# Patient Record
Sex: FEMALE | Race: WHITE | NOT HISPANIC OR LATINO | Employment: UNEMPLOYED | URBAN - METROPOLITAN AREA
[De-identification: names, ages, dates, MRNs, and addresses within clinical notes are randomized per-mention and may not be internally consistent; named-entity substitution may affect disease eponyms.]

---

## 2021-05-28 ENCOUNTER — HOSPITAL ENCOUNTER (EMERGENCY)
Facility: HOSPITAL | Age: 56
Discharge: HOME | End: 2021-05-28
Attending: EMERGENCY MEDICINE
Payer: MEDICARE

## 2021-05-28 VITALS
SYSTOLIC BLOOD PRESSURE: 124 MMHG | HEART RATE: 76 BPM | WEIGHT: 172.31 LBS | OXYGEN SATURATION: 96 % | DIASTOLIC BLOOD PRESSURE: 70 MMHG | TEMPERATURE: 99 F | RESPIRATION RATE: 17 BRPM

## 2021-05-28 DIAGNOSIS — E87.6 HYPOKALEMIA: ICD-10-CM

## 2021-05-28 DIAGNOSIS — R51.9 GENERALIZED HEADACHE: ICD-10-CM

## 2021-05-28 DIAGNOSIS — R11.0 NAUSEA: ICD-10-CM

## 2021-05-28 DIAGNOSIS — R07.9 CHEST PAIN: ICD-10-CM

## 2021-05-28 DIAGNOSIS — E86.0 DEHYDRATION: Primary | ICD-10-CM

## 2021-05-28 DIAGNOSIS — F17.200 SMOKER: ICD-10-CM

## 2021-05-28 LAB
ALBUMIN SERPL BCP-MCNC: 3.9 G/DL (ref 3.5–5.2)
ALP SERPL-CCNC: 62 U/L (ref 38–126)
ALT SERPL W/O P-5'-P-CCNC: 25 U/L (ref 10–44)
AMPHET+METHAMPHET UR QL: NORMAL
ANION GAP SERPL CALC-SCNC: 6 MMOL/L (ref 8–16)
AST SERPL-CCNC: 30 U/L (ref 15–46)
BARBITURATES UR QL SCN>200 NG/ML: NEGATIVE
BASOPHILS # BLD AUTO: 0.07 K/UL (ref 0–0.2)
BASOPHILS NFR BLD: 0.8 % (ref 0–1.9)
BENZODIAZ UR QL SCN>200 NG/ML: NEGATIVE
BILIRUB SERPL-MCNC: 0.3 MG/DL (ref 0.1–1)
BILIRUB UR QL STRIP: NEGATIVE
BZE UR QL SCN: NEGATIVE
CALCIUM SERPL-MCNC: 8.8 MG/DL (ref 8.7–10.5)
CANNABINOIDS UR QL SCN: NEGATIVE
CHLORIDE SERPL-SCNC: 100 MMOL/L (ref 95–110)
CK SERPL-CCNC: 85 U/L (ref 55–170)
CLARITY UR REFRACT.AUTO: CLEAR
CO2 SERPL-SCNC: 29 MMOL/L (ref 23–29)
COLOR UR AUTO: NORMAL
CREAT SERPL-MCNC: 0.75 MG/DL (ref 0.5–1.4)
CREAT UR-MCNC: 28.5 MG/DL (ref 15–325)
DIFFERENTIAL METHOD: NORMAL
EOSINOPHIL # BLD AUTO: 0.1 K/UL (ref 0–0.5)
EOSINOPHIL NFR BLD: 1.1 % (ref 0–8)
ERYTHROCYTE [DISTWIDTH] IN BLOOD BY AUTOMATED COUNT: 13.4 % (ref 11.5–14.5)
EST. GFR  (AFRICAN AMERICAN): >60 ML/MIN/1.73 M^2
EST. GFR  (NON AFRICAN AMERICAN): >60 ML/MIN/1.73 M^2
ETHANOL SERPL-MCNC: <10 MG/DL
GLUCOSE SERPL-MCNC: 114 MG/DL (ref 70–110)
GLUCOSE UR QL STRIP: NEGATIVE
HCT VFR BLD AUTO: 37.7 % (ref 37–48.5)
HGB BLD-MCNC: 12.4 G/DL (ref 12–16)
HGB UR QL STRIP: NEGATIVE
IMM GRANULOCYTES # BLD AUTO: 0.02 K/UL (ref 0–0.04)
IMM GRANULOCYTES NFR BLD AUTO: 0.2 % (ref 0–0.5)
KETONES UR QL STRIP: NEGATIVE
LEUKOCYTE ESTERASE UR QL STRIP: NEGATIVE
LIPASE SERPL-CCNC: 69 U/L (ref 23–300)
LYMPHOCYTES # BLD AUTO: 3.2 K/UL (ref 1–4.8)
LYMPHOCYTES NFR BLD: 35.9 % (ref 18–48)
MCH RBC QN AUTO: 29.1 PG (ref 27–31)
MCHC RBC AUTO-ENTMCNC: 32.9 G/DL (ref 32–36)
MCV RBC AUTO: 89 FL (ref 82–98)
METHADONE UR QL SCN>300 NG/ML: NEGATIVE
MONOCYTES # BLD AUTO: 0.6 K/UL (ref 0.3–1)
MONOCYTES NFR BLD: 6.5 % (ref 4–15)
NEUTROPHILS # BLD AUTO: 4.9 K/UL (ref 1.8–7.7)
NEUTROPHILS NFR BLD: 55.5 % (ref 38–73)
NITRITE UR QL STRIP: NEGATIVE
NRBC BLD-RTO: 0 /100 WBC
NT-PROBNP SERPL-MCNC: 157 PG/ML (ref 5–900)
OPIATES UR QL SCN: NEGATIVE
PCP UR QL SCN>25 NG/ML: NEGATIVE
PH UR STRIP: 7 [PH] (ref 5–8)
PLATELET # BLD AUTO: 248 K/UL (ref 150–450)
PMV BLD AUTO: 11.2 FL (ref 9.2–12.9)
POTASSIUM SERPL-SCNC: 3.3 MMOL/L (ref 3.5–5.1)
PROT SERPL-MCNC: 8 G/DL (ref 6–8.4)
PROT UR QL STRIP: NEGATIVE
RBC # BLD AUTO: 4.26 M/UL (ref 4–5.4)
SODIUM SERPL-SCNC: 135 MMOL/L (ref 136–145)
SP GR UR STRIP: 1 (ref 1–1.03)
TOXICOLOGY INFORMATION: NORMAL
TROPONIN I SERPL-MCNC: <0.012 NG/ML (ref 0.01–0.03)
TROPONIN I SERPL-MCNC: <0.012 NG/ML (ref 0.01–0.03)
URN SPEC COLLECT METH UR: NORMAL
UROBILINOGEN UR STRIP-ACNC: NEGATIVE EU/DL
UUN UR-MCNC: 12 MG/DL (ref 7–17)
WBC # BLD AUTO: 8.87 K/UL (ref 3.9–12.7)

## 2021-05-28 PROCEDURE — 25000003 PHARM REV CODE 250: Mod: ER | Performed by: EMERGENCY MEDICINE

## 2021-05-28 PROCEDURE — 93005 ELECTROCARDIOGRAM TRACING: CPT | Mod: ER

## 2021-05-28 PROCEDURE — 84484 ASSAY OF TROPONIN QUANT: CPT | Mod: 91,ER | Performed by: EMERGENCY MEDICINE

## 2021-05-28 PROCEDURE — 80053 COMPREHEN METABOLIC PANEL: CPT | Mod: ER | Performed by: EMERGENCY MEDICINE

## 2021-05-28 PROCEDURE — 93010 EKG 12-LEAD: ICD-10-PCS | Mod: ,,, | Performed by: INTERNAL MEDICINE

## 2021-05-28 PROCEDURE — 96374 THER/PROPH/DIAG INJ IV PUSH: CPT | Mod: ER

## 2021-05-28 PROCEDURE — 99285 EMERGENCY DEPT VISIT HI MDM: CPT | Mod: 25,ER

## 2021-05-28 PROCEDURE — 93010 ELECTROCARDIOGRAM REPORT: CPT | Mod: ,,, | Performed by: INTERNAL MEDICINE

## 2021-05-28 PROCEDURE — 80307 DRUG TEST PRSMV CHEM ANLYZR: CPT | Mod: ER | Performed by: EMERGENCY MEDICINE

## 2021-05-28 PROCEDURE — 83690 ASSAY OF LIPASE: CPT | Mod: ER | Performed by: EMERGENCY MEDICINE

## 2021-05-28 PROCEDURE — 63600175 PHARM REV CODE 636 W HCPCS: Mod: ER | Performed by: EMERGENCY MEDICINE

## 2021-05-28 PROCEDURE — 82077 ASSAY SPEC XCP UR&BREATH IA: CPT | Mod: ER | Performed by: EMERGENCY MEDICINE

## 2021-05-28 PROCEDURE — 96375 TX/PRO/DX INJ NEW DRUG ADDON: CPT | Mod: ER

## 2021-05-28 PROCEDURE — 85025 COMPLETE CBC W/AUTO DIFF WBC: CPT | Mod: ER | Performed by: EMERGENCY MEDICINE

## 2021-05-28 PROCEDURE — 82550 ASSAY OF CK (CPK): CPT | Mod: ER | Performed by: EMERGENCY MEDICINE

## 2021-05-28 PROCEDURE — 83880 ASSAY OF NATRIURETIC PEPTIDE: CPT | Mod: ER | Performed by: EMERGENCY MEDICINE

## 2021-05-28 PROCEDURE — 96361 HYDRATE IV INFUSION ADD-ON: CPT | Mod: ER

## 2021-05-28 PROCEDURE — 94760 N-INVAS EAR/PLS OXIMETRY 1: CPT | Mod: ER

## 2021-05-28 PROCEDURE — 81003 URINALYSIS AUTO W/O SCOPE: CPT | Mod: ER,59 | Performed by: EMERGENCY MEDICINE

## 2021-05-28 RX ORDER — POTASSIUM CHLORIDE 20 MEQ/1
20 TABLET, EXTENDED RELEASE ORAL
Status: COMPLETED | OUTPATIENT
Start: 2021-05-28 | End: 2021-05-28

## 2021-05-28 RX ORDER — LIDOCAINE HYDROCHLORIDE 20 MG/ML
10 SOLUTION OROPHARYNGEAL
Status: COMPLETED | OUTPATIENT
Start: 2021-05-28 | End: 2021-05-28

## 2021-05-28 RX ORDER — MAG HYDROX/ALUMINUM HYD/SIMETH 200-200-20
30 SUSPENSION, ORAL (FINAL DOSE FORM) ORAL
Status: COMPLETED | OUTPATIENT
Start: 2021-05-28 | End: 2021-05-28

## 2021-05-28 RX ORDER — ASPIRIN 325 MG
325 TABLET ORAL
Status: COMPLETED | OUTPATIENT
Start: 2021-05-28 | End: 2021-05-28

## 2021-05-28 RX ORDER — DEXAMETHASONE SODIUM PHOSPHATE 4 MG/ML
8 INJECTION, SOLUTION INTRA-ARTICULAR; INTRALESIONAL; INTRAMUSCULAR; INTRAVENOUS; SOFT TISSUE
Status: COMPLETED | OUTPATIENT
Start: 2021-05-28 | End: 2021-05-28

## 2021-05-28 RX ORDER — SODIUM CHLORIDE 9 MG/ML
INJECTION, SOLUTION INTRAVENOUS
Status: COMPLETED | OUTPATIENT
Start: 2021-05-28 | End: 2021-05-28

## 2021-05-28 RX ORDER — PANTOPRAZOLE SODIUM 40 MG/1
40 TABLET, DELAYED RELEASE ORAL
Status: COMPLETED | OUTPATIENT
Start: 2021-05-28 | End: 2021-05-28

## 2021-05-28 RX ORDER — METOCLOPRAMIDE HYDROCHLORIDE 5 MG/ML
10 INJECTION INTRAMUSCULAR; INTRAVENOUS
Status: COMPLETED | OUTPATIENT
Start: 2021-05-28 | End: 2021-05-28

## 2021-05-28 RX ORDER — ALBUTEROL SULFATE 1.25 MG/3ML
1.25 SOLUTION RESPIRATORY (INHALATION) EVERY 6 HOURS PRN
COMMUNITY

## 2021-05-28 RX ORDER — ONDANSETRON 2 MG/ML
4 INJECTION INTRAMUSCULAR; INTRAVENOUS
Status: COMPLETED | OUTPATIENT
Start: 2021-05-28 | End: 2021-05-28

## 2021-05-28 RX ORDER — FAMOTIDINE 20 MG/1
20 TABLET, FILM COATED ORAL
Status: COMPLETED | OUTPATIENT
Start: 2021-05-28 | End: 2021-05-28

## 2021-05-28 RX ADMIN — FAMOTIDINE 20 MG: 20 TABLET ORAL at 09:05

## 2021-05-28 RX ADMIN — METOCLOPRAMIDE 10 MG: 5 INJECTION, SOLUTION INTRAMUSCULAR; INTRAVENOUS at 09:05

## 2021-05-28 RX ADMIN — DEXAMETHASONE SODIUM PHOSPHATE 8 MG: 4 INJECTION, SOLUTION INTRAMUSCULAR; INTRAVENOUS at 09:05

## 2021-05-28 RX ADMIN — PANTOPRAZOLE SODIUM 40 MG: 40 TABLET, DELAYED RELEASE ORAL at 09:05

## 2021-05-28 RX ADMIN — ASPIRIN 325 MG ORAL TABLET 325 MG: 325 PILL ORAL at 05:05

## 2021-05-28 RX ADMIN — SODIUM CHLORIDE 1000 ML: 0.9 INJECTION, SOLUTION INTRAVENOUS at 10:05

## 2021-05-28 RX ADMIN — ALUMINUM HYDROXIDE, MAGNESIUM HYDROXIDE, AND SIMETHICONE 30 ML: 200; 200; 20 SUSPENSION ORAL at 09:05

## 2021-05-28 RX ADMIN — POTASSIUM CHLORIDE 20 MEQ: 1500 TABLET, EXTENDED RELEASE ORAL at 06:05

## 2021-05-28 RX ADMIN — LIDOCAINE HYDROCHLORIDE 10 ML: 20 SOLUTION ORAL; TOPICAL at 09:05

## 2021-05-28 RX ADMIN — SODIUM CHLORIDE 1000 ML: 0.9 INJECTION, SOLUTION INTRAVENOUS at 09:05

## 2021-05-28 RX ADMIN — ONDANSETRON 4 MG: 2 INJECTION INTRAMUSCULAR; INTRAVENOUS at 05:05

## 2021-05-28 RX ADMIN — SODIUM CHLORIDE: 0.9 INJECTION, SOLUTION INTRAVENOUS at 05:05

## 2021-06-08 ENCOUNTER — HOSPITAL ENCOUNTER (EMERGENCY)
Facility: HOSPITAL | Age: 56
Discharge: HOME | End: 2021-06-08
Attending: EMERGENCY MEDICINE
Payer: MEDICARE

## 2021-06-08 VITALS
BODY MASS INDEX: 23.1 KG/M2 | HEART RATE: 86 BPM | TEMPERATURE: 99 F | RESPIRATION RATE: 15 BRPM | HEIGHT: 71 IN | OXYGEN SATURATION: 98 % | WEIGHT: 165 LBS | SYSTOLIC BLOOD PRESSURE: 121 MMHG | DIASTOLIC BLOOD PRESSURE: 71 MMHG

## 2021-06-08 DIAGNOSIS — L03.115 CELLULITIS OF RIGHT LOWER EXTREMITY: Primary | ICD-10-CM

## 2021-06-08 LAB
AMPHET+METHAMPHET UR QL: NORMAL
BACTERIA #/AREA URNS AUTO: NORMAL /HPF
BARBITURATES UR QL SCN>200 NG/ML: NEGATIVE
BENZODIAZ UR QL SCN>200 NG/ML: NEGATIVE
BILIRUB UR QL STRIP: ABNORMAL
BZE UR QL SCN: NEGATIVE
CANNABINOIDS UR QL SCN: NEGATIVE
CLARITY UR REFRACT.AUTO: CLEAR
COLOR UR AUTO: YELLOW
CREAT UR-MCNC: 245.4 MG/DL (ref 15–325)
GLUCOSE UR QL STRIP: NEGATIVE
HGB UR QL STRIP: ABNORMAL
HYALINE CASTS UR QL AUTO: 0 /LPF
KETONES UR QL STRIP: ABNORMAL
LEUKOCYTE ESTERASE UR QL STRIP: ABNORMAL
METHADONE UR QL SCN>300 NG/ML: NEGATIVE
MICROSCOPIC COMMENT: NORMAL
NITRITE UR QL STRIP: NEGATIVE
OPIATES UR QL SCN: NEGATIVE
PCP UR QL SCN>25 NG/ML: NEGATIVE
PH UR STRIP: 6 [PH] (ref 5–8)
PROT UR QL STRIP: ABNORMAL
RBC #/AREA URNS AUTO: 2 /HPF (ref 0–4)
SP GR UR STRIP: 1.02 (ref 1–1.03)
TOXICOLOGY INFORMATION: NORMAL
URN SPEC COLLECT METH UR: ABNORMAL
UROBILINOGEN UR STRIP-ACNC: ABNORMAL EU/DL
WBC #/AREA URNS AUTO: 2 /HPF (ref 0–5)

## 2021-06-08 PROCEDURE — 81000 URINALYSIS NONAUTO W/SCOPE: CPT | Mod: ER | Performed by: EMERGENCY MEDICINE

## 2021-06-08 PROCEDURE — 96372 THER/PROPH/DIAG INJ SC/IM: CPT | Mod: ER

## 2021-06-08 PROCEDURE — 25000003 PHARM REV CODE 250: Mod: ER | Performed by: EMERGENCY MEDICINE

## 2021-06-08 PROCEDURE — 99284 EMERGENCY DEPT VISIT MOD MDM: CPT | Mod: 25,ER

## 2021-06-08 RX ORDER — TRAMADOL HYDROCHLORIDE AND ACETAMINOPHEN 37.5; 325 MG/1; MG/1
1 TABLET, FILM COATED ORAL EVERY 6 HOURS PRN
Qty: 9 TABLET | Refills: 0 | Status: SHIPPED | OUTPATIENT
Start: 2021-06-08

## 2021-06-08 RX ORDER — CLINDAMYCIN PHOSPHATE 150 MG/ML
900 INJECTION, SOLUTION INTRAVENOUS
Status: COMPLETED | OUTPATIENT
Start: 2021-06-08 | End: 2021-06-08

## 2021-06-08 RX ORDER — CLINDAMYCIN HYDROCHLORIDE 150 MG/1
300 CAPSULE ORAL 4 TIMES DAILY
Qty: 56 CAPSULE | Refills: 0 | Status: SHIPPED | OUTPATIENT
Start: 2021-06-08 | End: 2021-06-15

## 2021-06-08 RX ORDER — KETOROLAC TROMETHAMINE 10 MG/1
10 TABLET, FILM COATED ORAL
Status: COMPLETED | OUTPATIENT
Start: 2021-06-08 | End: 2021-06-08

## 2021-06-08 RX ADMIN — CLINDAMYCIN PHOSPHATE 900 MG: 150 INJECTION, SOLUTION INTRAMUSCULAR; INTRAVENOUS at 04:06

## 2021-06-08 RX ADMIN — KETOROLAC TROMETHAMINE 10 MG: 10 TABLET, FILM COATED ORAL at 02:06

## 2021-07-24 ENCOUNTER — HOSPITAL ENCOUNTER (EMERGENCY)
Facility: HOSPITAL | Age: 56
Discharge: HOME | End: 2021-07-24
Attending: EMERGENCY MEDICINE
Payer: MEDICARE

## 2021-07-24 VITALS
OXYGEN SATURATION: 94 % | SYSTOLIC BLOOD PRESSURE: 106 MMHG | WEIGHT: 165 LBS | RESPIRATION RATE: 18 BRPM | BODY MASS INDEX: 23.01 KG/M2 | TEMPERATURE: 98 F | HEART RATE: 76 BPM | DIASTOLIC BLOOD PRESSURE: 67 MMHG

## 2021-07-24 DIAGNOSIS — F15.10 AMPHETAMINE ABUSE: ICD-10-CM

## 2021-07-24 DIAGNOSIS — T50.901A ACCIDENTAL DRUG OVERDOSE, INITIAL ENCOUNTER: Primary | ICD-10-CM

## 2021-07-24 DIAGNOSIS — F11.10 OPIATE ABUSE, CONTINUOUS: ICD-10-CM

## 2021-07-24 LAB
AMPHET+METHAMPHET UR QL: ABNORMAL
BARBITURATES UR QL SCN>200 NG/ML: NEGATIVE
BENZODIAZ UR QL SCN>200 NG/ML: NEGATIVE
BZE UR QL SCN: NEGATIVE
CANNABINOIDS UR QL SCN: NEGATIVE
CREAT UR-MCNC: 69.1 MG/DL (ref 15–325)
METHADONE UR QL SCN>300 NG/ML: NEGATIVE
OPIATES UR QL SCN: NEGATIVE
PCP UR QL SCN>25 NG/ML: NEGATIVE
POCT GLUCOSE: 188 MG/DL (ref 70–110)
TOXICOLOGY INFORMATION: ABNORMAL

## 2021-07-24 PROCEDURE — 82962 GLUCOSE BLOOD TEST: CPT | Mod: ER

## 2021-07-24 PROCEDURE — 80307 DRUG TEST PRSMV CHEM ANLYZR: CPT | Mod: ER | Performed by: EMERGENCY MEDICINE

## 2021-07-24 PROCEDURE — 99283 EMERGENCY DEPT VISIT LOW MDM: CPT | Mod: 25,ER

## 2021-07-24 RX ORDER — NALOXONE HYDROCHLORIDE 4 MG/.1ML
SPRAY NASAL
Qty: 1 EACH | Refills: 1 | OUTPATIENT
Start: 2021-07-24 | End: 2022-03-24

## 2021-07-24 RX ORDER — AMLODIPINE BESYLATE 5 MG/1
5 TABLET ORAL DAILY
COMMUNITY
End: 2022-03-24 | Stop reason: SDUPTHER

## 2021-12-21 ENCOUNTER — HOSPITAL ENCOUNTER (EMERGENCY)
Facility: HOSPITAL | Age: 56
Discharge: HOME | End: 2021-12-21
Attending: EMERGENCY MEDICINE
Payer: MEDICARE

## 2021-12-21 VITALS
WEIGHT: 175 LBS | RESPIRATION RATE: 18 BRPM | BODY MASS INDEX: 24.5 KG/M2 | HEIGHT: 71 IN | SYSTOLIC BLOOD PRESSURE: 174 MMHG | HEART RATE: 74 BPM | TEMPERATURE: 98 F | OXYGEN SATURATION: 100 % | DIASTOLIC BLOOD PRESSURE: 82 MMHG

## 2021-12-21 DIAGNOSIS — S89.91XA RIGHT KNEE INJURY, INITIAL ENCOUNTER: ICD-10-CM

## 2021-12-21 DIAGNOSIS — S02.85XA RIGHT ORBITAL FRACTURE, CLOSED, INITIAL ENCOUNTER: Primary | ICD-10-CM

## 2021-12-21 PROCEDURE — 63600175 PHARM REV CODE 636 W HCPCS: Mod: ER | Performed by: EMERGENCY MEDICINE

## 2021-12-21 PROCEDURE — 99284 EMERGENCY DEPT VISIT MOD MDM: CPT | Mod: 25,ER

## 2021-12-21 PROCEDURE — 25000003 PHARM REV CODE 250: Mod: ER | Performed by: EMERGENCY MEDICINE

## 2021-12-21 PROCEDURE — 96372 THER/PROPH/DIAG INJ SC/IM: CPT | Mod: ER

## 2021-12-21 RX ORDER — DICLOFENAC SODIUM 75 MG/1
75 TABLET, DELAYED RELEASE ORAL 2 TIMES DAILY
Qty: 60 TABLET | Refills: 0 | Status: SHIPPED | OUTPATIENT
Start: 2021-12-21

## 2021-12-21 RX ORDER — ACETAMINOPHEN 500 MG
1000 TABLET ORAL
Status: COMPLETED | OUTPATIENT
Start: 2021-12-21 | End: 2021-12-21

## 2021-12-21 RX ORDER — AMOXICILLIN AND CLAVULANATE POTASSIUM 875; 125 MG/1; MG/1
1 TABLET, FILM COATED ORAL
Status: COMPLETED | OUTPATIENT
Start: 2021-12-21 | End: 2021-12-21

## 2021-12-21 RX ORDER — KETOROLAC TROMETHAMINE 30 MG/ML
15 INJECTION, SOLUTION INTRAMUSCULAR; INTRAVENOUS
Status: COMPLETED | OUTPATIENT
Start: 2021-12-21 | End: 2021-12-21

## 2021-12-21 RX ORDER — AMOXICILLIN AND CLAVULANATE POTASSIUM 875; 125 MG/1; MG/1
1 TABLET, FILM COATED ORAL 2 TIMES DAILY
Qty: 14 TABLET | Refills: 0 | Status: SHIPPED | OUTPATIENT
Start: 2021-12-21 | End: 2022-03-24 | Stop reason: CLARIF

## 2021-12-21 RX ADMIN — AMOXICILLIN AND CLAVULANATE POTASSIUM 1 TABLET: 875; 125 TABLET, FILM COATED ORAL at 08:12

## 2021-12-21 RX ADMIN — KETOROLAC TROMETHAMINE 15 MG: 30 INJECTION, SOLUTION INTRAMUSCULAR; INTRAVENOUS at 08:12

## 2021-12-21 RX ADMIN — ACETAMINOPHEN 1000 MG: 500 TABLET ORAL at 07:12

## 2021-12-29 ENCOUNTER — TELEPHONE (OUTPATIENT)
Dept: ADMINISTRATIVE | Facility: OTHER | Age: 56
End: 2021-12-29

## 2022-03-24 ENCOUNTER — HOSPITAL ENCOUNTER (EMERGENCY)
Facility: HOSPITAL | Age: 57
Discharge: HOME | End: 2022-03-24
Attending: EMERGENCY MEDICINE
Payer: MEDICARE

## 2022-03-24 VITALS
WEIGHT: 194.69 LBS | DIASTOLIC BLOOD PRESSURE: 61 MMHG | OXYGEN SATURATION: 100 % | HEIGHT: 71 IN | RESPIRATION RATE: 18 BRPM | TEMPERATURE: 98 F | HEART RATE: 72 BPM | BODY MASS INDEX: 27.26 KG/M2 | SYSTOLIC BLOOD PRESSURE: 106 MMHG

## 2022-03-24 DIAGNOSIS — R40.20 LOSS OF CONSCIOUSNESS: ICD-10-CM

## 2022-03-24 DIAGNOSIS — T40.601A OPIATE OVERDOSE, ACCIDENTAL OR UNINTENTIONAL, INITIAL ENCOUNTER: Primary | ICD-10-CM

## 2022-03-24 LAB
ALBUMIN SERPL BCP-MCNC: 3.7 G/DL (ref 3.5–5.2)
ALP SERPL-CCNC: 87 U/L (ref 38–126)
ALT SERPL W/O P-5'-P-CCNC: 68 U/L (ref 10–44)
ANION GAP SERPL CALC-SCNC: 9 MMOL/L (ref 8–16)
AST SERPL-CCNC: 69 U/L (ref 15–46)
BASOPHILS # BLD AUTO: 0.05 K/UL (ref 0–0.2)
BASOPHILS NFR BLD: 0.6 % (ref 0–1.9)
BILIRUB SERPL-MCNC: 0.2 MG/DL (ref 0.1–1)
BILIRUB UR QL STRIP: NEGATIVE
CALCIUM SERPL-MCNC: 8.3 MG/DL (ref 8.7–10.5)
CHLORIDE SERPL-SCNC: 101 MMOL/L (ref 95–110)
CK SERPL-CCNC: 167 U/L (ref 55–170)
CLARITY UR REFRACT.AUTO: CLEAR
CO2 SERPL-SCNC: 27 MMOL/L (ref 23–29)
COLOR UR AUTO: YELLOW
CREAT SERPL-MCNC: 0.91 MG/DL (ref 0.5–1.4)
DIFFERENTIAL METHOD: ABNORMAL
EOSINOPHIL # BLD AUTO: 0.2 K/UL (ref 0–0.5)
EOSINOPHIL NFR BLD: 2.4 % (ref 0–8)
ERYTHROCYTE [DISTWIDTH] IN BLOOD BY AUTOMATED COUNT: 12.9 % (ref 11.5–14.5)
EST. GFR  (AFRICAN AMERICAN): >60 ML/MIN/1.73 M^2
EST. GFR  (NON AFRICAN AMERICAN): >60 ML/MIN/1.73 M^2
GLUCOSE SERPL-MCNC: 140 MG/DL (ref 70–110)
GLUCOSE UR QL STRIP: ABNORMAL
HCT VFR BLD AUTO: 33.3 % (ref 37–48.5)
HGB BLD-MCNC: 10.9 G/DL (ref 12–16)
HGB UR QL STRIP: ABNORMAL
IMM GRANULOCYTES # BLD AUTO: 0.02 K/UL (ref 0–0.04)
IMM GRANULOCYTES NFR BLD AUTO: 0.3 % (ref 0–0.5)
KETONES UR QL STRIP: NEGATIVE
LEUKOCYTE ESTERASE UR QL STRIP: NEGATIVE
LYMPHOCYTES # BLD AUTO: 1.4 K/UL (ref 1–4.8)
LYMPHOCYTES NFR BLD: 18 % (ref 18–48)
MCH RBC QN AUTO: 29.1 PG (ref 27–31)
MCHC RBC AUTO-ENTMCNC: 32.7 G/DL (ref 32–36)
MCV RBC AUTO: 89 FL (ref 82–98)
MONOCYTES # BLD AUTO: 0.4 K/UL (ref 0.3–1)
MONOCYTES NFR BLD: 5.3 % (ref 4–15)
NEUTROPHILS # BLD AUTO: 5.7 K/UL (ref 1.8–7.7)
NEUTROPHILS NFR BLD: 73.4 % (ref 38–73)
NITRITE UR QL STRIP: NEGATIVE
NRBC BLD-RTO: 0 /100 WBC
PH UR STRIP: 6 [PH] (ref 5–8)
PLATELET # BLD AUTO: 249 K/UL (ref 150–450)
PMV BLD AUTO: 10.1 FL (ref 9.2–12.9)
POTASSIUM SERPL-SCNC: 3.9 MMOL/L (ref 3.5–5.1)
PROT SERPL-MCNC: 7.8 G/DL (ref 6–8.4)
PROT UR QL STRIP: ABNORMAL
RBC # BLD AUTO: 3.75 M/UL (ref 4–5.4)
SODIUM SERPL-SCNC: 137 MMOL/L (ref 136–145)
SP GR UR STRIP: 1.02 (ref 1–1.03)
URN SPEC COLLECT METH UR: ABNORMAL
UROBILINOGEN UR STRIP-ACNC: NEGATIVE EU/DL
UUN UR-MCNC: 23 MG/DL (ref 7–17)
WBC # BLD AUTO: 7.76 K/UL (ref 3.9–12.7)

## 2022-03-24 PROCEDURE — 99284 EMERGENCY DEPT VISIT MOD MDM: CPT | Mod: ER

## 2022-03-24 PROCEDURE — 80053 COMPREHEN METABOLIC PANEL: CPT | Mod: ER | Performed by: EMERGENCY MEDICINE

## 2022-03-24 PROCEDURE — 93010 ELECTROCARDIOGRAM REPORT: CPT | Mod: ,,, | Performed by: INTERNAL MEDICINE

## 2022-03-24 PROCEDURE — 81003 URINALYSIS AUTO W/O SCOPE: CPT | Mod: ER | Performed by: EMERGENCY MEDICINE

## 2022-03-24 PROCEDURE — 82550 ASSAY OF CK (CPK): CPT | Mod: ER | Performed by: EMERGENCY MEDICINE

## 2022-03-24 PROCEDURE — 93005 ELECTROCARDIOGRAM TRACING: CPT | Mod: ER

## 2022-03-24 PROCEDURE — 93010 EKG 12-LEAD: ICD-10-PCS | Mod: ,,, | Performed by: INTERNAL MEDICINE

## 2022-03-24 PROCEDURE — 85025 COMPLETE CBC W/AUTO DIFF WBC: CPT | Mod: ER | Performed by: EMERGENCY MEDICINE

## 2022-03-24 RX ORDER — NALOXONE HYDROCHLORIDE 4 MG/.1ML
SPRAY NASAL
Qty: 1 EACH | Refills: 11 | Status: SHIPPED | OUTPATIENT
Start: 2022-03-24

## 2022-03-24 RX ORDER — AMLODIPINE BESYLATE 5 MG/1
5 TABLET ORAL DAILY
Qty: 30 TABLET | Refills: 0 | Status: SHIPPED | OUTPATIENT
Start: 2022-03-24

## 2022-03-24 RX ORDER — HYDROXYZINE PAMOATE 25 MG/1
25 CAPSULE ORAL 4 TIMES DAILY
Qty: 30 CAPSULE | Refills: 0 | Status: SHIPPED | OUTPATIENT
Start: 2022-03-24

## 2022-03-24 RX ORDER — ALBUTEROL SULFATE 90 UG/1
1-2 AEROSOL, METERED RESPIRATORY (INHALATION) EVERY 6 HOURS PRN
Qty: 6.7 G | Refills: 0 | Status: SHIPPED | OUTPATIENT
Start: 2022-03-24
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
## 2024-08-12 ENCOUNTER — HOSPITAL ENCOUNTER (INPATIENT)
Facility: HOSPITAL | Age: 59
LOS: 1 days | Discharge: HOME | DRG: 291 | End: 2024-08-14
Attending: EMERGENCY MEDICINE | Admitting: HOSPITALIST
Payer: MEDICARE

## 2024-08-12 DIAGNOSIS — R79.89 ELEVATED BRAIN NATRIURETIC PEPTIDE (BNP) LEVEL: ICD-10-CM

## 2024-08-12 DIAGNOSIS — D50.0 IRON DEFICIENCY ANEMIA DUE TO CHRONIC BLOOD LOSS: ICD-10-CM

## 2024-08-12 DIAGNOSIS — E87.6 HYPOKALEMIA: ICD-10-CM

## 2024-08-12 DIAGNOSIS — I50.9 ACUTE ON CHRONIC CONGESTIVE HEART FAILURE, UNSPECIFIED HEART FAILURE TYPE: Primary | ICD-10-CM

## 2024-08-12 DIAGNOSIS — C19 COLORECTAL CARCINOMA: ICD-10-CM

## 2024-08-12 DIAGNOSIS — R06.02 SHORTNESS OF BREATH: ICD-10-CM

## 2024-08-12 PROBLEM — Z59.00 HOMELESSNESS: Status: ACTIVE | Noted: 2024-08-12

## 2024-08-12 PROBLEM — Z87.891 SMOKING HISTORY: Status: ACTIVE | Noted: 2024-08-12

## 2024-08-12 PROBLEM — D64.9 ANEMIA: Status: ACTIVE | Noted: 2024-08-12

## 2024-08-12 PROBLEM — R94.4 DECREASED GFR: Status: ACTIVE | Noted: 2024-08-12

## 2024-08-12 LAB
ALBUMIN SERPL BCP-MCNC: 2 G/DL (ref 3.5–5.2)
ALBUMIN SERPL BCP-MCNC: 2.2 G/DL (ref 3.5–5.2)
ALP SERPL-CCNC: 97 U/L (ref 55–135)
ALP SERPL-CCNC: 98 U/L (ref 55–135)
ALT SERPL W/O P-5'-P-CCNC: 17 U/L (ref 10–44)
ALT SERPL W/O P-5'-P-CCNC: 21 U/L (ref 10–44)
AMPHET+METHAMPHET UR QL: ABNORMAL
ANION GAP SERPL CALC-SCNC: 13 MMOL/L (ref 8–16)
ANION GAP SERPL CALC-SCNC: 13 MMOL/L (ref 8–16)
APICAL FOUR CHAMBER EJECTION FRACTION: 21 %
APICAL TWO CHAMBER EJECTION FRACTION: 47 %
ASCENDING AORTA: 3.4 CM
AST SERPL-CCNC: 14 U/L (ref 10–40)
AST SERPL-CCNC: 15 U/L (ref 10–40)
AV INDEX (PROSTH): 0.62
AV MEAN GRADIENT: 4 MMHG
AV PEAK GRADIENT: 8 MMHG
AV VALVE AREA BY VELOCITY RATIO: 2.02 CM²
AV VALVE AREA: 1.95 CM²
AV VELOCITY RATIO: 0.64
BARBITURATES UR QL SCN>200 NG/ML: NEGATIVE
BASOPHILS # BLD AUTO: 0.06 K/UL (ref 0–0.2)
BASOPHILS # BLD AUTO: 0.08 K/UL (ref 0–0.2)
BASOPHILS NFR BLD: 0.7 % (ref 0–1.9)
BASOPHILS NFR BLD: 1 % (ref 0–1.9)
BENZODIAZ UR QL SCN>200 NG/ML: NEGATIVE
BILIRUB SERPL-MCNC: 0.4 MG/DL (ref 0.1–1)
BILIRUB SERPL-MCNC: 0.5 MG/DL (ref 0.1–1)
BNP SERPL-MCNC: 347 PG/ML (ref 0–99)
BSA FOR ECHO PROCEDURE: 1.97 M2
BUN SERPL-MCNC: 12 MG/DL (ref 6–20)
BUN SERPL-MCNC: 13 MG/DL (ref 6–20)
BZE UR QL SCN: NEGATIVE
CALCIUM SERPL-MCNC: 7.9 MG/DL (ref 8.7–10.5)
CALCIUM SERPL-MCNC: 8.2 MG/DL (ref 8.7–10.5)
CANNABINOIDS UR QL SCN: NEGATIVE
CHLORIDE SERPL-SCNC: 102 MMOL/L (ref 95–110)
CHLORIDE SERPL-SCNC: 102 MMOL/L (ref 95–110)
CO2 SERPL-SCNC: 20 MMOL/L (ref 23–29)
CO2 SERPL-SCNC: 21 MMOL/L (ref 23–29)
CREAT SERPL-MCNC: 1.3 MG/DL (ref 0.5–1.4)
CREAT SERPL-MCNC: 1.3 MG/DL (ref 0.5–1.4)
CREAT UR-MCNC: 6 MG/DL (ref 15–325)
CTP QC/QA: YES
CTP QC/QA: YES
CV ECHO LV RWT: 0.34 CM
DIFFERENTIAL METHOD BLD: ABNORMAL
DIFFERENTIAL METHOD BLD: ABNORMAL
DOP CALC AO PEAK VEL: 1.4 M/S
DOP CALC AO VTI: 22.9 CM
DOP CALC LVOT AREA: 3.2 CM2
DOP CALC LVOT DIAMETER: 2.01 CM
DOP CALC LVOT PEAK VEL: 0.89 M/S
DOP CALC LVOT STROKE VOLUME: 44.72 CM3
DOP CALC MV VTI: 20.5 CM
DOP CALCLVOT PEAK VEL VTI: 14.1 CM
E WAVE DECELERATION TIME: 154.28 MSEC
E/A RATIO: 0.86
E/E' RATIO: 11.69 M/S
ECHO LV POSTERIOR WALL: 1.03 CM (ref 0.6–1.1)
EOSINOPHIL # BLD AUTO: 0.2 K/UL (ref 0–0.5)
EOSINOPHIL # BLD AUTO: 0.3 K/UL (ref 0–0.5)
EOSINOPHIL NFR BLD: 2.6 % (ref 0–8)
EOSINOPHIL NFR BLD: 3.5 % (ref 0–8)
ERYTHROCYTE [DISTWIDTH] IN BLOOD BY AUTOMATED COUNT: 21.1 % (ref 11.5–14.5)
ERYTHROCYTE [DISTWIDTH] IN BLOOD BY AUTOMATED COUNT: 21.2 % (ref 11.5–14.5)
EST. GFR  (NO RACE VARIABLE): 48 ML/MIN/1.73 M^2
EST. GFR  (NO RACE VARIABLE): 48 ML/MIN/1.73 M^2
ETHANOL UR-MCNC: <10 MG/DL
FERRITIN SERPL-MCNC: 112 NG/ML (ref 20–300)
FRACTIONAL SHORTENING: 7 % (ref 28–44)
GLUCOSE SERPL-MCNC: 86 MG/DL (ref 70–110)
GLUCOSE SERPL-MCNC: 91 MG/DL (ref 70–110)
HCT VFR BLD AUTO: 28.2 % (ref 37–48.5)
HCT VFR BLD AUTO: 28.3 % (ref 37–48.5)
HGB BLD-MCNC: 8.6 G/DL (ref 12–16)
HGB BLD-MCNC: 8.8 G/DL (ref 12–16)
IMM GRANULOCYTES # BLD AUTO: 0.03 K/UL (ref 0–0.04)
IMM GRANULOCYTES # BLD AUTO: 0.04 K/UL (ref 0–0.04)
IMM GRANULOCYTES NFR BLD AUTO: 0.4 % (ref 0–0.5)
IMM GRANULOCYTES NFR BLD AUTO: 0.4 % (ref 0–0.5)
INTERVENTRICULAR SEPTUM: 0.79 CM (ref 0.6–1.1)
IRON SERPL-MCNC: 21 UG/DL (ref 30–160)
IVRT: 111.32 MSEC
LA MAJOR: 5.97 CM
LA MINOR: 5.63 CM
LA WIDTH: 4.8 CM
LEFT ATRIUM AREA SYSTOLIC (APICAL 2 CHAMBER): 23.67 CM2
LEFT ATRIUM AREA SYSTOLIC (APICAL 4 CHAMBER): 25.77 CM2
LEFT ATRIUM SIZE: 4.22 CM
LEFT ATRIUM VOLUME INDEX MOD: 44.1 ML/M2
LEFT ATRIUM VOLUME INDEX: 52.2 ML/M2
LEFT ATRIUM VOLUME MOD: 84.22 CM3
LEFT ATRIUM VOLUME: 99.78 CM3
LEFT INTERNAL DIMENSION IN SYSTOLE: 5.54 CM (ref 2.1–4)
LEFT VENTRICLE DIASTOLIC VOLUME INDEX: 93.4 ML/M2
LEFT VENTRICLE DIASTOLIC VOLUME: 178.4 ML
LEFT VENTRICLE END DIASTOLIC VOLUME APICAL 2 CHAMBER: 143.67 ML
LEFT VENTRICLE END DIASTOLIC VOLUME APICAL 4 CHAMBER: 145.65 ML
LEFT VENTRICLE END SYSTOLIC VOLUME APICAL 2 CHAMBER: 78.97 ML
LEFT VENTRICLE END SYSTOLIC VOLUME APICAL 4 CHAMBER: 82.83 ML
LEFT VENTRICLE MASS INDEX: 114 G/M2
LEFT VENTRICLE SYSTOLIC VOLUME INDEX: 78.6 ML/M2
LEFT VENTRICLE SYSTOLIC VOLUME: 150.13 ML
LEFT VENTRICULAR INTERNAL DIMENSION IN DIASTOLE: 5.98 CM (ref 3.5–6)
LEFT VENTRICULAR MASS: 217.51 G
LV LATERAL E/E' RATIO: 12.67 M/S
LV SEPTAL E/E' RATIO: 10.86 M/S
LVED V (TEICH): 178.4 ML
LVES V (TEICH): 150.13 ML
LVOT MG: 1.74 MMHG
LVOT MV: 0.62 CM/S
LYMPHOCYTES # BLD AUTO: 1.9 K/UL (ref 1–4.8)
LYMPHOCYTES # BLD AUTO: 2 K/UL (ref 1–4.8)
LYMPHOCYTES NFR BLD: 22 % (ref 18–48)
LYMPHOCYTES NFR BLD: 24.9 % (ref 18–48)
MAGNESIUM SERPL-MCNC: 1.4 MG/DL (ref 1.6–2.6)
MCH RBC QN AUTO: 24.1 PG (ref 27–31)
MCH RBC QN AUTO: 24.2 PG (ref 27–31)
MCHC RBC AUTO-ENTMCNC: 30.4 G/DL (ref 32–36)
MCHC RBC AUTO-ENTMCNC: 31.2 G/DL (ref 32–36)
MCV RBC AUTO: 78 FL (ref 82–98)
MCV RBC AUTO: 79 FL (ref 82–98)
METHADONE UR QL SCN>300 NG/ML: NEGATIVE
MONOCYTES # BLD AUTO: 0.5 K/UL (ref 0.3–1)
MONOCYTES # BLD AUTO: 0.5 K/UL (ref 0.3–1)
MONOCYTES NFR BLD: 5.2 % (ref 4–15)
MONOCYTES NFR BLD: 6.8 % (ref 4–15)
MV MEAN GRADIENT: 2 MMHG
MV PEAK A VEL: 0.88 M/S
MV PEAK E VEL: 0.76 M/S
MV PEAK GRADIENT: 3 MMHG
MV VALVE AREA BY CONTINUITY EQUATION: 2.18 CM2
NEUTROPHILS # BLD AUTO: 4.8 K/UL (ref 1.8–7.7)
NEUTROPHILS # BLD AUTO: 6.2 K/UL (ref 1.8–7.7)
NEUTROPHILS NFR BLD: 63.4 % (ref 38–73)
NEUTROPHILS NFR BLD: 69.1 % (ref 38–73)
NRBC BLD-RTO: 0 /100 WBC
NRBC BLD-RTO: 0 /100 WBC
OHS CV RV/LV RATIO: 0.54 CM
OHS LV EJECTION FRACTION SIMPSONS BIPLANE MOD: 34 %
OPIATES UR QL SCN: NEGATIVE
PCP UR QL SCN>25 NG/ML: NEGATIVE
PHOSPHATE SERPL-MCNC: 3.5 MG/DL (ref 2.7–4.5)
PISA TR MAX VEL: 2.86 M/S
PLATELET # BLD AUTO: 317 K/UL (ref 150–450)
PLATELET # BLD AUTO: 358 K/UL (ref 150–450)
PLATELET BLD QL SMEAR: ABNORMAL
PMV BLD AUTO: 9.5 FL (ref 9.2–12.9)
PMV BLD AUTO: ABNORMAL FL (ref 9.2–12.9)
POC MOLECULAR INFLUENZA A AGN: NEGATIVE
POC MOLECULAR INFLUENZA B AGN: NEGATIVE
POCT GLUCOSE: 103 MG/DL (ref 70–110)
POTASSIUM SERPL-SCNC: 3 MMOL/L (ref 3.5–5.1)
POTASSIUM SERPL-SCNC: 3.2 MMOL/L (ref 3.5–5.1)
PROT SERPL-MCNC: 5.9 G/DL (ref 6–8.4)
PROT SERPL-MCNC: 6.7 G/DL (ref 6–8.4)
PULM VEIN S/D RATIO: 1.19
PV PEAK D VEL: 0.43 M/S
PV PEAK S VEL: 0.51 M/S
RA MAJOR: 5.27 CM
RA PRESSURE ESTIMATED: 3 MMHG
RA WIDTH: 4.99 CM
RBC # BLD AUTO: 3.57 M/UL (ref 4–5.4)
RBC # BLD AUTO: 3.63 M/UL (ref 4–5.4)
RIGHT VENTRICULAR END-DIASTOLIC DIMENSION: 3.23 CM
RV TB RVSP: 6 MMHG
RV TISSUE DOPPLER FREE WALL SYSTOLIC VELOCITY 1 (APICAL 4 CHAMBER VIEW): 7.35 CM/S
SARS-COV-2 RDRP RESP QL NAA+PROBE: NEGATIVE
SATURATED IRON: 7 % (ref 20–50)
SINUS: 3.84 CM
SODIUM SERPL-SCNC: 135 MMOL/L (ref 136–145)
SODIUM SERPL-SCNC: 136 MMOL/L (ref 136–145)
STJ: 3.5 CM
TDI LATERAL: 0.06 M/S
TDI SEPTAL: 0.07 M/S
TDI: 0.07 M/S
TOTAL IRON BINDING CAPACITY: 283 UG/DL (ref 250–450)
TOXICOLOGY INFORMATION: ABNORMAL
TR MAX PG: 33 MMHG
TRANSFERRIN SERPL-MCNC: 191 MG/DL (ref 200–375)
TRICUSPID ANNULAR PLANE SYSTOLIC EXCURSION: 2.16 CM
TROPONIN I SERPL DL<=0.01 NG/ML-MCNC: 0.06 NG/ML (ref 0–0.03)
TROPONIN I SERPL DL<=0.01 NG/ML-MCNC: 0.06 NG/ML (ref 0–0.03)
TV REST PULMONARY ARTERY PRESSURE: 36 MMHG
WBC # BLD AUTO: 7.63 K/UL (ref 3.9–12.7)
WBC # BLD AUTO: 8.96 K/UL (ref 3.9–12.7)
Z-SCORE OF LEFT VENTRICULAR DIMENSION IN END DIASTOLE: 1.07
Z-SCORE OF LEFT VENTRICULAR DIMENSION IN END SYSTOLE: 4.07

## 2024-08-12 PROCEDURE — 96366 THER/PROPH/DIAG IV INF ADDON: CPT

## 2024-08-12 PROCEDURE — 99205 OFFICE O/P NEW HI 60 MIN: CPT | Mod: ,,, | Performed by: NURSE PRACTITIONER

## 2024-08-12 PROCEDURE — 84484 ASSAY OF TROPONIN QUANT: CPT | Mod: 91 | Performed by: EMERGENCY MEDICINE

## 2024-08-12 PROCEDURE — 96365 THER/PROPH/DIAG IV INF INIT: CPT

## 2024-08-12 PROCEDURE — 25000003 PHARM REV CODE 250: Performed by: EMERGENCY MEDICINE

## 2024-08-12 PROCEDURE — 25000003 PHARM REV CODE 250

## 2024-08-12 PROCEDURE — 83735 ASSAY OF MAGNESIUM: CPT

## 2024-08-12 PROCEDURE — 96375 TX/PRO/DX INJ NEW DRUG ADDON: CPT

## 2024-08-12 PROCEDURE — 84100 ASSAY OF PHOSPHORUS: CPT

## 2024-08-12 PROCEDURE — 25000003 PHARM REV CODE 250: Performed by: NURSE PRACTITIONER

## 2024-08-12 PROCEDURE — 63600175 PHARM REV CODE 636 W HCPCS

## 2024-08-12 PROCEDURE — 84484 ASSAY OF TROPONIN QUANT: CPT | Performed by: EMERGENCY MEDICINE

## 2024-08-12 PROCEDURE — 80307 DRUG TEST PRSMV CHEM ANLYZR: CPT

## 2024-08-12 PROCEDURE — 80053 COMPREHEN METABOLIC PANEL: CPT | Performed by: EMERGENCY MEDICINE

## 2024-08-12 PROCEDURE — 80053 COMPREHEN METABOLIC PANEL: CPT | Mod: 91

## 2024-08-12 PROCEDURE — 96372 THER/PROPH/DIAG INJ SC/IM: CPT

## 2024-08-12 PROCEDURE — 96376 TX/PRO/DX INJ SAME DRUG ADON: CPT

## 2024-08-12 PROCEDURE — 87502 INFLUENZA DNA AMP PROBE: CPT

## 2024-08-12 PROCEDURE — 93010 ELECTROCARDIOGRAM REPORT: CPT | Mod: ,,, | Performed by: INTERNAL MEDICINE

## 2024-08-12 PROCEDURE — 99285 EMERGENCY DEPT VISIT HI MDM: CPT | Mod: 25

## 2024-08-12 PROCEDURE — G0378 HOSPITAL OBSERVATION PER HR: HCPCS

## 2024-08-12 PROCEDURE — 83880 ASSAY OF NATRIURETIC PEPTIDE: CPT | Performed by: EMERGENCY MEDICINE

## 2024-08-12 PROCEDURE — 85025 COMPLETE CBC W/AUTO DIFF WBC: CPT | Performed by: EMERGENCY MEDICINE

## 2024-08-12 PROCEDURE — 85025 COMPLETE CBC W/AUTO DIFF WBC: CPT | Mod: 91

## 2024-08-12 PROCEDURE — 63600175 PHARM REV CODE 636 W HCPCS: Performed by: EMERGENCY MEDICINE

## 2024-08-12 PROCEDURE — 63600175 PHARM REV CODE 636 W HCPCS: Performed by: NURSE PRACTITIONER

## 2024-08-12 PROCEDURE — 82728 ASSAY OF FERRITIN: CPT

## 2024-08-12 PROCEDURE — 93005 ELECTROCARDIOGRAM TRACING: CPT

## 2024-08-12 PROCEDURE — 83540 ASSAY OF IRON: CPT

## 2024-08-12 PROCEDURE — 87635 SARS-COV-2 COVID-19 AMP PRB: CPT | Performed by: STUDENT IN AN ORGANIZED HEALTH CARE EDUCATION/TRAINING PROGRAM

## 2024-08-12 RX ORDER — LIDOCAINE 50 MG/G
1 PATCH TOPICAL
Status: DISCONTINUED | OUTPATIENT
Start: 2024-08-12 | End: 2024-08-14 | Stop reason: HOSPADM

## 2024-08-12 RX ORDER — SPIRONOLACTONE 25 MG/1
25 TABLET ORAL DAILY
COMMUNITY
Start: 2024-07-26

## 2024-08-12 RX ORDER — POTASSIUM CHLORIDE 20 MEQ/1
40 TABLET, EXTENDED RELEASE ORAL
Status: COMPLETED | OUTPATIENT
Start: 2024-08-12 | End: 2024-08-12

## 2024-08-12 RX ORDER — ENOXAPARIN SODIUM 100 MG/ML
40 INJECTION SUBCUTANEOUS EVERY 24 HOURS
Status: DISCONTINUED | OUTPATIENT
Start: 2024-08-12 | End: 2024-08-14 | Stop reason: HOSPADM

## 2024-08-12 RX ORDER — TALC
6 POWDER (GRAM) TOPICAL NIGHTLY PRN
Status: DISCONTINUED | OUTPATIENT
Start: 2024-08-12 | End: 2024-08-14 | Stop reason: HOSPADM

## 2024-08-12 RX ORDER — IBUPROFEN 200 MG
24 TABLET ORAL
Status: DISCONTINUED | OUTPATIENT
Start: 2024-08-12 | End: 2024-08-14 | Stop reason: HOSPADM

## 2024-08-12 RX ORDER — NALOXONE HCL 0.4 MG/ML
0.02 VIAL (ML) INJECTION
Status: DISCONTINUED | OUTPATIENT
Start: 2024-08-12 | End: 2024-08-14 | Stop reason: HOSPADM

## 2024-08-12 RX ORDER — IBUPROFEN 200 MG
16 TABLET ORAL
Status: DISCONTINUED | OUTPATIENT
Start: 2024-08-12 | End: 2024-08-14 | Stop reason: HOSPADM

## 2024-08-12 RX ORDER — LANOLIN ALCOHOL/MO/W.PET/CERES
1 CREAM (GRAM) TOPICAL DAILY
Status: DISCONTINUED | OUTPATIENT
Start: 2024-08-12 | End: 2024-08-14 | Stop reason: HOSPADM

## 2024-08-12 RX ORDER — LOSARTAN POTASSIUM 25 MG/1
25 TABLET ORAL DAILY
Status: ON HOLD | COMMUNITY
Start: 2024-07-26 | End: 2024-08-14

## 2024-08-12 RX ORDER — GLUCAGON 1 MG
1 KIT INJECTION
Status: DISCONTINUED | OUTPATIENT
Start: 2024-08-12 | End: 2024-08-14 | Stop reason: HOSPADM

## 2024-08-12 RX ORDER — FERROUS SULFATE TAB 325 MG (65 MG ELEMENTAL FE) 325 (65 FE) MG
325 TAB ORAL DAILY
Status: ON HOLD | COMMUNITY
Start: 2024-07-25 | End: 2024-08-14

## 2024-08-12 RX ORDER — LOSARTAN POTASSIUM 25 MG/1
25 TABLET ORAL DAILY
Status: DISCONTINUED | OUTPATIENT
Start: 2024-08-12 | End: 2024-08-14 | Stop reason: HOSPADM

## 2024-08-12 RX ORDER — POLYETHYLENE GLYCOL 3350 17 G/17G
17 POWDER, FOR SOLUTION ORAL DAILY PRN
Status: DISCONTINUED | OUTPATIENT
Start: 2024-08-12 | End: 2024-08-14 | Stop reason: HOSPADM

## 2024-08-12 RX ORDER — MAGNESIUM SULFATE HEPTAHYDRATE 40 MG/ML
2 INJECTION, SOLUTION INTRAVENOUS ONCE
Status: COMPLETED | OUTPATIENT
Start: 2024-08-12 | End: 2024-08-12

## 2024-08-12 RX ORDER — FUROSEMIDE 10 MG/ML
80 INJECTION INTRAMUSCULAR; INTRAVENOUS EVERY 12 HOURS
Status: DISCONTINUED | OUTPATIENT
Start: 2024-08-12 | End: 2024-08-14 | Stop reason: HOSPADM

## 2024-08-12 RX ORDER — METOPROLOL SUCCINATE 25 MG/1
25 TABLET, EXTENDED RELEASE ORAL DAILY
Status: DISCONTINUED | OUTPATIENT
Start: 2024-08-12 | End: 2024-08-14 | Stop reason: HOSPADM

## 2024-08-12 RX ORDER — POLYETHYLENE GLYCOL 3350 17 G/17G
17 POWDER, FOR SOLUTION ORAL DAILY
Status: DISCONTINUED | OUTPATIENT
Start: 2024-08-13 | End: 2024-08-14 | Stop reason: HOSPADM

## 2024-08-12 RX ORDER — ONDANSETRON HYDROCHLORIDE 2 MG/ML
4 INJECTION, SOLUTION INTRAVENOUS EVERY 8 HOURS PRN
Status: DISCONTINUED | OUTPATIENT
Start: 2024-08-12 | End: 2024-08-14 | Stop reason: HOSPADM

## 2024-08-12 RX ORDER — ROSUVASTATIN CALCIUM 20 MG/1
20 TABLET, COATED ORAL NIGHTLY
Status: ON HOLD | COMMUNITY
Start: 2024-07-25 | End: 2024-08-14

## 2024-08-12 RX ORDER — POTASSIUM CHLORIDE 20 MEQ/1
20 TABLET, EXTENDED RELEASE ORAL
Status: COMPLETED | OUTPATIENT
Start: 2024-08-12 | End: 2024-08-12

## 2024-08-12 RX ORDER — ACETAMINOPHEN 325 MG/1
650 TABLET ORAL EVERY 4 HOURS PRN
Status: DISCONTINUED | OUTPATIENT
Start: 2024-08-12 | End: 2024-08-14 | Stop reason: HOSPADM

## 2024-08-12 RX ORDER — SODIUM CHLORIDE 0.9 % (FLUSH) 0.9 %
5 SYRINGE (ML) INJECTION
Status: DISCONTINUED | OUTPATIENT
Start: 2024-08-12 | End: 2024-08-14 | Stop reason: HOSPADM

## 2024-08-12 RX ORDER — AMOXICILLIN 250 MG
1 CAPSULE ORAL 2 TIMES DAILY
Status: DISCONTINUED | OUTPATIENT
Start: 2024-08-12 | End: 2024-08-14 | Stop reason: HOSPADM

## 2024-08-12 RX ORDER — FUROSEMIDE 10 MG/ML
80 INJECTION INTRAMUSCULAR; INTRAVENOUS
Status: COMPLETED | OUTPATIENT
Start: 2024-08-12 | End: 2024-08-12

## 2024-08-12 RX ORDER — PANTOPRAZOLE SODIUM 40 MG/1
TABLET, DELAYED RELEASE ORAL
Status: ON HOLD | COMMUNITY
Start: 2024-07-25 | End: 2024-08-14

## 2024-08-12 RX ORDER — METOPROLOL SUCCINATE 25 MG/1
25 TABLET, EXTENDED RELEASE ORAL DAILY
Status: ON HOLD | COMMUNITY
Start: 2024-07-26 | End: 2024-08-14

## 2024-08-12 RX ADMIN — SENNOSIDES AND DOCUSATE SODIUM 1 TABLET: 8.6; 5 TABLET ORAL at 10:08

## 2024-08-12 RX ADMIN — SENNOSIDES AND DOCUSATE SODIUM 1 TABLET: 8.6; 5 TABLET ORAL at 08:08

## 2024-08-12 RX ADMIN — LIDOCAINE 1 PATCH: 50 PATCH CUTANEOUS at 10:08

## 2024-08-12 RX ADMIN — MAGNESIUM SULFATE HEPTAHYDRATE 2 G: 40 INJECTION, SOLUTION INTRAVENOUS at 09:08

## 2024-08-12 RX ADMIN — POTASSIUM CHLORIDE 40 MEQ: 1500 TABLET, EXTENDED RELEASE ORAL at 03:08

## 2024-08-12 RX ADMIN — MAGNESIUM SULFATE HEPTAHYDRATE 2 G: 40 INJECTION, SOLUTION INTRAVENOUS at 11:08

## 2024-08-12 RX ADMIN — FUROSEMIDE 80 MG: 10 INJECTION, SOLUTION INTRAVENOUS at 10:08

## 2024-08-12 RX ADMIN — LOSARTAN POTASSIUM 25 MG: 25 TABLET, FILM COATED ORAL at 08:08

## 2024-08-12 RX ADMIN — ENOXAPARIN SODIUM 40 MG: 40 INJECTION SUBCUTANEOUS at 04:08

## 2024-08-12 RX ADMIN — POTASSIUM CHLORIDE 20 MEQ: 1500 TABLET, EXTENDED RELEASE ORAL at 04:08

## 2024-08-12 RX ADMIN — FUROSEMIDE 80 MG: 10 INJECTION, SOLUTION INTRAVENOUS at 03:08

## 2024-08-12 RX ADMIN — ACETAMINOPHEN 650 MG: 325 TABLET ORAL at 04:08

## 2024-08-12 RX ADMIN — METOPROLOL SUCCINATE 25 MG: 25 TABLET, EXTENDED RELEASE ORAL at 08:08

## 2024-08-12 RX ADMIN — POTASSIUM CHLORIDE 20 MEQ: 1500 TABLET, EXTENDED RELEASE ORAL at 12:08

## 2024-08-12 RX ADMIN — POTASSIUM CHLORIDE 20 MEQ: 1500 TABLET, EXTENDED RELEASE ORAL at 02:08

## 2024-08-12 RX ADMIN — FUROSEMIDE 80 MG: 10 INJECTION, SOLUTION INTRAVENOUS at 08:08

## 2024-08-12 RX ADMIN — FERROUS SULFATE TAB 325 MG (65 MG ELEMENTAL FE) 1 EACH: 325 (65 FE) TAB at 08:08

## 2024-08-12 NOTE — NURSING
Report given to Melany. Patient left per stretcher with personal belongings at hand to room #429. Son is aware.

## 2024-08-12 NOTE — ASSESSMENT & PLAN NOTE
- troponin 0.063-0.055  - reports recent angiogram with no stent placement  - presented with ADHF; feel troponin elevation related to demand etiology in setting of ADHF
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
## 2024-08-12 NOTE — CONSULTS
Job - Emergency Dept  Cardiology  Consult Note    Patient Name: Mary Ellen Saini  MRN: [id number]  Admission Date: 8/12/2024  Hospital Length of Stay: 0 days  Code Status: Full Code   Attending Provider: Kavin Villagomez DO   Consulting Provider: ANGIE Infante ANP  Primary Care Physician: Marlen, Primary Doctor  Principal Problem:Acute exacerbation of CHF (congestive heart failure)    Patient information was obtained from patient and ER records.     Inpatient consult to [facility]  Consult performed by: Denise Acosta APRN, AUGUSTINE  Consult ordered by: Ej Peralta MD  Reason for consult: ADHF      Subjective:     Chief Complaint:  SOB and BLE edema      HPI:   60yo female with elevated troponin, hypokalemia, anemia, acute CHF and tobacco abuse who presented to the ER with complaints of chest pain, SOB and BLE edema. She reports recently being admitted twice in the past 2 weeks in [place] with similar symptoms. She reports improvement in her LE swelling and weight  down to 160lbs with recurrence of symptoms 2 days ago and weight gain of 30lbs. She reports being discharged with several medications but does not think she received Lasix. She had a Life Vest placed and reports undergoing an angiogram and does not think she had any stents placed. She has relocated to this area to be closer to family. Labs CBC with H&H 8.8&28.2 BMP with K+ 3.2 Mg 1.4  Troponin 0.063-0.055 CXR with no marked pulmonary edema. Started on Lasix 80mg IV BID and admitted to [facility]. Cardiology consulted for evaluation of CHF exacerbation     Past Medical History:   Diagnosis Date    Asthma     Bipolar disorder     Hypertension        Past Surgical History:   Procedure Laterality Date    CHOLECYSTECTOMY      SHOULDER SURGERY      TUBAL LIGATION         Review of patient's allergies indicates:  No Known Allergies    No current facility-administered medications on file prior to encounter.     Current  Outpatient Medications on File Prior to Encounter   Medication Sig    albuterol (ACCUNEB) 1.25 mg/3 mL Nebu Take 1.25 mg by nebulization every 6 (six) hours as needed. Rescue    albuterol (PROVENTIL/VENTOLIN HFA) 90 mcg/actuation inhaler Inhale 1-2 puffs into the lungs every 6 (six) hours as needed for Wheezing. Rescue    ALBUTEROL INHL Inhale into the lungs.    amLODIPine (NORVASC) 5 MG tablet Take 1 tablet (5 mg total) by mouth once daily.    diclofenac (VOLTAREN) 75 MG EC tablet Take 1 tablet (75 mg total) by mouth 2 (two) times daily.    hydrOXYzine pamoate (VISTARIL) 25 MG Cap Take 1 capsule (25 mg total) by mouth 4 (four) times daily.    naloxone (NARCAN) 4 mg/actuation Spry 4mg by nasal route as needed for opioid overdose; may repeat every 2-3 minutes in alternating nostrils until medical help arrives. Call 911    tramadol-acetaminophen 37.5-325 mg (ULTRACET) 37.5-325 mg Tab Take 1 tablet by mouth every 6 (six) hours as needed for Pain.     Family History    None       Tobacco Use    Smoking status: Every Day     Types: Cigarettes    Smokeless tobacco: Never   Substance and Sexual Activity    Alcohol use: Yes     Comment: socailly    Drug use: Yes     Types: Cocaine, Methamphetamines     Comment: denies cocaine or meth. Reports maijurana use    Sexual activity: Yes     Review of Systems   Constitutional: Negative for chills, decreased appetite, diaphoresis, fever, malaise/fatigue, weight gain and weight loss.   Cardiovascular:  Positive for dyspnea on exertion and leg swelling. Negative for chest pain, claudication, irregular heartbeat, near-syncope, orthopnea, palpitations and paroxysmal nocturnal dyspnea.   Respiratory:  Negative for cough, shortness of breath, snoring, sputum production and wheezing.    Endocrine: Negative for cold intolerance, heat intolerance, polydipsia, polyphagia and polyuria.   Skin:  Negative for color change, dry skin, itching, nail changes and poor wound healing.    Musculoskeletal:  Negative for back pain, gout, joint pain and joint swelling.   Gastrointestinal:  Negative for bloating, abdominal pain, constipation, diarrhea, hematemesis, hematochezia, melena, nausea and vomiting.   Genitourinary:  Negative for dysuria, hematuria and nocturia.   Neurological:  Negative for dizziness, headaches, light-headedness, numbness, paresthesias and weakness.   Psychiatric/Behavioral:  Negative for altered mental status, depression and memory loss.      Objective:     Vital Signs (Most Recent):  Temp: 97.6 °F (36.4 °C) (08/12/24 0753)  Pulse: 83 (08/12/24 0853)  Resp: 20 (08/12/24 0853)  BP: 127/76 (08/12/24 0853)  SpO2: 97 % (08/12/24 0853) Vital Signs (24h Range):  Temp:  [97.6 °F (36.4 °C)-97.7 °F (36.5 °C)] 97.6 °F (36.4 °C)  Pulse:  [] 83  Resp:  [18-22] 20  SpO2:  [96 %-100 %] 97 %  BP: (115-139)/(76-83) 127/76     Weight: 86.2 kg (190 lb 0.6 oz)  Body mass index is 32.62 kg/m².    SpO2: 97 %         Intake/Output Summary (Last 24 hours) at 8/12/2024 0937  Last data filed at 8/12/2024 0902  Gross per 24 hour   Intake 150 ml   Output 1850 ml   Net -1700 ml       Lines/Drains/Airways       Drain  Duration             Female External Urinary Catheter w/ Suction 08/12/24 0855 <1 day              Peripheral Intravenous Line  Duration                  Peripheral IV - Single Lumen 08/12/24 0258 20 G No Left;Posterior Wrist <1 day                     Physical Exam  Constitutional:       General: She is not in acute distress.     Appearance: She is well-developed.   Cardiovascular:      Rate and Rhythm: Normal rate and regular rhythm.      Heart sounds: No murmur heard.     No gallop.   Pulmonary:      Effort: Pulmonary effort is normal. No respiratory distress.      Breath sounds: Normal breath sounds. No wheezing.   Abdominal:      General: Bowel sounds are normal. There is no distension.      Palpations: Abdomen is soft.      Tenderness: There is no abdominal tenderness.    Musculoskeletal:      Right lower leg: Edema (3+) present.      Left lower leg: Edema (3+) present.   Skin:     General: Skin is warm and dry.   Neurological:      Mental Status: She is alert and oriented to person, place, and time.          Significant Labs: BMP:   Recent Labs   Lab 08/12/24  0028 08/12/24  0535   GLU 91 86   * 136   K 3.0* 3.2*    102   CO2 20* 21*   BUN 13 12   CREATININE 1.3 1.3   CALCIUM 8.2* 7.9*   MG  --  1.4*    and CBC   Recent Labs   Lab 08/12/24  0028 08/12/24  0535   WBC 8.96 7.63   HGB 8.8* 8.6*   HCT 28.2* 28.3*     --        Significant Imaging: Echocardiogram: Transthoracic echo (TTE) complete (Cupid Only): Pending   Assessment and Plan:     * Acute exacerbation of CHF (congestive heart failure)  - reports recent admission in [place] x 2 for CHF exacerbation with Life Vest placed  - diuresed at that time with improvement in symptoms with recent recurrence; complains of MONSON and BLE edema this admission; significant LE edema noted; on IV Lasix 80mg BID with no output documented out overnight; approximately 500cc of clear yellow urine noted this AM  - will repeat echo this AM; request records from [place]; recommend continuation of IV diuresis for now  - uncertain if home med rec list accurate- will review meds at bedside; currently on Losartan and Toprol XL  with SBP 110s-130s and will continue BB and ARB; hold CCB for now   - strict I&Os and daily weights     Anemia  - H&H 8.8&28.2 this AM  - essentially unchanged from admission  - uncertain of baseline H&H; further management per primary team     Hypokalemia  - K+ 3.2 this AM  - replacement ordered for total of 100mEq; Mg 1.4 with 2g Mg rider ordered  - repeat BMP and Mg tomorrow  - goal K+>4.0 Mg >2.0    Elevated troponin  - troponin 0.063-0.055  - reports recent angiogram with no stent placement  - presented with ADHF; feel troponin elevation related to demand etiology in setting of ADHF         VTE Risk  Mitigation (From admission, onward)           Ordered     enoxaparin injection 40 mg  Daily         08/12/24 0400     IP VTE HIGH RISK PATIENT  Once         08/12/24 0400     Place sequential compression device  Until discontinued         08/12/24 0400                    Thank you for your consult. I will follow-up with patient. Please contact us if you have any additional questions.    ANGIE Infante, ANP  Cardiology   Job - Emergency Dept

## 2024-08-12 NOTE — ASSESSMENT & PLAN NOTE
GFR of 48 on recent CMP, although unclear history of CKD.  Last CMP 2 years ago without noted decrease in GFR  No KENY on CMP    Plan:  Renally dose meds  Trend CMP  Avoid nephrotoxic agents

## 2024-08-12 NOTE — SUBJECTIVE & OBJECTIVE
Interval History: NAOE. Pt denies any SOB, chest pain, or SOB on RA. She continues to be diuresed with Lasix 80 mg every 12 hours with a good  UOP response  of 1.8 liters   She reports that she is homeless and is requesting assistance.      Review of Systems   Constitutional:  Negative for chills and fever.   Respiratory:  Negative for apnea and shortness of breath.    Cardiovascular:  Positive for leg swelling. Negative for chest pain.   Gastrointestinal:  Negative for constipation, diarrhea, nausea and vomiting.   Endocrine: Negative for polydipsia and polyphagia.   Musculoskeletal:  Negative for joint swelling.         Objective:     Vital Signs (Most Recent):  Temp: 97.6 °F (36.4 °C) (08/12/24 0753)  Pulse: 83 (08/12/24 0853)  Resp: 20 (08/12/24 0853)  BP: 127/76 (08/12/24 0853)  SpO2: 97 % (08/12/24 0853) Vital Signs (24h Range):  Temp:  [97.6 °F (36.4 °C)-97.7 °F (36.5 °C)] 97.6 °F (36.4 °C)  Pulse:  [] 83  Resp:  [18-22] 20  SpO2:  [96 %-100 %] 97 %  BP: (115-139)/(76-83) 127/76     Weight: 86.2 kg (190 lb 0.6 oz)  Body mass index is 32.62 kg/m².    Intake/Output Summary (Last 24 hours) at 8/12/2024 1112  Last data filed at 8/12/2024 0902  Gross per 24 hour   Intake 150 ml   Output 1850 ml   Net -1700 ml         Physical Exam  Constitutional:       Appearance: Normal appearance.   HENT:      Head: Normocephalic and atraumatic.      Mouth/Throat:      Mouth: Mucous membranes are moist.      Pharynx: Oropharynx is clear.   Eyes:      Extraocular Movements: Extraocular movements intact.      Pupils: Pupils are equal, round, and reactive to light.   Cardiovascular:      Rate and Rhythm: Normal rate and regular rhythm.   Pulmonary:      Breath sounds: Wheezing present. No rhonchi or rales.   Abdominal:      General: Abdomen is flat.   Musculoskeletal:      Right lower leg: Edema (+2) present.      Left lower leg: Edema (+2) present.   Neurological:      General: No focal deficit present.      Mental Status:  She is alert.             Significant Labs: All pertinent labs within the past 24 hours have been reviewed.    Significant Imaging: I have reviewed all pertinent imaging results/findings within the past 24 hours.

## 2024-08-12 NOTE — CARE UPDATE
Echocardiogram pending.       Received records from [facility]. Admitted in July with SOB and BLE edema with documentation of atrial fib (atrial flutter noted on EKG) with RVR, NSTEMI with high sensitivity troponin 251, echocardiogram with EF 20-25%, reports of recent diagnosis of colon cancer with severe anemia with Hgb 6- reported melena as well at that time (appears newly diagnosed and untreated with ?HemOnc follow up) and COPD. Underwent EGD with notation of esophageal candida, distal healing esophageal ulcer, 5 antral ulcers s/p biopsy and 3 duodenal ulcers and was placed on PPI- pathology obtained (esophageal candida, stains for amyloid subtyping- positive for amyloid A and P and chronic active gastritis with amyloid on antrum of stomach).  PRBC transfusion with Hgb up to 9    Seen by Cardiology with recommendation for medical management and no invasive procedure recommended given anemia, non compliance and report recent of substance abuse (methamphetamine). Placed on Entresto, Toprol XL and Aldactone but later changed to Losartan given lack of insurance and financial constraints. Also recommended Life Vest. No OAC was started given anemia and reports of melena with newly diagnosed colon cancer Instructed to follow up with Cardiology and PCP as an outpatient.     Will continue with medical management for CHF and diurese until euvolemic.    Records to be scanned into chart

## 2024-08-12 NOTE — SUBJECTIVE & OBJECTIVE
Past Medical History:   Diagnosis Date    Asthma     Bipolar disorder     Hypertension        Past Surgical History:   Procedure Laterality Date    CHOLECYSTECTOMY      SHOULDER SURGERY      TUBAL LIGATION         Review of patient's allergies indicates:  No Known Allergies    No current facility-administered medications on file prior to encounter.     Current Outpatient Medications on File Prior to Encounter   Medication Sig    albuterol (ACCUNEB) 1.25 mg/3 mL Nebu Take 1.25 mg by nebulization every 6 (six) hours as needed. Rescue    albuterol (PROVENTIL/VENTOLIN HFA) 90 mcg/actuation inhaler Inhale 1-2 puffs into the lungs every 6 (six) hours as needed for Wheezing. Rescue    ALBUTEROL INHL Inhale into the lungs.    amLODIPine (NORVASC) 5 MG tablet Take 1 tablet (5 mg total) by mouth once daily.    diclofenac (VOLTAREN) 75 MG EC tablet Take 1 tablet (75 mg total) by mouth 2 (two) times daily.    hydrOXYzine pamoate (VISTARIL) 25 MG Cap Take 1 capsule (25 mg total) by mouth 4 (four) times daily.    naloxone (NARCAN) 4 mg/actuation Spry 4mg by nasal route as needed for opioid overdose; may repeat every 2-3 minutes in alternating nostrils until medical help arrives. Call 911    tramadol-acetaminophen 37.5-325 mg (ULTRACET) 37.5-325 mg Tab Take 1 tablet by mouth every 6 (six) hours as needed for Pain.     Family History    None       Tobacco Use    Smoking status: Every Day     Types: Cigarettes    Smokeless tobacco: Never   Substance and Sexual Activity    Alcohol use: Yes     Comment: socailly    Drug use: Yes     Types: Cocaine, Methamphetamines     Comment: denies cocaine or meth. Reports maijurana use    Sexual activity: Yes     Review of Systems   Constitutional: Negative for chills, decreased appetite, diaphoresis, fever, malaise/fatigue, weight gain and weight loss.   Cardiovascular:  Positive for dyspnea on exertion and leg swelling. Negative for chest pain, claudication, irregular heartbeat, near-syncope,  orthopnea, palpitations and paroxysmal nocturnal dyspnea.   Respiratory:  Negative for cough, shortness of breath, snoring, sputum production and wheezing.    Endocrine: Negative for cold intolerance, heat intolerance, polydipsia, polyphagia and polyuria.   Skin:  Negative for color change, dry skin, itching, nail changes and poor wound healing.   Musculoskeletal:  Negative for back pain, gout, joint pain and joint swelling.   Gastrointestinal:  Negative for bloating, abdominal pain, constipation, diarrhea, hematemesis, hematochezia, melena, nausea and vomiting.   Genitourinary:  Negative for dysuria, hematuria and nocturia.   Neurological:  Negative for dizziness, headaches, light-headedness, numbness, paresthesias and weakness.   Psychiatric/Behavioral:  Negative for altered mental status, depression and memory loss.      Objective:     Vital Signs (Most Recent):  Temp: 97.6 °F (36.4 °C) (08/12/24 0753)  Pulse: 83 (08/12/24 0853)  Resp: 20 (08/12/24 0853)  BP: 127/76 (08/12/24 0853)  SpO2: 97 % (08/12/24 0853) Vital Signs (24h Range):  Temp:  [97.6 °F (36.4 °C)-97.7 °F (36.5 °C)] 97.6 °F (36.4 °C)  Pulse:  [] 83  Resp:  [18-22] 20  SpO2:  [96 %-100 %] 97 %  BP: (115-139)/(76-83) 127/76     Weight: 86.2 kg (190 lb 0.6 oz)  Body mass index is 32.62 kg/m².    SpO2: 97 %         Intake/Output Summary (Last 24 hours) at 8/12/2024 0937  Last data filed at 8/12/2024 0902  Gross per 24 hour   Intake 150 ml   Output 1850 ml   Net -1700 ml       Lines/Drains/Airways       Drain  Duration             Female External Urinary Catheter w/ Suction 08/12/24 0855 <1 day              Peripheral Intravenous Line  Duration                  Peripheral IV - Single Lumen 08/12/24 0258 20 G No Left;Posterior Wrist <1 day                     Physical Exam  Constitutional:       General: She is not in acute distress.     Appearance: She is well-developed.   Cardiovascular:      Rate and Rhythm: Normal rate and regular rhythm.       Heart sounds: No murmur heard.     No gallop.   Pulmonary:      Effort: Pulmonary effort is normal. No respiratory distress.      Breath sounds: Normal breath sounds. No wheezing.   Abdominal:      General: Bowel sounds are normal. There is no distension.      Palpations: Abdomen is soft.      Tenderness: There is no abdominal tenderness.   Musculoskeletal:      Right lower leg: Edema (3+) present.      Left lower leg: Edema (3+) present.   Skin:     General: Skin is warm and dry.   Neurological:      Mental Status: She is alert and oriented to person, place, and time.          Significant Labs: BMP:   Recent Labs   Lab 08/12/24  0028 08/12/24  0535   GLU 91 86   * 136   K 3.0* 3.2*    102   CO2 20* 21*   BUN 13 12   CREATININE 1.3 1.3   CALCIUM 8.2* 7.9*   MG  --  1.4*    and CBC   Recent Labs   Lab 08/12/24  0028 08/12/24  0535   WBC 8.96 7.63   HGB 8.8* 8.6*   HCT 28.2* 28.3*     --        Significant Imaging: Echocardiogram: Transthoracic echo (TTE) complete (Cupid Only): Pending

## 2024-08-12 NOTE — PLAN OF CARE
Problem: Adult Inpatient Plan of Care  Goal: Plan of Care Review  Outcome: Progressing  Goal: Patient-Specific Goal (Individualized)  Outcome: Progressing  Goal: Absence of Hospital-Acquired Illness or Injury  Outcome: Progressing  Goal: Optimal Comfort and Wellbeing  Outcome: Progressing  Goal: Readiness for Transition of Care  Outcome: Progressing     Problem: Heart Failure  Goal: Optimal Coping  Outcome: Progressing  Goal: Optimal Cardiac Output  Outcome: Progressing  Goal: Stable Heart Rate and Rhythm  Outcome: Progressing  Goal: Optimal Functional Ability  Outcome: Progressing  Goal: Fluid and Electrolyte Balance  Outcome: Progressing  Goal: Improved Oral Intake  Outcome: Progressing  Goal: Effective Oxygenation and Ventilation  Outcome: Progressing  Goal: Effective Breathing Pattern During Sleep  Outcome: Progressing     Problem: Electrolyte Imbalance  Goal: Electrolyte Balance  Outcome: Progressing

## 2024-08-12 NOTE — ASSESSMENT & PLAN NOTE
Anemia is likely due to Iron deficiency. Most recent hemoglobin and hematocrit are listed below.  Recent Labs     08/12/24  0028 08/12/24  0535   HGB 8.8* 8.6*   HCT 28.2* 28.3*       Plan  - Monitor serial CBC: Daily  - Transfuse PRBC if patient becomes hemodynamically unstable, symptomatic or H/H drops below 7/21.  - Patient has not received any PRBC transfusions to date  - Patient's anemia is currently stable  - Continue home iron  - Iron studies while inpatient are pending

## 2024-08-12 NOTE — SUBJECTIVE & OBJECTIVE
Past Medical History:   Diagnosis Date    Asthma     Bipolar disorder     Hypertension        Past Surgical History:   Procedure Laterality Date    CHOLECYSTECTOMY      SHOULDER SURGERY      TUBAL LIGATION         Review of patient's allergies indicates:  No Known Allergies    No current facility-administered medications on file prior to encounter.     Current Outpatient Medications on File Prior to Encounter   Medication Sig    albuterol (ACCUNEB) 1.25 mg/3 mL Nebu Take 1.25 mg by nebulization every 6 (six) hours as needed. Rescue    albuterol (PROVENTIL/VENTOLIN HFA) 90 mcg/actuation inhaler Inhale 1-2 puffs into the lungs every 6 (six) hours as needed for Wheezing. Rescue    ALBUTEROL INHL Inhale into the lungs.    amLODIPine (NORVASC) 5 MG tablet Take 1 tablet (5 mg total) by mouth once daily.    diclofenac (VOLTAREN) 75 MG EC tablet Take 1 tablet (75 mg total) by mouth 2 (two) times daily.    hydrOXYzine pamoate (VISTARIL) 25 MG Cap Take 1 capsule (25 mg total) by mouth 4 (four) times daily.    naloxone (NARCAN) 4 mg/actuation Spry 4mg by nasal route as needed for opioid overdose; may repeat every 2-3 minutes in alternating nostrils until medical help arrives. Call 911    tramadol-acetaminophen 37.5-325 mg (ULTRACET) 37.5-325 mg Tab Take 1 tablet by mouth every 6 (six) hours as needed for Pain.     Family History    None       Tobacco Use    Smoking status: Every Day     Types: Cigarettes    Smokeless tobacco: Never   Substance and Sexual Activity    Alcohol use: Yes     Comment: socailly    Drug use: Yes     Types: Cocaine, Methamphetamines     Comment: denies cocaine or meth. Reports maijurana use    Sexual activity: Yes     Review of Systems   Constitutional:  Negative for chills and fever.   Respiratory:  Positive for shortness of breath. Negative for apnea.    Cardiovascular:  Positive for chest pain.   Gastrointestinal:  Negative for constipation, diarrhea, nausea and vomiting.   Endocrine: Negative for  polydipsia and polyphagia.   Musculoskeletal:  Positive for joint swelling.     Objective:     Vital Signs (Most Recent):  Temp: 97.7 °F (36.5 °C) (08/11/24 2218)  Pulse: 76 (08/12/24 0314)  Resp: (!) 21 (08/12/24 0314)  BP: 130/79 (08/12/24 0314)  SpO2: 98 % (08/12/24 0314) Vital Signs (24h Range):  Temp:  [97.7 °F (36.5 °C)] 97.7 °F (36.5 °C)  Pulse:  [] 76  Resp:  [18-22] 21  SpO2:  [96 %-100 %] 98 %  BP: (115-139)/(77-83) 130/79     Weight: 86.2 kg (190 lb 0.6 oz)  Body mass index is 32.62 kg/m².     Physical Exam  Constitutional:       Appearance: Normal appearance.   HENT:      Head: Normocephalic and atraumatic.      Mouth/Throat:      Mouth: Mucous membranes are moist.      Pharynx: Oropharynx is clear.   Eyes:      Extraocular Movements: Extraocular movements intact.      Pupils: Pupils are equal, round, and reactive to light.   Cardiovascular:      Rate and Rhythm: Normal rate and regular rhythm.   Pulmonary:      Breath sounds: Rhonchi and rales present. No wheezing.   Abdominal:      General: Abdomen is flat.   Musculoskeletal:      Right lower leg: 3+ Edema present.      Left lower leg: 3+ Edema present.   Neurological:      General: No focal deficit present.      Mental Status: She is alert.              CRANIAL NERVES     CN III, IV, VI   Pupils are equal, round, and reactive to light.       Significant Labs: All pertinent labs within the past 24 hours have been reviewed.  CBC:   Recent Labs   Lab 08/12/24 0028   WBC 8.96   HGB 8.8*   HCT 28.2*        CMP:   Recent Labs   Lab 08/12/24 0028   *   K 3.0*      CO2 20*   GLU 91   BUN 13   CREATININE 1.3   CALCIUM 8.2*   PROT 6.7   ALBUMIN 2.2*   BILITOT 0.5   ALKPHOS 98   AST 15   ALT 21   ANIONGAP 13     Troponin:   Recent Labs   Lab 08/12/24 0028 08/12/24 0310   TROPONINI 0.063* 0.055*       Significant Imaging: I have reviewed all pertinent imaging results/findings within the past 24 hours.  Imaging Results               X-Ray Chest AP Portable (Final result)  Result time 08/12/24 00:35:50      Final result by Nick Clifton DO (08/12/24 00:35:50)                   Impression:      No acute abnormality.      Electronically signed by: Nick Clifton  Date:    08/12/2024  Time:    00:35               Narrative:    EXAMINATION:  XR CHEST AP PORTABLE    CLINICAL HISTORY:  CHF;    TECHNIQUE:  Single frontal view of the chest was performed.    COMPARISON:  05/28/2021.    FINDINGS:  Mild bandlike opacities noted in the left lung base compatible with atelectasis.  The lungs are otherwise clear.  The pleural spaces are clear.  The cardiac silhouette is borderline.  Osseous structures demonstrate degenerative changes.

## 2024-08-12 NOTE — ASSESSMENT & PLAN NOTE
Anemia is likely due to Iron deficiency. Most recent hemoglobin and hematocrit are listed below.  Recent Labs     08/12/24  0028   HGB 8.8*   HCT 28.2*     Plan  - Monitor serial CBC: Daily  - Transfuse PRBC if patient becomes hemodynamically unstable, symptomatic or H/H drops below 7/21.  - Patient has not received any PRBC transfusions to date  - Patient's anemia is currently stable  - Continue home iron  - Iron studies while inpatient

## 2024-08-12 NOTE — HPI
Mary Ellen Saini is a 59 year old female PMHx of heart failure (unclear if systolic or diastolic), HTN, asthma, tobacco use disorder (minimum 2 cigarettes per day since age of 11) presenting to ER with complaints of edema, shortness of breath, and chest pain. Endorses recent hospitalization for CHF exacerbation in [place] with discharge dry weight in the 160s. Endorses over the last 4 days, was having worsening edema of lower extremity and discomfort. Additionally states increased weight gain of roughly 30lbs within this time frame. States medication adherence and takes 40 PO lasix at home, although not noted in her dispensary report so unclear if patient has actually been adherent to this. Denies any cough, however, states she has been having worsening shortness of breath and intermittent chest pain.    In the ED, initial vital signs /83, , Temp 97.7, SpO2 100% on room air. Labs include CBC with stable H/H 8.8/28.2 and WBC within normal limits 8.96. CMP with Na 135 K 3.0 Cl 102 Co2 20 and BUN/Cr 13/1.3. CXR with mild bandlike opacities noted in the left lung base. EKG NSR. Trops 0.063 which downtrended to 0.055. BNP elevated to 347. Initiated on 80 IV lasix and potassium supplementation. [facility] consulted for evaluation for admission for CHF exacerbation.

## 2024-08-12 NOTE — PLAN OF CARE
08/12/24 1810   Admission   Initial VN Admission Questions Complete   Communication Issues? None   Shift   Virtual Nurse - Patient Verbalized Approval Of Camera Use;VN Rounding   Safety/Activity   Patient Rounds bed in low position;placement of personal items at bedside;call light in patient/parent reach;visualized patient;toileting offered;clutter free environment maintained   Safety Promotion/Fall Prevention assistive device/personal item within reach;medications reviewed;side rails raised x 2;Fall Risk reviewed with patient/family   Positioning   Head of Bed (HOB) Positioning HOB at 30-45 degrees     VN cued into patient's room for introduction with patient's permission.  VN role explained and informed patient that VN would be working with bedside nurse and rest of care team.  Plan of care reviewed. Fall risk and bed alarm protocol education provided.  Instructed patient to call for assistance.  Patient aware and agreeable.  Patient's chart, labs and vital signs reviewed.  Allowed time for questions.  Pt denies pain, dyspnea, emotional support provided, pt reports recently homeless with her adult son.  Will continue to be available as needed.

## 2024-08-12 NOTE — ASSESSMENT & PLAN NOTE
- reports recent admission in [place] x 2 for CHF exacerbation with Life Vest placed  - diuresed at that time with improvement in symptoms with recent recurrence; complains of MONSON and BLE edema this admission; significant LE edema noted; on IV Lasix 80mg BID with no output documented out overnight; approximately 500cc of clear yellow urine noted this AM  - will repeat echo this AM; request records from [place]; recommend continuation of IV diuresis for now  - uncertain if home med rec list accurate- will review meds at bedside; currently on Losartan and Toprol XL  with SBP 110s-130s and will continue BB and ARB; hold CCB for now   - strict I&Os and daily weights

## 2024-08-12 NOTE — ASSESSMENT & PLAN NOTE
Unclear if etiology of CHF is systolic or diastolic in nature. Reportedly had CHF exacerbation at OSH in [place]. CHF is currently uncontrolled due to Continued edema of extremities and Weight gain of 30 pounds. No recent Echo on file. Continue Beta Blocker, ACE/ARB, and Furosemide and monitor clinical status closely. Monitor on telemetry. Monitor strict Is&Os and daily weights.  Place on fluid restriction of 2 L. Cardiology has been consulted. Continue to stress to patient importance of self efficacy and  on diet for CHF. Last BNP reviewed- and noted below   Recent Labs   Lab 08/12/24  0028   *       Plan:  Continue 80 mg  IV lasix BID  Added DuoNeb for wheezing   Pt wears a Life Vest  Echo 8/12/24 showed Moderate global hypokinesis present. There is moderately reduced systolic function with a visually estimated ejection fraction of 30 - 35%.   Cardiology consult, appreciate recs  Daily weights  Daily I/Os  Fluid restriction to 2L  Continue home BB and ARB (takes losartan and metoprolol based on dispensary report)  Urine Drug screen (positive amphetamines) and Covid/Flu both negative

## 2024-08-12 NOTE — PROGRESS NOTES
[facility] Emergency Dept  [facility]  Progress Note    Patient Name: Mary Ellen Saini  MRN: [id number]  Patient Class: OP- Observation   Admission Date: 8/12/2024  Length of Stay: 0 days  Attending Physician: Kavin Villagomez DO  Primary Care Provider: Marlen, Primary Doctor        Subjective:     Principal Problem:Acute exacerbation of CHF (congestive heart failure)        HPI:  Mary Ellen Saini is a 59 year old female PMHx of heart failure (unclear if systolic or diastolic), HTN, asthma, tobacco use disorder (minimum 2 cigarettes per day since age of 11) presenting to ER with complaints of edema, shortness of breath, and chest pain. Endorses recent hospitalization for CHF exacerbation in [place] with discharge dry weight in the 160s. Endorses over the last 4 days, was having worsening edema of lower extremity and discomfort. Additionally states increased weight gain of roughly 30lbs within this time frame. States medication adherence and takes 40 PO lasix at home, although not noted in her dispensary report so unclear if patient has actually been adherent to this. Denies any cough, however, states she has been having worsening shortness of breath and intermittent chest pain.    In the ED, initial vital signs /83, , Temp 97.7, SpO2 100% on room air. Labs include CBC with stable H/H 8.8/28.2 and WBC within normal limits 8.96. CMP with Na 135 K 3.0 Cl 102 Co2 20 and BUN/Cr 13/1.3. CXR with mild bandlike opacities noted in the left lung base. EKG NSR. Trops 0.063 which downtrended to 0.055. BNP elevated to 347. Initiated on 80 IV lasix and potassium supplementation. [facility] consulted for evaluation for admission for CHF exacerbation.     Overview/Hospital Course:  No notes on file    Interval History: NAOE. Pt denies any SOB, chest pain, or SOB on RA. She continues to be diuresed with Lasix 80 mg every 12 hours with a good  UOP response  of 1.8 liters   She reports that she is homeless and is  requesting assistance.      Review of Systems   Constitutional:  Negative for chills and fever.   Respiratory:  Negative for apnea and shortness of breath.    Cardiovascular:  Positive for leg swelling. Negative for chest pain.   Gastrointestinal:  Negative for constipation, diarrhea, nausea and vomiting.   Endocrine: Negative for polydipsia and polyphagia.   Musculoskeletal:  Negative for joint swelling.         Objective:     Vital Signs (Most Recent):  Temp: 97.6 °F (36.4 °C) (08/12/24 0753)  Pulse: 83 (08/12/24 0853)  Resp: 20 (08/12/24 0853)  BP: 127/76 (08/12/24 0853)  SpO2: 97 % (08/12/24 0853) Vital Signs (24h Range):  Temp:  [97.6 °F (36.4 °C)-97.7 °F (36.5 °C)] 97.6 °F (36.4 °C)  Pulse:  [] 83  Resp:  [18-22] 20  SpO2:  [96 %-100 %] 97 %  BP: (115-139)/(76-83) 127/76     Weight: 86.2 kg (190 lb 0.6 oz)  Body mass index is 32.62 kg/m².    Intake/Output Summary (Last 24 hours) at 8/12/2024 1112  Last data filed at 8/12/2024 0902  Gross per 24 hour   Intake 150 ml   Output 1850 ml   Net -1700 ml         Physical Exam  Constitutional:       Appearance: Normal appearance.   HENT:      Head: Normocephalic and atraumatic.      Mouth/Throat:      Mouth: Mucous membranes are moist.      Pharynx: Oropharynx is clear.   Eyes:      Extraocular Movements: Extraocular movements intact.      Pupils: Pupils are equal, round, and reactive to light.   Cardiovascular:      Rate and Rhythm: Normal rate and regular rhythm.   Pulmonary:      Breath sounds: Wheezing present. No rhonchi or rales.   Abdominal:      General: Abdomen is flat.   Musculoskeletal:      Right lower leg: Edema (+2) present.      Left lower leg: Edema (+2) present.   Neurological:      General: No focal deficit present.      Mental Status: She is alert.             Significant Labs: All pertinent labs within the past 24 hours have been reviewed.    Significant Imaging: I have reviewed all pertinent imaging results/findings within the past 24  hours.    Assessment/Plan:      * Acute exacerbation of CHF (congestive heart failure)  Unclear if etiology of CHF is systolic or diastolic in nature. Reportedly had CHF exacerbation at OSH in [place]. CHF is currently uncontrolled due to Continued edema of extremities and Weight gain of 30 pounds. No recent Echo on file. Continue Beta Blocker, ACE/ARB, and Furosemide and monitor clinical status closely. Monitor on telemetry. Monitor strict Is&Os and daily weights.  Place on fluid restriction of 2 L. Cardiology has been consulted. Continue to stress to patient importance of self efficacy and  on diet for CHF. Last BNP reviewed- and noted below   Recent Labs   Lab 08/12/24  0028   *       Plan:  Continue 80 mg  IV lasix BID  Echo pending today  Cardiology consult, appreciate recs  Daily weights  Daily I/Os  Fluid restriction to 2L  Continue home BB and ARB (takes losartan and metoprolol based on dispensary report)  Urine Drug screen (positive amphetamines) and Covid/Flu both negative    Smoking history  Minimum of 2 cigarettes per day since age of 11    Plan:  Consider nicotine patches  Consider smoking cessation referral at discharge    Decreased GFR  GFR of 48 on recent CMP, although unclear history of CKD.  Last CMP 2 years ago without noted decrease in GFR  No KENY on CMP    Plan:  Renally dose meds  Trend CMP  Avoid nephrotoxic agents    Anemia  Anemia is likely due to Iron deficiency. Most recent hemoglobin and hematocrit are listed below.  Recent Labs     08/12/24  0028 08/12/24  0535   HGB 8.8* 8.6*   HCT 28.2* 28.3*       Plan  - Monitor serial CBC: Daily  - Transfuse PRBC if patient becomes hemodynamically unstable, symptomatic or H/H drops below 7/21.  - Patient has not received any PRBC transfusions to date  - Patient's anemia is currently stable  - Continue home iron  - Iron studies while inpatient are pending    Hypokalemia  Patient's most recent potassium results are listed below.    Recent Labs     08/12/24  0028   K 3.0*     Plan  - Replete potassium per protocol  - Monitor potassium Daily  - Patient's hypokalemia is stable  - Continue to trend and replete prn daily    Elevated troponin  RESOLVED  Chest pain upon presentation  Trops 0.063 --> 0.055  No ST changes on EKG  Likely demand ischemia in the setting of CHF exacerbation.    Plan:  Continue to monitor for chest pain      VTE Risk Mitigation (From admission, onward)           Ordered     enoxaparin injection 40 mg  Daily         08/12/24 0400     IP VTE HIGH RISK PATIENT  Once         08/12/24 0400     Place sequential compression device  Until discontinued         08/12/24 0400                    Discharge Planning   JENAE:      Code Status: Full Code   Is the patient medically ready for discharge?:     Reason for patient still in hospital (select all that apply): Patient trending condition                     Berenice Alberto MD  Department of Hospital Medicine   [facility] Emergency Dept

## 2024-08-12 NOTE — ASSESSMENT & PLAN NOTE
Unclear if etiology of CHF is systolic or diastolic in nature. Reportedly had CHF exacerbation at OSH in [place]. CHF is currently uncontrolled due to Continued edema of extremities and Weight gain of 30 pounds. No recent Echo on file. Continue Beta Blocker, ACE/ARB, and Furosemide and monitor clinical status closely. Monitor on telemetry. Monitor strict Is&Os and daily weights.  Place on fluid restriction of 2 L. Cardiology has been consulted. Continue to stress to patient importance of self efficacy and  on diet for CHF. Last BNP reviewed- and noted below   Recent Labs   Lab 08/12/24  0028   *       Plan:  Continue 80 mg  IV lasix BID  Echo pending today  Cardiology consult, appreciate recs  Daily weights  Daily I/Os  Fluid restriction to 2L  Continue home BB and ARB (takes losartan and metoprolol based on dispensary report)  Urine Drug screen (positive amphetamines) and Covid/Flu both negative

## 2024-08-12 NOTE — ASSESSMENT & PLAN NOTE
RESOLVED  Chest pain upon presentation  Trops 0.063 --> 0.055  No ST changes on EKG  Likely demand ischemia in the setting of CHF exacerbation.    Plan:  Continue to monitor for chest pain

## 2024-08-12 NOTE — ASSESSMENT & PLAN NOTE
Minimum of 2 cigarettes per day since age of 11    Plan:  Consider nicotine patches  Consider smoking cessation referral at discharge

## 2024-08-12 NOTE — ASSESSMENT & PLAN NOTE
Unclear if etiology of CHF is systolic or diastolic in nature. Reportedly had CHF exacerbation at OSH in [place]. CHF is currently uncontrolled due to Continued edema of extremities and Weight gain of 30 pounds. No recent Echo on file. Continue Beta Blocker, ACE/ARB, and Furosemide and monitor clinical status closely. Monitor on telemetry. Monitor strict Is&Os and daily weights.  Place on fluid restriction of 2 L. Cardiology has been consulted. Continue to stress to patient importance of self efficacy and  on diet for CHF. Last BNP reviewed- and noted below   Recent Labs   Lab 08/12/24  0028   *     Plan:  80 IV lasix BID  Echo while inpatient   Cardiology consult, appreciate recs  Daily weights  Daily I/Os  Fluid restriction to 2L  Continue home BB and ARB (takes losartan and metoprolol based on dispensary report)  Urine Drug screen and Covid/Flu

## 2024-08-12 NOTE — PHARMACY MED REC
"[facility]           Pharmacy  Admission Medication History     The home medication history was taken by Caitlin Stinson.      Medication history obtained from Medications listed below were obtained from: [employer]- [employer]    Based on information gathered for medication list, you may go to "Admission" then "Reconcile Home Medications" tabs to review and/or act upon those items.     The home medication list has been updated by the Pharmacy department.   Please read ALL comments highlighted in yellow.   Please address this information as you see fit.    Feel free to contact us if you have any questions or require assistance.        No current facility-administered medications on file prior to encounter.     Current Outpatient Medications on File Prior to Encounter   Medication Sig Dispense Refill    FEROSUL 325 mg (65 mg iron) Tab tablet Take 325 mg by mouth once daily.      losartan (COZAAR) 25 MG tablet Take 25 mg by mouth once daily.      metoprolol succinate (TOPROL-XL) 25 MG 24 hr tablet Take 25 mg by mouth once daily.      pantoprazole (PROTONIX) 40 MG tablet Take by mouth.      rosuvastatin (CRESTOR) 20 MG tablet Take 20 mg by mouth every evening.      spironolactone (ALDACTONE) 25 MG tablet Take 25 mg by mouth once daily.      naloxone (NARCAN) 4 mg/actuation Spry 4mg by nasal route as needed for opioid overdose; may repeat every 2-3 minutes in alternating nostrils until medical help arrives. Call 911 1 each 11       Please address this information as you see fit.  Feel free to contact us if you have any questions or require assistance.    Caitlin Stinson  [phone]                  .          "

## 2024-08-12 NOTE — ASSESSMENT & PLAN NOTE
Patient's most recent potassium results are listed below.   Recent Labs     08/12/24  0028   K 3.0*     Plan  - Replete potassium per protocol  - Monitor potassium Daily  - Patient's hypokalemia is stable  - Continue to trend and replete prn daily

## 2024-08-12 NOTE — ED PROVIDER NOTES
Encounter Date: 8/11/2024       History     Chief Complaint   Patient presents with    Leg Swelling     Bilateral leg swelling increased over last 4-5 days; hx of CHF, states she takes 40 mg Lasix daily; reports compliance; states she was in the hospital last week for same complaint, was given 80mg IVP Lasix daily during admission     HPI    59-year-old female multiple medical problems including hypertension bipolar asthma CHF presents the ER for evaluation of worsening leg edema shortness of breath intermittent chest pain.  Patient reports that she was recently admitted at outside hospital a few weeks ago for CHF exacerbation.  She was subsequently discharged reports her dry weight was in the 160s.  Reports of the last 4 days she was compliant with her medication but she was having worsening edema swelling and discomfort of her lower extremities.  She reports she was had a proximally 30 lb weight gain.  She has been compliant with her Lasix she came the ER for further evaluation.    Review of patient's allergies indicates:  No Known Allergies  Past Medical History:   Diagnosis Date    Asthma     Bipolar disorder     Hypertension      Past Surgical History:   Procedure Laterality Date    CHOLECYSTECTOMY      SHOULDER SURGERY      TUBAL LIGATION       No family history on file.  Social History     Tobacco Use    Smoking status: Every Day     Types: Cigarettes    Smokeless tobacco: Never   Substance Use Topics    Alcohol use: Yes     Comment: socailly    Drug use: Yes     Types: Cocaine, Methamphetamines     Comment: denies cocaine or meth. Reports maijurana use     Review of Systems   Constitutional:  Positive for fatigue.   Respiratory:  Positive for cough.    Cardiovascular:  Positive for chest pain and leg swelling.   All other systems reviewed and are negative.      Physical Exam     Initial Vitals [08/11/24 2218]   BP Pulse Resp Temp SpO2   139/83 101 18 97.7 °F (36.5 °C) 100 %      MAP       --         Physical  Exam    Nursing note and vitals reviewed.  Constitutional:   Elderly chronically ill-appearing no significant distress   HENT:   Head: Normocephalic and atraumatic.   Eyes: Pupils are equal, round, and reactive to light.   Neck:   Normal range of motion.  Cardiovascular:  Normal rate and regular rhythm.           Pulmonary/Chest: No respiratory distress.   Rhonchi crackles noted bilaterally no accessory muscle use   Abdominal: Abdomen is soft. She exhibits no distension. There is no abdominal tenderness.   Musculoskeletal:      Cervical back: Normal range of motion.      Comments: Plus three pitting edema bilaterally no cellulitic changes     Neurological: She is alert and oriented to person, place, and time. She has normal strength. GCS score is 15. GCS eye subscore is 4. GCS verbal subscore is 5. GCS motor subscore is 6.   Skin: Skin is warm and dry. Capillary refill takes less than 2 seconds.   Psychiatric: She has a normal mood and affect. Thought content normal.         ED Course   Procedures  Labs Reviewed   CBC W/ AUTO DIFFERENTIAL - Abnormal       Result Value    WBC 8.96      RBC 3.63 (*)     Hemoglobin 8.8 (*)     Hematocrit 28.2 (*)     MCV 78 (*)     MCH 24.2 (*)     MCHC 31.2 (*)     RDW 21.1 (*)     Platelets 317      MPV 9.5      Immature Granulocytes 0.4      Gran # (ANC) 6.2      Immature Grans (Abs) 0.04      Lymph # 2.0      Mono # 0.5      Eos # 0.2      Baso # 0.06      nRBC 0      Gran % 69.1      Lymph % 22.0      Mono % 5.2      Eosinophil % 2.6      Basophil % 0.7      Differential Method Automated     COMPREHENSIVE METABOLIC PANEL - Abnormal    Sodium 135 (*)     Potassium 3.0 (*)     Chloride 102      CO2 20 (*)     Glucose 91      BUN 13      Creatinine 1.3      Calcium 8.2 (*)     Total Protein 6.7      Albumin 2.2 (*)     Total Bilirubin 0.5      Alkaline Phosphatase 98      AST 15      ALT 21      eGFR 48 (*)     Anion Gap 13     TROPONIN I - Abnormal    Troponin I 0.063 (*)    B-TYPE  NATRIURETIC PEPTIDE - Abnormal     (*)    TROPONIN I - Abnormal    Troponin I 0.055 (*)    COMPREHENSIVE METABOLIC PANEL   MAGNESIUM   PHOSPHORUS   CBC W/ AUTO DIFFERENTIAL   IRON AND TIBC   FERRITIN   TOXICOLOGY SCREEN, URINE, RANDOM (COMPLIANCE)   SARS-COV-2 RDRP GENE    POC Rapid COVID Negative       Acceptable Yes     POCT INFLUENZA A/B MOLECULAR    POC Molecular Influenza A Ag Negative      POC Molecular Influenza B Ag Negative       Acceptable Yes     POCT GLUCOSE MONITORING CONTINUOUS     EKG Readings: (Independently Interpreted)   Sinus rhythm 95 beats per minute, mild ST depressions in inferior leads no STEMI       Imaging Results              X-Ray Chest AP Portable (Final result)  Result time 08/12/24 00:35:50      Final result by Nick Clifton DO (08/12/24 00:35:50)                   Impression:      No acute abnormality.      Electronically signed by: Nick Clifton  Date:    08/12/2024  Time:    00:35               Narrative:    EXAMINATION:  XR CHEST AP PORTABLE    CLINICAL HISTORY:  CHF;    TECHNIQUE:  Single frontal view of the chest was performed.    COMPARISON:  05/28/2021.    FINDINGS:  Mild bandlike opacities noted in the left lung base compatible with atelectasis.  The lungs are otherwise clear.  The pleural spaces are clear.  The cardiac silhouette is borderline.  Osseous structures demonstrate degenerative changes.                                       Medications   sodium chloride 0.9% flush 5 mL (has no administration in time range)   melatonin tablet 6 mg (has no administration in time range)   ondansetron injection 4 mg (has no administration in time range)   polyethylene glycol packet 17 g (has no administration in time range)   senna-docusate 8.6-50 mg per tablet 1 tablet (has no administration in time range)   acetaminophen tablet 650 mg (650 mg Oral Given 8/12/24 3588)   naloxone 0.4 mg/mL injection 0.02 mg (has no administration in time range)    glucose chewable tablet 16 g (has no administration in time range)   glucose chewable tablet 24 g (has no administration in time range)   glucagon (human recombinant) injection 1 mg (has no administration in time range)   enoxaparin injection 40 mg (has no administration in time range)   furosemide injection 80 mg (has no administration in time range)   dextrose 10% bolus 125 mL 125 mL (has no administration in time range)   dextrose 10% bolus 250 mL 250 mL (has no administration in time range)   losartan tablet 25 mg (has no administration in time range)   metoprolol succinate (TOPROL-XL) 24 hr tablet 25 mg (has no administration in time range)   ferrous sulfate tablet 1 each (has no administration in time range)   potassium chloride SA CR tablet 40 mEq (40 mEq Oral Given 8/12/24 0310)   furosemide injection 80 mg (80 mg Intravenous Given 8/12/24 0310)     Medical Decision Making  Pleasant 59-year-old female presents the ER evaluation of CHF exacerbation.  Endorses 30 lb gain of water weight weakness fatigue intermittent chest pains and shortness of breath.  Significant pitting edema noted on exam without cellulitic changes.  Concern for CHF exacerbation.  Will give Lasix blood work anticipate admission.  No records of previous hospitalizations available at this time.    Amount and/or Complexity of Data Reviewed  Labs: ordered. Decision-making details documented in ED Course.  Radiology: ordered. Decision-making details documented in ED Course.  ECG/medicine tests: ordered and independent interpretation performed. Decision-making details documented in ED Course.    Risk  Prescription drug management.               ED Course as of 08/12/24 0554   Mon Aug 12, 2024   0330 Troponin I(!) [SE]   0330 Brain natriuretic peptide(!) [SE]   0330 CBC auto differential(!) [SE]   0330 Comprehensive metabolic panel(!) [SE]   0330 X-Ray Chest AP Portable [SE]   0331 Resting in bed no distress Lasix potassium given.  Concern for  CHF exacerbation given 30 lb weight gain +3 pitting edema.  Patient endorses compliance with Lasix.  Potassium repleted with p.o. given IV Lasix discussed with [facility] accepted to their service. [SE]      ED Course User Index  [SE] Alfred Don MD                           Clinical Impression:  Final diagnoses:  [R06.02] Shortness of breath  [I50.9] Acute on chronic congestive heart failure, unspecified heart failure type (Primary)  [E87.6] Hypokalemia          ED Disposition Condition    Observation Stable                Alfred Don MD  08/12/24 0571

## 2024-08-12 NOTE — H&P
[facility] Medicine  History & Physical    Patient Name: Mary Ellen Saini  MRN: [id number]  Patient Class: OP- Observation  Admission Date: 8/12/2024  Attending Physician: Kavin Villagomez DO   Primary Care Provider: Marlen, Primary Doctor         Patient information was obtained from patient, past medical records, and ER records.     Subjective:     Principal Problem:Acute exacerbation of CHF (congestive heart failure)    Chief Complaint:   Chief Complaint   Patient presents with    Leg Swelling     Bilateral leg swelling increased over last 4-5 days; hx of CHF, states she takes 40 mg Lasix daily; reports compliance; states she was in the hospital last week for same complaint, was given 80mg IVP Lasix daily during admission        HPI: Mary Ellen Saini is a 59 year old female PMHx of heart failure (unclear if systolic or diastolic), HTN, asthma, tobacco use disorder (minimum 2 cigarettes per day since age of 11) presenting to ER with complaints of edema, shortness of breath, and chest pain. Endorses recent hospitalization for CHF exacerbation in [place] with discharge dry weight in the 160s. Endorses over the last 4 days, was having worsening edema of lower extremity and discomfort. Additionally states increased weight gain of roughly 30lbs within this time frame. States medication adherence and takes 40 PO lasix at home, although not noted in her dispensary report so unclear if patient has actually been adherent to this. Denies any cough, however, states she has been having worsening shortness of breath and intermittent chest pain.    In the ED, initial vital signs /83, , Temp 97.7, SpO2 100% on room air. Labs include CBC with stable H/H 8.8/28.2 and WBC within normal limits 8.96. CMP with Na 135 K 3.0 Cl 102 Co2 20 and BUN/Cr 13/1.3. CXR with mild bandlike opacities noted in the left lung base. EKG NSR. Trops 0.063 which downtrended to 0.055. BNP elevated to 347. Initiated on 80  IV lasix and potassium supplementation. [facility] consulted for evaluation for admission for CHF exacerbation.     Past Medical History:   Diagnosis Date    Asthma     Bipolar disorder     Hypertension        Past Surgical History:   Procedure Laterality Date    CHOLECYSTECTOMY      SHOULDER SURGERY      TUBAL LIGATION         Review of patient's allergies indicates:  No Known Allergies    No current facility-administered medications on file prior to encounter.     Current Outpatient Medications on File Prior to Encounter   Medication Sig    albuterol (ACCUNEB) 1.25 mg/3 mL Nebu Take 1.25 mg by nebulization every 6 (six) hours as needed. Rescue    albuterol (PROVENTIL/VENTOLIN HFA) 90 mcg/actuation inhaler Inhale 1-2 puffs into the lungs every 6 (six) hours as needed for Wheezing. Rescue    ALBUTEROL INHL Inhale into the lungs.    amLODIPine (NORVASC) 5 MG tablet Take 1 tablet (5 mg total) by mouth once daily.    diclofenac (VOLTAREN) 75 MG EC tablet Take 1 tablet (75 mg total) by mouth 2 (two) times daily.    hydrOXYzine pamoate (VISTARIL) 25 MG Cap Take 1 capsule (25 mg total) by mouth 4 (four) times daily.    naloxone (NARCAN) 4 mg/actuation Spry 4mg by nasal route as needed for opioid overdose; may repeat every 2-3 minutes in alternating nostrils until medical help arrives. Call 911    tramadol-acetaminophen 37.5-325 mg (ULTRACET) 37.5-325 mg Tab Take 1 tablet by mouth every 6 (six) hours as needed for Pain.     Family History    None       Tobacco Use    Smoking status: Every Day     Types: Cigarettes    Smokeless tobacco: Never   Substance and Sexual Activity    Alcohol use: Yes     Comment: socailly    Drug use: Yes     Types: Cocaine, Methamphetamines     Comment: denies cocaine or meth. Reports maijurana use    Sexual activity: Yes     Review of Systems   Constitutional:  Negative for chills and fever.   Respiratory:  Positive for shortness of breath. Negative for apnea.    Cardiovascular:   Positive for chest pain.   Gastrointestinal:  Negative for constipation, diarrhea, nausea and vomiting.   Endocrine: Negative for polydipsia and polyphagia.   Musculoskeletal:  Positive for joint swelling.     Objective:     Vital Signs (Most Recent):  Temp: 97.7 °F (36.5 °C) (08/11/24 2218)  Pulse: 76 (08/12/24 0314)  Resp: (!) 21 (08/12/24 0314)  BP: 130/79 (08/12/24 0314)  SpO2: 98 % (08/12/24 0314) Vital Signs (24h Range):  Temp:  [97.7 °F (36.5 °C)] 97.7 °F (36.5 °C)  Pulse:  [] 76  Resp:  [18-22] 21  SpO2:  [96 %-100 %] 98 %  BP: (115-139)/(77-83) 130/79     Weight: 86.2 kg (190 lb 0.6 oz)  Body mass index is 32.62 kg/m².     Physical Exam  Constitutional:       Appearance: Normal appearance.   HENT:      Head: Normocephalic and atraumatic.      Mouth/Throat:      Mouth: Mucous membranes are moist.      Pharynx: Oropharynx is clear.   Eyes:      Extraocular Movements: Extraocular movements intact.      Pupils: Pupils are equal, round, and reactive to light.   Cardiovascular:      Rate and Rhythm: Normal rate and regular rhythm.   Pulmonary:      Breath sounds: Rhonchi and rales present. No wheezing.   Abdominal:      General: Abdomen is flat.   Musculoskeletal:      Right lower leg: 3+ Edema present.      Left lower leg: 3+ Edema present.   Neurological:      General: No focal deficit present.      Mental Status: She is alert.              CRANIAL NERVES     CN III, IV, VI   Pupils are equal, round, and reactive to light.       Significant Labs: All pertinent labs within the past 24 hours have been reviewed.  CBC:   Recent Labs   Lab 08/12/24 0028   WBC 8.96   HGB 8.8*   HCT 28.2*        CMP:   Recent Labs   Lab 08/12/24 0028   *   K 3.0*      CO2 20*   GLU 91   BUN 13   CREATININE 1.3   CALCIUM 8.2*   PROT 6.7   ALBUMIN 2.2*   BILITOT 0.5   ALKPHOS 98   AST 15   ALT 21   ANIONGAP 13     Troponin:   Recent Labs   Lab 08/12/24 0028 08/12/24 0310   TROPONINI 0.063* 0.055*        Significant Imaging: I have reviewed all pertinent imaging results/findings within the past 24 hours.  Imaging Results              X-Ray Chest AP Portable (Final result)  Result time 08/12/24 00:35:50      Final result by Nick Clifton DO (08/12/24 00:35:50)                   Impression:      No acute abnormality.      Electronically signed by: Nick Clifton  Date:    08/12/2024  Time:    00:35               Narrative:    EXAMINATION:  XR CHEST AP PORTABLE    CLINICAL HISTORY:  CHF;    TECHNIQUE:  Single frontal view of the chest was performed.    COMPARISON:  05/28/2021.    FINDINGS:  Mild bandlike opacities noted in the left lung base compatible with atelectasis.  The lungs are otherwise clear.  The pleural spaces are clear.  The cardiac silhouette is borderline.  Osseous structures demonstrate degenerative changes.                                     Assessment/Plan:     * Acute exacerbation of CHF (congestive heart failure)  Unclear if etiology of CHF is systolic or diastolic in nature. Reportedly had CHF exacerbation at OSH in [place]. CHF is currently uncontrolled due to Continued edema of extremities and Weight gain of 30 pounds. No recent Echo on file. Continue Beta Blocker, ACE/ARB, and Furosemide and monitor clinical status closely. Monitor on telemetry. Monitor strict Is&Os and daily weights.  Place on fluid restriction of 2 L. Cardiology has been consulted. Continue to stress to patient importance of self efficacy and  on diet for CHF. Last BNP reviewed- and noted below   Recent Labs   Lab 08/12/24  0028   *     Plan:  80 IV lasix BID  Echo while inpatient   Cardiology consult, appreciate recs  Daily weights  Daily I/Os  Fluid restriction to 2L  Continue home BB and ARB (takes losartan and metoprolol based on dispensary report)  Urine Drug screen and Covid/Flu    Smoking history  Minimum of 2 cigarettes per day since age of 11    Plan:  Consider nicotine patches  Consider  smoking cessation referral at discharge    Decreased GFR  GFR of 48 on recent CMP, although unclear history of CKD.  Last CMP 2 years ago without noted decrease in GFR  No KENY on CMP    Plan:  Renally dose meds  Trend CMP  Avoid nephrotoxic agents    Anemia  Anemia is likely due to Iron deficiency. Most recent hemoglobin and hematocrit are listed below.  Recent Labs     08/12/24 0028   HGB 8.8*   HCT 28.2*     Plan  - Monitor serial CBC: Daily  - Transfuse PRBC if patient becomes hemodynamically unstable, symptomatic or H/H drops below 7/21.  - Patient has not received any PRBC transfusions to date  - Patient's anemia is currently stable  - Continue home iron  - Iron studies while inpatient    Hypokalemia  Patient's most recent potassium results are listed below.   Recent Labs     08/12/24 0028   K 3.0*     Plan  - Replete potassium per protocol  - Monitor potassium Daily  - Patient's hypokalemia is stable  - Continue to trend and replete prn daily    Elevated troponin  RESOLVED  Chest pain upon presentation  Trops 0.063 --> 0.055  No ST changes on EKG  Likely demand ischemia in the setting of CHF exacerbation.    Plan:  Continue to monitor for chest pain      VTE Risk Mitigation (From admission, onward)           Ordered     enoxaparin injection 40 mg  Daily         08/12/24 0400     IP VTE HIGH RISK PATIENT  Once         08/12/24 0400     Place sequential compression device  Until discontinued         08/12/24 0400                      On 08/12/2024, patient should be placed in hospital observation services under my care in collaboration with Dr. Villagomez.         Ej Peralta MD  Department of Hospital Medicine  [place] - Emergency Dept

## 2024-08-12 NOTE — ASSESSMENT & PLAN NOTE
- K+ 3.2 this AM  - replacement ordered for total of 100mEq; Mg 1.4 with 2g Mg rider ordered  - repeat BMP and Mg tomorrow  - goal K+>4.0 Mg >2.0

## 2024-08-12 NOTE — ASSESSMENT & PLAN NOTE
- H&H 8.8&28.2 this AM  - essentially unchanged from admission  - uncertain of baseline H&H; further management per primary team

## 2024-08-13 ENCOUNTER — CLINICAL SUPPORT (OUTPATIENT)
Dept: SMOKING CESSATION | Facility: CLINIC | Age: 59
End: 2024-08-13

## 2024-08-13 DIAGNOSIS — F17.210 CIGARETTE SMOKER: Primary | ICD-10-CM

## 2024-08-13 PROBLEM — I48.92 ATRIAL FLUTTER: Status: ACTIVE | Noted: 2024-08-13

## 2024-08-13 PROBLEM — M79.605 LEG PAIN, BILATERAL: Status: ACTIVE | Noted: 2024-08-13

## 2024-08-13 PROBLEM — J44.1 COPD EXACERBATION: Status: ACTIVE | Noted: 2024-08-13

## 2024-08-13 PROBLEM — I50.43 ACUTE ON CHRONIC COMBINED SYSTOLIC AND DIASTOLIC CONGESTIVE HEART FAILURE: Status: ACTIVE | Noted: 2024-08-12

## 2024-08-13 PROBLEM — M79.604 LEG PAIN, BILATERAL: Status: ACTIVE | Noted: 2024-08-13

## 2024-08-13 PROBLEM — C19 COLORECTAL CARCINOMA: Status: ACTIVE | Noted: 2024-08-13

## 2024-08-13 LAB
ALBUMIN SERPL BCP-MCNC: 1.9 G/DL (ref 3.5–5.2)
ALP SERPL-CCNC: 97 U/L (ref 55–135)
ALT SERPL W/O P-5'-P-CCNC: 15 U/L (ref 10–44)
ANION GAP SERPL CALC-SCNC: 10 MMOL/L (ref 8–16)
AST SERPL-CCNC: 13 U/L (ref 10–40)
BASOPHILS # BLD AUTO: 0.05 K/UL (ref 0–0.2)
BASOPHILS NFR BLD: 0.6 % (ref 0–1.9)
BILIRUB SERPL-MCNC: 0.3 MG/DL (ref 0.1–1)
BUN SERPL-MCNC: 14 MG/DL (ref 6–20)
CALCIUM SERPL-MCNC: 8 MG/DL (ref 8.7–10.5)
CHLORIDE SERPL-SCNC: 98 MMOL/L (ref 95–110)
CO2 SERPL-SCNC: 27 MMOL/L (ref 23–29)
CREAT SERPL-MCNC: 1.3 MG/DL (ref 0.5–1.4)
DIFFERENTIAL METHOD BLD: ABNORMAL
EOSINOPHIL # BLD AUTO: 0.3 K/UL (ref 0–0.5)
EOSINOPHIL NFR BLD: 3.1 % (ref 0–8)
ERYTHROCYTE [DISTWIDTH] IN BLOOD BY AUTOMATED COUNT: 20.6 % (ref 11.5–14.5)
EST. GFR  (NO RACE VARIABLE): 48 ML/MIN/1.73 M^2
GLUCOSE SERPL-MCNC: 94 MG/DL (ref 70–110)
HCT VFR BLD AUTO: 27.3 % (ref 37–48.5)
HGB BLD-MCNC: 8.5 G/DL (ref 12–16)
IMM GRANULOCYTES # BLD AUTO: 0.02 K/UL (ref 0–0.04)
IMM GRANULOCYTES NFR BLD AUTO: 0.2 % (ref 0–0.5)
LYMPHOCYTES # BLD AUTO: 1.8 K/UL (ref 1–4.8)
LYMPHOCYTES NFR BLD: 21.7 % (ref 18–48)
MAGNESIUM SERPL-MCNC: 1.7 MG/DL (ref 1.6–2.6)
MCH RBC QN AUTO: 24 PG (ref 27–31)
MCHC RBC AUTO-ENTMCNC: 31.1 G/DL (ref 32–36)
MCV RBC AUTO: 77 FL (ref 82–98)
MONOCYTES # BLD AUTO: 0.6 K/UL (ref 0.3–1)
MONOCYTES NFR BLD: 7.2 % (ref 4–15)
NEUTROPHILS # BLD AUTO: 5.6 K/UL (ref 1.8–7.7)
NEUTROPHILS NFR BLD: 67.2 % (ref 38–73)
NRBC BLD-RTO: 0 /100 WBC
OHS QRS DURATION: 82 MS
OHS QTC CALCULATION: 469 MS
PHOSPHATE SERPL-MCNC: 3 MG/DL (ref 2.7–4.5)
PLATELET # BLD AUTO: 335 K/UL (ref 150–450)
PMV BLD AUTO: 9.6 FL (ref 9.2–12.9)
POCT GLUCOSE: 106 MG/DL (ref 70–110)
POCT GLUCOSE: 138 MG/DL (ref 70–110)
POCT GLUCOSE: 221 MG/DL (ref 70–110)
POTASSIUM SERPL-SCNC: 3.8 MMOL/L (ref 3.5–5.1)
PROT SERPL-MCNC: 6.1 G/DL (ref 6–8.4)
RBC # BLD AUTO: 3.54 M/UL (ref 4–5.4)
SODIUM SERPL-SCNC: 135 MMOL/L (ref 136–145)
WBC # BLD AUTO: 8.35 K/UL (ref 3.9–12.7)

## 2024-08-13 PROCEDURE — 25000242 PHARM REV CODE 250 ALT 637 W/ HCPCS: Performed by: NURSE PRACTITIONER

## 2024-08-13 PROCEDURE — 97161 PT EVAL LOW COMPLEX 20 MIN: CPT

## 2024-08-13 PROCEDURE — 25000003 PHARM REV CODE 250

## 2024-08-13 PROCEDURE — 63600175 PHARM REV CODE 636 W HCPCS

## 2024-08-13 PROCEDURE — 99233 SBSQ HOSP IP/OBS HIGH 50: CPT | Mod: ,,, | Performed by: NURSE PRACTITIONER

## 2024-08-13 PROCEDURE — 36415 COLL VENOUS BLD VENIPUNCTURE: CPT

## 2024-08-13 PROCEDURE — 99406 BEHAV CHNG SMOKING 3-10 MIN: CPT | Mod: ,,,

## 2024-08-13 PROCEDURE — 11000001 HC ACUTE MED/SURG PRIVATE ROOM

## 2024-08-13 PROCEDURE — 94761 N-INVAS EAR/PLS OXIMETRY MLT: CPT

## 2024-08-13 PROCEDURE — 97165 OT EVAL LOW COMPLEX 30 MIN: CPT

## 2024-08-13 PROCEDURE — 94640 AIRWAY INHALATION TREATMENT: CPT

## 2024-08-13 PROCEDURE — S4991 NICOTINE PATCH NONLEGEND: HCPCS

## 2024-08-13 PROCEDURE — 83735 ASSAY OF MAGNESIUM: CPT

## 2024-08-13 PROCEDURE — 99900035 HC TECH TIME PER 15 MIN (STAT)

## 2024-08-13 PROCEDURE — 25000003 PHARM REV CODE 250: Performed by: NURSE PRACTITIONER

## 2024-08-13 PROCEDURE — 84100 ASSAY OF PHOSPHORUS: CPT

## 2024-08-13 PROCEDURE — 85025 COMPLETE CBC W/AUTO DIFF WBC: CPT

## 2024-08-13 PROCEDURE — 63600175 PHARM REV CODE 636 W HCPCS: Performed by: NURSE PRACTITIONER

## 2024-08-13 PROCEDURE — 96376 TX/PRO/DX INJ SAME DRUG ADON: CPT

## 2024-08-13 PROCEDURE — 80053 COMPREHEN METABOLIC PANEL: CPT

## 2024-08-13 PROCEDURE — 96366 THER/PROPH/DIAG IV INF ADDON: CPT

## 2024-08-13 RX ORDER — IPRATROPIUM BROMIDE AND ALBUTEROL SULFATE 2.5; .5 MG/3ML; MG/3ML
3 SOLUTION RESPIRATORY (INHALATION)
Status: DISCONTINUED | OUTPATIENT
Start: 2024-08-14 | End: 2024-08-14 | Stop reason: HOSPADM

## 2024-08-13 RX ORDER — PREDNISONE 20 MG/1
40 TABLET ORAL DAILY
Status: DISCONTINUED | OUTPATIENT
Start: 2024-08-13 | End: 2024-08-13

## 2024-08-13 RX ORDER — IBUPROFEN 200 MG
1 TABLET ORAL DAILY
Status: DISCONTINUED | OUTPATIENT
Start: 2024-08-13 | End: 2024-08-14 | Stop reason: DRUGHIGH

## 2024-08-13 RX ORDER — IPRATROPIUM BROMIDE AND ALBUTEROL SULFATE 2.5; .5 MG/3ML; MG/3ML
3 SOLUTION RESPIRATORY (INHALATION) EVERY 4 HOURS
Status: DISCONTINUED | OUTPATIENT
Start: 2024-08-13 | End: 2024-08-13

## 2024-08-13 RX ORDER — PREDNISONE 20 MG/1
40 TABLET ORAL DAILY
Status: DISCONTINUED | OUTPATIENT
Start: 2024-08-13 | End: 2024-08-14 | Stop reason: HOSPADM

## 2024-08-13 RX ORDER — MAGNESIUM SULFATE HEPTAHYDRATE 40 MG/ML
2 INJECTION, SOLUTION INTRAVENOUS ONCE
Status: COMPLETED | OUTPATIENT
Start: 2024-08-13 | End: 2024-08-13

## 2024-08-13 RX ADMIN — LIDOCAINE 1 PATCH: 50 PATCH CUTANEOUS at 08:08

## 2024-08-13 RX ADMIN — FERROUS SULFATE TAB 325 MG (65 MG ELEMENTAL FE) 1 EACH: 325 (65 FE) TAB at 09:08

## 2024-08-13 RX ADMIN — FUROSEMIDE 80 MG: 10 INJECTION, SOLUTION INTRAVENOUS at 09:08

## 2024-08-13 RX ADMIN — PREDNISONE 40 MG: 20 TABLET ORAL at 12:08

## 2024-08-13 RX ADMIN — SENNOSIDES AND DOCUSATE SODIUM 1 TABLET: 8.6; 5 TABLET ORAL at 09:08

## 2024-08-13 RX ADMIN — IPRATROPIUM BROMIDE AND ALBUTEROL SULFATE 3 ML: .5; 3 SOLUTION RESPIRATORY (INHALATION) at 03:08

## 2024-08-13 RX ADMIN — METOPROLOL SUCCINATE 25 MG: 25 TABLET, EXTENDED RELEASE ORAL at 09:08

## 2024-08-13 RX ADMIN — FUROSEMIDE 80 MG: 10 INJECTION, SOLUTION INTRAVENOUS at 08:08

## 2024-08-13 RX ADMIN — MAGNESIUM SULFATE HEPTAHYDRATE 2 G: 40 INJECTION, SOLUTION INTRAVENOUS at 06:08

## 2024-08-13 RX ADMIN — LOSARTAN POTASSIUM 25 MG: 25 TABLET, FILM COATED ORAL at 09:08

## 2024-08-13 RX ADMIN — IPRATROPIUM BROMIDE AND ALBUTEROL SULFATE 3 ML: .5; 3 SOLUTION RESPIRATORY (INHALATION) at 12:08

## 2024-08-13 RX ADMIN — NICOTINE 1 PATCH: 14 PATCH, EXTENDED RELEASE TRANSDERMAL at 02:08

## 2024-08-13 RX ADMIN — ENOXAPARIN SODIUM 40 MG: 40 INJECTION SUBCUTANEOUS at 05:08

## 2024-08-13 RX ADMIN — POLYETHYLENE GLYCOL 3350 17 G: 17 POWDER, FOR SOLUTION ORAL at 09:08

## 2024-08-13 NOTE — PROGRESS NOTES
[facility] - Telemetry  Cardiology  Progress Note    Patient Name: Mary Ellen Saini  MRN: [id number]  Admission Date: 8/12/2024  Hospital Length of Stay: 0 days  Code Status: Full Code   Attending Physician: Kavin Villagomez DO   Primary Care Physician: Marlen, Primary Doctor  Expected Discharge Date: 8/12/2024  Principal Problem:Acute on chronic combined systolic and diastolic congestive heart failure    Subjective:     Hospital Course:   8/12/2024 per HPI   8/13/2024 Remains on Lasix 80mg IV BID with 6.3L out overnight negative 7.6L since admission. Echo yesterday with EF 30-35% H&H 8.5&27.3 unchanged from yesterday. BMP with creatinine 1.3. BLE venous ultrasound negative for DVT     No new subjective & objective note has been filed under this hospital service since the last note was generated.    Assessment and Plan:     Brief HPI: Seen this morning on AM NP rounds while resting in bed. Reports feeling better. LE edema improving. Discussed POC as detailed below-verbalized understanding and agrees with POC      * Acute on chronic combined systolic and diastolic congestive heart failure  - reports recent admission in [place] [id number] for CHF exacerbation with Life Vest placed- diuresed at that time with improvement in symptoms with recent recurrence  - admitted with MONSON and BLE edema; significant LE edema noted; remains on IV Lasix 80mg BID with 6.3L out overnight negative 7.6L since admission; outside records report medication regimen of Losartan, Toprol XL and Aldactone with EF 20-25%  - repeat echo yesterday with EF 30-35%; continue ARB and BB; IV Lasix for continued diuresis; will plan for transition to oral Lasix once euvolemic- Lasix 40mg po BID   - SBP 110s-120s and will continue BB and ARB  - strict I&Os and daily weights     Atrial flutter  - noted on EKG from previous admission to [facility]   - admission EKG with NSR; on Toprol XL; no OAC prescribed due to anemia and recent diagnosis of colon  cancer  - CHADSVAS score 2 (CHF and HTN) will hold OAC for now given anemia and to allow for further evaluation of recently diagnosed colon cancer     Anemia  - H&H 8.5&27.3 this AM  - essentially unchanged from admission  - uncertain of baseline H&H; further management per primary team; history of colon cancer diagnosed in Feb 2024 no Hem Onc follow up due to transportation issues     Hypokalemia  - K+ 3.8 this AM  - oral KDur ordered  - goal K+>4.0 Mg >2.0    Elevated troponin  - troponin 0.063-0.055  - reported  recent angiogram at [facility] however records indicate no angiogram given anemia as well as other precluding factors   - presented with ADHF;  troponin elevation related to demand etiology in setting of ADHF         VTE Risk Mitigation (From admission, onward)           Ordered     enoxaparin injection 40 mg  Daily         08/12/24 0400     IP VTE HIGH RISK PATIENT  Once         08/12/24 0400     Place sequential compression device  Until discontinued         08/12/24 0400                    ANGIE Infante, ANP  Cardiology  [place] - Telemetry

## 2024-08-13 NOTE — PLAN OF CARE
Problem: Heart Failure  Goal: Optimal Cardiac Output  Outcome: Progressing  Goal: Stable Heart Rate and Rhythm  Outcome: Progressing     Problem: Heart Failure  Goal: Stable Heart Rate and Rhythm  Outcome: Progressing     Problem: Fall Injury Risk  Goal: Absence of Fall and Fall-Related Injury  Outcome: Progressing

## 2024-08-13 NOTE — SUBJECTIVE & OBJECTIVE
Review of Systems   Constitutional: Negative for chills, decreased appetite, diaphoresis, fever, malaise/fatigue, weight gain and weight loss.   Cardiovascular:  Positive for dyspnea on exertion and leg swelling. Negative for chest pain, claudication, irregular heartbeat, near-syncope, orthopnea, palpitations and paroxysmal nocturnal dyspnea.   Respiratory:  Negative for cough, shortness of breath, snoring, sputum production and wheezing.    Endocrine: Negative for cold intolerance, heat intolerance, polydipsia, polyphagia and polyuria.   Skin:  Negative for color change, dry skin, itching, nail changes and poor wound healing.   Musculoskeletal:  Negative for back pain, gout, joint pain and joint swelling.   Gastrointestinal:  Negative for bloating, abdominal pain, constipation, diarrhea, hematemesis, hematochezia, melena, nausea and vomiting.   Genitourinary:  Negative for dysuria, hematuria and nocturia.   Neurological:  Negative for dizziness, headaches, light-headedness, numbness, paresthesias and weakness.   Psychiatric/Behavioral:  Negative for altered mental status, depression and memory loss.      Objective:     Vital Signs (Most Recent):  Temp: 98.4 °F (36.9 °C) (08/13/24 0746)  Pulse: 84 (08/13/24 0746)  Resp: 17 (08/13/24 0746)  BP: 129/74 (08/13/24 0746)  SpO2: 95 % (08/13/24 0746) Vital Signs (24h Range):  Temp:  [96.7 °F (35.9 °C)-98.9 °F (37.2 °C)] 98.4 °F (36.9 °C)  Pulse:  [77-89] 84  Resp:  [17-20] 17  SpO2:  [93 %-100 %] 95 %  BP: ()/(50-84) 129/74     Weight: 79.4 kg (175 lb 0.7 oz)  Body mass index is 30.05 kg/m².     SpO2: 95 %         Intake/Output Summary (Last 24 hours) at 8/13/2024 1044  Last data filed at 8/13/2024 0232  Gross per 24 hour   Intake 428.12 ml   Output 4600 ml   Net -4171.88 ml       Lines/Drains/Airways       Peripheral Intravenous Line  Duration                  Peripheral IV - Single Lumen 08/12/24 0258 20 G No Left;Posterior Wrist 1 day                        Physical Exam  Constitutional:       General: She is not in acute distress.     Appearance: She is well-developed.   Cardiovascular:      Rate and Rhythm: Normal rate and regular rhythm.      Heart sounds: No murmur heard.     No gallop.   Pulmonary:      Effort: Pulmonary effort is normal. No respiratory distress.      Breath sounds: Normal breath sounds. No wheezing.   Abdominal:      General: Bowel sounds are normal. There is no distension.      Palpations: Abdomen is soft.      Tenderness: There is no abdominal tenderness.   Musculoskeletal:      Right lower leg: Edema present.      Left lower leg: Edema present.   Skin:     General: Skin is warm and dry.   Neurological:      Mental Status: She is alert and oriented to person, place, and time.            Significant Labs: BMP:   Recent Labs   Lab 08/12/24  0028 08/12/24  0535 08/13/24  0315   GLU 91 86 94   * 136 135*   K 3.0* 3.2* 3.8    102 98   CO2 20* 21* 27   BUN 13 12 14   CREATININE 1.3 1.3 1.3   CALCIUM 8.2* 7.9* 8.0*   MG  --  1.4* 1.7    and CBC   Recent Labs   Lab 08/12/24  0028 08/12/24  0535 08/13/24  0315   WBC 8.96 7.63 8.35   HGB 8.8* 8.6* 8.5*   HCT 28.2* 28.3* 27.3*    358 335       Significant Imaging: Echocardiogram: Transthoracic echo (TTE) complete (Cupid Only):   Results for orders placed or performed during the hospital encounter of 08/12/24   Echo   Result Value Ref Range    Jenkins's Biplane MOD Ejection Fraction 34 %    A2C EF 47 %    A4C EF 21 %    LVOT stroke volume 44.72 cm3    LVIDd 5.98 3.5 - 6.0 cm    LV Systolic Volume 150.13 mL    LVIDs 5.54 (A) 2.1 - 4.0 cm    LV ESV A2C 78.97 mL    LV Diastolic Volume 178.40 mL    LV ESV A4C 82.83 mL    LV EDV A2C 143.646925269104133 mL    LV EDV A4C 145.65 mL    Left Ventricular End Systolic Volume by Teichholz Method 150.13 mL    Left Ventricular End Diastolic Volume by Teichholz Method 178.40 mL    IVS 0.79 0.6 - 1.1 cm    LVOT diameter 2.01 cm    LVOT area 3.2 cm2     FS 7 (A) 28 - 44 %    Left Ventricle Relative Wall Thickness 0.34 cm    Posterior Wall 1.03 0.6 - 1.1 cm    LV mass 217.51 g    MV Peak E Sukhdeep 0.76 m/s    TDI LATERAL 0.06 m/s    TDI SEPTAL 0.07 m/s    E/E' ratio 11.69 m/s    MV Peak A Sukhdeep 0.88 m/s    TR Max Sukhdeep 2.86 m/s    E/A ratio 0.86     IVRT 111.32 msec    E wave deceleration time 154.28 msec    LV SEPTAL E/E' RATIO 10.86 m/s    LV LATERAL E/E' RATIO 12.67 m/s    PV Peak S Sukhdeep 0.51 m/s    PV Peak D Sukhdeep 0.43 m/s    Pulm vein S/D ratio 1.19     LVOT peak sukhdeep 0.89 m/s    Left Ventricular Outflow Tract Mean Velocity 0.62 cm/s    Left Ventricular Outflow Tract Mean Gradient 1.74 mmHg    RV S' 7.35 cm/s    TAPSE 2.16 cm    RV/LV Ratio 0.54 cm    LA size 4.22 cm    Left Atrium Minor Axis 5.63 cm    Left Atrium Major Axis 5.97 cm    LA volume (mod) 84.22 cm3    RA Major Axis 5.27 cm    RA Width 4.99 cm    AV mean gradient 4 mmHg    AV peak gradient 8 mmHg    Ao peak sukhdeep 1.40 m/s    Ao VTI 22.90 cm    LVOT peak VTI 14.10 cm    AV valve area 1.95 cm²    AV Velocity Ratio 0.64     AV index (prosthetic) 0.62     BHARAT by Velocity Ratio 2.02 cm²    MV mean gradient 2 mmHg    MV peak gradient 3 mmHg    MV valve area by continuity eq 2.18 cm2    MV VTI 20.5 cm    Triscuspid Valve Regurgitation Peak Gradient 33 mmHg    Sinus 3.84 cm    STJ 3.50 cm    Ascending aorta 3.40 cm    Mean e' 0.07 m/s    LA area A4C 25.77 cm2    LA area A2C 23.67 cm2    RVDD 3.23 cm    BSA 1.97 m2    LV Systolic Volume Index 78.6 mL/m2    LV Diastolic Volume Index 93.40 mL/m2    LV Mass Index 114 g/m2    LA Volume Index (Mod) 44.1 mL/m2    ZLVIDS 4.07     ZLVIDD 1.07     LA Volume Index 52.2 mL/m2    LA volume 99.78 cm3    LA WIDTH 4.8 cm    TV resting pulmonary artery pressure 36 mmHg    RV TB RVSP 6 mmHg    Est. RA pres 3 mmHg    Narrative      Left Ventricle: The left ventricle is moderately dilated. There is   eccentric hypertrophy. Moderate global hypokinesis present. There is   moderately reduced  systolic function with a visually estimated ejection   fraction of 30 - 35%. Biplane (2D) method of discs ejection fraction is   34%. There is diastolic dysfunction but grade cannot be determined.    Right Ventricle: Normal right ventricular cavity size. Wall thickness   is normal. Systolic function is normal.    Left Atrium: Left atrium is severely dilated.    Right Atrium: Right atrium is moderately dilated.    Aortic Valve: There is mild stenosis. Aortic valve area by VTI is 1.95   cm². Aortic valve peak velocity is 1.40 m/s. Mean gradient is 4 mmHg. The   dimensionless index is 0.62.    Mitral Valve: There is mild regurgitation.    Tricuspid Valve: There is mild regurgitation.    Pulmonic Valve: There is mild regurgitation.    Pulmonary Artery: The estimated pulmonary artery systolic pressure is   36 mmHg.    IVC/SVC: Normal venous pressure at 3 mmHg.

## 2024-08-13 NOTE — ASSESSMENT & PLAN NOTE
- noted on EKG from previous admission to [facility]   - admission EKG with NSR; on Toprol XL; no OAC prescribed due to anemia and recent diagnosis of colon cancer  - CHADSVAS score 2 (CHF and HTN) will hold OAC for now given anemia and to allow for further evaluation of recently diagnosed colon cancer

## 2024-08-13 NOTE — PROGRESS NOTES
[facility] - Telemetry  Cardiology  Progress Note    Patient Name: Mary Ellen Saini  MRN: [id number]  Admission Date: 8/12/2024  Hospital Length of Stay: 0 days  Code Status: Full Code   Attending Physician: Kavin Villagomez DO   Primary Care Physician: Marlen, Primary Doctor  Expected Discharge Date: 8/12/2024  Principal Problem:Acute on chronic combined systolic and diastolic congestive heart failure    Subjective:     Hospital Course:   8/12/2024 per HPI   8/13/2024 Remains on Lasix 80mg IV BID with 6.3L out overnight negative 7.6L since admission. Echo yesterday with EF 30-35% H&H 8.5&27.3 unchanged from yesterday. BMP with creatinine 1.3. BLE venous ultrasound negative for DVT         Review of Systems   Constitutional: Negative for chills, decreased appetite, diaphoresis, fever, malaise/fatigue, weight gain and weight loss.   Cardiovascular:  Positive for dyspnea on exertion and leg swelling. Negative for chest pain, claudication, irregular heartbeat, near-syncope, orthopnea, palpitations and paroxysmal nocturnal dyspnea.   Respiratory:  Negative for cough, shortness of breath, snoring, sputum production and wheezing.    Endocrine: Negative for cold intolerance, heat intolerance, polydipsia, polyphagia and polyuria.   Skin:  Negative for color change, dry skin, itching, nail changes and poor wound healing.   Musculoskeletal:  Negative for back pain, gout, joint pain and joint swelling.   Gastrointestinal:  Negative for bloating, abdominal pain, constipation, diarrhea, hematemesis, hematochezia, melena, nausea and vomiting.   Genitourinary:  Negative for dysuria, hematuria and nocturia.   Neurological:  Negative for dizziness, headaches, light-headedness, numbness, paresthesias and weakness.   Psychiatric/Behavioral:  Negative for altered mental status, depression and memory loss.      Objective:     Vital Signs (Most Recent):  Temp: 98.4 °F (36.9 °C) (08/13/24 0746)  Pulse: 84 (08/13/24 0746)  Resp: 17 (08/13/24  0746)  BP: 129/74 (08/13/24 0746)  SpO2: 95 % (08/13/24 0746) Vital Signs (24h Range):  Temp:  [96.7 °F (35.9 °C)-98.9 °F (37.2 °C)] 98.4 °F (36.9 °C)  Pulse:  [77-89] 84  Resp:  [17-20] 17  SpO2:  [93 %-100 %] 95 %  BP: ()/(50-84) 129/74     Weight: 79.4 kg (175 lb 0.7 oz)  Body mass index is 30.05 kg/m².     SpO2: 95 %         Intake/Output Summary (Last 24 hours) at 8/13/2024 1044  Last data filed at 8/13/2024 0232  Gross per 24 hour   Intake 428.12 ml   Output 4600 ml   Net -4171.88 ml       Lines/Drains/Airways       Peripheral Intravenous Line  Duration                  Peripheral IV - Single Lumen 08/12/24 0258 20 G No Left;Posterior Wrist 1 day                       Physical Exam  Constitutional:       General: She is not in acute distress.     Appearance: She is well-developed.   Cardiovascular:      Rate and Rhythm: Normal rate and regular rhythm.      Heart sounds: No murmur heard.     No gallop.   Pulmonary:      Effort: Pulmonary effort is normal. No respiratory distress.      Breath sounds: Normal breath sounds. No wheezing.   Abdominal:      General: Bowel sounds are normal. There is no distension.      Palpations: Abdomen is soft.      Tenderness: There is no abdominal tenderness.   Musculoskeletal:      Right lower leg: Edema present.      Left lower leg: Edema present.   Skin:     General: Skin is warm and dry.   Neurological:      Mental Status: She is alert and oriented to person, place, and time.            Significant Labs: BMP:   Recent Labs   Lab 08/12/24  0028 08/12/24  0535 08/13/24  0315   GLU 91 86 94   * 136 135*   K 3.0* 3.2* 3.8    102 98   CO2 20* 21* 27   BUN 13 12 14   CREATININE 1.3 1.3 1.3   CALCIUM 8.2* 7.9* 8.0*   MG  --  1.4* 1.7    and CBC   Recent Labs   Lab 08/12/24  0028 08/12/24  0535 08/13/24  0315   WBC 8.96 7.63 8.35   HGB 8.8* 8.6* 8.5*   HCT 28.2* 28.3* 27.3*    358 335       Significant Imaging: Echocardiogram: Transthoracic echo (TTE)  complete (Cupid Only):   Results for orders placed or performed during the hospital encounter of 08/12/24   Echo   Result Value Ref Range    Jenkins's Biplane MOD Ejection Fraction 34 %    A2C EF 47 %    A4C EF 21 %    LVOT stroke volume 44.72 cm3    LVIDd 5.98 3.5 - 6.0 cm    LV Systolic Volume 150.13 mL    LVIDs 5.54 (A) 2.1 - 4.0 cm    LV ESV A2C 78.97 mL    LV Diastolic Volume 178.40 mL    LV ESV A4C 82.83 mL    LV EDV A2C 143.310656468257309 mL    LV EDV A4C 145.65 mL    Left Ventricular End Systolic Volume by Teichholz Method 150.13 mL    Left Ventricular End Diastolic Volume by Teichholz Method 178.40 mL    IVS 0.79 0.6 - 1.1 cm    LVOT diameter 2.01 cm    LVOT area 3.2 cm2    FS 7 (A) 28 - 44 %    Left Ventricle Relative Wall Thickness 0.34 cm    Posterior Wall 1.03 0.6 - 1.1 cm    LV mass 217.51 g    MV Peak E Sukhdeep 0.76 m/s    TDI LATERAL 0.06 m/s    TDI SEPTAL 0.07 m/s    E/E' ratio 11.69 m/s    MV Peak A Sukhdeep 0.88 m/s    TR Max Sukhdeep 2.86 m/s    E/A ratio 0.86     IVRT 111.32 msec    E wave deceleration time 154.28 msec    LV SEPTAL E/E' RATIO 10.86 m/s    LV LATERAL E/E' RATIO 12.67 m/s    PV Peak S Sukhdeep 0.51 m/s    PV Peak D Sukhdeep 0.43 m/s    Pulm vein S/D ratio 1.19     LVOT peak sukhdeep 0.89 m/s    Left Ventricular Outflow Tract Mean Velocity 0.62 cm/s    Left Ventricular Outflow Tract Mean Gradient 1.74 mmHg    RV S' 7.35 cm/s    TAPSE 2.16 cm    RV/LV Ratio 0.54 cm    LA size 4.22 cm    Left Atrium Minor Axis 5.63 cm    Left Atrium Major Axis 5.97 cm    LA volume (mod) 84.22 cm3    RA Major Axis 5.27 cm    RA Width 4.99 cm    AV mean gradient 4 mmHg    AV peak gradient 8 mmHg    Ao peak sukhdeep 1.40 m/s    Ao VTI 22.90 cm    LVOT peak VTI 14.10 cm    AV valve area 1.95 cm²    AV Velocity Ratio 0.64     AV index (prosthetic) 0.62     BHARAT by Velocity Ratio 2.02 cm²    MV mean gradient 2 mmHg    MV peak gradient 3 mmHg    MV valve area by continuity eq 2.18 cm2    MV VTI 20.5 cm    Triscuspid Valve Regurgitation Peak  Gradient 33 mmHg    Sinus 3.84 cm    STJ 3.50 cm    Ascending aorta 3.40 cm    Mean e' 0.07 m/s    LA area A4C 25.77 cm2    LA area A2C 23.67 cm2    RVDD 3.23 cm    BSA 1.97 m2    LV Systolic Volume Index 78.6 mL/m2    LV Diastolic Volume Index 93.40 mL/m2    LV Mass Index 114 g/m2    LA Volume Index (Mod) 44.1 mL/m2    ZLVIDS 4.07     ZLVIDD 1.07     LA Volume Index 52.2 mL/m2    LA volume 99.78 cm3    LA WIDTH 4.8 cm    TV resting pulmonary artery pressure 36 mmHg    RV TB RVSP 6 mmHg    Est. RA pres 3 mmHg    Narrative      Left Ventricle: The left ventricle is moderately dilated. There is   eccentric hypertrophy. Moderate global hypokinesis present. There is   moderately reduced systolic function with a visually estimated ejection   fraction of 30 - 35%. Biplane (2D) method of discs ejection fraction is   34%. There is diastolic dysfunction but grade cannot be determined.    Right Ventricle: Normal right ventricular cavity size. Wall thickness   is normal. Systolic function is normal.    Left Atrium: Left atrium is severely dilated.    Right Atrium: Right atrium is moderately dilated.    Aortic Valve: There is mild stenosis. Aortic valve area by VTI is 1.95   cm². Aortic valve peak velocity is 1.40 m/s. Mean gradient is 4 mmHg. The   dimensionless index is 0.62.    Mitral Valve: There is mild regurgitation.    Tricuspid Valve: There is mild regurgitation.    Pulmonic Valve: There is mild regurgitation.    Pulmonary Artery: The estimated pulmonary artery systolic pressure is   36 mmHg.    IVC/SVC: Normal venous pressure at 3 mmHg.       Assessment and Plan:     Brief HPI: Seen this morning on AM NP rounds while resting in bed with brother at the bedside. Reports LE edema improving as well as SOB. Discussed POC as detailed below-verbalized understanding and agrees with POC      * Acute on chronic combined systolic and diastolic congestive heart failure  - reports recent admission in [place] x 2 for CHF  exacerbation with Life Vest placed- diuresed at that time with improvement in symptoms with recent recurrence  - admitted with MONSON and BLE edema; significant LE edema noted; remains on IV Lasix 80mg BID with 6.3L out overnight negative 7.6L since admission; outside records report medication regimen of Losartan, Toprol XL and Aldactone with EF 20-25%  - repeat echo yesterday with EF 30-35%; continue ARB and BB; IV Lasix for continued diuresis; will plan for transition to oral Lasix once euvolemic- Lasix 40mg po BID   - SBP 110s-120s and will continue BB and ARB  - strict I&Os and daily weights     Atrial flutter  - noted on EKG from previous admission to [facility]   - admission EKG with NSR; on Toprol XL; no OAC prescribed due to anemia and recent diagnosis of colon cancer  - CHADSVAS score 2 (CHF and HTN) will hold OAC for now given anemia and to allow for further evaluation of recently diagnosed colon cancer     Anemia  - H&H 8.5&27.3 this AM  - essentially unchanged from admission  - uncertain of baseline H&H; further management per primary team; history of colon cancer diagnosed in Feb 2024 no Hem Onc follow up due to transportation issues     Hypokalemia  - K+ 3.8 this AM  - oral KDur ordered  - goal K+>4.0 Mg >2.0    Elevated troponin  - troponin 0.063-0.055  - reported  recent angiogram at [facility] however records indicate no angiogram given anemia as well as other precluding factors   - presented with ADHF;  troponin elevation related to demand etiology in setting of ADHF         VTE Risk Mitigation (From admission, onward)           Ordered     enoxaparin injection 40 mg  Daily         08/12/24 0400     IP VTE HIGH RISK PATIENT  Once         08/12/24 0400     Place sequential compression device  Until discontinued         08/12/24 0400                    ANGIE Infante, ANP  Cardiology  [place] - Telemetry

## 2024-08-13 NOTE — ASSESSMENT & PLAN NOTE
Anemia is likely due to Iron deficiency. Most recent hemoglobin and hematocrit are listed below.  Recent Labs     08/12/24  0028 08/12/24  0535 08/13/24  0315   HGB 8.8* 8.6* 8.5*   HCT 28.2* 28.3* 27.3*       Plan  - Monitor serial CBC: Daily  - Transfuse PRBC if patient becomes hemodynamically unstable, symptomatic or H/H drops below 7/21.  - Patient has not received any PRBC transfusions to date  - Patient's anemia is currently stable  - Continue home iron  - Iron studies  WNL

## 2024-08-13 NOTE — NURSING
Shift assessment complete. Pt is resting with eyes closed. Oriented x 4 while awake. Verbalizes no needs/complaints at this time. Bed in lowest position, locked, and side rails up x 3. Call light in reach.

## 2024-08-13 NOTE — PLAN OF CARE
Nutrition Plan of Care:    Recommendations     1. Encourage oral intake    2. Recommend ONS if po intake <50%     3. Monitor labs and weights    4. Collaboration with medical providers     Goals: Patient to consume >75% of EEN prior to RD follow up  Nutrition Goal Status: progressing towards goal  Communication of RD Recs: other (comment) (poc)     Assessment and Plan     Nutrition Problem  Altered nutritional needs      Related to (etiology):   Congestive Heart Failure     Signs and Symptoms (as evidenced by):   Edema   Fluid restrictions     Interventions/Recommendations (treatment strategy):  Disease specific oral diet   Collaboration with medical providers     Nutrition Diagnosis Status:   Henry Ro MS, RDN, LDN

## 2024-08-13 NOTE — SUBJECTIVE & OBJECTIVE
Interval History:NAOE. Denies any SOB on RA, chest pain or palpitations.  .  Does have increased redness, warmth and pain to the calf areas bilateral.  She does endorse a cough and expiratory wheezing.  She has remained Afebrile.      Review of Systems   Constitutional:  Negative for chills and fever.   Respiratory:  Negative for apnea and shortness of breath.    Cardiovascular:  Positive for leg swelling. Negative for chest pain.   Gastrointestinal:  Negative for constipation, diarrhea, nausea and vomiting.   Endocrine: Negative for polydipsia and polyphagia.   Musculoskeletal:  Negative for joint swelling.       Objective:     Vital Signs (Most Recent):  Temp: 98.4 °F (36.9 °C) (08/13/24 0746)  Pulse: 84 (08/13/24 0746)  Resp: 17 (08/13/24 0746)  BP: 129/74 (08/13/24 0746)  SpO2: 95 % (08/13/24 0746) Vital Signs (24h Range):  Temp:  [96.7 °F (35.9 °C)-98.9 °F (37.2 °C)] 98.4 °F (36.9 °C)  Pulse:  [77-89] 84  Resp:  [17-20] 17  SpO2:  [93 %-100 %] 95 %  BP: ()/(50-84) 129/74     Weight: 79.4 kg (175 lb 0.7 oz)  Body mass index is 30.05 kg/m².    Intake/Output Summary (Last 24 hours) at 8/13/2024 1053  Last data filed at 8/13/2024 0232  Gross per 24 hour   Intake 428.12 ml   Output 4600 ml   Net -4171.88 ml         Physical Exam  Constitutional:       Appearance: Normal appearance.   HENT:      Head: Normocephalic and atraumatic.      Mouth/Throat:      Mouth: Mucous membranes are moist.      Pharynx: Oropharynx is clear.   Eyes:      Extraocular Movements: Extraocular movements intact.      Pupils: Pupils are equal, round, and reactive to light.   Cardiovascular:      Rate and Rhythm: Normal rate and regular rhythm.   Pulmonary:      Breath sounds: Wheezing present. No rhonchi or rales.   Abdominal:      General: Abdomen is flat.   Musculoskeletal:      Right lower leg: Edema (+2) present.      Left lower leg: Edema (+2) present.   Neurological:      General: No focal deficit present.      Mental Status: She  is alert.             Significant Labs: All pertinent labs within the past 24 hours have been reviewed.    Significant Imaging: I have reviewed all pertinent imaging results/findings within the past 24 hours.

## 2024-08-13 NOTE — ASSESSMENT & PLAN NOTE
- H&H 8.5&27.3 this AM  - essentially unchanged from admission  - uncertain of baseline H&H; further management per primary team; history of colon cancer diagnosed in Feb 2024 no Hem Onc follow up due to transportation issues

## 2024-08-13 NOTE — ASSESSMENT & PLAN NOTE
- troponin 0.063-0.055  - reported  recent angiogram at [facility] however records indicate no angiogram given anemia as well as other precluding factors   - presented with ADHF;  troponin elevation related to demand etiology in setting of ADHF

## 2024-08-13 NOTE — PROGRESS NOTES
"Pt will d/c to [facility] tomorrow   CM met with pt - she needs clothing - CM to check clothing closet;   Pt will need f/u with pcp, Oncology and Cardiology - CM to request apts per scheduling     Per RAYSA Reid RN - [facility] Liason Nurse:  "She will need all her meds at d/c and a ride there. [address] [place] [address]. She said she needs f/u for CHF and colon cancer, also a new PCP"    Follow up apts have been requested     Future Appointments   Date Time Provider Department Center   8/27/2024 10:00 AM Yandel Bonds, RRT [facility]           "

## 2024-08-13 NOTE — PROGRESS NOTES
[facility]  Adult Nutrition  Progress Note    SUMMARY       Recommendations    1. Encourage oral intake    2. Recommend ONS if po intake <50%     3. Monitor labs and weights    4. Collaboration with medical providers    Goals: Patient to consume >75% of EEN prior to RD follow up  Nutrition Goal Status: progressing towards goal  Communication of RD Recs: other (comment) (poc)    Assessment and Plan    Nutrition Problem  Altered nutritional needs     Related to (etiology):   Congestive Heart Failure    Signs and Symptoms (as evidenced by):   Edema   Fluid restrictions    Interventions/Recommendations (treatment strategy):  Disease specific oral diet   Collaboration with medical providers    Nutrition Diagnosis Status:   New         Malnutrition Assessment     Musculoskeletal/Lower Extremities: edema       Fluid Accumulation (Malnutrition): moderate to severe (BLE, with CHF medical diagnosis)                         Reason for Assessment    Reason For Assessment: identified at risk by screening criteria    Diagnosis: cardiac disease  Patient Active Problem List   Diagnosis    Elevated troponin    Acute on chronic combined systolic and diastolic congestive heart failure    Hypokalemia    Anemia    Decreased GFR    Smoking history    Homelessness    Atrial flutter    Leg pain, bilateral    COPD exacerbation       Relevant Medical History:   Past Medical History:   Diagnosis Date    Asthma     Bipolar disorder     Hypertension        Interdisciplinary Rounds: did not attend (RD remote)    General Information Comments:   8/13/2024: Patient is on a cardiac, 1500ml fluid restriction oral diet with fair intake noted at 50%.  Patient is on lasix with 6.3L out yesterday.  Patient with 3-4+ edema in BLE.  No significant weight loss found at this time, Patient with UBW around 78kg. Labs reviewed.  NKFA.  LBM: 8/10/2024.  RD to continue to monitor po intake and tolerance.    Nutrition Discharge Planning: Patient to  "continue on cardiac, 1500ml fluid restriction if appropriate post discharge    Nutrition Risk Screen    Nutrition Risk Screen: no indicators present  Nutrition Related Social Determinants of Health: SDOH: None Identified    Nutrition/Diet History    Spiritual, Cultural Beliefs, Rastafari Practices, Values that Affect Care: no  Food Allergies: NKFA    Anthropometrics    Temp: 98.5 °F (36.9 °C)  Height Method: Stated  Height: 5' 4" (162.6 cm)  Height (inches): 64 in  Weight Method: Bed Scale  Weight: 79.4 kg (175 lb 0.7 oz)  Weight (lb): 175.05 lb  Ideal Body Weight (IBW), Female: 120 lb  % Ideal Body Weight, Female (lb): 145.88 %  BMI (Calculated): 30  BMI Grade: 30 - 34.9- obesity - grade I  Weight Loss:  (Patient reported weight loss, UBW 78kg)       Lab/Procedures/Meds    Pertinent Labs Reviewed: reviewed  BMP  Lab Results   Component Value Date     (L) 08/13/2024    K 3.8 08/13/2024    CL 98 08/13/2024    CO2 27 08/13/2024    BUN 14 08/13/2024    CREATININE 1.3 08/13/2024    CALCIUM 8.0 (L) 08/13/2024    ANIONGAP 10 08/13/2024    EGFRNORACEVR 48 (A) 08/13/2024     No results found for: "LABA1C", "HGBA1C"  Lab Results   Component Value Date    CALCIUM 8.0 (L) 08/13/2024    PHOS 3.0 08/13/2024       Pertinent Medications Reviewed: reviewed  Scheduled Meds:   albuterol-ipratropium  3 mL Nebulization Q4H    enoxparin  40 mg Subcutaneous Daily    ferrous sulfate  1 tablet Oral Daily    furosemide (LASIX) injection  80 mg Intravenous Q12H    LIDOcaine  1 patch Transdermal Q24H    losartan  25 mg Oral Daily    metoprolol succinate  25 mg Oral Daily    polyethylene glycol  17 g Oral Daily    predniSONE  40 mg Oral Daily    senna-docusate 8.6-50 mg  1 tablet Oral BID     Continuous Infusions:  PRN Meds:.  Current Facility-Administered Medications:     acetaminophen, 650 mg, Oral, Q4H PRN    dextrose 10%, 12.5 g, Intravenous, PRN    dextrose 10%, 25 g, Intravenous, PRN    glucagon (human recombinant), 1 mg, " Intramuscular, PRN    glucose, 16 g, Oral, PRN    glucose, 24 g, Oral, PRN    melatonin, 6 mg, Oral, Nightly PRN    naloxone, 0.02 mg, Intravenous, PRN    ondansetron, 4 mg, Intravenous, Q8H PRN    polyethylene glycol, 17 g, Oral, Daily PRN    sodium chloride 0.9%, 5 mL, Intravenous, PRN    Physical Findings/Assessment         Estimated/Assessed Needs    Weight Used For Calorie Calculations: 79.4 kg (175 lb 0.7 oz)  Energy Calorie Requirements (kcal): 25kcals/kg (1985kcals/day)  Energy Need Method: Kcal/kg  Protein Requirements: 0.8-1.0g/kg (64-80g/day)  Weight Used For Protein Calculations: 79.4 kg (175 lb 0.7 oz)  Fluid Requirements (mL): 1500ml fluid restriction  Estimated Fluid Requirement Method: other (see comments)  RDA Method (mL): 25  CHO Requirement: 225-250      Nutrition Prescription Ordered    Current Diet Order: Cardiac, 1500ml fluid restriction    Evaluation of Received Nutrient/Fluid Intake    % Kcal Needs: 50%  % Protein Needs: 50%  I/O: 8/13/2024: -7622mL since admit  Energy Calories Required: meeting needs  Protein Required: meeting needs  Fluid Required: meeting needs  Comments: LBM: 8/10/2024  Tolerance: tolerating  % Intake of Estimated Energy Needs: 50 - 75 %  % Meal Intake: 50 - 75 %    Nutrition Risk    Level of Risk/Frequency of Follow-up: low - moderate (Follow up: 1-2x per week)     Monitor and Evaluation    Food and Nutrient Intake: energy intake, food and beverage intake  Food and Nutrient Adminstration: diet order  Physical Activity and Function: nutrition-related ADLs and IADLs, factors affecting access to physical activity  Anthropometric Measurements: height/length, weight, weight change, body mass index  Biochemical Data, Medical Tests and Procedures: electrolyte and renal panel, gastrointestinal profile, glucose/endocrine profile, inflammatory profile, lipid profile     Nutrition Follow-Up    RD Follow-up?: Yes  Halley Ro, MS, RDN, LDN

## 2024-08-13 NOTE — PROGRESS NOTES
[facility] Medicine  Progress Note    Patient Name: Mary Ellen Saini  MRN: [id number]  Patient Class: OP- Observation   Admission Date: 8/12/2024  Length of Stay: 0 days  Attending Physician: Ugo Magana MD  Primary Care Provider: Marlen, Primary Doctor        Subjective:     Principal Problem:Acute on chronic combined systolic and diastolic congestive heart failure        HPI:  Mary Ellen Saini is a 59 year old female PMHx of heart failure (unclear if systolic or diastolic), HTN, asthma, tobacco use disorder (minimum 2 cigarettes per day since age of 11) presenting to ER with complaints of edema, shortness of breath, and chest pain. Endorses recent hospitalization for CHF exacerbation in [place] with discharge dry weight in the 160s. Endorses over the last 4 days, was having worsening edema of lower extremity and discomfort. Additionally states increased weight gain of roughly 30lbs within this time frame. States medication adherence and takes 40 PO lasix at home, although not noted in her dispensary report so unclear if patient has actually been adherent to this. Denies any cough, however, states she has been having worsening shortness of breath and intermittent chest pain.    In the ED, initial vital signs /83, , Temp 97.7, SpO2 100% on room air. Labs include CBC with stable H/H 8.8/28.2 and WBC within normal limits 8.96. CMP with Na 135 K 3.0 Cl 102 Co2 20 and BUN/Cr 13/1.3. CXR with mild bandlike opacities noted in the left lung base. EKG NSR. Trops 0.063 which downtrended to 0.055. BNP elevated to 347. Initiated on 80 IV lasix and potassium supplementation. [facility] consulted for evaluation for admission for CHF exacerbation.     Overview/Hospital Course:  No notes on file    Interval History:NAOE. Denies any SOB on RA, chest pain or palpitations.  .  Does have increased redness, warmth and pain to the calf areas bilateral.  She does endorse a cough and expiratory  wheezing.  She has remained Afebrile.      Review of Systems   Constitutional:  Negative for chills and fever.   Respiratory:  Negative for apnea and shortness of breath.    Cardiovascular:  Positive for leg swelling. Negative for chest pain.   Gastrointestinal:  Negative for constipation, diarrhea, nausea and vomiting.   Endocrine: Negative for polydipsia and polyphagia.   Musculoskeletal:  Negative for joint swelling.       Objective:     Vital Signs (Most Recent):  Temp: 98.4 °F (36.9 °C) (08/13/24 0746)  Pulse: 84 (08/13/24 0746)  Resp: 17 (08/13/24 0746)  BP: 129/74 (08/13/24 0746)  SpO2: 95 % (08/13/24 0746) Vital Signs (24h Range):  Temp:  [96.7 °F (35.9 °C)-98.9 °F (37.2 °C)] 98.4 °F (36.9 °C)  Pulse:  [77-89] 84  Resp:  [17-20] 17  SpO2:  [93 %-100 %] 95 %  BP: ()/(50-84) 129/74     Weight: 79.4 kg (175 lb 0.7 oz)  Body mass index is 30.05 kg/m².    Intake/Output Summary (Last 24 hours) at 8/13/2024 1053  Last data filed at 8/13/2024 0232  Gross per 24 hour   Intake 428.12 ml   Output 4600 ml   Net -4171.88 ml         Physical Exam  Constitutional:       Appearance: Normal appearance.   HENT:      Head: Normocephalic and atraumatic.      Mouth/Throat:      Mouth: Mucous membranes are moist.      Pharynx: Oropharynx is clear.   Eyes:      Extraocular Movements: Extraocular movements intact.      Pupils: Pupils are equal, round, and reactive to light.   Cardiovascular:      Rate and Rhythm: Normal rate and regular rhythm.   Pulmonary:      Breath sounds: Wheezing present. No rhonchi or rales.   Abdominal:      General: Abdomen is flat.   Musculoskeletal:      Right lower leg: Edema (+2) present.      Left lower leg: Edema (+2) present.   Neurological:      General: No focal deficit present.      Mental Status: She is alert.             Significant Labs: All pertinent labs within the past 24 hours have been reviewed.    Significant Imaging: I have reviewed all pertinent imaging results/findings within  the past 24 hours.    Assessment/Plan:      * Acute on chronic combined systolic and diastolic congestive heart failure  Unclear if etiology of CHF is systolic or diastolic in nature. Reportedly had CHF exacerbation at OSH in [place]. CHF is currently uncontrolled due to Continued edema of extremities and Weight gain of 30 pounds. No recent Echo on file. Continue Beta Blocker, ACE/ARB, and Furosemide and monitor clinical status closely. Monitor on telemetry. Monitor strict Is&Os and daily weights.  Place on fluid restriction of 2 L. Cardiology has been consulted. Continue to stress to patient importance of self efficacy and  on diet for CHF. Last BNP reviewed- and noted below   Recent Labs   Lab 08/12/24  0028   *       Plan:  Continue 80 mg  IV lasix BID  Added DuoNeb for wheezing   Pt wears a Life Vest  Echo 8/12/24 showed Moderate global hypokinesis present. There is moderately reduced systolic function with a visually estimated ejection fraction of 30 - 35%.   Cardiology consult, appreciate recs  Daily weights  Daily I/Os  Fluid restriction to 2L  Continue home BB and ARB (takes losartan and metoprolol based on dispensary report)  Urine Drug screen (positive amphetamines) and Covid/Flu both negative    Homelessness  Plan:  Pt to go to [place] after discharge       Smoking history  Minimum of 2 cigarettes per day since age of 11    Plan:  Consider nicotine patches  Consider smoking cessation referral at discharge    Decreased GFR  GFR of 48 on recent CMP, although unclear history of CKD.  Last CMP 2 years ago without noted decrease in GFR  No KENY on CMP    Plan:  Renally dose meds  Trend CMP  Avoid nephrotoxic agents    Anemia  Anemia is likely due to Iron deficiency. Most recent hemoglobin and hematocrit are listed below.  Recent Labs     08/12/24  0028 08/12/24  0535 08/13/24  0315   HGB 8.8* 8.6* 8.5*   HCT 28.2* 28.3* 27.3*       Plan  - Monitor serial CBC: Daily  - Transfuse PRBC if  patient becomes hemodynamically unstable, symptomatic or H/H drops below 7/21.  - Patient has not received any PRBC transfusions to date  - Patient's anemia is currently stable  - Continue home iron  - Iron studies  WNL    Hypokalemia  Patient's most recent potassium results are listed below.   Recent Labs     08/12/24  0028   K 3.0*     Plan  - Replete potassium per protocol  - Monitor potassium Daily  - Patient's hypokalemia is stable  - Continue to trend and replete prn daily    Elevated troponin  RESOLVED  Chest pain upon presentation  Trops 0.063 --> 0.055  No ST changes on EKG  Likely demand ischemia in the setting of CHF exacerbation.    Plan:  Continue to monitor for chest pain      VTE Risk Mitigation (From admission, onward)           Ordered     enoxaparin injection 40 mg  Daily         08/12/24 0400     IP VTE HIGH RISK PATIENT  Once         08/12/24 0400     Place sequential compression device  Until discontinued         08/12/24 0400                    Discharge Planning   JENAE: 8/12/2024     Code Status: Full Code   Is the patient medically ready for discharge?:     Reason for patient still in hospital (select all that apply): Patient trending condition  Discharge Plan A: Shelter          Case discussed with Dr. Izzy Alberto MD  Department of Hospital Medicine   [facility]

## 2024-08-13 NOTE — PLAN OF CARE
Problem: Occupational Therapy  Goal: Occupational Therapy Goal  Description: No goals set 2/2 pt performing at/near baseline.      Outcome: Progressing   Pt performing skills at baseline with no further OT needs; please reconsult if necessary.

## 2024-08-13 NOTE — ASSESSMENT & PLAN NOTE
- reports recent admission in [place] x 2 for CHF exacerbation with Life Vest placed- diuresed at that time with improvement in symptoms with recent recurrence  - admitted with MONSON and BLE edema; significant LE edema noted; remains on IV Lasix 80mg BID with 6.3L out overnight negative 7.6L since admission; outside records report medication regimen of Losartan, Toprol XL and Aldactone with EF 20-25%  - repeat echo yesterday with EF 30-35%; continue ARB and BB; IV Lasix for continued diuresis; will plan for transition to oral Lasix once euvolemic- Lasix 40mg po BID   - SBP 110s-120s and will continue BB and ARB  - strict I&Os and daily weights

## 2024-08-14 ENCOUNTER — CLINICAL SUPPORT (OUTPATIENT)
Dept: SMOKING CESSATION | Facility: CLINIC | Age: 59
End: 2024-08-14

## 2024-08-14 VITALS
DIASTOLIC BLOOD PRESSURE: 76 MMHG | HEART RATE: 88 BPM | HEIGHT: 64 IN | TEMPERATURE: 97 F | RESPIRATION RATE: 18 BRPM | SYSTOLIC BLOOD PRESSURE: 119 MMHG | OXYGEN SATURATION: 100 % | BODY MASS INDEX: 28.91 KG/M2 | WEIGHT: 169.31 LBS

## 2024-08-14 DIAGNOSIS — F17.210 CIGARETTE SMOKER: Primary | ICD-10-CM

## 2024-08-14 PROBLEM — F17.200 TOBACCO DEPENDENCY: Status: ACTIVE | Noted: 2024-08-14

## 2024-08-14 LAB
ALBUMIN SERPL BCP-MCNC: 2.1 G/DL (ref 3.5–5.2)
ALP SERPL-CCNC: 99 U/L (ref 55–135)
ALT SERPL W/O P-5'-P-CCNC: 14 U/L (ref 10–44)
ANION GAP SERPL CALC-SCNC: 11 MMOL/L (ref 8–16)
AST SERPL-CCNC: 10 U/L (ref 10–40)
BASOPHILS # BLD AUTO: 0.01 K/UL (ref 0–0.2)
BASOPHILS NFR BLD: 0.1 % (ref 0–1.9)
BILIRUB SERPL-MCNC: 0.3 MG/DL (ref 0.1–1)
BUN SERPL-MCNC: 22 MG/DL (ref 6–20)
CALCIUM SERPL-MCNC: 8.3 MG/DL (ref 8.7–10.5)
CHLORIDE SERPL-SCNC: 94 MMOL/L (ref 95–110)
CO2 SERPL-SCNC: 29 MMOL/L (ref 23–29)
CREAT SERPL-MCNC: 1.8 MG/DL (ref 0.5–1.4)
DIFFERENTIAL METHOD BLD: ABNORMAL
EOSINOPHIL # BLD AUTO: 0 K/UL (ref 0–0.5)
EOSINOPHIL NFR BLD: 0 % (ref 0–8)
ERYTHROCYTE [DISTWIDTH] IN BLOOD BY AUTOMATED COUNT: 20.5 % (ref 11.5–14.5)
EST. GFR  (NO RACE VARIABLE): 32 ML/MIN/1.73 M^2
GLUCOSE SERPL-MCNC: 145 MG/DL (ref 70–110)
HCT VFR BLD AUTO: 29.8 % (ref 37–48.5)
HGB BLD-MCNC: 9.4 G/DL (ref 12–16)
IMM GRANULOCYTES # BLD AUTO: 0.03 K/UL (ref 0–0.04)
IMM GRANULOCYTES NFR BLD AUTO: 0.4 % (ref 0–0.5)
LYMPHOCYTES # BLD AUTO: 1 K/UL (ref 1–4.8)
LYMPHOCYTES NFR BLD: 12 % (ref 18–48)
MAGNESIUM SERPL-MCNC: 1.8 MG/DL (ref 1.6–2.6)
MCH RBC QN AUTO: 24.2 PG (ref 27–31)
MCHC RBC AUTO-ENTMCNC: 31.5 G/DL (ref 32–36)
MCV RBC AUTO: 77 FL (ref 82–98)
MONOCYTES # BLD AUTO: 0.2 K/UL (ref 0.3–1)
MONOCYTES NFR BLD: 2.4 % (ref 4–15)
NEUTROPHILS # BLD AUTO: 7 K/UL (ref 1.8–7.7)
NEUTROPHILS NFR BLD: 85.1 % (ref 38–73)
NRBC BLD-RTO: 0 /100 WBC
PHOSPHATE SERPL-MCNC: 3.6 MG/DL (ref 2.7–4.5)
PLATELET # BLD AUTO: 398 K/UL (ref 150–450)
PMV BLD AUTO: 9.5 FL (ref 9.2–12.9)
POTASSIUM SERPL-SCNC: 3.6 MMOL/L (ref 3.5–5.1)
PROT SERPL-MCNC: 6.9 G/DL (ref 6–8.4)
RBC # BLD AUTO: 3.88 M/UL (ref 4–5.4)
SODIUM SERPL-SCNC: 134 MMOL/L (ref 136–145)
WBC # BLD AUTO: 8.24 K/UL (ref 3.9–12.7)

## 2024-08-14 PROCEDURE — 25000242 PHARM REV CODE 250 ALT 637 W/ HCPCS

## 2024-08-14 PROCEDURE — 99233 SBSQ HOSP IP/OBS HIGH 50: CPT | Mod: ,,, | Performed by: NURSE PRACTITIONER

## 2024-08-14 PROCEDURE — 25000003 PHARM REV CODE 250: Performed by: NURSE PRACTITIONER

## 2024-08-14 PROCEDURE — 36415 COLL VENOUS BLD VENIPUNCTURE: CPT

## 2024-08-14 PROCEDURE — 63600175 PHARM REV CODE 636 W HCPCS

## 2024-08-14 PROCEDURE — S4991 NICOTINE PATCH NONLEGEND: HCPCS | Performed by: NURSE PRACTITIONER

## 2024-08-14 PROCEDURE — 94761 N-INVAS EAR/PLS OXIMETRY MLT: CPT

## 2024-08-14 PROCEDURE — 85025 COMPLETE CBC W/AUTO DIFF WBC: CPT

## 2024-08-14 PROCEDURE — 80053 COMPREHEN METABOLIC PANEL: CPT

## 2024-08-14 PROCEDURE — 25000003 PHARM REV CODE 250

## 2024-08-14 PROCEDURE — 83735 ASSAY OF MAGNESIUM: CPT

## 2024-08-14 PROCEDURE — 63600175 PHARM REV CODE 636 W HCPCS: Performed by: NURSE PRACTITIONER

## 2024-08-14 PROCEDURE — 94640 AIRWAY INHALATION TREATMENT: CPT

## 2024-08-14 PROCEDURE — S4991 NICOTINE PATCH NONLEGEND: HCPCS

## 2024-08-14 PROCEDURE — 99900035 HC TECH TIME PER 15 MIN (STAT)

## 2024-08-14 PROCEDURE — 84100 ASSAY OF PHOSPHORUS: CPT

## 2024-08-14 RX ORDER — NYSTATIN 100000 [USP'U]/ML
100000 SUSPENSION ORAL 4 TIMES DAILY
Status: DISCONTINUED | OUTPATIENT
Start: 2024-08-14 | End: 2024-08-14 | Stop reason: HOSPADM

## 2024-08-14 RX ORDER — POLYETHYLENE GLYCOL 3350 17 G/17G
17 POWDER, FOR SOLUTION ORAL DAILY PRN
Qty: 510 G | Refills: 0 | Status: ON HOLD | OUTPATIENT
Start: 2024-08-14 | End: 2024-08-24 | Stop reason: HOSPADM

## 2024-08-14 RX ORDER — PANTOPRAZOLE SODIUM 40 MG/1
40 TABLET, DELAYED RELEASE ORAL DAILY
Qty: 30 TABLET | Refills: 1 | Status: SHIPPED | OUTPATIENT
Start: 2024-08-14 | End: 2024-10-13

## 2024-08-14 RX ORDER — ALBUTEROL SULFATE 90 UG/1
1-2 INHALANT RESPIRATORY (INHALATION) EVERY 6 HOURS PRN
Qty: 18 G | Refills: 1 | Status: SHIPPED | OUTPATIENT
Start: 2024-08-14

## 2024-08-14 RX ORDER — TRAMADOL HYDROCHLORIDE AND ACETAMINOPHEN 37.5; 325 MG/1; MG/1
1 TABLET, FILM COATED ORAL EVERY 8 HOURS PRN
Qty: 9 TABLET | Refills: 0 | Status: SHIPPED | OUTPATIENT
Start: 2024-08-14 | End: 2024-08-14

## 2024-08-14 RX ORDER — PANTOPRAZOLE SODIUM 40 MG/1
40 TABLET, DELAYED RELEASE ORAL DAILY
Status: DISCONTINUED | OUTPATIENT
Start: 2024-08-14 | End: 2024-08-14 | Stop reason: HOSPADM

## 2024-08-14 RX ORDER — NYSTATIN 100000 [USP'U]/ML
100000 SUSPENSION ORAL 4 TIMES DAILY
Qty: 40 ML | Refills: 0 | Status: ON HOLD | OUTPATIENT
Start: 2024-08-14 | End: 2024-08-24 | Stop reason: HOSPADM

## 2024-08-14 RX ORDER — IBUPROFEN 200 MG
1 TABLET ORAL DAILY
Status: DISCONTINUED | OUTPATIENT
Start: 2024-08-14 | End: 2024-08-14 | Stop reason: HOSPADM

## 2024-08-14 RX ORDER — NALOXONE HYDROCHLORIDE 4 MG/.1ML
SPRAY NASAL
Qty: 2 EACH | Refills: 11 | Status: SHIPPED | OUTPATIENT
Start: 2024-08-14

## 2024-08-14 RX ORDER — TRAMADOL HYDROCHLORIDE AND ACETAMINOPHEN 37.5; 325 MG/1; MG/1
1 TABLET, FILM COATED ORAL EVERY 8 HOURS PRN
Qty: 9 TABLET | Refills: 0 | Status: ON HOLD | OUTPATIENT
Start: 2024-08-14 | End: 2024-08-24 | Stop reason: HOSPADM

## 2024-08-14 RX ORDER — PANTOPRAZOLE SODIUM 40 MG/1
40 TABLET, DELAYED RELEASE ORAL DAILY
Qty: 30 TABLET | Refills: 1 | Status: SHIPPED | OUTPATIENT
Start: 2024-08-14 | End: 2024-08-14

## 2024-08-14 RX ORDER — METOPROLOL SUCCINATE 25 MG/1
25 TABLET, EXTENDED RELEASE ORAL DAILY
Qty: 30 TABLET | Refills: 1 | Status: SHIPPED | OUTPATIENT
Start: 2024-08-14 | End: 2024-10-13

## 2024-08-14 RX ORDER — OXYBUTYNIN CHLORIDE 5 MG/1
5 TABLET ORAL 3 TIMES DAILY
Qty: 30 TABLET | Refills: 1 | Status: SHIPPED | OUTPATIENT
Start: 2024-08-14 | End: 2024-10-13

## 2024-08-14 RX ORDER — TRAMADOL HYDROCHLORIDE AND ACETAMINOPHEN 37.5; 325 MG/1; MG/1
1 TABLET, FILM COATED ORAL ONCE
Status: COMPLETED | OUTPATIENT
Start: 2024-08-14 | End: 2024-08-14

## 2024-08-14 RX ORDER — FUROSEMIDE 20 MG/1
40 TABLET ORAL 2 TIMES DAILY
Qty: 120 TABLET | Refills: 11 | Status: ON HOLD | OUTPATIENT
Start: 2024-08-14 | End: 2024-08-24

## 2024-08-14 RX ORDER — IBUPROFEN 200 MG
1 TABLET ORAL DAILY
Qty: 28 PATCH | Refills: 1 | Status: SHIPPED | OUTPATIENT
Start: 2024-08-15 | End: 2024-10-14

## 2024-08-14 RX ORDER — ROSUVASTATIN CALCIUM 20 MG/1
20 TABLET, COATED ORAL NIGHTLY
Qty: 30 TABLET | Refills: 1 | Status: SHIPPED | OUTPATIENT
Start: 2024-08-14 | End: 2024-10-13

## 2024-08-14 RX ORDER — LOSARTAN POTASSIUM 25 MG/1
25 TABLET ORAL DAILY
Qty: 30 TABLET | Refills: 1 | Status: SHIPPED | OUTPATIENT
Start: 2024-08-14 | End: 2024-10-13

## 2024-08-14 RX ORDER — FERROUS SULFATE TAB 325 MG (65 MG ELEMENTAL FE) 325 (65 FE) MG
325 TAB ORAL DAILY
Qty: 30 TABLET | Refills: 1 | Status: SHIPPED | OUTPATIENT
Start: 2024-08-14 | End: 2024-10-13

## 2024-08-14 RX ADMIN — SENNOSIDES AND DOCUSATE SODIUM 1 TABLET: 8.6; 5 TABLET ORAL at 08:08

## 2024-08-14 RX ADMIN — IPRATROPIUM BROMIDE AND ALBUTEROL SULFATE 3 ML: .5; 3 SOLUTION RESPIRATORY (INHALATION) at 08:08

## 2024-08-14 RX ADMIN — FUROSEMIDE 80 MG: 10 INJECTION, SOLUTION INTRAVENOUS at 08:08

## 2024-08-14 RX ADMIN — TRAMADOL HYDROCHLORIDE AND ACETAMINOPHEN 1 TABLET: 37.5; 325 TABLET ORAL at 11:08

## 2024-08-14 RX ADMIN — NICOTINE 1 PATCH: 21 PATCH, EXTENDED RELEASE TRANSDERMAL at 11:08

## 2024-08-14 RX ADMIN — FERROUS SULFATE TAB 325 MG (65 MG ELEMENTAL FE) 1 EACH: 325 (65 FE) TAB at 08:08

## 2024-08-14 RX ADMIN — LOSARTAN POTASSIUM 25 MG: 25 TABLET, FILM COATED ORAL at 08:08

## 2024-08-14 RX ADMIN — PANTOPRAZOLE SODIUM 40 MG: 40 TABLET, DELAYED RELEASE ORAL at 08:08

## 2024-08-14 RX ADMIN — PREDNISONE 40 MG: 20 TABLET ORAL at 08:08

## 2024-08-14 RX ADMIN — NICOTINE 1 PATCH: 14 PATCH, EXTENDED RELEASE TRANSDERMAL at 08:08

## 2024-08-14 RX ADMIN — METOPROLOL SUCCINATE 25 MG: 25 TABLET, EXTENDED RELEASE ORAL at 08:08

## 2024-08-14 NOTE — NURSING
Shift assessment complete. Pt is alert and oriented x 4. Denies pain. BLE swelling getting better. Bed in lowest position, locked, and side rails up x 3. Bed alarm on. Call light in reach.

## 2024-08-14 NOTE — DISCHARGE SUMMARY
[facility] Medicine  Discharge Summary      Patient Name: Mary Ellen Saini  MRN: [id number]  ANGELIQUE: [id number]  Patient Class: IP- Inpatient  Admission Date: 8/12/2024  Hospital Length of Stay: 1 days  Discharge Date and Time:  08/14/2024 5:50 PM  Attending Physician: Ugo Magana MD   Discharging Provider: Berenice Alberto MD  Primary Care Provider: Marlen, Primary Doctor    Primary Care Team: Networked reference to record PCT     HPI:   Mary Ellen Saini is a 59 year old female PMHx of heart failure (unclear if systolic or diastolic), HTN, asthma, tobacco use disorder (minimum 2 cigarettes per day since age of 11) presenting to ER with complaints of edema, shortness of breath, and chest pain. Endorses recent hospitalization for CHF exacerbation in [place] with discharge dry weight in the 160s. Endorses over the last 4 days, was having worsening edema of lower extremity and discomfort. Additionally states increased weight gain of roughly 30lbs within this time frame. States medication adherence and takes 40 PO lasix at home, although not noted in her dispensary report so unclear if patient has actually been adherent to this. Denies any cough, however, states she has been having worsening shortness of breath and intermittent chest pain.    In the ED, initial vital signs /83, , Temp 97.7, SpO2 100% on room air. Labs include CBC with stable H/H 8.8/28.2 and WBC within normal limits 8.96. CMP with Na 135 K 3.0 Cl 102 Co2 20 and BUN/Cr 13/1.3. CXR with mild bandlike opacities noted in the left lung base. EKG NSR. Trops 0.063 which downtrended to 0.055. BNP elevated to 347. Initiated on 80 IV lasix and potassium supplementation. [facility] consulted for evaluation for admission for CHF exacerbation.     * No surgery found *      Hospital Course:   Pt has tolerated diuresing during this admission.  Echo showed The left ventricle is moderately dilated. There is eccentric  "hypertrophy. Moderate global hypokinesis present. There is moderately reduced systolic function with a visually estimated ejection fraction of 30 - 35%.  She had some bilateral pain and a US doppler BLE was negative. By discharge pt was Euvolemic. She was homeless and discharged to the [facility] for respite.  On day of discharge, patient was hemodynamically stable. [unfilled] was sent home with instructions to continue home medications, change dosage/frequency of home medications, and/or take new medications as mentioned under "Medication Reconciliation" below. These changes were further explained by patient's nurse or the clinical pharmacist. Patient will need to schedule with their PCP for hospital discharge followup. Patient agreeable to discharge plan & expressed understanding of all aforementioned changes. All questions were answered and return precautions were discussed & detailed in the after-visit summary.       Goals of Care Treatment Preferences:  Code Status: Full Code  Physical Exam  Constitutional:       Appearance: Normal appearance.   HENT:      Head: Normocephalic and atraumatic.      Mouth/Throat:      Mouth: Mucous membranes are moist.      Pharynx: Oropharynx is clear.   Eyes:      Extraocular Movements: Extraocular movements intact.      Pupils: Pupils are equal, round, and reactive to light.   Cardiovascular:      Rate and Rhythm: Normal rate and regular rhythm.   Pulmonary:      Breath sounds: CTAB. No rhonchi or rales.   Abdominal:      General: Abdomen is flat.   Musculoskeletal:      Right lower leg:no  edema present.      Left lower leg: no edema present.   Neurological:      General: No focal deficit present.      Mental Status: She is alert.     SDOH Screening:  The patient was screened for food insecurity, housing instability, transportation needs, utility difficulties, and interpersonal safety. The social determinant(s) of health identified as a concern this admission are:  Housing " instability  Food insecurity  Transportation difficulties        Social Determinants of Health with Concerns     Food Insecurity: Food Insecurity Present (8/12/2024)   Housing Stability: High Risk (8/12/2024)   Transportation Needs: Unmet Transportation Needs (8/12/2024)   Utilities: Patient Declined (8/12/2024)        Consults:   Consults (From admission, onward)          Status Ordering Provider     Inpatient consult to Social Work  Once        Provider:  (Not yet assigned)    Completed CHANTAL SCHUMACHER     Inpatient consult to Cardiology[facility]  Once        Provider:  (Not yet assigned)    Completed YVES HUGGINS new Assessment & Plan notes have been filed under this hospital service since the last note was generated.  Service: Hospital Medicine    Final Active Diagnoses:    Diagnosis Date Noted POA    PRINCIPAL PROBLEM:  Acute on chronic combined systolic and diastolic congestive heart failure [I50.43] 08/12/2024 Yes    Tobacco dependency [F17.200] 08/14/2024 Unknown    Atrial flutter [I48.92] 08/13/2024 Yes    Leg pain, bilateral [M79.604, M79.605] 08/13/2024 Unknown    COPD exacerbation [J44.1] 08/13/2024 Unknown    Colorectal carcinoma [C19] 08/13/2024 Yes    Elevated troponin [R79.89] 08/12/2024 Yes    Hypokalemia [E87.6] 08/12/2024 Yes    Anemia [D64.9] 08/12/2024 Yes    Decreased GFR [R94.4] 08/12/2024 Unknown    Smoking history [Z87.891] 08/12/2024 Not Applicable    Homelessness [Z59.00] 08/12/2024 Not Applicable      Problems Resolved During this Admission:       Discharged Condition: good    Disposition: Home or Self Care    Follow Up:   Follow-up Information       FOLLOW UP APTS Follow up.    Why: PCP, ONCOLOGY AND CARDIOLOGY REQUESTED                         Patient Instructions:      Basic Metabolic Panel   Standing Status: Future Standing Exp. Date: 11/12/25     Ambulatory referral/consult to Oncology   Standing Status: Future   Referral Priority: Routine Referral Type: Consultation    Referral Reason: Specialty Services Required   Requested Specialty: Oncology   Number of Visits Requested: 1     Ambulatory referral/consult to Cardiology   Standing Status: Future   Referral Priority: Routine Referral Type: Consultation   Referral Reason: Specialty Services Required   Requested Specialty: Cardiology   Number of Visits Requested: 1     Ambulatory referral/consult to Gastroenterology   Standing Status: Future   Referral Priority: Routine Referral Type: Consultation   Referral Reason: Specialty Services Required   Requested Specialty: Gastroenterology   Number of Visits Requested: 1     Ambulatory referral/consult to Family Practice   Standing Status: Future   Referral Priority: Routine Referral Type: Consultation   Referral Reason: Specialty Services Required   Requested Specialty: Family Medicine   Number of Visits Requested: 1     Ambulatory referral/consult to Cardiology   Standing Status: Future   Referral Priority: Routine Referral Type: Consultation   Referral Reason: Specialty Services Required   Requested Specialty: Cardiology   Number of Visits Requested: 1     Diet Cardiac     Diet diabetic     Diet Cardiac     Notify your health care provider if you experience any of the following:  temperature >100.4     Notify your health care provider if you experience any of the following:  persistent nausea and vomiting or diarrhea     Notify your health care provider if you experience any of the following:  severe uncontrolled pain     Notify your health care provider if you experience any of the following:  redness, tenderness, or signs of infection (pain, swelling, redness, odor or green/yellow discharge around incision site)     Notify your health care provider if you experience any of the following:  difficulty breathing or increased cough     Notify your health care provider if you experience any of the following:  severe persistent headache     Notify your health care provider if you  experience any of the following:  worsening rash     Notify your health care provider if you experience any of the following:  persistent dizziness, light-headedness, or visual disturbances     Notify your health care provider if you experience any of the following:  increased confusion or weakness     Notify your health care provider if you experience any of the following:     Notify your health care provider if you experience any of the following:  temperature >100.4     Notify your health care provider if you experience any of the following:  persistent nausea and vomiting or diarrhea     Notify your health care provider if you experience any of the following:  redness, tenderness, or signs of infection (pain, swelling, redness, odor or green/yellow discharge around incision site)     Notify your health care provider if you experience any of the following:  difficulty breathing or increased cough     Notify your health care provider if you experience any of the following:  increased confusion or weakness     Notify your health care provider if you experience any of the following:  persistent dizziness, light-headedness, or visual disturbances     Notify your health care provider if you experience any of the following:  temperature >100.4     Notify your health care provider if you experience any of the following:  persistent nausea and vomiting or diarrhea     Notify your health care provider if you experience any of the following:  severe uncontrolled pain     Notify your health care provider if you experience any of the following:  redness, tenderness, or signs of infection (pain, swelling, redness, odor or green/yellow discharge around incision site)     Notify your health care provider if you experience any of the following:  difficulty breathing or increased cough     Notify your health care provider if you experience any of the following:  severe persistent headache     Notify your health care provider if  you experience any of the following:  worsening rash     Notify your health care provider if you experience any of the following:  persistent dizziness, light-headedness, or visual disturbances     Notify your health care provider if you experience any of the following:  increased confusion or weakness     Notify your health care provider if you experience any of the following:     Reason for not Ordering Smoking Cessation Referral     Order Specific Question Answer Comments   Reason for not ordering: Patient refused      Reason for not Prescribing Nicotine Replacement     Order Specific Question Answer Comments   Reason for not Prescribing: Patient refused      Activity as tolerated     Activity as tolerated     Activity as tolerated       Significant Diagnostic Studies: Labs: CMP   Recent Labs   Lab 08/13/24  0315 08/14/24  0350   * 134*   K 3.8 3.6   CL 98 94*   CO2 27 29   GLU 94 145*   BUN 14 22*   CREATININE 1.3 1.8*   CALCIUM 8.0* 8.3*   PROT 6.1 6.9   ALBUMIN 1.9* 2.1*   BILITOT 0.3 0.3   ALKPHOS 97 99   AST 13 10   ALT 15 14   ANIONGAP 10 11   , CBC   Recent Labs   Lab 08/13/24  0315 08/14/24  0350   WBC 8.35 8.24   HGB 8.5* 9.4*   HCT 27.3* 29.8*    398   , and Troponin   Recent Labs   Lab 08/12/24  0028 08/12/24  0310   TROPONINI 0.063* 0.055*       Pending Diagnostic Studies:       None           Medications:  Reconciled Home Medications:      Medication List        START taking these medications      furosemide 20 MG tablet  Commonly known as: LASIX  Take 2 tablets (40 mg total) by mouth 2 (two) times daily.     nicotine 21 mg/24 hr  Commonly known as: NICODERM CQ  Place 1 patch onto the skin once daily.  Start taking on: August 15, 2024     nystatin 100,000 unit/mL suspension  Commonly known as: MYCOSTATIN  Take 1 mL (100,000 Units total) by mouth 4 (four) times daily. for 10 days     oxybutynin 5 MG Tab  Commonly known as: DITROPAN  Take 1 tablet (5 mg total) by mouth 3 (three) times  daily.     polyethylene glycol 17 gram/dose powder  Commonly known as: GLYCOLAX  Take 17 g by mouth daily as needed for Constipation.            CHANGE how you take these medications      albuterol 90 mcg/actuation inhaler  Commonly known as: PROVENTIL/VENTOLIN HFA  Inhale 1-2 puffs into the lungs every 6 (six) hours as needed for Wheezing. Rescue  What changed: Another medication with the same name was removed. Continue taking this medication, and follow the directions you see here.     pantoprazole 40 MG tablet  Commonly known as: PROTONIX  Take 1 tablet (40 mg total) by mouth once daily.  What changed:   how much to take  when to take this     tramadol-acetaminophen 37.5-325 mg 37.5-325 mg Tab  Commonly known as: ULTRACET  Take 1 tablet by mouth every 8 (eight) hours as needed for Pain.  What changed: when to take this            CONTINUE taking these medications      FeroSuL 325 mg (65 mg iron) Tab tablet  Generic drug: ferrous sulfate  Take 1 tablet (325 mg total) by mouth once daily.     losartan 25 MG tablet  Commonly known as: COZAAR  Take 1 tablet (25 mg total) by mouth once daily.     metoprolol succinate 25 MG 24 hr tablet  Commonly known as: TOPROL-XL  Take 1 tablet (25 mg total) by mouth once daily.     naloxone 4 mg/actuation Spry  Commonly known as: NARCAN  4mg by nasal route as needed for opioid overdose; may repeat every 2-3 minutes in alternating nostrils until medical help arrives. Call 911     rosuvastatin 20 MG tablet  Commonly known as: CRESTOR  Take 1 tablet (20 mg total) by mouth every evening.     spironolactone 25 MG tablet  Commonly known as: ALDACTONE  Take 25 mg by mouth once daily.            STOP taking these medications      amLODIPine 5 MG tablet  Commonly known as: NORVASC     diclofenac 75 MG EC tablet  Commonly known as: VOLTAREN     hydrOXYzine pamoate 25 MG Cap  Commonly known as: VISTARIL              Indwelling Lines/Drains at time of discharge:   Lines/Drains/Airways        None                   Time spent on the discharge of patient: 35 minutes         Berenice Alberto MD  Department of [facility] Medicine  [facility]

## 2024-08-14 NOTE — PROGRESS NOTES
[facility]                    Pharmacy       Discharge Medication Education    Patient ACCEPTED medication education. Pharmacy has provided education on the name, indication, and possible side effects of the medication(s) prescribed, using teach-back method.     The following medications have also been discussed, during this admission.        Medication List        START taking these medications      furosemide 20 MG tablet  Commonly known as: LASIX  Take 2 tablets (40 mg total) by mouth 2 (two) times daily.            CHANGE how you take these medications      tramadol-acetaminophen 37.5-325 mg 37.5-325 mg Tab  Commonly known as: ULTRACET  Take 1 tablet by mouth every 8 (eight) hours as needed for Pain.  What changed: when to take this            CONTINUE taking these medications      FeroSuL 325 mg (65 mg iron) Tab tablet  Generic drug: ferrous sulfate     losartan 25 MG tablet  Commonly known as: COZAAR     metoprolol succinate 25 MG 24 hr tablet  Commonly known as: TOPROL-XL     naloxone 4 mg/actuation Spry  Commonly known as: NARCAN  4mg by nasal route as needed for opioid overdose; may repeat every 2-3 minutes in alternating nostrils until medical help arrives. Call 911     pantoprazole 40 MG tablet  Commonly known as: PROTONIX     rosuvastatin 20 MG tablet  Commonly known as: CRESTOR     spironolactone 25 MG tablet  Commonly known as: ALDACTONE            ASK your doctor about these medications      * albuterol 1.25 mg/3 mL Nebu  Commonly known as: ACCUNEB     * albuterol 90 mcg/actuation inhaler  Commonly known as: PROVENTIL/VENTOLIN HFA  Inhale 1-2 puffs into the lungs every 6 (six) hours as needed for Wheezing. Rescue     amLODIPine 5 MG tablet  Commonly known as: NORVASC  Take 1 tablet (5 mg total) by mouth once daily.     diclofenac 75 MG EC tablet  Commonly known as: VOLTAREN  Take 1 tablet (75 mg total) by mouth 2 (two) times daily.     hydrOXYzine pamoate 25 MG Cap  Commonly known  as: VISTARIL  Take 1 capsule (25 mg total) by mouth 4 (four) times daily.           * This list has 2 medication(s) that are the same as other medications prescribed for you. Read the directions carefully, and ask your doctor or other care provider to review them with you.                   Where to Get Your Medications        These medications were sent to [facility], [place] [address]      Hours: Mon-Fri, 8a-5:30p Phone: [phone]   furosemide 20 MG tablet       You can get these medications from any pharmacy    Bring a paper prescription for each of these medications  tramadol-acetaminophen 37.5-325 mg 37.5-325 mg Tab          Thank you  Ella Chavira, PharmD  [phone]

## 2024-08-14 NOTE — PROGRESS NOTES
VN NOTE  Patient with AVS but AVS needed to be updated with additional med info. Bedside nurse reprinted the AVS but transport is at bedside and VN was told that patient stated that she will review it when she gets to facility

## 2024-08-14 NOTE — ASSESSMENT & PLAN NOTE
- troponin 0.063-0.055  - reported  recent angiogram at [facility] however records indicate no angiogram given anemia as well as other precluding factors   - presented with ADHF;  troponin elevation related to demand etiology in setting of ADHF

## 2024-08-14 NOTE — PT/OT/SLP EVAL
Physical Therapy Evaluation and Discharge Note    Patient Name:  Mary Ellen Saini   MRN:  [id number]    Recommendations:     Discharge Recommendations: None, pt does not require acute or post acute PT services.   Discharge Equipment Recommendations: none   Barriers to discharge: Inaccessible home, pt is homeless.     Assessment:     Mary Ellen Saini is a 59 y.o. female admitted with a medical diagnosis of Acute on chronic combined systolic and diastolic congestive heart failure. .  At this time, patient is functioning at their prior level of function and does not require further acute PT services.     Recent Surgery: * No surgery found *      Plan:     During this hospitalization, patient does not require further acute PT services.  Please re-consult if situation changes.      Subjective     Chief Complaint: Being homeless  Patient/Family Comments/goals: to obtain a home to get her daughter back. (Daughter suffered a severe TBI years ago and is impulsive. She lives out of the city but wants to move back with her mother.)  Pain/Comfort:  Pain Rating 1: 0/10  Pain Rating Post-Intervention 1: 0/10    Patients cultural, spiritual, Jehovah's witness conflicts given the current situation: no    Living Environment:  States she lives with her son. When asked about home environment stated she was homeless.   Prior to admission, patients level of function was independent.  Equipment used at home: none.  DME owned (not currently used): none.  Upon discharge, patient will have assistance from son possibly.    Objective:     Communicated with RN prior to session.  Patient found HOB elevated with telemetry upon PT entry to room.    General Precautions: Standard,    Orthopedic Precautions:N/A   Braces: N/A  Respiratory Status: Room air    Exams:  Cognitive Exam:  Patient is oriented to Person, Place, Time, and Situation  RLE ROM: WFL  RLE Strength: WFL  LLE ROM: WFL  LLE Strength: WFL    Functional Mobility:  Bed Mobility:     Rolling Left:   independence  Rolling Right: independence  Supine to Sit: independence  Sit to Supine: independence  Transfers:     Sit to Stand:  independence with no AD  Gait: >500 feet with SBA.  Balance: Good sitting and standing balance.     AM-PAC 6 CLICK MOBILITY  Total Score:24       Treatment and Education:  Bed mobility and transfers as listed above.   Pt ambulated >500 feet with SBA. Pt doesn't demonstrate any significant gait deviations or LOB.   Pt was educated on role of PT and POC.     Patient left supine with HOB elevated with all lines intact, call button in reach, and RN notified.     AM-PAC 6 CLICK MOBILITY  Total Score:24         GOALS:   Multidisciplinary Problems       Physical Therapy Goals       Not on file              Multidisciplinary Problems (Resolved)          Problem: Physical Therapy    Goal Priority Disciplines Outcome Goal Variances Interventions   Physical Therapy Goal   (Resolved)     PT, PT/OT Met     Description: Pt with no acute or post acute care PT needs.                        History:     Past Medical History:   Diagnosis Date    Asthma     Bipolar disorder     Hypertension        Past Surgical History:   Procedure Laterality Date    CHOLECYSTECTOMY      SHOULDER SURGERY      TUBAL LIGATION         Time Tracking:     PT Received On: 08/13/24  PT Start Time: 1811     PT Stop Time: 1822  PT Total Time (min): 11 min     Billable Minutes: Evaluation 11      08/13/2024

## 2024-08-14 NOTE — SUBJECTIVE & OBJECTIVE
Review of Systems   Constitutional: Negative for chills, decreased appetite, diaphoresis, fever, malaise/fatigue, weight gain and weight loss.   Cardiovascular:  Positive for dyspnea on exertion (improving) and leg swelling (improving). Negative for chest pain, claudication, irregular heartbeat, near-syncope, orthopnea, palpitations and paroxysmal nocturnal dyspnea.   Respiratory:  Negative for cough, shortness of breath, snoring, sputum production and wheezing.    Endocrine: Negative for cold intolerance, heat intolerance, polydipsia, polyphagia and polyuria.   Skin:  Negative for color change, dry skin, itching, nail changes and poor wound healing.   Musculoskeletal:  Negative for back pain, gout, joint pain and joint swelling.   Gastrointestinal:  Negative for bloating, abdominal pain, constipation, diarrhea, hematemesis, hematochezia, melena, nausea and vomiting.   Genitourinary:  Negative for dysuria, hematuria and nocturia.   Neurological:  Negative for dizziness, headaches, light-headedness, numbness, paresthesias and weakness.   Psychiatric/Behavioral:  Negative for altered mental status, depression and memory loss.      Objective:     Vital Signs (Most Recent):  Temp: 96.6 °F (35.9 °C) (08/14/24 0819)  Pulse: 88 (08/14/24 0819)  Resp: 18 (08/14/24 0819)  BP: 119/76 (08/14/24 0819)  SpO2: 100 % (08/14/24 0819) Vital Signs (24h Range):  Temp:  [96.6 °F (35.9 °C)-98.5 °F (36.9 °C)] 96.6 °F (35.9 °C)  Pulse:  [] 88  Resp:  [17-19] 18  SpO2:  [95 %-100 %] 100 %  BP: (111-135)/(67-85) 119/76     Weight: 76.8 kg (169 lb 5 oz)  Body mass index is 29.06 kg/m².     SpO2: 100 %         Intake/Output Summary (Last 24 hours) at 8/14/2024 1030  Last data filed at 8/14/2024 0454  Gross per 24 hour   Intake --   Output 1600 ml   Net -1600 ml       Lines/Drains/Airways       Peripheral Intravenous Line  Duration                  Peripheral IV - Single Lumen 08/12/24 0258 20 G No Left;Posterior Wrist 2 days                        Physical Exam  Constitutional:       General: She is not in acute distress.     Appearance: She is well-developed.   Cardiovascular:      Rate and Rhythm: Normal rate and regular rhythm.      Heart sounds: No murmur heard.     No gallop.   Pulmonary:      Effort: Pulmonary effort is normal. No respiratory distress.      Breath sounds: Normal breath sounds. No wheezing.   Abdominal:      General: Bowel sounds are normal. There is no distension.      Palpations: Abdomen is soft.      Tenderness: There is no abdominal tenderness.   Musculoskeletal:      Right lower leg: Edema (trace) present.      Left lower leg: Edema (trace) present.   Skin:     General: Skin is warm and dry.   Neurological:      Mental Status: She is alert and oriented to person, place, and time.            Significant Labs: BMP:   Recent Labs   Lab 08/13/24  0315 08/14/24  0350   GLU 94 145*   * 134*   K 3.8 3.6   CL 98 94*   CO2 27 29   BUN 14 22*   CREATININE 1.3 1.8*   CALCIUM 8.0* 8.3*   MG 1.7 1.8    and CBC   Recent Labs   Lab 08/13/24  0315 08/14/24  0350   WBC 8.35 8.24   HGB 8.5* 9.4*   HCT 27.3* 29.8*    398       Significant Imaging: Echocardiogram: Transthoracic echo (TTE) complete (Cupid Only):   Results for orders placed or performed during the hospital encounter of 08/12/24   Echo   Result Value Ref Range    Jenkins's Biplane MOD Ejection Fraction 34 %    A2C EF 47 %    A4C EF 21 %    LVOT stroke volume 44.72 cm3    LVIDd 5.98 3.5 - 6.0 cm    LV Systolic Volume 150.13 mL    LVIDs 5.54 (A) 2.1 - 4.0 cm    LV ESV A2C 78.97 mL    LV Diastolic Volume 178.40 mL    LV ESV A4C 82.83 mL    LV EDV A2C 143.869419812178453 mL    LV EDV A4C 145.65 mL    Left Ventricular End Systolic Volume by Teichholz Method 150.13 mL    Left Ventricular End Diastolic Volume by Teichholz Method 178.40 mL    IVS 0.79 0.6 - 1.1 cm    LVOT diameter 2.01 cm    LVOT area 3.2 cm2    FS 7 (A) 28 - 44 %    Left Ventricle Relative Wall  Thickness 0.34 cm    Posterior Wall 1.03 0.6 - 1.1 cm    LV mass 217.51 g    MV Peak E Sukhdeep 0.76 m/s    TDI LATERAL 0.06 m/s    TDI SEPTAL 0.07 m/s    E/E' ratio 11.69 m/s    MV Peak A Sukhdeep 0.88 m/s    TR Max Sukhdeep 2.86 m/s    E/A ratio 0.86     IVRT 111.32 msec    E wave deceleration time 154.28 msec    LV SEPTAL E/E' RATIO 10.86 m/s    LV LATERAL E/E' RATIO 12.67 m/s    PV Peak S Sukhdeep 0.51 m/s    PV Peak D Sukhdeep 0.43 m/s    Pulm vein S/D ratio 1.19     LVOT peak sukhdeep 0.89 m/s    Left Ventricular Outflow Tract Mean Velocity 0.62 cm/s    Left Ventricular Outflow Tract Mean Gradient 1.74 mmHg    RV S' 7.35 cm/s    TAPSE 2.16 cm    RV/LV Ratio 0.54 cm    LA size 4.22 cm    Left Atrium Minor Axis 5.63 cm    Left Atrium Major Axis 5.97 cm    LA volume (mod) 84.22 cm3    RA Major Axis 5.27 cm    RA Width 4.99 cm    AV mean gradient 4 mmHg    AV peak gradient 8 mmHg    Ao peak sukhdeep 1.40 m/s    Ao VTI 22.90 cm    LVOT peak VTI 14.10 cm    AV valve area 1.95 cm²    AV Velocity Ratio 0.64     AV index (prosthetic) 0.62     BHARAT by Velocity Ratio 2.02 cm²    MV mean gradient 2 mmHg    MV peak gradient 3 mmHg    MV valve area by continuity eq 2.18 cm2    MV VTI 20.5 cm    Triscuspid Valve Regurgitation Peak Gradient 33 mmHg    Sinus 3.84 cm    STJ 3.50 cm    Ascending aorta 3.40 cm    Mean e' 0.07 m/s    LA area A4C 25.77 cm2    LA area A2C 23.67 cm2    RVDD 3.23 cm    BSA 1.97 m2    LV Systolic Volume Index 78.6 mL/m2    LV Diastolic Volume Index 93.40 mL/m2    LV Mass Index 114 g/m2    LA Volume Index (Mod) 44.1 mL/m2    ZLVIDS 4.07     ZLVIDD 1.07     LA Volume Index 52.2 mL/m2    LA volume 99.78 cm3    LA WIDTH 4.8 cm    TV resting pulmonary artery pressure 36 mmHg    RV TB RVSP 6 mmHg    Est. RA pres 3 mmHg    Narrative      Left Ventricle: The left ventricle is moderately dilated. There is   eccentric hypertrophy. Moderate global hypokinesis present. There is   moderately reduced systolic function with a visually estimated  ejection   fraction of 30 - 35%. Biplane (2D) method of discs ejection fraction is   34%. There is diastolic dysfunction but grade cannot be determined.    Right Ventricle: Normal right ventricular cavity size. Wall thickness   is normal. Systolic function is normal.    Left Atrium: Left atrium is severely dilated.    Right Atrium: Right atrium is moderately dilated.    Aortic Valve: There is mild stenosis. Aortic valve area by VTI is 1.95   cm². Aortic valve peak velocity is 1.40 m/s. Mean gradient is 4 mmHg. The   dimensionless index is 0.62.    Mitral Valve: There is mild regurgitation.    Tricuspid Valve: There is mild regurgitation.    Pulmonic Valve: There is mild regurgitation.    Pulmonary Artery: The estimated pulmonary artery systolic pressure is   36 mmHg.    IVC/SVC: Normal venous pressure at 3 mmHg.

## 2024-08-14 NOTE — PROGRESS NOTES
Smoking cessation education follow-up: Pt is reporting significant nicotine withdrawal symptoms with 14 mg nicoitne patch in use. Order obtained for increase to 21 mg nicotine patch Q day. Reviewed details of pt's initial Ambulatory Smoking Cessation clinic appointment: 8/27/24 at 10 am, [facility].

## 2024-08-14 NOTE — ASSESSMENT & PLAN NOTE
- noted on EKG from previous admission to [facility]   - admission EKG with NSR; on Toprol XL; no OAC prescribed due to anemia and recent diagnosis of colon cancer  - CHADSVAS score 2 (CHF and HTN) will hold OAC for now given anemia and to allow for further evaluation of recently diagnosed colon cancer

## 2024-08-14 NOTE — PROGRESS NOTES
[place] - Telemetry  Cardiology  Progress Note    Patient Name: Mary Ellen Saini  MRN: [id number]  Admission Date: 8/12/2024  Hospital Length of Stay: 1 days  Code Status: Full Code   Attending Physician: Ugo Magana MD   Primary Care Physician: Marlen, Primary Doctor  Expected Discharge Date: 8/14/2024  Principal Problem:Acute on chronic combined systolic and diastolic congestive heart failure    Subjective:     Hospital Course:   8/12/2024 per HPI   8/13/2024 Remains on Lasix 80mg IV BID with 6.3L out overnight negative 7.6L since admission. Echo yesterday with EF 30-35% H&H 8.5&27.3 unchanged from yesterday. BMP with creatinine 1.3. BLE venous ultrasound negative for DVT   8/14/2024 Remains on IV Lasix BID with 1.6L out overnight negative 9.2L since admission likely less net negative given on intake documented. Creatinine up to 1.8. HR and BP stable. H&H with trend up to 9.4&29.8. SOB and BLE edema         Review of Systems   Constitutional: Negative for chills, decreased appetite, diaphoresis, fever, malaise/fatigue, weight gain and weight loss.   Cardiovascular:  Positive for dyspnea on exertion (improving) and leg swelling (improving). Negative for chest pain, claudication, irregular heartbeat, near-syncope, orthopnea, palpitations and paroxysmal nocturnal dyspnea.   Respiratory:  Negative for cough, shortness of breath, snoring, sputum production and wheezing.    Endocrine: Negative for cold intolerance, heat intolerance, polydipsia, polyphagia and polyuria.   Skin:  Negative for color change, dry skin, itching, nail changes and poor wound healing.   Musculoskeletal:  Negative for back pain, gout, joint pain and joint swelling.   Gastrointestinal:  Negative for bloating, abdominal pain, constipation, diarrhea, hematemesis, hematochezia, melena, nausea and vomiting.   Genitourinary:  Negative for dysuria, hematuria and nocturia.   Neurological:  Negative for dizziness, headaches, light-headedness, numbness,  paresthesias and weakness.   Psychiatric/Behavioral:  Negative for altered mental status, depression and memory loss.      Objective:     Vital Signs (Most Recent):  Temp: 96.6 °F (35.9 °C) (08/14/24 0819)  Pulse: 88 (08/14/24 0819)  Resp: 18 (08/14/24 0819)  BP: 119/76 (08/14/24 0819)  SpO2: 100 % (08/14/24 0819) Vital Signs (24h Range):  Temp:  [96.6 °F (35.9 °C)-98.5 °F (36.9 °C)] 96.6 °F (35.9 °C)  Pulse:  [] 88  Resp:  [17-19] 18  SpO2:  [95 %-100 %] 100 %  BP: (111-135)/(67-85) 119/76     Weight: 76.8 kg (169 lb 5 oz)  Body mass index is 29.06 kg/m².     SpO2: 100 %         Intake/Output Summary (Last 24 hours) at 8/14/2024 1030  Last data filed at 8/14/2024 0454  Gross per 24 hour   Intake --   Output 1600 ml   Net -1600 ml       Lines/Drains/Airways       Peripheral Intravenous Line  Duration                  Peripheral IV - Single Lumen 08/12/24 0258 20 G No Left;Posterior Wrist 2 days                       Physical Exam  Constitutional:       General: She is not in acute distress.     Appearance: She is well-developed.   Cardiovascular:      Rate and Rhythm: Normal rate and regular rhythm.      Heart sounds: No murmur heard.     No gallop.   Pulmonary:      Effort: Pulmonary effort is normal. No respiratory distress.      Breath sounds: Normal breath sounds. No wheezing.   Abdominal:      General: Bowel sounds are normal. There is no distension.      Palpations: Abdomen is soft.      Tenderness: There is no abdominal tenderness.   Musculoskeletal:      Right lower leg: Edema (trace) present.      Left lower leg: Edema (trace) present.   Skin:     General: Skin is warm and dry.   Neurological:      Mental Status: She is alert and oriented to person, place, and time.            Significant Labs: BMP:   Recent Labs   Lab 08/13/24  0315 08/14/24  0350   GLU 94 145*   * 134*   K 3.8 3.6   CL 98 94*   CO2 27 29   BUN 14 22*   CREATININE 1.3 1.8*   CALCIUM 8.0* 8.3*   MG 1.7 1.8    and CBC   Recent  Labs   Lab 08/13/24  0315 08/14/24  0350   WBC 8.35 8.24   HGB 8.5* 9.4*   HCT 27.3* 29.8*    398       Significant Imaging: Echocardiogram: Transthoracic echo (TTE) complete (Cupid Only):   Results for orders placed or performed during the hospital encounter of 08/12/24   Echo   Result Value Ref Range    Jenkins's Biplane MOD Ejection Fraction 34 %    A2C EF 47 %    A4C EF 21 %    LVOT stroke volume 44.72 cm3    LVIDd 5.98 3.5 - 6.0 cm    LV Systolic Volume 150.13 mL    LVIDs 5.54 (A) 2.1 - 4.0 cm    LV ESV A2C 78.97 mL    LV Diastolic Volume 178.40 mL    LV ESV A4C 82.83 mL    LV EDV A2C 143.708370515437972 mL    LV EDV A4C 145.65 mL    Left Ventricular End Systolic Volume by Teichholz Method 150.13 mL    Left Ventricular End Diastolic Volume by Teichholz Method 178.40 mL    IVS 0.79 0.6 - 1.1 cm    LVOT diameter 2.01 cm    LVOT area 3.2 cm2    FS 7 (A) 28 - 44 %    Left Ventricle Relative Wall Thickness 0.34 cm    Posterior Wall 1.03 0.6 - 1.1 cm    LV mass 217.51 g    MV Peak E Sukhdeep 0.76 m/s    TDI LATERAL 0.06 m/s    TDI SEPTAL 0.07 m/s    E/E' ratio 11.69 m/s    MV Peak A Sukhdeep 0.88 m/s    TR Max Sukhdeep 2.86 m/s    E/A ratio 0.86     IVRT 111.32 msec    E wave deceleration time 154.28 msec    LV SEPTAL E/E' RATIO 10.86 m/s    LV LATERAL E/E' RATIO 12.67 m/s    PV Peak S Sukhdeep 0.51 m/s    PV Peak D Sukhdeep 0.43 m/s    Pulm vein S/D ratio 1.19     LVOT peak sukhdeep 0.89 m/s    Left Ventricular Outflow Tract Mean Velocity 0.62 cm/s    Left Ventricular Outflow Tract Mean Gradient 1.74 mmHg    RV S' 7.35 cm/s    TAPSE 2.16 cm    RV/LV Ratio 0.54 cm    LA size 4.22 cm    Left Atrium Minor Axis 5.63 cm    Left Atrium Major Axis 5.97 cm    LA volume (mod) 84.22 cm3    RA Major Axis 5.27 cm    RA Width 4.99 cm    AV mean gradient 4 mmHg    AV peak gradient 8 mmHg    Ao peak sukhdeep 1.40 m/s    Ao VTI 22.90 cm    LVOT peak VTI 14.10 cm    AV valve area 1.95 cm²    AV Velocity Ratio 0.64     AV index (prosthetic) 0.62     BHARAT by  Velocity Ratio 2.02 cm²    MV mean gradient 2 mmHg    MV peak gradient 3 mmHg    MV valve area by continuity eq 2.18 cm2    MV VTI 20.5 cm    Triscuspid Valve Regurgitation Peak Gradient 33 mmHg    Sinus 3.84 cm    STJ 3.50 cm    Ascending aorta 3.40 cm    Mean e' 0.07 m/s    LA area A4C 25.77 cm2    LA area A2C 23.67 cm2    RVDD 3.23 cm    BSA 1.97 m2    LV Systolic Volume Index 78.6 mL/m2    LV Diastolic Volume Index 93.40 mL/m2    LV Mass Index 114 g/m2    LA Volume Index (Mod) 44.1 mL/m2    ZLVIDS 4.07     ZLVIDD 1.07     LA Volume Index 52.2 mL/m2    LA volume 99.78 cm3    LA WIDTH 4.8 cm    TV resting pulmonary artery pressure 36 mmHg    RV TB RVSP 6 mmHg    Est. RA pres 3 mmHg    Narrative      Left Ventricle: The left ventricle is moderately dilated. There is   eccentric hypertrophy. Moderate global hypokinesis present. There is   moderately reduced systolic function with a visually estimated ejection   fraction of 30 - 35%. Biplane (2D) method of discs ejection fraction is   34%. There is diastolic dysfunction but grade cannot be determined.    Right Ventricle: Normal right ventricular cavity size. Wall thickness   is normal. Systolic function is normal.    Left Atrium: Left atrium is severely dilated.    Right Atrium: Right atrium is moderately dilated.    Aortic Valve: There is mild stenosis. Aortic valve area by VTI is 1.95   cm². Aortic valve peak velocity is 1.40 m/s. Mean gradient is 4 mmHg. The   dimensionless index is 0.62.    Mitral Valve: There is mild regurgitation.    Tricuspid Valve: There is mild regurgitation.    Pulmonic Valve: There is mild regurgitation.    Pulmonary Artery: The estimated pulmonary artery systolic pressure is   36 mmHg.    IVC/SVC: Normal venous pressure at 3 mmHg.       Assessment and Plan:     Brief HPI: Seen this morning on AM NP rounds while resting in bed. Reports SOB and BLE edema improved. Discussed POC as detailed below-verbalized understanding and agrees with POC       * Acute on chronic combined systolic and diastolic congestive heart failure  - reports recent admission in [place] x 2 for CHF exacerbation with Life Vest placed- diuresed at that time with improvement in symptoms with recent recurrence  - admitted with MONSON and BLE edema;  remains on IV Lasix 80mg BID with 1.6L out overnight negative 9.2L since admission likely less net negative given inaccurate intake documentation  -  echo with EF 30-35%;  SOB and BLE edema improving; creatinine up to 1.8 this AM; recommend holding of IV Lasix; planning on Lasix 40mg po BID upon discharge; on ARB and BB and may need to hold ARB if creatinine trends up further   - SBP 110s-120s   - strict I&Os and daily weights     Atrial flutter  - noted on EKG from previous admission to [facility]   - admission EKG with NSR; on Toprol XL; no OAC prescribed due to anemia and recent diagnosis of colon cancer  - CHADSVAS score 2 (CHF and HTN) will hold OAC for now given anemia and to allow for further evaluation of recently diagnosed colon cancer     Anemia  - H&H 9.4&29.8 this AM up from 8.5&27.3 yesterday   -  uncertain of baseline H&H; further management per primary team; history of colon cancer diagnosed in Feb 2024 no Hem Onc follow up due to transportation issues     Hypokalemia  - K+ 3.6 this AM  - K+ replacement for  goal K+>4.0 Mg >2.0    Elevated troponin  - troponin 0.063-0.055  - reported  recent angiogram at [facility] however records indicate no angiogram given anemia as well as other precluding factors   - presented with ADHF;  troponin elevation related to demand etiology in setting of ADHF         VTE Risk Mitigation (From admission, onward)           Ordered     enoxaparin injection 40 mg  Daily         08/12/24 0400     IP VTE HIGH RISK PATIENT  Once         08/12/24 0400     Place sequential compression device  Until discontinued         08/12/24 0400                    Denise Acosta,  APRN, ANP  Cardiology  [place] - Telemetry

## 2024-08-14 NOTE — ASSESSMENT & PLAN NOTE
- H&H 9.4&29.8 this AM up from 8.5&27.3 yesterday   -  uncertain of baseline H&H; further management per primary team; history of colon cancer diagnosed in Feb 2024 no Hem Onc follow up due to transportation issues

## 2024-08-14 NOTE — PLAN OF CARE
ALEXANDER met with pt prior to d/c - pt to be transported to [facility] per WC Raudel Garcia is assisting with making f/u apts for pt pcp, GI, Oncology and Cardiology   therapy worked with pt- no dme recc   Future Appointments   Date Time Provider Department Center   8/27/2024 10:00 AM Yandel Bonds, RRT [facility]        08/14/24 [id number]   Final Note   Assessment Type Final Discharge Note   Anticipated Discharge Disposition   ([facility])   What phone number can be called within the next 1-3 days to see how you are doing after discharge? [phone]   Hospital Resources/Appts/Education Provided   (ALEXANDER Ng to be contacted with f/u apts [phone])   Post-Acute Status   Post-Acute Authorization Other   Other Status Community Services   Discharge Delays None known at this time

## 2024-08-14 NOTE — PLAN OF CARE
Problem: Adult Inpatient Plan of Care  Goal: Plan of Care Review  8/14/2024 1427 by Eli North RN  Outcome: Met  8/14/2024 0816 by Eli North RN  Outcome: Progressing  Goal: Patient-Specific Goal (Individualized)  Outcome: Met  Goal: Absence of Hospital-Acquired Illness or Injury  Outcome: Met  Goal: Optimal Comfort and Wellbeing  Outcome: Met  Goal: Readiness for Transition of Care  Outcome: Met     Problem: Heart Failure  Goal: Optimal Coping  Outcome: Met  Goal: Optimal Cardiac Output  Outcome: Met  Goal: Stable Heart Rate and Rhythm  Outcome: Met  Goal: Optimal Functional Ability  Outcome: Met  Goal: Fluid and Electrolyte Balance  Outcome: Met  Goal: Improved Oral Intake  Outcome: Met  Goal: Effective Oxygenation and Ventilation  Outcome: Met  Goal: Effective Breathing Pattern During Sleep  Outcome: Met     Problem: Electrolyte Imbalance  Goal: Electrolyte Balance  Outcome: Met     Problem: Fall Injury Risk  Goal: Absence of Fall and Fall-Related Injury  Outcome: Met     Problem: Comorbidity Management  Goal: Maintenance of Asthma Control  Outcome: Met  Goal: Maintenance of Heart Failure Symptom Control  Outcome: Met  Goal: Blood Pressure in Desired Range  Outcome: Met     Problem: Occupational Therapy  Goal: Occupational Therapy Goal  Description: No goals set 2/2 pt performing at/near baseline.      Outcome: Met

## 2024-08-14 NOTE — NURSING
RN unable to review pt's AVS as pt's transportation here to  pt.  stated nurse at facility will review with pt. Otherwise pt ready for dc without any needs.

## 2024-08-14 NOTE — ASSESSMENT & PLAN NOTE
- reports recent admission in [place] x 2 for CHF exacerbation with Life Vest placed- diuresed at that time with improvement in symptoms with recent recurrence  - admitted with MONSON and BLE edema;  remains on IV Lasix 80mg BID with 1.6L out overnight negative 9.2L since admission likely less net negative given inaccurate intake documentation  -  echo with EF 30-35%;  SOB and BLE edema improving; creatinine up to 1.8 this AM; recommend holding of IV Lasix; planning on Lasix 40mg po BID upon discharge; on ARB and BB and may need to hold ARB if creatinine trends up further   - SBP 110s-120s   - strict I&Os and daily weights

## 2024-08-14 NOTE — PT/OT/SLP EVAL
Occupational Therapy   Evaluation and Discharge Note    Name: Mary Ellen Saini  MRN: [id number]  Admitting Diagnosis: Acute on chronic combined systolic and diastolic congestive heart failure  Recent Surgery: * No surgery found *      Recommendations:     Discharge Recommendations: No Therapy Indicated  Discharge Equipment Recommendations: rollator (states she is to get rollator)  Barriers to discharge:   (pt and son are homeless)    Assessment:     Mary Ellen Saini is a 59 y.o. female with a medical diagnosis of Acute on chronic combined systolic and diastolic congestive heart failure. At this time, patient is functioning at their prior level of function and does not require further acute OT services.     Plan:     During this hospitalization, patient does not require further acute OT services.  Please re-consult if situation changes.    Plan of Care Reviewed with: patient    Subjective     Chief Complaint: Pt reports she's feeling a lot better since admission, reports decreased edema BLE  Patient/Family Comments/goals: To feel better    Occupational Profile:  Living Environment: Pt and son are homeless  Previous level of function: Mod I with RW and w/c  Roles and Routines: Indep for ADLs, increasing difficulty with ambulation just prior to presentation to hospital   Equipment Used at home: walker, rolling (had access to w/c)  Assistance upon Discharge: Son per pt report    Pain/Comfort:  Pain Rating 1: 0/10    Patients cultural, spiritual, Scientologist conflicts given the current situation:      Objective:     Communicated with: nsdiane prior to session.  Patient found HOB elevated with peripheral IV, telemetry upon OT entry to room.    General Precautions: Standard, fall  Orthopedic Precautions: N/A  Braces: N/A  Respiratory Status: Room air     Occupational Performance:    Bed Mobility:    Patient completed Rolling/Turning to Right with modified independence  Patient completed Scooting/Bridging with modified  independence  Patient completed Supine to Sit with modified independence  Patient completed Sit to Supine with modified independence    Functional Mobility/Transfers:  Patient completed Sit <> Stand Transfer with modified independence and supervision  with  no assistive device   Patient completed Toilet Transfer Step Transfer technique with modified independence and supervision with  no AD  Functional Mobility: Pt with good dynamic seated and standing balance.     Activities of Daily Living:  Upper Body Dressing: modified independence to don gown as robe seated EOB  Lower Body Dressing: modified independence with minimally increased time to don B socks seated EOB  Toileting: modified independence and distant supervision for pericare and clothing management at standard toilet in room    Cognitive/Visual Perceptual:  Cognitive/Psychosocial Skills:     -       Oriented to: Person, Place, Time and Situation   -       Follows Commands/attention:Follows multistep  commands  -       Communication: clear/fluent  -       Memory: No Deficits noted  -       Safety awareness/insight to disability: intact   -       Mood/Affect/Coping skills/emotional control: Appropriate to situation    Physical Exam:  Postural examination/scapula alignment:    -      Rounded shoulders, forward head  Skin integrity: BLE mild edema with redness  Sensation:    -       Intact  Motor Planning:    -       WFL  Dominant hand:    -       R handed  Upper Extremity Range of Motion: BUE WFL     Upper Extremity Strength:  BUE grossly 4-/5   Strength:  B hands WFL  Fine Motor Coordination:    -       Intact  Gross motor coordination:   WFL     AMPAC 6 Click ADL:  AMPAC Total Score: 24    Treatment & Education:  Pt educated on role of OT and POC.   Pt performing skills as listed above.     Patient left HOB elevated with all lines intact, call button in reach, and nsg notified    GOALS:   Multidisciplinary Problems       Occupational Therapy Goals           Problem: Occupational Therapy    Goal Priority Disciplines Outcome Interventions   Occupational Therapy Goal     OT, PT/OT Progressing    Description: No goals set 2/2 pt performing at/near baseline.                           History:     Past Medical History:   Diagnosis Date    Asthma     Bipolar disorder     Hypertension          Past Surgical History:   Procedure Laterality Date    CHOLECYSTECTOMY      SHOULDER SURGERY      TUBAL LIGATION         Time Tracking:     OT Date of Treatment: 08/13/24  OT Start Time: 1500  OT Stop Time: 1519  OT Total Time (min): 19 min    Billable Minutes:Evaluation 19 8/13/2024

## 2024-08-14 NOTE — PLAN OF CARE
Problem: Physical Therapy  Goal: Physical Therapy Goal  Description: Pt with no acute or post acute care PT needs.   Outcome: Met

## 2024-08-15 NOTE — PROGRESS NOTES
Berenice - Dl MUNOZ for [facility]pite - advised of f/u apts; Radha unable to make f/u with Oncology as MD needs to see notes from original dx.      Future Appointments   Date Time Provider Department Center   8/21/2024  5:00 PM Belle Phillips, CRT [facility]   8/22/2024  1:30 PM Phuc Montes, MAYELA [facility]     Mary Ellen Parveen/#[id number]  SCHED Gastro w/Dr Manuel Elizabeth on 8/19 at 12:30  [facility]   [address]  [address]  [place], [place]   [address].  Park on 4th floor.  [facility]'s entrance should be thru garage.  PH: [phone]     SCHED CARDS at [facility] w/Dr Laurent Wang on 8/19 at 2:30  [address]  3rd Floor  [place], [place]  Park in [address].  Park on 3rd floor.  Cardiology is all on 3rd floor so entrance should be from garage.  PH: [phone]     When you face [facility] from [facility].  It is shaped like a horseshoe.  One parking garage is on one side and the other the opposite.       [facility] - Oncology   [phone] - Will need Fax of records where patient diagnosed.  [facility] notes, labs, and images.     PH:  [phone] - Iraida / Oncology Nurse Navigator

## 2024-08-19 ENCOUNTER — HOSPITAL ENCOUNTER (INPATIENT)
Facility: HOSPITAL | Age: 59
LOS: 3 days | Discharge: HOME HEALTH | DRG: 872 | End: 2024-08-24
Attending: EMERGENCY MEDICINE
Payer: MEDICARE

## 2024-08-19 DIAGNOSIS — J44.9 CHRONIC OBSTRUCTIVE PULMONARY DISEASE, UNSPECIFIED COPD TYPE: ICD-10-CM

## 2024-08-19 DIAGNOSIS — B96.89 URINARY TRACT INFECTION DUE TO ESBL KLEBSIELLA: ICD-10-CM

## 2024-08-19 DIAGNOSIS — R07.9 CHEST PAIN: ICD-10-CM

## 2024-08-19 DIAGNOSIS — R06.02 SHORTNESS OF BREATH: ICD-10-CM

## 2024-08-19 DIAGNOSIS — Z74.09 MOBILITY IMPAIRED: ICD-10-CM

## 2024-08-19 DIAGNOSIS — N39.0 URINARY TRACT INFECTION DUE TO ESBL KLEBSIELLA: ICD-10-CM

## 2024-08-19 DIAGNOSIS — I95.9 HYPOTENSION: ICD-10-CM

## 2024-08-19 DIAGNOSIS — I95.9 ACUTE HYPOTENSION: ICD-10-CM

## 2024-08-19 DIAGNOSIS — B17.10 ACUTE HEPATITIS C VIRUS INFECTION WITHOUT HEPATIC COMA: Primary | ICD-10-CM

## 2024-08-19 LAB
ALBUMIN SERPL BCP-MCNC: 2.5 G/DL (ref 3.5–5.2)
ALLENS TEST: NORMAL
ALP SERPL-CCNC: 93 U/L (ref 55–135)
ALT SERPL W/O P-5'-P-CCNC: 20 U/L (ref 10–44)
ANION GAP SERPL CALC-SCNC: 11 MMOL/L (ref 8–16)
AST SERPL-CCNC: 25 U/L (ref 10–40)
BASOPHILS # BLD AUTO: 0.11 K/UL (ref 0–0.2)
BASOPHILS NFR BLD: 0.8 % (ref 0–1.9)
BILIRUB SERPL-MCNC: 0.2 MG/DL (ref 0.1–1)
BNP SERPL-MCNC: 206 PG/ML (ref 0–99)
BUN SERPL-MCNC: 28 MG/DL (ref 6–20)
CALCIUM SERPL-MCNC: 8 MG/DL (ref 8.7–10.5)
CHLORIDE SERPL-SCNC: 101 MMOL/L (ref 95–110)
CO2 SERPL-SCNC: 24 MMOL/L (ref 23–29)
CREAT SERPL-MCNC: 2.3 MG/DL (ref 0.5–1.4)
D DIMER PPP IA.FEU-MCNC: 0.72 MG/L FEU
DIFFERENTIAL METHOD BLD: ABNORMAL
EOSINOPHIL # BLD AUTO: 0.3 K/UL (ref 0–0.5)
EOSINOPHIL NFR BLD: 1.7 % (ref 0–8)
ERYTHROCYTE [DISTWIDTH] IN BLOOD BY AUTOMATED COUNT: 20.3 % (ref 11.5–14.5)
EST. GFR  (NO RACE VARIABLE): 24 ML/MIN/1.73 M^2
GLUCOSE SERPL-MCNC: 76 MG/DL (ref 70–110)
HCT VFR BLD AUTO: 32.7 % (ref 37–48.5)
HCV AB SERPL QL IA: REACTIVE
HGB BLD-MCNC: 9.7 G/DL (ref 12–16)
HIV 1+2 AB+HIV1 P24 AG SERPL QL IA: NORMAL
IMM GRANULOCYTES # BLD AUTO: 0.55 K/UL (ref 0–0.04)
IMM GRANULOCYTES NFR BLD AUTO: 3.8 % (ref 0–0.5)
LDH SERPL L TO P-CCNC: 1.2 MMOL/L (ref 0.5–2.2)
LYMPHOCYTES # BLD AUTO: 2.8 K/UL (ref 1–4.8)
LYMPHOCYTES NFR BLD: 19.5 % (ref 18–48)
MAGNESIUM SERPL-MCNC: 1.5 MG/DL (ref 1.6–2.6)
MCH RBC QN AUTO: 24.4 PG (ref 27–31)
MCHC RBC AUTO-ENTMCNC: 29.7 G/DL (ref 32–36)
MCV RBC AUTO: 82 FL (ref 82–98)
MONOCYTES # BLD AUTO: 0.7 K/UL (ref 0.3–1)
MONOCYTES NFR BLD: 4.9 % (ref 4–15)
NEUTROPHILS # BLD AUTO: 10.1 K/UL (ref 1.8–7.7)
NEUTROPHILS NFR BLD: 69.3 % (ref 38–73)
NRBC BLD-RTO: 0 /100 WBC
PHOSPHATE SERPL-MCNC: 4.2 MG/DL (ref 2.7–4.5)
PLATELET # BLD AUTO: 487 K/UL (ref 150–450)
PMV BLD AUTO: 9.3 FL (ref 9.2–12.9)
POTASSIUM SERPL-SCNC: 3 MMOL/L (ref 3.5–5.1)
PROCALCITONIN SERPL IA-MCNC: 0.1 NG/ML
PROT SERPL-MCNC: 7 G/DL (ref 6–8.4)
RBC # BLD AUTO: 3.97 M/UL (ref 4–5.4)
SAMPLE: NORMAL
SARS-COV-2 RDRP RESP QL NAA+PROBE: NEGATIVE
SITE: NORMAL
SODIUM SERPL-SCNC: 136 MMOL/L (ref 136–145)
TROPONIN I SERPL DL<=0.01 NG/ML-MCNC: 0.09 NG/ML (ref 0–0.03)
WBC # BLD AUTO: 14.55 K/UL (ref 3.9–12.7)

## 2024-08-19 PROCEDURE — 93010 ELECTROCARDIOGRAM REPORT: CPT | Mod: ,,, | Performed by: INTERNAL MEDICINE

## 2024-08-19 PROCEDURE — 87389 HIV-1 AG W/HIV-1&-2 AB AG IA: CPT | Performed by: EMERGENCY MEDICINE

## 2024-08-19 PROCEDURE — 84484 ASSAY OF TROPONIN QUANT: CPT | Performed by: EMERGENCY MEDICINE

## 2024-08-19 PROCEDURE — 25000003 PHARM REV CODE 250: Performed by: EMERGENCY MEDICINE

## 2024-08-19 PROCEDURE — 84145 PROCALCITONIN (PCT): CPT | Performed by: EMERGENCY MEDICINE

## 2024-08-19 PROCEDURE — 99285 EMERGENCY DEPT VISIT HI MDM: CPT | Mod: 25

## 2024-08-19 PROCEDURE — 83735 ASSAY OF MAGNESIUM: CPT | Performed by: EMERGENCY MEDICINE

## 2024-08-19 PROCEDURE — 93010 ELECTROCARDIOGRAM REPORT: CPT | Mod: 76,,, | Performed by: INTERNAL MEDICINE

## 2024-08-19 PROCEDURE — 84484 ASSAY OF TROPONIN QUANT: CPT | Mod: 91 | Performed by: EMERGENCY MEDICINE

## 2024-08-19 PROCEDURE — 87040 BLOOD CULTURE FOR BACTERIA: CPT | Mod: 59 | Performed by: EMERGENCY MEDICINE

## 2024-08-19 PROCEDURE — 85025 COMPLETE CBC W/AUTO DIFF WBC: CPT | Performed by: EMERGENCY MEDICINE

## 2024-08-19 PROCEDURE — 96366 THER/PROPH/DIAG IV INF ADDON: CPT

## 2024-08-19 PROCEDURE — 96367 TX/PROPH/DG ADDL SEQ IV INF: CPT

## 2024-08-19 PROCEDURE — 83605 ASSAY OF LACTIC ACID: CPT

## 2024-08-19 PROCEDURE — 99900035 HC TECH TIME PER 15 MIN (STAT)

## 2024-08-19 PROCEDURE — 25000242 PHARM REV CODE 250 ALT 637 W/ HCPCS: Performed by: EMERGENCY MEDICINE

## 2024-08-19 PROCEDURE — 83880 ASSAY OF NATRIURETIC PEPTIDE: CPT | Performed by: EMERGENCY MEDICINE

## 2024-08-19 PROCEDURE — 94640 AIRWAY INHALATION TREATMENT: CPT | Mod: XB

## 2024-08-19 PROCEDURE — G0378 HOSPITAL OBSERVATION PER HR: HCPCS

## 2024-08-19 PROCEDURE — 93005 ELECTROCARDIOGRAM TRACING: CPT

## 2024-08-19 PROCEDURE — 63600175 PHARM REV CODE 636 W HCPCS: Performed by: EMERGENCY MEDICINE

## 2024-08-19 PROCEDURE — 84100 ASSAY OF PHOSPHORUS: CPT | Performed by: EMERGENCY MEDICINE

## 2024-08-19 PROCEDURE — 80053 COMPREHEN METABOLIC PANEL: CPT | Performed by: EMERGENCY MEDICINE

## 2024-08-19 PROCEDURE — U0002 COVID-19 LAB TEST NON-CDC: HCPCS | Performed by: EMERGENCY MEDICINE

## 2024-08-19 PROCEDURE — 96361 HYDRATE IV INFUSION ADD-ON: CPT

## 2024-08-19 PROCEDURE — 87522 HEPATITIS C REVRS TRNSCRPJ: CPT | Performed by: EMERGENCY MEDICINE

## 2024-08-19 PROCEDURE — 86803 HEPATITIS C AB TEST: CPT | Performed by: EMERGENCY MEDICINE

## 2024-08-19 PROCEDURE — 85379 FIBRIN DEGRADATION QUANT: CPT | Performed by: EMERGENCY MEDICINE

## 2024-08-19 PROCEDURE — 96365 THER/PROPH/DIAG IV INF INIT: CPT

## 2024-08-19 PROCEDURE — 94761 N-INVAS EAR/PLS OXIMETRY MLT: CPT

## 2024-08-19 RX ORDER — ACETAMINOPHEN 500 MG
1000 TABLET ORAL
Status: COMPLETED | OUTPATIENT
Start: 2024-08-19 | End: 2024-08-19

## 2024-08-19 RX ORDER — MAGNESIUM SULFATE HEPTAHYDRATE 40 MG/ML
2 INJECTION, SOLUTION INTRAVENOUS ONCE
Status: COMPLETED | OUTPATIENT
Start: 2024-08-19 | End: 2024-08-20

## 2024-08-19 RX ORDER — ASPIRIN 325 MG
325 TABLET ORAL
Status: COMPLETED | OUTPATIENT
Start: 2024-08-19 | End: 2024-08-19

## 2024-08-19 RX ORDER — PREDNISONE 20 MG/1
60 TABLET ORAL
Status: COMPLETED | OUTPATIENT
Start: 2024-08-19 | End: 2024-08-19

## 2024-08-19 RX ORDER — IPRATROPIUM BROMIDE AND ALBUTEROL SULFATE 2.5; .5 MG/3ML; MG/3ML
3 SOLUTION RESPIRATORY (INHALATION)
Status: COMPLETED | OUTPATIENT
Start: 2024-08-19 | End: 2024-08-19

## 2024-08-19 RX ADMIN — IPRATROPIUM BROMIDE AND ALBUTEROL SULFATE 3 ML: 2.5; .5 SOLUTION RESPIRATORY (INHALATION) at 08:08

## 2024-08-19 RX ADMIN — POTASSIUM BICARBONATE 25 MEQ: 978 TABLET, EFFERVESCENT ORAL at 10:08

## 2024-08-19 RX ADMIN — ASPIRIN 325 MG ORAL TABLET 325 MG: 325 PILL ORAL at 11:08

## 2024-08-19 RX ADMIN — POTASSIUM BICARBONATE 50 MEQ: 978 TABLET, EFFERVESCENT ORAL at 10:08

## 2024-08-19 RX ADMIN — PIPERACILLIN SODIUM AND TAZOBACTAM SODIUM 4.5 G: 4; .5 INJECTION, POWDER, FOR SOLUTION INTRAVENOUS at 10:08

## 2024-08-19 RX ADMIN — MAGNESIUM SULFATE HEPTAHYDRATE 2 G: 40 INJECTION, SOLUTION INTRAVENOUS at 10:08

## 2024-08-19 RX ADMIN — PREDNISONE 60 MG: 20 TABLET ORAL at 10:08

## 2024-08-19 RX ADMIN — SODIUM CHLORIDE 500 ML: 9 INJECTION, SOLUTION INTRAVENOUS at 09:08

## 2024-08-19 RX ADMIN — VANCOMYCIN HYDROCHLORIDE 1500 MG: 1.5 INJECTION, POWDER, LYOPHILIZED, FOR SOLUTION INTRAVENOUS at 11:08

## 2024-08-19 RX ADMIN — ACETAMINOPHEN 1000 MG: 500 TABLET ORAL at 10:08

## 2024-08-20 PROBLEM — I25.10 CORONARY ARTERY DISEASE INVOLVING NATIVE CORONARY ARTERY OF NATIVE HEART WITHOUT ANGINA PECTORIS: Status: ACTIVE | Noted: 2024-08-20

## 2024-08-20 PROBLEM — J44.9 COPD (CHRONIC OBSTRUCTIVE PULMONARY DISEASE): Status: ACTIVE | Noted: 2024-08-20

## 2024-08-20 PROBLEM — N30.00 ACUTE CYSTITIS WITHOUT HEMATURIA: Status: ACTIVE | Noted: 2024-08-20

## 2024-08-20 PROBLEM — A41.9 SEPSIS: Status: ACTIVE | Noted: 2024-08-20

## 2024-08-20 PROBLEM — N17.9 AKI (ACUTE KIDNEY INJURY): Status: ACTIVE | Noted: 2024-08-20

## 2024-08-20 PROBLEM — R76.8 HEPATITIS C ANTIBODY POSITIVE IN BLOOD: Status: ACTIVE | Noted: 2024-08-20

## 2024-08-20 PROBLEM — Z71.89 ACP (ADVANCE CARE PLANNING): Status: ACTIVE | Noted: 2024-08-20

## 2024-08-20 PROBLEM — I95.9 SYMPTOMATIC HYPOTENSION: Status: ACTIVE | Noted: 2024-08-20

## 2024-08-20 PROBLEM — E83.42 HYPOMAGNESEMIA: Status: ACTIVE | Noted: 2024-08-20

## 2024-08-20 PROBLEM — I50.22 CHRONIC HFREF (HEART FAILURE WITH REDUCED EJECTION FRACTION): Status: ACTIVE | Noted: 2024-08-20

## 2024-08-20 LAB
ALBUMIN SERPL BCP-MCNC: 2.2 G/DL (ref 3.5–5.2)
ALP SERPL-CCNC: 78 U/L (ref 55–135)
ALT SERPL W/O P-5'-P-CCNC: 20 U/L (ref 10–44)
ANION GAP SERPL CALC-SCNC: 8 MMOL/L (ref 8–16)
AST SERPL-CCNC: 24 U/L (ref 10–40)
BACTERIA #/AREA URNS AUTO: ABNORMAL /HPF
BASOPHILS # BLD AUTO: 0.07 K/UL (ref 0–0.2)
BASOPHILS NFR BLD: 0.4 % (ref 0–1.9)
BILIRUB SERPL-MCNC: 0.2 MG/DL (ref 0.1–1)
BILIRUB UR QL STRIP: NEGATIVE
BUN SERPL-MCNC: 27 MG/DL (ref 6–20)
CALCIUM SERPL-MCNC: 7.8 MG/DL (ref 8.7–10.5)
CHLORIDE SERPL-SCNC: 100 MMOL/L (ref 95–110)
CLARITY UR REFRACT.AUTO: ABNORMAL
CO2 SERPL-SCNC: 24 MMOL/L (ref 23–29)
COLOR UR AUTO: YELLOW
CREAT SERPL-MCNC: 1.9 MG/DL (ref 0.5–1.4)
DIFFERENTIAL METHOD BLD: ABNORMAL
EOSINOPHIL # BLD AUTO: 0 K/UL (ref 0–0.5)
EOSINOPHIL NFR BLD: 0.1 % (ref 0–8)
ERYTHROCYTE [DISTWIDTH] IN BLOOD BY AUTOMATED COUNT: 20.3 % (ref 11.5–14.5)
EST. GFR  (NO RACE VARIABLE): 30.2 ML/MIN/1.73 M^2
GLUCOSE SERPL-MCNC: 143 MG/DL (ref 70–110)
GLUCOSE UR QL STRIP: NEGATIVE
HCT VFR BLD AUTO: 27.6 % (ref 37–48.5)
HCV RNA SERPL NAA+PROBE-LOG IU: 5.93 LOGIU/ML
HCV RNA SERPL QL NAA+PROBE: DETECTED
HCV RNA SPEC NAA+PROBE-ACNC: ABNORMAL IU/ML
HGB BLD-MCNC: 8.6 G/DL (ref 12–16)
HGB UR QL STRIP: NEGATIVE
HYALINE CASTS UR QL AUTO: 2 /LPF
IMM GRANULOCYTES # BLD AUTO: 0.37 K/UL (ref 0–0.04)
IMM GRANULOCYTES NFR BLD AUTO: 2.4 % (ref 0–0.5)
KETONES UR QL STRIP: NEGATIVE
LEUKOCYTE ESTERASE UR QL STRIP: ABNORMAL
LYMPHOCYTES # BLD AUTO: 1.1 K/UL (ref 1–4.8)
LYMPHOCYTES NFR BLD: 6.9 % (ref 18–48)
MAGNESIUM SERPL-MCNC: 2 MG/DL (ref 1.6–2.6)
MCH RBC QN AUTO: 24.6 PG (ref 27–31)
MCHC RBC AUTO-ENTMCNC: 31.2 G/DL (ref 32–36)
MCV RBC AUTO: 79 FL (ref 82–98)
MICROSCOPIC COMMENT: ABNORMAL
MONOCYTES # BLD AUTO: 0.1 K/UL (ref 0.3–1)
MONOCYTES NFR BLD: 0.9 % (ref 4–15)
NEUTROPHILS # BLD AUTO: 14.1 K/UL (ref 1.8–7.7)
NEUTROPHILS NFR BLD: 89.3 % (ref 38–73)
NITRITE UR QL STRIP: POSITIVE
NRBC BLD-RTO: 0 /100 WBC
OHS QRS DURATION: 84 MS
OHS QRS DURATION: 90 MS
OHS QTC CALCULATION: 480 MS
OHS QTC CALCULATION: 481 MS
PH UR STRIP: 6 [PH] (ref 5–8)
PHOSPHATE SERPL-MCNC: 3 MG/DL (ref 2.7–4.5)
PLATELET # BLD AUTO: 382 K/UL (ref 150–450)
PMV BLD AUTO: 9.6 FL (ref 9.2–12.9)
POTASSIUM SERPL-SCNC: 4.3 MMOL/L (ref 3.5–5.1)
PROT SERPL-MCNC: 6.3 G/DL (ref 6–8.4)
PROT UR QL STRIP: ABNORMAL
RBC # BLD AUTO: 3.5 M/UL (ref 4–5.4)
RBC #/AREA URNS AUTO: 1 /HPF (ref 0–4)
SODIUM SERPL-SCNC: 132 MMOL/L (ref 136–145)
SP GR UR STRIP: 1.01 (ref 1–1.03)
SQUAMOUS #/AREA URNS AUTO: 0 /HPF
TROPONIN I SERPL DL<=0.01 NG/ML-MCNC: 0.07 NG/ML (ref 0–0.03)
TROPONIN I SERPL DL<=0.01 NG/ML-MCNC: 0.08 NG/ML (ref 0–0.03)
URN SPEC COLLECT METH UR: ABNORMAL
WBC # BLD AUTO: 15.74 K/UL (ref 3.9–12.7)
WBC #/AREA URNS AUTO: >100 /HPF (ref 0–5)
WBC CLUMPS UR QL AUTO: ABNORMAL

## 2024-08-20 PROCEDURE — 96372 THER/PROPH/DIAG INJ SC/IM: CPT

## 2024-08-20 PROCEDURE — 87088 URINE BACTERIA CULTURE: CPT | Performed by: EMERGENCY MEDICINE

## 2024-08-20 PROCEDURE — 84484 ASSAY OF TROPONIN QUANT: CPT | Performed by: HOSPITALIST

## 2024-08-20 PROCEDURE — 87086 URINE CULTURE/COLONY COUNT: CPT | Performed by: EMERGENCY MEDICINE

## 2024-08-20 PROCEDURE — 87186 SC STD MICRODIL/AGAR DIL: CPT | Performed by: EMERGENCY MEDICINE

## 2024-08-20 PROCEDURE — 84100 ASSAY OF PHOSPHORUS: CPT | Performed by: HOSPITALIST

## 2024-08-20 PROCEDURE — 63600175 PHARM REV CODE 636 W HCPCS: Performed by: HOSPITALIST

## 2024-08-20 PROCEDURE — 25000003 PHARM REV CODE 250

## 2024-08-20 PROCEDURE — 63600175 PHARM REV CODE 636 W HCPCS

## 2024-08-20 PROCEDURE — 83735 ASSAY OF MAGNESIUM: CPT | Performed by: HOSPITALIST

## 2024-08-20 PROCEDURE — 81001 URINALYSIS AUTO W/SCOPE: CPT | Performed by: EMERGENCY MEDICINE

## 2024-08-20 PROCEDURE — 96361 HYDRATE IV INFUSION ADD-ON: CPT

## 2024-08-20 PROCEDURE — S4991 NICOTINE PATCH NONLEGEND: HCPCS | Performed by: HOSPITALIST

## 2024-08-20 PROCEDURE — 85025 COMPLETE CBC W/AUTO DIFF WBC: CPT | Performed by: HOSPITALIST

## 2024-08-20 PROCEDURE — 51798 US URINE CAPACITY MEASURE: CPT

## 2024-08-20 PROCEDURE — G0378 HOSPITAL OBSERVATION PER HR: HCPCS

## 2024-08-20 PROCEDURE — 25000003 PHARM REV CODE 250: Performed by: HOSPITALIST

## 2024-08-20 PROCEDURE — 80053 COMPREHEN METABOLIC PANEL: CPT | Performed by: HOSPITALIST

## 2024-08-20 PROCEDURE — 51701 INSERT BLADDER CATHETER: CPT

## 2024-08-20 RX ORDER — AMOXICILLIN 250 MG
2 CAPSULE ORAL 2 TIMES DAILY PRN
Status: DISCONTINUED | OUTPATIENT
Start: 2024-08-20 | End: 2024-08-24 | Stop reason: HOSPADM

## 2024-08-20 RX ORDER — ENOXAPARIN SODIUM 100 MG/ML
40 INJECTION SUBCUTANEOUS EVERY 24 HOURS
Status: DISCONTINUED | OUTPATIENT
Start: 2024-08-20 | End: 2024-08-24 | Stop reason: HOSPADM

## 2024-08-20 RX ORDER — ATORVASTATIN CALCIUM 40 MG/1
40 TABLET, FILM COATED ORAL DAILY
Status: DISCONTINUED | OUTPATIENT
Start: 2024-08-20 | End: 2024-08-24 | Stop reason: HOSPADM

## 2024-08-20 RX ORDER — ALUMINUM HYDROXIDE, MAGNESIUM HYDROXIDE, AND SIMETHICONE 1200; 120; 1200 MG/30ML; MG/30ML; MG/30ML
30 SUSPENSION ORAL
Status: DISCONTINUED | OUTPATIENT
Start: 2024-08-20 | End: 2024-08-20

## 2024-08-20 RX ORDER — LANOLIN ALCOHOL/MO/W.PET/CERES
1 CREAM (GRAM) TOPICAL DAILY
Status: DISCONTINUED | OUTPATIENT
Start: 2024-08-20 | End: 2024-08-24 | Stop reason: HOSPADM

## 2024-08-20 RX ORDER — TALC
6 POWDER (GRAM) TOPICAL NIGHTLY PRN
Status: DISCONTINUED | OUTPATIENT
Start: 2024-08-20 | End: 2024-08-24 | Stop reason: HOSPADM

## 2024-08-20 RX ORDER — ONDANSETRON HYDROCHLORIDE 2 MG/ML
4 INJECTION, SOLUTION INTRAVENOUS EVERY 8 HOURS PRN
Status: DISCONTINUED | OUTPATIENT
Start: 2024-08-20 | End: 2024-08-20 | Stop reason: SDUPTHER

## 2024-08-20 RX ORDER — ALUMINUM HYDROXIDE, MAGNESIUM HYDROXIDE, AND SIMETHICONE 1200; 120; 1200 MG/30ML; MG/30ML; MG/30ML
30 SUSPENSION ORAL 4 TIMES DAILY PRN
Status: DISCONTINUED | OUTPATIENT
Start: 2024-08-20 | End: 2024-08-24 | Stop reason: HOSPADM

## 2024-08-20 RX ORDER — ENOXAPARIN SODIUM 100 MG/ML
30 INJECTION SUBCUTANEOUS EVERY 24 HOURS
Status: DISCONTINUED | OUTPATIENT
Start: 2024-08-20 | End: 2024-08-20 | Stop reason: SDUPTHER

## 2024-08-20 RX ORDER — ACETAMINOPHEN 500 MG
1000 TABLET ORAL ONCE
Status: COMPLETED | OUTPATIENT
Start: 2024-08-20 | End: 2024-08-20

## 2024-08-20 RX ORDER — TRAMADOL HYDROCHLORIDE 50 MG/1
50 TABLET ORAL EVERY 8 HOURS PRN
Status: DISCONTINUED | OUTPATIENT
Start: 2024-08-20 | End: 2024-08-24 | Stop reason: HOSPADM

## 2024-08-20 RX ORDER — SUCRALFATE 1 G/10ML
1 SUSPENSION ORAL EVERY 6 HOURS
Status: DISCONTINUED | OUTPATIENT
Start: 2024-08-20 | End: 2024-08-20

## 2024-08-20 RX ORDER — IBUPROFEN 200 MG
24 TABLET ORAL
Status: DISCONTINUED | OUTPATIENT
Start: 2024-08-20 | End: 2024-08-24 | Stop reason: HOSPADM

## 2024-08-20 RX ORDER — ACETAMINOPHEN 325 MG/1
650 TABLET ORAL EVERY 4 HOURS PRN
Status: DISCONTINUED | OUTPATIENT
Start: 2024-08-20 | End: 2024-08-22

## 2024-08-20 RX ORDER — NALOXONE HCL 0.4 MG/ML
0.02 VIAL (ML) INJECTION
Status: DISCONTINUED | OUTPATIENT
Start: 2024-08-20 | End: 2024-08-24 | Stop reason: HOSPADM

## 2024-08-20 RX ORDER — METOPROLOL SUCCINATE 25 MG/1
25 TABLET, EXTENDED RELEASE ORAL DAILY
Status: DISCONTINUED | OUTPATIENT
Start: 2024-08-20 | End: 2024-08-24 | Stop reason: HOSPADM

## 2024-08-20 RX ORDER — IBUPROFEN 200 MG
1 TABLET ORAL DAILY
Status: DISCONTINUED | OUTPATIENT
Start: 2024-08-20 | End: 2024-08-24 | Stop reason: HOSPADM

## 2024-08-20 RX ORDER — IBUPROFEN 200 MG
16 TABLET ORAL
Status: DISCONTINUED | OUTPATIENT
Start: 2024-08-20 | End: 2024-08-24 | Stop reason: HOSPADM

## 2024-08-20 RX ORDER — SODIUM CHLORIDE 0.9 % (FLUSH) 0.9 %
10 SYRINGE (ML) INJECTION EVERY 12 HOURS PRN
Status: DISCONTINUED | OUTPATIENT
Start: 2024-08-20 | End: 2024-08-24 | Stop reason: HOSPADM

## 2024-08-20 RX ORDER — GLUCAGON 1 MG
1 KIT INJECTION
Status: DISCONTINUED | OUTPATIENT
Start: 2024-08-20 | End: 2024-08-24 | Stop reason: HOSPADM

## 2024-08-20 RX ORDER — PANTOPRAZOLE SODIUM 40 MG/1
40 TABLET, DELAYED RELEASE ORAL DAILY
Status: DISCONTINUED | OUTPATIENT
Start: 2024-08-20 | End: 2024-08-24 | Stop reason: HOSPADM

## 2024-08-20 RX ORDER — OXYBUTYNIN CHLORIDE 5 MG/1
5 TABLET ORAL 3 TIMES DAILY
Status: DISCONTINUED | OUTPATIENT
Start: 2024-08-20 | End: 2024-08-24 | Stop reason: HOSPADM

## 2024-08-20 RX ORDER — NYSTATIN 100000 [USP'U]/ML
100000 SUSPENSION ORAL 4 TIMES DAILY
Status: DISCONTINUED | OUTPATIENT
Start: 2024-08-20 | End: 2024-08-24 | Stop reason: HOSPADM

## 2024-08-20 RX ADMIN — OXYBUTYNIN CHLORIDE 5 MG: 5 TABLET ORAL at 09:08

## 2024-08-20 RX ADMIN — ACETAMINOPHEN 1000 MG: 500 TABLET ORAL at 07:08

## 2024-08-20 RX ADMIN — OXYBUTYNIN CHLORIDE 5 MG: 5 TABLET ORAL at 02:08

## 2024-08-20 RX ADMIN — PANTOPRAZOLE SODIUM 40 MG: 40 TABLET, DELAYED RELEASE ORAL at 09:08

## 2024-08-20 RX ADMIN — NYSTATIN 100000 UNITS: 100000 SUSPENSION ORAL at 09:08

## 2024-08-20 RX ADMIN — ENOXAPARIN SODIUM 40 MG: 40 INJECTION SUBCUTANEOUS at 05:08

## 2024-08-20 RX ADMIN — SODIUM CHLORIDE 500 ML: 9 INJECTION, SOLUTION INTRAVENOUS at 01:08

## 2024-08-20 RX ADMIN — NYSTATIN 100000 UNITS: 100000 SUSPENSION ORAL at 01:08

## 2024-08-20 RX ADMIN — CEFTRIAXONE 1 G: 1 INJECTION, POWDER, FOR SOLUTION INTRAMUSCULAR; INTRAVENOUS at 05:08

## 2024-08-20 RX ADMIN — FERROUS SULFATE TAB 325 MG (65 MG ELEMENTAL FE) 1 EACH: 325 (65 FE) TAB at 09:08

## 2024-08-20 RX ADMIN — TRAMADOL HYDROCHLORIDE 50 MG: 50 TABLET, COATED ORAL at 03:08

## 2024-08-20 RX ADMIN — METOPROLOL SUCCINATE 25 MG: 25 TABLET, EXTENDED RELEASE ORAL at 09:08

## 2024-08-20 RX ADMIN — OXYBUTYNIN CHLORIDE 5 MG: 5 TABLET ORAL at 10:08

## 2024-08-20 RX ADMIN — Medication 1 PATCH: at 09:08

## 2024-08-20 RX ADMIN — NYSTATIN 100000 UNITS: 100000 SUSPENSION ORAL at 10:08

## 2024-08-20 RX ADMIN — NYSTATIN 100000 UNITS: 100000 SUSPENSION ORAL at 05:08

## 2024-08-20 RX ADMIN — ATORVASTATIN CALCIUM 40 MG: 40 TABLET, FILM COATED ORAL at 09:08

## 2024-08-20 RX ADMIN — TRAMADOL HYDROCHLORIDE 50 MG: 50 TABLET, COATED ORAL at 11:08

## 2024-08-20 NOTE — ASSESSMENT & PLAN NOTE
This patient does have evidence of infective focus  My overall impression is sepsis.  Source: Urinary Tract  Antibiotics given-   Antibiotics (72h ago, onward)      Start     Stop Route Frequency Ordered    08/20/24 0545  cefTRIAXone (Rocephin) 1 g in D5W 100 mL IVPB (MB+)         -- IV Every 24 hours (non-standard times) 08/20/24 0433          Latest lactate reviewed-  Recent Labs   Lab 08/19/24  2042   POCLAC 1.20     Organ dysfunction indicated by Acute kidney injury    Fluid challenge Ideal Body Weight- The patient's ideal body weight is Ideal body weight: 54.7 kg (120 lb 9.5 oz) which will be used to calculate fluid bolus of 30 ml/kg for treatment of septic shock.      Post- resuscitation assessment Yes Perfusion exam was performed within 6 hours of septic shock presentation after bolus shows Adequate tissue perfusion assessed by non-invasive monitoring       Will Not start Pressors- Levophed for MAP of 65  Source control achieved by:   -received empiric dose of vancomycin and zosyn in ED   -will start on ceftriaxone pending cultures and clinical improvement

## 2024-08-20 NOTE — ASSESSMENT & PLAN NOTE
-recently moved from [place] to [facility]   -she is currently staying at [facility] and she has a

## 2024-08-20 NOTE — ASSESSMENT & PLAN NOTE
Dangers of cigarette smoking were reviewed with patient in detail. Patient was Counseled for 3-10 minutes. Nicotine replacement options were discussed. Nicotine replacement was discussed- prescribed  -patient reports she is down to 2-4 cigarettes/day

## 2024-08-20 NOTE — ASSESSMENT & PLAN NOTE
Patient's most recent potassium results are listed below.   Recent Labs     08/19/24 2034   K 3.0*     Plan  - Replete potassium per protocol  - Monitor potassium Daily  - Patient's hypokalemia is improving  - follow up with repeat labs and replace as needed

## 2024-08-20 NOTE — H&P
[facility] - Emergency Dept  [facility] Medicine  History & Physical    Patient Name: Mary Ellen Saini  MRN: [id number]  Patient Class: OP- Observation  Admission Date: 8/19/2024  Attending Physician: Chaitanya Wray MD   Primary Care Provider: Marlen Primary Doctor         Patient information was obtained from patient and ER records.     Subjective:     Principal Problem:Sepsis    Chief Complaint:   Chief Complaint   Patient presents with    Fatigue     C/o weakness and SOB. Hx of chf. Initial BP 86/57 with EMS. Now 94/44 with 500mL NS        HPI:   Mary Ellen Saini is a 58 y/o F with hx HFrEF (last EF 30-35%) on life vest 2/2 ischemic cardiomyopathy (past MI), COPD, colon cancer, former substance abuse (cocaine + meth), current smoker who presents to the ED via EMS from the [facility] for evaluation generalized weakness and dizziness. Patient was recently admitted to [facility] 8/12-8/14 for acute CHF exacerbation and treated with aggressive diuresis (total 7 liter) with clinical improvement. Patient reports feeling fatigue since discharged from hospital and she has been taking diuretics lasix 40 mg bid and aldactone. Patient went to hospital discharge follow up clinic with cardiology and GI at [facility] earlier today. She felt worse after returning to [facility] from clinic appointments and she felt really weak with difficulty walking which prompted to call EMS.  EMS administered 500mL of NS due to her systolic BP of ~74. Afterwards, her systolic BP went up to ~84. Her O2 sat was around 99 and she was having 16RR per EMS. Pt also c/o occipital headache, neck pain, dysuria, urinary frequency, suprapubic pain. Pt denies fever, chills, cough, sob, leg swelling, orthopnea, chest pain, palpitation, firing of life vest, vomiting, abdominal pain, focal weakness/numbness, dark stool or bleeding from other sites.          ED course: afebrile, borderline low BP 87/55, oxygenating well on  room air. WBC 15, K 3.3, Mg 1.5, Cr 2.3 up from 1.8 on 8/14, , Troponin 0.088, D-dimer 0.072, procalcitonin negative. EKG NSR @ 88 bpm with nonspecific ST/T changes. CXR w/o acute process.  Patient received  cc bolus, tylenol, aspirin 325 mg, K-bicarb and Mg sulfate replacement, duonebs, prednisone 60 mg, empiric dose of vancomycin and zosyn in ED. During my interview patient is awake, conversant and not in distress. She reports overall feeling better after IVF and her baseline dry weight around 170 lbs. Patient states she recently moved to [facility] from [place] and about a month ago she was admitted to hospital in [place] for heart failure exacerbation when cardiology placed her on life vest as a bridge to AICD placement in near future.      Echo (8/12/24):      Left Ventricle: The left ventricle is moderately dilated. There is eccentric hypertrophy. Moderate global hypokinesis present. There is moderately reduced systolic function with a visually estimated ejection fraction of 30 - 35%. Biplane (2D) method of discs ejection fraction is 34%. There is diastolic dysfunction but grade cannot be determined.    Right Ventricle: Normal right ventricular cavity size. Wall thickness is normal. Systolic function is normal.    Left Atrium: Left atrium is severely dilated.    Right Atrium: Right atrium is moderately dilated.    Aortic Valve: There is mild stenosis. Aortic valve area by VTI is 1.95 cm². Aortic valve peak velocity is 1.40 m/s. Mean gradient is 4 mmHg. The dimensionless index is 0.62.    Mitral Valve: There is mild regurgitation.    Tricuspid Valve: There is mild regurgitation.    Pulmonic Valve: There is mild regurgitation.    Pulmonary Artery: The estimated pulmonary artery systolic pressure is 36 mmHg.    IVC/SVC: Normal venous pressure at 3 mmHg.       Past Medical History:   Diagnosis Date    Asthma     Bipolar disorder     Hypertension        Past Surgical History:   Procedure  Laterality Date    CHOLECYSTECTOMY      SHOULDER SURGERY      TUBAL LIGATION         Review of patient's allergies indicates:  No Known Allergies    No current facility-administered medications on file prior to encounter.     Current Outpatient Medications on File Prior to Encounter   Medication Sig    albuterol (PROVENTIL/VENTOLIN HFA) 90 mcg/actuation inhaler Inhale 1-2 puffs into the lungs every 6 (six) hours as needed for Wheezing. Rescue    FEROSUL 325 mg (65 mg iron) Tab tablet Take 1 tablet (325 mg total) by mouth once daily.    furosemide (LASIX) 20 MG tablet Take 2 tablets (40 mg total) by mouth 2 (two) times daily.    losartan (COZAAR) 25 MG tablet Take 1 tablet (25 mg total) by mouth once daily.    metoprolol succinate (TOPROL-XL) 25 MG 24 hr tablet Take 1 tablet (25 mg total) by mouth once daily.    naloxone (NARCAN) 4 mg/actuation Spry 4mg by nasal route as needed for opioid overdose; may repeat every 2-3 minutes in alternating nostrils until medical help arrives. Call 911    nicotine (NICODERM CQ) 21 mg/24 hr Place 1 patch onto the skin once daily.    nystatin (MYCOSTATIN) 100,000 unit/mL suspension Take 1 mL (100,000 Units total) by mouth 4 (four) times daily. for 10 days    oxybutynin (DITROPAN) 5 MG Tab Take 1 tablet (5 mg total) by mouth 3 (three) times daily.    pantoprazole (PROTONIX) 40 MG tablet Take 1 tablet (40 mg total) by mouth once daily.    polyethylene glycol (GLYCOLAX) 17 gram/dose powder Take 17 g by mouth daily as needed for Constipation.    rosuvastatin (CRESTOR) 20 MG tablet Take 1 tablet (20 mg total) by mouth every evening.    spironolactone (ALDACTONE) 25 MG tablet Take 25 mg by mouth once daily.    tramadol-acetaminophen 37.5-325 mg (ULTRACET) 37.5-325 mg Tab Take 1 tablet by mouth every 8 (eight) hours as needed for Pain.     Family History    None       Tobacco Use    Smoking status: Every Day     Current packs/day: 0.50     Average packs/day: 2.0 packs/day for 57.6 years  (114.4 ttl pk-yrs)     Types: Cigarettes     Start date: 1967    Smokeless tobacco: Never    Tobacco comments:     Pt is a 2 pk/day cigarette smoker x 57 yrs. She states that she cut down to 0.5 pk/day or less approximately 6 months ago, and is trying to quit. Ambulatory referral to Smoking Cessation clinic following hospital discharge.    Substance and Sexual Activity    Alcohol use: Yes     Comment: socailly    Drug use: Yes     Types: Cocaine, Methamphetamines     Comment: denies cocaine or meth. Reports maijurana use    Sexual activity: Yes     Review of Systems   Constitutional:  Positive for fatigue. Negative for activity change, appetite change, chills, diaphoresis and fever.   HENT:  Negative for congestion, dental problem, drooling, ear discharge, ear pain, facial swelling, hearing loss, mouth sores, nosebleeds, postnasal drip, rhinorrhea, sinus pressure, sneezing, sore throat, tinnitus, trouble swallowing and voice change.    Eyes:  Negative for photophobia, pain, discharge, redness, itching and visual disturbance.   Respiratory:  Negative for apnea, cough, choking, chest tightness, shortness of breath, wheezing and stridor.    Cardiovascular:  Negative for chest pain, palpitations and leg swelling.   Gastrointestinal:  Positive for nausea. Negative for abdominal distention, abdominal pain, anal bleeding, blood in stool, constipation, diarrhea, rectal pain and vomiting.        Suprapubic pain    Endocrine: Negative for cold intolerance, heat intolerance, polydipsia, polyphagia and polyuria.   Genitourinary:  Positive for dysuria, frequency and urgency. Negative for decreased urine volume, difficulty urinating, dyspareunia, enuresis, flank pain, genital sores, hematuria, menstrual problem, pelvic pain, vaginal bleeding, vaginal discharge and vaginal pain.   Musculoskeletal:  Negative for arthralgias, back pain, gait problem, joint swelling, myalgias, neck pain and neck stiffness.   Skin:  Negative for color  change, pallor, rash and wound.   Allergic/Immunologic: Negative for environmental allergies, food allergies and immunocompromised state.   Neurological:  Positive for dizziness, weakness and headaches. Negative for tremors, seizures, syncope, facial asymmetry, speech difficulty, light-headedness and numbness.   Hematological:  Negative for adenopathy. Does not bruise/bleed easily.   Psychiatric/Behavioral:  Negative for agitation, behavioral problems, confusion, decreased concentration, dysphoric mood, hallucinations, self-injury, sleep disturbance and suicidal ideas. The patient is not nervous/anxious and is not hyperactive.      Objective:     Vital Signs (Most Recent):  Temp: 98 °F (36.7 °C) (08/20/24 0227)  Pulse: 77 (08/20/24 0402)  Resp: 17 (08/20/24 0402)  BP: 111/64 (08/20/24 0402)  SpO2: 97 % (08/20/24 0402) Vital Signs (24h Range):  Temp:  [97.7 °F (36.5 °C)-98.2 °F (36.8 °C)] 98 °F (36.7 °C)  Pulse:  [74-92] 77  Resp:  [15-23] 17  SpO2:  [95 %-100 %] 97 %  BP: ()/(44-67) 111/64     Weight: 77.1 kg (169 lb 15.6 oz)  Body mass index is 29.18 kg/m².     Physical Exam  Constitutional:       General: She is not in acute distress.     Appearance: She is well-developed. She is ill-appearing. She is not diaphoretic.   HENT:      Head: Normocephalic and atraumatic.      Right Ear: External ear normal.      Left Ear: External ear normal.      Nose: Nose normal.      Mouth/Throat:      Mouth: Mucous membranes are dry.      Pharynx: No oropharyngeal exudate.   Eyes:      General: No scleral icterus.     Conjunctiva/sclera: Conjunctivae normal.      Pupils: Pupils are equal, round, and reactive to light.   Neck:      Thyroid: No thyromegaly.      Vascular: No JVD.      Trachea: No tracheal deviation.   Cardiovascular:      Rate and Rhythm: Normal rate and regular rhythm.      Heart sounds: Normal heart sounds. No murmur heard.     No gallop.      Comments: Life vest in place.   Pulmonary:      Effort: Pulmonary  effort is normal. No respiratory distress.      Breath sounds: Normal breath sounds. No wheezing or rales.   Chest:      Chest wall: No tenderness.   Abdominal:      General: Bowel sounds are normal. There is no distension.      Palpations: Abdomen is soft. There is no mass.      Tenderness: There is no abdominal tenderness. There is no guarding or rebound.   Genitourinary:     Vagina: No vaginal discharge.   Musculoskeletal:         General: No tenderness.      Cervical back: Neck supple.   Lymphadenopathy:      Cervical: No cervical adenopathy.   Skin:     General: Skin is warm and dry.      Coloration: Skin is not pale.      Findings: No erythema or rash.   Neurological:      Mental Status: She is alert and oriented to person, place, and time.      Cranial Nerves: No cranial nerve deficit.      Motor: No abnormal muscle tone.      Coordination: Coordination normal.      Deep Tendon Reflexes: Reflexes are normal and symmetric. Reflexes normal.      Comments: Mild tremors of upper extremities    Psychiatric:         Behavior: Behavior normal.         Thought Content: Thought content normal.         Judgment: Judgment normal.      Comments: Flat affect             CRANIAL NERVES     CN III, IV, VI   Pupils are equal, round, and reactive to light.       Significant Labs: All pertinent labs within the past 24 hours have been reviewed.  Recent Lab Results  (Last 5 results in the past 24 hours)        08/20/24  0227   08/19/24  2333   08/19/24  2238   08/19/24  2233   08/19/24  2042        Allens Test         N/A       Appearance, UA Hazy               Bacteria, UA Moderate               Bilirubin (UA) Negative               Site         Other       Color, UA Yellow               D-Dimer       0.72  Comment: The quantitative D-dimer assay should be used as an aid in   the diagnosis of deep vein thrombosis and pulmonary embolism  in patients with the appropriate presentation and clinical  history. The upper limit of the  reference interval and the clinical   cut off   point are identical. Causes of a positive (>0.50 mg/L FEU) D-Dimer   test  include, but are not limited to: DVT, PE, DIC, thrombolytic   therapy, anticoagulant therapy, recent surgery, trauma, or   pregnancy, disseminated malignancy, aortic aneurysm, cirrhosis,  and severe infection. False negative results may occur in   patients with distal DVT.           Glucose, UA Negative               Hyaline Casts, UA 2               Ketones, UA Negative               Leukocyte Esterase, UA 3+               Microscopic Comment SEE COMMENT  Comment: Other formed elements not mentioned in the report are not   present in the microscopic examination.                  NITRITE UA Positive               Blood, UA Negative               pH, UA 6.0               POC Lactate         1.20       Protein, UA Trace  Comment: Recommend a 24 hour urine protein or a urine   protein/creatinine ratio if globulin induced proteinuria is  clinically suspected.                 RBC, UA 1               Sample         VENOUS       SARS-CoV-2 RNA, Amplification, Qual     Negative  Comment: This test utilizes isothermal nucleic acid amplification technology   to   detect the SARS-CoV-2 RdRp nucleic acid segment. The analytical   sensitivity   (limit of detection) is 500 copies/swab.    A POSITIVE result is indicative of the presence of SARS-CoV-2 RNA;   clinical   correlation with patient history and other diagnostic information is   necessary to determine patient infection status.    A NEGATIVE result means that SARS-CoV-2 nucleic acids are not present   above   the limit of detection. A NEGATIVE result should be treated as   presumptive.   It does not rule out the possibility of COVID-19 and should not be   the sole   basis for treatment decisions.    If COVID-19 is strongly suspected based on clinical and exposure   history,   re-testing using an alternate molecular assay should be considered.    This  test is Food and Drug Administration (FDA) approved. Performance   characteristics of this has been independently verified by [facility] Department of Pathology and Laboratory Medicine.             Spec Grav UA 1.015               Specimen UA Urine, Clean Catch               Squam Epithel, UA 0               Troponin I   0.075  Comment: The reference interval for Troponin I represents the 99th percentile   cutoff   for our facility and is consistent with 3rd generation assay   performance.               WBC Clumps, UA Many               WBC, UA >100                                      Significant Imaging: I have reviewed all pertinent imaging results/findings within the past 24 hours.   Assessment/Plan:     * Sepsis  This patient does have evidence of infective focus  My overall impression is sepsis.  Source: Urinary Tract  Antibiotics given-   Antibiotics (72h ago, onward)      Start     Stop Route Frequency Ordered    08/20/24 0545  cefTRIAXone (Rocephin) 1 g in D5W 100 mL IVPB (MB+)         -- IV Every 24 hours (non-standard times) 08/20/24 0433          Latest lactate reviewed-  Recent Labs   Lab 08/19/24  2042   POCLAC 1.20     Organ dysfunction indicated by Acute kidney injury    Fluid challenge Ideal Body Weight- The patient's ideal body weight is Ideal body weight: 54.7 kg (120 lb 9.5 oz) which will be used to calculate fluid bolus of 30 ml/kg for treatment of septic shock.      Post- resuscitation assessment Yes Perfusion exam was performed within 6 hours of septic shock presentation after bolus shows Adequate tissue perfusion assessed by non-invasive monitoring       Will Not start Pressors- Levophed for MAP of 65  Source control achieved by:   -received empiric dose of vancomycin and zosyn in ED   -will start on ceftriaxone pending cultures and clinical improvement     Acute cystitis without hematuria  -symptoms of suprapubic pain, urinary F/U/D  -UA showed nitrite positive UTI    -received empiric dose of vancomycin and zosyn in ED for sepsis   -continue ceftriaxone and follow up with cultures       Symptomatic hypotension  -SBP in 70-80 upon EMS arrival with dizziness, fatigue and generalized weakness   -likely from over diureses during recent hospital admission for heart failure, taking diuretics at home and UTI   -received total 1500 cc IVF with clinical improvement   -remained normotensive after IVF   -will hold diuretics and blood pressure medicine for now with plan to resume after continued clinical improvement       KENY (acute kidney injury)  KENY is likely due to pre-renal azotemia due to intravascular volume depletion. Baseline creatinine is  1.8 . Most recent creatinine and eGFR are listed below.  Recent Labs     08/19/24 2034   CREATININE 2.3*   EGFRNORACEVR 24.0*      Plan  - KENY is stable  - Avoid nephrotoxins and renally dose meds for GFR listed above  - Monitor urine output, serial BMP, and adjust therapy as needed  - follow up with repeat Cr after IVF  -avoid hypotensive episodes     Hypokalemia  Patient's most recent potassium results are listed below.   Recent Labs     08/19/24 2034   K 3.0*     Plan  - Replete potassium per protocol  - Monitor potassium Daily  - Patient's hypokalemia is improving  - follow up with repeat labs and replace as needed     Hypomagnesemia  Patient has Abnormal Magnesium: hypomagnesemia. Will continue to monitor electrolytes closely. Will replace the affected electrolytes and repeat labs to be done after interventions completed. The patient's magnesium results have been reviewed and are listed below.  Recent Labs   Lab 08/19/24 2034   MG 1.5*        Chronic HFrEF (heart failure with reduced ejection fraction)  -recurrent recent hospital admission for Acute CHF exacerbation   -recent echo on 8/12 showed ED 30-35%  -appears hypovolemic on exam in setting of recent aggressive diuresis   -will hold home lasix and aldactone, losartan  for now with  plan to restart GDMT later   -patient was placed on life vest by cariology at [place] as a bridge to AICD in near future per patient   -she had cardiology clinic follow up earlier today at [facility] with follow up next month per patient         Coronary artery disease involving native coronary artery of native heart without angina pectoris  Patient with known CAD, which is controlled Will continue Beta blocker, Statin and monitor for S/Sx of angina/ACS. Continue to monitor on telemetry.   -mild elevated troponin 0.088 >> 0.075 from demand ischemia in setting of hypotension and KENY   -do not suspect ACS     COPD (chronic obstructive pulmonary disease)  Patient's COPD is controlled currently.  Patient is currently off COPD Pathway. Continue scheduled and prn inhalers. Oxygenating well on room air. Monitor respiratory status closely.     Colorectal carcinoma  -patient had GI clinic follow up at [facility] earlier today and have follow up visit next month per patient       Hepatitis C antibody positive in blood  -positive HCV antibody today, HIV negative  -prior history of substance abuse though denies IVDU  -check HCV PCR and consider outpatient treatment   -      Tobacco dependency  Dangers of cigarette smoking were reviewed with patient in detail. Patient was Counseled for 3-10 minutes. Nicotine replacement options were discussed. Nicotine replacement was discussed- prescribed  -patient reports she is down to 2-4 cigarettes/day     Homelessness  -recently moved from [place] to [facility]   -she is currently staying at [facility] and she has a        ACP (advance care planning)  Advance Care Planning    Date: 08/20/2024    [facility]  I engaged the patient in a voluntary conversation about advance care planning and we specifically addressed what the goals of care would be moving forward, in light of the patient's change in clinical status, specifically if her heart or  breathing stops.  We did specifically address the patient's likely prognosis, which is fair .  We explored the patient's values and preferences for future care.  The patient endorses that what is most important right now is to focus on remaining as independent as possible, symptom/pain control, and extending life as long as possible, even it it means sacrificing quality    Accordingly, we have decided that the best plan to meet the patient's goals includes continuing with treatment and Full code.    A total of 10 min was spent on advance care planning, goals of care discussion, emotional support, formulating and communicating prognosis and exploring burden/benefit of various approaches of treatment. This discussion occurred on a fully voluntary basis with the verbal consent of the patient and/or family.              VTE Risk Mitigation (From admission, onward)           Ordered     enoxaparin injection 30 mg  Daily         08/20/24 0045     IP VTE HIGH RISK PATIENT  Once         08/20/24 0045     Place sequential compression device  Until discontinued         08/20/24 0045                       On 08/20/2024, patient should be placed in hospital observation services under my care.        Pharmacist Renal Dose Adjustment Note    Mary Ellen Saini is a 59 y.o. female being treated with enoxaparin 40 mg every 24 hour for VTE ppt    Patient Data:    Vital Signs (Most Recent):  Temp: 98.2 °F (36.8 °C) (08/19/24 2302)  Pulse: 81 (08/20/24 0001)  Resp: 20 (08/20/24 0001)  BP: (!) 91/54 (08/20/24 0001)  SpO2: 97 % (08/20/24 0001) Vital Signs (72h Range):  Temp:  [97.7 °F (36.5 °C)-98.2 °F (36.8 °C)]   Pulse:  [81-92]   Resp:  [15-23]   BP: ()/(44-63)   SpO2:  [95 %-99 %]      Recent Labs   Lab 08/13/24  0315 08/14/24  0350 08/19/24 2034   CREATININE 1.3 1.8* 2.3*     Serum creatinine: 2.3 mg/dL (H) 08/19/24 2034  Estimated creatinine clearance: 26.8 mL/min (A)    Adjusted   Enoxaparin 30 mg every 24 hours per pharmacy  protocol     Pharmacist's Name: Sandie Smith  Pharmacist's Extension: [phone]      Ishmael Rabago DO  Department of Hospital Medicine  [facility] - Emergency Dept

## 2024-08-20 NOTE — HPI
Mary Ellen Saini is a 60 y/o F with hx HFrEF (last EF 30-35%) on life vest 2/2 ischemic cardiomyopathy (past MI), COPD, colon cancer, former substance abuse (cocaine + meth), current smoker who presents to the ED via EMS from the [facility] for evaluation generalized weakness and dizziness. Patient was recently admitted to [facility] 8/12-8/14 for acute CHF exacerbation and treated with aggressive diuresis (total 7 liter) with clinical improvement. Patient reports feeling fatigue since discharged from hospital and she has been taking diuretics lasix 40 mg bid and aldactone. Patient went to hospital discharge follow up clinic with cardiology and GI at [facility] earlier today. She felt worse after returning to [facility] from clinic appointments and she felt really weak with difficulty walking which prompted to call EMS.  EMS administered 500mL of NS due to her systolic BP of ~74. Afterwards, her systolic BP went up to ~84. Her O2 sat was around 99 and she was having 16RR per EMS. Pt also c/o occipital headache, neck pain, dysuria, urinary frequency, suprapubic pain. Pt denies fever, chills, cough, sob, leg swelling, orthopnea, chest pain, palpitation, firing of life vest, vomiting, abdominal pain, focal weakness/numbness, dark stool or bleeding from other sites.          ED course: afebrile, borderline low BP 87/55, oxygenating well on room air. WBC 15, K 3.3, Mg 1.5, Cr 2.3 up from 1.8 on 8/14, , Troponin 0.088, D-dimer 0.072, procalcitonin negative. EKG NSR @ 88 bpm with nonspecific ST/T changes. CXR w/o acute process.  Patient received  cc bolus, tylenol, aspirin 325 mg, K-bicarb and Mg sulfate replacement, duonebs, prednisone 60 mg, empiric dose of vancomycin and zosyn in ED. During my interview patient is awake, conversant and not in distress. She reports overall feeling better after IVF and her baseline dry weight around 170 lbs. Patient states she  recently moved to [facility] from [place] and about a month ago she was admitted to hospital in [place] for heart failure exacerbation when cardiology placed her on life vest as a bridge to AICD placement in near future.      Echo (8/12/24):      Left Ventricle: The left ventricle is moderately dilated. There is eccentric hypertrophy. Moderate global hypokinesis present. There is moderately reduced systolic function with a visually estimated ejection fraction of 30 - 35%. Biplane (2D) method of discs ejection fraction is 34%. There is diastolic dysfunction but grade cannot be determined.    Right Ventricle: Normal right ventricular cavity size. Wall thickness is normal. Systolic function is normal.    Left Atrium: Left atrium is severely dilated.    Right Atrium: Right atrium is moderately dilated.    Aortic Valve: There is mild stenosis. Aortic valve area by VTI is 1.95 cm². Aortic valve peak velocity is 1.40 m/s. Mean gradient is 4 mmHg. The dimensionless index is 0.62.    Mitral Valve: There is mild regurgitation.    Tricuspid Valve: There is mild regurgitation.    Pulmonic Valve: There is mild regurgitation.    Pulmonary Artery: The estimated pulmonary artery systolic pressure is 36 mmHg.    IVC/SVC: Normal venous pressure at 3 mmHg.

## 2024-08-20 NOTE — ED TRIAGE NOTES
Mary Ellen Saini, a 59 y.o. female presents to the ED w/ complaint of SOB.     Pt was picked up from [place]. Stated that someone else from previous location removed Life vest, advised pt to replace Life vest at this time.     Pt speaking very rapidly while c/o SOB with 1 episode of emesis earlier today.     Triage note:  Chief Complaint   Patient presents with    Fatigue     C/o weakness and SOB. Hx of chf. Initial BP 86/57 with EMS. Now 94/44 with 500mL NS     Review of patient's allergies indicates:  No Known Allergies  Past Medical History:   Diagnosis Date    Asthma     Bipolar disorder     Hypertension

## 2024-08-20 NOTE — PROVIDER PROGRESS NOTES - EMERGENCY DEPT.
Patient was signed out to me by Dr. Goyal pending labs and admission. Briefly, this is a 60yo F with hx CHF, recent exacerbation, now with hypotension, suprapubic abdominal pain, generalized weakness, headache, congestion, cough, and SOB. She endorses chills 2d ago.     PE:   Const: Awake and alert, NAD  Resp: Even and unlabored  Abd: Soft, ND, NTTP  Neuro: Moves all extremities equally  Psych: Normal mood and affect    Data:  Results for orders placed or performed during the hospital encounter of 08/19/24   HIV 1/2 Ag/Ab (4th Gen)   Result Value Ref Range    HIV 1/2 Ag/Ab Non-reactive Non-reactive   Hepatitis C Antibody   Result Value Ref Range    Hepatitis C Ab Reactive (A) Non-reactive   CBC auto differential   Result Value Ref Range    WBC 14.55 (H) 3.90 - 12.70 K/uL    RBC 3.97 (L) 4.00 - 5.40 M/uL    Hemoglobin 9.7 (L) 12.0 - 16.0 g/dL    Hematocrit 32.7 (L) 37.0 - 48.5 %    MCV 82 82 - 98 fL    MCH 24.4 (L) 27.0 - 31.0 pg    MCHC 29.7 (L) 32.0 - 36.0 g/dL    RDW 20.3 (H) 11.5 - 14.5 %    Platelets 487 (H) 150 - 450 K/uL    MPV 9.3 9.2 - 12.9 fL    Immature Granulocytes 3.8 (H) 0.0 - 0.5 %    Gran # (ANC) 10.1 (H) 1.8 - 7.7 K/uL    Immature Grans (Abs) 0.55 (H) 0.00 - 0.04 K/uL    Lymph # 2.8 1.0 - 4.8 K/uL    Mono # 0.7 0.3 - 1.0 K/uL    Eos # 0.3 0.0 - 0.5 K/uL    Baso # 0.11 0.00 - 0.20 K/uL    nRBC 0 0 /100 WBC    Gran % 69.3 38.0 - 73.0 %    Lymph % 19.5 18.0 - 48.0 %    Mono % 4.9 4.0 - 15.0 %    Eosinophil % 1.7 0.0 - 8.0 %    Basophil % 0.8 0.0 - 1.9 %    Differential Method Automated    Comprehensive metabolic panel   Result Value Ref Range    Sodium 136 136 - 145 mmol/L    Potassium 3.0 (L) 3.5 - 5.1 mmol/L    Chloride 101 95 - 110 mmol/L    CO2 24 23 - 29 mmol/L    Glucose 76 70 - 110 mg/dL    BUN 28 (H) 6 - 20 mg/dL    Creatinine 2.3 (H) 0.5 - 1.4 mg/dL    Calcium 8.0 (L) 8.7 - 10.5 mg/dL    Total Protein 7.0 6.0 - 8.4 g/dL    Albumin 2.5 (L) 3.5 - 5.2 g/dL    Total Bilirubin 0.2 0.1 - 1.0 mg/dL  "   Alkaline Phosphatase 93 55 - 135 U/L    AST 25 10 - 40 U/L    ALT 20 10 - 44 U/L    eGFR 24.0 (A) >60 mL/min/1.73 m^2    Anion Gap 11 8 - 16 mmol/L   Magnesium   Result Value Ref Range    Magnesium 1.5 (L) 1.6 - 2.6 mg/dL   Phosphorus   Result Value Ref Range    Phosphorus 4.2 2.7 - 4.5 mg/dL   Troponin I   Result Value Ref Range    Troponin I 0.088 (H) 0.000 - 0.026 ng/mL   Procalcitonin   Result Value Ref Range    Procalcitonin 0.10 <0.25 ng/mL   Brain natriuretic peptide   Result Value Ref Range     (H) 0 - 99 pg/mL   ISTAT Lactate   Result Value Ref Range    POC Lactate 1.20 0.5 - 2.2 mmol/L    Sample VENOUS     Site Other     Allens Test N/A       Imaging Results              X-Ray Chest AP Portable (Final result)  Result time 08/19/24 20:52:41      Final result by Cory Matta MD (08/19/24 20:52:41)                   Impression:      Overall limited quality study.  No obvious acute abnormality identified on this limited single view.      Electronically signed by: Cory Matta MD  Date:    08/19/2024  Time:    20:52               Narrative:    EXAMINATION:  XR CHEST AP PORTABLE    CLINICAL HISTORY:  Provided history is "Sepsis;  ".    TECHNIQUE:  One view of the chest.    COMPARISON:  08/12/2024.    FINDINGS:  Multiple cardiac wires and other support devices overlie the chest, limiting evaluation of the underlying pulmonary parenchyma.  Cardiomediastinal silhouette is not enlarged.  No large focal consolidation or detrimental change in lung aeration allowing for multiple overlapping support devices.  PA and lateral views may provide improved sensitivity if there is persistent clinical concern given the presence of multiple overlapping dense metallic structures.                                       MDM:   Labs show low K and Mg, replaced. She has a worsening KENY, likely due to overdiuresis. She was given 500cc IVF bolus by EMS, had a 2nd 500cc bolus ordered, BP improved 110s systolic, " symptoms improved, bolus stopped after 250cc. Her troponin is slightly elevated, mildly worse than recent baseline, EKG shows mildly worsened ST depressions in her inferior leads, no dynamic changes on repeat, plan to trend troponin, given aspirin.  She has a new leukocytosis with a history of chills and congestion and cough, COVID test ordered and pending at time of admission.  Procalcitonin is negative, though given her hypotension and new leukocytosis, given broad-spectrum antibiotics.  Patient admitted to Hospital Medicine pending UA.  D-dimer pending, suspicion for PE is quite low, patient now asymptomatic following IV fluid boluses, SOB, no tachycardia, no further hypotension, no hypoxemia.

## 2024-08-20 NOTE — ASSESSMENT & PLAN NOTE
Patient has Abnormal Magnesium: hypomagnesemia. Will continue to monitor electrolytes closely. Will replace the affected electrolytes and repeat labs to be done after interventions completed. The patient's magnesium results have been reviewed and are listed below.  Recent Labs   Lab 08/19/24  2034   MG 1.5*

## 2024-08-20 NOTE — ED NOTES
Bladder scan revealed about 200 cc of urine. Patient denies feeling urge to urinate at this time. Will reeval after IVF

## 2024-08-20 NOTE — ASSESSMENT & PLAN NOTE
Patient's COPD is controlled currently.  Patient is currently off COPD Pathway. Continue scheduled and prn inhalers. Oxygenating well on room air. Monitor respiratory status closely.

## 2024-08-20 NOTE — ASSESSMENT & PLAN NOTE
-recurrent recent hospital admission for Acute CHF exacerbation   -recent echo on 8/12 showed ED 30-35%  -appears hypovolemic on exam in setting of recent aggressive diuresis   -will hold home lasix and aldactone, losartan  for now with plan to restart GDMT later   -patient was placed on life vest by cariology at [place] as a bridge to AICD in near future per patient   -she had cardiology clinic follow up earlier today at [facility] with follow up next month per patient

## 2024-08-20 NOTE — ED NOTES
Pt identifiers Mary Ellen Pauliggs were checked and are correct  LOC: The patient is awake, alert, aware of environment with an appropriate affect. Oriented x4, speaking appropriately  APPEARANCE: Pt rates abd pain a 6/10 , in no acute distress, pt is clean and well groomed, clothing properly fastened  SKIN: Skin warm, dry and intact, normal skin turgor, moist mucus membranes  RESPIRATORY: Airway is open and patent, respirations are spontaneous, even and unlabored, normal effort and rate  CARDIAC: Normal rate and rhythm, no peripheral edema noted, capillary refill < 3 seconds, bilateral radial pulses 2+ Pt is on a  cardiac monitor and pulse oximetry  Pt has a life vest on at present   ABDOMEN: Soft, nontender, nondistended. Bowel sounds present to all four quad of abd on auscultation  NEUROLOGIC: PERRL, facial expression is symmetrical, patient moving all extremities spontaneously, normal sensation in all extremities when touched with a finger.  Follows all commands appropriately  MUSCULOSKELETAL: No obvious deformities.

## 2024-08-20 NOTE — ASSESSMENT & PLAN NOTE
-positive HCV antibody today, HIV negative  -prior history of substance abuse though denies IVDU  -check HCV PCR and consider outpatient treatment   -

## 2024-08-20 NOTE — ASSESSMENT & PLAN NOTE
Patient with known CAD, which is controlled Will continue Beta blocker, Statin and monitor for S/Sx of angina/ACS. Continue to monitor on telemetry.   -mild elevated troponin 0.088 >> 0.075 from demand ischemia in setting of hypotension and KENY   -do not suspect ACS

## 2024-08-20 NOTE — ED NOTES
Assumed care of patient. Patient is alert and resting comfortably in bed in NAD. BP, cardiac, and O2 monitoring continued. Patient states that she is more comfortable in her personal clothing at this time. Patient updated on plan of care, pt denies any needs or complaints at this time. Bed locked in lowest position, side rails up x2, call light within reach. VSS. Will continue to monitor.

## 2024-08-20 NOTE — ED NOTES
Nurses Note -- 4 Eyes      8/19/2024   11:44 PM      Skin assessed during: Daily Assessment      [x] No Altered Skin Integrity Present    [x]Prevention Measures Documented      [] Yes- Altered Skin Integrity Present or Discovered   [] LDA Added if Not in Epic (Describe Wound)   [] New Altered Skin Integrity was Present on Admit and Documented in LDA   [] Wound Image Taken    Wound Care Consulted? No    Attending Nurse:  Andree Mederos RN/Staff Member:   Viry

## 2024-08-20 NOTE — ASSESSMENT & PLAN NOTE
-symptoms of suprapubic pain, urinary F/U/D  -UA showed nitrite positive UTI   -received empiric dose of vancomycin and zosyn in ED for sepsis   -continue ceftriaxone and follow up with cultures

## 2024-08-20 NOTE — ASSESSMENT & PLAN NOTE
-SBP in 70-80 upon EMS arrival with dizziness, fatigue and generalized weakness   -likely from over diureses during recent hospital admission for heart failure, taking diuretics at home and UTI   -received total 1500 cc IVF with clinical improvement   -remained normotensive after IVF   -will hold diuretics and blood pressure medicine for now with plan to resume after continued clinical improvement

## 2024-08-20 NOTE — ASSESSMENT & PLAN NOTE
-patient had GI clinic follow up at [facility] earlier today and have follow up visit next month per patient

## 2024-08-20 NOTE — ED PROVIDER NOTES
Encounter Date: 8/19/2024       History     Chief Complaint   Patient presents with    Fatigue     C/o weakness and SOB. Hx of chf. Initial BP 86/57 with EMS. Now 94/44 with 500mL NS     60 y/o F with hx HF (last EF 30-35%) 2/2 ischemic cardiomyopathy (past MI), COPD, and colon cancer presents to the ED via EMS from the [facility] for evaluation of SOB that worsened tonight. Pt has been experiencing generalized weakness, light-headedness with exertion, increased coughing, and increased sputum production for the past 2 to 3 days. Tonight she vomited once went upstairs to her room for an hour or so prior to experiencing SOB for which she called EMS. Prior to arrival EMS administered 500mL of NS due to her systolic BP of ~74. Afterwards, her systolic BP went up to ~84. Her O2 sat was around 99 and she was having 16RR per EMS. Pt also c/o occipital headache, neck pain, dysuria, urinary frequency, suprapubic pain. Pt denies any current leg swelling, abdominal distension, fever, chills.         Review of patient's allergies indicates:  No Known Allergies  Past Medical History:   Diagnosis Date    Asthma     Bipolar disorder     Hypertension      Past Surgical History:   Procedure Laterality Date    CHOLECYSTECTOMY      SHOULDER SURGERY      TUBAL LIGATION       No family history on file.  Social History     Tobacco Use    Smoking status: Every Day     Current packs/day: 0.50     Average packs/day: 2.0 packs/day for 57.6 years (114.4 ttl pk-yrs)     Types: Cigarettes     Start date: 1967    Smokeless tobacco: Never    Tobacco comments:     Pt is a 2 pk/day cigarette smoker x 57 yrs. She states that she cut down to 0.5 pk/day or less approximately 6 months ago, and is trying to quit. Ambulatory referral to Smoking Cessation clinic following hospital discharge.    Substance Use Topics    Alcohol use: Yes     Comment: socailly    Drug use: Yes     Types: Cocaine, Methamphetamines     Comment: denies cocaine or meth.  Reports maijurana use     Review of Systems   Constitutional:  Positive for fatigue.   Respiratory:  Positive for shortness of breath and wheezing.    Gastrointestinal:  Positive for abdominal pain. Negative for nausea and vomiting.   Genitourinary:  Positive for dysuria.       Physical Exam     Initial Vitals   BP Pulse Resp Temp SpO2   08/19/24 1921 08/19/24 1921 08/19/24 1921 08/19/24 1922 08/19/24 1921   (!) 94/44 92 16 97.7 °F (36.5 °C) 99 %      MAP       --                Physical Exam    Nursing note and vitals reviewed.  Constitutional: She appears well-developed and well-nourished. She is not diaphoretic. No distress.   HENT:   Head: Normocephalic and atraumatic.   Eyes: EOM are normal. Right eye exhibits no discharge. Left eye exhibits no discharge.   Neck:   Normal range of motion.  Cardiovascular:  Normal rate and regular rhythm.     Exam reveals no gallop and no friction rub.       No murmur heard.  Wearing life vest    Pulmonary/Chest: No respiratory distress. She has wheezes (diffuse). She has no rales.   Abdominal: Abdomen is soft. Bowel sounds are normal. She exhibits no distension. There is no abdominal tenderness. There is no rebound.   Musculoskeletal:         General: No edema.      Cervical back: Normal range of motion.     Neurological: She is alert and oriented to person, place, and time. GCS score is 15. GCS eye subscore is 4. GCS verbal subscore is 5. GCS motor subscore is 6.   Skin: Skin is warm.         ED Course   Procedures  Labs Reviewed   CBC W/ AUTO DIFFERENTIAL - Abnormal       Result Value    WBC 14.55 (*)     RBC 3.97 (*)     Hemoglobin 9.7 (*)     Hematocrit 32.7 (*)     MCV 82      MCH 24.4 (*)     MCHC 29.7 (*)     RDW 20.3 (*)     Platelets 487 (*)     MPV 9.3      Immature Granulocytes 3.8 (*)     Gran # (ANC) 10.1 (*)     Immature Grans (Abs) 0.55 (*)     Lymph # 2.8      Mono # 0.7      Eos # 0.3      Baso # 0.11      nRBC 0      Gran % 69.3      Lymph % 19.5      Mono %  "4.9      Eosinophil % 1.7      Basophil % 0.8      Differential Method Automated     COMPREHENSIVE METABOLIC PANEL - Abnormal    Sodium 136      Potassium 3.0 (*)     Chloride 101      CO2 24      Glucose 76      BUN 28 (*)     Creatinine 2.3 (*)     Calcium 8.0 (*)     Total Protein 7.0      Albumin 2.5 (*)     Total Bilirubin 0.2      Alkaline Phosphatase 93      AST 25      ALT 20      eGFR 24.0 (*)     Anion Gap 11     MAGNESIUM - Abnormal    Magnesium 1.5 (*)    TROPONIN I - Abnormal    Troponin I 0.088 (*)    B-TYPE NATRIURETIC PEPTIDE - Abnormal     (*)    CULTURE, BLOOD   CULTURE, BLOOD   PHOSPHORUS    Phosphorus 4.2     HIV 1 / 2 ANTIBODY   HEPATITIS C ANTIBODY   URINALYSIS, REFLEX TO URINE CULTURE   PROCALCITONIN   D DIMER, QUANTITATIVE   ISTAT LACTATE    POC Lactate 1.20      Sample VENOUS      Site Other      Allens Test N/A       EKG Readings: (Independently Interpreted)   Initial Reading: No STEMI. Previous EKG: Compared with most recent EKG Rhythm: Normal Sinus Rhythm. Heart Rate: 95.   No ST segment elevation or depression concerning for acute ischemia.          Imaging Results              X-Ray Chest AP Portable (Final result)  Result time 08/19/24 20:52:41      Final result by Cory Matta MD (08/19/24 20:52:41)                   Impression:      Overall limited quality study.  No obvious acute abnormality identified on this limited single view.      Electronically signed by: Cory Matta MD  Date:    08/19/2024  Time:    20:52               Narrative:    EXAMINATION:  XR CHEST AP PORTABLE    CLINICAL HISTORY:  Provided history is "Sepsis;  ".    TECHNIQUE:  One view of the chest.    COMPARISON:  08/12/2024.    FINDINGS:  Multiple cardiac wires and other support devices overlie the chest, limiting evaluation of the underlying pulmonary parenchyma.  Cardiomediastinal silhouette is not enlarged.  No large focal consolidation or detrimental change in lung aeration allowing for " multiple overlapping support devices.  PA and lateral views may provide improved sensitivity if there is persistent clinical concern given the presence of multiple overlapping dense metallic structures.                                       Medications   predniSONE tablet 60 mg (has no administration in time range)   sodium chloride 0.9% bolus 500 mL 500 mL (500 mLs Intravenous New Bag 8/19/24 2119)   magnesium sulfate 2g in water 50mL IVPB (premix) (has no administration in time range)   potassium bicarbonate disintegrating tablet 25 mEq (has no administration in time range)   albuterol-ipratropium 2.5 mg-0.5 mg/3 mL nebulizer solution 3 mL (3 mLs Nebulization Given 8/19/24 2058)     Medical Decision Making  Differential diagnosis includes COPD exacerbation, dehydration, CHF exacerbation, sepsis 2/2 UTI, PE.     Given 500mL due to hypotension. Prior to handoff/shift change workup significant for leukocytosis.       Amount and/or Complexity of Data Reviewed  Labs: ordered.  Radiology: ordered.    Risk  Prescription drug management.              Attending Attestation:   Physician Attestation Statement for Resident:  As the supervising MD   Physician Attestation Statement: I have personally seen and examined this patient.   I agree with the above history.  -:   As the supervising MD I agree with the above PE.     As the supervising MD I agree with the above treatment, course, plan, and disposition.   I was personally present during the entire procedure.   I have reviewed the following: old records at this facility.                           Medical Decision Making:   Initial Assessment:   59-year-old female presenting with fatigue, hypotension, dyspnea with exertion.  On exam she is well-appearing and in no acute distress.  She has bilateral wheezing though is speaking in full sentences without increased work of breathing.  She has noted hypotension, fluid responsive.  She notes pain above her suprapubic area though  denies any other severe abdominal pain.  Was previously here about a week ago with CHF exacerbation.  She was given significant Lasix at that time.  Reports compliance with medications.  ED Management:  Noted KENY above baseline.  No tachycardia.  She notes mild headache and neck pain though no neck stiffness.  No nausea, vomiting, photophobia.  No altered mental status.  Doubt meningitis or encephalitis.  We will get labs to include urinalysis.  Chest x-ray does not appear wet.  No evidence of volume overload.  Conversely I believe she is likely dry.  Will provide an additional 500 cc bolus, total of 1 L. patient is being signed out to oncoming doctor pending remainder of labs and urinalysis.             Clinical Impression:  Final diagnoses:  [I95.9] Hypotension  [I95.9] Acute hypotension                 Ej Caldwell,   Resident  08/19/24 2122       Fidencio Goyal MD  08/19/24 2143       Fidencio Goyal MD  08/19/24 2143

## 2024-08-20 NOTE — PROGRESS NOTES
Pharmacist Renal Dose Adjustment Note    Mary Ellen Saini is a 59 y.o. female being treated with enoxaparin 40 mg every 24 hour for VTE ppt    Patient Data:    Vital Signs (Most Recent):  Temp: 98.2 °F (36.8 °C) (08/19/24 2302)  Pulse: 81 (08/20/24 0001)  Resp: 20 (08/20/24 0001)  BP: (!) 91/54 (08/20/24 0001)  SpO2: 97 % (08/20/24 0001) Vital Signs (72h Range):  Temp:  [97.7 °F (36.5 °C)-98.2 °F (36.8 °C)]   Pulse:  [81-92]   Resp:  [15-23]   BP: ()/(44-63)   SpO2:  [95 %-99 %]      Recent Labs   Lab 08/13/24  0315 08/14/24  0350 08/19/24 2034   CREATININE 1.3 1.8* 2.3*     Serum creatinine: 2.3 mg/dL (H) 08/19/24 2034  Estimated creatinine clearance: 26.8 mL/min (A)    Adjusted   Enoxaparin 30 mg every 24 hours per pharmacy protocol     Pharmacist's Name: Sandie Smith  Pharmacist's Extension: [phone]

## 2024-08-20 NOTE — ASSESSMENT & PLAN NOTE
Advance Care Planning     Date: 08/20/2024    [facility]  I engaged the patient in a voluntary conversation about advance care planning and we specifically addressed what the goals of care would be moving forward, in light of the patient's change in clinical status, specifically if her heart or breathing stops.  We did specifically address the patient's likely prognosis, which is fair .  We explored the patient's values and preferences for future care.  The patient endorses that what is most important right now is to focus on remaining as independent as possible, symptom/pain control, and extending life as long as possible, even it it means sacrificing quality    Accordingly, we have decided that the best plan to meet the patient's goals includes continuing with treatment and Full code.    A total of 10 min was spent on advance care planning, goals of care discussion, emotional support, formulating and communicating prognosis and exploring burden/benefit of various approaches of treatment. This discussion occurred on a fully voluntary basis with the verbal consent of the patient and/or family.

## 2024-08-20 NOTE — ASSESSMENT & PLAN NOTE
KENY is likely due to pre-renal azotemia due to intravascular volume depletion. Baseline creatinine is  1.8 . Most recent creatinine and eGFR are listed below.  Recent Labs     08/19/24 2034   CREATININE 2.3*   EGFRNORACEVR 24.0*      Plan  - KENY is stable  - Avoid nephrotoxins and renally dose meds for GFR listed above  - Monitor urine output, serial BMP, and adjust therapy as needed  - follow up with repeat Cr after IVF  -avoid hypotensive episodes

## 2024-08-20 NOTE — SUBJECTIVE & OBJECTIVE
Past Medical History:   Diagnosis Date    Asthma     Bipolar disorder     Hypertension        Past Surgical History:   Procedure Laterality Date    CHOLECYSTECTOMY      SHOULDER SURGERY      TUBAL LIGATION         Review of patient's allergies indicates:  No Known Allergies    No current facility-administered medications on file prior to encounter.     Current Outpatient Medications on File Prior to Encounter   Medication Sig    albuterol (PROVENTIL/VENTOLIN HFA) 90 mcg/actuation inhaler Inhale 1-2 puffs into the lungs every 6 (six) hours as needed for Wheezing. Rescue    FEROSUL 325 mg (65 mg iron) Tab tablet Take 1 tablet (325 mg total) by mouth once daily.    furosemide (LASIX) 20 MG tablet Take 2 tablets (40 mg total) by mouth 2 (two) times daily.    losartan (COZAAR) 25 MG tablet Take 1 tablet (25 mg total) by mouth once daily.    metoprolol succinate (TOPROL-XL) 25 MG 24 hr tablet Take 1 tablet (25 mg total) by mouth once daily.    naloxone (NARCAN) 4 mg/actuation Spry 4mg by nasal route as needed for opioid overdose; may repeat every 2-3 minutes in alternating nostrils until medical help arrives. Call 911    nicotine (NICODERM CQ) 21 mg/24 hr Place 1 patch onto the skin once daily.    nystatin (MYCOSTATIN) 100,000 unit/mL suspension Take 1 mL (100,000 Units total) by mouth 4 (four) times daily. for 10 days    oxybutynin (DITROPAN) 5 MG Tab Take 1 tablet (5 mg total) by mouth 3 (three) times daily.    pantoprazole (PROTONIX) 40 MG tablet Take 1 tablet (40 mg total) by mouth once daily.    polyethylene glycol (GLYCOLAX) 17 gram/dose powder Take 17 g by mouth daily as needed for Constipation.    rosuvastatin (CRESTOR) 20 MG tablet Take 1 tablet (20 mg total) by mouth every evening.    spironolactone (ALDACTONE) 25 MG tablet Take 25 mg by mouth once daily.    tramadol-acetaminophen 37.5-325 mg (ULTRACET) 37.5-325 mg Tab Take 1 tablet by mouth every 8 (eight) hours as needed for Pain.      Family History    None       Tobacco Use    Smoking status: Every Day     Current packs/day: 0.50     Average packs/day: 2.0 packs/day for 57.6 years (114.4 ttl pk-yrs)     Types: Cigarettes     Start date: 1967    Smokeless tobacco: Never    Tobacco comments:     Pt is a 2 pk/day cigarette smoker x 57 yrs. She states that she cut down to 0.5 pk/day or less approximately 6 months ago, and is trying to quit. Ambulatory referral to Smoking Cessation clinic following hospital discharge.    Substance and Sexual Activity    Alcohol use: Yes     Comment: socailly    Drug use: Yes     Types: Cocaine, Methamphetamines     Comment: denies cocaine or meth. Reports maijurana use    Sexual activity: Yes     Review of Systems   Constitutional:  Positive for fatigue. Negative for activity change, appetite change, chills, diaphoresis and fever.   HENT:  Negative for congestion, dental problem, drooling, ear discharge, ear pain, facial swelling, hearing loss, mouth sores, nosebleeds, postnasal drip, rhinorrhea, sinus pressure, sneezing, sore throat, tinnitus, trouble swallowing and voice change.    Eyes:  Negative for photophobia, pain, discharge, redness, itching and visual disturbance.   Respiratory:  Negative for apnea, cough, choking, chest tightness, shortness of breath, wheezing and stridor.    Cardiovascular:  Negative for chest pain, palpitations and leg swelling.   Gastrointestinal:  Positive for nausea. Negative for abdominal distention, abdominal pain, anal bleeding, blood in stool, constipation, diarrhea, rectal pain and vomiting.        Suprapubic pain    Endocrine: Negative for cold intolerance, heat intolerance, polydipsia, polyphagia and polyuria.   Genitourinary:  Positive for dysuria, frequency and urgency. Negative for decreased urine volume, difficulty urinating, dyspareunia, enuresis, flank pain, genital sores, hematuria, menstrual problem, pelvic pain, vaginal bleeding, vaginal discharge and vaginal  pain.   Musculoskeletal:  Negative for arthralgias, back pain, gait problem, joint swelling, myalgias, neck pain and neck stiffness.   Skin:  Negative for color change, pallor, rash and wound.   Allergic/Immunologic: Negative for environmental allergies, food allergies and immunocompromised state.   Neurological:  Positive for dizziness, weakness and headaches. Negative for tremors, seizures, syncope, facial asymmetry, speech difficulty, light-headedness and numbness.   Hematological:  Negative for adenopathy. Does not bruise/bleed easily.   Psychiatric/Behavioral:  Negative for agitation, behavioral problems, confusion, decreased concentration, dysphoric mood, hallucinations, self-injury, sleep disturbance and suicidal ideas. The patient is not nervous/anxious and is not hyperactive.      Objective:     Vital Signs (Most Recent):  Temp: 98 °F (36.7 °C) (08/20/24 0227)  Pulse: 77 (08/20/24 0402)  Resp: 17 (08/20/24 0402)  BP: 111/64 (08/20/24 0402)  SpO2: 97 % (08/20/24 0402) Vital Signs (24h Range):  Temp:  [97.7 °F (36.5 °C)-98.2 °F (36.8 °C)] 98 °F (36.7 °C)  Pulse:  [74-92] 77  Resp:  [15-23] 17  SpO2:  [95 %-100 %] 97 %  BP: ()/(44-67) 111/64     Weight: 77.1 kg (169 lb 15.6 oz)  Body mass index is 29.18 kg/m².     Physical Exam  Constitutional:       General: She is not in acute distress.     Appearance: She is well-developed. She is ill-appearing. She is not diaphoretic.   HENT:      Head: Normocephalic and atraumatic.      Right Ear: External ear normal.      Left Ear: External ear normal.      Nose: Nose normal.      Mouth/Throat:      Mouth: Mucous membranes are dry.      Pharynx: No oropharyngeal exudate.   Eyes:      General: No scleral icterus.     Conjunctiva/sclera: Conjunctivae normal.      Pupils: Pupils are equal, round, and reactive to light.   Neck:      Thyroid: No thyromegaly.      Vascular: No JVD.      Trachea: No tracheal deviation.   Cardiovascular:      Rate and Rhythm: Normal rate  and regular rhythm.      Heart sounds: Normal heart sounds. No murmur heard.     No gallop.      Comments: Life vest in place.   Pulmonary:      Effort: Pulmonary effort is normal. No respiratory distress.      Breath sounds: Normal breath sounds. No wheezing or rales.   Chest:      Chest wall: No tenderness.   Abdominal:      General: Bowel sounds are normal. There is no distension.      Palpations: Abdomen is soft. There is no mass.      Tenderness: There is no abdominal tenderness. There is no guarding or rebound.   Genitourinary:     Vagina: No vaginal discharge.   Musculoskeletal:         General: No tenderness.      Cervical back: Neck supple.   Lymphadenopathy:      Cervical: No cervical adenopathy.   Skin:     General: Skin is warm and dry.      Coloration: Skin is not pale.      Findings: No erythema or rash.   Neurological:      Mental Status: She is alert and oriented to person, place, and time.      Cranial Nerves: No cranial nerve deficit.      Motor: No abnormal muscle tone.      Coordination: Coordination normal.      Deep Tendon Reflexes: Reflexes are normal and symmetric. Reflexes normal.      Comments: Mild tremors of upper extremities    Psychiatric:         Behavior: Behavior normal.         Thought Content: Thought content normal.         Judgment: Judgment normal.      Comments: Flat affect             CRANIAL NERVES     CN III, IV, VI   Pupils are equal, round, and reactive to light.       Significant Labs: All pertinent labs within the past 24 hours have been reviewed.  Recent Lab Results  (Last 5 results in the past 24 hours)        08/20/24  0227   08/19/24  2333   08/19/24  2238   08/19/24  2233   08/19/24  2042        Allens Test         N/A       Appearance, UA Hazy               Bacteria, UA Moderate               Bilirubin (UA) Negative               Site         Other       Color, UA Yellow               D-Dimer       0.72  Comment: The quantitative D-dimer assay should be used as an  aid in   the diagnosis of deep vein thrombosis and pulmonary embolism  in patients with the appropriate presentation and clinical  history. The upper limit of the reference interval and the clinical   cut off   point are identical. Causes of a positive (>0.50 mg/L FEU) D-Dimer   test  include, but are not limited to: DVT, PE, DIC, thrombolytic   therapy, anticoagulant therapy, recent surgery, trauma, or   pregnancy, disseminated malignancy, aortic aneurysm, cirrhosis,  and severe infection. False negative results may occur in   patients with distal DVT.           Glucose, UA Negative               Hyaline Casts, UA 2               Ketones, UA Negative               Leukocyte Esterase, UA 3+               Microscopic Comment SEE COMMENT  Comment: Other formed elements not mentioned in the report are not   present in the microscopic examination.                  NITRITE UA Positive               Blood, UA Negative               pH, UA 6.0               POC Lactate         1.20       Protein, UA Trace  Comment: Recommend a 24 hour urine protein or a urine   protein/creatinine ratio if globulin induced proteinuria is  clinically suspected.                 RBC, UA 1               Sample         VENOUS       SARS-CoV-2 RNA, Amplification, Qual     Negative  Comment: This test utilizes isothermal nucleic acid amplification technology   to   detect the SARS-CoV-2 RdRp nucleic acid segment. The analytical   sensitivity   (limit of detection) is 500 copies/swab.    A POSITIVE result is indicative of the presence of SARS-CoV-2 RNA;   clinical   correlation with patient history and other diagnostic information is   necessary to determine patient infection status.    A NEGATIVE result means that SARS-CoV-2 nucleic acids are not present   above   the limit of detection. A NEGATIVE result should be treated as   presumptive.   It does not rule out the possibility of COVID-19 and should not be   the sole   basis for treatment  decisions.    If COVID-19 is strongly suspected based on clinical and exposure   history,   re-testing using an alternate molecular assay should be considered.    This test is Food and Drug Administration (FDA) approved. Performance   characteristics of this has been independently verified by [facility] Department of Pathology and Laboratory Medicine.             Spec Grav UA 1.015               Specimen UA Urine, Clean Catch               Squam Epithel, UA 0               Troponin I   0.075  Comment: The reference interval for Troponin I represents the 99th percentile   cutoff   for our facility and is consistent with 3rd generation assay   performance.               WBC Clumps, UA Many               WBC, UA >100                                      Significant Imaging: I have reviewed all pertinent imaging results/findings within the past 24 hours.

## 2024-08-21 ENCOUNTER — TELEPHONE (OUTPATIENT)
Dept: SMOKING CESSATION | Facility: CLINIC | Age: 59
End: 2024-08-21
Payer: MEDICARE

## 2024-08-21 PROBLEM — B17.10 ACUTE HEPATITIS C VIRUS INFECTION WITHOUT HEPATIC COMA: Status: ACTIVE | Noted: 2024-08-21

## 2024-08-21 PROBLEM — Z74.09 MOBILITY IMPAIRED: Status: ACTIVE | Noted: 2024-08-21

## 2024-08-21 LAB
ALBUMIN SERPL BCP-MCNC: 2.1 G/DL (ref 3.5–5.2)
ALP SERPL-CCNC: 74 U/L (ref 55–135)
ALT SERPL W/O P-5'-P-CCNC: 14 U/L (ref 10–44)
ANION GAP SERPL CALC-SCNC: 8 MMOL/L (ref 8–16)
AST SERPL-CCNC: 18 U/L (ref 10–40)
BASOPHILS # BLD AUTO: 0.08 K/UL (ref 0–0.2)
BASOPHILS NFR BLD: 0.5 % (ref 0–1.9)
BILIRUB SERPL-MCNC: 0.1 MG/DL (ref 0.1–1)
BUN SERPL-MCNC: 35 MG/DL (ref 6–20)
CALCIUM SERPL-MCNC: 8.2 MG/DL (ref 8.7–10.5)
CHLORIDE SERPL-SCNC: 101 MMOL/L (ref 95–110)
CO2 SERPL-SCNC: 24 MMOL/L (ref 23–29)
CREAT SERPL-MCNC: 2 MG/DL (ref 0.5–1.4)
DIFFERENTIAL METHOD BLD: ABNORMAL
EOSINOPHIL # BLD AUTO: 0 K/UL (ref 0–0.5)
EOSINOPHIL NFR BLD: 0.2 % (ref 0–8)
ERYTHROCYTE [DISTWIDTH] IN BLOOD BY AUTOMATED COUNT: 20.3 % (ref 11.5–14.5)
EST. GFR  (NO RACE VARIABLE): 28.4 ML/MIN/1.73 M^2
GLUCOSE SERPL-MCNC: 87 MG/DL (ref 70–110)
HCT VFR BLD AUTO: 28.9 % (ref 37–48.5)
HGB BLD-MCNC: 8.6 G/DL (ref 12–16)
IMM GRANULOCYTES # BLD AUTO: 0.22 K/UL (ref 0–0.04)
IMM GRANULOCYTES NFR BLD AUTO: 1.3 % (ref 0–0.5)
LYMPHOCYTES # BLD AUTO: 2.6 K/UL (ref 1–4.8)
LYMPHOCYTES NFR BLD: 14.8 % (ref 18–48)
MAGNESIUM SERPL-MCNC: 1.9 MG/DL (ref 1.6–2.6)
MCH RBC QN AUTO: 24.2 PG (ref 27–31)
MCHC RBC AUTO-ENTMCNC: 29.8 G/DL (ref 32–36)
MCV RBC AUTO: 81 FL (ref 82–98)
MONOCYTES # BLD AUTO: 0.6 K/UL (ref 0.3–1)
MONOCYTES NFR BLD: 3.6 % (ref 4–15)
NEUTROPHILS # BLD AUTO: 14 K/UL (ref 1.8–7.7)
NEUTROPHILS NFR BLD: 79.6 % (ref 38–73)
NRBC BLD-RTO: 0 /100 WBC
PHOSPHATE SERPL-MCNC: 3.4 MG/DL (ref 2.7–4.5)
PLATELET # BLD AUTO: 387 K/UL (ref 150–450)
PMV BLD AUTO: 9.4 FL (ref 9.2–12.9)
POTASSIUM SERPL-SCNC: 3.8 MMOL/L (ref 3.5–5.1)
PROT SERPL-MCNC: 5.9 G/DL (ref 6–8.4)
RBC # BLD AUTO: 3.56 M/UL (ref 4–5.4)
SODIUM SERPL-SCNC: 133 MMOL/L (ref 136–145)
WBC # BLD AUTO: 17.52 K/UL (ref 3.9–12.7)

## 2024-08-21 PROCEDURE — 25000003 PHARM REV CODE 250: Performed by: PHYSICIAN ASSISTANT

## 2024-08-21 PROCEDURE — 94640 AIRWAY INHALATION TREATMENT: CPT

## 2024-08-21 PROCEDURE — 21400001 HC TELEMETRY ROOM

## 2024-08-21 PROCEDURE — 25000003 PHARM REV CODE 250

## 2024-08-21 PROCEDURE — S4991 NICOTINE PATCH NONLEGEND: HCPCS | Performed by: HOSPITALIST

## 2024-08-21 PROCEDURE — 25000242 PHARM REV CODE 250 ALT 637 W/ HCPCS

## 2024-08-21 PROCEDURE — 80053 COMPREHEN METABOLIC PANEL: CPT | Performed by: HOSPITALIST

## 2024-08-21 PROCEDURE — 83735 ASSAY OF MAGNESIUM: CPT | Performed by: HOSPITALIST

## 2024-08-21 PROCEDURE — 63600175 PHARM REV CODE 636 W HCPCS

## 2024-08-21 PROCEDURE — 96367 TX/PROPH/DG ADDL SEQ IV INF: CPT

## 2024-08-21 PROCEDURE — 11000001 HC ACUTE MED/SURG PRIVATE ROOM

## 2024-08-21 PROCEDURE — 94761 N-INVAS EAR/PLS OXIMETRY MLT: CPT

## 2024-08-21 PROCEDURE — 63600175 PHARM REV CODE 636 W HCPCS: Performed by: HOSPITALIST

## 2024-08-21 PROCEDURE — 84100 ASSAY OF PHOSPHORUS: CPT | Performed by: HOSPITALIST

## 2024-08-21 PROCEDURE — 85025 COMPLETE CBC W/AUTO DIFF WBC: CPT | Performed by: HOSPITALIST

## 2024-08-21 PROCEDURE — 25000003 PHARM REV CODE 250: Performed by: HOSPITALIST

## 2024-08-21 RX ORDER — IPRATROPIUM BROMIDE AND ALBUTEROL SULFATE 2.5; .5 MG/3ML; MG/3ML
3 SOLUTION RESPIRATORY (INHALATION)
Status: DISCONTINUED | OUTPATIENT
Start: 2024-08-21 | End: 2024-08-24 | Stop reason: HOSPADM

## 2024-08-21 RX ORDER — IPRATROPIUM BROMIDE AND ALBUTEROL SULFATE 2.5; .5 MG/3ML; MG/3ML
3 SOLUTION RESPIRATORY (INHALATION)
Status: DISCONTINUED | OUTPATIENT
Start: 2024-08-21 | End: 2024-08-21

## 2024-08-21 RX ORDER — BUTALBITAL, ACETAMINOPHEN AND CAFFEINE 50; 325; 40 MG/1; MG/1; MG/1
1 TABLET ORAL ONCE
Status: COMPLETED | OUTPATIENT
Start: 2024-08-21 | End: 2024-08-21

## 2024-08-21 RX ORDER — SODIUM CHLORIDE, SODIUM LACTATE, POTASSIUM CHLORIDE, CALCIUM CHLORIDE 600; 310; 30; 20 MG/100ML; MG/100ML; MG/100ML; MG/100ML
INJECTION, SOLUTION INTRAVENOUS CONTINUOUS
Status: ACTIVE | OUTPATIENT
Start: 2024-08-21 | End: 2024-08-21

## 2024-08-21 RX ADMIN — CEFEPIME 1 G: 1 INJECTION, POWDER, FOR SOLUTION INTRAMUSCULAR; INTRAVENOUS at 10:08

## 2024-08-21 RX ADMIN — Medication 1 PATCH: at 08:08

## 2024-08-21 RX ADMIN — NYSTATIN 100000 UNITS: 100000 SUSPENSION ORAL at 08:08

## 2024-08-21 RX ADMIN — IPRATROPIUM BROMIDE AND ALBUTEROL SULFATE 3 ML: 2.5; .5 SOLUTION RESPIRATORY (INHALATION) at 01:08

## 2024-08-21 RX ADMIN — OXYBUTYNIN CHLORIDE 5 MG: 5 TABLET ORAL at 09:08

## 2024-08-21 RX ADMIN — ACETAMINOPHEN 650 MG: 325 TABLET ORAL at 08:08

## 2024-08-21 RX ADMIN — ENOXAPARIN SODIUM 40 MG: 40 INJECTION SUBCUTANEOUS at 04:08

## 2024-08-21 RX ADMIN — CEFTRIAXONE 1 G: 1 INJECTION, POWDER, FOR SOLUTION INTRAMUSCULAR; INTRAVENOUS at 04:08

## 2024-08-21 RX ADMIN — CEFEPIME 1 G: 1 INJECTION, POWDER, FOR SOLUTION INTRAMUSCULAR; INTRAVENOUS at 09:08

## 2024-08-21 RX ADMIN — BUTALBITAL, ACETAMINOPHEN, AND CAFFEINE 1 TABLET: 325; 50; 40 TABLET ORAL at 12:08

## 2024-08-21 RX ADMIN — IPRATROPIUM BROMIDE AND ALBUTEROL SULFATE 3 ML: 2.5; .5 SOLUTION RESPIRATORY (INHALATION) at 11:08

## 2024-08-21 RX ADMIN — NYSTATIN 100000 UNITS: 100000 SUSPENSION ORAL at 12:08

## 2024-08-21 RX ADMIN — METOPROLOL SUCCINATE 25 MG: 25 TABLET, EXTENDED RELEASE ORAL at 08:08

## 2024-08-21 RX ADMIN — OXYBUTYNIN CHLORIDE 5 MG: 5 TABLET ORAL at 03:08

## 2024-08-21 RX ADMIN — Medication 6 MG: at 11:08

## 2024-08-21 RX ADMIN — NYSTATIN 100000 UNITS: 100000 SUSPENSION ORAL at 09:08

## 2024-08-21 RX ADMIN — ATORVASTATIN CALCIUM 40 MG: 40 TABLET, FILM COATED ORAL at 08:08

## 2024-08-21 RX ADMIN — OXYBUTYNIN CHLORIDE 5 MG: 5 TABLET ORAL at 08:08

## 2024-08-21 RX ADMIN — PANTOPRAZOLE SODIUM 40 MG: 40 TABLET, DELAYED RELEASE ORAL at 08:08

## 2024-08-21 RX ADMIN — FERROUS SULFATE TAB 325 MG (65 MG ELEMENTAL FE) 1 EACH: 325 (65 FE) TAB at 08:08

## 2024-08-21 RX ADMIN — TRAMADOL HYDROCHLORIDE 50 MG: 50 TABLET, COATED ORAL at 12:08

## 2024-08-21 RX ADMIN — SODIUM CHLORIDE, POTASSIUM CHLORIDE, SODIUM LACTATE AND CALCIUM CHLORIDE: 600; 310; 30; 20 INJECTION, SOLUTION INTRAVENOUS at 05:08

## 2024-08-21 NOTE — PLAN OF CARE
Discharge Planning Assessment:  Patient admitted on: 8-19-24  Chart reviewed today  Care plan discussed with treatment team,   attending Dr Kamala JARVIS at home: zoll life vest  Current Dispo: obs admit  [facility], tomorrow or friday  Case management to follow

## 2024-08-21 NOTE — ASSESSMENT & PLAN NOTE
KENY is likely due to pre-renal azotemia due to intravascular volume depletion. Baseline creatinine is  1.8 . Most recent creatinine and eGFR are listed below.  Recent Labs     08/19/24  2034 08/20/24  0421 08/21/24  0449   CREATININE 2.3* 1.9* 2.0*   EGFRNORACEVR 24.0* 30.2* 28.4*        Plan  - KENY is stable  - Avoid nephrotoxins and renally dose meds for GFR listed above  - Monitor urine output, serial BMP, and adjust therapy as needed  - follow up with repeat Cr after IVF  -avoid hypotensive episodes

## 2024-08-21 NOTE — ASSESSMENT & PLAN NOTE
-recurrent recent hospital admission for Acute CHF exacerbation   -recent echo on 8/12 showed ED 30-35%  -appears hypovolemic on exam in setting of recent aggressive diuresis   -will hold home lasix and aldactone, losartan  for now with plan to restart GDMT later   -patient was placed on life vest by cariology at [place] as a bridge to AICD in near future per patient   -she had cardiology clinic follow up earlier today at [facility] with follow up next month per patient

## 2024-08-21 NOTE — ASSESSMENT & PLAN NOTE
-recently moved from [place] to [facility]   -she is currently staying at [facility] and she has a

## 2024-08-21 NOTE — ASSESSMENT & PLAN NOTE
Pt with positive HCV antibody and increased viral load. History of substance abuse though denies IVDU. Has one tattoo. Few sexual partners in past few decades, but suspects her ex- may have had other partners.    - Pt advised on outpatient Hepatology follow up for evaluation and care plan.

## 2024-08-21 NOTE — HOSPITAL COURSE
Pt with KENY and persistent leukocytosis suspected sepsis source ESBL Klebsiella UTI. Started on Meropenem and ID consulted with recs for transition to Ertapenem for total 7d course with ID follow up. Hep C positive with elevated viral load and patient advised on outpatient Hepatology follow up for evaluation/treatment. Pt discharged with recommended PCP follow up, Cardiology follow up for Lifevest vs. ICD plan discussion (previously scheduled), GI follow up for malignancy follow up, new nebulizer machine, new walker for impaired mobility. ID plans to follow up with labs and visit. HH ordered for meds administration and line care.

## 2024-08-21 NOTE — ASSESSMENT & PLAN NOTE
This patient does have evidence of infective focus  My overall impression is sepsis.  Source: Urinary Tract  Antibiotics given-   Antibiotics (72h ago, onward)    Start     Stop Route Frequency Ordered    08/21/24 1015  ceFEPIme (MAXIPIME) 1 g in D5W 100 mL IVPB (MB+)         -- IV Every 12 hours (non-standard times) 08/21/24 0909        Latest lactate reviewed-  Recent Labs   Lab 08/19/24 2042   POCLAC 1.20       Organ dysfunction indicated by Acute kidney injury    Fluid challenge Ideal Body Weight- The patient's ideal body weight is Ideal body weight: 54.7 kg (120 lb 9.5 oz) which will be used to calculate fluid bolus of 30 ml/kg for treatment of septic shock.      Post- resuscitation assessment Yes Perfusion exam was performed within 6 hours of septic shock presentation after bolus shows Adequate tissue perfusion assessed by non-invasive monitoring       Will Not start Pressors- Levophed for MAP of 65  Source control achieved by:   -received empiric dose of vancomycin and zosyn in ED   -will start on ceftriaxone pending cultures and clinical improvement

## 2024-08-21 NOTE — ASSESSMENT & PLAN NOTE
The mobility limitation cannot be sufficiently resolved by the use of a cane. Patient's functional mobility deficit can be sufficiently resolved with the use of a (Rolling Walker or Walker). Patient's mobility limitation significantly impairs their ability to participate in one of more activities of daily living. The use of a (RW or Walker) will significantly improve the patient's ability to participate in MRADLS and the patient will use it on regular basis in the home.

## 2024-08-21 NOTE — ASSESSMENT & PLAN NOTE
-positive HCV antibody today, HIV negative  -prior history of substance abuse though denies IVDU  -check HCV PCR and consider outpatient treatment

## 2024-08-21 NOTE — ED NOTES
Patient provided with sandwich and cranberry juice. Denies any other needs or complaints at this time.

## 2024-08-21 NOTE — PHARMACY MED REC
"  Admission Medication History     The home medication history was taken by Criss Puckett.    You may go to "Admission" then "Reconcile Home Medications" tabs to review and/or act upon these items.     The home medication list has been updated by the Pharmacy department.   Please read ALL comments highlighted in yellow.   Please address this information as you see fit.    Feel free to contact us if you have any questions or require assistance.        Medications listed below were obtained from: Patient/family and Analytic software- [employer]  Current Outpatient Medications on File Prior to Encounter   Medication Sig    albuterol (PROVENTIL/VENTOLIN HFA) 90 mcg/actuation inhaler Inhale 1 puff into the lungs every 6 (six) hours as needed for wheezing. Rescue)      FEROSUL 325 mg (65 mg iron) Tab tablet Take 1 tablet (325 mg total) by mouth once daily.      furosemide (LASIX) 20 MG tablet Take 2 tablets (40 mg total) by mouth 2 (two) times daily.      losartan (COZAAR) 25 MG tablet Take 1 tablet (25 mg total) by mouth once daily.      metoprolol succinate (TOPROL-XL) 25 MG 24 hr tablet Take 1 tablet (25 mg total) by mouth once daily.        nicotine (NICODERM CQ) 21 mg/24 hr Place 1 patch onto the skin once daily.      nystatin (MYCOSTATIN) 100,000 unit/mL suspension Take 1 mL (100,000 Units total) by mouth 4 (four) times daily for 10 days.      oxybutynin (DITROPAN) 5 MG Tab Take 1 tablet (5 mg total) by mouth 3 (three) times daily.      pantoprazole (PROTONIX) 40 MG tablet Take 1 tablet (40 mg total) by mouth once daily.      polyethylene glycol (GLYCOLAX) 17 gram/dose powder Take 17 g by mouth daily as needed for constipation.        rosuvastatin (CRESTOR) 20 MG tablet Take 1 tablet (20 mg total) by mouth every evening.      spironolactone (ALDACTONE) 25 MG tablet Take 25 mg by mouth once daily.        tramadol-acetaminophen 37.5-325 mg (ULTRACET) 37.5-325 mg Tab Take 1 tablet by mouth every 8 (eight) hours as needed " for pain.        naloxone (NARCAN) 4 mg/actuation Spry 4mg by nasal route as needed for opioid overdose; may repeat every 2-3 minutes in alternating nostrils until medical help arrives. Call 911 (Patient not taking: Reported on 8/21/2024)               Criss Puckett  EXT [id number]                .

## 2024-08-21 NOTE — ED NOTES
Assumed care for pt after recieving report from Rajani VINSON. Pt. resting in bed in NAD, RR e/u. Vital signs stable and within desired limits at this time of assessment. Pt. offered bathroom assistance and denies need at this time. Explanation of care/wait provided. Pt verbalizes no needs at this time. Bed in low, locked position with rails up and call bell in reach. Pt's white board updated with today's care team and plan.

## 2024-08-21 NOTE — ED NOTES
Nurses Note -- 4 Eyes      8/20/2024   8:03 PM      Skin assessed during: Q Shift Change      [x] No Altered Skin Integrity Present    [x]Prevention Measures Documented      [] Yes- Altered Skin Integrity Present or Discovered   [] LDA Added if Not in Epic (Describe Wound)   [] New Altered Skin Integrity was Present on Admit and Documented in LDA   [] Wound Image Taken    Wound Care Consulted? No    Attending Nurse:  Almaz Mederos RN/Staff Member:   Andree

## 2024-08-21 NOTE — ASSESSMENT & PLAN NOTE
-symptoms of suprapubic pain, urinary F/U/D  -UA showed nitrite positive UTI  -received empiric dose of vancomycin and zosyn in ED for sepsis   -cultures with GNR, but rising leukocytosis so CTX broadened to Cefepime

## 2024-08-21 NOTE — ED NOTES
Assumed care of patient. Patient is alert and resting comfortably in bed in NAD. BP, cardiac, and O2 monitoring continued. Patient remains in hospital gown. Patient updated on plan of care, pt denies any needs or complaints at this time. Bed locked in lowest position, side rails up x2, call light within reach. VSS. Will continue to monitor.

## 2024-08-21 NOTE — TELEPHONE ENCOUNTER
Spoke with patient's (Andra), she states patient is currently admitted to the hospital. Will call to reschedule smoking cessation visit at a later date and time.

## 2024-08-21 NOTE — ASSESSMENT & PLAN NOTE
Patient has Abnormal Magnesium: hypomagnesemia. Will continue to monitor electrolytes closely. Will replace the affected electrolytes and repeat labs to be done after interventions completed. The patient's magnesium results have been reviewed and are listed below.  Recent Labs   Lab 08/21/24  0449   MG 1.9

## 2024-08-21 NOTE — SUBJECTIVE & OBJECTIVE
Interval History: Wheezing today. Pt says she uses a nebulizer at home 4x/day.    Review of Systems   Constitutional:  Negative for activity change, appetite change and fever.   Respiratory:  Negative for cough, shortness of breath and wheezing.    Cardiovascular:  Negative for chest pain and leg swelling.   Gastrointestinal:  Negative for abdominal pain, constipation, diarrhea, nausea and vomiting.   Genitourinary:  Positive for dysuria and urgency.   Skin:  Negative for rash.   Neurological:  Negative for headaches.     Objective:     Vital Signs (Most Recent):  Temp: 97.9 °F (36.6 °C) (08/21/24 1030)  Pulse: 75 (08/21/24 1311)  Resp: 18 (08/21/24 1311)  BP: 114/69 (08/21/24 1145)  SpO2: 100 % (08/21/24 1311) Vital Signs (24h Range):  Temp:  [97.4 °F (36.3 °C)-98.9 °F (37.2 °C)] 97.9 °F (36.6 °C)  Pulse:  [73-92] 75  Resp:  [15-20] 18  SpO2:  [95 %-100 %] 100 %  BP: (105-131)/(55-72) 114/69     Weight: 77.1 kg (169 lb 15.6 oz)  Body mass index is 29.18 kg/m².    Intake/Output Summary (Last 24 hours) at 8/21/2024 1620  Last data filed at 8/21/2024 1108  Gross per 24 hour   Intake 200.37 ml   Output --   Net 200.37 ml         Physical Exam  Vitals and nursing note reviewed.   HENT:      Head: Normocephalic and atraumatic.   Cardiovascular:      Rate and Rhythm: Normal rate and regular rhythm.      Pulses: Normal pulses.   Pulmonary:      Effort: Pulmonary effort is normal. No respiratory distress.      Breath sounds: Wheezing present. No rales.   Abdominal:      Palpations: Abdomen is soft.      Tenderness: There is no abdominal tenderness. There is no guarding.   Musculoskeletal:      Right lower leg: No edema.      Left lower leg: No edema.   Skin:     General: Skin is warm and dry.   Neurological:      General: No focal deficit present.      Mental Status: She is alert and oriented to person, place, and time.   Psychiatric:         Mood and Affect: Mood normal.             Significant Labs: All pertinent labs  within the past 24 hours have been reviewed.    Significant Imaging: I have reviewed all pertinent imaging results/findings within the past 24 hours.

## 2024-08-21 NOTE — PLAN OF CARE
[facility] - Emergency Dept  Initial Discharge Assessment       Primary Care Provider: Marlen, Primary Doctor    Admission Diagnosis: Hypotension [I95.9]    Admission Date: 8/19/2024  Expected Discharge Date: 8/22/2024    Transition of Care Barriers: (P) Homeless, Other (see comments) (respite shelter bed currently at )    Payor: [employer] MANAGED MEDICARE / Plan: [employer] MEDICARE PLAN PPO / Product Type: Medicare Advantage /     Extended Emergency Contact Information  Primary Emergency Contact: Sheyla Buckley  Mobile Phone: [phone]  Relation: Sister  Preferred language: English   needed? No  Secondary Emergency Contact: Raymond Shi  Mobile Phone: [phone]  Relation: Son    Discharge Plan A: (P) Shelter  Discharge Plan B: (P) Shelter    No Pharmacies Listed    Initial Assessment (most recent)       Adult Discharge Assessment - 08/21/24 1053          Discharge Assessment    Assessment Type Discharge Planning Assessment (P)      Confirmed/corrected address, phone number and insurance Yes (P)      Confirmed Demographics Correct on Facesheet (P)      Source of Information patient;health record (P)      Does patient/caregiver understand observation status Yes (P)      Communicated JENAE with patient/caregiver Yes (P)      People in Home facility resident (P)      Do you expect to return to your current living situation? Yes (P)      Do you have help at home or someone to help you manage your care at home? No (P)      Prior to hospitilization cognitive status: Alert/Oriented (P)      Current cognitive status: Alert/Oriented (P)      Equipment Currently Used at Home other (see comments) (P)    zoll life vest    Patient currently being followed by outpatient case management? No (P)      Do you currently have service(s) that help you manage your care at home? No (P)      Do you take prescription medications? Yes (P)      Do you have prescription coverage? Yes (P)      Do you have any problems affording any of your  prescribed medications? TBD (P)      Is the patient taking medications as prescribed? yes (P)      How do you get to doctors appointments? health plan transportation;public transportation (P)      Are you on dialysis? No (P)      Discharge Plan A Shelter (P)      Discharge Plan B Shelter (P)      DME Needed Upon Discharge  none (P)      Discharge Plan discussed with: Patient (P)      Transition of Care Barriers Homeless;Other (see comments) (P)    respite shelter bed currently at        Physical Activity    On average, how many days per week do you engage in moderate to strenuous exercise (like a brisk walk)? 0 days (P)         Financial Resource Strain    How hard is it for you to pay for the very basics like food, housing, medical care, and heating? Somewhat hard (P)         Housing Stability    In the last 12 months, was there a time when you were not able to pay the mortgage or rent on time? Yes (P)         Food Insecurity    Within the past 12 months, you worried that your food would run out before you got the money to buy more. Never true (P)         Stress    Do you feel stress - tense, restless, nervous, or anxious, or unable to sleep at night because your mind is troubled all the time - these days? To some extent (P)         Social Isolation    How often do you feel lonely or isolated from those around you?  Sometimes (P)         Alcohol Use    Q1: How often do you have a drink containing alcohol? Patient declined (P)         Health Literacy    How often do you need to have someone help you when you read instructions, pamphlets, or other written material from your doctor or pharmacy? Rarely (P)

## 2024-08-21 NOTE — ASSESSMENT & PLAN NOTE
-patient had GI clinic follow up at [facility] earlier today and have follow up visit next month per patient

## 2024-08-21 NOTE — PROGRESS NOTES
[facility] - Emergency Dept  [facility] Medicine  Progress Note    Patient Name: Mary Ellen Saini  MRN: [id number]  Patient Class: IP- Inpatient   Admission Date: 8/19/2024  Length of Stay: 0 days  Attending Physician: Chaitanya Wray MD  Primary Care Provider: Marlen, Primary Doctor        Subjective:     Principal Problem:Sepsis        HPI:    Mary Ellen Saini is a 60 y/o F with hx HFrEF (last EF 30-35%) on life vest 2/2 ischemic cardiomyopathy (past MI), COPD, colon cancer, former substance abuse (cocaine + meth), current smoker who presents to the ED via EMS from the [facility] for evaluation generalized weakness and dizziness. Patient was recently admitted to [facility] 8/12-8/14 for acute CHF exacerbation and treated with aggressive diuresis (total 7 liter) with clinical improvement. Patient reports feeling fatigue since discharged from hospital and she has been taking diuretics lasix 40 mg bid and aldactone. Patient went to hospital discharge follow up clinic with cardiology and GI at [facility] earlier today. She felt worse after returning to [facility] from clinic appointments and she felt really weak with difficulty walking which prompted to call EMS.  EMS administered 500mL of NS due to her systolic BP of ~74. Afterwards, her systolic BP went up to ~84. Her O2 sat was around 99 and she was having 16RR per EMS. Pt also c/o occipital headache, neck pain, dysuria, urinary frequency, suprapubic pain. Pt denies fever, chills, cough, sob, leg swelling, orthopnea, chest pain, palpitation, firing of life vest, vomiting, abdominal pain, focal weakness/numbness, dark stool or bleeding from other sites.          ED course: afebrile, borderline low BP 87/55, oxygenating well on room air. WBC 15, K 3.3, Mg 1.5, Cr 2.3 up from 1.8 on 8/14, , Troponin 0.088, D-dimer 0.072, procalcitonin negative. EKG NSR @ 88 bpm with nonspecific ST/T changes. CXR w/o acute process.  Patient received   cc bolus, tylenol, aspirin 325 mg, K-bicarb and Mg sulfate replacement, duonebs, prednisone 60 mg, empiric dose of vancomycin and zosyn in ED. During my interview patient is awake, conversant and not in distress. She reports overall feeling better after IVF and her baseline dry weight around 170 lbs. Patient states she recently moved to [facility] from [place] and about a month ago she was admitted to hospital in [place] for heart failure exacerbation when cardiology placed her on life vest as a bridge to AICD placement in near future.      Echo (8/12/24):      Left Ventricle: The left ventricle is moderately dilated. There is eccentric hypertrophy. Moderate global hypokinesis present. There is moderately reduced systolic function with a visually estimated ejection fraction of 30 - 35%. Biplane (2D) method of discs ejection fraction is 34%. There is diastolic dysfunction but grade cannot be determined.    Right Ventricle: Normal right ventricular cavity size. Wall thickness is normal. Systolic function is normal.    Left Atrium: Left atrium is severely dilated.    Right Atrium: Right atrium is moderately dilated.    Aortic Valve: There is mild stenosis. Aortic valve area by VTI is 1.95 cm². Aortic valve peak velocity is 1.40 m/s. Mean gradient is 4 mmHg. The dimensionless index is 0.62.    Mitral Valve: There is mild regurgitation.    Tricuspid Valve: There is mild regurgitation.    Pulmonic Valve: There is mild regurgitation.    Pulmonary Artery: The estimated pulmonary artery systolic pressure is 36 mmHg.    IVC/SVC: Normal venous pressure at 3 mmHg.       Overview/Hospital Course:  Pt with KENY and persistent leukocytosis suspected sepsis source UTI with cultures pending. Hep C positive with elevated viral load and patient advised on outpatient Hepatology follow up for evaluation/treatment.     Interval History: Wheezing today. Pt says she uses a nebulizer at home 4x/day.    Review of Systems    Constitutional:  Negative for activity change, appetite change and fever.   Respiratory:  Negative for cough, shortness of breath and wheezing.    Cardiovascular:  Negative for chest pain and leg swelling.   Gastrointestinal:  Negative for abdominal pain, constipation, diarrhea, nausea and vomiting.   Genitourinary:  Positive for dysuria and urgency.   Skin:  Negative for rash.   Neurological:  Negative for headaches.     Objective:     Vital Signs (Most Recent):  Temp: 97.9 °F (36.6 °C) (08/21/24 1030)  Pulse: 75 (08/21/24 1311)  Resp: 18 (08/21/24 1311)  BP: 114/69 (08/21/24 1145)  SpO2: 100 % (08/21/24 1311) Vital Signs (24h Range):  Temp:  [97.4 °F (36.3 °C)-98.9 °F (37.2 °C)] 97.9 °F (36.6 °C)  Pulse:  [73-92] 75  Resp:  [15-20] 18  SpO2:  [95 %-100 %] 100 %  BP: (105-131)/(55-72) 114/69     Weight: 77.1 kg (169 lb 15.6 oz)  Body mass index is 29.18 kg/m².    Intake/Output Summary (Last 24 hours) at 8/21/2024 1620  Last data filed at 8/21/2024 1108  Gross per 24 hour   Intake 200.37 ml   Output --   Net 200.37 ml         Physical Exam  Vitals and nursing note reviewed.   HENT:      Head: Normocephalic and atraumatic.   Cardiovascular:      Rate and Rhythm: Normal rate and regular rhythm.      Pulses: Normal pulses.   Pulmonary:      Effort: Pulmonary effort is normal. No respiratory distress.      Breath sounds: Wheezing present. No rales.   Abdominal:      Palpations: Abdomen is soft.      Tenderness: There is no abdominal tenderness. There is no guarding.   Musculoskeletal:      Right lower leg: No edema.      Left lower leg: No edema.   Skin:     General: Skin is warm and dry.   Neurological:      General: No focal deficit present.      Mental Status: She is alert and oriented to person, place, and time.   Psychiatric:         Mood and Affect: Mood normal.             Significant Labs: All pertinent labs within the past 24 hours have been reviewed.    Significant Imaging: I have reviewed all pertinent  imaging results/findings within the past 24 hours.    Assessment/Plan:      * Sepsis  This patient does have evidence of infective focus  My overall impression is sepsis.  Source: Urinary Tract  Antibiotics given-   Antibiotics (72h ago, onward)      Start     Stop Route Frequency Ordered    08/21/24 1015  ceFEPIme (MAXIPIME) 1 g in D5W 100 mL IVPB (MB+)         -- IV Every 12 hours (non-standard times) 08/21/24 0909          Latest lactate reviewed-  Recent Labs   Lab 08/19/24 2042   POCLAC 1.20       Organ dysfunction indicated by Acute kidney injury    Fluid challenge Ideal Body Weight- The patient's ideal body weight is Ideal body weight: 54.7 kg (120 lb 9.5 oz) which will be used to calculate fluid bolus of 30 ml/kg for treatment of septic shock.      Post- resuscitation assessment Yes Perfusion exam was performed within 6 hours of septic shock presentation after bolus shows Adequate tissue perfusion assessed by non-invasive monitoring       Will Not start Pressors- Levophed for MAP of 65  Source control achieved by:   -received empiric dose of vancomycin and zosyn in ED   -will start on ceftriaxone pending cultures and clinical improvement     Mobility impaired  The mobility limitation cannot be sufficiently resolved by the use of a cane. Patient's functional mobility deficit can be sufficiently resolved with the use of a (Rolling Walker or Walker). Patient's mobility limitation significantly impairs their ability to participate in one of more activities of daily living. The use of a (RW or Walker) will significantly improve the patient's ability to participate in MRADLS and the patient will use it on regular basis in the home.       Acute hepatitis C virus infection without hepatic coma  Pt with positive HCV antibody and increased viral load. History of substance abuse though denies IVDU. Has one tattoo. Few sexual partners in past few decades, but suspects her ex- may have had other partners.    - Pt  advised on outpatient Hepatology follow up for evaluation and care plan.     ACP (advance care planning)  Advance Care Planning    Date: 08/20/2024    [facility]  I engaged the patient in a voluntary conversation about advance care planning and we specifically addressed what the goals of care would be moving forward, in light of the patient's change in clinical status, specifically if her heart or breathing stops.  We did specifically address the patient's likely prognosis, which is fair .  We explored the patient's values and preferences for future care.  The patient endorses that what is most important right now is to focus on remaining as independent as possible, symptom/pain control, and extending life as long as possible, even it it means sacrificing quality    Accordingly, we have decided that the best plan to meet the patient's goals includes continuing with treatment and Full code.    A total of 10 min was spent on advance care planning, goals of care discussion, emotional support, formulating and communicating prognosis and exploring burden/benefit of various approaches of treatment. This discussion occurred on a fully voluntary basis with the verbal consent of the patient and/or family.            Hepatitis C antibody positive in blood  -positive HCV antibody today, HIV negative  -prior history of substance abuse though denies IVDU  -check HCV PCR and consider outpatient treatment       COPD (chronic obstructive pulmonary disease)  Patient's COPD is controlled currently.  Patient is currently off COPD Pathway. Continue scheduled and prn inhalers. Oxygenating well on room air. Monitor respiratory status closely.     -wheezing on exam today, pt reports using nebulizer 4x/day. Would benefit from OP PCP/Pulm follow up for PFTs for COPD evaluation as none on file. Dusom ordered inpatient.     Coronary artery disease involving native coronary artery of native heart without angina pectoris  Patient with known CAD,  which is controlled Will continue Beta blocker, Statin and monitor for S/Sx of angina/ACS. Continue to monitor on telemetry.   -mild elevated troponin 0.088 >> 0.075 from demand ischemia in setting of hypotension and KENY   -do not suspect ACS     Chronic HFrEF (heart failure with reduced ejection fraction)  -recurrent recent hospital admission for Acute CHF exacerbation   -recent echo on 8/12 showed ED 30-35%  -appears hypovolemic on exam in setting of recent aggressive diuresis   -will hold home lasix and aldactone, losartan  for now with plan to restart GDMT later   -patient was placed on life vest by cariology at [place] as a bridge to AICD in near future per patient   -she had cardiology clinic follow up earlier today at [facility] with follow up next month per patient         Hypomagnesemia  Patient has Abnormal Magnesium: hypomagnesemia. Will continue to monitor electrolytes closely. Will replace the affected electrolytes and repeat labs to be done after interventions completed. The patient's magnesium results have been reviewed and are listed below.  Recent Labs   Lab 08/21/24  0449   MG 1.9          KENY (acute kidney injury)  KENY is likely due to pre-renal azotemia due to intravascular volume depletion. Baseline creatinine is  1.8 . Most recent creatinine and eGFR are listed below.  Recent Labs     08/19/24  2034 08/20/24  0421 08/21/24  0449   CREATININE 2.3* 1.9* 2.0*   EGFRNORACEVR 24.0* 30.2* 28.4*        Plan  - KENY is stable  - Avoid nephrotoxins and renally dose meds for GFR listed above  - Monitor urine output, serial BMP, and adjust therapy as needed  - follow up with repeat Cr after IVF  -avoid hypotensive episodes     Symptomatic hypotension  -SBP in 70-80 upon EMS arrival with dizziness, fatigue and generalized weakness   -likely from over diureses during recent hospital admission for heart failure, taking diuretics at home and UTI   -received total 1500 cc IVF with clinical  improvement   -remained normotensive after IVF   -will hold diuretics and blood pressure medicine for now with plan to resume after continued clinical improvement       Acute cystitis without hematuria  -symptoms of suprapubic pain, urinary F/U/D  -UA showed nitrite positive UTI  -received empiric dose of vancomycin and zosyn in ED for sepsis   -cultures with GNR, but rising leukocytosis so CTX broadened to Cefepime      Tobacco dependency  Dangers of cigarette smoking were reviewed with patient in detail. Patient was Counseled for 3-10 minutes. Nicotine replacement options were discussed. Nicotine replacement was discussed- prescribed  -patient reports she is down to 2-4 cigarettes/day       Colorectal carcinoma  -patient had GI clinic follow up at [facility] earlier today and have follow up visit next month per patient       Homelessness  -recently moved from [place] to [facility]   -she is currently staying at [facility] and she has a        Hypokalemia  Patient's most recent potassium results are listed below.   Recent Labs     08/19/24 2034 08/20/24  0421 08/21/24  0449   K 3.0* 4.3 3.8       Plan  - Replete potassium per protocol  - Monitor potassium Daily  - Patient's hypokalemia is improving  - follow up with repeat labs and replace as needed     Elevated troponin  Trop trended to peak          VTE Risk Mitigation (From admission, onward)           Ordered     enoxaparin injection 40 mg  Every 24 hours         08/20/24 1420     IP VTE HIGH RISK PATIENT  Once         08/20/24 0045     Place sequential compression device  Until discontinued         08/20/24 0045                    Discharge Planning   JENAE: 8/22/2024     Code Status: Full Code   Is the patient medically ready for discharge?:     Reason for patient still in hospital (select all that apply): Treatment  Discharge Plan A: Shelter                  Chaitanya Wray MD  Department of Hospital Medicine   [facility] -  Emergency Dept

## 2024-08-21 NOTE — ASSESSMENT & PLAN NOTE
Patient's most recent potassium results are listed below.   Recent Labs     08/19/24 2034 08/20/24  0421 08/21/24  0449   K 3.0* 4.3 3.8       Plan  - Replete potassium per protocol  - Monitor potassium Daily  - Patient's hypokalemia is improving  - follow up with repeat labs and replace as needed

## 2024-08-22 PROBLEM — B96.89 URINARY TRACT INFECTION DUE TO ESBL KLEBSIELLA: Status: ACTIVE | Noted: 2024-08-20

## 2024-08-22 PROBLEM — N39.0 URINARY TRACT INFECTION DUE TO ESBL KLEBSIELLA: Status: ACTIVE | Noted: 2024-08-20

## 2024-08-22 LAB
ALBUMIN SERPL BCP-MCNC: 2.1 G/DL (ref 3.5–5.2)
ALP SERPL-CCNC: 68 U/L (ref 55–135)
ALT SERPL W/O P-5'-P-CCNC: 15 U/L (ref 10–44)
ANION GAP SERPL CALC-SCNC: 6 MMOL/L (ref 8–16)
AST SERPL-CCNC: 18 U/L (ref 10–40)
BACTERIA UR CULT: ABNORMAL
BASOPHILS # BLD AUTO: 0.1 K/UL (ref 0–0.2)
BASOPHILS NFR BLD: 0.8 % (ref 0–1.9)
BILIRUB SERPL-MCNC: 0.1 MG/DL (ref 0.1–1)
BUN SERPL-MCNC: 34 MG/DL (ref 6–20)
CALCIUM SERPL-MCNC: 8 MG/DL (ref 8.7–10.5)
CHLORIDE SERPL-SCNC: 102 MMOL/L (ref 95–110)
CO2 SERPL-SCNC: 26 MMOL/L (ref 23–29)
CREAT SERPL-MCNC: 1.6 MG/DL (ref 0.5–1.4)
DIFFERENTIAL METHOD BLD: ABNORMAL
EOSINOPHIL # BLD AUTO: 0.2 K/UL (ref 0–0.5)
EOSINOPHIL NFR BLD: 1.7 % (ref 0–8)
ERYTHROCYTE [DISTWIDTH] IN BLOOD BY AUTOMATED COUNT: 20.3 % (ref 11.5–14.5)
EST. GFR  (NO RACE VARIABLE): 37.2 ML/MIN/1.73 M^2
GLUCOSE SERPL-MCNC: 82 MG/DL (ref 70–110)
HCT VFR BLD AUTO: 26.8 % (ref 37–48.5)
HGB BLD-MCNC: 8.2 G/DL (ref 12–16)
IMM GRANULOCYTES # BLD AUTO: 0.2 K/UL (ref 0–0.04)
IMM GRANULOCYTES NFR BLD AUTO: 1.6 % (ref 0–0.5)
LYMPHOCYTES # BLD AUTO: 2.5 K/UL (ref 1–4.8)
LYMPHOCYTES NFR BLD: 19.6 % (ref 18–48)
MAGNESIUM SERPL-MCNC: 1.6 MG/DL (ref 1.6–2.6)
MCH RBC QN AUTO: 24.6 PG (ref 27–31)
MCHC RBC AUTO-ENTMCNC: 30.6 G/DL (ref 32–36)
MCV RBC AUTO: 81 FL (ref 82–98)
MONOCYTES # BLD AUTO: 0.7 K/UL (ref 0.3–1)
MONOCYTES NFR BLD: 5.6 % (ref 4–15)
NEUTROPHILS # BLD AUTO: 8.9 K/UL (ref 1.8–7.7)
NEUTROPHILS NFR BLD: 70.7 % (ref 38–73)
NRBC BLD-RTO: 0 /100 WBC
PHOSPHATE SERPL-MCNC: 3.4 MG/DL (ref 2.7–4.5)
PLATELET # BLD AUTO: 348 K/UL (ref 150–450)
PMV BLD AUTO: 9.4 FL (ref 9.2–12.9)
POTASSIUM SERPL-SCNC: 3.8 MMOL/L (ref 3.5–5.1)
PROT SERPL-MCNC: 5.7 G/DL (ref 6–8.4)
RBC # BLD AUTO: 3.33 M/UL (ref 4–5.4)
SODIUM SERPL-SCNC: 134 MMOL/L (ref 136–145)
WBC # BLD AUTO: 12.53 K/UL (ref 3.9–12.7)

## 2024-08-22 PROCEDURE — 27000221 HC OXYGEN, UP TO 24 HOURS

## 2024-08-22 PROCEDURE — 25000003 PHARM REV CODE 250

## 2024-08-22 PROCEDURE — 83735 ASSAY OF MAGNESIUM: CPT | Performed by: HOSPITALIST

## 2024-08-22 PROCEDURE — S4991 NICOTINE PATCH NONLEGEND: HCPCS | Performed by: HOSPITALIST

## 2024-08-22 PROCEDURE — 94640 AIRWAY INHALATION TREATMENT: CPT

## 2024-08-22 PROCEDURE — 25000242 PHARM REV CODE 250 ALT 637 W/ HCPCS

## 2024-08-22 PROCEDURE — 85025 COMPLETE CBC W/AUTO DIFF WBC: CPT

## 2024-08-22 PROCEDURE — 21400001 HC TELEMETRY ROOM

## 2024-08-22 PROCEDURE — 84100 ASSAY OF PHOSPHORUS: CPT | Performed by: HOSPITALIST

## 2024-08-22 PROCEDURE — 63600175 PHARM REV CODE 636 W HCPCS

## 2024-08-22 PROCEDURE — 99900035 HC TECH TIME PER 15 MIN (STAT)

## 2024-08-22 PROCEDURE — 36415 COLL VENOUS BLD VENIPUNCTURE: CPT | Performed by: HOSPITALIST

## 2024-08-22 PROCEDURE — 94761 N-INVAS EAR/PLS OXIMETRY MLT: CPT

## 2024-08-22 PROCEDURE — 25000003 PHARM REV CODE 250: Performed by: HOSPITALIST

## 2024-08-22 PROCEDURE — 80053 COMPREHEN METABOLIC PANEL: CPT

## 2024-08-22 RX ORDER — BUTALBITAL, ACETAMINOPHEN AND CAFFEINE 50; 325; 40 MG/1; MG/1; MG/1
1 TABLET ORAL EVERY 4 HOURS PRN
Status: DISCONTINUED | OUTPATIENT
Start: 2024-08-22 | End: 2024-08-24 | Stop reason: HOSPADM

## 2024-08-22 RX ADMIN — ACETAMINOPHEN 650 MG: 325 TABLET ORAL at 02:08

## 2024-08-22 RX ADMIN — NYSTATIN 100000 UNITS: 100000 SUSPENSION ORAL at 08:08

## 2024-08-22 RX ADMIN — IPRATROPIUM BROMIDE AND ALBUTEROL SULFATE 3 ML: 2.5; .5 SOLUTION RESPIRATORY (INHALATION) at 08:08

## 2024-08-22 RX ADMIN — IPRATROPIUM BROMIDE AND ALBUTEROL SULFATE 3 ML: 2.5; .5 SOLUTION RESPIRATORY (INHALATION) at 07:08

## 2024-08-22 RX ADMIN — NYSTATIN 100000 UNITS: 100000 SUSPENSION ORAL at 12:08

## 2024-08-22 RX ADMIN — NYSTATIN 100000 UNITS: 100000 SUSPENSION ORAL at 04:08

## 2024-08-22 RX ADMIN — OXYBUTYNIN CHLORIDE 5 MG: 5 TABLET ORAL at 08:08

## 2024-08-22 RX ADMIN — IPRATROPIUM BROMIDE AND ALBUTEROL SULFATE 3 ML: 2.5; .5 SOLUTION RESPIRATORY (INHALATION) at 01:08

## 2024-08-22 RX ADMIN — TRAMADOL HYDROCHLORIDE 50 MG: 50 TABLET, COATED ORAL at 02:08

## 2024-08-22 RX ADMIN — ATORVASTATIN CALCIUM 40 MG: 40 TABLET, FILM COATED ORAL at 08:08

## 2024-08-22 RX ADMIN — OXYBUTYNIN CHLORIDE 5 MG: 5 TABLET ORAL at 04:08

## 2024-08-22 RX ADMIN — BUTALBITAL, ACETAMINOPHEN, AND CAFFEINE 1 TABLET: 325; 50; 40 TABLET ORAL at 11:08

## 2024-08-22 RX ADMIN — ENOXAPARIN SODIUM 40 MG: 40 INJECTION SUBCUTANEOUS at 04:08

## 2024-08-22 RX ADMIN — METOPROLOL SUCCINATE 25 MG: 25 TABLET, EXTENDED RELEASE ORAL at 08:08

## 2024-08-22 RX ADMIN — CEFEPIME 1 G: 1 INJECTION, POWDER, FOR SOLUTION INTRAMUSCULAR; INTRAVENOUS at 09:08

## 2024-08-22 RX ADMIN — MEROPENEM 1 G: 1 INJECTION INTRAVENOUS at 04:08

## 2024-08-22 RX ADMIN — ACETAMINOPHEN 650 MG: 325 TABLET ORAL at 09:08

## 2024-08-22 RX ADMIN — BUTALBITAL, ACETAMINOPHEN, AND CAFFEINE 1 TABLET: 325; 50; 40 TABLET ORAL at 12:08

## 2024-08-22 RX ADMIN — FERROUS SULFATE TAB 325 MG (65 MG ELEMENTAL FE) 1 EACH: 325 (65 FE) TAB at 08:08

## 2024-08-22 RX ADMIN — PANTOPRAZOLE SODIUM 40 MG: 40 TABLET, DELAYED RELEASE ORAL at 08:08

## 2024-08-22 RX ADMIN — Medication 1 PATCH: at 08:08

## 2024-08-22 NOTE — PLAN OF CARE
[facility] scheduled pcp f/u   Future Appointments   Date Time Provider Department Center   8/29/2024  1:30 PM [facility]

## 2024-08-22 NOTE — ASSESSMENT & PLAN NOTE
Patient has Abnormal Magnesium: hypomagnesemia. Will continue to monitor electrolytes closely. Will replace the affected electrolytes and repeat labs to be done after interventions completed. The patient's magnesium results have been reviewed and are listed below.  Recent Labs   Lab 08/22/24  0527   MG 1.6

## 2024-08-22 NOTE — PLAN OF CARE
Problem: Sepsis/Septic Shock  Goal: Blood Glucose Level Within Targeted Range  Outcome: Progressing     Problem: Acute Kidney Injury/Impairment  Goal: Effective Renal Function  Outcome: Progressing   Iv abx given. Infrequent coughs noted. Bed locked and in lowest position. Call light in reach.

## 2024-08-22 NOTE — ASSESSMENT & PLAN NOTE
Dangers of cigarette smoking were reviewed with patient in detail. Patient was Counseled for 3-10 minutes. Nicotine replacement options were discussed. Nicotine replacement was discussed- prescribed  -patient reports she is down to 2-4 cigarettes/day   -doing well with nicotine patch

## 2024-08-22 NOTE — ASSESSMENT & PLAN NOTE
Patient's most recent potassium results are listed below.   Recent Labs     08/20/24  0421 08/21/24  0449 08/22/24  0527   K 4.3 3.8 3.8       Plan  - Replete potassium per protocol  - Monitor potassium Daily  - Patient's hypokalemia is improving  - follow up with repeat labs and replace as needed

## 2024-08-22 NOTE — ASSESSMENT & PLAN NOTE
KENY is likely due to pre-renal azotemia due to intravascular volume depletion. Baseline creatinine is  1.8 . Most recent creatinine and eGFR are listed below.  Recent Labs     08/20/24  0421 08/21/24  0449 08/22/24  0527   CREATININE 1.9* 2.0* 1.6*   EGFRNORACEVR 30.2* 28.4* 37.2*        Plan  - KENY is stable  - Avoid nephrotoxins and renally dose meds for GFR listed above  - Monitor urine output, serial BMP, and adjust therapy as needed  - follow up with repeat Cr after IVF  -avoid hypotensive episodes

## 2024-08-22 NOTE — ASSESSMENT & PLAN NOTE
This patient does have evidence of infective focus  My overall impression is sepsis.  Source: Urinary Tract  Antibiotics given-   Antibiotics (72h ago, onward)      Start     Stop Route Frequency Ordered    08/22/24 1645  meropenem (MERREM) 1 g in 0.9% NaCl 100 mL IVPB (MB+)         -- IV Every 8 hours (non-standard times) 08/22/24 1540          Latest lactate reviewed-  Recent Labs   Lab 08/19/24  2042   POCLAC 1.20       Organ dysfunction indicated by Acute kidney injury    Fluid challenge Ideal Body Weight- The patient's ideal body weight is Ideal body weight: 54.7 kg (120 lb 9.5 oz) which will be used to calculate fluid bolus of 30 ml/kg for treatment of septic shock.      Post- resuscitation assessment Yes Perfusion exam was performed within 6 hours of septic shock presentation after bolus shows Adequate tissue perfusion assessed by non-invasive monitoring       Will Not start Pressors- Levophed for MAP of 65  Source control achieved by:   -received empiric dose of vancomycin and zosyn in ED   -see UTI

## 2024-08-22 NOTE — PROGRESS NOTES
Pharmacist Renal Dose Adjustment Note    Mary Ellen Saini is a 59 y.o. female being treated with the medication Meropenem    Patient Data:    Vital Signs (Most Recent):  Temp: 97.6 °F (36.4 °C) (08/22/24 1125)  Pulse: 76 (08/22/24 1300)  Resp: 16 (08/22/24 1300)  BP: 112/73 (08/22/24 1125)  SpO2: 98 % (08/22/24 1502) Vital Signs (72h Range):  Temp:  [97.2 °F (36.2 °C)-98.9 °F (37.2 °C)]   Pulse:  [73-92]   Resp:  [15-23]   BP: ()/(44-77)   SpO2:  [94 %-100 %]      Recent Labs   Lab 08/20/24  0421 08/21/24  0449 08/22/24  0527   CREATININE 1.9* 2.0* 1.6*     Serum creatinine: 1.6 mg/dL (H) 08/22/24 0527  Estimated creatinine clearance: 38.5 mL/min (A)    Medication:Meropenem dose: 1 G frequency Q 8 H will be changed to medication:Meropenem dose:1 G frequency:Q 12 H    Pharmacist's Name: Jya Malhotra  Pharmacist's Extension: [phone]

## 2024-08-22 NOTE — PROGRESS NOTES
[facility] Surg ([address] Medicine  Progress Note    Patient Name: Mary Ellen Saini  MRN: [id number]  Patient Class: IP- Inpatient   Admission Date: 8/19/2024  Length of Stay: 1 days  Attending Physician: Chaitanya Wray MD  Primary Care Provider: Marlen, Primary Doctor        Subjective:     Principal Problem:Urinary tract infection due to ESBL Klebsiella        HPI:    Mary Ellen Saini is a 58 y/o F with hx HFrEF (last EF 30-35%) on life vest 2/2 ischemic cardiomyopathy (past MI), COPD, colon cancer, former substance abuse (cocaine + meth), current smoker who presents to the ED via EMS from the [facility] for evaluation generalized weakness and dizziness. Patient was recently admitted to [facility] 8/12-8/14 for acute CHF exacerbation and treated with aggressive diuresis (total 7 liter) with clinical improvement. Patient reports feeling fatigue since discharged from hospital and she has been taking diuretics lasix 40 mg bid and aldactone. Patient went to hospital discharge follow up clinic with cardiology and GI at [facility] earlier today. She felt worse after returning to [facility] from clinic appointments and she felt really weak with difficulty walking which prompted to call EMS.  EMS administered 500mL of NS due to her systolic BP of ~74. Afterwards, her systolic BP went up to ~84. Her O2 sat was around 99 and she was having 16RR per EMS. Pt also c/o occipital headache, neck pain, dysuria, urinary frequency, suprapubic pain. Pt denies fever, chills, cough, sob, leg swelling, orthopnea, chest pain, palpitation, firing of life vest, vomiting, abdominal pain, focal weakness/numbness, dark stool or bleeding from other sites.          ED course: afebrile, borderline low BP 87/55, oxygenating well on room air. WBC 15, K 3.3, Mg 1.5, Cr 2.3 up from 1.8 on 8/14, , Troponin 0.088, D-dimer 0.072, procalcitonin negative. EKG NSR @ 88 bpm with nonspecific ST/T  changes. CXR w/o acute process.  Patient received  cc bolus, tylenol, aspirin 325 mg, K-bicarb and Mg sulfate replacement, duonebs, prednisone 60 mg, empiric dose of vancomycin and zosyn in ED. During my interview patient is awake, conversant and not in distress. She reports overall feeling better after IVF and her baseline dry weight around 170 lbs. Patient states she recently moved to [facility] from [place] and about a month ago she was admitted to hospital in [place] for heart failure exacerbation when cardiology placed her on life vest as a bridge to AICD placement in near future.      Echo (8/12/24):      Left Ventricle: The left ventricle is moderately dilated. There is eccentric hypertrophy. Moderate global hypokinesis present. There is moderately reduced systolic function with a visually estimated ejection fraction of 30 - 35%. Biplane (2D) method of discs ejection fraction is 34%. There is diastolic dysfunction but grade cannot be determined.    Right Ventricle: Normal right ventricular cavity size. Wall thickness is normal. Systolic function is normal.    Left Atrium: Left atrium is severely dilated.    Right Atrium: Right atrium is moderately dilated.    Aortic Valve: There is mild stenosis. Aortic valve area by VTI is 1.95 cm². Aortic valve peak velocity is 1.40 m/s. Mean gradient is 4 mmHg. The dimensionless index is 0.62.    Mitral Valve: There is mild regurgitation.    Tricuspid Valve: There is mild regurgitation.    Pulmonic Valve: There is mild regurgitation.    Pulmonary Artery: The estimated pulmonary artery systolic pressure is 36 mmHg.    IVC/SVC: Normal venous pressure at 3 mmHg.       Overview/Hospital Course:  Pt with KENY and persistent leukocytosis suspected sepsis source ESBL Klebsiella UTI. Started on Meropenem and ID consulted. Hep C positive with elevated viral load and patient advised on outpatient Hepatology follow up for evaluation/treatment.     Interval History:  Persistent wheezing, improved with nebs.    Review of Systems   Constitutional:  Negative for activity change, appetite change and fever.   Respiratory:  Positive for cough and wheezing. Negative for shortness of breath.    Cardiovascular:  Negative for chest pain and leg swelling.   Gastrointestinal:  Negative for abdominal pain, constipation, diarrhea, nausea and vomiting.   Skin:  Negative for rash.   Neurological:  Negative for headaches.     Objective:     Vital Signs (Most Recent):  Temp: 97.6 °F (36.4 °C) (08/22/24 1125)  Pulse: 76 (08/22/24 1300)  Resp: 16 (08/22/24 1300)  BP: 112/73 (08/22/24 1125)  SpO2: 98 % (08/22/24 1502) Vital Signs (24h Range):  Temp:  [97.2 °F (36.2 °C)-98 °F (36.7 °C)] 97.6 °F (36.4 °C)  Pulse:  [73-88] 76  Resp:  [16-19] 16  SpO2:  [94 %-99 %] 98 %  BP: (111-120)/(61-75) 112/73     Weight: 77.1 kg (169 lb 15.6 oz)  Body mass index is 29.18 kg/m².    Intake/Output Summary (Last 24 hours) at 8/22/2024 1542  Last data filed at 8/22/2024 1245  Gross per 24 hour   Intake 982 ml   Output 1 ml   Net 981 ml         Physical Exam  Vitals and nursing note reviewed.   HENT:      Head: Normocephalic and atraumatic.   Cardiovascular:      Rate and Rhythm: Normal rate and regular rhythm.      Pulses: Normal pulses.   Pulmonary:      Effort: Pulmonary effort is normal. No respiratory distress.      Breath sounds: Wheezing present. No rales.   Abdominal:      Palpations: Abdomen is soft.      Tenderness: There is no abdominal tenderness. There is no guarding.   Musculoskeletal:      Right lower leg: No edema.      Left lower leg: No edema.   Skin:     General: Skin is warm and dry.   Neurological:      General: No focal deficit present.      Mental Status: She is alert and oriented to person, place, and time.   Psychiatric:         Mood and Affect: Mood normal.             Significant Labs: All pertinent labs within the past 24 hours have been reviewed.    Significant Imaging: I have reviewed all  pertinent imaging results/findings within the past 24 hours.    Assessment/Plan:      * Urinary tract infection due to ESBL Klebsiella  -symptoms of suprapubic pain, urinary F/U/D. UA showed nitrite positive UTI. Received empiric dose of vancomycin and zosyn in ED for sepsis, transitioned to CTX, then transitioned to Cefepime after persistent leukocytosis. Urine Cx with ESBL Klebsiella.    - Meropenem  - ID consulted for IV vs po regimen and duration    Mobility impaired  The mobility limitation cannot be sufficiently resolved by the use of a cane. Patient's functional mobility deficit can be sufficiently resolved with the use of a (Rolling Walker or Walker). Patient's mobility limitation significantly impairs their ability to participate in one of more activities of daily living. The use of a (RW or Walker) will significantly improve the patient's ability to participate in MRADLS and the patient will use it on regular basis in the home.       Acute hepatitis C virus infection without hepatic coma  Pt with positive HCV antibody and increased viral load. History of substance abuse though denies IVDU. Has one tattoo. Few sexual partners in past few decades, but suspects her ex- may have had other partners.    - Pt advised on outpatient Hepatology follow up for evaluation and care plan.     ACP (advance care planning)  Advance Care Planning    Date: 08/20/2024    [facility]  I engaged the patient in a voluntary conversation about advance care planning and we specifically addressed what the goals of care would be moving forward, in light of the patient's change in clinical status, specifically if her heart or breathing stops.  We did specifically address the patient's likely prognosis, which is fair .  We explored the patient's values and preferences for future care.  The patient endorses that what is most important right now is to focus on remaining as independent as possible, symptom/pain control, and extending life  as long as possible, even it it means sacrificing quality    Accordingly, we have decided that the best plan to meet the patient's goals includes continuing with treatment and Full code.    A total of 10 min was spent on advance care planning, goals of care discussion, emotional support, formulating and communicating prognosis and exploring burden/benefit of various approaches of treatment. This discussion occurred on a fully voluntary basis with the verbal consent of the patient and/or family.           Hepatitis C antibody positive in blood  -positive HCV antibody today, HIV negative  -prior history of substance abuse though denies IVDU  -check HCV PCR and consider outpatient treatment       COPD (chronic obstructive pulmonary disease)  Patient's COPD is controlled currently.  Patient is currently off COPD Pathway. Continue scheduled and prn inhalers. Oxygenating well on room air. Monitor respiratory status closely.     -wheezing on exam today, pt reports using nebulizer 4x/day. Would benefit from OP PCP/Pulm follow up for PFTs for COPD evaluation as none on file. Duonebs ordered inpatient.     Coronary artery disease involving native coronary artery of native heart without angina pectoris  Patient with known CAD, which is controlled Will continue Beta blocker, Statin and monitor for S/Sx of angina/ACS. Continue to monitor on telemetry.   -mild elevated troponin 0.088 >> 0.075 from demand ischemia in setting of hypotension and KENY   -do not suspect ACS     Chronic HFrEF (heart failure with reduced ejection fraction)  -recurrent recent hospital admission for Acute CHF exacerbation   -recent echo on 8/12 showed ED 30-35%  -appears hypovolemic on exam in setting of recent aggressive diuresis   -will hold home lasix and aldactone, losartan  for now with plan to restart GDMT later   -patient was placed on life vest by cariology at [facility] as a bridge to AICD in near future per patient   -she has cardiology clinic  scheduled for Mon for LiveVest plan        Hypomagnesemia  Patient has Abnormal Magnesium: hypomagnesemia. Will continue to monitor electrolytes closely. Will replace the affected electrolytes and repeat labs to be done after interventions completed. The patient's magnesium results have been reviewed and are listed below.  Recent Labs   Lab 08/22/24  0527   MG 1.6          KENY (acute kidney injury)  KENY is likely due to pre-renal azotemia due to intravascular volume depletion. Baseline creatinine is  1.8 . Most recent creatinine and eGFR are listed below.  Recent Labs     08/20/24  0421 08/21/24  0449 08/22/24  0527   CREATININE 1.9* 2.0* 1.6*   EGFRNORACEVR 30.2* 28.4* 37.2*        Plan  - KENY is stable  - Avoid nephrotoxins and renally dose meds for GFR listed above  - Monitor urine output, serial BMP, and adjust therapy as needed  - follow up with repeat Cr after IVF  -avoid hypotensive episodes     Symptomatic hypotension  -SBP in 70-80 upon EMS arrival with dizziness, fatigue and generalized weakness   -likely from over diureses during recent hospital admission for heart failure, taking diuretics at home and UTI   -received total 1500 cc IVF with clinical improvement   -remained normotensive after IVF   -will hold diuretics and blood pressure medicine for now with plan to resume after continued clinical improvement       Sepsis  This patient does have evidence of infective focus  My overall impression is sepsis.  Source: Urinary Tract  Antibiotics given-   Antibiotics (72h ago, onward)      Start     Stop Route Frequency Ordered    08/22/24 1645  meropenem (MERREM) 1 g in 0.9% NaCl 100 mL IVPB (MB+)         -- IV Every 8 hours (non-standard times) 08/22/24 1540          Latest lactate reviewed-  Recent Labs   Lab 08/19/24  2042   POCLAC 1.20       Organ dysfunction indicated by Acute kidney injury    Fluid challenge Ideal Body Weight- The patient's ideal body weight is Ideal body weight: 54.7 kg (120 lb 9.5 oz)  which will be used to calculate fluid bolus of 30 ml/kg for treatment of septic shock.      Post- resuscitation assessment Yes Perfusion exam was performed within 6 hours of septic shock presentation after bolus shows Adequate tissue perfusion assessed by non-invasive monitoring       Will Not start Pressors- Levophed for MAP of 65  Source control achieved by:   -received empiric dose of vancomycin and zosyn in ED   -see UTI    Tobacco dependency  Dangers of cigarette smoking were reviewed with patient in detail. Patient was Counseled for 3-10 minutes. Nicotine replacement options were discussed. Nicotine replacement was discussed- prescribed  -patient reports she is down to 2-4 cigarettes/day   -doing well with nicotine patch      Colorectal carcinoma  -patient had GI clinic follow up at  hospital day prior to admission and have follow up visit next month per patient       Homelessness  -recently moved from [facility]   -she is currently staying at [facility] and she has a        Hypokalemia  Patient's most recent potassium results are listed below.   Recent Labs     08/20/24  0421 08/21/24  0449 08/22/24  0527   K 4.3 3.8 3.8       Plan  - Replete potassium per protocol  - Monitor potassium Daily  - Patient's hypokalemia is improving  - follow up with repeat labs and replace as needed     Elevated troponin  Trop trended to peak          VTE Risk Mitigation (From admission, onward)           Ordered     enoxaparin injection 40 mg  Every 24 hours         08/20/24 1420     IP VTE HIGH RISK PATIENT  Once         08/20/24 0045     Place sequential compression device  Until discontinued         08/20/24 0045                    Discharge Planning   JENAE: 8/23/2024     Code Status: Full Code   Is the patient medically ready for discharge?:     Reason for patient still in hospital (select all that apply): Treatment and Consult recommendations  Discharge Plan A: Shelter                   Chaitanya Wray MD  Department of Hospital Medicine   [facility] ([place]

## 2024-08-22 NOTE — ASSESSMENT & PLAN NOTE
-recently moved from [place] to [facility]   -she is currently staying at [facility] and she has a

## 2024-08-22 NOTE — ASSESSMENT & PLAN NOTE
Advance Care Planning     Date: 08/20/2024    [facility]  I engaged the patient in a voluntary conversation about advance care planning and we specifically addressed what the goals of care would be moving forward, in light of the patient's change in clinical status, specifically if her heart or breathing stops.  We did specifically address the patient's likely prognosis, which is fair .  We explored the patient's values and preferences for future care.  The patient endorses that what is most important right now is to focus on remaining as independent as possible, symptom/pain control, and extending life as long as possible, even it it means sacrificing quality    Accordingly, we have decided that the best plan to meet the patient's goals includes continuing with treatment and Full code.    A total of 10 min was spent on advance care planning, goals of care discussion, emotional support, formulating and communicating prognosis and exploring burden/benefit of various approaches of treatment. This discussion occurred on a fully voluntary basis with the verbal consent of the patient and/or family.

## 2024-08-22 NOTE — PLAN OF CARE
Problem: Adult Inpatient Plan of Care  Goal: Plan of Care Review  Outcome: Progressing  Flowsheets (Taken 8/22/2024 0246)  Plan of Care Reviewed With: patient  Goal: Patient-Specific Goal (Individualized)  Outcome: Progressing  Goal: Absence of Hospital-Acquired Illness or Injury  Outcome: Progressing  Intervention: Prevent Skin Injury  Flowsheets (Taken 8/22/2024 0246)  Body Position: position changed independently  Skin Protection: pulse oximeter probe site changed  Device Skin Pressure Protection:   absorbent pad utilized/changed   adhesive use limited  Intervention: Prevent Infection  Flowsheets (Taken 8/22/2024 0246)  Infection Prevention: hand hygiene promoted  Goal: Optimal Comfort and Wellbeing  Outcome: Progressing  Intervention: Monitor Pain and Promote Comfort  Flowsheets (Taken 8/22/2024 0246)  Pain Management Interventions:   care clustered   pain management plan reviewed with patient/caregiver   position adjusted   quiet environment facilitated  Intervention: Provide Person-Centered Care  Flowsheets (Taken 8/22/2024 0246)  Trust Relationship/Rapport:   care explained   thoughts/feelings acknowledged  Goal: Readiness for Transition of Care  Outcome: Progressing     Problem: Sepsis/Septic Shock  Goal: Optimal Coping  Outcome: Progressing  Intervention: Optimize Psychosocial Adjustment to Illness  Flowsheets (Taken 8/22/2024 0246)  Supportive Measures:   active listening utilized   verbalization of feelings encouraged  Goal: Absence of Bleeding  Outcome: Progressing  Goal: Blood Glucose Level Within Targeted Range  Outcome: Progressing  Goal: Absence of Infection Signs and Symptoms  Outcome: Progressing  Intervention: Initiate Sepsis Management  Flowsheets (Taken 8/22/2024 0246)  Infection Prevention: hand hygiene promoted  Intervention: Promote Stabilization  Flowsheets (Taken 8/22/2024 0246)  Fluid/Electrolyte Management: fluids provided  Intervention: Promote Recovery  Flowsheets (Taken 8/22/2024  0246)  Sleep/Rest Enhancement:   awakenings minimized   regular sleep/rest pattern promoted   room darkened   noise level reduced  Airway/Ventilation Support: pulmonary hygiene promoted  Goal: Optimal Nutrition Intake  Outcome: Progressing  Intervention: Optimize Nutrition Delivery  Flowsheets (Taken 8/22/2024 0246)  Nutrition Interventions: meal set-up provided     Problem: Acute Kidney Injury/Impairment  Goal: Fluid and Electrolyte Balance  Outcome: Progressing  Intervention: Monitor and Manage Fluid and Electrolyte Balance  Flowsheets (Taken 8/22/2024 0246)  Fluid/Electrolyte Management: fluids provided  Goal: Improved Oral Intake  Outcome: Progressing  Intervention: Promote and Optimize Oral Intake  Flowsheets (Taken 8/22/2024 0246)  Oral Nutrition Promotion: rest periods promoted  Nutrition Interventions: meal set-up provided  Goal: Effective Renal Function  Outcome: Progressing  Intervention: Monitor and Support Renal Function  Flowsheets (Taken 8/22/2024 0246)  Medication Review/Management: medications reviewed

## 2024-08-22 NOTE — ASSESSMENT & PLAN NOTE
-recurrent recent hospital admission for Acute CHF exacerbation   -recent echo on 8/12 showed ED 30-35%  -appears hypovolemic on exam in setting of recent aggressive diuresis   -will hold home lasix and aldactone, losartan  for now with plan to restart GDMT later   -patient was placed on life vest by cariology at [place] as a bridge to AICD in near future per patient   -she has cardiology clinic scheduled for Mon for LiveVest plan

## 2024-08-22 NOTE — ASSESSMENT & PLAN NOTE
-patient had GI clinic follow up at [facility] hospital day prior to admission and have follow up visit next month per patient

## 2024-08-22 NOTE — SUBJECTIVE & OBJECTIVE
Interval History: Persistent wheezing, improved with nebs.    Review of Systems   Constitutional:  Negative for activity change, appetite change and fever.   Respiratory:  Positive for cough and wheezing. Negative for shortness of breath.    Cardiovascular:  Negative for chest pain and leg swelling.   Gastrointestinal:  Negative for abdominal pain, constipation, diarrhea, nausea and vomiting.   Skin:  Negative for rash.   Neurological:  Negative for headaches.     Objective:     Vital Signs (Most Recent):  Temp: 97.6 °F (36.4 °C) (08/22/24 1125)  Pulse: 76 (08/22/24 1300)  Resp: 16 (08/22/24 1300)  BP: 112/73 (08/22/24 1125)  SpO2: 98 % (08/22/24 1502) Vital Signs (24h Range):  Temp:  [97.2 °F (36.2 °C)-98 °F (36.7 °C)] 97.6 °F (36.4 °C)  Pulse:  [73-88] 76  Resp:  [16-19] 16  SpO2:  [94 %-99 %] 98 %  BP: (111-120)/(61-75) 112/73     Weight: 77.1 kg (169 lb 15.6 oz)  Body mass index is 29.18 kg/m².    Intake/Output Summary (Last 24 hours) at 8/22/2024 1542  Last data filed at 8/22/2024 1245  Gross per 24 hour   Intake 982 ml   Output 1 ml   Net 981 ml         Physical Exam  Vitals and nursing note reviewed.   HENT:      Head: Normocephalic and atraumatic.   Cardiovascular:      Rate and Rhythm: Normal rate and regular rhythm.      Pulses: Normal pulses.   Pulmonary:      Effort: Pulmonary effort is normal. No respiratory distress.      Breath sounds: Wheezing present. No rales.   Abdominal:      Palpations: Abdomen is soft.      Tenderness: There is no abdominal tenderness. There is no guarding.   Musculoskeletal:      Right lower leg: No edema.      Left lower leg: No edema.   Skin:     General: Skin is warm and dry.   Neurological:      General: No focal deficit present.      Mental Status: She is alert and oriented to person, place, and time.   Psychiatric:         Mood and Affect: Mood normal.             Significant Labs: All pertinent labs within the past 24 hours have been reviewed.    Significant Imaging: I  have reviewed all pertinent imaging results/findings within the past 24 hours.

## 2024-08-22 NOTE — ASSESSMENT & PLAN NOTE
-symptoms of suprapubic pain, urinary F/U/D. UA showed nitrite positive UTI. Received empiric dose of vancomycin and zosyn in ED for sepsis, transitioned to CTX, then transitioned to Cefepime after persistent leukocytosis. Urine Cx with ESBL Klebsiella.    - Meropenem  - ID consulted for IV vs po regimen and duration

## 2024-08-22 NOTE — ASSESSMENT & PLAN NOTE
Patient's COPD is controlled currently.  Patient is currently off COPD Pathway. Continue scheduled and prn inhalers. Oxygenating well on room air. Monitor respiratory status closely.     -wheezing on exam today, pt reports using nebulizer 4x/day. Would benefit from OP PCP/Pulm follow up for PFTs for COPD evaluation as none on file. Luis Felipe ordered inpatient.

## 2024-08-23 LAB
ALBUMIN SERPL BCP-MCNC: 1.9 G/DL (ref 3.5–5.2)
ALP SERPL-CCNC: 63 U/L (ref 55–135)
ALT SERPL W/O P-5'-P-CCNC: 14 U/L (ref 10–44)
ANION GAP SERPL CALC-SCNC: 5 MMOL/L (ref 8–16)
AST SERPL-CCNC: 19 U/L (ref 10–40)
BASOPHILS # BLD AUTO: 0.08 K/UL (ref 0–0.2)
BASOPHILS NFR BLD: 0.8 % (ref 0–1.9)
BILIRUB SERPL-MCNC: 0.1 MG/DL (ref 0.1–1)
BUN SERPL-MCNC: 29 MG/DL (ref 6–20)
CALCIUM SERPL-MCNC: 8 MG/DL (ref 8.7–10.5)
CHLORIDE SERPL-SCNC: 104 MMOL/L (ref 95–110)
CO2 SERPL-SCNC: 26 MMOL/L (ref 23–29)
CREAT SERPL-MCNC: 1.2 MG/DL (ref 0.5–1.4)
DIFFERENTIAL METHOD BLD: ABNORMAL
EOSINOPHIL # BLD AUTO: 0.2 K/UL (ref 0–0.5)
EOSINOPHIL NFR BLD: 2.3 % (ref 0–8)
ERYTHROCYTE [DISTWIDTH] IN BLOOD BY AUTOMATED COUNT: 20.5 % (ref 11.5–14.5)
EST. GFR  (NO RACE VARIABLE): 52.5 ML/MIN/1.73 M^2
GLUCOSE SERPL-MCNC: 88 MG/DL (ref 70–110)
HCT VFR BLD AUTO: 26.7 % (ref 37–48.5)
HGB BLD-MCNC: 8.2 G/DL (ref 12–16)
IMM GRANULOCYTES # BLD AUTO: 0.17 K/UL (ref 0–0.04)
IMM GRANULOCYTES NFR BLD AUTO: 1.6 % (ref 0–0.5)
LYMPHOCYTES # BLD AUTO: 2 K/UL (ref 1–4.8)
LYMPHOCYTES NFR BLD: 18.9 % (ref 18–48)
MCH RBC QN AUTO: 24.8 PG (ref 27–31)
MCHC RBC AUTO-ENTMCNC: 30.7 G/DL (ref 32–36)
MCV RBC AUTO: 81 FL (ref 82–98)
MONOCYTES # BLD AUTO: 0.7 K/UL (ref 0.3–1)
MONOCYTES NFR BLD: 6.4 % (ref 4–15)
NEUTROPHILS # BLD AUTO: 7.2 K/UL (ref 1.8–7.7)
NEUTROPHILS NFR BLD: 70 % (ref 38–73)
NRBC BLD-RTO: 0 /100 WBC
PLATELET # BLD AUTO: 334 K/UL (ref 150–450)
PMV BLD AUTO: 9.3 FL (ref 9.2–12.9)
POTASSIUM SERPL-SCNC: 3.9 MMOL/L (ref 3.5–5.1)
PROT SERPL-MCNC: 5.3 G/DL (ref 6–8.4)
RBC # BLD AUTO: 3.31 M/UL (ref 4–5.4)
SODIUM SERPL-SCNC: 135 MMOL/L (ref 136–145)
WBC # BLD AUTO: 10.34 K/UL (ref 3.9–12.7)

## 2024-08-23 PROCEDURE — 25000003 PHARM REV CODE 250

## 2024-08-23 PROCEDURE — 21400001 HC TELEMETRY ROOM

## 2024-08-23 PROCEDURE — 63600175 PHARM REV CODE 636 W HCPCS

## 2024-08-23 PROCEDURE — 99900035 HC TECH TIME PER 15 MIN (STAT)

## 2024-08-23 PROCEDURE — 85025 COMPLETE CBC W/AUTO DIFF WBC: CPT

## 2024-08-23 PROCEDURE — 76937 US GUIDE VASCULAR ACCESS: CPT

## 2024-08-23 PROCEDURE — 25000242 PHARM REV CODE 250 ALT 637 W/ HCPCS

## 2024-08-23 PROCEDURE — 99223 1ST HOSP IP/OBS HIGH 75: CPT | Mod: ,,, | Performed by: STUDENT IN AN ORGANIZED HEALTH CARE EDUCATION/TRAINING PROGRAM

## 2024-08-23 PROCEDURE — 94761 N-INVAS EAR/PLS OXIMETRY MLT: CPT

## 2024-08-23 PROCEDURE — 27000207 HC ISOLATION

## 2024-08-23 PROCEDURE — S4991 NICOTINE PATCH NONLEGEND: HCPCS | Performed by: HOSPITALIST

## 2024-08-23 PROCEDURE — 94640 AIRWAY INHALATION TREATMENT: CPT

## 2024-08-23 PROCEDURE — 25000003 PHARM REV CODE 250: Performed by: HOSPITALIST

## 2024-08-23 PROCEDURE — 25000003 PHARM REV CODE 250: Performed by: STUDENT IN AN ORGANIZED HEALTH CARE EDUCATION/TRAINING PROGRAM

## 2024-08-23 PROCEDURE — C1751 CATH, INF, PER/CENT/MIDLINE: HCPCS

## 2024-08-23 PROCEDURE — 63600175 PHARM REV CODE 636 W HCPCS: Performed by: STUDENT IN AN ORGANIZED HEALTH CARE EDUCATION/TRAINING PROGRAM

## 2024-08-23 PROCEDURE — 36410 VNPNXR 3YR/> PHY/QHP DX/THER: CPT

## 2024-08-23 PROCEDURE — 36415 COLL VENOUS BLD VENIPUNCTURE: CPT

## 2024-08-23 PROCEDURE — 80053 COMPREHEN METABOLIC PANEL: CPT

## 2024-08-23 RX ORDER — SODIUM CHLORIDE 0.9 % (FLUSH) 0.9 %
10 SYRINGE (ML) INJECTION EVERY 6 HOURS
Status: DISCONTINUED | OUTPATIENT
Start: 2024-08-24 | End: 2024-08-24 | Stop reason: HOSPADM

## 2024-08-23 RX ORDER — SODIUM CHLORIDE 0.9 % (FLUSH) 0.9 %
10 SYRINGE (ML) INJECTION
Status: DISCONTINUED | OUTPATIENT
Start: 2024-08-23 | End: 2024-08-24 | Stop reason: HOSPADM

## 2024-08-23 RX ADMIN — NYSTATIN 100000 UNITS: 100000 SUSPENSION ORAL at 08:08

## 2024-08-23 RX ADMIN — METOPROLOL SUCCINATE 25 MG: 25 TABLET, EXTENDED RELEASE ORAL at 08:08

## 2024-08-23 RX ADMIN — OXYBUTYNIN CHLORIDE 5 MG: 5 TABLET ORAL at 08:08

## 2024-08-23 RX ADMIN — ERTAPENEM 1 G: 1 INJECTION INTRAMUSCULAR; INTRAVENOUS at 04:08

## 2024-08-23 RX ADMIN — ATORVASTATIN CALCIUM 40 MG: 40 TABLET, FILM COATED ORAL at 08:08

## 2024-08-23 RX ADMIN — BUTALBITAL, ACETAMINOPHEN, AND CAFFEINE 1 TABLET: 325; 50; 40 TABLET ORAL at 04:08

## 2024-08-23 RX ADMIN — PANTOPRAZOLE SODIUM 40 MG: 40 TABLET, DELAYED RELEASE ORAL at 08:08

## 2024-08-23 RX ADMIN — OXYBUTYNIN CHLORIDE 5 MG: 5 TABLET ORAL at 04:08

## 2024-08-23 RX ADMIN — MEROPENEM 1 G: 1 INJECTION INTRAVENOUS at 04:08

## 2024-08-23 RX ADMIN — Medication 1 PATCH: at 08:08

## 2024-08-23 RX ADMIN — NYSTATIN 100000 UNITS: 100000 SUSPENSION ORAL at 01:08

## 2024-08-23 RX ADMIN — NYSTATIN 100000 UNITS: 100000 SUSPENSION ORAL at 04:08

## 2024-08-23 RX ADMIN — IPRATROPIUM BROMIDE AND ALBUTEROL SULFATE 3 ML: 2.5; .5 SOLUTION RESPIRATORY (INHALATION) at 09:08

## 2024-08-23 RX ADMIN — IPRATROPIUM BROMIDE AND ALBUTEROL SULFATE 3 ML: 2.5; .5 SOLUTION RESPIRATORY (INHALATION) at 02:08

## 2024-08-23 RX ADMIN — BUTALBITAL, ACETAMINOPHEN, AND CAFFEINE 1 TABLET: 325; 50; 40 TABLET ORAL at 11:08

## 2024-08-23 RX ADMIN — FERROUS SULFATE TAB 325 MG (65 MG ELEMENTAL FE) 1 EACH: 325 (65 FE) TAB at 08:08

## 2024-08-23 RX ADMIN — ENOXAPARIN SODIUM 40 MG: 40 INJECTION SUBCUTANEOUS at 04:08

## 2024-08-23 NOTE — ASSESSMENT & PLAN NOTE
Advance Care Planning     Date: 08/20/2024    [facility]  I engaged the patient in a voluntary conversation about advance care planning and we specifically addressed what the goals of care would be moving forward, in light of the patient's change in clinical status, specifically if her heart or breathing stops.  We did specifically address the patient's likely prognosis, which is fair .  We explored the patient's values and preferences for future care.  The patient endorses that what is most important right now is to focus on remaining as independent as possible, symptom/pain control, and extending life as long as possible, even it it means sacrificing quality    Accordingly, we have decided that the best plan to meet the patient's goals includes continuing with treatment and Full code.    A total of 10 min was spent on advance care planning, goals of care discussion, emotional support, formulating and communicating prognosis and exploring burden/benefit of various approaches of treatment. This discussion occurred on a fully voluntary basis with the verbal consent of the patient and/or family.

## 2024-08-23 NOTE — ASSESSMENT & PLAN NOTE
-recently moved from [place] to [facility]   -she is currently staying at [facility] and she has a

## 2024-08-23 NOTE — PLAN OF CARE
Problem: Acute Kidney Injury/Impairment  Goal: Improved Oral Intake  Outcome: Progressing     Problem: Infection  Goal: Absence of Infection Signs and Symptoms  Outcome: Progressing   Contact isolation placed for ESBL in the urine. No significant changes noted. [place] Bed locked and in lowest position. Call light in reach.

## 2024-08-23 NOTE — ASSESSMENT & PLAN NOTE
I have reviewed hospital notes from   service and other specialty providers. I have also reviewed CBC, CMP/BMP,  cultures and imaging with my interpretation as documented.      59F with new h/o homelessness, HFrEF (last EF 30-35%) on life vest 2/2 ischemic cardiomyopathy (past MI), COPD, recently dx'd colon cancer 02/2024, former substance abuse (cocaine + meth), chronic HCV (untreated), current smoker, with recent hosp admission 08/12-14 for CHF exacerbation -- who presents to the ED 08/19 via EMS from the [facility] for evaluation generalized weakness, dizziness, and hypotension. Also endorsing occipital headache, neck pain, dysuria, urinary frequency, suprapubic pain, with radiation to Rt flank. Urinary sxs improved. Mild Rt CVA tenderness on exam.     Pt afebrile, low BP 87/55, otherwise stable. Wbc 15, Cr 2.3 (up from 1.8 on 08.14), , procalc nml. CXR w/o acute process.  Bld cxs 08/19 NGTD. UA+ wbc >100, Ucx +ESBL-kleb pneumo. Started on merrem 08/22. Urinary sxs markedly improving. Contact isolation ordered for ESBL on Ucx.     Recommendations / Plan:  Discontinue Iv-merrem. To start Iv-Ertapenem 1g Q24h.   To complete abx duration of 7d, SUSAN 08/27.    Pt will likely require placement at SNF to complete any remaining doses of abx course  SNF/LTAC to perform weekly labs (cbc, cmp, crp), and picc line dressing changes.  Facility to fax results to [facility] Fax Number: [phone].   Pt to f/u outpt with GI-oncology ([facility]?) outpt for colon cancer work-up / management.   Pt to f/u outpt with hepatology for management of chronic HCV infection.     -- Discussed with ID staff and primary team.  -- ID will sign off at this time. Pt may f/u with ID as needed.

## 2024-08-23 NOTE — ASSESSMENT & PLAN NOTE
"This patient does have evidence of infective focus  My overall impression is sepsis.  Source: Urinary Tract  Antibiotics given-   Antibiotics (72h ago, onward)    Start     Stop Route Frequency Ordered    08/23/24 1500  ertapenem (INVANZ) 1 g in 0.9% NaCl 100 mL IVPB (MB+)         -- IV Every 24 hours (non-standard times) 08/23/24 1355        Latest lactate reviewed-  No results for input(s): "LACTATE", "POCLAC" in the last 72 hours.    Organ dysfunction indicated by Acute kidney injury    Fluid challenge Ideal Body Weight- The patient's ideal body weight is Ideal body weight: 54.7 kg (120 lb 9.5 oz) which will be used to calculate fluid bolus of 30 ml/kg for treatment of septic shock.      Post- resuscitation assessment Yes Perfusion exam was performed within 6 hours of septic shock presentation after bolus shows Adequate tissue perfusion assessed by non-invasive monitoring       Will Not start Pressors- Levophed for MAP of 65  Source control achieved by:   -received empiric dose of vancomycin and zosyn in ED   -see UTI  "

## 2024-08-23 NOTE — ASSESSMENT & PLAN NOTE
-recurrent recent hospital admission for Acute CHF exacerbation   -recent echo on 8/12 showed ED 30-35%  -appears hypovolemic on exam in setting of recent aggressive diuresis   -will hold home lasix and aldactone, losartan  for now with plan to restart GDMT later   -patient was placed on life vest by cariology at [place] as a bridge to AICD in near future per patient   -she has cardiology clinic scheduled for Mon for LiveVest plan

## 2024-08-23 NOTE — PROGRESS NOTES
[facility] - [facility] Surg ([address] Medicine  Progress Note    Patient Name: Mary Ellen Saini  MRN: [id number]  Patient Class: IP- Inpatient   Admission Date: 8/19/2024  Length of Stay: 2 days  Attending Physician: Chaitanya Wray MD  Primary Care Provider: Marlen, Primary Doctor        Subjective:     Principal Problem:Urinary tract infection due to ESBL Klebsiella        HPI:    Mary Eleln Saini is a 60 y/o F with hx HFrEF (last EF 30-35%) on life vest 2/2 ischemic cardiomyopathy (past MI), COPD, colon cancer, former substance abuse (cocaine + meth), current smoker who presents to the ED via EMS from the [facility] for evaluation generalized weakness and dizziness. Patient was recently admitted to [facility] 8/12-8/14 for acute CHF exacerbation and treated with aggressive diuresis (total 7 liter) with clinical improvement. Patient reports feeling fatigue since discharged from hospital and she has been taking diuretics lasix 40 mg bid and aldactone. Patient went to hospital discharge follow up clinic with cardiology and GI at [facility] earlier today. She felt worse after returning to [facility] from clinic appointments and she felt really weak with difficulty walking which prompted to call EMS.  EMS administered 500mL of NS due to her systolic BP of ~74. Afterwards, her systolic BP went up to ~84. Her O2 sat was around 99 and she was having 16RR per EMS. Pt also c/o occipital headache, neck pain, dysuria, urinary frequency, suprapubic pain. Pt denies fever, chills, cough, sob, leg swelling, orthopnea, chest pain, palpitation, firing of life vest, vomiting, abdominal pain, focal weakness/numbness, dark stool or bleeding from other sites.          ED course: afebrile, borderline low BP 87/55, oxygenating well on room air. WBC 15, K 3.3, Mg 1.5, Cr 2.3 up from 1.8 on 8/14, , Troponin 0.088, D-dimer 0.072, procalcitonin negative. EKG NSR @ 88 bpm with nonspecific ST/T  changes. CXR w/o acute process.  Patient received  cc bolus, tylenol, aspirin 325 mg, K-bicarb and Mg sulfate replacement, duonebs, prednisone 60 mg, empiric dose of vancomycin and zosyn in ED. During my interview patient is awake, conversant and not in distress. She reports overall feeling better after IVF and her baseline dry weight around 170 lbs. Patient states she recently moved to [facility] from [place] and about a month ago she was admitted to hospital in [place] for heart failure exacerbation when cardiology placed her on life vest as a bridge to AICD placement in near future.      Echo (8/12/24):      Left Ventricle: The left ventricle is moderately dilated. There is eccentric hypertrophy. Moderate global hypokinesis present. There is moderately reduced systolic function with a visually estimated ejection fraction of 30 - 35%. Biplane (2D) method of discs ejection fraction is 34%. There is diastolic dysfunction but grade cannot be determined.    Right Ventricle: Normal right ventricular cavity size. Wall thickness is normal. Systolic function is normal.    Left Atrium: Left atrium is severely dilated.    Right Atrium: Right atrium is moderately dilated.    Aortic Valve: There is mild stenosis. Aortic valve area by VTI is 1.95 cm². Aortic valve peak velocity is 1.40 m/s. Mean gradient is 4 mmHg. The dimensionless index is 0.62.    Mitral Valve: There is mild regurgitation.    Tricuspid Valve: There is mild regurgitation.    Pulmonic Valve: There is mild regurgitation.    Pulmonary Artery: The estimated pulmonary artery systolic pressure is 36 mmHg.    IVC/SVC: Normal venous pressure at 3 mmHg.       Overview/Hospital Course:  Pt with KENY and persistent leukocytosis suspected sepsis source ESBL Klebsiella UTI. Started on Meropenem and ID consulted with recs for transition to Ertapenem for total 7d course. Hep C positive with elevated viral load and patient advised on outpatient Hepatology  follow up for evaluation/treatment.     Interval History: jose g CHI feeling about the same today. Merrem transitioned to Ertapenem    Review of Systems   Respiratory:  Positive for wheezing. Negative for shortness of breath.    Gastrointestinal:  Negative for abdominal distention, abdominal pain, blood in stool and diarrhea.   Genitourinary:  Negative for difficulty urinating, dysuria, flank pain and hematuria.   All other systems reviewed and are negative.    Objective:     Vital Signs (Most Recent):  Temp: 98.3 °F (36.8 °C) (08/23/24 1513)  Pulse: 80 (08/23/24 1513)  Resp: 17 (08/23/24 1513)  BP: 117/78 (08/23/24 1513)  SpO2: 97 % (08/23/24 1513) Vital Signs (24h Range):  Temp:  [97.5 °F (36.4 °C)-98.3 °F (36.8 °C)] 98.3 °F (36.8 °C)  Pulse:  [74-85] 80  Resp:  [17-20] 17  SpO2:  [94 %-98 %] 97 %  BP: ()/(57-81) 117/78     Weight: 81.3 kg (179 lb 2 oz)  Body mass index is 30.75 kg/m².    Intake/Output Summary (Last 24 hours) at 8/23/2024 1644  Last data filed at 8/23/2024 0442  Gross per 24 hour   Intake 480 ml   Output --   Net 480 ml         Physical Exam  Vitals and nursing note reviewed.   HENT:      Head: Normocephalic and atraumatic.   Cardiovascular:      Rate and Rhythm: Normal rate and regular rhythm.      Pulses: Normal pulses.   Pulmonary:      Effort: Pulmonary effort is normal. No respiratory distress.      Breath sounds: Wheezing present. No rales.   Abdominal:      Palpations: Abdomen is soft.      Tenderness: There is no abdominal tenderness. There is no guarding.   Musculoskeletal:      Right lower leg: No edema.      Left lower leg: No edema.   Skin:     General: Skin is warm and dry.   Neurological:      General: No focal deficit present.      Mental Status: She is alert and oriented to person, place, and time.   Psychiatric:         Mood and Affect: Mood normal.             Significant Labs: All pertinent labs within the past 24 hours have been reviewed.    Significant Imaging: I have  reviewed all pertinent imaging results/findings within the past 24 hours.    Assessment/Plan:      * Urinary tract infection due to ESBL Klebsiella  -symptoms of suprapubic pain, urinary F/U/D. UA showed nitrite positive UTI. Received empiric dose of vancomycin and zosyn in ED for sepsis, transitioned to CTX, then transitioned to Cefepime after persistent leukocytosis. Urine Cx with ESBL Klebsiella.    - Meropenem transitioned to Ertapenem for 7 d duration, with OPAT note  - Midline team consulted  - CM to provide assistance and CM at [facility] to provide contact for patient to do follow up.  - HH ordered for IV Abx assistance    Mobility impaired  The mobility limitation cannot be sufficiently resolved by the use of a cane. Patient's functional mobility deficit can be sufficiently resolved with the use of a (Rolling Walker or Walker). Patient's mobility limitation significantly impairs their ability to participate in one of more activities of daily living. The use of a (RW or Walker) will significantly improve the patient's ability to participate in MRADLS and the patient will use it on regular basis in the home.       Acute hepatitis C virus infection without hepatic coma  Pt with positive HCV antibody and increased viral load. History of substance abuse though denies IVDU. Has one tattoo. Few sexual partners in past few decades, but suspects her ex- may have had other partners.    - Pt advised on outpatient Hepatology follow up for evaluation and care plan.     ACP (advance care planning)  Advance Care Planning    Date: 08/20/2024    [facility]  I engaged the patient in a voluntary conversation about advance care planning and we specifically addressed what the goals of care would be moving forward, in light of the patient's change in clinical status, specifically if her heart or breathing stops.  We did specifically address the patient's likely prognosis, which is fair .  We explored the patient's values and  preferences for future care.  The patient endorses that what is most important right now is to focus on remaining as independent as possible, symptom/pain control, and extending life as long as possible, even it it means sacrificing quality    Accordingly, we have decided that the best plan to meet the patient's goals includes continuing with treatment and Full code.    A total of 10 min was spent on advance care planning, goals of care discussion, emotional support, formulating and communicating prognosis and exploring burden/benefit of various approaches of treatment. This discussion occurred on a fully voluntary basis with the verbal consent of the patient and/or family.           Hepatitis C antibody positive in blood  -positive HCV antibody today, HIV negative  -prior history of substance abuse though denies IVDU  -check HCV PCR and consider outpatient treatment       COPD (chronic obstructive pulmonary disease)  Patient's COPD is controlled currently.  Patient is currently off COPD Pathway. Continue scheduled and prn inhalers. Oxygenating well on room air. Monitor respiratory status closely.     -wheezing on exam today, pt reports using nebulizer 4x/day. Would benefit from OP PCP/Pulm follow up for PFTs for COPD evaluation as none on file. Luis Felipe ordered inpatient.     Coronary artery disease involving native coronary artery of native heart without angina pectoris  Patient with known CAD, which is controlled Will continue Beta blocker, Statin and monitor for S/Sx of angina/ACS. Continue to monitor on telemetry.   -mild elevated troponin 0.088 >> 0.075 from demand ischemia in setting of hypotension and KENY   -do not suspect ACS     Chronic HFrEF (heart failure with reduced ejection fraction)  -recurrent recent hospital admission for Acute CHF exacerbation   -recent echo on 8/12 showed ED 30-35%  -appears hypovolemic on exam in setting of recent aggressive diuresis   -will hold home lasix and aldactone,  "losartan  for now with plan to restart GDMT later   -patient was placed on life vest by cariology at [place] as a bridge to AICD in near future per patient   -she has cardiology clinic scheduled for Mon for LiveVest plan        Hypomagnesemia  Patient has Abnormal Magnesium: hypomagnesemia. Will continue to monitor electrolytes closely. Will replace the affected electrolytes and repeat labs to be done after interventions completed. The patient's magnesium results have been reviewed and are listed below.  No results for input(s): "MG" in the last 24 hours.       KENY (acute kidney injury)  KENY is likely due to pre-renal azotemia due to intravascular volume depletion. Baseline creatinine is  1.8 . Most recent creatinine and eGFR are listed below.  Recent Labs     08/21/24  0449 08/22/24  0527 08/23/24  0818   CREATININE 2.0* 1.6* 1.2   EGFRNORACEVR 28.4* 37.2* 52.5*        Plan  - KENY is stable  - Avoid nephrotoxins and renally dose meds for GFR listed above  - Monitor urine output, serial BMP, and adjust therapy as needed  - follow up with repeat Cr after IVF  -avoid hypotensive episodes     Symptomatic hypotension  -SBP in 70-80 upon EMS arrival with dizziness, fatigue and generalized weakness   -likely from over diureses during recent hospital admission for heart failure, taking diuretics at home and UTI   -received total 1500 cc IVF with clinical improvement   -remained normotensive after IVF   -will hold diuretics and blood pressure medicine for now with plan to resume after continued clinical improvement       Sepsis  This patient does have evidence of infective focus  My overall impression is sepsis.  Source: Urinary Tract  Antibiotics given-   Antibiotics (72h ago, onward)      Start     Stop Route Frequency Ordered    08/23/24 1500  ertapenem (INVANZ) 1 g in 0.9% NaCl 100 mL IVPB (MB+)         -- IV Every 24 hours (non-standard times) 08/23/24 1355          Latest lactate reviewed-  No results for input(s): " ""LACTATE", "POCLAC" in the last 72 hours.    Organ dysfunction indicated by Acute kidney injury    Fluid challenge Ideal Body Weight- The patient's ideal body weight is Ideal body weight: 54.7 kg (120 lb 9.5 oz) which will be used to calculate fluid bolus of 30 ml/kg for treatment of septic shock.      Post- resuscitation assessment Yes Perfusion exam was performed within 6 hours of septic shock presentation after bolus shows Adequate tissue perfusion assessed by non-invasive monitoring       Will Not start Pressors- Levophed for MAP of 65  Source control achieved by:   -received empiric dose of vancomycin and zosyn in ED   -see UTI    Tobacco dependency  Dangers of cigarette smoking were reviewed with patient in detail. Patient was Counseled for 3-10 minutes. Nicotine replacement options were discussed. Nicotine replacement was discussed- prescribed  -patient reports she is down to 2-4 cigarettes/day   -doing well with nicotine patch      Colorectal carcinoma  -patient had GI clinic follow up at [facility] hospital day prior to admission and have follow up visit next month per patient       Homelessness  -recently moved from [facility]   -she is currently staying at [facility] and she has a        Hypokalemia  Patient's most recent potassium results are listed below.   Recent Labs     08/21/24  0449 08/22/24  0527 08/23/24  0818   K 3.8 3.8 3.9       Plan  - Replete potassium per protocol  - Monitor potassium Daily  - Patient's hypokalemia is improving  - follow up with repeat labs and replace as needed     Elevated troponin  Trop trended to peak          VTE Risk Mitigation (From admission, onward)           Ordered     enoxaparin injection 40 mg  Every 24 hours         08/20/24 1420     IP VTE HIGH RISK PATIENT  Once         08/20/24 0045     Place sequential compression device  Until discontinued         08/20/24 0045                    Discharge Planning   JENAE: 8/24/2024     " Code Status: Full Code   Is the patient medically ready for discharge?:     Reason for patient still in hospital (select all that apply): Treatment and Consult recommendations  Discharge Plan A: Monica Wray MD  Department of Hospital Medicine   [facility] - [facility] ([place]

## 2024-08-23 NOTE — SUBJECTIVE & OBJECTIVE
Interval History: ALON pt feeling about the same today. Merrem transitioned to Ertapenem    Review of Systems   Respiratory:  Positive for wheezing. Negative for shortness of breath.    Gastrointestinal:  Negative for abdominal distention, abdominal pain, blood in stool and diarrhea.   Genitourinary:  Negative for difficulty urinating, dysuria, flank pain and hematuria.   All other systems reviewed and are negative.    Objective:     Vital Signs (Most Recent):  Temp: 98.3 °F (36.8 °C) (08/23/24 1513)  Pulse: 80 (08/23/24 1513)  Resp: 17 (08/23/24 1513)  BP: 117/78 (08/23/24 1513)  SpO2: 97 % (08/23/24 1513) Vital Signs (24h Range):  Temp:  [97.5 °F (36.4 °C)-98.3 °F (36.8 °C)] 98.3 °F (36.8 °C)  Pulse:  [74-85] 80  Resp:  [17-20] 17  SpO2:  [94 %-98 %] 97 %  BP: ()/(57-81) 117/78     Weight: 81.3 kg (179 lb 2 oz)  Body mass index is 30.75 kg/m².    Intake/Output Summary (Last 24 hours) at 8/23/2024 1644  Last data filed at 8/23/2024 0442  Gross per 24 hour   Intake 480 ml   Output --   Net 480 ml         Physical Exam  Vitals and nursing note reviewed.   HENT:      Head: Normocephalic and atraumatic.   Cardiovascular:      Rate and Rhythm: Normal rate and regular rhythm.      Pulses: Normal pulses.   Pulmonary:      Effort: Pulmonary effort is normal. No respiratory distress.      Breath sounds: Wheezing present. No rales.   Abdominal:      Palpations: Abdomen is soft.      Tenderness: There is no abdominal tenderness. There is no guarding.   Musculoskeletal:      Right lower leg: No edema.      Left lower leg: No edema.   Skin:     General: Skin is warm and dry.   Neurological:      General: No focal deficit present.      Mental Status: She is alert and oriented to person, place, and time.   Psychiatric:         Mood and Affect: Mood normal.             Significant Labs: All pertinent labs within the past 24 hours have been reviewed.    Significant Imaging: I have reviewed all pertinent imaging  results/findings within the past 24 hours.

## 2024-08-23 NOTE — ASSESSMENT & PLAN NOTE
Chronic issue. Pain is well controlled taking occasional Tylenol 3 or tramadol. She takes meloxicam 50 mg every day. Still suffers from low back and bilateral knee pain. She had still lose weight and get more in shape to help with her multiple joint aches and pains, has no desire to see pain management.   KENY is likely due to pre-renal azotemia due to intravascular volume depletion. Baseline creatinine is  1.8 . Most recent creatinine and eGFR are listed below.  Recent Labs     08/21/24  0449 08/22/24  0527 08/23/24  0818   CREATININE 2.0* 1.6* 1.2   EGFRNORACEVR 28.4* 37.2* 52.5*        Plan  - KENY is stable  - Avoid nephrotoxins and renally dose meds for GFR listed above  - Monitor urine output, serial BMP, and adjust therapy as needed  - follow up with repeat Cr after IVF  -avoid hypotensive episodes

## 2024-08-23 NOTE — CONSULTS
[facility] - Med S[facility] ([place] [address])  Infectious Disease  Consult Note    Patient Name: Mary Ellen Saini  MRN: [id number]  Admission Date: 8/19/2024  Hospital Length of Stay: 2 days  Attending Physician: Chaitanya Wray MD  Primary Care Provider: Marlen Primary Doctor     Isolation Status: Contact    Patient information was obtained from patient and ER records.      Inpatient consult to Infectious Diseases  Consult performed by: Ej Bennett PA-C  Consult ordered by: Chaitanya Wray MD        Assessment/Plan:     Renal/  * Urinary tract infection due to ESBL Klebsiella  I have reviewed hospital notes from  HM service and other specialty providers. I have also reviewed CBC, CMP/BMP,  cultures and imaging with my interpretation as documented.      59F with new h/o homelessness, HFrEF (last EF 30-35%) on life vest 2/2 ischemic cardiomyopathy (past MI), COPD, recently dx'd colon cancer 02/2024, former substance abuse (cocaine, last used 10-12mo ago; denies IVDU), chronic HCV (untreated), current smoker, with recent hosp admission 08/12-14 for CHF exacerbation -- who presents to the ED 08/19 via EMS [facility] for evaluation generalized weakness, dizziness, and hypotension. Also endorsing occipital headache, neck pain, dysuria, urinary frequency, suprapubic pain, with radiation to Rt flank. Urinary sxs improved. Mild Rt CVA tenderness on exam.     Pt afebrile, low BP 87/55, otherwise stable. Wbc 15, Cr 2.3 (up from 1.8 on 08.14), , procalc nml. CXR w/o acute process.  Bld cxs 08/19 NGTD. UA+ wbc >100, Ucx +ESBL-kleb pneumo. Started on merrem 08/22. Urinary sxs markedly improving. Contact isolation ordered for ESBL on Ucx.     Recommendations / Plan:  Discontinue Iv-merrem. To start Iv-Ertapenem 1g Q24h.   To complete abx duration of 7d s/p 08/22; SUSAN 08/29.    Pt to f/u outpt with GI-oncology ([facility]?) outpt for colon cancer work-up / management.   Pt to f/u outpt with hepatology for management of chronic  HCV infection.   Social work to help set up transportation to ID clinic outpt for f/u appt; or virtual appt at a minimum with HH to remove line.      -- ID will schedule pt for ID clinic f/u appt   -- Discussed with ID staff and primary team.  -- ID will sign off at this time. [facility].       Outpatient Antibiotic Therapy Plan:    Please send referral to [facility].    1) Infection :   pyelo (Ucx+ ESBL-kleb pneumo)    2) Discharge Antibiotics:     Intravenous antibiotics:  IV - Ertapenem 1g Q24h    3) Therapy Duration:  7d    Estimated end date of IV antibiotics:  08/29    4) Outpatient Weekly Labs:    Order the following labs to be drawn on Mondays:   CBC  CMP   CRP    5) Fax Lab Results to Infectious Diseases Provider:  Ej Bennett PA-C    [facility] Fax Number: [phone]    6) Outpatient Infectious Diseases Follow-up    Follow-up appointment will be arranged by the ID clinic and will be found in the patient's appointments tab.    Prior to discharge, please ensure the patient's follow-up has been scheduled.    If there is still no follow-up scheduled prior to discharge, please send an EPIC message to [facility] Clinical Pool or Call Infectious Diseases Dept.        Thank you for your consult. I will sign off. Please contact us if you have any additional questions.    Ej Bennett PA-C  Infectious Disease  [facility] - Med Surg ([facility])    Subjective:     Principal Problem: Urinary tract infection due to ESBL Klebsiella    HPI: 59F with HFrEF (last EF 30-35%) on life vest 2/2 ischemic cardiomyopathy (past MI), COPD, recent dx'd with colon cancer 02/2204, former substance abuse (cocaine + meth), chronic HCV infection (untreated), and current smoker -- who presents to the ED 08/19 [facility] for evaluation generalized weakness, dizziness, and hypotension.    Patient was recently admitted to [facility] 8/12-8/14 for acute CHF exacerbation and treated with aggressive diuresis (total 7 liter) with clinical improvement. Patient reports feeling fatigue since discharged from hospital and she has been taking diuretics lasix 40 mg bid and aldactone. Pt also c/o occipital headache, neck pain, dysuria, urinary frequency, suprapubic pain.     Pt afebrile, low BP 87/55, otherwise stable. Wbc 15, Cr 2.3 (up from 1.8 on 08.14), , procalc nml. CXR w/o acute process.     Patient states she recently moved to [facility] from [place] and about a month ago she was admitted to hospital in [place] for heart failure exacerbation when cardiology placed her on life vest as a bridge to AICD placement in near future.      Bld cxs 08/19 NGTD. UA+ wbc >100, Ucx +ESBL-kleb pneumo. Started on merrem 08/22.            Past Medical History:   Diagnosis Date    Asthma     Bipolar disorder     Hypertension        Past Surgical History:   Procedure Laterality Date    CHOLECYSTECTOMY      SHOULDER SURGERY      TUBAL LIGATION         Review of patient's allergies indicates:  No Known Allergies    Medications:  Medications Prior to Admission   Medication Sig    albuterol (PROVENTIL/VENTOLIN HFA) 90 mcg/actuation inhaler Inhale 1-2 puffs into the lungs every 6 (six) hours as needed for Wheezing. Rescue (Patient taking differently: Inhale 1 puff into the lungs every 6 (six) hours as needed for Wheezing. Rescue)    FEROSUL 325 mg (65 mg iron) Tab tablet Take 1 tablet (325 mg total) by mouth once daily.    furosemide (LASIX) 20 MG tablet Take 2 tablets (40 mg total) by mouth 2 (two) times daily.    losartan (COZAAR) 25 MG tablet Take 1 tablet (25 mg total) by mouth once daily.    metoprolol succinate (TOPROL-XL) 25 MG 24 hr tablet Take 1 tablet (25 mg total) by mouth once daily.    nicotine (NICODERM CQ) 21 mg/24 hr Place 1 patch onto the skin once daily.    nystatin (MYCOSTATIN) 100,000 unit/mL suspension Take 1 mL (100,000  Units total) by mouth 4 (four) times daily. for 10 days    oxybutynin (DITROPAN) 5 MG Tab Take 1 tablet (5 mg total) by mouth 3 (three) times daily.    pantoprazole (PROTONIX) 40 MG tablet Take 1 tablet (40 mg total) by mouth once daily.    polyethylene glycol (GLYCOLAX) 17 gram/dose powder Take 17 g by mouth daily as needed for Constipation.    rosuvastatin (CRESTOR) 20 MG tablet Take 1 tablet (20 mg total) by mouth every evening.    spironolactone (ALDACTONE) 25 MG tablet Take 25 mg by mouth once daily.    [] tramadol-acetaminophen 37.5-325 mg (ULTRACET) 37.5-325 mg Tab Take 1 tablet by mouth every 8 (eight) hours as needed for Pain.    naloxone (NARCAN) 4 mg/actuation Spry 4mg by nasal route as needed for opioid overdose; may repeat every 2-3 minutes in alternating nostrils until medical help arrives. Call 911 (Patient not taking: Reported on 2024)     Antibiotics (From admission, onward)      Start     Stop Route Frequency Ordered    24 1700  meropenem (MERREM) 1 g in 0.9% NaCl 100 mL IVPB (MB+)         -- IV Every 12 hours (non-standard times) 24 1540          Antifungals (From admission, onward)      Start     Stop Route Frequency Ordered    24 0900  nystatin 100,000 unit/mL suspension 100,000 Units         -- Oral 4 times daily 24 0045          Antivirals (From admission, onward)      None               There is no immunization history on file for this patient.    Family History    None       Social History     Socioeconomic History    Marital status: Single   Tobacco Use    Smoking status: Every Day     Current packs/day: 0.50     Average packs/day: 2.0 packs/day for 57.6 years (114.4 ttl pk-yrs)     Types: Cigarettes     Start date:     Smokeless tobacco: Never    Tobacco comments:     Pt is a 2 pk/day cigarette smoker x 57 yrs. She states that she cut down to 0.5 pk/day or less approximately 6 months ago, and is trying to quit. Ambulatory referral to Smoking  Cessation clinic following hospital discharge.    Substance and Sexual Activity    Alcohol use: Yes     Comment: socailly    Drug use: Yes     Types: Cocaine, Methamphetamines     Comment: denies cocaine or meth. Reports maijurana use    Sexual activity: Yes     Social Determinants of Health     Financial Resource Strain: Medium Risk (8/21/2024)    Overall Financial Resource Strain (CARDIA)     Difficulty of Paying Living Expenses: Somewhat hard   Food Insecurity: Unknown (8/21/2024)    Hunger Vital Sign     Worried About Running Out of Food in the Last Year: Never true   Recent Concern: Food Insecurity - Food Insecurity Present (8/12/2024)    Hunger Vital Sign     Worried About Running Out of Food in the Last Year: Sometimes true   Transportation Needs: Unmet Transportation Needs (8/12/2024)    TRANSPORTATION NEEDS     Transportation : Yes, it has kept me from medical appointments or from getting my medications.   Physical Activity: Inactive (8/21/2024)    Exercise Vital Sign     Days of Exercise per Week: 0 days     Minutes of Exercise per Session: 20 min   Stress: Stress Concern Present (8/21/2024)    [employer] - Occupational Stress Questionnaire     Feeling of Stress : To some extent   Housing Stability: High Risk (8/21/2024)    Housing Stability Vital Sign     Unable to Pay for Housing in the Last Year: Yes     Homeless in the Last Year: Yes     Review of Systems   Respiratory:  Positive for shortness of breath.    Gastrointestinal:  Positive for abdominal pain. Negative for abdominal distention, blood in stool and diarrhea.   Genitourinary:  Positive for dysuria and flank pain. Negative for difficulty urinating and hematuria.   All other systems reviewed and are negative.    Objective:     Vital Signs (Most Recent):  Temp: 98.3 °F (36.8 °C) (08/23/24 0830)  Pulse: 81 (08/23/24 0914)  Resp: 18 (08/23/24 0914)  BP: (!) 151/81 (08/23/24 0830)  SpO2: 97 % (08/23/24 0914) Vital Signs  (24h Range):  Temp:  [97.5 °F (36.4 °C)-98.3 °F (36.8 °C)] 98.3 °F (36.8 °C)  Pulse:  [74-82] 81  Resp:  [16-20] 18  SpO2:  [94 %-98 %] 97 %  BP: ()/(57-81) 151/81     Weight: 81.3 kg (179 lb 2 oz)  Body mass index is 30.75 kg/m².    Estimated Creatinine Clearance: 52.7 mL/min (based on SCr of 1.2 mg/dL).     Physical Exam  Vitals and nursing note reviewed.   Constitutional:       General: She is not in acute distress.     Appearance: She is not toxic-appearing or diaphoretic.   HENT:      Nose: No congestion.      Mouth/Throat:      Comments: Poor dentition  Eyes:      General: No scleral icterus.  Cardiovascular:      Rate and Rhythm: Normal rate.      Heart sounds: Normal heart sounds. No murmur heard.  Pulmonary:      Breath sounds: Wheezing present.   Abdominal:      General: Bowel sounds are normal. There is no distension.      Palpations: Abdomen is soft.      Tenderness: There is right CVA tenderness. There is no left CVA tenderness.   Musculoskeletal:         General: No swelling or tenderness.      Cervical back: Neck supple. No tenderness.      Right lower leg: No edema.      Left lower leg: No edema.   Skin:     General: Skin is warm and dry.      Findings: No erythema or rash.   Neurological:      Mental Status: She is oriented to person, place, and time. Mental status is at baseline.   Psychiatric:         Behavior: Behavior normal.         Thought Content: Thought content normal.          Significant Labs: Blood Culture:   Recent Labs   Lab 08/19/24 2035   LABBLOO No Growth to date  No Growth to date  No Growth to date  No Growth to date  No Growth to date  No Growth to date  No Growth to date  No Growth to date     CBC:   Recent Labs   Lab 08/22/24 0527 08/23/24 0818   WBC 12.53 10.34   HGB 8.2* 8.2*   HCT 26.8* 26.7*    334     CMP:   Recent Labs   Lab 08/22/24 0527 08/23/24 0818   * 135*   K 3.8 3.9    104   CO2 26 26   GLU 82 88   BUN 34* 29*   CREATININE 1.6*  1.2   CALCIUM 8.0* 8.0*   PROT 5.7* 5.3*   ALBUMIN 2.1* 1.9*   BILITOT 0.1 0.1   ALKPHOS 68 63   AST 18 19   ALT 15 14   ANIONGAP 6* 5*     Microbiology Results (last 7 days)       Procedure Component Value Units Date/Time    Blood culture x two cultures. Draw prior to antibiotics. [[id number]] Collected: 08/19/24 2035    Order Status: Completed Specimen: Blood from Peripheral, Forearm, Left Updated: 08/22/24 2212     Blood Culture, Routine No Growth to date      No Growth to date      No Growth to date      No Growth to date    Narrative:      Aerobic and anaerobic    Blood culture x two cultures. Draw prior to antibiotics. [[id number]] Collected: 08/19/24 2035    Order Status: Completed Specimen: Blood from Peripheral, Antecubital, Right Updated: 08/22/24 2212     Blood Culture, Routine No Growth to date      No Growth to date      No Growth to date      No Growth to date    Narrative:      Aerobic and anaerobic    Urine culture [[id number]]  (Abnormal)  (Susceptibility) Collected: 08/20/24 0227    Order Status: Completed Specimen: Urine Updated: 08/22/24 1430     Urine Culture, Routine KLEBSIELLA PNEUMONIAE ESBL  >100,000 cfu/ml      Narrative:      Specimen Source->Urine          Urine Culture:   Recent Labs   Lab 08/20/24 0227   LABURIN KLEBSIELLA PNEUMONIAE ESBL  >100,000 cfu/ml  *     Urine Studies:   Recent Labs   Lab 08/20/24 0227   COLORU Yellow   APPEARANCEUA Hazy*   PHUR 6.0   SPECGRAV 1.015   PROTEINUA Trace*   GLUCUA Negative   KETONESU Negative   BILIRUBINUA Negative   OCCULTUA Negative   NITRITE Positive*   LEUKOCYTESUR 3+*   RBCUA 1   WBCUA >100*   BACTERIA Moderate*   SQUAMEPITHEL 0   HYALINECASTS 2*     All pertinent labs within the past 24 hours have been reviewed.    Significant Imaging: I have reviewed all pertinent imaging results/findings within the past 24 hours.    I have personally reviewed records / hospital notes from   service and other specialty providers. I have also reviewed CBC,  CMP/BMP,  cultures and imaging with my interpretation as documented in my assessment / plan.    Patient is high risk for infectious complications given pt's age, multiple co-morbidities, and case complexity.      Time: 75 minutes   50% of time spent on face-to-face counseling and coordination of care. Counseling included review of test results, diagnosis, and treatment plan with patient and/or family.

## 2024-08-23 NOTE — HPI
59F with HFrEF (last EF 30-35%) on life vest 2/2 ischemic cardiomyopathy (past MI), COPD, recent dx'd with colon cancer 02/2204, former substance abuse (cocaine + meth), chronic HCV infection (untreated), and current smoker -- who presents to the ED 08/19 via EMS [facility] for evaluation generalized weakness, dizziness, and hypotension.    Patient was recently admitted to [facility] 8/12-8/14 for acute CHF exacerbation and treated with aggressive diuresis (total 7 liter) with clinical improvement. Patient reports feeling fatigue since discharged from hospital and she has been taking diuretics lasix 40 mg bid and aldactone. Pt also c/o occipital headache, neck pain, dysuria, urinary frequency, suprapubic pain.     Pt afebrile, low BP 87/55, otherwise stable. Wbc 15, Cr 2.3 (up from 1.8 on 08.14), , procalc nml. CXR w/o acute process.     Patient states she recently moved to [facility] from [place] and about a month ago she was admitted to hospital in [place] for heart failure exacerbation when cardiology placed her on life vest as a bridge to AICD placement in near future.      Bld cxs 08/19 NGTD. UA+ wbc >100, Ucx +ESBL-kleb pneumo. Started on merrem 08/22.

## 2024-08-23 NOTE — ASSESSMENT & PLAN NOTE
-symptoms of suprapubic pain, urinary F/U/D. UA showed nitrite positive UTI. Received empiric dose of vancomycin and zosyn in ED for sepsis, transitioned to CTX, then transitioned to Cefepime after persistent leukocytosis. Urine Cx with ESBL Klebsiella.    - Meropenem transitioned to Ertapenem for 7 d duration, with OPAT note  - Midline team consulted  - CM to provide assistance and CM at [facility] to provide contact for patient to do follow up.  -  ordered for IV Abx assistance

## 2024-08-23 NOTE — SUBJECTIVE & OBJECTIVE
Past Medical History:   Diagnosis Date    Asthma     Bipolar disorder     Hypertension        Past Surgical History:   Procedure Laterality Date    CHOLECYSTECTOMY      SHOULDER SURGERY      TUBAL LIGATION         Review of patient's allergies indicates:  No Known Allergies    Medications:  Medications Prior to Admission   Medication Sig    albuterol (PROVENTIL/VENTOLIN HFA) 90 mcg/actuation inhaler Inhale 1-2 puffs into the lungs every 6 (six) hours as needed for Wheezing. Rescue (Patient taking differently: Inhale 1 puff into the lungs every 6 (six) hours as needed for Wheezing. Rescue)    FEROSUL 325 mg (65 mg iron) Tab tablet Take 1 tablet (325 mg total) by mouth once daily.    furosemide (LASIX) 20 MG tablet Take 2 tablets (40 mg total) by mouth 2 (two) times daily.    losartan (COZAAR) 25 MG tablet Take 1 tablet (25 mg total) by mouth once daily.    metoprolol succinate (TOPROL-XL) 25 MG 24 hr tablet Take 1 tablet (25 mg total) by mouth once daily.    nicotine (NICODERM CQ) 21 mg/24 hr Place 1 patch onto the skin once daily.    nystatin (MYCOSTATIN) 100,000 unit/mL suspension Take 1 mL (100,000 Units total) by mouth 4 (four) times daily. for 10 days    oxybutynin (DITROPAN) 5 MG Tab Take 1 tablet (5 mg total) by mouth 3 (three) times daily.    pantoprazole (PROTONIX) 40 MG tablet Take 1 tablet (40 mg total) by mouth once daily.    polyethylene glycol (GLYCOLAX) 17 gram/dose powder Take 17 g by mouth daily as needed for Constipation.    rosuvastatin (CRESTOR) 20 MG tablet Take 1 tablet (20 mg total) by mouth every evening.    spironolactone (ALDACTONE) 25 MG tablet Take 25 mg by mouth once daily.    [] tramadol-acetaminophen 37.5-325 mg (ULTRACET) 37.5-325 mg Tab Take 1 tablet by mouth every 8 (eight) hours as needed for Pain.    naloxone (NARCAN) 4 mg/actuation Spry 4mg by nasal route as needed for opioid overdose; may repeat every 2-3 minutes in alternating nostrils until medical help arrives. Call 911  (Patient not taking: Reported on 8/21/2024)     Antibiotics (From admission, onward)      Start     Stop Route Frequency Ordered    08/22/24 1700  meropenem (MERREM) 1 g in 0.9% NaCl 100 mL IVPB (MB+)         -- IV Every 12 hours (non-standard times) 08/22/24 1540          Antifungals (From admission, onward)      Start     Stop Route Frequency Ordered    08/20/24 0900  nystatin 100,000 unit/mL suspension 100,000 Units         -- Oral 4 times daily 08/20/24 0045          Antivirals (From admission, onward)      None               There is no immunization history on file for this patient.    Family History    None       Social History     Socioeconomic History    Marital status: Single   Tobacco Use    Smoking status: Every Day     Current packs/day: 0.50     Average packs/day: 2.0 packs/day for 57.6 years (114.4 ttl pk-yrs)     Types: Cigarettes     Start date: 1967    Smokeless tobacco: Never    Tobacco comments:     Pt is a 2 pk/day cigarette smoker x 57 yrs. She states that she cut down to 0.5 pk/day or less approximately 6 months ago, and is trying to quit. Ambulatory referral to Smoking Cessation clinic following hospital discharge.    Substance and Sexual Activity    Alcohol use: Yes     Comment: socailly    Drug use: Yes     Types: Cocaine, Methamphetamines     Comment: denies cocaine or meth. Reports maijurana use    Sexual activity: Yes     Social Determinants of Health     Financial Resource Strain: Medium Risk (8/21/2024)    Overall Financial Resource Strain (CARDIA)     Difficulty of Paying Living Expenses: Somewhat hard   Food Insecurity: Unknown (8/21/2024)    Hunger Vital Sign     Worried About Running Out of Food in the Last Year: Never true   Recent Concern: Food Insecurity - Food Insecurity Present (8/12/2024)    Hunger Vital Sign     Worried About Running Out of Food in the Last Year: Sometimes true   Transportation Needs: Unmet Transportation Needs (8/12/2024)    TRANSPORTATION NEEDS      Transportation : Yes, it has kept me from medical appointments or from getting my medications.   Physical Activity: Inactive (8/21/2024)    Exercise Vital Sign     Days of Exercise per Week: 0 days     Minutes of Exercise per Session: 20 min   Stress: Stress Concern Present (8/21/2024)    [employer] - Occupational Stress Questionnaire     Feeling of Stress : To some extent   Housing Stability: High Risk (8/21/2024)    Housing Stability Vital Sign     Unable to Pay for Housing in the Last Year: Yes     Homeless in the Last Year: Yes     Review of Systems   Respiratory:  Positive for shortness of breath.    Gastrointestinal:  Positive for abdominal pain. Negative for abdominal distention, blood in stool and diarrhea.   Genitourinary:  Positive for dysuria and flank pain. Negative for difficulty urinating and hematuria.   All other systems reviewed and are negative.    Objective:     Vital Signs (Most Recent):  Temp: 98.3 °F (36.8 °C) (08/23/24 0830)  Pulse: 81 (08/23/24 0914)  Resp: 18 (08/23/24 0914)  BP: (!) 151/81 (08/23/24 0830)  SpO2: 97 % (08/23/24 0914) Vital Signs (24h Range):  Temp:  [97.5 °F (36.4 °C)-98.3 °F (36.8 °C)] 98.3 °F (36.8 °C)  Pulse:  [74-82] 81  Resp:  [16-20] 18  SpO2:  [94 %-98 %] 97 %  BP: ()/(57-81) 151/81     Weight: 81.3 kg (179 lb 2 oz)  Body mass index is 30.75 kg/m².    Estimated Creatinine Clearance: 52.7 mL/min (based on SCr of 1.2 mg/dL).     Physical Exam  Vitals and nursing note reviewed.   Constitutional:       General: She is not in acute distress.     Appearance: She is not toxic-appearing or diaphoretic.   HENT:      Nose: No congestion.      Mouth/Throat:      Comments: Poor dentition  Eyes:      General: No scleral icterus.  Cardiovascular:      Rate and Rhythm: Normal rate.      Heart sounds: Normal heart sounds. No murmur heard.  Pulmonary:      Breath sounds: Wheezing present.   Abdominal:      General: Bowel sounds are normal. There is  no distension.      Palpations: Abdomen is soft.      Tenderness: There is right CVA tenderness. There is no left CVA tenderness.   Musculoskeletal:         General: No swelling or tenderness.      Cervical back: Neck supple. No tenderness.      Right lower leg: No edema.      Left lower leg: No edema.   Skin:     General: Skin is warm and dry.      Findings: No erythema or rash.   Neurological:      Mental Status: She is oriented to person, place, and time. Mental status is at baseline.   Psychiatric:         Behavior: Behavior normal.         Thought Content: Thought content normal.          Significant Labs: Blood Culture:   Recent Labs   Lab 08/19/24 2035   LABBLOO No Growth to date  No Growth to date  No Growth to date  No Growth to date  No Growth to date  No Growth to date  No Growth to date  No Growth to date     CBC:   Recent Labs   Lab 08/22/24 0527 08/23/24 0818   WBC 12.53 10.34   HGB 8.2* 8.2*   HCT 26.8* 26.7*    334     CMP:   Recent Labs   Lab 08/22/24 0527 08/23/24 0818   * 135*   K 3.8 3.9    104   CO2 26 26   GLU 82 88   BUN 34* 29*   CREATININE 1.6* 1.2   CALCIUM 8.0* 8.0*   PROT 5.7* 5.3*   ALBUMIN 2.1* 1.9*   BILITOT 0.1 0.1   ALKPHOS 68 63   AST 18 19   ALT 15 14   ANIONGAP 6* 5*     Microbiology Results (last 7 days)       Procedure Component Value Units Date/Time    Blood culture x two cultures. Draw prior to antibiotics. [[id number]] Collected: 08/19/24 2035    Order Status: Completed Specimen: Blood from Peripheral, Forearm, Left Updated: 08/22/24 2212     Blood Culture, Routine No Growth to date      No Growth to date      No Growth to date      No Growth to date    Narrative:      Aerobic and anaerobic    Blood culture x two cultures. Draw prior to antibiotics. [[id number]] Collected: 08/19/24 2035    Order Status: Completed Specimen: Blood from Peripheral, Antecubital, Right Updated: 08/22/24 2212     Blood Culture, Routine No Growth to date      No Growth  to date      No Growth to date      No Growth to date    Narrative:      Aerobic and anaerobic    Urine culture [[id number]]  (Abnormal)  (Susceptibility) Collected: 08/20/24 0227    Order Status: Completed Specimen: Urine Updated: 08/22/24 1430     Urine Culture, Routine KLEBSIELLA PNEUMONIAE ESBL  >100,000 cfu/ml      Narrative:      Specimen Source->Urine          Urine Culture:   Recent Labs   Lab 08/20/24 0227   LABURIN KLEBSIELLA PNEUMONIAE ESBL  >100,000 cfu/ml  *     Urine Studies:   Recent Labs   Lab 08/20/24 0227   COLORU Yellow   APPEARANCEUA Hazy*   PHUR 6.0   SPECGRAV 1.015   PROTEINUA Trace*   GLUCUA Negative   KETONESU Negative   BILIRUBINUA Negative   OCCULTUA Negative   NITRITE Positive*   LEUKOCYTESUR 3+*   RBCUA 1   WBCUA >100*   BACTERIA Moderate*   SQUAMEPITHEL 0   HYALINECASTS 2*     All pertinent labs within the past 24 hours have been reviewed.    Significant Imaging: I have reviewed all pertinent imaging results/findings within the past 24 hours.    I have personally reviewed records / hospital notes from   service and other specialty providers. I have also reviewed CBC, CMP/BMP,  cultures and imaging with my interpretation as documented in my assessment / plan.    Patient is high risk for infectious complications given pt's age, multiple co-morbidities, and case complexity.      Time: 75 minutes   50% of time spent on face-to-face counseling and coordination of care. Counseling included review of test results, diagnosis, and treatment plan with patient and/or family.

## 2024-08-23 NOTE — ASSESSMENT & PLAN NOTE
Patient's most recent potassium results are listed below.   Recent Labs     08/21/24  0449 08/22/24  0527 08/23/24  0818   K 3.8 3.8 3.9       Plan  - Replete potassium per protocol  - Monitor potassium Daily  - Patient's hypokalemia is improving  - follow up with repeat labs and replace as needed

## 2024-08-24 VITALS
TEMPERATURE: 98 F | WEIGHT: 179.13 LBS | RESPIRATION RATE: 18 BRPM | HEART RATE: 83 BPM | BODY MASS INDEX: 30.58 KG/M2 | DIASTOLIC BLOOD PRESSURE: 88 MMHG | SYSTOLIC BLOOD PRESSURE: 129 MMHG | OXYGEN SATURATION: 97 % | HEIGHT: 64 IN

## 2024-08-24 LAB
ALBUMIN SERPL BCP-MCNC: 2.2 G/DL (ref 3.5–5.2)
ALP SERPL-CCNC: 72 U/L (ref 55–135)
ALT SERPL W/O P-5'-P-CCNC: 18 U/L (ref 10–44)
ANION GAP SERPL CALC-SCNC: 7 MMOL/L (ref 8–16)
AST SERPL-CCNC: 27 U/L (ref 10–40)
BACTERIA BLD CULT: NORMAL
BACTERIA BLD CULT: NORMAL
BASOPHILS # BLD AUTO: 0.09 K/UL (ref 0–0.2)
BASOPHILS NFR BLD: 0.8 % (ref 0–1.9)
BILIRUB SERPL-MCNC: 0.2 MG/DL (ref 0.1–1)
BUN SERPL-MCNC: 25 MG/DL (ref 6–20)
CALCIUM SERPL-MCNC: 8.2 MG/DL (ref 8.7–10.5)
CHLORIDE SERPL-SCNC: 103 MMOL/L (ref 95–110)
CO2 SERPL-SCNC: 25 MMOL/L (ref 23–29)
CREAT SERPL-MCNC: 1.2 MG/DL (ref 0.5–1.4)
DIFFERENTIAL METHOD BLD: ABNORMAL
EOSINOPHIL # BLD AUTO: 0.2 K/UL (ref 0–0.5)
EOSINOPHIL NFR BLD: 2 % (ref 0–8)
ERYTHROCYTE [DISTWIDTH] IN BLOOD BY AUTOMATED COUNT: 20.6 % (ref 11.5–14.5)
EST. GFR  (NO RACE VARIABLE): 52.5 ML/MIN/1.73 M^2
GLUCOSE SERPL-MCNC: 91 MG/DL (ref 70–110)
HCT VFR BLD AUTO: 27.6 % (ref 37–48.5)
HGB BLD-MCNC: 8.3 G/DL (ref 12–16)
IMM GRANULOCYTES # BLD AUTO: 0.16 K/UL (ref 0–0.04)
IMM GRANULOCYTES NFR BLD AUTO: 1.4 % (ref 0–0.5)
LYMPHOCYTES # BLD AUTO: 2.8 K/UL (ref 1–4.8)
LYMPHOCYTES NFR BLD: 25.5 % (ref 18–48)
MCH RBC QN AUTO: 23.9 PG (ref 27–31)
MCHC RBC AUTO-ENTMCNC: 30.1 G/DL (ref 32–36)
MCV RBC AUTO: 80 FL (ref 82–98)
MONOCYTES # BLD AUTO: 0.7 K/UL (ref 0.3–1)
MONOCYTES NFR BLD: 5.9 % (ref 4–15)
NEUTROPHILS # BLD AUTO: 7.1 K/UL (ref 1.8–7.7)
NEUTROPHILS NFR BLD: 64.4 % (ref 38–73)
NRBC BLD-RTO: 0 /100 WBC
PLATELET # BLD AUTO: 355 K/UL (ref 150–450)
PMV BLD AUTO: 9.7 FL (ref 9.2–12.9)
POTASSIUM SERPL-SCNC: 4.2 MMOL/L (ref 3.5–5.1)
PROT SERPL-MCNC: 6 G/DL (ref 6–8.4)
RBC # BLD AUTO: 3.47 M/UL (ref 4–5.4)
SODIUM SERPL-SCNC: 135 MMOL/L (ref 136–145)
WBC # BLD AUTO: 11.07 K/UL (ref 3.9–12.7)

## 2024-08-24 PROCEDURE — 63600175 PHARM REV CODE 636 W HCPCS

## 2024-08-24 PROCEDURE — 94761 N-INVAS EAR/PLS OXIMETRY MLT: CPT

## 2024-08-24 PROCEDURE — 36415 COLL VENOUS BLD VENIPUNCTURE: CPT

## 2024-08-24 PROCEDURE — 25000242 PHARM REV CODE 250 ALT 637 W/ HCPCS

## 2024-08-24 PROCEDURE — S4991 NICOTINE PATCH NONLEGEND: HCPCS | Performed by: HOSPITALIST

## 2024-08-24 PROCEDURE — 25000003 PHARM REV CODE 250: Performed by: HOSPITALIST

## 2024-08-24 PROCEDURE — 80053 COMPREHEN METABOLIC PANEL: CPT

## 2024-08-24 PROCEDURE — 94640 AIRWAY INHALATION TREATMENT: CPT

## 2024-08-24 PROCEDURE — 25000003 PHARM REV CODE 250

## 2024-08-24 PROCEDURE — 85025 COMPLETE CBC W/AUTO DIFF WBC: CPT

## 2024-08-24 PROCEDURE — A4216 STERILE WATER/SALINE, 10 ML: HCPCS

## 2024-08-24 RX ORDER — BUTALBITAL, ACETAMINOPHEN AND CAFFEINE 50; 325; 40 MG/1; MG/1; MG/1
1 TABLET ORAL EVERY 4 HOURS PRN
Qty: 60 TABLET | Refills: 0 | Status: SHIPPED | OUTPATIENT
Start: 2024-08-24 | End: 2024-09-23

## 2024-08-24 RX ORDER — FUROSEMIDE 20 MG/1
40 TABLET ORAL DAILY
Qty: 60 TABLET | Refills: 11 | Status: SHIPPED | OUTPATIENT
Start: 2024-08-24 | End: 2025-08-24

## 2024-08-24 RX ADMIN — NYSTATIN 100000 UNITS: 100000 SUSPENSION ORAL at 01:08

## 2024-08-24 RX ADMIN — Medication 10 ML: at 06:08

## 2024-08-24 RX ADMIN — BUTALBITAL, ACETAMINOPHEN, AND CAFFEINE 1 TABLET: 325; 50; 40 TABLET ORAL at 03:08

## 2024-08-24 RX ADMIN — Medication 1 PATCH: at 08:08

## 2024-08-24 RX ADMIN — METOPROLOL SUCCINATE 25 MG: 25 TABLET, EXTENDED RELEASE ORAL at 08:08

## 2024-08-24 RX ADMIN — ATORVASTATIN CALCIUM 40 MG: 40 TABLET, FILM COATED ORAL at 08:08

## 2024-08-24 RX ADMIN — PANTOPRAZOLE SODIUM 40 MG: 40 TABLET, DELAYED RELEASE ORAL at 08:08

## 2024-08-24 RX ADMIN — ERTAPENEM 1 G: 1 INJECTION INTRAMUSCULAR; INTRAVENOUS at 11:08

## 2024-08-24 RX ADMIN — IPRATROPIUM BROMIDE AND ALBUTEROL SULFATE 3 ML: 2.5; .5 SOLUTION RESPIRATORY (INHALATION) at 01:08

## 2024-08-24 RX ADMIN — NYSTATIN 100000 UNITS: 100000 SUSPENSION ORAL at 03:08

## 2024-08-24 RX ADMIN — OXYBUTYNIN CHLORIDE 5 MG: 5 TABLET ORAL at 02:08

## 2024-08-24 RX ADMIN — OXYBUTYNIN CHLORIDE 5 MG: 5 TABLET ORAL at 08:08

## 2024-08-24 RX ADMIN — Medication 10 ML: at 12:08

## 2024-08-24 RX ADMIN — NYSTATIN 100000 UNITS: 100000 SUSPENSION ORAL at 09:08

## 2024-08-24 RX ADMIN — IPRATROPIUM BROMIDE AND ALBUTEROL SULFATE 3 ML: 2.5; .5 SOLUTION RESPIRATORY (INHALATION) at 08:08

## 2024-08-24 RX ADMIN — FERROUS SULFATE TAB 325 MG (65 MG ELEMENTAL FE) 1 EACH: 325 (65 FE) TAB at 09:08

## 2024-08-24 RX ADMIN — ENOXAPARIN SODIUM 40 MG: 40 INJECTION SUBCUTANEOUS at 03:08

## 2024-08-24 NOTE — PLAN OF CARE
[facility] ([place] [address])      HOME HEALTH ORDERS  FACE TO FACE ENCOUNTER    Patient Name: Mary Ellen Saini  YOB: 1965    PCP: Marlen, Primary Doctor   PCP Address: None  PCP Phone Number: None  PCP Fax: None    Encounter Date: 8/19/24    Admit to Home Health    Diagnoses:  Active Hospital Problems    Diagnosis  POA    *Urinary tract infection due to ESBL Klebsiella [N39.0, B96.89]  Yes    Acute hepatitis C virus infection without hepatic coma [B17.10]  Unknown    Mobility impaired [Z74.09]  Unknown    Sepsis [A41.9]  Yes    Symptomatic hypotension [I95.9]  Yes    KENY (acute kidney injury) [N17.9]  Yes    Hypomagnesemia [E83.42]  Yes    Chronic HFrEF (heart failure with reduced ejection fraction) [I50.22]  Yes    Coronary artery disease involving native coronary artery of native heart without angina pectoris [I25.10]  Yes    COPD (chronic obstructive pulmonary disease) [J44.9]  Yes    Hepatitis C antibody positive in blood [R76.8]  Yes    ACP (advance care planning) [Z71.89]  Not Applicable    Tobacco dependency [F17.200]  Yes    Colorectal carcinoma [C19]  Yes    Hypokalemia [E87.6]  Yes    Elevated troponin [R79.89]  Yes    Homelessness [Z59.00]  Not Applicable     Pt states is homeless and requesting assistance    PLAN:   Conuslted CM to identify community resources        Resolved Hospital Problems   No resolved problems to display.       Follow Up Appointments:  Future Appointments   Date Time Provider Department Center   8/29/2024  1:30 PM Phuc Montes PA-C [facility]       Allergies:Review of patient's allergies indicates:  No Known Allergies    Medications: Review discharge medications with patient and family and provide education.    Current Facility-Administered Medications   Medication Dose Route Frequency Provider Last Rate Last Admin    albuterol-ipratropium 2.5 mg-0.5 mg/3 mL nebulizer solution 3 mL  3 mL Nebulization Q6H WAKE Chaitanya Wray MD   3 mL at 08/23/24  2116    aluminum-magnesium hydroxide-simethicone 200-200-20 mg/5 mL suspension 30 mL  30 mL Oral QID PRN GemIshmael govea K., DO        atorvastatin tablet 40 mg  40 mg Oral Daily Gem, Ishmael K., DO   40 mg at 08/23/24 0836    butalbital-acetaminophen-caffeine -40 mg per tablet 1 tablet  1 tablet Oral Q4H PRN Chaitanya Wray MD   1 tablet at 08/23/24 2322    dextrose 10% bolus 125 mL 125 mL  12.5 g Intravenous PRN Gem, Arup K., DO        dextrose 10% bolus 250 mL 250 mL  25 g Intravenous PRN Gem, Arup K., DO        enoxaparin injection 40 mg  40 mg Subcutaneous Q24H (prophylaxis, 1700) Chaitanya Wray MD   40 mg at 08/23/24 1631    ertapenem (INVANZ) 1 g in 0.9% NaCl 100 mL IVPB (MB+)  1 g Intravenous Q24H Ej Bennett PA-C   Stopped at 08/23/24 1706    ferrous sulfate tablet 1 each  1 tablet Oral Daily GemIshmael govea K., DO   1 each at 08/23/24 0836    glucagon (human recombinant) injection 1 mg  1 mg Intramuscular PRN Gem, Ishmael K., DO        glucose chewable tablet 16 g  16 g Oral PRN Gem, Arup K., DO        glucose chewable tablet 24 g  24 g Oral PRN Gem, Arup K., DO        melatonin tablet 6 mg  6 mg Oral Nightly PRN GemIshmael govea K., DO   6 mg at 08/21/24 2309    metoprolol succinate (TOPROL-XL) 24 hr tablet 25 mg  25 mg Oral Daily Gem, Arup K., DO   25 mg at 08/23/24 0836    naloxone 0.4 mg/mL injection 0.02 mg  0.02 mg Intravenous PRN Gem, Arup K., DO        nicotine 21 mg/24 hr 1 patch  1 patch Transdermal Daily Gem, Arup K., DO   1 patch at 08/23/24 0836    nystatin 100,000 unit/mL suspension 100,000 Units  100,000 Units Oral QID GemIshmael govea K., DO   100,000 Units at 08/23/24 2045    oxybutynin tablet 5 mg  5 mg Oral TID Ishmael Rabago,    5 mg at 08/23/24 2045    pantoprazole EC tablet 40 mg  40 mg Oral Daily Ishmael Rabago,    40 mg at 08/23/24 0836    senna-docusate 8.6-50 mg per tablet 2 tablet  2 tablet Oral BID PRN Ishmael Rabago,         sodium chloride 0.9% flush 10 mL  10 mL Intravenous  Q12H PRN Ishmael Rabago, DO        sodium chloride 0.9% flush 10 mL  10 mL Intravenous Q6H Chaitanya Wray MD   10 mL at 08/24/24 0600    And    sodium chloride 0.9% flush 10 mL  10 mL Intravenous PRN Chaitanya Wray MD        traMADoL tablet 50 mg  50 mg Oral Q8H PRN GemIshmael govea., DO   50 mg at 08/22/24 0240        Medication List        START taking these medications      0.9% NaCl PgBk 100 mL with ertapenem 1 gram SolR 1 g  Inject 1 g into the vein once daily.     butalbital-acetaminophen-caffeine -40 mg -40 mg per tablet  Commonly known as: FIORICET, ESGIC  Take 1 tablet by mouth every 4 (four) hours as needed for Headaches.     naloxone 4 mg/actuation Spry  Commonly known as: NARCAN  4mg by nasal route as needed for opioid overdose; may repeat every 2-3 minutes in alternating nostrils until medical help arrives. Call 911            CHANGE how you take these medications      albuterol 90 mcg/actuation inhaler  Commonly known as: PROVENTIL/VENTOLIN HFA  Inhale 1-2 puffs into the lungs every 6 (six) hours as needed for Wheezing. Rescue  What changed: how much to take     furosemide 20 MG tablet  Commonly known as: LASIX  Take 2 tablets (40 mg total) by mouth once daily.  What changed: when to take this            CONTINUE taking these medications      FeroSuL 325 mg (65 mg iron) Tab tablet  Generic drug: ferrous sulfate  Take 1 tablet (325 mg total) by mouth once daily.     losartan 25 MG tablet  Commonly known as: COZAAR  Take 1 tablet (25 mg total) by mouth once daily.     metoprolol succinate 25 MG 24 hr tablet  Commonly known as: TOPROL-XL  Take 1 tablet (25 mg total) by mouth once daily.     nicotine 21 mg/24 hr  Commonly known as: NICODERM CQ  Place 1 patch onto the skin once daily.     oxybutynin 5 MG Tab  Commonly known as: DITROPAN  Take 1 tablet (5 mg total) by mouth 3 (three) times daily.     pantoprazole 40 MG tablet  Commonly known as: PROTONIX  Take 1 tablet (40 mg total) by mouth once  daily.     rosuvastatin 20 MG tablet  Commonly known as: CRESTOR  Take 1 tablet (20 mg total) by mouth every evening.     spironolactone 25 MG tablet  Commonly known as: ALDACTONE  Take 25 mg by mouth once daily.            STOP taking these medications      nystatin 100,000 unit/mL suspension  Commonly known as: MYCOSTATIN     polyethylene glycol 17 gram/dose powder  Commonly known as: GLYCOLAX     tramadol-acetaminophen 37.5-325 mg 37.5-325 mg Tab  Commonly known as: ULTRACET                I have seen and examined this patient within the last 30 days. My clinical findings that support the need for the home health skilled services and home bound status are the following:no   Weakness/numbness causing balance and gait disturbance due to COPD Exacerbation and Infection making it taxing to leave home.     Diet:   cardiac diet    Labs:  SN to perform labs:  CBC: Weekly; 1 week(s), CMP: Weekly; 1 week(s), and CRP: Weekly; 1 week(s) complete on Mondays. Fax Lab Results to Infectious Diseases Provider:  Ej Bennett PA-C. [facility] Fax Number: [phone]    Referrals/ Consults  Aide to provide assistance with personal care, ADLs, and vital signs.    Activities:   activity as tolerated    Nursing:   Agency to admit patient within 24 hours of hospital discharge unless specified on physician order or at patient request    SN to complete comprehensive assessment including routine vital signs. Instruct on disease process and s/s of complications to report to MD. Review/verify medication list sent home with the patient at time of discharge  and instruct patient/caregiver as needed. Frequency may be adjusted depending on start of care date.     Skilled nurse to perform up to 3 visits PRN for symptoms related to diagnosis    Notify MD if SBP > 160 or < 90; DBP > 90 or < 50; HR > 120 or < 50; Temp > 101; O2 < 88%;     Ok to schedule additional visits based on staff availability and patient request on consecutive days  within the home health episode.    When multiple disciplines ordered:    Start of Care occurs on Sunday - Wednesday schedule remaining discipline evaluations as ordered on separate consecutive days following the start of care.    Thursday SOC -schedule subsequent evaluations Friday and Monday the following week.     Friday - Saturday SOC - schedule subsequent discipline evaluations on consecutive days starting Monday of the following week.    For all post-discharge communication and subsequent orders please contact patient's primary care physician.     Miscellaneous   Home Infusion Therapy:   SN to perform Infusion Therapy/Central Line Care.  Review Central Line Care & Central Line Flush with patient.    Administer (drug and dose):  IV - Ertapenem 1g Q24h     Therapy Duration:  7d     Estimated end date of IV antibiotics:  08/29    Last dose given: 08/24/24 1100          Home dose due: 08/25/24 1100    Scrub the Hub: Prior to accessing the line, always perform a 30 second alcohol scrub  Each lumen of the central line is to be flushed at least daily with 10 mL Normal Saline and 3 mL Heparin flush (10 units/mL)  Skilled Nurse (SN) may draw blood from IV access  Blood Draw Procedure:   - Aspirate at least 5 mL of blood   - Discard   - Obtain specimen   - Change injection cap   - Flush with 20 mL Normal Saline followed by a                 3-5 mL Heparin flush (10 units/mL)  Central :   - Sterile dressing changes are done weekly and as needed.   - Use chlor-hexadine scrub to cleanse site, apply Biopatch to insertion site,       apply securement device dressing   - Injection caps are changed weekly and after EVERY lab draw.   - If sterile gauze is under dressing to control oozing,                 dressing change must be performed every 24 hours until gauze is not needed.    Home Health Aide:  Nursing Three times weekly and Home Health Aide Three times weekly    Wound Care Orders  no    I certify that this  patient is confined to her home and needs intermittent skilled nursing care.

## 2024-08-24 NOTE — ASSESSMENT & PLAN NOTE
-recently moved from [place] to [facility]   -she is currently staying at [facility] and she has a       Clinically asymptomatic.   Monitored by PCP

## 2024-08-24 NOTE — DISCHARGE SUMMARY
[facility] Surg ([address] Medicine  Discharge Summary      Patient Name: Mary Ellen Saini  MRN: [id number]  ANGELIQUE: [id number]  Patient Class: IP- Inpatient  Admission Date: 8/19/2024  Hospital Length of Stay: 3 days  Discharge Date and Time:  08/24/2024 3:50 PM  Attending Physician: Chaitanya Wray MD   Discharging Provider: Chaitanya Wray MD  Primary Care Provider: Marlen Primary Doctor  [facility] Medicine Team: [facility] MED Q Chaitanya Wray MD  Primary Care Team: [facility] MED Q    HPI:     Mary Ellen Saini is a 60 y/o F with hx HFrEF (last EF 30-35%) on life vest 2/2 ischemic cardiomyopathy (past MI), COPD, colon cancer, former substance abuse (cocaine + meth), current smoker who presents to the ED via EMS from the [facility] for evaluation generalized weakness and dizziness. Patient was recently admitted to [facility] 8/12-8/14 for acute CHF exacerbation and treated with aggressive diuresis (total 7 liter) with clinical improvement. Patient reports feeling fatigue since discharged from hospital and she has been taking diuretics lasix 40 mg bid and aldactone. Patient went to hospital discharge follow up clinic with cardiology and GI at [facility] earlier today. She felt worse after returning to [facility] from clinic appointments and she felt really weak with difficulty walking which prompted to call EMS.  EMS administered 500mL of NS due to her systolic BP of ~74. Afterwards, her systolic BP went up to ~84. Her O2 sat was around 99 and she was having 16RR per EMS. Pt also c/o occipital headache, neck pain, dysuria, urinary frequency, suprapubic pain. Pt denies fever, chills, cough, sob, leg swelling, orthopnea, chest pain, palpitation, firing of life vest, vomiting, abdominal pain, focal weakness/numbness, dark stool or bleeding from other sites.          ED course: afebrile, borderline low BP 87/55, oxygenating well on room air. WBC 15, K 3.3, Mg 1.5, Cr 2.3  up from 1.8 on 8/14, , Troponin 0.088, D-dimer 0.072, procalcitonin negative. EKG NSR @ 88 bpm with nonspecific ST/T changes. CXR w/o acute process.  Patient received  cc bolus, tylenol, aspirin 325 mg, K-bicarb and Mg sulfate replacement, duonebs, prednisone 60 mg, empiric dose of vancomycin and zosyn in ED. During my interview patient is awake, conversant and not in distress. She reports overall feeling better after IVF and her baseline dry weight around 170 lbs. Patient states she recently moved to [facility] from [place] and about a month ago she was admitted to hospital in [place] for heart failure exacerbation when cardiology placed her on life vest as a bridge to AICD placement in near future.      Echo (8/12/24):      Left Ventricle: The left ventricle is moderately dilated. There is eccentric hypertrophy. Moderate global hypokinesis present. There is moderately reduced systolic function with a visually estimated ejection fraction of 30 - 35%. Biplane (2D) method of discs ejection fraction is 34%. There is diastolic dysfunction but grade cannot be determined.    Right Ventricle: Normal right ventricular cavity size. Wall thickness is normal. Systolic function is normal.    Left Atrium: Left atrium is severely dilated.    Right Atrium: Right atrium is moderately dilated.    Aortic Valve: There is mild stenosis. Aortic valve area by VTI is 1.95 cm². Aortic valve peak velocity is 1.40 m/s. Mean gradient is 4 mmHg. The dimensionless index is 0.62.    Mitral Valve: There is mild regurgitation.    Tricuspid Valve: There is mild regurgitation.    Pulmonic Valve: There is mild regurgitation.    Pulmonary Artery: The estimated pulmonary artery systolic pressure is 36 mmHg.    IVC/SVC: Normal venous pressure at 3 mmHg.       * No surgery found *      Hospital Course:   Pt with KENY and persistent leukocytosis suspected sepsis source ESBL Klebsiella UTI. Started on Meropenem and ID consulted  with recs for transition to Ertapenem for total 7d course with ID follow up. Hep C positive with elevated viral load and patient advised on outpatient Hepatology follow up for evaluation/treatment. Pt discharged with recommended PCP follow up, Cardiology follow up for Lifevest vs. ICD plan discussion (previously scheduled), GI follow up for malignancy follow up, new nebulizer machine, new walker for impaired mobility. ID plans to follow up with labs and visit. HH ordered for meds administration and line care.      Goals of Care Treatment Preferences:  Code Status: Full Code          What is most important right now is to focus on remaining as independent as possible, symptom/pain control, extending life as long as possible, even it it means sacrificing quality.  Accordingly, we have decided that the best plan to meet the patient's goals includes continuing with treatment.      SDOH Screening:  The patient was screened for food insecurity, housing instability, transportation needs, utility difficulties, and interpersonal safety. The social determinant(s) of health identified as a concern this admission are:  Housing instability    The plan to address these concerns is: Respite care at [facility]    Social [facility] with Concerns     Food Insecurity: Unknown (8/21/2024)   Recent Concern: Food Insecurity - Food Insecurity Present (8/12/2024)   Housing Stability: High Risk (8/21/2024)   Transportation Needs: Unmet Transportation Needs (8/12/2024)   Utilities: Patient Declined (8/12/2024)        Consults:   Consults (From admission, onward)          Status Ordering Provider     Inpatient consult to Midline team  Once        Provider:  (Not yet assigned)    Completed ZONIA GARCIA     Inpatient consult to Infectious Diseases  Once        Provider:  (Not yet assigned)    Completed ZONIA GARCIA            No new Assessment & Plan notes have been filed under this hospital service since the last note was  generated.  Service: Hospital Medicine    Final Active Diagnoses:    Diagnosis Date Noted POA    PRINCIPAL PROBLEM:  Urinary tract infection due to ESBL Klebsiella [N39.0, B96.89] 08/20/2024 Yes    Acute hepatitis C virus infection without hepatic coma [B17.10] 08/21/2024 Unknown    Mobility impaired [Z74.09] 08/21/2024 Unknown    Sepsis [A41.9] 08/20/2024 Yes    Symptomatic hypotension [I95.9] 08/20/2024 Yes    KENY (acute kidney injury) [N17.9] 08/20/2024 Yes    Hypomagnesemia [E83.42] 08/20/2024 Yes    Chronic HFrEF (heart failure with reduced ejection fraction) [I50.22] 08/20/2024 Yes    Coronary artery disease involving native coronary artery of native heart without angina pectoris [I25.10] 08/20/2024 Yes    COPD (chronic obstructive pulmonary disease) [J44.9] 08/20/2024 Yes    Hepatitis C antibody positive in blood [R76.8] 08/20/2024 Yes    ACP (advance care planning) [Z71.89] 08/20/2024 Not Applicable    Tobacco dependency [F17.200] 08/14/2024 Yes    Colorectal carcinoma [C19] 08/13/2024 Yes    Hypokalemia [E87.6] 08/12/2024 Yes    Elevated troponin [R79.89] 08/12/2024 Yes    Homelessness [Z59.00] 08/12/2024 Not Applicable      Problems Resolved During this Admission:       Discharged Condition: good    Disposition: Home or Self Care    Follow Up:   Follow-up Information       [facility]ious [facility] Follow up.    Specialty: Infectious Diseases  Contact information:  [address] [facility] [address] [phone]  Additional information:  Main Building, 1st floor near [place] entrance   Please park in [facility].  parking is available on the first floor of the main parking garage.             Hepatology Clinic - [facility] Follow up.    Specialty: Hepatology  Contact information:  [address] [facility] [address] [phone]  Additional information:  [facility] - Main Building, 1st floor near Clinic elevator    "Please park in [facility] and walk through Clinic elevator hallway             Phuc Montes PA-C. Go on 8/29/2024.    Specialty: Family Medicine  Why: Follow up 8/29 at 130p  Contact information:  [address]  [address]  [place] [id number] [phone]                           Patient Instructions:      NEBULIZER FOR HOME USE     Order Specific Question Answer Comments   Height: 5' 4" (1.626 m)    Weight: 81.3 kg (179 lb 2 oz)    Does patient have medical equipment at home? other (see comments) zoll life vest   Length of need (1-99 months): 99      NEBULIZER KIT (SUPPLIES) FOR HOME USE     Order Specific Question Answer Comments   Height: 5' 4" (1.626 m)    Weight: 81.3 kg (179 lb 2 oz)    Does patient have medical equipment at home? other (see comments) zoll life vest   Length of need (1-99 months): 99    Mask or Mouthpiece? Mouthpiece      WALKER FOR HOME USE     Order Specific Question Answer Comments   Type of Walker: Rollator with brakes and/or seat    With wheels? Yes    Height: 5' 4" (1.626 m)    Weight: 81.3 kg (179 lb 2 oz)    Length of need (1-99 months): 99    Does patient have medical equipment at home? other (see comments) zoll life vest   Please check all that apply: Patient's condition impairs ambulation.      Ambulatory referral/consult to Hepatology   Standing Status: Future   Referral Priority: Routine Referral Type: Consultation   Referral Reason: Specialty Services Required   Requested Specialty: Hepatology   Number of Visits Requested: 1     Activity as tolerated       Significant Diagnostic Studies: N/A    Pending Diagnostic Studies:       None           Medications:  Reconciled Home Medications:      Medication List        START taking these medications      0.9% NaCl PgBk 100 mL with ertapenem 1 gram SolR 1 g  Inject 1 g into the vein once daily.     butalbital-acetaminophen-caffeine -40 mg -40 mg per tablet  Commonly known as: FIORICET, ESGIC  Take 1 tablet by mouth " every 4 (four) hours as needed for Headaches.     naloxone 4 mg/actuation Spry  Commonly known as: NARCAN  4mg by nasal route as needed for opioid overdose; may repeat every 2-3 minutes in alternating nostrils until medical help arrives. Call 911            CHANGE how you take these medications      albuterol 90 mcg/actuation inhaler  Commonly known as: PROVENTIL/VENTOLIN HFA  Inhale 1-2 puffs into the lungs every 6 (six) hours as needed for Wheezing. Rescue  What changed: how much to take     furosemide 20 MG tablet  Commonly known as: LASIX  Take 2 tablets (40 mg total) by mouth once daily.  What changed: when to take this            CONTINUE taking these medications      FeroSuL 325 mg (65 mg iron) Tab tablet  Generic drug: ferrous sulfate  Take 1 tablet (325 mg total) by mouth once daily.     losartan 25 MG tablet  Commonly known as: COZAAR  Take 1 tablet (25 mg total) by mouth once daily.     metoprolol succinate 25 MG 24 hr tablet  Commonly known as: TOPROL-XL  Take 1 tablet (25 mg total) by mouth once daily.     nicotine 21 mg/24 hr  Commonly known as: NICODERM CQ  Place 1 patch onto the skin once daily.     oxybutynin 5 MG Tab  Commonly known as: DITROPAN  Take 1 tablet (5 mg total) by mouth 3 (three) times daily.     pantoprazole 40 MG tablet  Commonly known as: PROTONIX  Take 1 tablet (40 mg total) by mouth once daily.     rosuvastatin 20 MG tablet  Commonly known as: CRESTOR  Take 1 tablet (20 mg total) by mouth every evening.     spironolactone 25 MG tablet  Commonly known as: ALDACTONE  Take 25 mg by mouth once daily.            STOP taking these medications      nystatin 100,000 unit/mL suspension  Commonly known as: MYCOSTATIN     polyethylene glycol 17 gram/dose powder  Commonly known as: GLYCOLAX     tramadol-acetaminophen 37.5-325 mg 37.5-325 mg Tab  Commonly known as: ULTRACET              Indwelling Lines/Drains at time of discharge:   Lines/Drains/Airways       None                   Time spent  on the discharge of patient: 50 minutes         Chaitanya Wray MD  Department of Hospital Medicine  [facility]urg ([facility]

## 2024-08-24 NOTE — ASSESSMENT & PLAN NOTE
KENY is likely due to pre-renal azotemia due to intravascular volume depletion. Baseline creatinine is  1.8 . Most recent creatinine and eGFR are listed below.  Recent Labs     08/22/24  0527 08/23/24  0818 08/24/24  0326   CREATININE 1.6* 1.2 1.2   EGFRNORACEVR 37.2* 52.5* 52.5*        Plan  - KENY is stable  - Avoid nephrotoxins and renally dose meds for GFR listed above  - Monitor urine output, serial BMP, and adjust therapy as needed  - follow up with repeat Cr after IVF  -avoid hypotensive episodes

## 2024-08-24 NOTE — ASSESSMENT & PLAN NOTE
-recurrent recent hospital admission for Acute CHF exacerbation   -recent echo on 8/12 showed ED 30-35%  -appears hypovolemic on exam in setting of recent aggressive diuresis   -will hold home lasix and aldactone, losartan  for now with plan to restart GDMT later   -patient was placed on life vest by cariology at [place] as a bridge to AICD in near future per patient   -she has cardiology clinic scheduled for Mon for LiveVest plan

## 2024-08-24 NOTE — CONSULTS
BOB consulted for Midline insertion in real time using u/s guidance.     Indication: IV ABX: ERTAPENEM; EED 8/29/2024  Gauge: 20  Location: RIGHT BRACHIAL  Length in cm: 10  Max dwell date: 9/21/2024  Lot #: [id number]    Image saved and uploaded to EMR

## 2024-08-24 NOTE — PLAN OF CARE
[facility] - Med Surg ([facility])  Discharge Reassessment    Primary Care Provider: No, Primary Doctor    Expected Discharge Date: 8/24/2024    Reassessment (most recent)       Discharge Reassessment - 08/24/24 1000          Discharge Reassessment    Assessment Type Discharge Planning Reassessment (P)      Did the patient's condition or plan change since previous assessment? Yes (P)      Discharge Plan discussed with: Patient (P)      Communicated JENAE with patient/caregiver Yes (P)      Discharge Plan A Shelter;Home Health (P)    [facility] Respite Program w/ home infusion +  services    Discharge Plan B Shelter (P)      DME Needed Upon Discharge  rollator (P)      Transition of Care Barriers Social;Homeless (P)         Post-Acute Status    Post-Acute Authorization IV Infusion;Home Health;HME;Placement (P)      Post-Acute Placement Status Set-up Complete/Auth obtained (P)      HME Status Pending post-acute provider review/more information requested (P)    Rollator order pending duramed review    Home Health Status Set-up Complete/Auth obtained (P)    [facility]    IV Infusion Status Pending payor review/awaiting authorization (if required) (P)    [facility]                Discharge Plan A and Plan B have been determined by review of patient's clinical status, future medical and therapeutic needs, and coverage/benefits for post-acute care in coordination with multidisciplinary team members.                       ANGLE Jensen, LMSW  [facility]  Case Management  Ext. [phone]

## 2024-08-24 NOTE — ASSESSMENT & PLAN NOTE
"This patient does have evidence of infective focus  My overall impression is sepsis.  Source: Urinary Tract  Antibiotics given-   Antibiotics (72h ago, onward)    Start     Stop Route Frequency Ordered    08/24/24 1100  ertapenem (INVANZ) 1 g in 0.9% NaCl 100 mL IVPB (MB+)         -- IV Every 24 hours (non-standard times) 08/24/24 0836        Latest lactate reviewed-  No results for input(s): "LACTATE", "POCLAC" in the last 72 hours.    Organ dysfunction indicated by Acute kidney injury    Fluid challenge Ideal Body Weight- The patient's ideal body weight is Ideal body weight: 54.7 kg (120 lb 9.5 oz) which will be used to calculate fluid bolus of 30 ml/kg for treatment of septic shock.      Post- resuscitation assessment Yes Perfusion exam was performed within 6 hours of septic shock presentation after bolus shows Adequate tissue perfusion assessed by non-invasive monitoring       Will Not start Pressors- Levophed for MAP of 65  Source control achieved by:   -received empiric dose of vancomycin and zosyn in ED   -see UTI  "

## 2024-08-24 NOTE — PLAN OF CARE
Problem: Adult Inpatient Plan of Care  Goal: Plan of Care Review  Outcome: Progressing  Goal: Patient-Specific Goal (Individualized)  Outcome: Progressing  Goal: Absence of Hospital-Acquired Illness or Injury  Outcome: Progressing  Goal: Optimal Comfort and Wellbeing  Outcome: Progressing  Goal: Readiness for Transition of Care  Outcome: Progressing     Problem: Sepsis/Septic Shock  Goal: Optimal Coping  Outcome: Progressing  Goal: Absence of Bleeding  Outcome: Progressing  Goal: Blood Glucose Level Within Targeted Range  Outcome: Progressing  Goal: Absence of Infection Signs and Symptoms  Outcome: Progressing  Goal: Optimal Nutrition Intake  Outcome: Progressing     Problem: Acute Kidney Injury/Impairment  Goal: Fluid and Electrolyte Balance  Outcome: Progressing  Goal: Improved Oral Intake  Outcome: Progressing  Goal: Effective Renal Function  Outcome: Progressing     Problem: Infection  Goal: Absence of Infection Signs and Symptoms  Outcome: Progressing     Pt had an uneventful night. VSS. Pt given prn pain medication for headache.  Pt is free of falls and injuries. Bed in lowest position and call light within reach. Safety maintained

## 2024-08-24 NOTE — PLAN OF CARE
"DEBORAH reviewed dc medication needs w/ attending MD Dr. Wray and bedside pharmacy. Per Bethany ([facility]), "fioricet isnt covered by insurance". MD confirmed that patient needs this medication upon dc home. Bethany confirmed that amount of $15 due is not a copay, this is the total cost of the medication due to insurance doesn't cover medication in part or full. DEBORAH completed cost-transfer in the amount of $15 for dc medication. [facility] Respite Program CM to support w/ obtaining medication post-dc as needed. DEBORAH submitted completed cost-transfer to [facility] via email. Patient to receive at the bedside.                     ANGLE Jensen, LMSW  [facility]  Case Management  Ext. [phone]   "

## 2024-08-24 NOTE — ASSESSMENT & PLAN NOTE
The mobility limitation cannot be sufficiently resolved by the use of a cane. Patient's functional mobility deficit can be sufficiently resolved with the use of a rollator. Patient's mobility limitation significantly impairs their ability to participate in one of more activities of daily living. The use of a rollator will significantly improve the patient's ability to participate in MRADLS and the patient will use it on regular basis in the home.

## 2024-08-24 NOTE — PLAN OF CARE
Discharge Plan A and Plan B have been determined by review of patient's clinical status, future medical and therapeutic needs, and coverage/benefits for post-acute care in coordination with multidisciplinary team members.    08/24/24 1152   Post-Acute Status   Post-Acute Authorization Home Health;IV Infusion   Home Health Status Referrals Sent   IV Infusion Status Pending payor review/awaiting authorization (if required)   Discharge Plan   [facility]   Discharge Plan B Shelter     SW reviewed dc needs w/ attending MD, infectious Diseases MD and Leah ([facility] Transitional Navigator). Attending MD, ID MD and Leah confirmed that patient is safe to return to [facility] w/ IV abx needs. SW sent infusion referral to [facility]. [facility] states that they are not able to accept due to out of network. SW sent referral to [facility].  Per provider, referral pending payor review.    [facility] - accepts  [facility] - denies       SW referred to [facility] for patient's HME (rollator) order. Rollator order pending post-acute provider review.       2:00pm  Per Nano ([facility]), provider is out of network. SW will need to outsource order. DEBORAH submitted rollator order to [facility] via hard fax to [phone]. Order pending post-acute provider review.    3:20pm  DEBORAH phoned [facility] [phone] to f/u on status of rollator order sent. DEBORAH spoke w/ Suzette (on call). Suzette confirmed that provider is in network w/ patient's insurance. Per Suzette, provider is only able to process order on weekday. Provider will process on Monday 8/26 and notify SW if any additional information is needed. SW notified care team.    SW will continue to follow.                          Ryan Lemus, ANGLE, LMSW  [facility]  Case Management  Ext. [phone]

## 2024-08-24 NOTE — ASSESSMENT & PLAN NOTE
Advance Care Planning     Date: 08/20/2024    [facility]  I engaged the patient in a voluntary conversation about advance care planning and we specifically addressed what the goals of care would be moving forward, in light of the patient's change in clinical status, specifically if her heart or breathing stops.  We did specifically address the patient's likely prognosis, which is fair .  We explored the patient's values and preferences for future care.  The patient endorses that what is most important right now is to focus on remaining as independent as possible, symptom/pain control, and extending life as long as possible, even it it means sacrificing quality    Accordingly, we have decided that the best plan to meet the patient's goals includes continuing with treatment and Full code.    A total of 10 min was spent on advance care planning, goals of care discussion, emotional support, formulating and communicating prognosis and exploring burden/benefit of various approaches of treatment. This discussion occurred on a fully voluntary basis with the verbal consent of the patient and/or family.

## 2024-08-24 NOTE — SUBJECTIVE & OBJECTIVE
Interval History: NAEON, midline in place    Review of Systems   Respiratory:  Positive for wheezing. Negative for shortness of breath.    Gastrointestinal:  Negative for abdominal distention, abdominal pain, blood in stool and diarrhea.   Genitourinary:  Negative for difficulty urinating, dysuria, flank pain and hematuria.   All other systems reviewed and are negative.    Objective:     Vital Signs (Most Recent):  Temp: 98.2 °F (36.8 °C) (08/24/24 1217)  Pulse: 73 (08/24/24 1323)  Resp: 16 (08/24/24 1323)  BP: 127/83 (08/24/24 1217)  SpO2: 99 % (08/24/24 1323) Vital Signs (24h Range):  Temp:  [97.4 °F (36.3 °C)-98.4 °F (36.9 °C)] 98.2 °F (36.8 °C)  Pulse:  [73-95] 73  Resp:  [16-20] 16  SpO2:  [96 %-100 %] 99 %  BP: (117-153)/() 127/83     Weight: 81.3 kg (179 lb 2 oz)  Body mass index is 30.75 kg/m².    Intake/Output Summary (Last 24 hours) at 8/24/2024 1455  Last data filed at 8/23/2024 2000  Gross per 24 hour   Intake 120 ml   Output --   Net 120 ml         Physical Exam  Vitals and nursing note reviewed.   HENT:      Head: Normocephalic and atraumatic.   Cardiovascular:      Rate and Rhythm: Normal rate and regular rhythm.      Pulses: Normal pulses.   Pulmonary:      Effort: Pulmonary effort is normal. No respiratory distress.      Breath sounds: Wheezing present. No rales.   Abdominal:      Palpations: Abdomen is soft.      Tenderness: There is no abdominal tenderness. There is no guarding.   Musculoskeletal:      Right lower leg: No edema.      Left lower leg: No edema.   Skin:     General: Skin is warm and dry.   Neurological:      General: No focal deficit present.      Mental Status: She is alert and oriented to person, place, and time.   Psychiatric:         Mood and Affect: Mood normal.             Significant Labs: All pertinent labs within the past 24 hours have been reviewed.    Significant Imaging: I have reviewed all pertinent imaging results/findings within the past 24 hours.

## 2024-08-24 NOTE — ASSESSMENT & PLAN NOTE
Patient's most recent potassium results are listed below.   Recent Labs     08/22/24  0527 08/23/24  0818 08/24/24  0326   K 3.8 3.9 4.2       Plan  - Replete potassium per protocol  - Monitor potassium Daily  - Patient's hypokalemia is improving  - follow up with repeat labs and replace as needed

## 2024-08-24 NOTE — PROGRESS NOTES
[facility] - [facility] Surg ([address] Medicine  Progress Note    Patient Name: Mary Ellen Saini  MRN: [id number]  Patient Class: IP- Inpatient   Admission Date: 8/19/2024  Length of Stay: 3 days  Attending Physician: Chaitanya Wray MD  Primary Care Provider: Marlen, Primary Doctor        Subjective:     Principal Problem:Urinary tract infection due to ESBL Klebsiella        HPI:    Mary Ellen Saini is a 60 y/o F with hx HFrEF (last EF 30-35%) on life vest 2/2 ischemic cardiomyopathy (past MI), COPD, colon cancer, former substance abuse (cocaine + meth), current smoker who presents to the ED via EMS from the [facility] for evaluation generalized weakness and dizziness. Patient was recently admitted to [facility] 8/12-8/14 for acute CHF exacerbation and treated with aggressive diuresis (total 7 liter) with clinical improvement. Patient reports feeling fatigue since discharged from hospital and she has been taking diuretics lasix 40 mg bid and aldactone. Patient went to hospital discharge follow up clinic with cardiology and GI at [facility] earlier today. She felt worse after returning to [facility] from clinic appointments and she felt really weak with difficulty walking which prompted to call EMS.  EMS administered 500mL of NS due to her systolic BP of ~74. Afterwards, her systolic BP went up to ~84. Her O2 sat was around 99 and she was having 16RR per EMS. Pt also c/o occipital headache, neck pain, dysuria, urinary frequency, suprapubic pain. Pt denies fever, chills, cough, sob, leg swelling, orthopnea, chest pain, palpitation, firing of life vest, vomiting, abdominal pain, focal weakness/numbness, dark stool or bleeding from other sites.          ED course: afebrile, borderline low BP 87/55, oxygenating well on room air. WBC 15, K 3.3, Mg 1.5, Cr 2.3 up from 1.8 on 8/14, , Troponin 0.088, D-dimer 0.072, procalcitonin negative. EKG NSR @ 88 bpm with nonspecific ST/T  changes. CXR w/o acute process.  Patient received  cc bolus, tylenol, aspirin 325 mg, K-bicarb and Mg sulfate replacement, duonebs, prednisone 60 mg, empiric dose of vancomycin and zosyn in ED. During my interview patient is awake, conversant and not in distress. She reports overall feeling better after IVF and her baseline dry weight around 170 lbs. Patient states she recently moved to [facility] from [place] and about a month ago she was admitted to hospital in [place] for heart failure exacerbation when cardiology placed her on life vest as a bridge to AICD placement in near future.      Echo (8/12/24):      Left Ventricle: The left ventricle is moderately dilated. There is eccentric hypertrophy. Moderate global hypokinesis present. There is moderately reduced systolic function with a visually estimated ejection fraction of 30 - 35%. Biplane (2D) method of discs ejection fraction is 34%. There is diastolic dysfunction but grade cannot be determined.    Right Ventricle: Normal right ventricular cavity size. Wall thickness is normal. Systolic function is normal.    Left Atrium: Left atrium is severely dilated.    Right Atrium: Right atrium is moderately dilated.    Aortic Valve: There is mild stenosis. Aortic valve area by VTI is 1.95 cm². Aortic valve peak velocity is 1.40 m/s. Mean gradient is 4 mmHg. The dimensionless index is 0.62.    Mitral Valve: There is mild regurgitation.    Tricuspid Valve: There is mild regurgitation.    Pulmonic Valve: There is mild regurgitation.    Pulmonary Artery: The estimated pulmonary artery systolic pressure is 36 mmHg.    IVC/SVC: Normal venous pressure at 3 mmHg.       Overview/Hospital Course:  Pt with KENY and persistent leukocytosis suspected sepsis source ESBL Klebsiella UTI. Started on Meropenem and ID consulted with recs for transition to Ertapenem for total 7d course with ID follow up. Hep C positive with elevated viral load and patient advised on  outpatient Hepatology follow up for evaluation/treatment. Pt discharged with recommended PCP follow up, Cardiology follow up for Lifevest vs. ICD plan discussion (previously scheduled), GI follow up for malignancy follow up, new nebulizer machine, new walker for impaired mobility. ID plans to follow up with labs and visit. HH ordered for meds administration and line care.     Interval History: NAEON, midline in place    Review of Systems   Respiratory:  Positive for wheezing. Negative for shortness of breath.    Gastrointestinal:  Negative for abdominal distention, abdominal pain, blood in stool and diarrhea.   Genitourinary:  Negative for difficulty urinating, dysuria, flank pain and hematuria.   All other systems reviewed and are negative.    Objective:     Vital Signs (Most Recent):  Temp: 98.2 °F (36.8 °C) (08/24/24 1217)  Pulse: 73 (08/24/24 1323)  Resp: 16 (08/24/24 1323)  BP: 127/83 (08/24/24 1217)  SpO2: 99 % (08/24/24 1323) Vital Signs (24h Range):  Temp:  [97.4 °F (36.3 °C)-98.4 °F (36.9 °C)] 98.2 °F (36.8 °C)  Pulse:  [73-95] 73  Resp:  [16-20] 16  SpO2:  [96 %-100 %] 99 %  BP: (117-153)/() 127/83     Weight: 81.3 kg (179 lb 2 oz)  Body mass index is 30.75 kg/m².    Intake/Output Summary (Last 24 hours) at 8/24/2024 1455  Last data filed at 8/23/2024 2000  Gross per 24 hour   Intake 120 ml   Output --   Net 120 ml         Physical Exam  Vitals and nursing note reviewed.   HENT:      Head: Normocephalic and atraumatic.   Cardiovascular:      Rate and Rhythm: Normal rate and regular rhythm.      Pulses: Normal pulses.   Pulmonary:      Effort: Pulmonary effort is normal. No respiratory distress.      Breath sounds: Wheezing present. No rales.   Abdominal:      Palpations: Abdomen is soft.      Tenderness: There is no abdominal tenderness. There is no guarding.   Musculoskeletal:      Right lower leg: No edema.      Left lower leg: No edema.   Skin:     General: Skin is warm and dry.   Neurological:       General: No focal deficit present.      Mental Status: She is alert and oriented to person, place, and time.   Psychiatric:         Mood and Affect: Mood normal.             Significant Labs: All pertinent labs within the past 24 hours have been reviewed.    Significant Imaging: I have reviewed all pertinent imaging results/findings within the past 24 hours.    Assessment/Plan:      * Urinary tract infection due to ESBL Klebsiella  -symptoms of suprapubic pain, urinary F/U/D. UA showed nitrite positive UTI. Received empiric dose of vancomycin and zosyn in ED for sepsis, transitioned to CTX, then transitioned to Cefepime after persistent leukocytosis. Urine Cx with ESBL Klebsiella.    - Meropenem transitioned to Ertapenem for 7 d duration, with OPAT note  - Midline team consulted  - CM to provide assistance and CM at [facility] to provide contact for patient to do follow up.  -  ordered for IV Abx assistance    Mobility impaired  The mobility limitation cannot be sufficiently resolved by the use of a cane. Patient's functional mobility deficit can be sufficiently resolved with the use of a rollator. Patient's mobility limitation significantly impairs their ability to participate in one of more activities of daily living. The use of a rollator will significantly improve the patient's ability to participate in MRADLS and the patient will use it on regular basis in the home.       Acute hepatitis C virus infection without hepatic coma  Pt with positive HCV antibody and increased viral load. History of substance abuse though denies IVDU. Has one tattoo. Few sexual partners in past few decades, but suspects her ex- may have had other partners.    - Pt advised on outpatient Hepatology follow up for evaluation and care plan.     ACP (advance care planning)  Advance Care Planning    Date: 08/20/2024    [facility]  I engaged the patient in a voluntary conversation about advance care planning and we specifically  addressed what the goals of care would be moving forward, in light of the patient's change in clinical status, specifically if her heart or breathing stops.  We did specifically address the patient's likely prognosis, which is fair .  We explored the patient's values and preferences for future care.  The patient endorses that what is most important right now is to focus on remaining as independent as possible, symptom/pain control, and extending life as long as possible, even it it means sacrificing quality    Accordingly, we have decided that the best plan to meet the patient's goals includes continuing with treatment and Full code.    A total of 10 min was spent on advance care planning, goals of care discussion, emotional support, formulating and communicating prognosis and exploring burden/benefit of various approaches of treatment. This discussion occurred on a fully voluntary basis with the verbal consent of the patient and/or family.           Hepatitis C antibody positive in blood  -positive HCV antibody today, HIV negative  -prior history of substance abuse though denies IVDU  -check HCV PCR and consider outpatient treatment       COPD (chronic obstructive pulmonary disease)  Patient's COPD is controlled currently.  Patient is currently off COPD Pathway. Continue scheduled and prn inhalers. Oxygenating well on room air. Monitor respiratory status closely.     -wheezing on exam today, pt reports using nebulizer 4x/day. Would benefit from OP PCP/Pulm follow up for PFTs for COPD evaluation as none on file. Luis Felipe ordered inpatient.     Coronary artery disease involving native coronary artery of native heart without angina pectoris  Patient with known CAD, which is controlled Will continue Beta blocker, Statin and monitor for S/Sx of angina/ACS. Continue to monitor on telemetry.   -mild elevated troponin 0.088 >> 0.075 from demand ischemia in setting of hypotension and KENY   -do not suspect ACS     Chronic  "HFrEF (heart failure with reduced ejection fraction)  -recurrent recent hospital admission for Acute CHF exacerbation   -recent echo on 8/12 showed ED 30-35%  -appears hypovolemic on exam in setting of recent aggressive diuresis   -will hold home lasix and aldactone, losartan  for now with plan to restart GDMT later   -patient was placed on life vest by cariology at [place] as a bridge to AICD in near future per patient   -she has cardiology clinic scheduled for Mon for LiveVest plan        Hypomagnesemia  Patient has Abnormal Magnesium: hypomagnesemia. Will continue to monitor electrolytes closely. Will replace the affected electrolytes and repeat labs to be done after interventions completed. The patient's magnesium results have been reviewed and are listed below.  No results for input(s): "MG" in the last 24 hours.       KENY (acute kidney injury)  KENY is likely due to pre-renal azotemia due to intravascular volume depletion. Baseline creatinine is  1.8 . Most recent creatinine and eGFR are listed below.  Recent Labs     08/22/24  0527 08/23/24  0818 08/24/24  0326   CREATININE 1.6* 1.2 1.2   EGFRNORACEVR 37.2* 52.5* 52.5*        Plan  - KENY is stable  - Avoid nephrotoxins and renally dose meds for GFR listed above  - Monitor urine output, serial BMP, and adjust therapy as needed  - follow up with repeat Cr after IVF  -avoid hypotensive episodes     Symptomatic hypotension  -SBP in 70-80 upon EMS arrival with dizziness, fatigue and generalized weakness   -likely from over diureses during recent hospital admission for heart failure, taking diuretics at home and UTI   -received total 1500 cc IVF with clinical improvement   -remained normotensive after IVF   -will hold diuretics and blood pressure medicine for now with plan to resume after continued clinical improvement       Sepsis  This patient does have evidence of infective focus  My overall impression is sepsis.  Source: Urinary Tract  Antibiotics given- " "  Antibiotics (72h ago, onward)      Start     Stop Route Frequency Ordered    08/24/24 1100  ertapenem (INVANZ) 1 g in 0.9% NaCl 100 mL IVPB (MB+)         -- IV Every 24 hours (non-standard times) 08/24/24 0836          Latest lactate reviewed-  No results for input(s): "LACTATE", "POCLAC" in the last 72 hours.    Organ dysfunction indicated by Acute kidney injury    Fluid challenge Ideal Body Weight- The patient's ideal body weight is Ideal body weight: 54.7 kg (120 lb 9.5 oz) which will be used to calculate fluid bolus of 30 ml/kg for treatment of septic shock.      Post- resuscitation assessment Yes Perfusion exam was performed within 6 hours of septic shock presentation after bolus shows Adequate tissue perfusion assessed by non-invasive monitoring       Will Not start Pressors- Levophed for MAP of 65  Source control achieved by:   -received empiric dose of vancomycin and zosyn in ED   -see UTI    Tobacco dependency  Dangers of cigarette smoking were reviewed with patient in detail. Patient was Counseled for 3-10 minutes. Nicotine replacement options were discussed. Nicotine replacement was discussed- prescribed  -patient reports she is down to 2-4 cigarettes/day   -doing well with nicotine patch      Colorectal carcinoma  -patient had GI clinic follow up at [facility] day prior to admission and have follow up visit next month per patient       Homelessness  -recently moved from [place] to [facility]   -she is currently staying at [facility] and she has a        Hypokalemia  Patient's most recent potassium results are listed below.   Recent Labs     08/22/24  0527 08/23/24  0818 08/24/24  0326   K 3.8 3.9 4.2       Plan  - Replete potassium per protocol  - Monitor potassium Daily  - Patient's hypokalemia is improving  - follow up with repeat labs and replace as needed     Elevated troponin  Trop trended to peak          VTE Risk Mitigation (From admission, onward)           " Ordered     enoxaparin injection 40 mg  Every 24 hours         08/20/24 1420     IP VTE HIGH RISK PATIENT  Once         08/20/24 0045     Place sequential compression device  Until discontinued         08/20/24 0045                    Discharge Planning   JENAE: 8/24/2024     Code Status: Full Code   Is the patient medically ready for discharge?:     Reason for patient still in hospital (select all that apply): Treatment and Pending disposition  Discharge Plan A: Shelter ([facility])                  Chaitanya Wary MD  Department of Hospital Medicine   [facility] S[facility] ([place])

## 2024-08-24 NOTE — PLAN OF CARE
Discharge Plan A and Plan B have been determined by review of patient's clinical status, future medical and therapeutic needs, and coverage/benefits for post-acute care in coordination with multidisciplinary team members.    08/24/24 1450   Post-Acute Status   Post-Acute Authorization Home Health;IV Infusion;HME   HME Status Pending post-acute provider review/more information requested   Home Health Status Set-up Complete/Auth obtained   IV Infusion Status Pending payor review/awaiting authorization (if required)   Discharge Plan   Discharge Plan A Shelter  ([facility])   Discharge Plan B Shelter     SW with patient to review discharge recommendation of HH and is agreeable to plan    Patient/family provided list of facilities in-network with patient's payor plan. Providers that are owned, operated, or affiliated with [facility] are included on the list.     Notified that referral sent to below listed facilities from in-network list based on proximity to home/family support:   [facility]    Patient/family instructed to identify preference.    Preferred Facility: (if more than 1, listed in order of descending preference)  [facility]    If an additional preferred facility not listed above is identified, additional referral to be sent. If above facilities unable to accept, will send additional referrals to in-network providers.        SW reviewed w/ Eli ([facility]). Per Eli, patient accepted for  services. Start of care date 8/25. HH set-up complete.    Home Infusion set-up pending payor review + bedside education via [facility] home infusion.    HME (rollator) order pending [employer] review.      SW will continue to follow.                        ANGLE Jensen, LMSW  [facility]  Case Management  Ext. [phone]

## 2024-08-25 NOTE — PLAN OF CARE
[facility] Med S[facility] ([facility])  Discharge Final Note    Primary Care Provider: No, Primary Doctor    Expected Discharge Date: 8/24/2024    Final Discharge Note (most recent)       Final Note - 08/25/24 0813          Final Note    Assessment Type Final Discharge Note (P)      Anticipated Discharge Disposition IV Therapy Provider (P)      What phone number can be called within the next 1-3 days to see how you are doing after discharge? [phone] (P)         Post-Acute Status    Post-Acute Authorization IV Infusion;HME;Home Health (P)      HME Status Pending Delivery (P)    rollator    Home Health Status Set-up Complete/Auth obtained (P)      IV Infusion Status Set-up Complete/Auth obtained (P)                      Important Message from Medicare  Important Message from Medicare regarding Discharge Appeal Rights: Given to patient/caregiver, Explained to patient/caregiver, Signed/date by patient/caregiver     Date IMM was signed: 08/22/24  Time IMM was signed: 0940    Contact Info       [facility] Infectious Disease [facility]   Specialty: Infectious Diseases    [address]  [place] [address]   Phone: [phone]       Next Steps: Follow up    Hepatology Clinic - [facility]   Specialty: Hepatology    [address]  [place] [address]   Phone: [phone]       Next Steps: Follow up    Phuc Montes PA-C   Specialty: Family Medicine    [address]  [address]  [facility] [address]   Phone: [phone]       Next Steps: Go on 8/29/2024    Instructions: Follow up 8/29 at 130p          Patient discharged to [facility] Respite program w/ [facility] + [facility]  services. Rollator pending delivery to [facility] via [facility]. Estimated delivery date 8/26.  Patient discharged via lyft ride.                  Ryan Lemus, MSW, LMSW  [facility]  Case Management  Ext. [phone]

## 2024-08-25 NOTE — NURSING
Pt discharge via Lyft transportation. Pharmacy medication delivered. All belongings left with patient.

## 2024-08-26 ENCOUNTER — LAB VISIT (OUTPATIENT)
Dept: LAB | Facility: HOSPITAL | Age: 59
End: 2024-08-26
Attending: STUDENT IN AN ORGANIZED HEALTH CARE EDUCATION/TRAINING PROGRAM
Payer: MEDICARE

## 2024-08-26 ENCOUNTER — TELEPHONE (OUTPATIENT)
Dept: HEPATOLOGY | Facility: CLINIC | Age: 59
End: 2024-08-26
Payer: MEDICARE

## 2024-08-26 DIAGNOSIS — B96.89 BACTERIAL CHOLANGITIS: Primary | ICD-10-CM

## 2024-08-26 DIAGNOSIS — K83.09 BACTERIAL CHOLANGITIS: Primary | ICD-10-CM

## 2024-08-26 LAB
ALBUMIN SERPL BCP-MCNC: 2.3 G/DL (ref 3.5–5.2)
ALP SERPL-CCNC: 85 U/L (ref 55–135)
ALT SERPL W/O P-5'-P-CCNC: 33 U/L (ref 10–44)
ANION GAP SERPL CALC-SCNC: 8 MMOL/L (ref 8–16)
AST SERPL-CCNC: 48 U/L (ref 10–40)
BASOPHILS # BLD AUTO: 0.09 K/UL (ref 0–0.2)
BASOPHILS NFR BLD: 0.6 % (ref 0–1.9)
BILIRUB SERPL-MCNC: 0.1 MG/DL (ref 0.1–1)
BUN SERPL-MCNC: 24 MG/DL (ref 6–20)
CALCIUM SERPL-MCNC: 7.8 MG/DL (ref 8.7–10.5)
CHLORIDE SERPL-SCNC: 105 MMOL/L (ref 95–110)
CO2 SERPL-SCNC: 26 MMOL/L (ref 23–29)
CREAT SERPL-MCNC: 1.3 MG/DL (ref 0.5–1.4)
DIFFERENTIAL METHOD BLD: ABNORMAL
EOSINOPHIL # BLD AUTO: 0.2 K/UL (ref 0–0.5)
EOSINOPHIL NFR BLD: 1.4 % (ref 0–8)
ERYTHROCYTE [DISTWIDTH] IN BLOOD BY AUTOMATED COUNT: 21 % (ref 11.5–14.5)
ERYTHROCYTE [SEDIMENTATION RATE] IN BLOOD BY PHOTOMETRIC METHOD: 26 MM/HR (ref 0–36)
EST. GFR  (NO RACE VARIABLE): 47.7 ML/MIN/1.73 M^2
GLUCOSE SERPL-MCNC: 55 MG/DL (ref 70–110)
HCT VFR BLD AUTO: 22.2 % (ref 37–48.5)
HGB BLD-MCNC: 6.8 G/DL (ref 12–16)
IMM GRANULOCYTES # BLD AUTO: 0.23 K/UL (ref 0–0.04)
IMM GRANULOCYTES NFR BLD AUTO: 1.4 % (ref 0–0.5)
LYMPHOCYTES # BLD AUTO: 3 K/UL (ref 1–4.8)
LYMPHOCYTES NFR BLD: 18.8 % (ref 18–48)
MCH RBC QN AUTO: 24.7 PG (ref 27–31)
MCHC RBC AUTO-ENTMCNC: 30.6 G/DL (ref 32–36)
MCV RBC AUTO: 81 FL (ref 82–98)
MONOCYTES # BLD AUTO: 0.9 K/UL (ref 0.3–1)
MONOCYTES NFR BLD: 5.7 % (ref 4–15)
NEUTROPHILS # BLD AUTO: 11.4 K/UL (ref 1.8–7.7)
NEUTROPHILS NFR BLD: 72.1 % (ref 38–73)
NRBC BLD-RTO: 0 /100 WBC
PLATELET # BLD AUTO: 340 K/UL (ref 150–450)
PMV BLD AUTO: 9.9 FL (ref 9.2–12.9)
POTASSIUM SERPL-SCNC: 3.8 MMOL/L (ref 3.5–5.1)
PROT SERPL-MCNC: 6 G/DL (ref 6–8.4)
RBC # BLD AUTO: 2.75 M/UL (ref 4–5.4)
SODIUM SERPL-SCNC: 139 MMOL/L (ref 136–145)
WBC # BLD AUTO: 15.88 K/UL (ref 3.9–12.7)

## 2024-08-26 PROCEDURE — 85025 COMPLETE CBC W/AUTO DIFF WBC: CPT | Performed by: STUDENT IN AN ORGANIZED HEALTH CARE EDUCATION/TRAINING PROGRAM

## 2024-08-26 PROCEDURE — 80053 COMPREHEN METABOLIC PANEL: CPT | Performed by: STUDENT IN AN ORGANIZED HEALTH CARE EDUCATION/TRAINING PROGRAM

## 2024-08-26 PROCEDURE — 85652 RBC SED RATE AUTOMATED: CPT | Performed by: STUDENT IN AN ORGANIZED HEALTH CARE EDUCATION/TRAINING PROGRAM

## 2024-08-26 NOTE — PLAN OF CARE
SW received phone call from Tammy ([employer]). Tammy confirmed that patient approved for rollator, to be delivered to patient's home address ([facility]) Today.                ANGLE Jensen, SW  [facility]  Case Management  Ext. [phone]

## 2024-08-26 NOTE — TELEPHONE ENCOUNTER
Called the patient to schedule a hepatology consult from referral.  No answer, left voicemail with call back # [phone].

## 2024-08-26 NOTE — PLAN OF CARE
Follow up with Hepatology Clinic - 1st Fl  CHW called to schedule Hepatology appt, Effie (rep) sent message to clinic to call patient with appt. [address]  [facility] [address] [phone]

## 2024-08-26 NOTE — TELEPHONE ENCOUNTER
----- Message from Effie Murphy sent at 8/26/2024  9:02 AM CDT -----  Regarding: Appointment needed  Contact: [phone]  Type:  Needs Medical Advice    Who Called: Anitra    Needing to schedule an appointment  Would the patient rather a call back or a response via [email or web address]? Call back  Best Call Back Number: [phone]    Additional Information:

## 2024-08-28 ENCOUNTER — TELEPHONE (OUTPATIENT)
Dept: GASTROENTEROLOGY | Facility: CLINIC | Age: 59
End: 2024-08-28
Payer: MEDICARE

## 2024-08-28 NOTE — TELEPHONE ENCOUNTER
----- Message from Prosper Keller sent at 8/28/2024 10:10 AM CDT -----  Regarding: Appt requested  Contact: [phone]  Hi, pt called to request a call to discuss getting an apt for   Colorectal carcinoma [C19]  Iron deficiency anemia due to chronic blood loss [D50.0] Pls call  Mrs. Lynn at [phone].    Thank you.

## 2024-08-29 ENCOUNTER — OFFICE VISIT (OUTPATIENT)
Dept: FAMILY MEDICINE | Facility: HOSPITAL | Age: 59
End: 2024-08-29
Payer: MEDICARE

## 2024-08-29 VITALS
DIASTOLIC BLOOD PRESSURE: 75 MMHG | WEIGHT: 182.56 LBS | HEIGHT: 64 IN | SYSTOLIC BLOOD PRESSURE: 117 MMHG | TEMPERATURE: 98 F | BODY MASS INDEX: 31.17 KG/M2 | HEART RATE: 84 BPM

## 2024-08-29 DIAGNOSIS — R50.9 FEVER, UNSPECIFIED FEVER CAUSE: Primary | ICD-10-CM

## 2024-08-29 DIAGNOSIS — J44.1 CHRONIC OBSTRUCTIVE PULMONARY DISEASE WITH ACUTE EXACERBATION: ICD-10-CM

## 2024-08-29 DIAGNOSIS — I50.9 ACUTE ON CHRONIC CONGESTIVE HEART FAILURE, UNSPECIFIED HEART FAILURE TYPE: ICD-10-CM

## 2024-08-29 DIAGNOSIS — C19 COLORECTAL CARCINOMA: ICD-10-CM

## 2024-08-29 LAB
CTP QC/QA: YES
SARS-COV-2 RDRP RESP QL NAA+PROBE: NEGATIVE

## 2024-08-29 PROCEDURE — 87635 SARS-COV-2 COVID-19 AMP PRB: CPT | Performed by: PHYSICIAN ASSISTANT

## 2024-08-29 PROCEDURE — 99215 OFFICE O/P EST HI 40 MIN: CPT | Performed by: PHYSICIAN ASSISTANT

## 2024-08-29 RX ORDER — TIOTROPIUM BROMIDE 18 UG/1
18 CAPSULE ORAL; RESPIRATORY (INHALATION) DAILY
Qty: 90 CAPSULE | Refills: 3 | Status: SHIPPED | OUTPATIENT
Start: 2024-08-29 | End: 2025-08-29

## 2024-08-29 RX ORDER — PREDNISONE 20 MG/1
TABLET ORAL
Qty: 10 TABLET | Refills: 0 | Status: SHIPPED | OUTPATIENT
Start: 2024-08-29 | End: 2024-09-03

## 2024-08-29 RX ORDER — AZITHROMYCIN 250 MG/1
TABLET, FILM COATED ORAL
Qty: 6 TABLET | Refills: 0 | Status: SHIPPED | OUTPATIENT
Start: 2024-08-29 | End: 2024-09-03

## 2024-08-29 NOTE — PROGRESS NOTES
FAMILY MEDICINE  New Visit Progress Note   Recent Hospital Discharge     PRESENTING HISTORY     Chief Complaint/Reason for Admission:  Follow up Hospital Discharge   Chief Complaint   Patient presents with    Follow-up     HOSPITAL     PCP: Marlen, Primary Doctor    Admission Date: 8/19/2024  Hospital Length of Stay: 3 days  Discharge Date and Time:  08/24/2024 3:50 PM    History of Present Illness:  Mary Ellen Saini is a 60 y/o F with hx HFrEF (last EF 30-35%) on life vest 2/2 ischemic cardiomyopathy (past MI), COPD, colon cancer, former substance abuse (cocaine + meth), current smoker here today for hospital follow up. Patient was recently admitted with KENY and persistent leukocytosis suspected sepsis source ESBL Klebsiella UTI. Started on Meropenem and ID consulted with recs for transition to Ertapenem for total 7d course with ID follow up. Hep C positive with elevated viral load and patient advised on outpatient Hepatology follow up for evaluation/treatment. Pt discharged with recommended PCP follow up, Cardiology follow up for Lifevest vs. ICD plan discussion (previously scheduled), GI follow up for malignancy follow up, new nebulizer machine, new walker for impaired mobility. ID plans to follow up with labs and visit. HH ordered for meds administration and line care.      Today patient is unaccompanied during visit. Overall doing ok, but does report a fever yesterday of 102F, but normal in clinic today. She reports taking 2 tylenol a couple hours ago. She did finish her IV Abx today and HH RN will see her on Monday and remove PICC line. She also had labs drawn by HH yesterday, but is not aware of the labs. She is reporting some chest tightness today, especially on deep inspiration. She has not yet gotten her nebulizer or rollator. She is currently staying at [facility]. Has case workers listed in the chart.    Established with Cards already. Has follow up next week.   GI appointment scheduled next week according  to patient. Needs to follow up with colon cancer diagnosis.   Previously smoked 2 packs per day. Quit 3 weeks ago.     Review of Systems  General ROS: +fever, chills  Psychological ROS: negative for hallucination, depression or suicidal ideation  Ophthalmic ROS: negative for blurry vision, photophobia or eye pain  ENT ROS: negative for epistaxis, sore throat or rhinorrhea  Respiratory ROS: +chest tightness  Cardiovascular ROS: +MONSON (baseline)  Gastrointestinal ROS: no abdominal pain, change in bowel habits, or black/ bloody stools  Genito-Urinary ROS: no dysuria, trouble voiding, or hematuria  Musculoskeletal ROS: negative for gait disturbance or muscular weakness  Neurological ROS: no syncope or seizures; no ataxia  Dermatological ROS: negative for pruritis, rash and jaundice    PAST HISTORY:     Past Medical History:   Diagnosis Date    Asthma     Bipolar disorder     Hypertension        Past Surgical History:   Procedure Laterality Date    CHOLECYSTECTOMY      SHOULDER SURGERY      TUBAL LIGATION         No family history on file.    Social History     Socioeconomic History    Marital status: Single   Tobacco Use    Smoking status: Every Day     Current packs/day: 0.50     Average packs/day: 2.0 packs/day for 57.7 years (114.5 ttl pk-yrs)     Types: Cigarettes     Start date: 1967    Smokeless tobacco: Never    Tobacco comments:     Pt is a 2 pk/day cigarette smoker x 57 yrs. She states that she cut down to 0.5 pk/day or less approximately 6 months ago, and is trying to quit. Ambulatory referral to Smoking Cessation clinic following hospital discharge.    Substance and Sexual Activity    Alcohol use: Yes     Comment: socailly    Drug use: Yes     Types: Cocaine, Methamphetamines     Comment: denies cocaine or meth. Reports maijurana use    Sexual activity: Yes     Social Determinants of Health     Financial Resource Strain: Medium Risk (8/21/2024)    Overall Financial Resource Strain (CARDIA)     Difficulty of  Paying Living Expenses: Somewhat hard   Food Insecurity: Unknown (8/21/2024)    Hunger Vital Sign     Worried About Running Out of Food in the Last Year: Never true   Recent Concern: Food Insecurity - Food Insecurity Present (8/12/2024)    Hunger Vital Sign     Worried About Running Out of Food in the Last Year: Sometimes true   Transportation Needs: Unmet Transportation Needs (8/12/2024)    TRANSPORTATION NEEDS     Transportation : Yes, it has kept me from medical appointments or from getting my medications.   Physical Activity: Inactive (8/21/2024)    Exercise Vital Sign     Days of Exercise per Week: 0 days     Minutes of Exercise per Session: 20 min   Stress: Stress Concern Present (8/21/2024)    [employer] [place] [employer] - Occupational Stress Questionnaire     Feeling of Stress : To some extent   Housing Stability: High Risk (8/21/2024)    Housing Stability Vital Sign     Unable to Pay for Housing in the Last Year: Yes     Homeless in the Last Year: Yes       MEDICATIONS & ALLERGIES:     Current Outpatient Medications on File Prior to Visit   Medication Sig Dispense Refill    albuterol (PROVENTIL/VENTOLIN HFA) 90 mcg/actuation inhaler Inhale 1-2 puffs into the lungs every 6 (six) hours as needed for Wheezing. Rescue 18 g 1    butalbital-acetaminophen-caffeine -40 mg (FIORICET, ESGIC) -40 mg per tablet Take 1 tablet by mouth every 4 (four) hours as needed for Headaches. 60 tablet 0    FEROSUL 325 mg (65 mg iron) Tab tablet Take 1 tablet (325 mg total) by mouth once daily. 30 tablet 1    furosemide (LASIX) 20 MG tablet Take 2 tablets (40 mg total) by mouth once daily. 60 tablet 11    losartan (COZAAR) 25 MG tablet Take 1 tablet (25 mg total) by mouth once daily. 30 tablet 1    metoprolol succinate (TOPROL-XL) 25 MG 24 hr tablet Take 1 tablet (25 mg total) by mouth once daily. 30 tablet 1    nicotine (NICODERM CQ) 21 mg/24 hr Place 1 patch onto the skin once daily. 28 patch 1     "oxybutynin (DITROPAN) 5 MG Tab Take 1 tablet (5 mg total) by mouth 3 (three) times daily. 30 tablet 1    pantoprazole (PROTONIX) 40 MG tablet Take 1 tablet (40 mg total) by mouth once daily. 30 tablet 1    rosuvastatin (CRESTOR) 20 MG tablet Take 1 tablet (20 mg total) by mouth every evening. 30 tablet 1    spironolactone (ALDACTONE) 25 MG tablet Take 25 mg by mouth once daily.      0.9% NaCl PgBk 100 mL with ertapenem 1 gram SolR 1 g Inject 1 g into the vein once daily. 7 g 0    naloxone (NARCAN) 4 mg/actuation Spry 4mg by nasal route as needed for opioid overdose; may repeat every 2-3 minutes in alternating nostrils until medical help arrives. Call 911 (Patient not taking: Reported on 8/29/2024) 2 each 11     No current facility-administered medications on file prior to visit.        Review of patient's allergies indicates:  No Known Allergies    OBJECTIVE:     Vital Signs:  Vitals:    08/29/24 1340   BP: 117/75   Pulse: 84   Temp: 97.9 °F (36.6 °C)   Weight: 82.8 kg (182 lb 8.7 oz)   Height: 5' 4" (1.626 m)       Wt Readings from Last 3 Encounters:   08/29/24 1340 82.8 kg (182 lb 8.7 oz)   08/23/24 0701 81.3 kg (179 lb 2 oz)   08/19/24 2302 77.1 kg (169 lb 15.6 oz)   08/19/24 1921 77.1 kg (170 lb)   08/14/24 0715 76.8 kg (169 lb 5 oz)   08/13/24 1118 79.4 kg (175 lb 0.7 oz)   08/13/24 0615 79.4 kg (175 lb 0.7 oz)   08/12/24 1613 80.9 kg (178 lb 5.6 oz)   08/12/24 1541 86.2 kg (190 lb)   08/11/24 2218 86.2 kg (190 lb 0.6 oz)     Body mass index is 31.33 kg/m².        Physical Exam:  /75   Pulse 84   Temp 97.9 °F (36.6 °C)   Ht 5' 4" (1.626 m)   Wt 82.8 kg (182 lb 8.7 oz)   BMI 31.33 kg/m²   General appearance: Alert, cooperative, no distress  Constitutional: Oriented to person, place, and time. Appears chronically ill  HEENT: Normocephalic, atraumatic, neck symmetrical, no nasal discharge   Eyes: Conjunctivae/corneas clear, EOM's intact  Lungs: +scattered wheezes, no crackles  Heart: Regular rate and " "rhythm without rub  Abdomen: Soft, non-tender; bowel sounds normoactive  Extremities: extremities symmetric; no edema  Integument: Skin color, texture, turgor normal  Neurologic: Alert and oriented X 3, normal strength, normal coordination and gait  Psychiatric: no pressured speech; normal affect; no evidence of impaired cognition     Laboratory  Lab Results   Component Value Date    WBC 15.88 (H) 08/26/2024    HGB 6.8 (L) 08/26/2024    HCT 22.2 (L) 08/26/2024    MCV 81 (L) 08/26/2024     08/26/2024     BMP  Lab Results   Component Value Date     08/26/2024    K 3.8 08/26/2024     08/26/2024    CO2 26 08/26/2024    BUN 24 (H) 08/26/2024    CREATININE 1.3 08/26/2024    CALCIUM 7.8 (L) 08/26/2024    ANIONGAP 8 08/26/2024    EGFRNORACEVR 47.7 (A) 08/26/2024     Lab Results   Component Value Date    ALT 33 08/26/2024    AST 48 (H) 08/26/2024    ALKPHOS 85 08/26/2024    BILITOT 0.1 08/26/2024     No results found for: "INR", "PROTIME"  No results found for: "HGBA1C"  No results for input(s): "POCTGLUCOSE" in the last 72 hours.    Diagnostic Results:    Echo (8/12/24):       Left Ventricle: The left ventricle is moderately dilated. There is eccentric hypertrophy. Moderate global hypokinesis present. There is moderately reduced systolic function with a visually estimated ejection fraction of 30 - 35%. Biplane (2D) method of discs ejection fraction is 34%. There is diastolic dysfunction but grade cannot be determined.    Right Ventricle: Normal right ventricular cavity size. Wall thickness is normal. Systolic function is normal.    Left Atrium: Left atrium is severely dilated.    Right Atrium: Right atrium is moderately dilated.    Aortic Valve: There is mild stenosis. Aortic valve area by VTI is 1.95 cm². Aortic valve peak velocity is 1.40 m/s. Mean gradient is 4 mmHg. The dimensionless index is 0.62.    Mitral Valve: There is mild regurgitation.    Tricuspid Valve: There is mild regurgitation.    " Pulmonic Valve: There is mild regurgitation.    Pulmonary Artery: The estimated pulmonary artery systolic pressure is 36 mmHg.    IVC/SVC: Normal venous pressure at 3 mmHg.    ASSESSMENT & PLAN:     Fever, unspecified fever cause   -No fever in clinic today. Strict ED precautions given. Advised if actual fever to call EMS.   -     POCT COVID-19 Rapid Screening    Acute on chronic congestive heart failure, unspecified heart failure type   -Euvolemic on exam today. Continue ARB, BB, statin, diuretic. Follow up with cardiology as scheduled.   -     Ambulatory referral/consult to Family Practice    Colorectal carcinoma   -Pt reports that she has GI appointment scheduled next week at [facility].   -     Ambulatory referral/consult to Family Practice    Chronic obstructive pulmonary disease with acute exacerbation   -Will need PFTs for official diagnosis, but for now will treat as exacerbation. Will also add Spiriva for controller.   -     tiotropium (SPIRIVA) 18 mcg inhalation capsule; Inhale 1 capsule (18 mcg total) into the lungs once daily. Controller  Dispense: 90 capsule; Refill: 3  -     azithromycin (Z-ANDREIA) 250 MG tablet; Take 2 tablets by mouth on day 1; Take 1 tablet by mouth on days 2-5  Dispense: 6 tablet; Refill: 0  -     predniSONE (DELTASONE) 20 MG tablet; Take 2 tablets (40 mg total) by mouth once daily for 2 days, THEN 1 tablet (20 mg total) once daily for 3 days.  Dispense: 10 tablet; Refill: 0      Scheduled Follow-up :  Future Appointments   Date Time Provider Department Center   9/11/2024  9:00 AM Belle Phillips CRT [facility]   9/12/2024  1:40 PM Shadi Gomez MD [facility]       Post Visit Medication List:     Medication List            Accurate as of August 29, 2024  3:14 PM. If you have any questions, ask your nurse or doctor.                START taking these medications      azithromycin 250 MG tablet  Commonly known as: Z-ANDREIA  Take 2 tablets by mouth on day 1;  Take 1 tablet by mouth on days 2-5  Started by: Phuc Montes PA-C     predniSONE 20 MG tablet  Commonly known as: DELTASONE  Take 2 tablets (40 mg total) by mouth once daily for 2 days, THEN 1 tablet (20 mg total) once daily for 3 days.  Start taking on: August 29, 2024  Started by: Phuc Montes PA-C     tiotropium 18 mcg inhalation capsule  Commonly known as: SPIRIVA  Inhale 1 capsule (18 mcg total) into the lungs once daily. Controller  Started by: Phuc Montes PA-C            CONTINUE taking these medications      0.9% NaCl PgBk 100 mL with ertapenem 1 gram SolR 1 g  Inject 1 g into the vein once daily.     albuterol 90 mcg/actuation inhaler  Commonly known as: PROVENTIL/VENTOLIN HFA  Inhale 1-2 puffs into the lungs every 6 (six) hours as needed for Wheezing. Rescue     butalbital-acetaminophen-caffeine -40 mg -40 mg per tablet  Commonly known as: FIORICET, ESGIC  Take 1 tablet by mouth every 4 (four) hours as needed for Headaches.     FeroSuL 325 mg (65 mg iron) Tab tablet  Generic drug: ferrous sulfate  Take 1 tablet (325 mg total) by mouth once daily.     furosemide 20 MG tablet  Commonly known as: LASIX  Take 2 tablets (40 mg total) by mouth once daily.     losartan 25 MG tablet  Commonly known as: COZAAR  Take 1 tablet (25 mg total) by mouth once daily.     metoprolol succinate 25 MG 24 hr tablet  Commonly known as: TOPROL-XL  Take 1 tablet (25 mg total) by mouth once daily.     naloxone 4 mg/actuation Spry  Commonly known as: NARCAN  4mg by nasal route as needed for opioid overdose; may repeat every 2-3 minutes in alternating nostrils until medical help arrives. Call 911     nicotine 21 mg/24 hr  Commonly known as: NICODERM CQ  Place 1 patch onto the skin once daily.     oxybutynin 5 MG Tab  Commonly known as: DITROPAN  Take 1 tablet (5 mg total) by mouth 3 (three) times daily.     pantoprazole 40 MG tablet  Commonly known as: PROTONIX  Take 1 tablet (40 mg total) by mouth once daily.      rosuvastatin 20 MG tablet  Commonly known as: CRESTOR  Take 1 tablet (20 mg total) by mouth every evening.     spironolactone 25 MG tablet  Commonly known as: ALDACTONE               Where to Get Your Medications        These medications were sent to [id number] - [place] - [address] AT [facility] [address]  [address], [place] [address]      Phone: [phone]   azithromycin 250 MG tablet  predniSONE 20 MG tablet  tiotropium 18 mcg inhalation capsule       Signing Provider:  Phuc Montes PA-C

## 2024-09-05 ENCOUNTER — HOSPITAL ENCOUNTER (INPATIENT)
Facility: HOSPITAL | Age: 59
LOS: 1 days | Discharge: HOME HEALTH | DRG: 291 | End: 2024-09-07
Attending: EMERGENCY MEDICINE | Admitting: INTERNAL MEDICINE
Payer: MEDICARE

## 2024-09-05 DIAGNOSIS — I50.9 CONGESTIVE HEART FAILURE, UNSPECIFIED HF CHRONICITY, UNSPECIFIED HEART FAILURE TYPE: Primary | ICD-10-CM

## 2024-09-05 DIAGNOSIS — R06.02 SHORTNESS OF BREATH: ICD-10-CM

## 2024-09-05 DIAGNOSIS — J44.1 COPD EXACERBATION: ICD-10-CM

## 2024-09-05 DIAGNOSIS — R07.9 CHEST PAIN: ICD-10-CM

## 2024-09-05 PROBLEM — B19.20 HEPATITIS C: Status: ACTIVE | Noted: 2024-09-05

## 2024-09-05 PROBLEM — I50.20 HFREF (HEART FAILURE WITH REDUCED EJECTION FRACTION): Status: ACTIVE | Noted: 2024-09-05

## 2024-09-05 LAB
ALBUMIN SERPL BCP-MCNC: 2.2 G/DL (ref 3.5–5.2)
ALP SERPL-CCNC: 134 U/L (ref 55–135)
ALT SERPL W/O P-5'-P-CCNC: 15 U/L (ref 10–44)
ANION GAP SERPL CALC-SCNC: 11 MMOL/L (ref 8–16)
AST SERPL-CCNC: 26 U/L (ref 10–40)
BASOPHILS # BLD AUTO: 0.12 K/UL (ref 0–0.2)
BASOPHILS NFR BLD: 0.9 % (ref 0–1.9)
BILIRUB SERPL-MCNC: 0.1 MG/DL (ref 0.1–1)
BILIRUB UR QL STRIP: NEGATIVE
BNP SERPL-MCNC: 1582 PG/ML (ref 0–99)
BUN SERPL-MCNC: 12 MG/DL (ref 6–20)
CALCIUM SERPL-MCNC: 8.4 MG/DL (ref 8.7–10.5)
CHLORIDE SERPL-SCNC: 108 MMOL/L (ref 95–110)
CLARITY UR: CLEAR
CO2 SERPL-SCNC: 20 MMOL/L (ref 23–29)
COLOR UR: COLORLESS
CREAT SERPL-MCNC: 1.2 MG/DL (ref 0.5–1.4)
DIFFERENTIAL METHOD BLD: ABNORMAL
EOSINOPHIL # BLD AUTO: 0.4 K/UL (ref 0–0.5)
EOSINOPHIL NFR BLD: 3.1 % (ref 0–8)
ERYTHROCYTE [DISTWIDTH] IN BLOOD BY AUTOMATED COUNT: 19.3 % (ref 11.5–14.5)
EST. GFR  (NO RACE VARIABLE): 52 ML/MIN/1.73 M^2
FIO2: 21 %
GLUCOSE SERPL-MCNC: 119 MG/DL (ref 70–110)
GLUCOSE UR QL STRIP: NEGATIVE
HCT VFR BLD AUTO: 24.6 % (ref 37–48.5)
HGB BLD-MCNC: 7.5 G/DL (ref 12–16)
HGB UR QL STRIP: NEGATIVE
IMM GRANULOCYTES # BLD AUTO: 0.07 K/UL (ref 0–0.04)
IMM GRANULOCYTES NFR BLD AUTO: 0.5 % (ref 0–0.5)
KETONES UR QL STRIP: NEGATIVE
LEUKOCYTE ESTERASE UR QL STRIP: NEGATIVE
LYMPHOCYTES # BLD AUTO: 2.9 K/UL (ref 1–4.8)
LYMPHOCYTES NFR BLD: 20.5 % (ref 18–48)
MAGNESIUM SERPL-MCNC: 1.7 MG/DL (ref 1.6–2.6)
MCH RBC QN AUTO: 24.6 PG (ref 27–31)
MCHC RBC AUTO-ENTMCNC: 30.5 G/DL (ref 32–36)
MCV RBC AUTO: 81 FL (ref 82–98)
MONOCYTES # BLD AUTO: 0.8 K/UL (ref 0.3–1)
MONOCYTES NFR BLD: 5.8 % (ref 4–15)
NEUTROPHILS # BLD AUTO: 9.6 K/UL (ref 1.8–7.7)
NEUTROPHILS NFR BLD: 69.2 % (ref 38–73)
NITRITE UR QL STRIP: NEGATIVE
NRBC BLD-RTO: 0 /100 WBC
OHS QRS DURATION: 82 MS
OHS QTC CALCULATION: 468 MS
PCO2 BLDA: 45.2 MMHG (ref 35–45)
PH SMN: 7.33 [PH] (ref 7.35–7.45)
PH UR STRIP: 7 [PH] (ref 5–8)
PLATELET # BLD AUTO: 525 K/UL (ref 150–450)
PMV BLD AUTO: 9.6 FL (ref 9.2–12.9)
PO2 BLDA: 29.7 MMHG (ref 40–60)
POC BASE DEFICIT: -1.7 MMOL/L (ref -2–2)
POC HCO3: 24.1 MMOL/L (ref 24–28)
POC PERFORMED BY: ABNORMAL
POC SATURATED O2: 49.9 % (ref 95–100)
POTASSIUM SERPL-SCNC: 3.4 MMOL/L (ref 3.5–5.1)
PROT SERPL-MCNC: 7.5 G/DL (ref 6–8.4)
PROT UR QL STRIP: NEGATIVE
RBC # BLD AUTO: 3.05 M/UL (ref 4–5.4)
SARS-COV-2 RDRP RESP QL NAA+PROBE: NEGATIVE
SODIUM SERPL-SCNC: 139 MMOL/L (ref 136–145)
SP GR UR STRIP: 1.01 (ref 1–1.03)
SPECIMEN SOURCE: ABNORMAL
TROPONIN I SERPL DL<=0.01 NG/ML-MCNC: 0.11 NG/ML (ref 0–0.03)
TROPONIN I SERPL DL<=0.01 NG/ML-MCNC: 0.35 NG/ML (ref 0–0.03)
URN SPEC COLLECT METH UR: ABNORMAL
UROBILINOGEN UR STRIP-ACNC: NEGATIVE EU/DL
WBC # BLD AUTO: 13.9 K/UL (ref 3.9–12.7)

## 2024-09-05 PROCEDURE — 93010 ELECTROCARDIOGRAM REPORT: CPT | Mod: ,,, | Performed by: STUDENT IN AN ORGANIZED HEALTH CARE EDUCATION/TRAINING PROGRAM

## 2024-09-05 PROCEDURE — 94640 AIRWAY INHALATION TREATMENT: CPT | Mod: XB

## 2024-09-05 PROCEDURE — G0378 HOSPITAL OBSERVATION PER HR: HCPCS

## 2024-09-05 PROCEDURE — 82803 BLOOD GASES ANY COMBINATION: CPT

## 2024-09-05 PROCEDURE — 80053 COMPREHEN METABOLIC PANEL: CPT | Performed by: EMERGENCY MEDICINE

## 2024-09-05 PROCEDURE — S4991 NICOTINE PATCH NONLEGEND: HCPCS | Performed by: REGISTERED NURSE

## 2024-09-05 PROCEDURE — 25000003 PHARM REV CODE 250: Performed by: REGISTERED NURSE

## 2024-09-05 PROCEDURE — 96366 THER/PROPH/DIAG IV INF ADDON: CPT

## 2024-09-05 PROCEDURE — 93005 ELECTROCARDIOGRAM TRACING: CPT

## 2024-09-05 PROCEDURE — U0002 COVID-19 LAB TEST NON-CDC: HCPCS | Performed by: EMERGENCY MEDICINE

## 2024-09-05 PROCEDURE — 99900035 HC TECH TIME PER 15 MIN (STAT)

## 2024-09-05 PROCEDURE — 96376 TX/PRO/DX INJ SAME DRUG ADON: CPT

## 2024-09-05 PROCEDURE — 96367 TX/PROPH/DG ADDL SEQ IV INF: CPT

## 2024-09-05 PROCEDURE — 25000242 PHARM REV CODE 250 ALT 637 W/ HCPCS: Performed by: REGISTERED NURSE

## 2024-09-05 PROCEDURE — 25000242 PHARM REV CODE 250 ALT 637 W/ HCPCS: Performed by: EMERGENCY MEDICINE

## 2024-09-05 PROCEDURE — 83880 ASSAY OF NATRIURETIC PEPTIDE: CPT | Performed by: EMERGENCY MEDICINE

## 2024-09-05 PROCEDURE — 85025 COMPLETE CBC W/AUTO DIFF WBC: CPT | Performed by: EMERGENCY MEDICINE

## 2024-09-05 PROCEDURE — 25000003 PHARM REV CODE 250: Performed by: EMERGENCY MEDICINE

## 2024-09-05 PROCEDURE — 63600175 PHARM REV CODE 636 W HCPCS: Performed by: EMERGENCY MEDICINE

## 2024-09-05 PROCEDURE — 84484 ASSAY OF TROPONIN QUANT: CPT | Performed by: EMERGENCY MEDICINE

## 2024-09-05 PROCEDURE — 25500020 PHARM REV CODE 255: Performed by: EMERGENCY MEDICINE

## 2024-09-05 PROCEDURE — 99285 EMERGENCY DEPT VISIT HI MDM: CPT | Mod: 25

## 2024-09-05 PROCEDURE — 84484 ASSAY OF TROPONIN QUANT: CPT | Mod: 91 | Performed by: REGISTERED NURSE

## 2024-09-05 PROCEDURE — 96365 THER/PROPH/DIAG IV INF INIT: CPT

## 2024-09-05 PROCEDURE — 96375 TX/PRO/DX INJ NEW DRUG ADDON: CPT

## 2024-09-05 PROCEDURE — 81003 URINALYSIS AUTO W/O SCOPE: CPT | Performed by: INTERNAL MEDICINE

## 2024-09-05 PROCEDURE — 94761 N-INVAS EAR/PLS OXIMETRY MLT: CPT | Mod: XB

## 2024-09-05 PROCEDURE — 63600175 PHARM REV CODE 636 W HCPCS: Performed by: REGISTERED NURSE

## 2024-09-05 PROCEDURE — 99291 CRITICAL CARE FIRST HOUR: CPT

## 2024-09-05 PROCEDURE — 83735 ASSAY OF MAGNESIUM: CPT | Performed by: EMERGENCY MEDICINE

## 2024-09-05 PROCEDURE — 63600175 PHARM REV CODE 636 W HCPCS: Performed by: NURSE PRACTITIONER

## 2024-09-05 RX ORDER — IBUPROFEN 200 MG
1 TABLET ORAL DAILY
Status: DISCONTINUED | OUTPATIENT
Start: 2024-09-05 | End: 2024-09-07 | Stop reason: HOSPADM

## 2024-09-05 RX ORDER — PANTOPRAZOLE SODIUM 40 MG/1
40 TABLET, DELAYED RELEASE ORAL DAILY
Status: DISCONTINUED | OUTPATIENT
Start: 2024-09-05 | End: 2024-09-07 | Stop reason: HOSPADM

## 2024-09-05 RX ORDER — FUROSEMIDE 10 MG/ML
40 INJECTION INTRAMUSCULAR; INTRAVENOUS EVERY 12 HOURS
Status: COMPLETED | OUTPATIENT
Start: 2024-09-05 | End: 2024-09-05

## 2024-09-05 RX ORDER — OXYBUTYNIN CHLORIDE 5 MG/1
5 TABLET ORAL 3 TIMES DAILY
Status: DISCONTINUED | OUTPATIENT
Start: 2024-09-05 | End: 2024-09-07 | Stop reason: HOSPADM

## 2024-09-05 RX ORDER — POLYETHYLENE GLYCOL 3350 17 G/17G
1 POWDER, FOR SOLUTION ORAL DAILY
COMMUNITY

## 2024-09-05 RX ORDER — FUROSEMIDE 10 MG/ML
40 INJECTION INTRAMUSCULAR; INTRAVENOUS
Status: COMPLETED | OUTPATIENT
Start: 2024-09-05 | End: 2024-09-05

## 2024-09-05 RX ORDER — SUCRALFATE 1 G/10ML
1 SUSPENSION ORAL EVERY 6 HOURS
Status: DISCONTINUED | OUTPATIENT
Start: 2024-09-05 | End: 2024-09-07 | Stop reason: HOSPADM

## 2024-09-05 RX ORDER — ALUMINUM HYDROXIDE, MAGNESIUM HYDROXIDE, AND SIMETHICONE 1200; 120; 1200 MG/30ML; MG/30ML; MG/30ML
30 SUSPENSION ORAL
Status: DISCONTINUED | OUTPATIENT
Start: 2024-09-05 | End: 2024-09-07 | Stop reason: HOSPADM

## 2024-09-05 RX ORDER — LOSARTAN POTASSIUM 25 MG/1
25 TABLET ORAL DAILY
Status: DISCONTINUED | OUTPATIENT
Start: 2024-09-05 | End: 2024-09-07 | Stop reason: HOSPADM

## 2024-09-05 RX ORDER — SODIUM CHLORIDE 0.9 % (FLUSH) 0.9 %
10 SYRINGE (ML) INJECTION
Status: DISCONTINUED | OUTPATIENT
Start: 2024-09-05 | End: 2024-09-07 | Stop reason: HOSPADM

## 2024-09-05 RX ORDER — METOPROLOL SUCCINATE 25 MG/1
25 TABLET, EXTENDED RELEASE ORAL DAILY
Status: DISCONTINUED | OUTPATIENT
Start: 2024-09-05 | End: 2024-09-07 | Stop reason: HOSPADM

## 2024-09-05 RX ORDER — IPRATROPIUM BROMIDE AND ALBUTEROL SULFATE 2.5; .5 MG/3ML; MG/3ML
3 SOLUTION RESPIRATORY (INHALATION) EVERY 6 HOURS PRN
Status: DISCONTINUED | OUTPATIENT
Start: 2024-09-05 | End: 2024-09-07 | Stop reason: HOSPADM

## 2024-09-05 RX ORDER — SPIRONOLACTONE 25 MG/1
25 TABLET ORAL DAILY
Status: DISCONTINUED | OUTPATIENT
Start: 2024-09-05 | End: 2024-09-07 | Stop reason: HOSPADM

## 2024-09-05 RX ORDER — IPRATROPIUM BROMIDE AND ALBUTEROL SULFATE 2.5; .5 MG/3ML; MG/3ML
3 SOLUTION RESPIRATORY (INHALATION)
Status: COMPLETED | OUTPATIENT
Start: 2024-09-05 | End: 2024-09-05

## 2024-09-05 RX ORDER — ONDANSETRON HYDROCHLORIDE 2 MG/ML
4 INJECTION, SOLUTION INTRAVENOUS EVERY 8 HOURS PRN
Status: DISCONTINUED | OUTPATIENT
Start: 2024-09-05 | End: 2024-09-07 | Stop reason: HOSPADM

## 2024-09-05 RX ORDER — LANOLIN ALCOHOL/MO/W.PET/CERES
1 CREAM (GRAM) TOPICAL DAILY
Status: DISCONTINUED | OUTPATIENT
Start: 2024-09-05 | End: 2024-09-07 | Stop reason: HOSPADM

## 2024-09-05 RX ORDER — ACETAMINOPHEN 500 MG
1000 TABLET ORAL
Status: COMPLETED | OUTPATIENT
Start: 2024-09-05 | End: 2024-09-05

## 2024-09-05 RX ORDER — MAGNESIUM SULFATE HEPTAHYDRATE 40 MG/ML
2 INJECTION, SOLUTION INTRAVENOUS ONCE
Status: COMPLETED | OUTPATIENT
Start: 2024-09-05 | End: 2024-09-05

## 2024-09-05 RX ORDER — BUTALBITAL, ACETAMINOPHEN AND CAFFEINE 50; 325; 40 MG/1; MG/1; MG/1
1 TABLET ORAL EVERY 4 HOURS PRN
Status: DISCONTINUED | OUTPATIENT
Start: 2024-09-05 | End: 2024-09-07 | Stop reason: HOSPADM

## 2024-09-05 RX ORDER — ATORVASTATIN CALCIUM 40 MG/1
80 TABLET, FILM COATED ORAL DAILY
Status: DISCONTINUED | OUTPATIENT
Start: 2024-09-05 | End: 2024-09-07 | Stop reason: HOSPADM

## 2024-09-05 RX ADMIN — OXYBUTYNIN CHLORIDE 5 MG: 5 TABLET ORAL at 09:09

## 2024-09-05 RX ADMIN — BUTALBITAL, ACETAMINOPHEN, AND CAFFEINE 1 TABLET: 325; 50; 40 TABLET ORAL at 09:09

## 2024-09-05 RX ADMIN — ALUMINUM HYDROXIDE, MAGNESIUM HYDROXIDE, AND SIMETHICONE 30 ML: 1200; 120; 1200 SUSPENSION ORAL at 11:09

## 2024-09-05 RX ADMIN — POTASSIUM BICARBONATE 25 MEQ: 978 TABLET, EFFERVESCENT ORAL at 09:09

## 2024-09-05 RX ADMIN — MAGNESIUM SULFATE IN WATER 2 G: 40 INJECTION, SOLUTION INTRAVENOUS at 01:09

## 2024-09-05 RX ADMIN — IPRATROPIUM BROMIDE AND ALBUTEROL SULFATE 3 ML: .5; 3 SOLUTION RESPIRATORY (INHALATION) at 03:09

## 2024-09-05 RX ADMIN — FUROSEMIDE 40 MG: 10 INJECTION, SOLUTION INTRAMUSCULAR; INTRAVENOUS at 09:09

## 2024-09-05 RX ADMIN — SPIRONOLACTONE 25 MG: 25 TABLET, FILM COATED ORAL at 09:09

## 2024-09-05 RX ADMIN — NICOTINE 1 PATCH: 21 PATCH, EXTENDED RELEASE TRANSDERMAL at 09:09

## 2024-09-05 RX ADMIN — ATORVASTATIN CALCIUM 80 MG: 40 TABLET, FILM COATED ORAL at 09:09

## 2024-09-05 RX ADMIN — SUCRALFATE 1 G: 1 SUSPENSION ORAL at 06:09

## 2024-09-05 RX ADMIN — ALUMINUM HYDROXIDE, MAGNESIUM HYDROXIDE, AND SIMETHICONE 30 ML: 1200; 120; 1200 SUSPENSION ORAL at 09:09

## 2024-09-05 RX ADMIN — SUCRALFATE 1 G: 1 SUSPENSION ORAL at 11:09

## 2024-09-05 RX ADMIN — IOHEXOL 100 ML: 350 INJECTION, SOLUTION INTRAVENOUS at 03:09

## 2024-09-05 RX ADMIN — FERROUS SULFATE TAB 325 MG (65 MG ELEMENTAL FE) 1 EACH: 325 (65 FE) TAB at 09:09

## 2024-09-05 RX ADMIN — TIOTROPIUM BROMIDE INHALATION SPRAY 2 PUFF: 3.12 SPRAY, METERED RESPIRATORY (INHALATION) at 08:09

## 2024-09-05 RX ADMIN — PANTOPRAZOLE SODIUM 40 MG: 40 TABLET, DELAYED RELEASE ORAL at 09:09

## 2024-09-05 RX ADMIN — ACETAMINOPHEN 1000 MG: 500 TABLET ORAL at 04:09

## 2024-09-05 RX ADMIN — FUROSEMIDE 40 MG: 10 INJECTION, SOLUTION INTRAMUSCULAR; INTRAVENOUS at 03:09

## 2024-09-05 RX ADMIN — METOPROLOL SUCCINATE 25 MG: 25 TABLET, EXTENDED RELEASE ORAL at 09:09

## 2024-09-05 RX ADMIN — BUTALBITAL, ACETAMINOPHEN, AND CAFFEINE 1 TABLET: 325; 50; 40 TABLET ORAL at 11:09

## 2024-09-05 RX ADMIN — LOSARTAN POTASSIUM 25 MG: 25 TABLET, FILM COATED ORAL at 09:09

## 2024-09-05 RX ADMIN — ERTAPENEM 1 G: 1 INJECTION INTRAMUSCULAR; INTRAVENOUS at 09:09

## 2024-09-05 NOTE — ASSESSMENT & PLAN NOTE
- history of HFrEF diagnosed earlier this year in [place] with EF 20-25%; repeat echo last  month with EF 34%  - on outpatient GDMT with ARB, BB, Aldactone and Lasix; admitted with ADHF due to medication non compliance; started on Lasix 40mg IV BID with no output documented; fine rales and trace LE edema noted- recommend continue diuresis until euvolemic; once able to ambulate with no SOB then transition back to home dose of Lasix; continue remaining meds  - follow up with Dr. Mills at [facility] and continue with Life Vest use  - stressed importance of medication and dietary compliance as well as close follow up

## 2024-09-05 NOTE — H&P
[facility] Medicine  History & Physical    Patient Name: Mary Ellen Saini  MRN: [id number]  Patient Class: OP- Observation  Admission Date: 9/5/2024  Attending Physician: Jax Rosas MD   Primary Care Provider: Marlen Primary Doctor         Patient information was obtained from patient and ER records.     Subjective:     Principal Problem:<principal problem not specified>    Chief Complaint:   Chief Complaint   Patient presents with    Shortness of Breath     Pt to the  EMS, hx COPD and CHF with c/o worsening SOB over the last 4 hours. Pt Given Dup Neb by EMS en route         HPI: Chemistry mostly benign some mild hypokalemia at 3.4.  BNP elevated at 15 82, troponin elevated at 0.109  Pt is a 59-year-old female with a past medical history of CHF, bipolar 1 disorder, hypertension, asthma who presents to the ED via EMS with complaint of shortness of breath. Patient reports worsening shortness of breath over the past several hours with associated chest pressure that started approximately 12 hours ago. She is currently receiving antibiotics via a PICC line for reported kidney infection. Per EMS, she was administered 1 DuoNeb EN route with improvement of her reported wheezing and shortness of breath. She is not on home oxygen.  In ED labwork remarkable for WBC of 14.  H&H 7.5 and 24.6    Past Medical History:   Diagnosis Date    Asthma     Bipolar disorder     Hypertension        Past Surgical History:   Procedure Laterality Date    CHOLECYSTECTOMY      SHOULDER SURGERY      TUBAL LIGATION         Review of patient's allergies indicates:  No Known Allergies    No current facility-administered medications on file prior to encounter.     Current Outpatient Medications on File Prior to Encounter   Medication Sig    0.9% NaCl PgBk 100 mL with ertapenem 1 gram SolR 1 g Inject 1 g into the vein once daily.    albuterol (PROVENTIL/VENTOLIN HFA) 90 mcg/actuation inhaler Inhale 1-2 puffs into the lungs  every 6 (six) hours as needed for Wheezing. Rescue    butalbital-acetaminophen-caffeine -40 mg (FIORICET, ESGIC) -40 mg per tablet Take 1 tablet by mouth every 4 (four) hours as needed for Headaches.    FEROSUL 325 mg (65 mg iron) Tab tablet Take 1 tablet (325 mg total) by mouth once daily.    furosemide (LASIX) 20 MG tablet Take 2 tablets (40 mg total) by mouth once daily.    losartan (COZAAR) 25 MG tablet Take 1 tablet (25 mg total) by mouth once daily.    metoprolol succinate (TOPROL-XL) 25 MG 24 hr tablet Take 1 tablet (25 mg total) by mouth once daily.    naloxone (NARCAN) 4 mg/actuation Spry 4mg by nasal route as needed for opioid overdose; may repeat every 2-3 minutes in alternating nostrils until medical help arrives. Call 911 (Patient not taking: Reported on 8/29/2024)    nicotine (NICODERM CQ) 21 mg/24 hr Place 1 patch onto the skin once daily.    oxybutynin (DITROPAN) 5 MG Tab Take 1 tablet (5 mg total) by mouth 3 (three) times daily.    pantoprazole (PROTONIX) 40 MG tablet Take 1 tablet (40 mg total) by mouth once daily.    rosuvastatin (CRESTOR) 20 MG tablet Take 1 tablet (20 mg total) by mouth every evening.    spironolactone (ALDACTONE) 25 MG tablet Take 25 mg by mouth once daily.    tiotropium (SPIRIVA) 18 mcg inhalation capsule Inhale 1 capsule (18 mcg total) into the lungs once daily. Controller     Family History    None       Tobacco Use    Smoking status: Every Day     Current packs/day: 0.50     Average packs/day: 2.0 packs/day for 57.7 years (114.5 ttl pk-yrs)     Types: Cigarettes     Start date: 1967    Smokeless tobacco: Never    Tobacco comments:     Pt is a 2 pk/day cigarette smoker x 57 yrs. She states that she cut down to 0.5 pk/day or less approximately 6 months ago, and is trying to quit. Ambulatory referral to Smoking Cessation clinic following hospital discharge.    Substance and Sexual Activity    Alcohol use: Yes     Comment: socailly    Drug use: Yes     Types:  Cocaine, Methamphetamines     Comment: denies cocaine or meth. Reports maijurana use    Sexual activity: Yes     Review of Systems   All other systems reviewed and are negative.    Objective:     Vital Signs (Most Recent):  Temp: 98.5 °F (36.9 °C) (09/05/24 0115)  Pulse: (!) 127 (09/05/24 0327)  Resp: (!) 48 (09/05/24 0327)  BP: (!) 161/92 (09/05/24 0108)  SpO2: 95 % (09/05/24 0327) Vital Signs (24h Range):  Temp:  [98.5 °F (36.9 °C)] 98.5 °F (36.9 °C)  Pulse:  [100-129] 127  Resp:  [22-48] 48  SpO2:  [94 %-100 %] 95 %  BP: (156-161)/() 161/92     Weight: 81.6 kg (180 lb)  Body mass index is 30.9 kg/m².     Physical Exam  Constitutional:       Appearance: Normal appearance. She is ill-appearing.   HENT:      Head: Normocephalic.      Nose: Nose normal.      Mouth/Throat:      Mouth: Mucous membranes are dry.   Eyes:      Pupils: Pupils are equal, round, and reactive to light.   Cardiovascular:      Rate and Rhythm: Regular rhythm. Tachycardia present.      Pulses: Normal pulses.      Heart sounds: Normal heart sounds.   Pulmonary:      Effort: Pulmonary effort is normal.   Abdominal:      General: Bowel sounds are normal.      Palpations: Abdomen is soft.   Musculoskeletal:      Right lower leg: Edema present.      Left lower leg: Edema present.   Skin:     General: Skin is warm and dry.      Capillary Refill: Capillary refill takes less than 2 seconds.   Neurological:      General: No focal deficit present.      Mental Status: She is alert and oriented to person, place, and time. Mental status is at baseline.   Psychiatric:         Mood and Affect: Mood normal.         Behavior: Behavior normal.              CRANIAL NERVES     CN III, IV, VI   Pupils are equal, round, and reactive to light.       Significant Labs: All pertinent labs within the past 24 hours have been reviewed.  Recent Lab Results         09/05/24  0326   09/05/24  0124   09/05/24 0115        Base Deficit -1.7           Performed By:  beto           Specimen source Venous           Albumin   2.2         ALP   134         ALT   15         Anion Gap   11         AST   26         Baso #   0.12         Basophil %   0.9         BILIRUBIN TOTAL   0.1  Comment: For infants and newborns, interpretation of results should be based  on gestational age, weight and in agreement with clinical  observations.    Premature Infant recommended reference ranges:  Up to 24 hours.............<8.0 mg/dL  Up to 48 hours............<12.0 mg/dL  3-5 days..................<15.0 mg/dL  6-29 days.................<15.0 mg/dL           BNP   1,582  Comment: Values of less than 100 pg/ml are consistent with non-CHF populations.         BUN   12         Calcium   8.4         Chloride   108         CO2   20         Creatinine   1.2         Differential Method   Automated         eGFR   52         Eos #   0.4         Eos %   3.1         FiO2 21.0           Glucose   119         Gran # (ANC)   9.6         Gran %   69.2         Hematocrit   24.6         Hemoglobin   7.5         Immature Grans (Abs)   0.07  Comment: Mild elevation in immature granulocytes is non specific and   can be seen in a variety of conditions including stress response,   acute inflammation, trauma and pregnancy. Correlation with other   laboratory and clinical findings is essential.           Immature Granulocytes   0.5         Lymph #   2.9         Lymph %   20.5         Magnesium    1.7         MCH   24.6         MCHC   30.5         MCV   81         Mono #   0.8         Mono %   5.8         MPV   9.6         nRBC   0         Platelet Count   525         POC HCO3 24.1           POC PCO2 45.2  Comment: Value above reference range           POC PH 7.335  Comment: Value below reference range           POC PO2 29.7  Comment:  notified  at    read back  Value below critical limit             POC SATURATED O2 49.9  Comment:  notified  at    read back  Value below critical limit             Potassium   3.4          PROTEIN TOTAL   7.5         RBC   3.05         RDW   19.3         SARS-CoV-2 RNA, Amplification, Qual     Negative  Comment: This test utilizes isothermal nucleic acid amplification technology   to   detect the SARS-CoV-2 RdRp nucleic acid segment. The analytical   sensitivity   (limit of detection) is 500 copies/swab.    A POSITIVE result is indicative of the presence of SARS-CoV-2 RNA;   clinical   correlation with patient history and other diagnostic information is   necessary to determine patient infection status.    A NEGATIVE result means that SARS-CoV-2 nucleic acids are not present   above   the limit of detection. A NEGATIVE result should be treated as   presumptive.   It does not rule out the possibility of COVID-19 and should not be   the sole   basis for treatment decisions.    If COVID-19 is strongly suspected based on clinical and exposure   history,   re-testing using an alternate molecular assay should be considered.    This test is Food and Drug Administration (FDA) approved. Performance   characteristics of this has been independently verified by [facility] Department of Pathology and Laboratory Medicine.         Sodium   139         Troponin I   0.109  Comment: The reference interval for Troponin I represents the 99th percentile   cutoff   for our facility and is consistent with 3rd generation assay   performance.           WBC   13.90                 Significant Imaging: I have reviewed all pertinent imaging results/findings within the past 24 hours.  I have reviewed and interpreted all pertinent imaging results/findings within the past 24 hours.  Assessment/Plan:     HFrEF (heart failure with reduced ejection fraction)  Most recent echo with EF 30%   patient has cardiology follow-up from previous hospitalization discuss AICD   Continue Lasix and Aldactone    Hepatitis C  On prior admission, Hep C positive with elevated viral load and patient advised on outpatient Hepatology  follow up for evaluation/treatment   LFTs normal on admission    COPD (chronic obstructive pulmonary disease)  Patient's COPD is controlled currently.  Patient is currently off COPD Pathway. Continue scheduled inhalers  and monitor respiratory status closely.     Urinary tract infection due to ESBL Klebsiella  Recently completed 7 day course of ertapenem via PICC line  Recheck UA    Anemia  Anemia is likely due to chronic disease due to Chronic liver disease. Most recent hemoglobin and hematocrit are listed below.  Recent Labs     09/05/24 0124   HGB 7.5*   HCT 24.6*     Plan  - Monitor serial CBC: Daily  - Transfuse PRBC if patient becomes hemodynamically unstable, symptomatic or H/H drops below 7/21.  - Patient has not received any PRBC transfusions to date  - Patient's anemia is currently stable  -     Hypokalemia  Patient's most recent potassium results are listed below.   Recent Labs     09/05/24 0124   K 3.4*     Plan  - Replete potassium per protocol  - Monitor potassium Daily  - Patient's hypokalemia is stable  -    Elevated troponin  Patient with chest pain  EKG without ST elevation  She reports 0.109, trending.          VTE Risk Mitigation (From admission, onward)           Ordered     IP VTE HIGH RISK PATIENT  Once         09/05/24 0644     Place sequential compression device  Until discontinued         09/05/24 0644                         On 09/05/2024, patient should be placed in hospital observation services under my care in collaboration with Dr Harris  .           Tavon Crawley NP  Department of Hospital Medicine  [place] - Emergency Dept

## 2024-09-05 NOTE — HPI
58yo female with UTI-ESBL, hypokalemia, Hepatitis C, HFrEF, elevated troponin, anemia, COPD, colorectal cancer, atrial flutter and CAD per CT scan who presented to the ER with complaints of SOB. She was admitted last month for ADHF and was placed on GDMT and discharged to [facility]. She followed up with Dr. Mills at [facility] last month and is due to follow up again later this month. She reports going to her sister's house for  few days and was unable to get back to the [facility] where he medications were. She reports oting SOB yesterday that progressively worsened therefore she presented to the ER for evaluation. She reports feeling better after discharge with recurrence of SOB recently. Labs CBC with WBCs 13K H&H 7.5&24.6 BMP with K+ 3.4 creatinine 1.2 Mg 1.7 BNP 1582 CTA negative PE CXR with mild interstitial edema. Initial /100. Started on Lasix 40mg IV BID and admitted to [facility]. Cardiology consulted for ADHF management and elevated troponin

## 2024-09-05 NOTE — ASSESSMENT & PLAN NOTE
- K+ 3.4 related to diuresis  - replaced with K+ Lyte; Mg 1.7 and will replace with Mg rider 2gm   - goal K>4.0 Mg > 2.0

## 2024-09-05 NOTE — ASSESSMENT & PLAN NOTE
Patient with chest pain  EKG without ST elevation  She reports 0.109, trended up in setting of ADHF.  Cardiology following  Continue ARB, BB and statin; no ASA or Plavix due to anemia and demand etiology

## 2024-09-05 NOTE — ASSESSMENT & PLAN NOTE
Anemia is likely due to chronic disease due to Chronic liver disease. Most recent hemoglobin and hematocrit are listed below.  Recent Labs     09/05/24  0124   HGB 7.5*   HCT 24.6*     Plan  - Monitor serial CBC: Daily  - Transfuse PRBC if patient becomes hemodynamically unstable, symptomatic or H/H drops below 7/21.  - Patient has not received any PRBC transfusions to date  - Patient's anemia is currently stable  -

## 2024-09-05 NOTE — HPI
Chemistry mostly benign some mild hypokalemia at 3.4.  BNP elevated at 15 82, troponin elevated at 0.109  Pt is a 59-year-old female with a past medical history of CHF, bipolar 1 disorder, hypertension, asthma who presents to the ED via EMS with complaint of shortness of breath. Patient reports worsening shortness of breath over the past several hours with associated chest pressure that started approximately 12 hours ago. She is currently receiving antibiotics via a PICC line for reported kidney infection. Per EMS, she was administered 1 DuoNeb EN route with improvement of her reported wheezing and shortness of breath. She is not on home oxygen.  In ED labwork remarkable for WBC of 14.  H&H 7.5 and 24.6

## 2024-09-05 NOTE — ASSESSMENT & PLAN NOTE
Most recent echo with EF 30%   patient has cardiology follow-up from previous hospitalization discuss AICD   Continue Lasix and Aldactone

## 2024-09-05 NOTE — ASSESSMENT & PLAN NOTE
On prior admission, Hep C positive with elevated viral load and patient advised on outpatient Hepatology follow up for evaluation/treatment   LFTs normal on admission

## 2024-09-05 NOTE — PROGRESS NOTES
VIRTUAL NURSE:  Cued into patient's room.  Permission received per patient to turn camera to view patient.  Introduced as VN that will be working with floor nurse and nursing assistant.  Educated patient on VN's role in patient care and  [facility] model.  Plan of care reviewed with patient.  Education per flowsheet.   Informed patient that staff will round on them every 2 hours but to use call light for any other needs they may have; informed of fall risk and fall precautions.  Patient verbalized understanding.  Call light within reach; bed siderails up x3.  Opportunity given for questions and questions answered.  Admission assessment questions answered.  Patient denies complaints or any needs at this time. Instructed to call for assistance.  Will cont to monitor and intervene as needed.    Labs, notes, orders, and careplan reviewed.        09/05/24 1856   Admission   Initial VN Admission Questions Complete   Communication Issues? None   Shift   Virtual Nurse - Rounding Complete   Pain Management Interventions quiet environment facilitated;relaxation techniques promoted   Virtual Nurse - Patient Verbalized Approval Of Camera Use;VN Rounding   Type of Frequent Check   Type Patient Rounds   Safety/Activity   Patient Rounds bed in low position;bed wheels locked;call light in patient/parent reach;clutter free environment maintained;ID band on;visualized patient;placement of personal items at bedside   Safety Promotion/Fall Prevention Fall Risk reviewed with patient/family   Positioning   Body Position side-lying   Head of Bed (HOB) Positioning HOB at 30 degrees

## 2024-09-05 NOTE — ED TRIAGE NOTES
Pt comes in reporting that over the past two days she has been having increasing sob. Today she could not walk more then a few steps with out becoming sob and needing to pause for rest. Pt reports that tonight when she lay down to going to bed she began to feel a heavy tonight feeling in her chest that made it difficult to sleep and if feel a little better when stilling up. Pt has a hx of a fib and COPD. Pt has not been able to take her regular home meds for the past few days and has noticed a increase in the swelling in her legs. 3+ non pitting edema. PICC noted in the upper right arm that the pt reports she has been using for at home antibiotic home infusions. Pt 100 % on 3L NC per ems she was 91% at home on room air. AAO x4.

## 2024-09-05 NOTE — ASSESSMENT & PLAN NOTE
- H&H 7.5&24.6  - no far off baseline; diagnosed with colorectal cancer in February  - scheduled for Hem Onc evaluation at [facility]

## 2024-09-05 NOTE — PHARMACY MED REC
"  [facility]           Pharmacy  Admission Medication History     The home medication history was taken by Caitlin Stinson.      Medication history obtained from Medications listed below were obtained from: Patient/family    Based on information gathered for medication list, you may go to "Admission" then "Reconcile Home Medications" tabs to review and/or act upon those items.     The home medication list has been updated by the Pharmacy department.   Please read ALL comments highlighted in yellow.   Please address this information as you see fit.    Feel free to contact us if you have any questions or require assistance.        No current facility-administered medications on file prior to encounter.     Current Outpatient Medications on File Prior to Encounter   Medication Sig Dispense Refill    albuterol (PROVENTIL/VENTOLIN HFA) 90 mcg/actuation inhaler Inhale 1-2 puffs into the lungs every 6 (six) hours as needed for Wheezing. Rescue 18 g 1    butalbital-acetaminophen-caffeine -40 mg (FIORICET, ESGIC) -40 mg per tablet Take 1 tablet by mouth every 4 (four) hours as needed for Headaches. 60 tablet 0    FEROSUL 325 mg (65 mg iron) Tab tablet Take 1 tablet (325 mg total) by mouth once daily. 30 tablet 1    furosemide (LASIX) 20 MG tablet Take 2 tablets (40 mg total) by mouth once daily. 60 tablet 11    losartan (COZAAR) 25 MG tablet Take 1 tablet (25 mg total) by mouth once daily. 30 tablet 1    metoprolol succinate (TOPROL-XL) 25 MG 24 hr tablet Take 1 tablet (25 mg total) by mouth once daily. 30 tablet 1    oxybutynin (DITROPAN) 5 MG Tab Take 1 tablet (5 mg total) by mouth 3 (three) times daily. 30 tablet 1    pantoprazole (PROTONIX) 40 MG tablet Take 1 tablet (40 mg total) by mouth once daily. 30 tablet 1    polyethylene glycol (GLYCOLAX) 17 gram PwPk Take 1 packet by mouth once daily.      rosuvastatin (CRESTOR) 20 MG tablet Take 1 tablet (20 mg total) by mouth every evening. 30 " tablet 1    spironolactone (ALDACTONE) 25 MG tablet Take 25 mg by mouth once daily.      0.9% NaCl PgBk 100 mL with ertapenem 1 gram SolR 1 g Inject 1 g into the vein once daily. 7 g 0       Please address this information as you see fit.  Feel free to contact us if you have any questions or require assistance.    Caitlin Stinson  [phone]                .

## 2024-09-05 NOTE — ASSESSMENT & PLAN NOTE
Recently completed 7 day course of ertapenem via PICC line  UA negative for UTI  Remove PICC line before discharge

## 2024-09-05 NOTE — SUBJECTIVE & OBJECTIVE
Past Medical History:   Diagnosis Date    Asthma     Bipolar disorder     Hypertension        Past Surgical History:   Procedure Laterality Date    CHOLECYSTECTOMY      SHOULDER SURGERY      TUBAL LIGATION         Review of patient's allergies indicates:  No Known Allergies    No current facility-administered medications on file prior to encounter.     Current Outpatient Medications on File Prior to Encounter   Medication Sig    0.9% NaCl PgBk 100 mL with ertapenem 1 gram SolR 1 g Inject 1 g into the vein once daily.    albuterol (PROVENTIL/VENTOLIN HFA) 90 mcg/actuation inhaler Inhale 1-2 puffs into the lungs every 6 (six) hours as needed for Wheezing. Rescue    butalbital-acetaminophen-caffeine -40 mg (FIORICET, ESGIC) -40 mg per tablet Take 1 tablet by mouth every 4 (four) hours as needed for Headaches.    FEROSUL 325 mg (65 mg iron) Tab tablet Take 1 tablet (325 mg total) by mouth once daily.    furosemide (LASIX) 20 MG tablet Take 2 tablets (40 mg total) by mouth once daily.    losartan (COZAAR) 25 MG tablet Take 1 tablet (25 mg total) by mouth once daily.    metoprolol succinate (TOPROL-XL) 25 MG 24 hr tablet Take 1 tablet (25 mg total) by mouth once daily.    naloxone (NARCAN) 4 mg/actuation Spry 4mg by nasal route as needed for opioid overdose; may repeat every 2-3 minutes in alternating nostrils until medical help arrives. Call 911 (Patient not taking: Reported on 8/29/2024)    nicotine (NICODERM CQ) 21 mg/24 hr Place 1 patch onto the skin once daily.    oxybutynin (DITROPAN) 5 MG Tab Take 1 tablet (5 mg total) by mouth 3 (three) times daily.    pantoprazole (PROTONIX) 40 MG tablet Take 1 tablet (40 mg total) by mouth once daily.    rosuvastatin (CRESTOR) 20 MG tablet Take 1 tablet (20 mg total) by mouth every evening.    spironolactone (ALDACTONE) 25 MG tablet Take 25 mg by mouth once daily.    tiotropium (SPIRIVA) 18 mcg inhalation capsule Inhale 1 capsule (18 mcg total) into the lungs once  daily. Controller     Family History    None       Tobacco Use    Smoking status: Every Day     Current packs/day: 0.50     Average packs/day: 2.0 packs/day for 57.7 years (114.5 ttl pk-yrs)     Types: Cigarettes     Start date: 1967    Smokeless tobacco: Never    Tobacco comments:     Pt is a 2 pk/day cigarette smoker x 57 yrs. She states that she cut down to 0.5 pk/day or less approximately 6 months ago, and is trying to quit. Ambulatory referral to Smoking Cessation clinic following hospital discharge.    Substance and Sexual Activity    Alcohol use: Yes     Comment: socailly    Drug use: Yes     Types: Cocaine, Methamphetamines     Comment: denies cocaine or meth. Reports maijurana use    Sexual activity: Yes     Review of Systems   Constitutional: Negative for chills, decreased appetite, diaphoresis, fever, malaise/fatigue, weight gain and weight loss.   Cardiovascular:  Positive for dyspnea on exertion. Negative for chest pain, claudication, irregular heartbeat, leg swelling, near-syncope, orthopnea, palpitations and paroxysmal nocturnal dyspnea.   Respiratory:  Negative for cough, shortness of breath, snoring, sputum production and wheezing.    Endocrine: Negative for cold intolerance, heat intolerance, polydipsia, polyphagia and polyuria.   Skin:  Negative for color change, dry skin, itching, nail changes and poor wound healing.   Musculoskeletal:  Negative for back pain, gout, joint pain and joint swelling.   Gastrointestinal:  Negative for bloating, abdominal pain, constipation, diarrhea, hematemesis, hematochezia, melena, nausea and vomiting.   Genitourinary:  Negative for dysuria, hematuria and nocturia.   Neurological:  Negative for dizziness, headaches, light-headedness, numbness, paresthesias and weakness.   Psychiatric/Behavioral:  Negative for altered mental status, depression and memory loss.      Objective:     Vital Signs (Most Recent):  Temp: 98.5 °F (36.9 °C) (09/05/24 0115)  Pulse: 96  (09/05/24 1038)  Resp: (!) 21 (09/05/24 1038)  BP: 138/64 (09/05/24 1038)  SpO2: 97 % (09/05/24 1038) Vital Signs (24h Range):  Temp:  [98.5 °F (36.9 °C)] 98.5 °F (36.9 °C)  Pulse:  [] 96  Resp:  [20-48] 21  SpO2:  [93 %-100 %] 97 %  BP: (138-161)/() 138/64     Weight: 81.6 kg (180 lb)  Body mass index is 30.9 kg/m².    SpO2: 97 %         Intake/Output Summary (Last 24 hours) at 9/5/2024 1141  Last data filed at 9/5/2024 1105  Gross per 24 hour   Intake 100 ml   Output 2300 ml   Net -2200 ml       Lines/Drains/Airways       Drain  Duration             Female External Urinary Catheter w/ Suction 09/05/24 0315 <1 day              Peripheral Intravenous Line  Duration                  Midline Catheter - Single Lumen 08/23/24 1957 Right brachial vein 20g x 10cm 12 days         Peripheral IV - Single Lumen 09/05/24 0132 20 G Left Antecubital <1 day                     Physical Exam  Constitutional:       General: She is not in acute distress.     Appearance: She is well-developed.   Cardiovascular:      Rate and Rhythm: Normal rate and regular rhythm.      Heart sounds: No murmur heard.     No gallop.   Pulmonary:      Effort: Pulmonary effort is normal. No respiratory distress.      Breath sounds: Examination of the right-lower field reveals rales. Examination of the left-lower field reveals rales. Rales present.   Abdominal:      General: Bowel sounds are normal. There is no distension.      Palpations: Abdomen is soft.      Tenderness: There is no abdominal tenderness.   Skin:     General: Skin is warm and dry.   Neurological:      Mental Status: She is alert and oriented to person, place, and time.          Significant Labs: BMP:   Recent Labs   Lab 09/05/24  0124   *      K 3.4*      CO2 20*   BUN 12   CREATININE 1.2   CALCIUM 8.4*   MG 1.7   , CBC   Recent Labs   Lab 09/05/24  0124   WBC 13.90*   HGB 7.5*   HCT 24.6*   *   , and Troponin   Recent Labs   Lab 09/05/24  0124  09/05/24  0731   TROPONINI 0.109* 0.352*       Significant Imaging: Echocardiogram: Transthoracic echo (TTE) complete (Cupid Only):   Results for orders placed or performed during the hospital encounter of 08/12/24   Echo   Result Value Ref Range    Jenkins's Biplane MOD Ejection Fraction 34 %    A2C EF 47 %    A4C EF 21 %    LVOT stroke volume 44.72 cm3    LVIDd 5.98 3.5 - 6.0 cm    LV Systolic Volume 150.13 mL    LVIDs 5.54 (A) 2.1 - 4.0 cm    LV ESV A2C 78.97 mL    LV Diastolic Volume 178.40 mL    LV ESV A4C 82.83 mL    LV EDV A2C 143.054842114111940 mL    LV EDV A4C 145.65 mL    Left Ventricular End Systolic Volume by Teichholz Method 150.13 mL    Left Ventricular End Diastolic Volume by Teichholz Method 178.40 mL    IVS 0.79 0.6 - 1.1 cm    LVOT diameter 2.01 cm    LVOT area 3.2 cm2    FS 7 (A) 28 - 44 %    Left Ventricle Relative Wall Thickness 0.34 cm    Posterior Wall 1.03 0.6 - 1.1 cm    LV mass 217.51 g    MV Peak E Sukhdeep 0.76 m/s    TDI LATERAL 0.06 m/s    TDI SEPTAL 0.07 m/s    E/E' ratio 11.69 m/s    MV Peak A Sukhdeep 0.88 m/s    TR Max Sukhdeep 2.86 m/s    E/A ratio 0.86     IVRT 111.32 msec    E wave deceleration time 154.28 msec    LV SEPTAL E/E' RATIO 10.86 m/s    LV LATERAL E/E' RATIO 12.67 m/s    PV Peak S Sukhdeep 0.51 m/s    PV Peak D Sukhdeep 0.43 m/s    Pulm vein S/D ratio 1.19     LVOT peak sukhdeep 0.89 m/s    Left Ventricular Outflow Tract Mean Velocity 0.62 cm/s    Left Ventricular Outflow Tract Mean Gradient 1.74 mmHg    RV S' 7.35 cm/s    TAPSE 2.16 cm    RV/LV Ratio 0.54 cm    LA size 4.22 cm    Left Atrium Minor Axis 5.63 cm    Left Atrium Major Axis 5.97 cm    LA volume (mod) 84.22 cm3    RA Major Axis 5.27 cm    RA Width 4.99 cm    AV mean gradient 4 mmHg    AV peak gradient 8 mmHg    Ao peak sukhdeep 1.40 m/s    Ao VTI 22.90 cm    LVOT peak VTI 14.10 cm    AV valve area 1.95 cm²    AV Velocity Ratio 0.64     AV index (prosthetic) 0.62     BHARAT by Velocity Ratio 2.02 cm²    MV mean gradient 2 mmHg    MV peak  gradient 3 mmHg    MV valve area by continuity eq 2.18 cm2    MV VTI 20.5 cm    Triscuspid Valve Regurgitation Peak Gradient 33 mmHg    Sinus 3.84 cm    STJ 3.50 cm    Ascending aorta 3.40 cm    Mean e' 0.07 m/s    LA area A4C 25.77 cm2    LA area A2C 23.67 cm2    RVDD 3.23 cm    BSA 1.97 m2    LV Systolic Volume Index 78.6 mL/m2    LV Diastolic Volume Index 93.40 mL/m2    LV Mass Index 114 g/m2    LA Volume Index (Mod) 44.1 mL/m2    ZLVIDS 4.07     ZLVIDD 1.07     LA Volume Index 52.2 mL/m2    LA volume 99.78 cm3    LA WIDTH 4.8 cm    TV resting pulmonary artery pressure 36 mmHg    RV TB RVSP 6 mmHg    Est. RA pres 3 mmHg    Narrative      Left Ventricle: The left ventricle is moderately dilated. There is   eccentric hypertrophy. Moderate global hypokinesis present. There is   moderately reduced systolic function with a visually estimated ejection   fraction of 30 - 35%. Biplane (2D) method of discs ejection fraction is   34%. There is diastolic dysfunction but grade cannot be determined.    Right Ventricle: Normal right ventricular cavity size. Wall thickness   is normal. Systolic function is normal.    Left Atrium: Left atrium is severely dilated.    Right Atrium: Right atrium is moderately dilated.    Aortic Valve: There is mild stenosis. Aortic valve area by VTI is 1.95   cm². Aortic valve peak velocity is 1.40 m/s. Mean gradient is 4 mmHg. The   dimensionless index is 0.62.    Mitral Valve: There is mild regurgitation.    Tricuspid Valve: There is mild regurgitation.    Pulmonic Valve: There is mild regurgitation.    Pulmonary Artery: The estimated pulmonary artery systolic pressure is   36 mmHg.    IVC/SVC: Normal venous pressure at 3 mmHg.

## 2024-09-05 NOTE — ED PROVIDER NOTES
Encounter Date: 9/5/2024       History     Chief Complaint   Patient presents with    Shortness of Breath     Pt to the  EMS, hx COPD and CHF with c/o worsening SOB over the last 4 hours. Pt Given Dup Neb by EMS en route      Patient is a 59-year-old female with a past medical history of CHF, bipolar 1 disorder, hypertension, asthma who presents to the ED via EMS with complaint of shortness of breath.  Patient reports worsening shortness of breath over the past several hours with associated chest pressure that started approximately 12 hours ago.  She denies any back pain, diaphoresis,  numbness,  tingling, weakness, palpitations, near syncope, syncope,  nausea,  vomiting or abdominal pain.  She denies any cough or fever.  She is currently receiving antibiotics via a PICC line for reported kidney infection.  Per EMS, she was administered 1 DuoNeb EN route with improvement of her reported wheezing and shortness of breath.  She is not on home oxygen.  She does report compliance with her diuretics.  She denies any recent travel, leg pain or swelling, hemoptysis or other risk factors for venous thromboembolism when questioned.    Of note, per chart check, patient was seen in the emergency department at [facility] [address] earlier this today for same abdominal pain complaint.  His workup done at that time appeared to be unremarkable.  He was subsequently discharged after receiving pain medications.      Review of patient's allergies indicates:  No Known Allergies  Past Medical History:   Diagnosis Date    Asthma     Bipolar disorder     Hypertension      Past Surgical History:   Procedure Laterality Date    CHOLECYSTECTOMY      SHOULDER SURGERY      TUBAL LIGATION       No family history on file.  Social History     Tobacco Use    Smoking status: Every Day     Current packs/day: 0.50     Average packs/day: 2.0 packs/day for 57.7 years (114.5 ttl pk-yrs)     Types: Cigarettes     Start date: 1967    Smokeless tobacco:  Never    Tobacco comments:     Pt is a 2 pk/day cigarette smoker x 57 yrs. She states that she cut down to 0.5 pk/day or less approximately 6 months ago, and is trying to quit. Ambulatory referral to Smoking Cessation clinic following hospital discharge.    Substance Use Topics    Alcohol use: Yes     Comment: socailly    Drug use: Yes     Types: Cocaine, Methamphetamines     Comment: denies cocaine or meth. Reports maijurana use     Review of Systems   Constitutional:  Negative for chills and fever.   Respiratory:  Positive for shortness of breath. Negative for cough.    Cardiovascular:  Positive for chest pain and leg swelling. Negative for palpitations.   Gastrointestinal:  Negative for abdominal pain, nausea and vomiting.   Genitourinary:  Negative for dysuria.   Musculoskeletal:  Negative for back pain, myalgias and neck pain.   Neurological:  Negative for dizziness and headaches.   Psychiatric/Behavioral:  Negative for agitation and confusion.        Physical Exam     Initial Vitals [09/05/24 0051]   BP Pulse Resp Temp SpO2   (!) 156/100 100 (!) 26 -- 98 %      MAP       --         Physical Exam    Vitals reviewed.  Constitutional: She appears well-developed and well-nourished.   HENT:   Head: Normocephalic and atraumatic.   Eyes: EOM are normal. Pupils are equal, round, and reactive to light.   Neck: Neck supple.   Normal range of motion.  Cardiovascular:  Normal rate, regular rhythm, normal heart sounds and intact distal pulses.           Tachycardic   Pulmonary/Chest: She has wheezes.   Coarse, wheezes   Abdominal: Abdomen is soft.   Musculoskeletal:         General: Normal range of motion.      Cervical back: Normal range of motion and neck supple.     Neurological: She is alert and oriented to person, place, and time. She has normal strength. GCS score is 15. GCS eye subscore is 4. GCS verbal subscore is 5. GCS motor subscore is 6.   Skin: Skin is warm. Capillary refill takes less than 2 seconds.    Psychiatric: She has a normal mood and affect.         ED Course   Critical Care    Date/Time: 9/5/2024 4:59 AM    Performed by: Yusuf Berman MD  Authorized by: Yusuf Berman MD  Direct patient critical care time: 15 minutes  Additional history critical care time: 5 minutes  Ordering / reviewing critical care time: 5 minutes  Documentation critical care time: 5 minutes  Consulting other physicians critical care time: 5 minutes  Total critical care time (exclusive of procedural time) : 35 minutes  Critical care was necessary to treat or prevent imminent or life-threatening deterioration of the following conditions: respiratory failure.  Critical care was time spent personally by me on the following activities: discussions with primary provider, evaluation of patient's response to treatment, examination of patient, obtaining history from patient or surrogate, ordering and performing treatments and interventions, ordering and review of laboratory studies, ordering and review of radiographic studies, pulse oximetry, re-evaluation of patient's condition and review of old charts.        Labs Reviewed   CBC W/ AUTO DIFFERENTIAL - Abnormal       Result Value    WBC 13.90 (*)     RBC 3.05 (*)     Hemoglobin 7.5 (*)     Hematocrit 24.6 (*)     MCV 81 (*)     MCH 24.6 (*)     MCHC 30.5 (*)     RDW 19.3 (*)     Platelets 525 (*)     MPV 9.6      Immature Granulocytes 0.5      Gran # (ANC) 9.6 (*)     Immature Grans (Abs) 0.07 (*)     Lymph # 2.9      Mono # 0.8      Eos # 0.4      Baso # 0.12      nRBC 0      Gran % 69.2      Lymph % 20.5      Mono % 5.8      Eosinophil % 3.1      Basophil % 0.9      Differential Method Automated     COMPREHENSIVE METABOLIC PANEL - Abnormal    Sodium 139      Potassium 3.4 (*)     Chloride 108      CO2 20 (*)     Glucose 119 (*)     BUN 12      Creatinine 1.2      Calcium 8.4 (*)     Total Protein 7.5      Albumin 2.2 (*)     Total Bilirubin 0.1      Alkaline Phosphatase 134       AST 26      ALT 15      eGFR 52 (*)     Anion Gap 11     TROPONIN I - Abnormal    Troponin I 0.109 (*)    B-TYPE NATRIURETIC PEPTIDE - Abnormal    BNP 1,582 (*)    MAGNESIUM    Magnesium 1.7     SARS-COV-2 RNA AMPLIFICATION, QUAL    SARS-CoV-2 RNA, Amplification, Qual Negative       EKG Readings: (Independently Interpreted)   Initial Reading: No STEMI. Rhythm: Sinus Tachycardia. Heart Rate: 124. Ectopy: PVCs.   Sinus tachycardia with occasional PVCs; no STEMI        Imaging Results              CTA Chest Non-Coronary (PE Studies) (Final result)  Result time 09/05/24 04:34:36      Final result by Yaneli Rankin MD (09/05/24 04:34:36)                   Impression:      1. No evidence of pulmonary artery thromboembolism to the segmental levels.  2. Slight prominence of the main pulmonary artery which can be seen with pulmonary arterial hypertension.  3. Cardiomegaly, small volume of pericardial fluid, calcific atherosclerosis of the coronary vessels.  4. Diffuse bilateral interlobular septal thickening and regions of ground-glass attenuation which can be seen with edema, infection or non infectious inflammatory process.  Additional bilateral peribronchial thickening.  5. Calcified hilar and mediastinal lymph nodes suggestive of sequela of prior granulomatous disease.  6. Low attenuating 2.8 cm right adrenal gland nodule suggestive of an adenoma.      Electronically signed by: Yaneli Rankin MD  Date:    09/05/2024  Time:    04:34               Narrative:    EXAMINATION:  CTA CHEST NON CORONARY (PE STUDIES)    CLINICAL HISTORY:  Pulmonary embolism (PE) suspected, high prob;    TECHNIQUE:  Low dose axial images, sagittal and coronal reformations were obtained from the thoracic inlet to the lung bases following the IV administration of 100 mL of Omnipaque 350.  Contrast timing was optimized to evaluate the pulmonary arteries.  MIP images were performed.    COMPARISON:  None    FINDINGS:  The thoracic aorta maintains  normal caliber, contour, and course without significant atherosclerotic calcification within its course.  The heart is prominent in size and there is a small volume of pericardial fluid present.  There is calcific atherosclerosis of the coronary vessels..The esophagus maintains a normal course and caliber.There are calcified mediastinal and hilar lymph nodes.  Hilar contours are within normal limits.  No axillary adenopathy..    Please note evaluation of pulmonary parenchyma is limited due to respiratory motion artifact.  There is diffuse bilateral interlobular septal thickening and scattered ground-glass attenuation which can be seen with edema, non infectious inflammatory process or infectious process.  There is also nonspecific peribronchial wall thickening with a basilar predominance.  There is dependent bibasilar atelectasis.    Allowing for artifact from dense contrast bolus in the SVC and respiratory motion artifact, there are no definite filling defects within the pulmonary arteries to the segmental levels.  Slight prominence of the main pulmonary artery which can be seen with pulmonary hypertension.    Visualized structures of the upper abdomen demonstrate splenic granulomas.  There is a low attenuating 2.8 cm right adrenal gland nodule.  The visualized osseous structures are intact.                                       X-Ray Chest AP Portable (Final result)  Result time 09/05/24 01:53:04      Final result by Yfn Rankin MD (09/05/24 01:53:04)                   Impression:      Cardiomegaly with interval development of diffuse increased interstitial attenuation which may reflect interstitial edema in the appropriate clinical setting.      Electronically signed by: Yfn Rankin MD  Date:    09/05/2024  Time:    01:53               Narrative:    EXAMINATION:  XR CHEST AP PORTABLE    CLINICAL HISTORY:  Shortness of breath    TECHNIQUE:  Single frontal view of the chest was  performed.    COMPARISON:  08/21/2024    FINDINGS:  Cardiac monitoring leads overlie the chest.  Cardiac silhouette is enlarged, increased in size from prior examination.  There is diffuse increased interstitial attenuation bilaterally, new from prior examination.  No large volume of pleural fluid appreciated.  No evidence of pneumothorax.  Osseous structures demonstrate degenerative changes.                                       Medications   albuterol-ipratropium 2.5 mg-0.5 mg/3 mL nebulizer solution 3 mL (3 mLs Nebulization Given 9/5/24 0321)   furosemide injection 40 mg (40 mg Intravenous Given 9/5/24 0316)   iohexoL (OMNIPAQUE 350) injection 100 mL (100 mLs Intravenous Given 9/5/24 0306)   acetaminophen tablet 1,000 mg (1,000 mg Oral Given 9/5/24 0451)     Medical Decision Making  Amount and/or Complexity of Data Reviewed  Labs: ordered. Decision-making details documented in ED Course.  Radiology: ordered. Decision-making details documented in ED Course.    Risk  OTC drugs.  Prescription drug management.               ED Course as of 09/05/24 0500   Thu Sep 05, 2024   0138 WBC(!): 13.90 [LC]   0138 Hemoglobin(!): 7.5 [LC]   0138 Hematocrit(!): 24.6 [LC]   0138 Platelet Count(!): 525 [LC]   0149 WBC(!): 13.90 [LC]   0149 SARS-CoV-2 RNA, Amplification, Qual: Negative [LC]   0149 Hemoglobin(!): 7.5 [LC]   0149 Hematocrit(!): 24.6 [LC]   0152 Potassium(!): 3.4 [LC]   0152 CO2(!): 20 [LC]   0152 Glucose(!): 119 [LC]   0152 Creatinine: 1.2 [LC]   0152 Magnesium : 1.7 [LC]   0157 BNP(!): 1,582 [LC]   0157 Troponin I(!): 0.109 [LC]   0211 BNP(!): 1,582 [LC]   0211 Troponin I(!): 0.109 [LC]   0211 Cardiomegaly with interval development of diffuse increased interstitial attenuation which may reflect interstitial edema in the appropriate clinical setting.      [LC]   0400 POC PH(!): 7.335 [LC]   0400 POC PCO2(!): 45.2 [LC]   0416 Case discussed with the [facility] hospitalist eliminate Devin Crawley, who will admit patient  to his service [LC]   0438 CTA Chest Non-Coronary (PE Studies)  No PE per final radiology read.  [LC]      ED Course User Index  [LC] Yusuf Berman MD                 Medical Decision Making:   ED Management:  - will admit to the [facility] service for COPD versus CHF exacerbation;             Clinical Impression:  Final diagnoses:  [R06.02] Shortness of breath  [R07.9] Chest pain  [I50.9] Congestive heart failure, unspecified HF chronicity, unspecified heart failure type (Primary)  [J44.1] COPD exacerbation          ED Disposition Condition    Observation Stable                Yusuf Berman MD  09/05/24 8022       Yusuf Berman MD  09/05/24 5840

## 2024-09-05 NOTE — ASSESSMENT & PLAN NOTE
- troponin 0.109; felt to be related to demand etiology in setting of ADHF  - echo with EF 34%; no previous evaluation given anemia and recent cancer diagnosis  - no concern for ACS; notation of coronary atherosclerosis on CT scan  - continue medical management with ARB, BB and statin; no ASA or Plavix due to anemia and demand etiology

## 2024-09-05 NOTE — PROGRESS NOTES
[facility]  [facility] Medicine  Progress Note    Patient Name: Mary Ellen Saini  MRN: [id number]  Patient Class: OP- Observation   Admission Date: 9/5/2024  Length of Stay: 0 days  Attending Physician: Jax Rosas MD  Primary Care Provider: Marlen, Primary Doctor        Subjective:     Principal Problem:<principal problem not specified>        HPI:  Chemistry mostly benign some mild hypokalemia at 3.4.  BNP elevated at 15 82, troponin elevated at 0.109  Pt is a 59-year-old female with a past medical history of CHF, bipolar 1 disorder, hypertension, asthma who presents to the ED via EMS with complaint of shortness of breath. Patient reports worsening shortness of breath over the past several hours with associated chest pressure that started approximately 12 hours ago. She is currently receiving antibiotics via a PICC line for reported kidney infection. Per EMS, she was administered 1 DuoNeb EN route with improvement of her reported wheezing and shortness of breath. She is not on home oxygen.  In ED labwork remarkable for WBC of 14.  H&H 7.5 and 24.6    Overview/Hospital Course:  No notes on file    Interval History: Pt was examined in her bed. She was calm and not in distress. She denies chest pain, palpitations and states her shortness of breath is improving. She states she had not taken her medications for the past 3 days.    Review of Systems   Constitutional:  Negative for activity change, chills and fever.   HENT:  Negative for congestion, rhinorrhea and sore throat.    Eyes:  Negative for discharge and redness.   Respiratory:  Positive for shortness of breath. Negative for cough, chest tightness and wheezing.    Cardiovascular:  Positive for leg swelling. Negative for chest pain and palpitations.   Gastrointestinal:  Negative for abdominal pain, constipation, diarrhea, nausea and vomiting.   Genitourinary:  Negative for dysuria, flank pain and hematuria.   Musculoskeletal:  Negative for back pain,  myalgias and neck pain.   Skin:  Negative for pallor, rash and wound.   Neurological:  Negative for dizziness, tremors, syncope, weakness, numbness and headaches.   Psychiatric/Behavioral:  Negative for agitation, confusion and hallucinations.      Objective:     Vital Signs (Most Recent):  Temp: 98.5 °F (36.9 °C) (09/05/24 0115)  Pulse: 96 (09/05/24 1038)  Resp: (!) 21 (09/05/24 1038)  BP: 138/64 (09/05/24 1038)  SpO2: 97 % (09/05/24 1038) Vital Signs (24h Range):  Temp:  [98.5 °F (36.9 °C)] 98.5 °F (36.9 °C)  Pulse:  [] 96  Resp:  [20-48] 21  SpO2:  [93 %-100 %] 97 %  BP: (138-161)/() 138/64     Weight: 81.6 kg (180 lb)  Body mass index is 30.9 kg/m².    Intake/Output Summary (Last 24 hours) at 9/5/2024 1313  Last data filed at 9/5/2024 1105  Gross per 24 hour   Intake 100 ml   Output 2300 ml   Net -2200 ml         Physical Exam  Constitutional:       Appearance: Normal appearance. She is ill-appearing.   HENT:      Head: Normocephalic.      Nose: Nose normal.      Mouth/Throat:      Mouth: Mucous membranes are dry.   Eyes:      Pupils: Pupils are equal, round, and reactive to light.   Cardiovascular:      Rate and Rhythm: Regular rhythm. Tachycardia present.      Pulses: Normal pulses.      Heart sounds: Normal heart sounds.   Pulmonary:      Effort: Pulmonary effort is normal.      Breath sounds: Wheezing present.   Abdominal:      General: Bowel sounds are normal.      Palpations: Abdomen is soft.      Tenderness: There is no abdominal tenderness.   Musculoskeletal:      Right lower leg: Edema present.      Left lower leg: Edema present.   Skin:     General: Skin is warm and dry.      Capillary Refill: Capillary refill takes less than 2 seconds.   Neurological:      General: No focal deficit present.      Mental Status: She is alert and oriented to person, place, and time. Mental status is at baseline.   Psychiatric:         Mood and Affect: Mood normal.         Behavior: Behavior normal.              Significant Labs: All pertinent labs within the past 24 hours have been reviewed.    Significant Imaging: I have reviewed all pertinent imaging results/findings within the past 24 hours.    Assessment/Plan:      HFrEF (heart failure with reduced ejection fraction)  Most recent echo with EF 30%   patient has cardiology follow-up from previous hospitalization discuss AICD   Continue Lasix and Aldactone  Cardiology following  Patient was supposed to have LifeVest on but noncompliant.  Counseled  On outpatient GDMT with ARB, BB, Aldactone and Lasix     Hepatitis C  On prior admission, Hep C positive with elevated viral load and patient advised on outpatient Hepatology follow up for evaluation/treatment   LFTs normal on admission    COPD (chronic obstructive pulmonary disease)  Patient's COPD is controlled currently.  Patient is currently off COPD Pathway. Continue scheduled inhalers  and monitor respiratory status closely.     Urinary tract infection due to ESBL Klebsiella  Recently completed 7 day course of ertapenem via PICC line  UA negative for UTI  Remove PICC line before discharge    Colorectal carcinoma        Anemia  Anemia is likely due to chronic disease due to Chronic liver disease. Most recent hemoglobin and hematocrit are listed below.  Recent Labs     09/05/24  0124   HGB 7.5*   HCT 24.6*       Plan  - Monitor serial CBC: Daily  - Transfuse PRBC if patient becomes hemodynamically unstable, symptomatic or H/H drops below 7/21.  - Patient has not received any PRBC transfusions to date  - Patient's anemia is currently stable  -     Hypokalemia  Patient's most recent potassium results are listed below.   Recent Labs     09/05/24  0124   K 3.4*       Plan  - Replete potassium per protocol  - Monitor potassium Daily  - Patient's hypokalemia is stable  -magnesium within normal limits    Elevated troponin  Patient with chest pain  EKG without ST elevation  She reports 0.109, trended up in setting of ADHF.   Cardiology following  Continue ARB, BB and statin; no ASA or Plavix due to anemia and demand etiology           VTE Risk Mitigation (From admission, onward)           Ordered     IP VTE HIGH RISK PATIENT  Once         09/05/24 0644     Place sequential compression device  Until discontinued         09/05/24 0644                    Discharge Planning   JENAE:      Code Status: Full Code   Is the patient medically ready for discharge?:     Reason for patient still in hospital (select all that apply): Patient trending condition, Laboratory test, Treatment, and Consult recommendations                     Jax Rosas MD  Department of Hospital Medicine   [facility] - Emergency Dept

## 2024-09-05 NOTE — SUBJECTIVE & OBJECTIVE
Interval History: Pt was examined in her bed. She was calm and not in distress. She denies chest pain, palpitations and states her shortness of breath is improving. She states she had not taken her medications for the past 3 days.    Review of Systems   Constitutional:  Negative for activity change, chills and fever.   HENT:  Negative for congestion, rhinorrhea and sore throat.    Eyes:  Negative for discharge and redness.   Respiratory:  Positive for shortness of breath. Negative for cough, chest tightness and wheezing.    Cardiovascular:  Positive for leg swelling. Negative for chest pain and palpitations.   Gastrointestinal:  Negative for abdominal pain, constipation, diarrhea, nausea and vomiting.   Genitourinary:  Negative for dysuria, flank pain and hematuria.   Musculoskeletal:  Negative for back pain, myalgias and neck pain.   Skin:  Negative for pallor, rash and wound.   Neurological:  Negative for dizziness, tremors, syncope, weakness, numbness and headaches.   Psychiatric/Behavioral:  Negative for agitation, confusion and hallucinations.      Objective:     Vital Signs (Most Recent):  Temp: 98.5 °F (36.9 °C) (09/05/24 0115)  Pulse: 96 (09/05/24 1038)  Resp: (!) 21 (09/05/24 1038)  BP: 138/64 (09/05/24 1038)  SpO2: 97 % (09/05/24 1038) Vital Signs (24h Range):  Temp:  [98.5 °F (36.9 °C)] 98.5 °F (36.9 °C)  Pulse:  [] 96  Resp:  [20-48] 21  SpO2:  [93 %-100 %] 97 %  BP: (138-161)/() 138/64     Weight: 81.6 kg (180 lb)  Body mass index is 30.9 kg/m².    Intake/Output Summary (Last 24 hours) at 9/5/2024 1313  Last data filed at 9/5/2024 1105  Gross per 24 hour   Intake 100 ml   Output 2300 ml   Net -2200 ml         Physical Exam  Constitutional:       Appearance: Normal appearance. She is ill-appearing.   HENT:      Head: Normocephalic.      Nose: Nose normal.      Mouth/Throat:      Mouth: Mucous membranes are dry.   Eyes:      Pupils: Pupils are equal, round, and reactive to light.    Cardiovascular:      Rate and Rhythm: Regular rhythm. Tachycardia present.      Pulses: Normal pulses.      Heart sounds: Normal heart sounds.   Pulmonary:      Effort: Pulmonary effort is normal.      Breath sounds: Wheezing present.   Abdominal:      General: Bowel sounds are normal.      Palpations: Abdomen is soft.      Tenderness: There is no abdominal tenderness.   Musculoskeletal:      Right lower leg: Edema present.      Left lower leg: Edema present.   Skin:     General: Skin is warm and dry.      Capillary Refill: Capillary refill takes less than 2 seconds.   Neurological:      General: No focal deficit present.      Mental Status: She is alert and oriented to person, place, and time. Mental status is at baseline.   Psychiatric:         Mood and Affect: Mood normal.         Behavior: Behavior normal.             Significant Labs: All pertinent labs within the past 24 hours have been reviewed.    Significant Imaging: I have reviewed all pertinent imaging results/findings within the past 24 hours.

## 2024-09-05 NOTE — ASSESSMENT & PLAN NOTE
Patient's most recent potassium results are listed below.   Recent Labs     09/05/24  0124   K 3.4*     Plan  - Replete potassium per protocol  - Monitor potassium Daily  - Patient's hypokalemia is stable  -

## 2024-09-05 NOTE — ASSESSMENT & PLAN NOTE
Most recent echo with EF 30%   patient has cardiology follow-up from previous hospitalization discuss AICD   Continue Lasix and Aldactone  Cardiology following  Patient was supposed to have LifeVest on but noncompliant.  Counseled  On outpatient GDMT with ARB, BB, Aldactone and Lasix

## 2024-09-05 NOTE — SUBJECTIVE & OBJECTIVE
Past Medical History:   Diagnosis Date    Asthma     Bipolar disorder     Hypertension        Past Surgical History:   Procedure Laterality Date    CHOLECYSTECTOMY      SHOULDER SURGERY      TUBAL LIGATION         Review of patient's allergies indicates:  No Known Allergies    No current facility-administered medications on file prior to encounter.     Current Outpatient Medications on File Prior to Encounter   Medication Sig    0.9% NaCl PgBk 100 mL with ertapenem 1 gram SolR 1 g Inject 1 g into the vein once daily.    albuterol (PROVENTIL/VENTOLIN HFA) 90 mcg/actuation inhaler Inhale 1-2 puffs into the lungs every 6 (six) hours as needed for Wheezing. Rescue    butalbital-acetaminophen-caffeine -40 mg (FIORICET, ESGIC) -40 mg per tablet Take 1 tablet by mouth every 4 (four) hours as needed for Headaches.    FEROSUL 325 mg (65 mg iron) Tab tablet Take 1 tablet (325 mg total) by mouth once daily.    furosemide (LASIX) 20 MG tablet Take 2 tablets (40 mg total) by mouth once daily.    losartan (COZAAR) 25 MG tablet Take 1 tablet (25 mg total) by mouth once daily.    metoprolol succinate (TOPROL-XL) 25 MG 24 hr tablet Take 1 tablet (25 mg total) by mouth once daily.    naloxone (NARCAN) 4 mg/actuation Spry 4mg by nasal route as needed for opioid overdose; may repeat every 2-3 minutes in alternating nostrils until medical help arrives. Call 911 (Patient not taking: Reported on 8/29/2024)    nicotine (NICODERM CQ) 21 mg/24 hr Place 1 patch onto the skin once daily.    oxybutynin (DITROPAN) 5 MG Tab Take 1 tablet (5 mg total) by mouth 3 (three) times daily.    pantoprazole (PROTONIX) 40 MG tablet Take 1 tablet (40 mg total) by mouth once daily.    rosuvastatin (CRESTOR) 20 MG tablet Take 1 tablet (20 mg total) by mouth every evening.    spironolactone (ALDACTONE) 25 MG tablet Take 25 mg by mouth once daily.    tiotropium (SPIRIVA) 18 mcg inhalation capsule Inhale 1 capsule (18 mcg total) into the lungs once  daily. Controller     Family History    None       Tobacco Use    Smoking status: Every Day     Current packs/day: 0.50     Average packs/day: 2.0 packs/day for 57.7 years (114.5 ttl pk-yrs)     Types: Cigarettes     Start date: 1967    Smokeless tobacco: Never    Tobacco comments:     Pt is a 2 pk/day cigarette smoker x 57 yrs. She states that she cut down to 0.5 pk/day or less approximately 6 months ago, and is trying to quit. Ambulatory referral to Smoking Cessation clinic following hospital discharge.    Substance and Sexual Activity    Alcohol use: Yes     Comment: socailly    Drug use: Yes     Types: Cocaine, Methamphetamines     Comment: denies cocaine or meth. Reports maijurana use    Sexual activity: Yes     Review of Systems   All other systems reviewed and are negative.    Objective:     Vital Signs (Most Recent):  Temp: 98.5 °F (36.9 °C) (09/05/24 0115)  Pulse: (!) 127 (09/05/24 0327)  Resp: (!) 48 (09/05/24 0327)  BP: (!) 161/92 (09/05/24 0108)  SpO2: 95 % (09/05/24 0327) Vital Signs (24h Range):  Temp:  [98.5 °F (36.9 °C)] 98.5 °F (36.9 °C)  Pulse:  [100-129] 127  Resp:  [22-48] 48  SpO2:  [94 %-100 %] 95 %  BP: (156-161)/() 161/92     Weight: 81.6 kg (180 lb)  Body mass index is 30.9 kg/m².     Physical Exam  Constitutional:       Appearance: Normal appearance. She is ill-appearing.   HENT:      Head: Normocephalic.      Nose: Nose normal.      Mouth/Throat:      Mouth: Mucous membranes are dry.   Eyes:      Pupils: Pupils are equal, round, and reactive to light.   Cardiovascular:      Rate and Rhythm: Regular rhythm. Tachycardia present.      Pulses: Normal pulses.      Heart sounds: Normal heart sounds.   Pulmonary:      Effort: Pulmonary effort is normal.   Abdominal:      General: Bowel sounds are normal.      Palpations: Abdomen is soft.   Musculoskeletal:      Right lower leg: Edema present.      Left lower leg: Edema present.   Skin:     General: Skin is warm and dry.      Capillary  Refill: Capillary refill takes less than 2 seconds.   Neurological:      General: No focal deficit present.      Mental Status: She is alert and oriented to person, place, and time. Mental status is at baseline.   Psychiatric:         Mood and Affect: Mood normal.         Behavior: Behavior normal.              CRANIAL NERVES     CN III, IV, VI   Pupils are equal, round, and reactive to light.       Significant Labs: All pertinent labs within the past 24 hours have been reviewed.  Recent Lab Results         09/05/24  0326   09/05/24  0124   09/05/24  0115        Base Deficit -1.7           Performed By: beto           Specimen source Venous           Albumin   2.2         ALP   134         ALT   15         Anion Gap   11         AST   26         Baso #   0.12         Basophil %   0.9         BILIRUBIN TOTAL   0.1  Comment: For infants and newborns, interpretation of results should be based  on gestational age, weight and in agreement with clinical  observations.    Premature Infant recommended reference ranges:  Up to 24 hours.............<8.0 mg/dL  Up to 48 hours............<12.0 mg/dL  3-5 days..................<15.0 mg/dL  6-29 days.................<15.0 mg/dL           BNP   1,582  Comment: Values of less than 100 pg/ml are consistent with non-CHF populations.         BUN   12         Calcium   8.4         Chloride   108         CO2   20         Creatinine   1.2         Differential Method   Automated         eGFR   52         Eos #   0.4         Eos %   3.1         FiO2 21.0           Glucose   119         Gran # (ANC)   9.6         Gran %   69.2         Hematocrit   24.6         Hemoglobin   7.5         Immature Grans (Abs)   0.07  Comment: Mild elevation in immature granulocytes is non specific and   can be seen in a variety of conditions including stress response,   acute inflammation, trauma and pregnancy. Correlation with other   laboratory and clinical findings is essential.           Immature  Granulocytes   0.5         Lymph #   2.9         Lymph %   20.5         Magnesium    1.7         MCH   24.6         MCHC   30.5         MCV   81         Mono #   0.8         Mono %   5.8         MPV   9.6         nRBC   0         Platelet Count   525         POC HCO3 24.1           POC PCO2 45.2  Comment: Value above reference range           POC PH 7.335  Comment: Value below reference range           POC PO2 29.7  Comment:  notified  at    read back  Value below critical limit             POC SATURATED O2 49.9  Comment:  notified  at    read back  Value below critical limit             Potassium   3.4         PROTEIN TOTAL   7.5         RBC   3.05         RDW   19.3         SARS-CoV-2 RNA, Amplification, Qual     Negative  Comment: This test utilizes isothermal nucleic acid amplification technology   to   detect the SARS-CoV-2 RdRp nucleic acid segment. The analytical   sensitivity   (limit of detection) is 500 copies/swab.    A POSITIVE result is indicative of the presence of SARS-CoV-2 RNA;   clinical   correlation with patient history and other diagnostic information is   necessary to determine patient infection status.    A NEGATIVE result means that SARS-CoV-2 nucleic acids are not present   above   the limit of detection. A NEGATIVE result should be treated as   presumptive.   It does not rule out the possibility of COVID-19 and should not be   the sole   basis for treatment decisions.    If COVID-19 is strongly suspected based on clinical and exposure   history,   re-testing using an alternate molecular assay should be considered.    This test is Food and Drug Administration (FDA) approved. Performance   characteristics of this has been independently verified by [facility] Department of Pathology and Laboratory Medicine.         Sodium   139         Troponin I   0.109  Comment: The reference interval for Troponin I represents the 99th percentile   cutoff   for our facility and is consistent  with 3rd generation assay   performance.           WBC   13.90                 Significant Imaging: I have reviewed all pertinent imaging results/findings within the past 24 hours.  I have reviewed and interpreted all pertinent imaging results/findings within the past 24 hours.

## 2024-09-05 NOTE — ASSESSMENT & PLAN NOTE
Patient's most recent potassium results are listed below.   Recent Labs     09/05/24  0124   K 3.4*       Plan  - Replete potassium per protocol  - Monitor potassium Daily  - Patient's hypokalemia is stable  -magnesium within normal limits

## 2024-09-05 NOTE — CONSULTS
[place] - Emergency Dept  Cardiology  Consult Note    Patient Name: Mary Ellen Saini  MRN: [id number]  Admission Date: 9/5/2024  Hospital Length of Stay: 0 days  Code Status: Full Code   Attending Provider: Jax Rosas MD   Consulting Provider: ANGIE Infante ANP  Primary Care Physician: No, Primary Doctor  Principal Problem:<principal problem not specified>    Patient information was obtained from patient, past medical records, and ER records.     Inpatient consult to [facility]  Consult performed by: Denise Acosta APRN, ANP  Consult ordered by: Tavon Crawley NP  Reason for consult: ADHF        Subjective:     Chief Complaint:  SOB      HPI:   58yo female with UTI-ESBL, hypokalemia, Hepatitis C, HFrEF, elevated troponin, anemia, COPD, colorectal cancer, atrial flutter and CAD per CT scan who presented to the ER with complaints of SOB. She was admitted last month for ADHF and was placed on GDMT and discharged to [facility]. She followed up with Dr. Mills at [facility] last month and is due to follow up again later this month. She reports going to her sister's house for  few days and was unable to get back to the [facility] where he medications were. She reports oting SOB yesterday that progressively worsened therefore she presented to the ER for evaluation. She reports feeling better after discharge with recurrence of SOB recently. Labs CBC with WBCs 13K H&H 7.5&24.6 BMP with K+ 3.4 creatinine 1.2 Mg 1.7 BNP 1582 CTA negative PE CXR with mild interstitial edema. Initial /100. Started on Lasix 40mg IV BID and admitted to [facility]. Cardiology consulted for ADHF management and elevated troponin     Past Medical History:   Diagnosis Date    Asthma     Bipolar disorder     Hypertension        Past Surgical History:   Procedure Laterality Date    CHOLECYSTECTOMY      SHOULDER SURGERY      TUBAL LIGATION         Review of patient's allergies indicates:  No Known  Allergies    No current facility-administered medications on file prior to encounter.     Current Outpatient Medications on File Prior to Encounter   Medication Sig    0.9% NaCl PgBk 100 mL with ertapenem 1 gram SolR 1 g Inject 1 g into the vein once daily.    albuterol (PROVENTIL/VENTOLIN HFA) 90 mcg/actuation inhaler Inhale 1-2 puffs into the lungs every 6 (six) hours as needed for Wheezing. Rescue    butalbital-acetaminophen-caffeine -40 mg (FIORICET, ESGIC) -40 mg per tablet Take 1 tablet by mouth every 4 (four) hours as needed for Headaches.    FEROSUL 325 mg (65 mg iron) Tab tablet Take 1 tablet (325 mg total) by mouth once daily.    furosemide (LASIX) 20 MG tablet Take 2 tablets (40 mg total) by mouth once daily.    losartan (COZAAR) 25 MG tablet Take 1 tablet (25 mg total) by mouth once daily.    metoprolol succinate (TOPROL-XL) 25 MG 24 hr tablet Take 1 tablet (25 mg total) by mouth once daily.    naloxone (NARCAN) 4 mg/actuation Spry 4mg by nasal route as needed for opioid overdose; may repeat every 2-3 minutes in alternating nostrils until medical help arrives. Call 911 (Patient not taking: Reported on 8/29/2024)    nicotine (NICODERM CQ) 21 mg/24 hr Place 1 patch onto the skin once daily.    oxybutynin (DITROPAN) 5 MG Tab Take 1 tablet (5 mg total) by mouth 3 (three) times daily.    pantoprazole (PROTONIX) 40 MG tablet Take 1 tablet (40 mg total) by mouth once daily.    rosuvastatin (CRESTOR) 20 MG tablet Take 1 tablet (20 mg total) by mouth every evening.    spironolactone (ALDACTONE) 25 MG tablet Take 25 mg by mouth once daily.    tiotropium (SPIRIVA) 18 mcg inhalation capsule Inhale 1 capsule (18 mcg total) into the lungs once daily. Controller     Family History    None       Tobacco Use    Smoking status: Every Day     Current packs/day: 0.50     Average packs/day: 2.0 packs/day for 57.7 years (114.5 ttl pk-yrs)     Types: Cigarettes     Start date: 1967    Smokeless tobacco: Never     Tobacco comments:     Pt is a 2 pk/day cigarette smoker x 57 yrs. She states that she cut down to 0.5 pk/day or less approximately 6 months ago, and is trying to quit. Ambulatory referral to Smoking Cessation clinic following hospital discharge.    Substance and Sexual Activity    Alcohol use: Yes     Comment: socailly    Drug use: Yes     Types: Cocaine, Methamphetamines     Comment: denies cocaine or meth. Reports maijurana use    Sexual activity: Yes     Review of Systems   Constitutional: Negative for chills, decreased appetite, diaphoresis, fever, malaise/fatigue, weight gain and weight loss.   Cardiovascular:  Positive for dyspnea on exertion. Negative for chest pain, claudication, irregular heartbeat, leg swelling, near-syncope, orthopnea, palpitations and paroxysmal nocturnal dyspnea.   Respiratory:  Negative for cough, shortness of breath, snoring, sputum production and wheezing.    Endocrine: Negative for cold intolerance, heat intolerance, polydipsia, polyphagia and polyuria.   Skin:  Negative for color change, dry skin, itching, nail changes and poor wound healing.   Musculoskeletal:  Negative for back pain, gout, joint pain and joint swelling.   Gastrointestinal:  Negative for bloating, abdominal pain, constipation, diarrhea, hematemesis, hematochezia, melena, nausea and vomiting.   Genitourinary:  Negative for dysuria, hematuria and nocturia.   Neurological:  Negative for dizziness, headaches, light-headedness, numbness, paresthesias and weakness.   Psychiatric/Behavioral:  Negative for altered mental status, depression and memory loss.      Objective:     Vital Signs (Most Recent):  Temp: 98.5 °F (36.9 °C) (09/05/24 0115)  Pulse: 96 (09/05/24 1038)  Resp: (!) 21 (09/05/24 1038)  BP: 138/64 (09/05/24 1038)  SpO2: 97 % (09/05/24 1038) Vital Signs (24h Range):  Temp:  [98.5 °F (36.9 °C)] 98.5 °F (36.9 °C)  Pulse:  [] 96  Resp:  [20-48] 21  SpO2:  [93 %-100 %] 97 %  BP: (138-161)/() 138/64      Weight: 81.6 kg (180 lb)  Body mass index is 30.9 kg/m².    SpO2: 97 %         Intake/Output Summary (Last 24 hours) at 9/5/2024 1141  Last data filed at 9/5/2024 1105  Gross per 24 hour   Intake 100 ml   Output 2300 ml   Net -2200 ml       Lines/Drains/Airways       Drain  Duration             Female External Urinary Catheter w/ Suction 09/05/24 0315 <1 day              Peripheral Intravenous Line  Duration                  Midline Catheter - Single Lumen 08/23/24 1957 Right brachial vein 20g x 10cm 12 days         Peripheral IV - Single Lumen 09/05/24 0132 20 G Left Antecubital <1 day                     Physical Exam  Constitutional:       General: She is not in acute distress.     Appearance: She is well-developed.   Cardiovascular:      Rate and Rhythm: Normal rate and regular rhythm.      Heart sounds: No murmur heard.     No gallop.   Pulmonary:      Effort: Pulmonary effort is normal. No respiratory distress.      Breath sounds: Examination of the right-lower field reveals rales. Examination of the left-lower field reveals rales. Rales present.   Abdominal:      General: Bowel sounds are normal. There is no distension.      Palpations: Abdomen is soft.      Tenderness: There is no abdominal tenderness.   Skin:     General: Skin is warm and dry.   Neurological:      Mental Status: She is alert and oriented to person, place, and time.          Significant Labs: BMP:   Recent Labs   Lab 09/05/24  0124   *      K 3.4*      CO2 20*   BUN 12   CREATININE 1.2   CALCIUM 8.4*   MG 1.7   , CBC   Recent Labs   Lab 09/05/24  0124   WBC 13.90*   HGB 7.5*   HCT 24.6*   *   , and Troponin   Recent Labs   Lab 09/05/24  0124 09/05/24  0731   TROPONINI 0.109* 0.352*       Significant Imaging: Echocardiogram: Transthoracic echo (TTE) complete (Cupid Only):   Results for orders placed or performed during the hospital encounter of 08/12/24   Echo   Result Value Ref Range    Jenkins's Biplane MOD  Ejection Fraction 34 %    A2C EF 47 %    A4C EF 21 %    LVOT stroke volume 44.72 cm3    LVIDd 5.98 3.5 - 6.0 cm    LV Systolic Volume 150.13 mL    LVIDs 5.54 (A) 2.1 - 4.0 cm    LV ESV A2C 78.97 mL    LV Diastolic Volume 178.40 mL    LV ESV A4C 82.83 mL    LV EDV A2C 143.635552107863138 mL    LV EDV A4C 145.65 mL    Left Ventricular End Systolic Volume by Teichholz Method 150.13 mL    Left Ventricular End Diastolic Volume by Teichholz Method 178.40 mL    IVS 0.79 0.6 - 1.1 cm    LVOT diameter 2.01 cm    LVOT area 3.2 cm2    FS 7 (A) 28 - 44 %    Left Ventricle Relative Wall Thickness 0.34 cm    Posterior Wall 1.03 0.6 - 1.1 cm    LV mass 217.51 g    MV Peak E Sukhdeep 0.76 m/s    TDI LATERAL 0.06 m/s    TDI SEPTAL 0.07 m/s    E/E' ratio 11.69 m/s    MV Peak A Sukhdeep 0.88 m/s    TR Max Sukhdeep 2.86 m/s    E/A ratio 0.86     IVRT 111.32 msec    E wave deceleration time 154.28 msec    LV SEPTAL E/E' RATIO 10.86 m/s    LV LATERAL E/E' RATIO 12.67 m/s    PV Peak S Sukhdeep 0.51 m/s    PV Peak D Sukhdeep 0.43 m/s    Pulm vein S/D ratio 1.19     LVOT peak sukhdeep 0.89 m/s    Left Ventricular Outflow Tract Mean Velocity 0.62 cm/s    Left Ventricular Outflow Tract Mean Gradient 1.74 mmHg    RV S' 7.35 cm/s    TAPSE 2.16 cm    RV/LV Ratio 0.54 cm    LA size 4.22 cm    Left Atrium Minor Axis 5.63 cm    Left Atrium Major Axis 5.97 cm    LA volume (mod) 84.22 cm3    RA Major Axis 5.27 cm    RA Width 4.99 cm    AV mean gradient 4 mmHg    AV peak gradient 8 mmHg    Ao peak sukhdeep 1.40 m/s    Ao VTI 22.90 cm    LVOT peak VTI 14.10 cm    AV valve area 1.95 cm²    AV Velocity Ratio 0.64     AV index (prosthetic) 0.62     BHARAT by Velocity Ratio 2.02 cm²    MV mean gradient 2 mmHg    MV peak gradient 3 mmHg    MV valve area by continuity eq 2.18 cm2    MV VTI 20.5 cm    Triscuspid Valve Regurgitation Peak Gradient 33 mmHg    Sinus 3.84 cm    STJ 3.50 cm    Ascending aorta 3.40 cm    Mean e' 0.07 m/s    LA area A4C 25.77 cm2    LA area A2C 23.67 cm2    RVDD 3.23 cm     BSA 1.97 m2    LV Systolic Volume Index 78.6 mL/m2    LV Diastolic Volume Index 93.40 mL/m2    LV Mass Index 114 g/m2    LA Volume Index (Mod) 44.1 mL/m2    ZLVIDS 4.07     ZLVIDD 1.07     LA Volume Index 52.2 mL/m2    LA volume 99.78 cm3    LA WIDTH 4.8 cm    TV resting pulmonary artery pressure 36 mmHg    RV TB RVSP 6 mmHg    Est. RA pres 3 mmHg    Narrative      Left Ventricle: The left ventricle is moderately dilated. There is   eccentric hypertrophy. Moderate global hypokinesis present. There is   moderately reduced systolic function with a visually estimated ejection   fraction of 30 - 35%. Biplane (2D) method of discs ejection fraction is   34%. There is diastolic dysfunction but grade cannot be determined.    Right Ventricle: Normal right ventricular cavity size. Wall thickness   is normal. Systolic function is normal.    Left Atrium: Left atrium is severely dilated.    Right Atrium: Right atrium is moderately dilated.    Aortic Valve: There is mild stenosis. Aortic valve area by VTI is 1.95   cm². Aortic valve peak velocity is 1.40 m/s. Mean gradient is 4 mmHg. The   dimensionless index is 0.62.    Mitral Valve: There is mild regurgitation.    Tricuspid Valve: There is mild regurgitation.    Pulmonic Valve: There is mild regurgitation.    Pulmonary Artery: The estimated pulmonary artery systolic pressure is   36 mmHg.    IVC/SVC: Normal venous pressure at 3 mmHg.       Assessment and Plan:     HFrEF (heart failure with reduced ejection fraction)  - history of HFrEF diagnosed earlier this year in [place] with EF 20-25%; repeat echo last  month with EF 34%  - on outpatient GDMT with ARB, BB, Aldactone and Lasix; admitted with ADHF due to medication non compliance; started on Lasix 40mg IV BID with no output documented; fine rales and trace LE edema noted- recommend continue diuresis until euvolemic; once able to ambulate with no SOB then transition back to home dose of Lasix; continue remaining meds  -  follow up with Dr. Mills at [facility] [place] and continue with Life Vest use  - stressed importance of medication and dietary compliance as well as close follow up     Anemia  - H&H 7.5&24.6  - no far off baseline; diagnosed with colorectal cancer in February  - scheduled for Hem Onc evaluation at [facility]     Hypokalemia  - K+ 3.4 related to diuresis  - replaced with K+ Lyte; Mg 1.7 and will replace with Mg rider 2gm   - goal K>4.0 Mg > 2.0      Elevated troponin  - troponin 0.109; felt to be related to demand etiology in setting of ADHF  - echo with EF 34%; no previous evaluation given anemia and recent cancer diagnosis  - no concern for ACS; notation of coronary atherosclerosis on CT scan  - continue medical management with ARB, BB and statin; no ASA or Plavix due to anemia and demand etiology         VTE Risk Mitigation (From admission, onward)           Ordered     IP VTE HIGH RISK PATIENT  Once         09/05/24 0644     Place sequential compression device  Until discontinued         09/05/24 0644                    Thank you for your consult. I will sign off. Please contact us if you have any additional questions.    ANGIE Infante, ANP  Cardiology   [place] - Emergency Dept

## 2024-09-06 ENCOUNTER — CLINICAL SUPPORT (OUTPATIENT)
Dept: SMOKING CESSATION | Facility: CLINIC | Age: 59
End: 2024-09-06
Payer: COMMERCIAL

## 2024-09-06 DIAGNOSIS — F17.210 CIGARETTE SMOKER: Primary | ICD-10-CM

## 2024-09-06 DIAGNOSIS — F17.200 NEEDS SMOKING CESSATION EDUCATION: ICD-10-CM

## 2024-09-06 LAB
ABO + RH BLD: NORMAL
ANION GAP SERPL CALC-SCNC: 10 MMOL/L (ref 8–16)
BASOPHILS # BLD AUTO: 0.1 K/UL (ref 0–0.2)
BASOPHILS NFR BLD: 0.8 % (ref 0–1.9)
BLD GP AB SCN CELLS X3 SERPL QL: NORMAL
BUN SERPL-MCNC: 15 MG/DL (ref 6–20)
CALCIUM SERPL-MCNC: 7.9 MG/DL (ref 8.7–10.5)
CHLORIDE SERPL-SCNC: 102 MMOL/L (ref 95–110)
CO2 SERPL-SCNC: 24 MMOL/L (ref 23–29)
CREAT SERPL-MCNC: 1.4 MG/DL (ref 0.5–1.4)
DIFFERENTIAL METHOD BLD: ABNORMAL
EOSINOPHIL # BLD AUTO: 0.7 K/UL (ref 0–0.5)
EOSINOPHIL NFR BLD: 4.9 % (ref 0–8)
ERYTHROCYTE [DISTWIDTH] IN BLOOD BY AUTOMATED COUNT: 19.4 % (ref 11.5–14.5)
EST. GFR  (NO RACE VARIABLE): 44 ML/MIN/1.73 M^2
GLUCOSE SERPL-MCNC: 107 MG/DL (ref 70–110)
HCT VFR BLD AUTO: 21.5 % (ref 37–48.5)
HGB BLD-MCNC: 6.8 G/DL (ref 12–16)
IMM GRANULOCYTES # BLD AUTO: 0.11 K/UL (ref 0–0.04)
IMM GRANULOCYTES NFR BLD AUTO: 0.8 % (ref 0–0.5)
LYMPHOCYTES # BLD AUTO: 2.3 K/UL (ref 1–4.8)
LYMPHOCYTES NFR BLD: 17.4 % (ref 18–48)
MAGNESIUM SERPL-MCNC: 1.8 MG/DL (ref 1.6–2.6)
MCH RBC QN AUTO: 24.8 PG (ref 27–31)
MCHC RBC AUTO-ENTMCNC: 31.6 G/DL (ref 32–36)
MCV RBC AUTO: 79 FL (ref 82–98)
MONOCYTES # BLD AUTO: 0.7 K/UL (ref 0.3–1)
MONOCYTES NFR BLD: 5 % (ref 4–15)
NEUTROPHILS # BLD AUTO: 9.4 K/UL (ref 1.8–7.7)
NEUTROPHILS NFR BLD: 71.1 % (ref 38–73)
NRBC BLD-RTO: 0 /100 WBC
PLATELET # BLD AUTO: 589 K/UL (ref 150–450)
PMV BLD AUTO: 9.5 FL (ref 9.2–12.9)
POTASSIUM SERPL-SCNC: 4.1 MMOL/L (ref 3.5–5.1)
RBC # BLD AUTO: 2.74 M/UL (ref 4–5.4)
SODIUM SERPL-SCNC: 136 MMOL/L (ref 136–145)
SPECIMEN OUTDATE: NORMAL
WBC # BLD AUTO: 13.25 K/UL (ref 3.9–12.7)

## 2024-09-06 PROCEDURE — 36415 COLL VENOUS BLD VENIPUNCTURE: CPT | Performed by: REGISTERED NURSE

## 2024-09-06 PROCEDURE — 85025 COMPLETE CBC W/AUTO DIFF WBC: CPT | Performed by: REGISTERED NURSE

## 2024-09-06 PROCEDURE — 94761 N-INVAS EAR/PLS OXIMETRY MLT: CPT

## 2024-09-06 PROCEDURE — 99900035 HC TECH TIME PER 15 MIN (STAT)

## 2024-09-06 PROCEDURE — 96376 TX/PRO/DX INJ SAME DRUG ADON: CPT

## 2024-09-06 PROCEDURE — 94640 AIRWAY INHALATION TREATMENT: CPT | Mod: XB

## 2024-09-06 PROCEDURE — 83735 ASSAY OF MAGNESIUM: CPT | Performed by: INTERNAL MEDICINE

## 2024-09-06 PROCEDURE — 36415 COLL VENOUS BLD VENIPUNCTURE: CPT | Performed by: INTERNAL MEDICINE

## 2024-09-06 PROCEDURE — 11000001 HC ACUTE MED/SURG PRIVATE ROOM

## 2024-09-06 PROCEDURE — 63600175 PHARM REV CODE 636 W HCPCS: Performed by: REGISTERED NURSE

## 2024-09-06 PROCEDURE — 86920 COMPATIBILITY TEST SPIN: CPT | Performed by: REGISTERED NURSE

## 2024-09-06 PROCEDURE — 80048 BASIC METABOLIC PNL TOTAL CA: CPT | Performed by: REGISTERED NURSE

## 2024-09-06 PROCEDURE — 25000003 PHARM REV CODE 250: Performed by: REGISTERED NURSE

## 2024-09-06 PROCEDURE — 25000242 PHARM REV CODE 250 ALT 637 W/ HCPCS: Performed by: REGISTERED NURSE

## 2024-09-06 PROCEDURE — 96366 THER/PROPH/DIAG IV INF ADDON: CPT

## 2024-09-06 PROCEDURE — 63600175 PHARM REV CODE 636 W HCPCS: Performed by: INTERNAL MEDICINE

## 2024-09-06 PROCEDURE — 86900 BLOOD TYPING SEROLOGIC ABO: CPT | Performed by: REGISTERED NURSE

## 2024-09-06 PROCEDURE — 27000207 HC ISOLATION

## 2024-09-06 PROCEDURE — 86901 BLOOD TYPING SEROLOGIC RH(D): CPT | Performed by: REGISTERED NURSE

## 2024-09-06 PROCEDURE — S4991 NICOTINE PATCH NONLEGEND: HCPCS | Performed by: REGISTERED NURSE

## 2024-09-06 RX ORDER — HYDROCODONE BITARTRATE AND ACETAMINOPHEN 500; 5 MG/1; MG/1
TABLET ORAL
Status: DISCONTINUED | OUTPATIENT
Start: 2024-09-06 | End: 2024-09-07 | Stop reason: HOSPADM

## 2024-09-06 RX ORDER — ALBUTEROL SULFATE 0.83 MG/ML
2.5 SOLUTION RESPIRATORY (INHALATION) EVERY 6 HOURS PRN
Qty: 90 ML | Refills: 0 | Status: SHIPPED | OUTPATIENT
Start: 2024-09-06 | End: 2024-09-20

## 2024-09-06 RX ORDER — FUROSEMIDE 10 MG/ML
40 INJECTION INTRAMUSCULAR; INTRAVENOUS EVERY 12 HOURS
Status: DISCONTINUED | OUTPATIENT
Start: 2024-09-06 | End: 2024-09-07 | Stop reason: HOSPADM

## 2024-09-06 RX ADMIN — OXYBUTYNIN CHLORIDE 5 MG: 5 TABLET ORAL at 09:09

## 2024-09-06 RX ADMIN — ATORVASTATIN CALCIUM 80 MG: 40 TABLET, FILM COATED ORAL at 09:09

## 2024-09-06 RX ADMIN — ALUMINUM HYDROXIDE, MAGNESIUM HYDROXIDE, AND SIMETHICONE 30 ML: 1200; 120; 1200 SUSPENSION ORAL at 06:09

## 2024-09-06 RX ADMIN — BUTALBITAL, ACETAMINOPHEN, AND CAFFEINE 1 TABLET: 325; 50; 40 TABLET ORAL at 06:09

## 2024-09-06 RX ADMIN — FERROUS SULFATE TAB 325 MG (65 MG ELEMENTAL FE) 1 EACH: 325 (65 FE) TAB at 09:09

## 2024-09-06 RX ADMIN — LOSARTAN POTASSIUM 25 MG: 25 TABLET, FILM COATED ORAL at 09:09

## 2024-09-06 RX ADMIN — ALUMINUM HYDROXIDE, MAGNESIUM HYDROXIDE, AND SIMETHICONE 30 ML: 1200; 120; 1200 SUSPENSION ORAL at 09:09

## 2024-09-06 RX ADMIN — SUCRALFATE 1 G: 1 SUSPENSION ORAL at 12:09

## 2024-09-06 RX ADMIN — OXYBUTYNIN CHLORIDE 5 MG: 5 TABLET ORAL at 03:09

## 2024-09-06 RX ADMIN — SUCRALFATE 1 G: 1 SUSPENSION ORAL at 05:09

## 2024-09-06 RX ADMIN — ERTAPENEM 1 G: 1 INJECTION INTRAMUSCULAR; INTRAVENOUS at 09:09

## 2024-09-06 RX ADMIN — ALUMINUM HYDROXIDE, MAGNESIUM HYDROXIDE, AND SIMETHICONE 30 ML: 1200; 120; 1200 SUSPENSION ORAL at 03:09

## 2024-09-06 RX ADMIN — PANTOPRAZOLE SODIUM 40 MG: 40 TABLET, DELAYED RELEASE ORAL at 09:09

## 2024-09-06 RX ADMIN — METOPROLOL SUCCINATE 25 MG: 25 TABLET, EXTENDED RELEASE ORAL at 09:09

## 2024-09-06 RX ADMIN — TIOTROPIUM BROMIDE INHALATION SPRAY 2 PUFF: 3.12 SPRAY, METERED RESPIRATORY (INHALATION) at 08:09

## 2024-09-06 RX ADMIN — FUROSEMIDE 40 MG: 10 INJECTION, SOLUTION INTRAMUSCULAR; INTRAVENOUS at 12:09

## 2024-09-06 RX ADMIN — SPIRONOLACTONE 25 MG: 25 TABLET, FILM COATED ORAL at 09:09

## 2024-09-06 RX ADMIN — NICOTINE 1 PATCH: 21 PATCH, EXTENDED RELEASE TRANSDERMAL at 09:09

## 2024-09-06 RX ADMIN — SUCRALFATE 1 G: 1 SUSPENSION ORAL at 06:09

## 2024-09-06 RX ADMIN — FUROSEMIDE 40 MG: 10 INJECTION, SOLUTION INTRAMUSCULAR; INTRAVENOUS at 09:09

## 2024-09-06 RX ADMIN — ALUMINUM HYDROXIDE, MAGNESIUM HYDROXIDE, AND SIMETHICONE 30 ML: 1200; 120; 1200 SUSPENSION ORAL at 12:09

## 2024-09-06 NOTE — NURSING
Received call from blood bank spoke with Suni, tech is having trouble running crossmatch and it will take a while to have blood ready for transfusion.

## 2024-09-06 NOTE — SUBJECTIVE & OBJECTIVE
Interval History:  Patient was examined in her bed.  She was calm and not in distress.  She denies chest pain, palpitation, shortness for breath.  She denies GI/urinary bleed.    Review of Systems   Constitutional:  Negative for activity change, chills and fever.   HENT:  Negative for congestion, rhinorrhea and sore throat.    Eyes:  Negative for discharge and redness.   Respiratory:  Negative for cough, chest tightness, shortness of breath and wheezing.    Cardiovascular:  Negative for chest pain, palpitations and leg swelling.   Gastrointestinal:  Negative for abdominal pain, constipation, diarrhea, nausea and vomiting.   Genitourinary:  Negative for dysuria, flank pain and hematuria.   Musculoskeletal:  Negative for back pain, myalgias and neck pain.   Skin:  Negative for pallor, rash and wound.   Neurological:  Negative for dizziness, tremors, syncope, weakness, numbness and headaches.   Psychiatric/Behavioral:  Negative for agitation, confusion and hallucinations.      Objective:     Vital Signs (Most Recent):  Temp: 98.2 °F (36.8 °C) (09/06/24 1623)  Pulse: 91 (09/06/24 1623)  Resp: 20 (09/06/24 1623)  BP: 106/68 (09/06/24 1623)  SpO2: 96 % (09/06/24 1623) Vital Signs (24h Range):  Temp:  [98.2 °F (36.8 °C)-98.8 °F (37.1 °C)] 98.2 °F (36.8 °C)  Pulse:  [] 91  Resp:  [16-20] 20  SpO2:  [93 %-97 %] 96 %  BP: (103-126)/(60-82) 106/68     Weight: 79.7 kg (175 lb 11.3 oz)  Body mass index is 30.16 kg/m².    Intake/Output Summary (Last 24 hours) at 9/6/2024 1823  Last data filed at 9/6/2024 1718  Gross per 24 hour   Intake --   Output 1000 ml   Net -1000 ml         Physical Exam  Constitutional:       Appearance: Normal appearance. She is not ill-appearing.   HENT:      Head: Normocephalic.      Nose: Nose normal.      Mouth/Throat:      Mouth: Mucous membranes are dry.   Eyes:      Pupils: Pupils are equal, round, and reactive to light.   Cardiovascular:      Rate and Rhythm: Regular rhythm. Tachycardia  present.      Pulses: Normal pulses.      Heart sounds: Normal heart sounds.   Pulmonary:      Effort: Pulmonary effort is normal.      Breath sounds: No wheezing.   Abdominal:      General: Bowel sounds are normal.      Palpations: Abdomen is soft.      Tenderness: There is no abdominal tenderness.   Musculoskeletal:      Right lower leg: No edema.      Left lower leg: No edema.   Skin:     General: Skin is warm and dry.      Capillary Refill: Capillary refill takes less than 2 seconds.   Neurological:      General: No focal deficit present.      Mental Status: She is alert and oriented to person, place, and time. Mental status is at baseline.   Psychiatric:         Mood and Affect: Mood normal.         Behavior: Behavior normal.             Significant Labs: All pertinent labs within the past 24 hours have been reviewed.    Significant Imaging: I have reviewed all pertinent imaging results/findings within the past 24 hours.

## 2024-09-06 NOTE — PROGRESS NOTES
Smoking cessation education note: Pt is a 2 pk/day cigarette smoker x 57 yrs. She states that she cut down to 0.5 pk/day or less approximately 6 months ago. 21 mg nicotine patch ordered Q day. Ambulatory referral to Smoking Cessation clinic following hospital discharge.

## 2024-09-06 NOTE — PLAN OF CARE
Pt is pending final d/c, pt will potentially d/c after receiving unit of blood. Once MD places d/c orders pt will be cleared from  pov. Pt was accepted by   [facility] Phone: [phone]    [employer] will send out pt's HME to pt's sister home. SW requested f/u appts for pt. Pt will have family help transport her home. Rounds completed on pt. All questions addressed. Bedside nurse to discuss d/c medications. Discussed importance to attend all f/u appts and take medications as prescribed. Verbalized understanding. Case Management to sign off.     ANGLE Brown  [phone]    Future Appointments   Date Time Provider Department Center   9/11/2024  9:00 AM Belle Phillips, DM [facility]   9/12/2024  1:40 PM Shadi Gomez MD [facility]          09/06/24 5083   Final Note   Assessment Type Final Discharge Note   Anticipated Discharge Disposition Home-Health   Post-Acute Status   Post-Acute Authorization Home Health   Home Health Status Set-up Complete/Auth obtained   Discharge Delays None known at this time

## 2024-09-06 NOTE — PROGRESS NOTES
Job - Telemetry      HOME HEALTH ORDERS  FACE TO FACE ENCOUNTER    Patient Name: Mary Ellen Saini  YOB: 1965    PCP: No, Primary Doctor   PCP Address: None  PCP Phone Number: None  PCP Fax: None    Encounter Date: 9/5/24    Admit to Home Health    Diagnoses:  Active Hospital Problems    Diagnosis  POA    Hepatitis C [B19.20]  Yes    HFrEF (heart failure with reduced ejection fraction) [I50.20]  Yes    Urinary tract infection due to ESBL Klebsiella [N39.0, B96.89]  Yes    COPD (chronic obstructive pulmonary disease) [J44.9]  Yes    Colorectal carcinoma [C19]  Yes    Hypokalemia [E87.6]  Yes    Elevated troponin [R79.89]  Yes    Anemia [D64.9]  Yes      Resolved Hospital Problems   No resolved problems to display.       Follow Up Appointments:  Future Appointments   Date Time Provider Department Center   9/12/2024  1:40 PM Shadi Gomez MD [facility]       Allergies:Review of patient's allergies indicates:  No Known Allergies    Medications: Review discharge medications with patient and family and provide education.    Current Facility-Administered Medications   Medication Dose Route Frequency Provider Last Rate Last Admin    0.9%  NaCl infusion (for blood administration)   Intravenous Q24H PRN Tavon Crawley NP        albuterol-ipratropium 2.5 mg-0.5 mg/3 mL nebulizer solution 3 mL  3 mL Nebulization Q6H PRN Tavon Crawley NP        aluminum-magnesium hydroxide-simethicone 200-200-20 mg/5 mL suspension 30 mL  30 mL Oral QID (AC & HS) Tavon Crawley NP   30 mL at 09/06/24 1200    atorvastatin tablet 80 mg  80 mg Oral Daily Tavon Crawley NP   80 mg at 09/06/24 0930    butalbital-acetaminophen-caffeine -40 mg per tablet 1 tablet  1 tablet Oral Q4H PRN Tavon Crawley NP   1 tablet at 09/06/24 0634    ertapenem (INVANZ) 1 g in 0.9% NaCl 100 mL IVPB (MB+)  1 g Intravenous Daily Tavon Crawley NP   Stopped at 09/06/24 1003    ferrous  sulfate tablet 1 each  1 tablet Oral Daily Tavon Crawley, NP   1 each at 09/06/24 0929    furosemide injection 40 mg  40 mg Intravenous Q12H Jax Rosas MD   40 mg at 09/06/24 1203    influenza (Flulaval, Fluzone, Fluarix) 45 mcg/0.5 mL vaccine 0.5 mL  0.5 mL Intramuscular vaccine x 1 dose Jessica Charles MD        losartan tablet 25 mg  25 mg Oral Daily Tavon Crawley, NP   25 mg at 09/06/24 0930    metoprolol succinate (TOPROL-XL) 24 hr tablet 25 mg  25 mg Oral Daily Tavon Crawley, NP   25 mg at 09/06/24 0929    nicotine 21 mg/24 hr 1 patch  1 patch Transdermal Daily Tavon Crawley NP   1 patch at 09/06/24 0932    ondansetron injection 4 mg  4 mg Intravenous Q8H PRN Tavon Crawley NP        oxybutynin tablet 5 mg  5 mg Oral TID Tavon Cralwey, NP   5 mg at 09/06/24 0930    pantoprazole EC tablet 40 mg  40 mg Oral Daily Tavon Crawley, NP   40 mg at 09/06/24 0929    sodium chloride 0.9% flush 10 mL  10 mL Intravenous PRN Tavon Crawley, NP        spironolactone tablet 25 mg  25 mg Oral Daily Tavon Crawley, NP   25 mg at 09/06/24 0930    sucralfate 100 mg/mL suspension 1 g  1 g Oral Q6H Tavon Crawley, NP   1 g at 09/06/24 1201    tiotropium bromide 2.5 mcg/actuation inhaler 2 puff  2 puff Inhalation Daily Tavon Crawley NP   2 puff at 09/06/24 0819        Medication List        CHANGE how you take these medications      * albuterol 90 mcg/actuation inhaler  Commonly known as: PROVENTIL/VENTOLIN HFA  Inhale 1-2 puffs into the lungs every 6 (six) hours as needed for Wheezing. Rescue  What changed: Another medication with the same name was added. Make sure you understand how and when to take each.     * albuterol 2.5 mg /3 mL (0.083 %) nebulizer solution  Commonly known as: PROVENTIL  Take 3 mLs (2.5 mg total) by nebulization every 6 (six) hours as needed for Wheezing. Rescue  What changed: You were already taking a  medication with the same name, and this prescription was added. Make sure you understand how and when to take each.           * This list has 2 medication(s) that are the same as other medications prescribed for you. Read the directions carefully, and ask your doctor or other care provider to review them with you.                CONTINUE taking these medications      butalbital-acetaminophen-caffeine -40 mg -40 mg per tablet  Commonly known as: FIORICET, ESGIC  Take 1 tablet by mouth every 4 (four) hours as needed for Headaches.     FeroSuL 325 mg (65 mg iron) Tab tablet  Generic drug: ferrous sulfate  Take 1 tablet (325 mg total) by mouth once daily.     furosemide 20 MG tablet  Commonly known as: LASIX  Take 2 tablets (40 mg total) by mouth once daily.     losartan 25 MG tablet  Commonly known as: COZAAR  Take 1 tablet (25 mg total) by mouth once daily.     metoprolol succinate 25 MG 24 hr tablet  Commonly known as: TOPROL-XL  Take 1 tablet (25 mg total) by mouth once daily.     oxybutynin 5 MG Tab  Commonly known as: DITROPAN  Take 1 tablet (5 mg total) by mouth 3 (three) times daily.     pantoprazole 40 MG tablet  Commonly known as: PROTONIX  Take 1 tablet (40 mg total) by mouth once daily.     polyethylene glycol 17 gram Pwpk  Commonly known as: GLYCOLAX  Take 1 packet by mouth once daily.     rosuvastatin 20 MG tablet  Commonly known as: CRESTOR  Take 1 tablet (20 mg total) by mouth every evening.     spironolactone 25 MG tablet  Commonly known as: ALDACTONE  Take 25 mg by mouth once daily.            STOP taking these medications      0.9% NaCl PgBk 100 mL with ertapenem 1 gram SolR 1 g            ASK your doctor about these medications      naloxone 4 mg/actuation Spry  Commonly known as: NARCAN  4mg by nasal route as needed for opioid overdose; may repeat every 2-3 minutes in alternating nostrils until medical help arrives. Call 911     nicotine 21 mg/24 hr  Commonly known as: NICODERM CQ  Place  1 patch onto the skin once daily.     tiotropium 18 mcg inhalation capsule  Commonly known as: SPIRIVA  Inhale 1 capsule (18 mcg total) into the lungs once daily. Controller                I have seen and examined this patient within the last 30 days. My clinical findings that support the need for the home health skilled services and home bound status are the following:no   Weakness/numbness causing balance and gait disturbance due to Heart Failure and Weakness/Debility making it taxing to leave home.     Diet:   cardiac diet and fluid restriction 1500ml    Labs:  SN to perform labs:  CBC: Weekly; 2 week(s) and Report Lab results to PCP.    Referrals/ Consults  Physical Therapy to evaluate and treat. Evaluate for home safety and equipment needs; Establish/upgrade home exercise program. Perform / instruct on therapeutic exercises, gait training, transfer training, and Range of Motion.  Occupational Therapy to evaluate and treat. Evaluate home environment for safety and equipment needs. Perform/Instruct on transfers, ADL training, ROM, and therapeutic exercises.  Aide to provide assistance with personal care, ADLs, and vital signs.    Activities:   activity as tolerated    Nursing:   Agency to admit patient within 24 hours of hospital discharge unless specified on physician order or at patient request    SN to complete comprehensive assessment including routine vital signs. Instruct on disease process and s/s of complications to report to MD. Review/verify medication list sent home with the patient at time of discharge  and instruct patient/caregiver as needed. Frequency may be adjusted depending on start of care date.     Skilled nurse to perform up to 3 visits PRN for symptoms related to diagnosis    Notify MD if SBP > 160 or < 90; DBP > 90 or < 50; HR > 120 or < 50; Temp > 101; O2 < 88%    Ok to schedule additional visits based on staff availability and patient request on consecutive days within the home health  episode.    When multiple disciplines ordered:    Start of Care occurs on Sunday - Wednesday schedule remaining discipline evaluations as ordered on separate consecutive days following the start of care.    Thursday SOC -schedule subsequent evaluations Friday and Monday the following week.     Friday - Saturday SOC - schedule subsequent discipline evaluations on consecutive days starting Monday of the following week.    For all post-discharge communication and subsequent orders please contact patient's primary care physician.     Miscellaneous       Home Health Aide:  Nursing Twice weekly, Physical Therapy Twice weekly, and Occupational Therapy Twice weekly    Wound Care Orders  no    I certify that this patient is confined to her home and needs intermittent skilled nursing care, physical therapy, and occupational therapy.

## 2024-09-06 NOTE — PROGRESS NOTES
[facility] Medicine  Progress Note    Patient Name: Mary Ellen Saini  MRN: [id number]  Patient Class: IP- Inpatient   Admission Date: 9/5/2024  Length of Stay: 0 days  Attending Physician: Jax Rosas MD  Primary Care Provider: Marlen, Primary Doctor        Subjective:     Principal Problem:<principal problem not specified>        HPI:  Chemistry mostly benign some mild hypokalemia at 3.4.  BNP elevated at 15 82, troponin elevated at 0.109  Pt is a 59-year-old female with a past medical history of CHF, bipolar 1 disorder, hypertension, asthma who presents to the ED via EMS with complaint of shortness of breath. Patient reports worsening shortness of breath over the past several hours with associated chest pressure that started approximately 12 hours ago. She is currently receiving antibiotics via a PICC line for reported kidney infection. Per EMS, she was administered 1 DuoNeb EN route with improvement of her reported wheezing and shortness of breath. She is not on home oxygen.  In ED labwork remarkable for WBC of 14.  H&H 7.5 and 24.6    Overview/Hospital Course:  No notes on file    Interval History:  Patient was examined in her bed.  She was calm and not in distress.  She denies chest pain, palpitation, shortness for breath.  She denies GI/urinary bleed.    Review of Systems   Constitutional:  Negative for activity change, chills and fever.   HENT:  Negative for congestion, rhinorrhea and sore throat.    Eyes:  Negative for discharge and redness.   Respiratory:  Negative for cough, chest tightness, shortness of breath and wheezing.    Cardiovascular:  Negative for chest pain, palpitations and leg swelling.   Gastrointestinal:  Negative for abdominal pain, constipation, diarrhea, nausea and vomiting.   Genitourinary:  Negative for dysuria, flank pain and hematuria.   Musculoskeletal:  Negative for back pain, myalgias and neck pain.   Skin:  Negative for pallor, rash and wound.   Neurological:   Negative for dizziness, tremors, syncope, weakness, numbness and headaches.   Psychiatric/Behavioral:  Negative for agitation, confusion and hallucinations.      Objective:     Vital Signs (Most Recent):  Temp: 98.2 °F (36.8 °C) (09/06/24 1623)  Pulse: 91 (09/06/24 1623)  Resp: 20 (09/06/24 1623)  BP: 106/68 (09/06/24 1623)  SpO2: 96 % (09/06/24 1623) Vital Signs (24h Range):  Temp:  [98.2 °F (36.8 °C)-98.8 °F (37.1 °C)] 98.2 °F (36.8 °C)  Pulse:  [] 91  Resp:  [16-20] 20  SpO2:  [93 %-97 %] 96 %  BP: (103-126)/(60-82) 106/68     Weight: 79.7 kg (175 lb 11.3 oz)  Body mass index is 30.16 kg/m².    Intake/Output Summary (Last 24 hours) at 9/6/2024 1823  Last data filed at 9/6/2024 1718  Gross per 24 hour   Intake --   Output 1000 ml   Net -1000 ml         Physical Exam  Constitutional:       Appearance: Normal appearance. She is not ill-appearing.   HENT:      Head: Normocephalic.      Nose: Nose normal.      Mouth/Throat:      Mouth: Mucous membranes are dry.   Eyes:      Pupils: Pupils are equal, round, and reactive to light.   Cardiovascular:      Rate and Rhythm: Regular rhythm. Tachycardia present.      Pulses: Normal pulses.      Heart sounds: Normal heart sounds.   Pulmonary:      Effort: Pulmonary effort is normal.      Breath sounds: No wheezing.   Abdominal:      General: Bowel sounds are normal.      Palpations: Abdomen is soft.      Tenderness: There is no abdominal tenderness.   Musculoskeletal:      Right lower leg: No edema.      Left lower leg: No edema.   Skin:     General: Skin is warm and dry.      Capillary Refill: Capillary refill takes less than 2 seconds.   Neurological:      General: No focal deficit present.      Mental Status: She is alert and oriented to person, place, and time. Mental status is at baseline.   Psychiatric:         Mood and Affect: Mood normal.         Behavior: Behavior normal.             Significant Labs: All pertinent labs within the past 24 hours have been  reviewed.    Significant Imaging: I have reviewed all pertinent imaging results/findings within the past 24 hours.    Assessment/Plan:      HFrEF (heart failure with reduced ejection fraction)  Most recent echo with EF 30%   patient has cardiology follow-up from previous hospitalization discuss AICD   Continue Lasix and Aldactone  Cardiology following  Patient was supposed to have LifeVest on but noncompliant.  Counseled  On outpatient GDMT with ARB, BB, Aldactone and Lasix   Asymptomatic    Hepatitis C  On prior admission, Hep C positive with elevated viral load and patient advised on outpatient Hepatology follow up for evaluation/treatment   LFTs normal on admission    COPD (chronic obstructive pulmonary disease)  Patient's COPD is controlled currently.  Patient is currently off COPD Pathway. Continue scheduled inhalers  and monitor respiratory status closely.   -asymptomatic    Urinary tract infection due to ESBL Klebsiella  Recently completed 7 day course of ertapenem via PICC line  UA negative for UTI  Remove PICC line before discharge    Colorectal carcinoma        Anemia  Anemia is likely due to chronic disease due to Chronic liver disease. Most recent hemoglobin and hematocrit are listed below.  Recent Labs     09/05/24  0124 09/06/24  0212   HGB 7.5* 6.8*   HCT 24.6* 21.5*       Plan  - Monitor serial CBC: Daily  - Transfuse PRBC if patient becomes hemodynamically unstable, symptomatic or H/H drops below 7/21.  - Patient has not received any PRBC transfusions to date  - Patient's anemia is currently stable  - patient was awaiting PRBC transfusion    Hypokalemia  Patient's most recent potassium results are listed below.   Recent Labs     09/05/24  0124 09/06/24  0212   K 3.4* 4.1       Plan  - Replete potassium per protocol  - Monitor potassium Daily  - Patient's hypokalemia is stable  -magnesium within normal limits  -potassium now within normal limits    Elevated troponin  Patient with chest pain  EKG  without ST elevation  She reports 0.109, trended up in setting of ADHF.  Cardiology following  Continue ARB, BB and statin; no ASA or Plavix due to anemia and demand etiology           VTE Risk Mitigation (From admission, onward)           Ordered     IP VTE HIGH RISK PATIENT  Once         09/05/24 0644     Place sequential compression device  Until discontinued         09/05/24 0644                    Discharge Planning   JENAE:      Code Status: Full Code   Is the patient medically ready for discharge?:     Reason for patient still in hospital (select all that apply): Laboratory test and Treatment      Discharge Delays: None known at this time              Jax Rosas MD  Department of [facility] Medicine   [facility]

## 2024-09-06 NOTE — ASSESSMENT & PLAN NOTE
Most recent echo with EF 30%   patient has cardiology follow-up from previous hospitalization discuss AICD   Continue Lasix and Aldactone  Cardiology following  Patient was supposed to have LifeVest on but noncompliant.  Counseled  On outpatient GDMT with ARB, BB, Aldactone and Lasix   Asymptomatic

## 2024-09-06 NOTE — NURSING
Called and spoke with Suni at blood bank. Tech states blood still not ready. Notified pt and Dr Rosas via secure chat

## 2024-09-06 NOTE — ASSESSMENT & PLAN NOTE
Patient's COPD is controlled currently.  Patient is currently off COPD Pathway. Continue scheduled inhalers  and monitor respiratory status closely.   -asymptomatic

## 2024-09-06 NOTE — ASSESSMENT & PLAN NOTE
Anemia is likely due to chronic disease due to Chronic liver disease. Most recent hemoglobin and hematocrit are listed below.  Recent Labs     09/05/24  0124 09/06/24  0212   HGB 7.5* 6.8*   HCT 24.6* 21.5*       Plan  - Monitor serial CBC: Daily  - Transfuse PRBC if patient becomes hemodynamically unstable, symptomatic or H/H drops below 7/21.  - Patient has not received any PRBC transfusions to date  - Patient's anemia is currently stable  - patient was awaiting PRBC transfusion

## 2024-09-06 NOTE — ASSESSMENT & PLAN NOTE
Patient's most recent potassium results are listed below.   Recent Labs     09/05/24  0124 09/06/24  0212   K 3.4* 4.1       Plan  - Replete potassium per protocol  - Monitor potassium Daily  - Patient's hypokalemia is stable  -magnesium within normal limits  -potassium now within normal limits

## 2024-09-07 VITALS
TEMPERATURE: 99 F | HEART RATE: 92 BPM | SYSTOLIC BLOOD PRESSURE: 130 MMHG | BODY MASS INDEX: 29.99 KG/M2 | RESPIRATION RATE: 17 BRPM | OXYGEN SATURATION: 94 % | HEIGHT: 64 IN | WEIGHT: 175.69 LBS | DIASTOLIC BLOOD PRESSURE: 76 MMHG

## 2024-09-07 LAB
ANION GAP SERPL CALC-SCNC: 13 MMOL/L (ref 8–16)
BASOPHILS # BLD AUTO: 0.08 K/UL (ref 0–0.2)
BASOPHILS NFR BLD: 0.6 % (ref 0–1.9)
BLD PROD TYP BPU: NORMAL
BLOOD UNIT EXPIRATION DATE: NORMAL
BLOOD UNIT TYPE CODE: 5100
BLOOD UNIT TYPE: NORMAL
BUN SERPL-MCNC: 22 MG/DL (ref 6–20)
CALCIUM SERPL-MCNC: 8 MG/DL (ref 8.7–10.5)
CHLORIDE SERPL-SCNC: 99 MMOL/L (ref 95–110)
CO2 SERPL-SCNC: 24 MMOL/L (ref 23–29)
CODING SYSTEM: NORMAL
CREAT SERPL-MCNC: 1.5 MG/DL (ref 0.5–1.4)
CROSSMATCH INTERPRETATION: NORMAL
DIFFERENTIAL METHOD BLD: ABNORMAL
DISPENSE STATUS: NORMAL
EOSINOPHIL # BLD AUTO: 0.7 K/UL (ref 0–0.5)
EOSINOPHIL NFR BLD: 4.8 % (ref 0–8)
ERYTHROCYTE [DISTWIDTH] IN BLOOD BY AUTOMATED COUNT: 18.2 % (ref 11.5–14.5)
EST. GFR  (NO RACE VARIABLE): 40 ML/MIN/1.73 M^2
GLUCOSE SERPL-MCNC: 94 MG/DL (ref 70–110)
HCT VFR BLD AUTO: 26.5 % (ref 37–48.5)
HGB BLD-MCNC: 8.4 G/DL (ref 12–16)
IMM GRANULOCYTES # BLD AUTO: 0.2 K/UL (ref 0–0.04)
IMM GRANULOCYTES NFR BLD AUTO: 1.4 % (ref 0–0.5)
LYMPHOCYTES # BLD AUTO: 2.3 K/UL (ref 1–4.8)
LYMPHOCYTES NFR BLD: 16.6 % (ref 18–48)
MCH RBC QN AUTO: 24.9 PG (ref 27–31)
MCHC RBC AUTO-ENTMCNC: 31.7 G/DL (ref 32–36)
MCV RBC AUTO: 79 FL (ref 82–98)
MONOCYTES # BLD AUTO: 0.8 K/UL (ref 0.3–1)
MONOCYTES NFR BLD: 6 % (ref 4–15)
NEUTROPHILS # BLD AUTO: 9.8 K/UL (ref 1.8–7.7)
NEUTROPHILS NFR BLD: 70.6 % (ref 38–73)
NRBC BLD-RTO: 0 /100 WBC
NUM UNITS TRANS PACKED RBC: NORMAL
PLATELET # BLD AUTO: 632 K/UL (ref 150–450)
PMV BLD AUTO: 9.3 FL (ref 9.2–12.9)
POTASSIUM SERPL-SCNC: 4.2 MMOL/L (ref 3.5–5.1)
RBC # BLD AUTO: 3.37 M/UL (ref 4–5.4)
SODIUM SERPL-SCNC: 136 MMOL/L (ref 136–145)
WBC # BLD AUTO: 13.83 K/UL (ref 3.9–12.7)

## 2024-09-07 PROCEDURE — 80048 BASIC METABOLIC PNL TOTAL CA: CPT | Performed by: REGISTERED NURSE

## 2024-09-07 PROCEDURE — 63600175 PHARM REV CODE 636 W HCPCS: Performed by: REGISTERED NURSE

## 2024-09-07 PROCEDURE — P9016 RBC LEUKOCYTES REDUCED: HCPCS | Performed by: REGISTERED NURSE

## 2024-09-07 PROCEDURE — S4991 NICOTINE PATCH NONLEGEND: HCPCS | Performed by: REGISTERED NURSE

## 2024-09-07 PROCEDURE — 30233N1 TRANSFUSION OF NONAUTOLOGOUS RED BLOOD CELLS INTO PERIPHERAL VEIN, PERCUTANEOUS APPROACH: ICD-10-PCS | Performed by: INTERNAL MEDICINE

## 2024-09-07 PROCEDURE — 99900035 HC TECH TIME PER 15 MIN (STAT)

## 2024-09-07 PROCEDURE — 94640 AIRWAY INHALATION TREATMENT: CPT

## 2024-09-07 PROCEDURE — 63600175 PHARM REV CODE 636 W HCPCS: Performed by: INTERNAL MEDICINE

## 2024-09-07 PROCEDURE — 94761 N-INVAS EAR/PLS OXIMETRY MLT: CPT

## 2024-09-07 PROCEDURE — 36430 TRANSFUSION BLD/BLD COMPNT: CPT

## 2024-09-07 PROCEDURE — 36415 COLL VENOUS BLD VENIPUNCTURE: CPT | Performed by: REGISTERED NURSE

## 2024-09-07 PROCEDURE — 25000003 PHARM REV CODE 250: Performed by: REGISTERED NURSE

## 2024-09-07 PROCEDURE — 85025 COMPLETE CBC W/AUTO DIFF WBC: CPT | Performed by: REGISTERED NURSE

## 2024-09-07 RX ORDER — IBUPROFEN 200 MG
1 TABLET ORAL DAILY
Qty: 30 PATCH | Refills: 0 | Status: SHIPPED | OUTPATIENT
Start: 2024-09-07 | End: 2024-10-07

## 2024-09-07 RX ADMIN — LOSARTAN POTASSIUM 25 MG: 25 TABLET, FILM COATED ORAL at 10:09

## 2024-09-07 RX ADMIN — SUCRALFATE 1 G: 1 SUSPENSION ORAL at 12:09

## 2024-09-07 RX ADMIN — METOPROLOL SUCCINATE 25 MG: 25 TABLET, EXTENDED RELEASE ORAL at 10:09

## 2024-09-07 RX ADMIN — FUROSEMIDE 40 MG: 10 INJECTION, SOLUTION INTRAMUSCULAR; INTRAVENOUS at 10:09

## 2024-09-07 RX ADMIN — NICOTINE 1 PATCH: 21 PATCH, EXTENDED RELEASE TRANSDERMAL at 10:09

## 2024-09-07 RX ADMIN — ATORVASTATIN CALCIUM 80 MG: 40 TABLET, FILM COATED ORAL at 10:09

## 2024-09-07 RX ADMIN — BUTALBITAL, ACETAMINOPHEN, AND CAFFEINE 1 TABLET: 325; 50; 40 TABLET ORAL at 04:09

## 2024-09-07 RX ADMIN — SUCRALFATE 1 G: 1 SUSPENSION ORAL at 11:09

## 2024-09-07 RX ADMIN — ALUMINUM HYDROXIDE, MAGNESIUM HYDROXIDE, AND SIMETHICONE 30 ML: 1200; 120; 1200 SUSPENSION ORAL at 06:09

## 2024-09-07 RX ADMIN — TIOTROPIUM BROMIDE INHALATION SPRAY 2 PUFF: 3.12 SPRAY, METERED RESPIRATORY (INHALATION) at 07:09

## 2024-09-07 RX ADMIN — SPIRONOLACTONE 25 MG: 25 TABLET, FILM COATED ORAL at 10:09

## 2024-09-07 RX ADMIN — SUCRALFATE 1 G: 1 SUSPENSION ORAL at 06:09

## 2024-09-07 RX ADMIN — PANTOPRAZOLE SODIUM 40 MG: 40 TABLET, DELAYED RELEASE ORAL at 10:09

## 2024-09-07 RX ADMIN — FERROUS SULFATE TAB 325 MG (65 MG ELEMENTAL FE) 1 EACH: 325 (65 FE) TAB at 10:09

## 2024-09-07 RX ADMIN — ERTAPENEM 1 G: 1 INJECTION INTRAMUSCULAR; INTRAVENOUS at 10:09

## 2024-09-07 RX ADMIN — OXYBUTYNIN CHLORIDE 5 MG: 5 TABLET ORAL at 02:09

## 2024-09-07 RX ADMIN — ALUMINUM HYDROXIDE, MAGNESIUM HYDROXIDE, AND SIMETHICONE 30 ML: 1200; 120; 1200 SUSPENSION ORAL at 10:09

## 2024-09-07 RX ADMIN — OXYBUTYNIN CHLORIDE 5 MG: 5 TABLET ORAL at 10:09

## 2024-09-07 NOTE — ASSESSMENT & PLAN NOTE
Patient's most recent potassium results are listed below.   Recent Labs     09/05/24  0124 09/06/24  0212 09/07/24  0621   K 3.4* 4.1 4.2       Plan  - Replete potassium per protocol  - Monitor potassium Daily  - Patient's hypokalemia is stable  -magnesium within normal limits  -potassium now within normal limits

## 2024-09-07 NOTE — HOSPITAL COURSE
59-year-old female with a past medical history of CHF, bipolar 1 disorder, hypertension, asthma who presents to the ED via EMS with complaint of shortness of breath.  Patient was stated she had not been using her medication over the last couple of days because she was not at home.  Patient was immediately started on diuretics and seen by Cardiology.  Her medications were restarted and she was advised to make sure she compliant with medical advice.  Patient was supposed to have her life vest on but did not.  Patient was counseled extensively on the need to maintain LifeVest at all times.  Patient was also been recently treated for UTI with IV antibiotics at, she presented with PICC line which has been removed before discharge.  Patient continued to improve and shortness for breath resolved with IV diuretics.  Her hemoglobin dropped below 7 during admission, requiring 1 unit PRBC transfusion.  Patient tolerated transfusion well and stated she has a history of lower GI bleed.  She was scheduled for colonoscopy and EGD next week.  Patient has been advised to make sure she keeps her appointment, risks were explained to patient.  She voiced understanding.  Patient was examined in her bed today.  She was calm and not in distress.  She denies chest pain, palpitation, shortness for breath, GI/ symptoms.  She will be discharged home with her sister today and advised to follow up with PCP within 3-5 days.

## 2024-09-07 NOTE — DISCHARGE SUMMARY
[facility] Medicine  Discharge Summary      Patient Name: Mary Ellen Saini  MRN: [id number]  ANGELIQUE: [id number]  Patient Class: IP- Inpatient  Admission Date: 9/5/2024  Hospital Length of Stay: 1 days  Discharge Date and Time:  09/07/2024 12:52 PM  Attending Physician: Jax Rosas MD   Discharging Provider: Jax Rosas MD  Primary Care Provider: Marlen, Primary Doctor    Primary Care Team: Networked reference to record PCT     HPI:   Chemistry mostly benign some mild hypokalemia at 3.4.  BNP elevated at 15 82, troponin elevated at 0.109  Pt is a 59-year-old female with a past medical history of CHF, bipolar 1 disorder, hypertension, asthma who presents to the ED via EMS with complaint of shortness of breath. Patient reports worsening shortness of breath over the past several hours with associated chest pressure that started approximately 12 hours ago. She is currently receiving antibiotics via a PICC line for reported kidney infection. Per EMS, she was administered 1 DuoNeb EN route with improvement of her reported wheezing and shortness of breath. She is not on home oxygen.  In ED labwork remarkable for WBC of 14.  H&H 7.5 and 24.6    * No surgery found *      Hospital Course:   59-year-old female with a past medical history of CHF, bipolar 1 disorder, hypertension, asthma who presents to the ED via EMS with complaint of shortness of breath.  Patient was stated she had not been using her medication over the last couple of days because she was not at home.  Patient was immediately started on diuretics and seen by Cardiology.  Her medications were restarted and she was advised to make sure she compliant with medical advice.  Patient was supposed to have her life vest on but did not.  Patient was counseled extensively on the need to maintain LifeVest at all times.  Patient was also been recently treated for UTI with IV antibiotics at, she presented with PICC line which has been removed before discharge.   Patient continued to improve and shortness for breath resolved with IV diuretics.  Her hemoglobin dropped below 7 during admission, requiring 1 unit PRBC transfusion.  Patient tolerated transfusion well and stated she has a history of lower GI bleed.  She was scheduled for colonoscopy and EGD next week.  Patient has been advised to make sure she keeps her appointment, risks were explained to patient.  She voiced understanding.  Patient was examined in her bed today.  She was calm and not in distress.  She denies chest pain, palpitation, shortness for breath, GI/ symptoms.  She will be discharged home with her sister today and advised to follow up with PCP within 3-5 days.           Goals of Care Treatment Preferences:  Code Status: Full Code          What is most important right now is to focus on remaining as independent as possible, symptom/pain control, extending life as long as possible, even it it means sacrificing quality.  Accordingly, we have decided that the best plan to meet the patient's goals includes continuing with treatment.         Consults:   Consults (From admission, onward)          Status Ordering Provider     Inpatient consult to Cardiology-[facility]  Once        Provider:  (Not yet assigned)    Completed HARJINDER GELLER            Pulmonary  COPD (chronic obstructive pulmonary disease)  Patient's COPD is controlled currently.  Patient is currently off COPD Pathway. Continue scheduled inhalers  and monitor respiratory status closely.   -asymptomatic    Cardiac/Vascular  HFrEF (heart failure with reduced ejection fraction)  Most recent echo with EF 30%   patient has cardiology follow-up from previous hospitalization discuss AICD   Continue Lasix and Aldactone  Cardiology following  Patient was supposed to have LifeVest on but noncompliant.  Counseled  On outpatient GDMT with ARB, BB, Aldactone and Lasix   Asymptomatic    Renal/  Urinary tract infection due to ESBL Klebsiella  Recently  completed 7 day course of ertapenem via PICC line  UA negative for UTI  PICC line removed before discharge    Hypokalemia  Patient's most recent potassium results are listed below.   Recent Labs     09/05/24  0124 09/06/24  0212 09/07/24 0621   K 3.4* 4.1 4.2       Plan  - Replete potassium per protocol  - Monitor potassium Daily  - Patient's hypokalemia is stable  -magnesium within normal limits  -potassium now within normal limits    Oncology  Colorectal carcinoma  Patient was denies history\  She was scheduled for EGD and colonoscopy next week      Anemia  Anemia is likely due to chronic disease due to Chronic liver disease. Most recent hemoglobin and hematocrit are listed below.  Recent Labs     09/05/24  0124 09/06/24 0212 09/07/24 0621   HGB 7.5* 6.8* 8.4*   HCT 24.6* 21.5* 26.5*       Plan  - Monitor serial CBC: Daily  - Transfuse PRBC if patient becomes hemodynamically unstable, symptomatic or H/H drops below 7/21.  - Patient has not received any PRBC transfusions to date  - Patient's anemia is currently stable  - patient transfused 1 unit prbc    GI  Hepatitis C  On prior admission, Hep C positive with elevated viral load and patient advised on outpatient Hepatology follow up for evaluation/treatment   LFTs normal on admission      Final Active Diagnoses:    Diagnosis Date Noted POA    Hepatitis C [B19.20] 09/05/2024 Yes    HFrEF (heart failure with reduced ejection fraction) [I50.20] 09/05/2024 Yes    Urinary tract infection due to ESBL Klebsiella [N39.0, B96.89] 08/20/2024 Yes    COPD (chronic obstructive pulmonary disease) [J44.9] 08/20/2024 Yes    Colorectal carcinoma [C19] 08/13/2024 Yes    Hypokalemia [E87.6] 08/12/2024 Yes    Elevated troponin [R79.89] 08/12/2024 Yes    Anemia [D64.9] 08/12/2024 Yes      Problems Resolved During this Admission:       Discharged Condition: stable    Disposition:     Follow Up:   Follow-up Information       [facility] Phone: [phone]  Follow up.               [facility] Follow up.    Why: Pt will need to call 3-2 days before appt to schedule transportion.  Contact information:  Phone:  [phone]             No, Primary Doctor Follow up in 1 week(s).                           Patient Instructions:      Ambulatory referral/consult to Home Health   Standing Status: Future   Referral Priority: Routine Referral Type: Home Health   Referral Reason: Specialty Services Required   Requested Specialty: Home Health Services   Number of Visits Requested: 1       Significant Diagnostic Studies: Labs: BMP:   Recent Labs   Lab 09/06/24 0212 09/07/24 0621    94    136   K 4.1 4.2    99   CO2 24 24   BUN 15 22*   CREATININE 1.4 1.5*   CALCIUM 7.9* 8.0*   MG 1.8  --    , CMP   Recent Labs   Lab 09/06/24 0212 09/07/24 0621    136   K 4.1 4.2    99   CO2 24 24    94   BUN 15 22*   CREATININE 1.4 1.5*   CALCIUM 7.9* 8.0*   ANIONGAP 10 13   , CBC   Recent Labs   Lab 09/06/24 0212 09/07/24 0621   WBC 13.25* 13.83*   HGB 6.8* 8.4*   HCT 21.5* 26.5*   * 632*   , and All labs within the past 24 hours have been reviewed  Radiology: CT scan:  CTA chest    Pending Diagnostic Studies:       None           Medications:  Reconciled Home Medications:      Medication List        CHANGE how you take these medications      * albuterol 90 mcg/actuation inhaler  Commonly known as: PROVENTIL/VENTOLIN HFA  Inhale 1-2 puffs into the lungs every 6 (six) hours as needed for Wheezing. Rescue  What changed: Another medication with the same name was added. Make sure you understand how and when to take each.     * albuterol 2.5 mg /3 mL (0.083 %) nebulizer solution  Commonly known as: PROVENTIL  Take 3 mLs (2.5 mg total) by nebulization every 6 (six) hours as needed for Wheezing. Rescue  What changed: You were already taking a medication with the same name, and this prescription was added. Make sure you understand  how and when to take each.           * This list has 2 medication(s) that are the same as other medications prescribed for you. Read the directions carefully, and ask your doctor or other care provider to review them with you.                CONTINUE taking these medications      butalbital-acetaminophen-caffeine -40 mg -40 mg per tablet  Commonly known as: FIORICET, ESGIC  Take 1 tablet by mouth every 4 (four) hours as needed for Headaches.     FeroSuL 325 mg (65 mg iron) Tab tablet  Generic drug: ferrous sulfate  Take 1 tablet (325 mg total) by mouth once daily.     furosemide 20 MG tablet  Commonly known as: LASIX  Take 2 tablets (40 mg total) by mouth once daily.     losartan 25 MG tablet  Commonly known as: COZAAR  Take 1 tablet (25 mg total) by mouth once daily.     metoprolol succinate 25 MG 24 hr tablet  Commonly known as: TOPROL-XL  Take 1 tablet (25 mg total) by mouth once daily.     nicotine 21 mg/24 hr  Commonly known as: NICODERM CQ  Place 1 patch onto the skin once daily.     oxybutynin 5 MG Tab  Commonly known as: DITROPAN  Take 1 tablet (5 mg total) by mouth 3 (three) times daily.     pantoprazole 40 MG tablet  Commonly known as: PROTONIX  Take 1 tablet (40 mg total) by mouth once daily.     polyethylene glycol 17 gram Pwpk  Commonly known as: GLYCOLAX  Take 1 packet by mouth once daily.     rosuvastatin 20 MG tablet  Commonly known as: CRESTOR  Take 1 tablet (20 mg total) by mouth every evening.     spironolactone 25 MG tablet  Commonly known as: ALDACTONE  Take 25 mg by mouth once daily.            STOP taking these medications      0.9% NaCl PgBk 100 mL with ertapenem 1 gram SolR 1 g            ASK your doctor about these medications      naloxone 4 mg/actuation Spry  Commonly known as: NARCAN  4mg by nasal route as needed for opioid overdose; may repeat every 2-3 minutes in alternating nostrils until medical help arrives. Call 911     tiotropium 18 mcg inhalation capsule  Commonly  known as: SPIRIVA  Inhale 1 capsule (18 mcg total) into the lungs once daily. Controller              Indwelling Lines/Drains at time of discharge:   Lines/Drains/Airways       None                   Time spent on the discharge of patient: 36 minutes         Jax Rosas MD  Department of Hospital Medicine  [facility]

## 2024-09-07 NOTE — PLAN OF CARE
Problem: Adult Inpatient Plan of Care  Goal: Plan of Care Review  9/7/2024 1507 by Eli North, RN  Outcome: Met  9/7/2024 0923 by Eli North RN  Outcome: Progressing  Goal: Patient-Specific Goal (Individualized)  Outcome: Met  Goal: Absence of Hospital-Acquired Illness or Injury  Outcome: Met  Goal: Optimal Comfort and Wellbeing  Outcome: Met  Goal: Readiness for Transition of Care  Outcome: Met     Problem: Sepsis/Septic Shock  Goal: Optimal Coping  Outcome: Met  Goal: Absence of Bleeding  Outcome: Met  Goal: Blood Glucose Level Within Targeted Range  Outcome: Met  Goal: Absence of Infection Signs and Symptoms  Outcome: Met  Goal: Optimal Nutrition Intake  Outcome: Met     Problem: Acute Kidney Injury/Impairment  Goal: Fluid and Electrolyte Balance  Outcome: Met  Goal: Improved Oral Intake  Outcome: Met  Goal: Effective Renal Function  Outcome: Met     Problem: Infection  Goal: Absence of Infection Signs and Symptoms  Outcome: Met     Problem: UTI (Urinary Tract Infection)  Goal: Improved Infection Symptoms  Outcome: Met

## 2024-09-07 NOTE — ASSESSMENT & PLAN NOTE
Anemia is likely due to chronic disease due to Chronic liver disease. Most recent hemoglobin and hematocrit are listed below.  Recent Labs     09/05/24  0124 09/06/24  0212 09/07/24  0621   HGB 7.5* 6.8* 8.4*   HCT 24.6* 21.5* 26.5*       Plan  - Monitor serial CBC: Daily  - Transfuse PRBC if patient becomes hemodynamically unstable, symptomatic or H/H drops below 7/21.  - Patient has not received any PRBC transfusions to date  - Patient's anemia is currently stable  - patient transfused 1 unit prbc

## 2024-09-07 NOTE — PROGRESS NOTES
Discharge orders noted. Additional clinical references attached. Patient's discharge instructions given by bedside RN and reviewed via this VN.  Education provided on new and previous medications, diagnosis, and follow-up appointments.  New medications delivered by pharmacy. Patient verbalized understanding and teach back method was used. Patient's ride/transportation home at bedside. All questions answered.  Floor nurse notified.                09/07/24 1506    Notification    Notified Of Discharge Status   Admission   Communication Issues? None   Shift   Virtual Nurse - Rounding Complete   Virtual Nurse - Patient Verbalized Approval Of Camera Use   Safety/Activity   Patient Rounds call light in patient/parent reach;clutter free environment maintained;visualized patient   Activity Management Up in chair - L3;Ambulated in room - L4

## 2024-09-07 NOTE — PLAN OF CARE
Pt clear to D/C home today to sisters home. [facility] set up complete with [facility]. Pt agreeable to D/C. Pt family to transport Pt home. F/U appts have been requested and scheduled. No other D/C needs identified. Pt clear from CM.     Future Appointments   Date Time Provider Department Center   9/11/2024  9:00 AM Belle Phillips, CRT [facility]   9/12/2024  1:40 PM Shadi Gomez MD [facility]         Job - Telemetry  Discharge Final Note    Primary Care Provider: No, Primary Doctor    Expected Discharge Date: 9/7/2024    Final Discharge Note (most recent)       Final Note - 09/07/24 1419          Final Note    Assessment Type Discharge Planning Assessment (P)      Anticipated Discharge Disposition Home-Health Care Svc (P)      What phone number can be called within the next 1-3 days to see how you are doing after discharge? [phone] (P)      Hospital Resources/Appts/Education Provided Appointments scheduled and added to AVS (P)         Post-Acute Status    Post-Acute Authorization Home Health (P)      Home Health Status Set-up Complete/Auth obtained (P)      Discharge Delays None known at this time (P)                      Important Message from Medicare             Contact Info       [facility] Phone: [phone]        Next Steps: Follow up    [facility]    Phone:  [phone]       Next Steps: Follow up    Instructions: Pt will need to call 3-2 days before appt to schedule transportion.    No, Primary Doctor   Relationship: PCP - General        Next Steps: Follow up in 1 week(s)              Lisa Quintero LMSW  Phone: [phone]  [facility]

## 2024-09-07 NOTE — PLAN OF CARE
AAOx4; POC reviewed & verbalizes understanding.  Admit DX: UTI?CHF exacerbation  Afebrile. No acute events on shift. No deficits noted  IV access & IVF: PIV & Midline, saline locked  BM: 97/24 loose  : WNL  Appetite: adequate  Bed alarm set; fall precautions maintained.   Bed locked in lowest position, side rails up x2, call light within reach, environment clear. Encouraged pt to call nurse with any concerns.  Safety measures maintained

## 2024-09-07 NOTE — ASSESSMENT & PLAN NOTE
Recently completed 7 day course of ertapenem via PICC line  UA negative for UTI  PICC line removed before discharge

## 2024-09-09 ENCOUNTER — DOCUMENTATION ONLY (OUTPATIENT)
Dept: CASE MANAGEMENT | Facility: HOSPITAL | Age: 59
End: 2024-09-09
Payer: MEDICARE

## 2024-09-09 NOTE — PROGRESS NOTES
start date: 8/14/2024. Readmit - admit to [place] 9/5 and in room K430  Total days: 17    Patient discharged from [facility]  9/7 and received a ride to sister's home in [place]. As of 9/9 - pt has not returned to [facility].  has bagged pt's belongings and at desk for possible pickup. CM attempted to contact Sheyla hedrick [phone] - N/A and  full. CM will attempt to contact later today and if successful, will relay info about belongings. Pt is discharged from the [facility] Medical Respite program - last overnight 9/4.    CARRIE StokesN, RN, [facility]  Transitional Care Manager  [phone]  [email or web address]

## 2024-09-10 ENCOUNTER — TELEPHONE (OUTPATIENT)
Dept: SMOKING CESSATION | Facility: CLINIC | Age: 59
End: 2024-09-10
Payer: MEDICARE

## 2024-09-10 ENCOUNTER — TELEPHONE (OUTPATIENT)
Dept: GASTROENTEROLOGY | Facility: CLINIC | Age: 59
End: 2024-09-10
Payer: MEDICARE

## 2024-09-10 NOTE — TELEPHONE ENCOUNTER
----- Message from Salima Murphy sent at 9/10/2024  1:25 PM CDT -----  Good afternoon,    Patient needs to follow up with GI for a GI bleed she had over the summer. Patient is also in need of a colonoscopy. She has reported she has colon cancer, but after receiving all her outside records I can not find record of that diagnosis. Please call patient's sister to schedule at [phone].    Thank you,  Salima ARZATE  Oncology [facility]

## 2024-09-10 NOTE — TELEPHONE ENCOUNTER
"Called patient to reschedule smoking cessation intake appointment to virtual/audio, number "not reachable".  "

## 2024-09-18 ENCOUNTER — TELEPHONE (OUTPATIENT)
Dept: HEPATOLOGY | Facility: CLINIC | Age: 59
End: 2024-09-18
Payer: MEDICARE

## 2024-09-18 NOTE — TELEPHONE ENCOUNTER
Dr. Chaitanya Wray ordered that patient be scheduled for a hepatology consult visit for hep c.  Patient hep c quant positive.  Attempt made to schedule patient for a visit with PA Scheuermann at [facility].  The patient has [employer] Medicare coverage.  She has no benefits for services at this location. The patient will have to pay out of pocket for this visit so consult visit not scheduled.  Attempt made to reach patient. Unable to leave a voicemail.   Referral info will be faxed to Stephie Paniagua at [facility] so that patient can be scheduled there for a visit. This message is being sent to the referring provider.

## 2024-09-25 ENCOUNTER — EXTERNAL HOME HEALTH (OUTPATIENT)
Dept: HOME HEALTH SERVICES | Facility: HOSPITAL | Age: 59
End: 2024-09-25
Payer: MEDICARE

## 2024-10-18 PROCEDURE — 99291 CRITICAL CARE FIRST HOUR: CPT

## 2024-10-19 ENCOUNTER — HOSPITAL ENCOUNTER (INPATIENT)
Facility: HOSPITAL | Age: 59
LOS: 6 days | Discharge: HOME | DRG: 377 | End: 2024-10-26
Attending: EMERGENCY MEDICINE | Admitting: STUDENT IN AN ORGANIZED HEALTH CARE EDUCATION/TRAINING PROGRAM
Payer: MEDICARE

## 2024-10-19 DIAGNOSIS — R07.9 CHEST PAIN: ICD-10-CM

## 2024-10-19 DIAGNOSIS — D64.9 ANEMIA, UNSPECIFIED TYPE: ICD-10-CM

## 2024-10-19 DIAGNOSIS — I21.4 NSTEMI (NON-ST ELEVATED MYOCARDIAL INFARCTION): Primary | ICD-10-CM

## 2024-10-19 DIAGNOSIS — I50.22 CHRONIC HFREF (HEART FAILURE WITH REDUCED EJECTION FRACTION): ICD-10-CM

## 2024-10-19 LAB
ALBUMIN SERPL BCP-MCNC: 2.4 G/DL (ref 3.5–5.2)
ALP SERPL-CCNC: 103 U/L (ref 40–150)
ALT SERPL W/O P-5'-P-CCNC: 14 U/L (ref 10–44)
ANION GAP SERPL CALC-SCNC: 12 MMOL/L (ref 8–16)
AST SERPL-CCNC: 16 U/L (ref 10–40)
BASOPHILS # BLD AUTO: 0.08 K/UL (ref 0–0.2)
BASOPHILS NFR BLD: 0.7 % (ref 0–1.9)
BILIRUB SERPL-MCNC: 0.1 MG/DL (ref 0.1–1)
BNP SERPL-MCNC: 1007 PG/ML (ref 0–99)
BUN SERPL-MCNC: 25 MG/DL (ref 6–20)
CALCIUM SERPL-MCNC: 8.3 MG/DL (ref 8.7–10.5)
CHLORIDE SERPL-SCNC: 104 MMOL/L (ref 95–110)
CO2 SERPL-SCNC: 22 MMOL/L (ref 23–29)
CREAT SERPL-MCNC: 1.4 MG/DL (ref 0.5–1.4)
DIFFERENTIAL METHOD BLD: ABNORMAL
EOSINOPHIL # BLD AUTO: 0.5 K/UL (ref 0–0.5)
EOSINOPHIL NFR BLD: 4.2 % (ref 0–8)
ERYTHROCYTE [DISTWIDTH] IN BLOOD BY AUTOMATED COUNT: 15.8 % (ref 11.5–14.5)
EST. GFR  (NO RACE VARIABLE): 44 ML/MIN/1.73 M^2
GLUCOSE SERPL-MCNC: 73 MG/DL (ref 70–110)
HCT VFR BLD AUTO: 26.1 % (ref 37–48.5)
HGB BLD-MCNC: 8.3 G/DL (ref 12–16)
IMM GRANULOCYTES # BLD AUTO: 0.08 K/UL (ref 0–0.04)
IMM GRANULOCYTES NFR BLD AUTO: 0.7 % (ref 0–0.5)
LYMPHOCYTES # BLD AUTO: 2.5 K/UL (ref 1–4.8)
LYMPHOCYTES NFR BLD: 21.9 % (ref 18–48)
MAGNESIUM SERPL-MCNC: 1.4 MG/DL (ref 1.6–2.6)
MCH RBC QN AUTO: 25.1 PG (ref 27–31)
MCHC RBC AUTO-ENTMCNC: 31.8 G/DL (ref 32–36)
MCV RBC AUTO: 79 FL (ref 82–98)
MONOCYTES # BLD AUTO: 0.6 K/UL (ref 0.3–1)
MONOCYTES NFR BLD: 5.3 % (ref 4–15)
NEUTROPHILS # BLD AUTO: 7.6 K/UL (ref 1.8–7.7)
NEUTROPHILS NFR BLD: 67.2 % (ref 38–73)
NRBC BLD-RTO: 0 /100 WBC
PLATELET # BLD AUTO: 496 K/UL (ref 150–450)
PMV BLD AUTO: 9 FL (ref 9.2–12.9)
POTASSIUM SERPL-SCNC: 3.3 MMOL/L (ref 3.5–5.1)
PROT SERPL-MCNC: 8.4 G/DL (ref 6–8.4)
RBC # BLD AUTO: 3.31 M/UL (ref 4–5.4)
SODIUM SERPL-SCNC: 138 MMOL/L (ref 136–145)
TROPONIN I SERPL DL<=0.01 NG/ML-MCNC: 0.26 NG/ML (ref 0–0.03)
TROPONIN I SERPL DL<=0.01 NG/ML-MCNC: 0.77 NG/ML (ref 0–0.03)
TROPONIN I SERPL DL<=0.01 NG/ML-MCNC: 0.93 NG/ML (ref 0–0.03)
TROPONIN I SERPL DL<=0.01 NG/ML-MCNC: 1.04 NG/ML (ref 0–0.03)
WBC # BLD AUTO: 11.37 K/UL (ref 3.9–12.7)

## 2024-10-19 PROCEDURE — G0378 HOSPITAL OBSERVATION PER HR: HCPCS

## 2024-10-19 PROCEDURE — 83735 ASSAY OF MAGNESIUM: CPT | Performed by: REGISTERED NURSE

## 2024-10-19 PROCEDURE — 25000242 PHARM REV CODE 250 ALT 637 W/ HCPCS: Performed by: STUDENT IN AN ORGANIZED HEALTH CARE EDUCATION/TRAINING PROGRAM

## 2024-10-19 PROCEDURE — 93010 ELECTROCARDIOGRAM REPORT: CPT | Mod: ,,, | Performed by: INTERNAL MEDICINE

## 2024-10-19 PROCEDURE — 83880 ASSAY OF NATRIURETIC PEPTIDE: CPT | Performed by: EMERGENCY MEDICINE

## 2024-10-19 PROCEDURE — 85025 COMPLETE CBC W/AUTO DIFF WBC: CPT | Performed by: EMERGENCY MEDICINE

## 2024-10-19 PROCEDURE — 84484 ASSAY OF TROPONIN QUANT: CPT | Mod: 91 | Performed by: REGISTERED NURSE

## 2024-10-19 PROCEDURE — 36415 COLL VENOUS BLD VENIPUNCTURE: CPT | Performed by: REGISTERED NURSE

## 2024-10-19 PROCEDURE — 25000003 PHARM REV CODE 250: Performed by: STUDENT IN AN ORGANIZED HEALTH CARE EDUCATION/TRAINING PROGRAM

## 2024-10-19 PROCEDURE — 84484 ASSAY OF TROPONIN QUANT: CPT | Performed by: EMERGENCY MEDICINE

## 2024-10-19 PROCEDURE — 94640 AIRWAY INHALATION TREATMENT: CPT

## 2024-10-19 PROCEDURE — 93010 ELECTROCARDIOGRAM REPORT: CPT | Mod: ,,, | Performed by: STUDENT IN AN ORGANIZED HEALTH CARE EDUCATION/TRAINING PROGRAM

## 2024-10-19 PROCEDURE — 80053 COMPREHEN METABOLIC PANEL: CPT | Performed by: EMERGENCY MEDICINE

## 2024-10-19 PROCEDURE — 99214 OFFICE O/P EST MOD 30 MIN: CPT | Mod: ,,, | Performed by: INTERNAL MEDICINE

## 2024-10-19 PROCEDURE — 63600175 PHARM REV CODE 636 W HCPCS: Performed by: EMERGENCY MEDICINE

## 2024-10-19 PROCEDURE — 84484 ASSAY OF TROPONIN QUANT: CPT | Mod: 91 | Performed by: INTERNAL MEDICINE

## 2024-10-19 PROCEDURE — 63600175 PHARM REV CODE 636 W HCPCS: Performed by: STUDENT IN AN ORGANIZED HEALTH CARE EDUCATION/TRAINING PROGRAM

## 2024-10-19 PROCEDURE — 94761 N-INVAS EAR/PLS OXIMETRY MLT: CPT

## 2024-10-19 PROCEDURE — S4991 NICOTINE PATCH NONLEGEND: HCPCS | Performed by: STUDENT IN AN ORGANIZED HEALTH CARE EDUCATION/TRAINING PROGRAM

## 2024-10-19 PROCEDURE — 93005 ELECTROCARDIOGRAM TRACING: CPT

## 2024-10-19 PROCEDURE — 96372 THER/PROPH/DIAG INJ SC/IM: CPT | Performed by: EMERGENCY MEDICINE

## 2024-10-19 PROCEDURE — 25000003 PHARM REV CODE 250: Performed by: EMERGENCY MEDICINE

## 2024-10-19 PROCEDURE — 36415 COLL VENOUS BLD VENIPUNCTURE: CPT | Performed by: INTERNAL MEDICINE

## 2024-10-19 RX ORDER — ONDANSETRON HYDROCHLORIDE 2 MG/ML
4 INJECTION, SOLUTION INTRAVENOUS EVERY 8 HOURS PRN
Status: DISCONTINUED | OUTPATIENT
Start: 2024-10-19 | End: 2024-10-26 | Stop reason: HOSPADM

## 2024-10-19 RX ORDER — ATORVASTATIN CALCIUM 40 MG/1
80 TABLET, FILM COATED ORAL DAILY
Status: DISCONTINUED | OUTPATIENT
Start: 2024-10-19 | End: 2024-10-26 | Stop reason: HOSPADM

## 2024-10-19 RX ORDER — POTASSIUM CHLORIDE 20 MEQ/1
40 TABLET, EXTENDED RELEASE ORAL EVERY 4 HOURS
Status: COMPLETED | OUTPATIENT
Start: 2024-10-19 | End: 2024-10-19

## 2024-10-19 RX ORDER — IBUPROFEN 200 MG
1 TABLET ORAL DAILY
Status: DISCONTINUED | OUTPATIENT
Start: 2024-10-19 | End: 2024-10-26 | Stop reason: HOSPADM

## 2024-10-19 RX ORDER — NALOXONE HCL 0.4 MG/ML
0.02 VIAL (ML) INJECTION
Status: DISCONTINUED | OUTPATIENT
Start: 2024-10-19 | End: 2024-10-26 | Stop reason: HOSPADM

## 2024-10-19 RX ORDER — SODIUM CHLORIDE 0.9 % (FLUSH) 0.9 %
10 SYRINGE (ML) INJECTION EVERY 12 HOURS PRN
Status: DISCONTINUED | OUTPATIENT
Start: 2024-10-19 | End: 2024-10-26 | Stop reason: HOSPADM

## 2024-10-19 RX ORDER — FUROSEMIDE 10 MG/ML
20 INJECTION INTRAMUSCULAR; INTRAVENOUS EVERY 12 HOURS
Status: DISCONTINUED | OUTPATIENT
Start: 2024-10-19 | End: 2024-10-21

## 2024-10-19 RX ORDER — POLYETHYLENE GLYCOL 3350 17 G/17G
17 POWDER, FOR SOLUTION ORAL DAILY
Status: DISCONTINUED | OUTPATIENT
Start: 2024-10-20 | End: 2024-10-26 | Stop reason: HOSPADM

## 2024-10-19 RX ORDER — FUROSEMIDE 20 MG/1
20 TABLET ORAL
Status: COMPLETED | OUTPATIENT
Start: 2024-10-19 | End: 2024-10-19

## 2024-10-19 RX ORDER — METOPROLOL SUCCINATE 25 MG/1
25 TABLET, EXTENDED RELEASE ORAL DAILY
Status: DISCONTINUED | OUTPATIENT
Start: 2024-10-20 | End: 2024-10-26 | Stop reason: HOSPADM

## 2024-10-19 RX ORDER — MAGNESIUM SULFATE HEPTAHYDRATE 40 MG/ML
2 INJECTION, SOLUTION INTRAVENOUS ONCE
Status: COMPLETED | OUTPATIENT
Start: 2024-10-19 | End: 2024-10-19

## 2024-10-19 RX ORDER — ENOXAPARIN SODIUM 100 MG/ML
1 INJECTION SUBCUTANEOUS EVERY 12 HOURS
Status: DISCONTINUED | OUTPATIENT
Start: 2024-10-19 | End: 2024-10-21

## 2024-10-19 RX ORDER — PANTOPRAZOLE SODIUM 40 MG/1
40 TABLET, DELAYED RELEASE ORAL DAILY
Status: DISCONTINUED | OUTPATIENT
Start: 2024-10-19 | End: 2024-10-26 | Stop reason: HOSPADM

## 2024-10-19 RX ORDER — LOSARTAN POTASSIUM 25 MG/1
25 TABLET ORAL
Status: COMPLETED | OUTPATIENT
Start: 2024-10-19 | End: 2024-10-19

## 2024-10-19 RX ORDER — METOPROLOL SUCCINATE 25 MG/1
25 TABLET, EXTENDED RELEASE ORAL
Status: COMPLETED | OUTPATIENT
Start: 2024-10-19 | End: 2024-10-19

## 2024-10-19 RX ORDER — IBUPROFEN 200 MG
16 TABLET ORAL
Status: DISCONTINUED | OUTPATIENT
Start: 2024-10-19 | End: 2024-10-26 | Stop reason: HOSPADM

## 2024-10-19 RX ORDER — GLUCAGON 1 MG
1 KIT INJECTION
Status: DISCONTINUED | OUTPATIENT
Start: 2024-10-19 | End: 2024-10-26 | Stop reason: HOSPADM

## 2024-10-19 RX ORDER — IBUPROFEN 200 MG
24 TABLET ORAL
Status: DISCONTINUED | OUTPATIENT
Start: 2024-10-19 | End: 2024-10-26 | Stop reason: HOSPADM

## 2024-10-19 RX ORDER — SPIRONOLACTONE 25 MG/1
25 TABLET ORAL DAILY
Status: DISCONTINUED | OUTPATIENT
Start: 2024-10-19 | End: 2024-10-19

## 2024-10-19 RX ADMIN — SPIRONOLACTONE 12.5 MG: 25 TABLET ORAL at 11:10

## 2024-10-19 RX ADMIN — LOSARTAN POTASSIUM 12.5 MG: 25 TABLET, FILM COATED ORAL at 11:10

## 2024-10-19 RX ADMIN — NICOTINE 1 PATCH: 14 PATCH, EXTENDED RELEASE TRANSDERMAL at 01:10

## 2024-10-19 RX ADMIN — ENOXAPARIN SODIUM 80 MG: 80 INJECTION SUBCUTANEOUS at 04:10

## 2024-10-19 RX ADMIN — POTASSIUM CHLORIDE 40 MEQ: 1500 TABLET, EXTENDED RELEASE ORAL at 11:10

## 2024-10-19 RX ADMIN — FUROSEMIDE 20 MG: 10 INJECTION, SOLUTION INTRAMUSCULAR; INTRAVENOUS at 09:10

## 2024-10-19 RX ADMIN — TIOTROPIUM BROMIDE INHALATION SPRAY 2 PUFF: 3.12 SPRAY, METERED RESPIRATORY (INHALATION) at 11:10

## 2024-10-19 RX ADMIN — FUROSEMIDE 20 MG: 20 TABLET ORAL at 01:10

## 2024-10-19 RX ADMIN — MAGNESIUM SULFATE HEPTAHYDRATE 2 G: 40 INJECTION, SOLUTION INTRAVENOUS at 01:10

## 2024-10-19 RX ADMIN — ATORVASTATIN CALCIUM 80 MG: 40 TABLET, FILM COATED ORAL at 11:10

## 2024-10-19 RX ADMIN — METOPROLOL SUCCINATE 25 MG: 25 TABLET, EXTENDED RELEASE ORAL at 01:10

## 2024-10-19 RX ADMIN — FUROSEMIDE 20 MG: 10 INJECTION, SOLUTION INTRAMUSCULAR; INTRAVENOUS at 01:10

## 2024-10-19 RX ADMIN — PANTOPRAZOLE SODIUM 40 MG: 40 TABLET, DELAYED RELEASE ORAL at 11:10

## 2024-10-19 RX ADMIN — POTASSIUM CHLORIDE 40 MEQ: 1500 TABLET, EXTENDED RELEASE ORAL at 01:10

## 2024-10-19 RX ADMIN — LOSARTAN POTASSIUM 25 MG: 25 TABLET, FILM COATED ORAL at 01:10

## 2024-10-20 PROBLEM — I21.4 NSTEMI (NON-ST ELEVATED MYOCARDIAL INFARCTION): Status: ACTIVE | Noted: 2024-10-19

## 2024-10-20 LAB
ANION GAP SERPL CALC-SCNC: 7 MMOL/L (ref 8–16)
BUN SERPL-MCNC: 21 MG/DL (ref 6–20)
CALCIUM SERPL-MCNC: 8.4 MG/DL (ref 8.7–10.5)
CHLORIDE SERPL-SCNC: 103 MMOL/L (ref 95–110)
CHOLEST SERPL-MCNC: 74 MG/DL (ref 120–199)
CHOLEST/HDLC SERPL: 2.6 {RATIO} (ref 2–5)
CO2 SERPL-SCNC: 25 MMOL/L (ref 23–29)
CREAT SERPL-MCNC: 1.4 MG/DL (ref 0.5–1.4)
ERYTHROCYTE [DISTWIDTH] IN BLOOD BY AUTOMATED COUNT: 15.8 % (ref 11.5–14.5)
EST. GFR  (NO RACE VARIABLE): 44 ML/MIN/1.73 M^2
ESTIMATED AVG GLUCOSE: 108 MG/DL (ref 68–131)
GLUCOSE SERPL-MCNC: 103 MG/DL (ref 70–110)
HBA1C MFR BLD: 5.4 % (ref 4–5.6)
HCT VFR BLD AUTO: 27.4 % (ref 37–48.5)
HDLC SERPL-MCNC: 29 MG/DL (ref 40–75)
HDLC SERPL: 39.2 % (ref 20–50)
HGB BLD-MCNC: 8.7 G/DL (ref 12–16)
LDLC SERPL CALC-MCNC: 17.6 MG/DL (ref 63–159)
MAGNESIUM SERPL-MCNC: 1.8 MG/DL (ref 1.6–2.6)
MCH RBC QN AUTO: 24.9 PG (ref 27–31)
MCHC RBC AUTO-ENTMCNC: 31.8 G/DL (ref 32–36)
MCV RBC AUTO: 79 FL (ref 82–98)
NONHDLC SERPL-MCNC: 45 MG/DL
PLATELET # BLD AUTO: 508 K/UL (ref 150–450)
PMV BLD AUTO: 8.8 FL (ref 9.2–12.9)
POTASSIUM SERPL-SCNC: 4 MMOL/L (ref 3.5–5.1)
RBC # BLD AUTO: 3.49 M/UL (ref 4–5.4)
SODIUM SERPL-SCNC: 135 MMOL/L (ref 136–145)
TRIGL SERPL-MCNC: 137 MG/DL (ref 30–150)
TROPONIN I SERPL DL<=0.01 NG/ML-MCNC: 0.57 NG/ML (ref 0–0.03)
TROPONIN I SERPL DL<=0.01 NG/ML-MCNC: 0.71 NG/ML (ref 0–0.03)
TROPONIN I SERPL DL<=0.01 NG/ML-MCNC: 0.84 NG/ML (ref 0–0.03)
WBC # BLD AUTO: 10.67 K/UL (ref 3.9–12.7)

## 2024-10-20 PROCEDURE — 80061 LIPID PANEL: CPT | Performed by: REGISTERED NURSE

## 2024-10-20 PROCEDURE — 63600175 PHARM REV CODE 636 W HCPCS: Performed by: EMERGENCY MEDICINE

## 2024-10-20 PROCEDURE — 99223 1ST HOSP IP/OBS HIGH 75: CPT | Mod: ,,, | Performed by: INTERNAL MEDICINE

## 2024-10-20 PROCEDURE — 84484 ASSAY OF TROPONIN QUANT: CPT | Mod: 91 | Performed by: INTERNAL MEDICINE

## 2024-10-20 PROCEDURE — 94761 N-INVAS EAR/PLS OXIMETRY MLT: CPT

## 2024-10-20 PROCEDURE — 83036 HEMOGLOBIN GLYCOSYLATED A1C: CPT | Performed by: REGISTERED NURSE

## 2024-10-20 PROCEDURE — 11000001 HC ACUTE MED/SURG PRIVATE ROOM

## 2024-10-20 PROCEDURE — 63600175 PHARM REV CODE 636 W HCPCS: Performed by: STUDENT IN AN ORGANIZED HEALTH CARE EDUCATION/TRAINING PROGRAM

## 2024-10-20 PROCEDURE — 85027 COMPLETE CBC AUTOMATED: CPT | Performed by: STUDENT IN AN ORGANIZED HEALTH CARE EDUCATION/TRAINING PROGRAM

## 2024-10-20 PROCEDURE — 83735 ASSAY OF MAGNESIUM: CPT | Performed by: STUDENT IN AN ORGANIZED HEALTH CARE EDUCATION/TRAINING PROGRAM

## 2024-10-20 PROCEDURE — S4991 NICOTINE PATCH NONLEGEND: HCPCS | Performed by: STUDENT IN AN ORGANIZED HEALTH CARE EDUCATION/TRAINING PROGRAM

## 2024-10-20 PROCEDURE — 36415 COLL VENOUS BLD VENIPUNCTURE: CPT | Performed by: INTERNAL MEDICINE

## 2024-10-20 PROCEDURE — 99233 SBSQ HOSP IP/OBS HIGH 50: CPT | Mod: ,,, | Performed by: INTERNAL MEDICINE

## 2024-10-20 PROCEDURE — 25000003 PHARM REV CODE 250: Performed by: STUDENT IN AN ORGANIZED HEALTH CARE EDUCATION/TRAINING PROGRAM

## 2024-10-20 PROCEDURE — 25000003 PHARM REV CODE 250: Performed by: INTERNAL MEDICINE

## 2024-10-20 PROCEDURE — 80048 BASIC METABOLIC PNL TOTAL CA: CPT | Performed by: STUDENT IN AN ORGANIZED HEALTH CARE EDUCATION/TRAINING PROGRAM

## 2024-10-20 PROCEDURE — 94640 AIRWAY INHALATION TREATMENT: CPT | Mod: XB

## 2024-10-20 RX ORDER — CLOPIDOGREL BISULFATE 75 MG/1
75 TABLET ORAL DAILY
Status: DISCONTINUED | OUTPATIENT
Start: 2024-10-20 | End: 2024-10-23

## 2024-10-20 RX ORDER — POLYETHYLENE GLYCOL 3350, SODIUM SULFATE ANHYDROUS, SODIUM BICARBONATE, SODIUM CHLORIDE, POTASSIUM CHLORIDE 236; 22.74; 6.74; 5.86; 2.97 G/4L; G/4L; G/4L; G/4L; G/4L
4000 POWDER, FOR SOLUTION ORAL ONCE
Status: COMPLETED | OUTPATIENT
Start: 2024-10-21 | End: 2024-10-21

## 2024-10-20 RX ORDER — OXYBUTYNIN CHLORIDE 5 MG/1
5 TABLET ORAL 3 TIMES DAILY
Status: DISCONTINUED | OUTPATIENT
Start: 2024-10-20 | End: 2024-10-26 | Stop reason: HOSPADM

## 2024-10-20 RX ORDER — MAGNESIUM SULFATE 1 G/100ML
1 INJECTION INTRAVENOUS ONCE
Status: COMPLETED | OUTPATIENT
Start: 2024-10-20 | End: 2024-10-20

## 2024-10-20 RX ADMIN — METOPROLOL SUCCINATE 25 MG: 25 TABLET, EXTENDED RELEASE ORAL at 10:10

## 2024-10-20 RX ADMIN — TIOTROPIUM BROMIDE INHALATION SPRAY 2 PUFF: 3.12 SPRAY, METERED RESPIRATORY (INHALATION) at 08:10

## 2024-10-20 RX ADMIN — MAGNESIUM SULFATE IN DEXTROSE 1 G: 10 INJECTION, SOLUTION INTRAVENOUS at 10:10

## 2024-10-20 RX ADMIN — ATORVASTATIN CALCIUM 80 MG: 40 TABLET, FILM COATED ORAL at 10:10

## 2024-10-20 RX ADMIN — SPIRONOLACTONE 12.5 MG: 25 TABLET ORAL at 10:10

## 2024-10-20 RX ADMIN — CLOPIDOGREL BISULFATE 75 MG: 75 TABLET ORAL at 01:10

## 2024-10-20 RX ADMIN — PANTOPRAZOLE SODIUM 40 MG: 40 TABLET, DELAYED RELEASE ORAL at 10:10

## 2024-10-20 RX ADMIN — LOSARTAN POTASSIUM 12.5 MG: 25 TABLET, FILM COATED ORAL at 10:10

## 2024-10-20 RX ADMIN — FUROSEMIDE 20 MG: 10 INJECTION, SOLUTION INTRAMUSCULAR; INTRAVENOUS at 08:10

## 2024-10-20 RX ADMIN — FUROSEMIDE 20 MG: 10 INJECTION, SOLUTION INTRAMUSCULAR; INTRAVENOUS at 10:10

## 2024-10-20 RX ADMIN — ENOXAPARIN SODIUM 80 MG: 80 INJECTION SUBCUTANEOUS at 05:10

## 2024-10-20 RX ADMIN — NICOTINE 1 PATCH: 14 PATCH, EXTENDED RELEASE TRANSDERMAL at 10:10

## 2024-10-20 RX ADMIN — OXYBUTYNIN CHLORIDE 5 MG: 5 TABLET ORAL at 08:10

## 2024-10-21 ENCOUNTER — CLINICAL SUPPORT (OUTPATIENT)
Dept: SMOKING CESSATION | Facility: CLINIC | Age: 59
End: 2024-10-21

## 2024-10-21 DIAGNOSIS — F17.210 CIGARETTE SMOKER: Primary | ICD-10-CM

## 2024-10-21 PROBLEM — E87.6 HYPOKALEMIA: Status: RESOLVED | Noted: 2024-08-12 | Resolved: 2024-10-21

## 2024-10-21 LAB
ABO + RH BLD: NORMAL
ANION GAP SERPL CALC-SCNC: 9 MMOL/L (ref 8–16)
BLD GP AB SCN CELLS X3 SERPL QL: NORMAL
BUN SERPL-MCNC: 32 MG/DL (ref 6–20)
CALCIUM SERPL-MCNC: 8.8 MG/DL (ref 8.7–10.5)
CHLORIDE SERPL-SCNC: 98 MMOL/L (ref 95–110)
CO2 SERPL-SCNC: 26 MMOL/L (ref 23–29)
CREAT SERPL-MCNC: 1.6 MG/DL (ref 0.5–1.4)
ERYTHROCYTE [DISTWIDTH] IN BLOOD BY AUTOMATED COUNT: 16 % (ref 11.5–14.5)
EST. GFR  (NO RACE VARIABLE): 37 ML/MIN/1.73 M^2
GLUCOSE SERPL-MCNC: 98 MG/DL (ref 70–110)
HCT VFR BLD AUTO: 27.6 % (ref 37–48.5)
HCT VFR BLD AUTO: 29.1 % (ref 37–48.5)
HGB BLD-MCNC: 8.7 G/DL (ref 12–16)
HGB BLD-MCNC: 9.2 G/DL (ref 12–16)
MAGNESIUM SERPL-MCNC: 1.8 MG/DL (ref 1.6–2.6)
MCH RBC QN AUTO: 24.5 PG (ref 27–31)
MCHC RBC AUTO-ENTMCNC: 31.6 G/DL (ref 32–36)
MCV RBC AUTO: 77 FL (ref 82–98)
OHS QRS DURATION: 90 MS
OHS QTC CALCULATION: 487 MS
PLATELET # BLD AUTO: 574 K/UL (ref 150–450)
PMV BLD AUTO: 9.3 FL (ref 9.2–12.9)
POTASSIUM SERPL-SCNC: 4.1 MMOL/L (ref 3.5–5.1)
RBC # BLD AUTO: 3.76 M/UL (ref 4–5.4)
SODIUM SERPL-SCNC: 133 MMOL/L (ref 136–145)
SPECIMEN OUTDATE: NORMAL
WBC # BLD AUTO: 14.44 K/UL (ref 3.9–12.7)

## 2024-10-21 PROCEDURE — S4991 NICOTINE PATCH NONLEGEND: HCPCS | Performed by: STUDENT IN AN ORGANIZED HEALTH CARE EDUCATION/TRAINING PROGRAM

## 2024-10-21 PROCEDURE — 86900 BLOOD TYPING SEROLOGIC ABO: CPT | Performed by: STUDENT IN AN ORGANIZED HEALTH CARE EDUCATION/TRAINING PROGRAM

## 2024-10-21 PROCEDURE — 99223 1ST HOSP IP/OBS HIGH 75: CPT | Mod: ,,, | Performed by: INTERNAL MEDICINE

## 2024-10-21 PROCEDURE — 85018 HEMOGLOBIN: CPT | Performed by: STUDENT IN AN ORGANIZED HEALTH CARE EDUCATION/TRAINING PROGRAM

## 2024-10-21 PROCEDURE — 25000003 PHARM REV CODE 250: Performed by: INTERNAL MEDICINE

## 2024-10-21 PROCEDURE — 94761 N-INVAS EAR/PLS OXIMETRY MLT: CPT

## 2024-10-21 PROCEDURE — 63600175 PHARM REV CODE 636 W HCPCS: Performed by: EMERGENCY MEDICINE

## 2024-10-21 PROCEDURE — 63600175 PHARM REV CODE 636 W HCPCS: Performed by: STUDENT IN AN ORGANIZED HEALTH CARE EDUCATION/TRAINING PROGRAM

## 2024-10-21 PROCEDURE — 99900035 HC TECH TIME PER 15 MIN (STAT)

## 2024-10-21 PROCEDURE — 11000001 HC ACUTE MED/SURG PRIVATE ROOM

## 2024-10-21 PROCEDURE — 83735 ASSAY OF MAGNESIUM: CPT | Performed by: STUDENT IN AN ORGANIZED HEALTH CARE EDUCATION/TRAINING PROGRAM

## 2024-10-21 PROCEDURE — 94640 AIRWAY INHALATION TREATMENT: CPT

## 2024-10-21 PROCEDURE — 36415 COLL VENOUS BLD VENIPUNCTURE: CPT | Performed by: STUDENT IN AN ORGANIZED HEALTH CARE EDUCATION/TRAINING PROGRAM

## 2024-10-21 PROCEDURE — 86901 BLOOD TYPING SEROLOGIC RH(D): CPT | Performed by: STUDENT IN AN ORGANIZED HEALTH CARE EDUCATION/TRAINING PROGRAM

## 2024-10-21 PROCEDURE — 99406 BEHAV CHNG SMOKING 3-10 MIN: CPT | Mod: ,,,

## 2024-10-21 PROCEDURE — 85027 COMPLETE CBC AUTOMATED: CPT | Performed by: STUDENT IN AN ORGANIZED HEALTH CARE EDUCATION/TRAINING PROGRAM

## 2024-10-21 PROCEDURE — 25000003 PHARM REV CODE 250: Performed by: STUDENT IN AN ORGANIZED HEALTH CARE EDUCATION/TRAINING PROGRAM

## 2024-10-21 PROCEDURE — 80048 BASIC METABOLIC PNL TOTAL CA: CPT | Performed by: STUDENT IN AN ORGANIZED HEALTH CARE EDUCATION/TRAINING PROGRAM

## 2024-10-21 PROCEDURE — 99233 SBSQ HOSP IP/OBS HIGH 50: CPT | Mod: ,,, | Performed by: NURSE PRACTITIONER

## 2024-10-21 PROCEDURE — 85014 HEMATOCRIT: CPT | Performed by: STUDENT IN AN ORGANIZED HEALTH CARE EDUCATION/TRAINING PROGRAM

## 2024-10-21 RX ORDER — MAGNESIUM SULFATE 1 G/100ML
1 INJECTION INTRAVENOUS ONCE
Status: COMPLETED | OUTPATIENT
Start: 2024-10-21 | End: 2024-10-21

## 2024-10-21 RX ADMIN — MAGNESIUM SULFATE IN DEXTROSE 1 G: 10 INJECTION, SOLUTION INTRAVENOUS at 05:10

## 2024-10-21 RX ADMIN — LOSARTAN POTASSIUM 12.5 MG: 25 TABLET, FILM COATED ORAL at 09:10

## 2024-10-21 RX ADMIN — PANTOPRAZOLE SODIUM 40 MG: 40 TABLET, DELAYED RELEASE ORAL at 09:10

## 2024-10-21 RX ADMIN — POLYETHYLENE GLYCOL 3350, SODIUM SULFATE ANHYDROUS, SODIUM BICARBONATE, SODIUM CHLORIDE, POTASSIUM CHLORIDE 4000 ML: 236; 22.74; 6.74; 5.86; 2.97 POWDER, FOR SOLUTION ORAL at 08:10

## 2024-10-21 RX ADMIN — ENOXAPARIN SODIUM 80 MG: 80 INJECTION SUBCUTANEOUS at 04:10

## 2024-10-21 RX ADMIN — TIOTROPIUM BROMIDE INHALATION SPRAY 2 PUFF: 3.12 SPRAY, METERED RESPIRATORY (INHALATION) at 08:10

## 2024-10-21 RX ADMIN — SPIRONOLACTONE 12.5 MG: 25 TABLET ORAL at 09:10

## 2024-10-21 RX ADMIN — ATORVASTATIN CALCIUM 80 MG: 40 TABLET, FILM COATED ORAL at 09:10

## 2024-10-21 RX ADMIN — CLOPIDOGREL BISULFATE 75 MG: 75 TABLET ORAL at 09:10

## 2024-10-21 RX ADMIN — OXYBUTYNIN CHLORIDE 5 MG: 5 TABLET ORAL at 02:10

## 2024-10-21 RX ADMIN — OXYBUTYNIN CHLORIDE 5 MG: 5 TABLET ORAL at 09:10

## 2024-10-21 RX ADMIN — METOPROLOL SUCCINATE 25 MG: 25 TABLET, EXTENDED RELEASE ORAL at 09:10

## 2024-10-21 RX ADMIN — NICOTINE 1 PATCH: 14 PATCH, EXTENDED RELEASE TRANSDERMAL at 09:10

## 2024-10-21 RX ADMIN — OXYBUTYNIN CHLORIDE 5 MG: 5 TABLET ORAL at 08:10

## 2024-10-21 NOTE — PROGRESS NOTES
Smoking cessation education note: Pt is a 0.5 pk/day cigarette smoker x 37 yrs. Order requested for 14 mg nicotine patch Q day. Pt states ready to quit smoking. Ambulatory referral to Smoking Cessation clinic following hospital discharge.

## 2024-10-22 ENCOUNTER — ANESTHESIA (OUTPATIENT)
Dept: ENDOSCOPY | Facility: HOSPITAL | Age: 59
End: 2024-10-22
Payer: MEDICARE

## 2024-10-22 ENCOUNTER — CLINICAL SUPPORT (OUTPATIENT)
Dept: SMOKING CESSATION | Facility: CLINIC | Age: 59
End: 2024-10-22

## 2024-10-22 ENCOUNTER — ANESTHESIA EVENT (OUTPATIENT)
Dept: ENDOSCOPY | Facility: HOSPITAL | Age: 59
End: 2024-10-22
Payer: MEDICARE

## 2024-10-22 DIAGNOSIS — F17.210 CIGARETTE SMOKER: Primary | ICD-10-CM

## 2024-10-22 LAB
ANION GAP SERPL CALC-SCNC: 8 MMOL/L (ref 8–16)
BASOPHILS # BLD AUTO: 0.12 K/UL (ref 0–0.2)
BASOPHILS NFR BLD: 0.8 % (ref 0–1.9)
BUN SERPL-MCNC: 38 MG/DL (ref 6–20)
CALCIUM SERPL-MCNC: 8.8 MG/DL (ref 8.7–10.5)
CHLORIDE SERPL-SCNC: 98 MMOL/L (ref 95–110)
CO2 SERPL-SCNC: 27 MMOL/L (ref 23–29)
CREAT SERPL-MCNC: 1.4 MG/DL (ref 0.5–1.4)
DIFFERENTIAL METHOD BLD: ABNORMAL
EOSINOPHIL # BLD AUTO: 0.3 K/UL (ref 0–0.5)
EOSINOPHIL NFR BLD: 1.8 % (ref 0–8)
ERYTHROCYTE [DISTWIDTH] IN BLOOD BY AUTOMATED COUNT: 16.5 % (ref 11.5–14.5)
EST. GFR  (NO RACE VARIABLE): 44 ML/MIN/1.73 M^2
GLUCOSE SERPL-MCNC: 102 MG/DL (ref 70–110)
HCT VFR BLD AUTO: 27.8 % (ref 37–48.5)
HGB BLD-MCNC: 8.7 G/DL (ref 12–16)
IMM GRANULOCYTES # BLD AUTO: 0.11 K/UL (ref 0–0.04)
IMM GRANULOCYTES NFR BLD AUTO: 0.7 % (ref 0–0.5)
LYMPHOCYTES # BLD AUTO: 3.8 K/UL (ref 1–4.8)
LYMPHOCYTES NFR BLD: 25.3 % (ref 18–48)
MAGNESIUM SERPL-MCNC: 1.9 MG/DL (ref 1.6–2.6)
MCH RBC QN AUTO: 24.9 PG (ref 27–31)
MCHC RBC AUTO-ENTMCNC: 31.3 G/DL (ref 32–36)
MCV RBC AUTO: 80 FL (ref 82–98)
MONOCYTES # BLD AUTO: 0.7 K/UL (ref 0.3–1)
MONOCYTES NFR BLD: 4.5 % (ref 4–15)
NEUTROPHILS # BLD AUTO: 10.1 K/UL (ref 1.8–7.7)
NEUTROPHILS NFR BLD: 66.9 % (ref 38–73)
NRBC BLD-RTO: 0 /100 WBC
PLATELET # BLD AUTO: 531 K/UL (ref 150–450)
PMV BLD AUTO: 9.3 FL (ref 9.2–12.9)
POTASSIUM SERPL-SCNC: 3.7 MMOL/L (ref 3.5–5.1)
RBC # BLD AUTO: 3.49 M/UL (ref 4–5.4)
SODIUM SERPL-SCNC: 133 MMOL/L (ref 136–145)
WBC # BLD AUTO: 15.03 K/UL (ref 3.9–12.7)

## 2024-10-22 PROCEDURE — 45380 COLONOSCOPY AND BIOPSY: CPT | Mod: ,,, | Performed by: INTERNAL MEDICINE

## 2024-10-22 PROCEDURE — 43270 EGD LESION ABLATION: CPT | Mod: ,,, | Performed by: INTERNAL MEDICINE

## 2024-10-22 PROCEDURE — 85025 COMPLETE CBC W/AUTO DIFF WBC: CPT | Performed by: STUDENT IN AN ORGANIZED HEALTH CARE EDUCATION/TRAINING PROGRAM

## 2024-10-22 PROCEDURE — 63600175 PHARM REV CODE 636 W HCPCS: Performed by: NURSE ANESTHETIST, CERTIFIED REGISTERED

## 2024-10-22 PROCEDURE — 88305 TISSUE EXAM BY PATHOLOGIST: CPT | Mod: 59 | Performed by: PATHOLOGY

## 2024-10-22 PROCEDURE — 63600175 PHARM REV CODE 636 W HCPCS: Performed by: INTERNAL MEDICINE

## 2024-10-22 PROCEDURE — 27201038 HC PROBE, BI-POLAR: Performed by: INTERNAL MEDICINE

## 2024-10-22 PROCEDURE — 43239 EGD BIOPSY SINGLE/MULTIPLE: CPT | Mod: 59 | Performed by: INTERNAL MEDICINE

## 2024-10-22 PROCEDURE — 25000003 PHARM REV CODE 250: Performed by: HOSPITALIST

## 2024-10-22 PROCEDURE — 25000003 PHARM REV CODE 250: Performed by: INTERNAL MEDICINE

## 2024-10-22 PROCEDURE — 25000003 PHARM REV CODE 250: Performed by: NURSE ANESTHETIST, CERTIFIED REGISTERED

## 2024-10-22 PROCEDURE — 0DB98ZX EXCISION OF DUODENUM, VIA NATURAL OR ARTIFICIAL OPENING ENDOSCOPIC, DIAGNOSTIC: ICD-10-PCS | Performed by: INTERNAL MEDICINE

## 2024-10-22 PROCEDURE — 94761 N-INVAS EAR/PLS OXIMETRY MLT: CPT

## 2024-10-22 PROCEDURE — 99406 BEHAV CHNG SMOKING 3-10 MIN: CPT | Mod: ,,,

## 2024-10-22 PROCEDURE — 45380 COLONOSCOPY AND BIOPSY: CPT | Performed by: INTERNAL MEDICINE

## 2024-10-22 PROCEDURE — 80048 BASIC METABOLIC PNL TOTAL CA: CPT | Performed by: STUDENT IN AN ORGANIZED HEALTH CARE EDUCATION/TRAINING PROGRAM

## 2024-10-22 PROCEDURE — 88305 TISSUE EXAM BY PATHOLOGIST: CPT | Mod: 26,,, | Performed by: PATHOLOGY

## 2024-10-22 PROCEDURE — 43270 EGD LESION ABLATION: CPT | Performed by: INTERNAL MEDICINE

## 2024-10-22 PROCEDURE — 83735 ASSAY OF MAGNESIUM: CPT | Performed by: STUDENT IN AN ORGANIZED HEALTH CARE EDUCATION/TRAINING PROGRAM

## 2024-10-22 PROCEDURE — 25000003 PHARM REV CODE 250: Performed by: STUDENT IN AN ORGANIZED HEALTH CARE EDUCATION/TRAINING PROGRAM

## 2024-10-22 PROCEDURE — 0DBK8ZX EXCISION OF ASCENDING COLON, VIA NATURAL OR ARTIFICIAL OPENING ENDOSCOPIC, DIAGNOSTIC: ICD-10-PCS | Performed by: INTERNAL MEDICINE

## 2024-10-22 PROCEDURE — 43239 EGD BIOPSY SINGLE/MULTIPLE: CPT | Mod: 59,,, | Performed by: INTERNAL MEDICINE

## 2024-10-22 PROCEDURE — 37000009 HC ANESTHESIA EA ADD 15 MINS: Performed by: INTERNAL MEDICINE

## 2024-10-22 PROCEDURE — 3E0G8GC INTRODUCTION OF OTHER THERAPEUTIC SUBSTANCE INTO UPPER GI, VIA NATURAL OR ARTIFICIAL OPENING ENDOSCOPIC: ICD-10-PCS | Performed by: INTERNAL MEDICINE

## 2024-10-22 PROCEDURE — 37000008 HC ANESTHESIA 1ST 15 MINUTES: Performed by: INTERNAL MEDICINE

## 2024-10-22 PROCEDURE — 27201012 HC FORCEPS, HOT/COLD, DISP: Performed by: INTERNAL MEDICINE

## 2024-10-22 PROCEDURE — 94640 AIRWAY INHALATION TREATMENT: CPT

## 2024-10-22 PROCEDURE — 36415 COLL VENOUS BLD VENIPUNCTURE: CPT | Performed by: STUDENT IN AN ORGANIZED HEALTH CARE EDUCATION/TRAINING PROGRAM

## 2024-10-22 PROCEDURE — 99900035 HC TECH TIME PER 15 MIN (STAT)

## 2024-10-22 PROCEDURE — S4991 NICOTINE PATCH NONLEGEND: HCPCS | Performed by: STUDENT IN AN ORGANIZED HEALTH CARE EDUCATION/TRAINING PROGRAM

## 2024-10-22 PROCEDURE — 11000001 HC ACUTE MED/SURG PRIVATE ROOM

## 2024-10-22 PROCEDURE — 0W3P8ZZ CONTROL BLEEDING IN GASTROINTESTINAL TRACT, VIA NATURAL OR ARTIFICIAL OPENING ENDOSCOPIC: ICD-10-PCS | Performed by: INTERNAL MEDICINE

## 2024-10-22 RX ORDER — PROPOFOL 10 MG/ML
VIAL (ML) INTRAVENOUS
Status: DISCONTINUED | OUTPATIENT
Start: 2024-10-22 | End: 2024-10-23

## 2024-10-22 RX ORDER — HYDROCODONE BITARTRATE AND ACETAMINOPHEN 5; 325 MG/1; MG/1
1 TABLET ORAL EVERY 6 HOURS PRN
Status: DISCONTINUED | OUTPATIENT
Start: 2024-10-22 | End: 2024-10-26 | Stop reason: HOSPADM

## 2024-10-22 RX ORDER — ETOMIDATE 2 MG/ML
INJECTION INTRAVENOUS
Status: DISCONTINUED | OUTPATIENT
Start: 2024-10-22 | End: 2024-10-23

## 2024-10-22 RX ORDER — PROPOFOL 10 MG/ML
VIAL (ML) INTRAVENOUS CONTINUOUS PRN
Status: DISCONTINUED | OUTPATIENT
Start: 2024-10-22 | End: 2024-10-23

## 2024-10-22 RX ORDER — EPINEPHRINE 0.1 MG/ML
INJECTION INTRAVENOUS
Status: COMPLETED | OUTPATIENT
Start: 2024-10-22 | End: 2024-10-22

## 2024-10-22 RX ORDER — TRAMADOL HYDROCHLORIDE 50 MG/1
50 TABLET ORAL ONCE
Status: COMPLETED | OUTPATIENT
Start: 2024-10-22 | End: 2024-10-22

## 2024-10-22 RX ADMIN — SPIRONOLACTONE 12.5 MG: 25 TABLET ORAL at 10:10

## 2024-10-22 RX ADMIN — TRAMADOL HYDROCHLORIDE 50 MG: 50 TABLET, COATED ORAL at 08:10

## 2024-10-22 RX ADMIN — HYDROCODONE BITARTRATE AND ACETAMINOPHEN 1 TABLET: 5; 325 TABLET ORAL at 06:10

## 2024-10-22 RX ADMIN — METOPROLOL SUCCINATE 25 MG: 25 TABLET, EXTENDED RELEASE ORAL at 10:10

## 2024-10-22 RX ADMIN — NICOTINE 1 PATCH: 14 PATCH, EXTENDED RELEASE TRANSDERMAL at 10:10

## 2024-10-22 RX ADMIN — ATORVASTATIN CALCIUM 80 MG: 40 TABLET, FILM COATED ORAL at 10:10

## 2024-10-22 RX ADMIN — PROPOFOL 20 MG: 10 INJECTION, EMULSION INTRAVENOUS at 03:10

## 2024-10-22 RX ADMIN — TIOTROPIUM BROMIDE INHALATION SPRAY 2 PUFF: 3.12 SPRAY, METERED RESPIRATORY (INHALATION) at 07:10

## 2024-10-22 RX ADMIN — LOSARTAN POTASSIUM 12.5 MG: 25 TABLET, FILM COATED ORAL at 10:10

## 2024-10-22 RX ADMIN — PROPOFOL 30 MG: 10 INJECTION, EMULSION INTRAVENOUS at 03:10

## 2024-10-22 RX ADMIN — PROPOFOL 150 MCG/KG/MIN: 10 INJECTION, EMULSION INTRAVENOUS at 03:10

## 2024-10-22 RX ADMIN — OXYBUTYNIN CHLORIDE 5 MG: 5 TABLET ORAL at 08:10

## 2024-10-22 RX ADMIN — CLOPIDOGREL BISULFATE 75 MG: 75 TABLET ORAL at 10:10

## 2024-10-22 RX ADMIN — OXYBUTYNIN CHLORIDE 5 MG: 5 TABLET ORAL at 10:10

## 2024-10-22 RX ADMIN — PANTOPRAZOLE SODIUM 40 MG: 40 TABLET, DELAYED RELEASE ORAL at 10:10

## 2024-10-22 RX ADMIN — ETOMIDATE 6 MG: 2 INJECTION, SOLUTION INTRAVENOUS at 03:10

## 2024-10-22 NOTE — PROGRESS NOTES
Smoking cessation education follow-up: Pt denies nicotine withdrawal syptoms with patch in use. Order requested upon discharge for 14 mg nicotine patch Q day. Ambulatory referral to Smoking Cessation clinic.

## 2024-10-22 NOTE — ANESTHESIA PREPROCEDURE EVALUATION
10/22/2024  Mary Ellen Saini is a 59 y.o., female.      Pre-op Assessment    I have reviewed the Patient Summary Reports.     I have reviewed the Nursing Notes.    I have reviewed the Medications.     Review of Systems  Anesthesia Hx:             Denies Family Hx of Anesthesia complications.    Denies Personal Hx of Anesthesia complications.                    Procedure: EGD (ESOPHAGOGASTRODUODENOSCOPY) (N/A), COLONOSCOPY (N/A)         Patient Active Problem List   Diagnosis    Elevated troponin    Acute on chronic combined systolic and diastolic congestive heart failure    Anemia    Decreased GFR    Smoking history    Homelessness    Atrial flutter    Leg pain, bilateral    COPD exacerbation    Colorectal carcinoma    Tobacco dependency    Urinary tract infection due to ESBL Klebsiella    Sepsis    Symptomatic hypotension    KENY (acute kidney injury)    Hypomagnesemia    Chronic HFrEF (heart failure with reduced ejection fraction)    Coronary artery disease involving native coronary artery of native heart without angina pectoris    COPD (chronic obstructive pulmonary disease)    Hepatitis C antibody positive in blood    ACP (advance care planning)    Acute hepatitis C virus infection without hepatic coma    Mobility impaired    Hepatitis C    HFrEF (heart failure with reduced ejection fraction)    NSTEMI (non-ST elevated myocardial infarction)       Past Medical History:   Diagnosis Date    Asthma     Bipolar disorder     Hypertension        ECHO: Results for orders placed during the hospital encounter of 08/12/24    Echo    Interpretation Summary    Left Ventricle: The left ventricle is moderately dilated. There is eccentric hypertrophy. Moderate global hypokinesis present. There is moderately reduced systolic function with a visually estimated ejection fraction of 30 - 35%. Biplane (2D) method of discs  ejection fraction is 34%. There is diastolic dysfunction but grade cannot be determined.    Right Ventricle: Normal right ventricular cavity size. Wall thickness is normal. Systolic function is normal.    Left Atrium: Left atrium is severely dilated.    Right Atrium: Right atrium is moderately dilated.    Aortic Valve: There is mild stenosis. Aortic valve area by VTI is 1.95 cm². Aortic valve peak velocity is 1.40 m/s. Mean gradient is 4 mmHg. The dimensionless index is 0.62.    Mitral Valve: There is mild regurgitation.    Tricuspid Valve: There is mild regurgitation.    Pulmonic Valve: There is mild regurgitation.    Pulmonary Artery: The estimated pulmonary artery systolic pressure is 36 mmHg.    IVC/SVC: Normal venous pressure at 3 mmHg.      Body mass index is 23.82 kg/m².    Tobacco Use: High Risk (10/22/2024)    Patient History     Smoking Tobacco Use: Every Day     Smokeless Tobacco Use: Never     Passive Exposure: Not on file       Social History     Substance and Sexual Activity   Drug Use Yes    Types: Cocaine, Methamphetamines    Comment: denies cocaine or meth. Reports maijurana use        Alcohol Use: Not At Risk (10/22/2024)    AUDIT-C     Frequency of Alcohol Consumption: Never     Average Number of Drinks: Patient does not drink     Frequency of Binge Drinking: Never       Review of patient's allergies indicates:  No Known Allergies    Lab Results   Component Value Date    WBC 15.03 (H) 10/22/2024    HGB 8.7 (L) 10/22/2024    HCT 27.8 (L) 10/22/2024    MCV 80 (L) 10/22/2024     (H) 10/22/2024         Airway:  No value filed.     Physical Exam  General: Alert and Oriented    Airway:  Mouth Opening: Normal  TM Distance: Normal  Neck ROM: Normal ROM        Anesthesia Plan  Type of Anesthesia, risks & benefits discussed:    Anesthesia Type: Gen Natural Airway, Gen ETT  Intra-op Monitoring Plan: Standard ASA Monitors  Post Op Pain Control Plan: multimodal analgesia  Induction:  IV  Informed  Consent: Informed consent signed with the Patient and all parties understand the risks and agree with anesthesia plan.  All questions answered.   ASA Score: 4  Day of Surgery Review of History & Physical: H&P Update referred to the surgeon/provider.    Ready For Surgery From Anesthesia Perspective.     .

## 2024-10-23 LAB
ANION GAP SERPL CALC-SCNC: 10 MMOL/L (ref 8–16)
ANISOCYTOSIS BLD QL SMEAR: SLIGHT
BASOPHILS # BLD AUTO: 0.07 K/UL (ref 0–0.2)
BASOPHILS NFR BLD: 0.4 % (ref 0–1.9)
BLD PROD TYP BPU: NORMAL
BLD PROD TYP BPU: NORMAL
BLOOD UNIT EXPIRATION DATE: NORMAL
BLOOD UNIT EXPIRATION DATE: NORMAL
BLOOD UNIT TYPE CODE: 5100
BLOOD UNIT TYPE CODE: 5100
BLOOD UNIT TYPE: NORMAL
BLOOD UNIT TYPE: NORMAL
BUN SERPL-MCNC: 36 MG/DL (ref 6–20)
CALCIUM SERPL-MCNC: 8.1 MG/DL (ref 8.7–10.5)
CHLORIDE SERPL-SCNC: 100 MMOL/L (ref 95–110)
CO2 SERPL-SCNC: 24 MMOL/L (ref 23–29)
CODING SYSTEM: NORMAL
CODING SYSTEM: NORMAL
CREAT SERPL-MCNC: 1.5 MG/DL (ref 0.5–1.4)
CROSSMATCH INTERPRETATION: NORMAL
CROSSMATCH INTERPRETATION: NORMAL
DIFFERENTIAL METHOD BLD: ABNORMAL
DISPENSE STATUS: NORMAL
DISPENSE STATUS: NORMAL
EOSINOPHIL # BLD AUTO: 0.2 K/UL (ref 0–0.5)
EOSINOPHIL NFR BLD: 1.4 % (ref 0–8)
ERYTHROCYTE [DISTWIDTH] IN BLOOD BY AUTOMATED COUNT: 16.6 % (ref 11.5–14.5)
EST. GFR  (NO RACE VARIABLE): 40 ML/MIN/1.73 M^2
GLUCOSE SERPL-MCNC: 98 MG/DL (ref 70–110)
HCT VFR BLD AUTO: 18.5 % (ref 37–48.5)
HCT VFR BLD AUTO: 19.8 % (ref 37–48.5)
HGB BLD-MCNC: 5.8 G/DL (ref 12–16)
HGB BLD-MCNC: 6.4 G/DL (ref 12–16)
HYPOCHROMIA BLD QL SMEAR: ABNORMAL
IMM GRANULOCYTES # BLD AUTO: 0.11 K/UL (ref 0–0.04)
IMM GRANULOCYTES NFR BLD AUTO: 0.6 % (ref 0–0.5)
LYMPHOCYTES # BLD AUTO: 3 K/UL (ref 1–4.8)
LYMPHOCYTES NFR BLD: 17.5 % (ref 18–48)
MCH RBC QN AUTO: 24.7 PG (ref 27–31)
MCHC RBC AUTO-ENTMCNC: 31.4 G/DL (ref 32–36)
MCV RBC AUTO: 79 FL (ref 82–98)
MONOCYTES # BLD AUTO: 0.8 K/UL (ref 0.3–1)
MONOCYTES NFR BLD: 4.4 % (ref 4–15)
NEUTROPHILS # BLD AUTO: 12.9 K/UL (ref 1.8–7.7)
NEUTROPHILS NFR BLD: 75.7 % (ref 38–73)
NRBC BLD-RTO: 0 /100 WBC
NUM UNITS TRANS PACKED RBC: NORMAL
OHS QRS DURATION: 96 MS
OHS QTC CALCULATION: 499 MS
OVALOCYTES BLD QL SMEAR: ABNORMAL
PLATELET # BLD AUTO: 384 K/UL (ref 150–450)
PLATELET BLD QL SMEAR: ABNORMAL
PMV BLD AUTO: 9.7 FL (ref 9.2–12.9)
POIKILOCYTOSIS BLD QL SMEAR: SLIGHT
POTASSIUM SERPL-SCNC: 3.8 MMOL/L (ref 3.5–5.1)
RBC # BLD AUTO: 2.35 M/UL (ref 4–5.4)
SODIUM SERPL-SCNC: 134 MMOL/L (ref 136–145)
TRANS ERYTHROCYTES VOL PATIENT: NORMAL ML
WBC # BLD AUTO: 17.07 K/UL (ref 3.9–12.7)

## 2024-10-23 PROCEDURE — 85014 HEMATOCRIT: CPT | Performed by: HOSPITALIST

## 2024-10-23 PROCEDURE — 85018 HEMOGLOBIN: CPT | Performed by: HOSPITALIST

## 2024-10-23 PROCEDURE — 11000001 HC ACUTE MED/SURG PRIVATE ROOM

## 2024-10-23 PROCEDURE — 36415 COLL VENOUS BLD VENIPUNCTURE: CPT | Performed by: HOSPITALIST

## 2024-10-23 PROCEDURE — 36430 TRANSFUSION BLD/BLD COMPNT: CPT

## 2024-10-23 PROCEDURE — 94640 AIRWAY INHALATION TREATMENT: CPT

## 2024-10-23 PROCEDURE — 25000003 PHARM REV CODE 250: Performed by: HOSPITALIST

## 2024-10-23 PROCEDURE — 25000242 PHARM REV CODE 250 ALT 637 W/ HCPCS: Performed by: HOSPITALIST

## 2024-10-23 PROCEDURE — P9021 RED BLOOD CELLS UNIT: HCPCS | Performed by: STUDENT IN AN ORGANIZED HEALTH CARE EDUCATION/TRAINING PROGRAM

## 2024-10-23 PROCEDURE — 25000003 PHARM REV CODE 250: Performed by: STUDENT IN AN ORGANIZED HEALTH CARE EDUCATION/TRAINING PROGRAM

## 2024-10-23 PROCEDURE — 25000003 PHARM REV CODE 250: Performed by: INTERNAL MEDICINE

## 2024-10-23 PROCEDURE — 94761 N-INVAS EAR/PLS OXIMETRY MLT: CPT

## 2024-10-23 PROCEDURE — S4991 NICOTINE PATCH NONLEGEND: HCPCS | Performed by: STUDENT IN AN ORGANIZED HEALTH CARE EDUCATION/TRAINING PROGRAM

## 2024-10-23 PROCEDURE — 99232 SBSQ HOSP IP/OBS MODERATE 35: CPT | Mod: ,,, | Performed by: INTERNAL MEDICINE

## 2024-10-23 PROCEDURE — 80048 BASIC METABOLIC PNL TOTAL CA: CPT | Performed by: STUDENT IN AN ORGANIZED HEALTH CARE EDUCATION/TRAINING PROGRAM

## 2024-10-23 PROCEDURE — 36415 COLL VENOUS BLD VENIPUNCTURE: CPT | Performed by: STUDENT IN AN ORGANIZED HEALTH CARE EDUCATION/TRAINING PROGRAM

## 2024-10-23 PROCEDURE — 30233N1 TRANSFUSION OF NONAUTOLOGOUS RED BLOOD CELLS INTO PERIPHERAL VEIN, PERCUTANEOUS APPROACH: ICD-10-PCS | Performed by: INTERNAL MEDICINE

## 2024-10-23 PROCEDURE — P9016 RBC LEUKOCYTES REDUCED: HCPCS | Performed by: FAMILY MEDICINE

## 2024-10-23 PROCEDURE — 99233 SBSQ HOSP IP/OBS HIGH 50: CPT | Mod: ,,, | Performed by: NURSE PRACTITIONER

## 2024-10-23 PROCEDURE — 86920 COMPATIBILITY TEST SPIN: CPT | Performed by: FAMILY MEDICINE

## 2024-10-23 PROCEDURE — 86920 COMPATIBILITY TEST SPIN: CPT | Performed by: STUDENT IN AN ORGANIZED HEALTH CARE EDUCATION/TRAINING PROGRAM

## 2024-10-23 PROCEDURE — 85025 COMPLETE CBC W/AUTO DIFF WBC: CPT | Performed by: STUDENT IN AN ORGANIZED HEALTH CARE EDUCATION/TRAINING PROGRAM

## 2024-10-23 RX ORDER — IPRATROPIUM BROMIDE AND ALBUTEROL SULFATE 2.5; .5 MG/3ML; MG/3ML
3 SOLUTION RESPIRATORY (INHALATION)
Status: DISCONTINUED | OUTPATIENT
Start: 2024-10-23 | End: 2024-10-26 | Stop reason: HOSPADM

## 2024-10-23 RX ORDER — HYDROCODONE BITARTRATE AND ACETAMINOPHEN 500; 5 MG/1; MG/1
TABLET ORAL
Status: DISCONTINUED | OUTPATIENT
Start: 2024-10-23 | End: 2024-10-26 | Stop reason: HOSPADM

## 2024-10-23 RX ADMIN — IPRATROPIUM BROMIDE AND ALBUTEROL SULFATE 3 ML: 2.5; .5 SOLUTION RESPIRATORY (INHALATION) at 07:10

## 2024-10-23 RX ADMIN — METOPROLOL SUCCINATE 25 MG: 25 TABLET, EXTENDED RELEASE ORAL at 08:10

## 2024-10-23 RX ADMIN — HYDROCODONE BITARTRATE AND ACETAMINOPHEN 1 TABLET: 5; 325 TABLET ORAL at 11:10

## 2024-10-23 RX ADMIN — CLOPIDOGREL BISULFATE 75 MG: 75 TABLET ORAL at 08:10

## 2024-10-23 RX ADMIN — HYDROCODONE BITARTRATE AND ACETAMINOPHEN 1 TABLET: 5; 325 TABLET ORAL at 05:10

## 2024-10-23 RX ADMIN — IPRATROPIUM BROMIDE AND ALBUTEROL SULFATE 3 ML: 2.5; .5 SOLUTION RESPIRATORY (INHALATION) at 12:10

## 2024-10-23 RX ADMIN — TIOTROPIUM BROMIDE INHALATION SPRAY 2 PUFF: 3.12 SPRAY, METERED RESPIRATORY (INHALATION) at 08:10

## 2024-10-23 RX ADMIN — HYDROCODONE BITARTRATE AND ACETAMINOPHEN 1 TABLET: 5; 325 TABLET ORAL at 04:10

## 2024-10-23 RX ADMIN — NICOTINE 1 PATCH: 14 PATCH, EXTENDED RELEASE TRANSDERMAL at 08:10

## 2024-10-23 RX ADMIN — LOSARTAN POTASSIUM 12.5 MG: 25 TABLET, FILM COATED ORAL at 08:10

## 2024-10-23 RX ADMIN — OXYBUTYNIN CHLORIDE 5 MG: 5 TABLET ORAL at 02:10

## 2024-10-23 RX ADMIN — SPIRONOLACTONE 12.5 MG: 25 TABLET ORAL at 08:10

## 2024-10-23 RX ADMIN — PANTOPRAZOLE SODIUM 40 MG: 40 TABLET, DELAYED RELEASE ORAL at 08:10

## 2024-10-23 RX ADMIN — OXYBUTYNIN CHLORIDE 5 MG: 5 TABLET ORAL at 08:10

## 2024-10-23 RX ADMIN — ATORVASTATIN CALCIUM 80 MG: 40 TABLET, FILM COATED ORAL at 08:10

## 2024-10-23 NOTE — ANESTHESIA POSTPROCEDURE EVALUATION
Anesthesia Post Evaluation    Patient: Mary Ellen Saini    Procedure(s) Performed: Procedure(s) (LRB):  EGD (ESOPHAGOGASTRODUODENOSCOPY) (N/A)  COLONOSCOPY (N/A)    Final Anesthesia Type: general      Patient location during evaluation: floor  Patient participation: Yes- Able to Participate  Level of consciousness: awake and alert  Post-procedure vital signs: reviewed and stable  Pain management: adequate  Airway patency: patent    PONV status at discharge: No PONV  Anesthetic complications: no      Cardiovascular status: stable  Respiratory status: spontaneous ventilation  Hydration status: euvolemic  Follow-up not needed.              Vitals Value Taken Time   /58 10/23/24 1137   Temp 36.6 °C (97.9 °F) 10/23/24 1137   Pulse 80 10/23/24 1224   Resp 18 10/23/24 1224   SpO2 96 % 10/23/24 1224         Event Time   Out of Recovery 10/22/2024 17:03:34         Pain/Saroj Score: Pain Rating Prior to Med Admin: 8 (10/23/2024 11:01 AM)  Saroj Score: 10 (10/22/2024  4:35 PM)

## 2024-10-24 PROBLEM — D72.829 LEUKOCYTOSIS: Status: ACTIVE | Noted: 2024-10-24

## 2024-10-24 LAB
ANISOCYTOSIS BLD QL SMEAR: SLIGHT
BASOPHILS # BLD AUTO: ABNORMAL K/UL (ref 0–0.2)
BASOPHILS NFR BLD: 0 % (ref 0–1.9)
BURR CELLS BLD QL SMEAR: ABNORMAL
C4 SERPL-MCNC: 12 MG/DL (ref 11–44)
DIFFERENTIAL METHOD BLD: ABNORMAL
EOSINOPHIL # BLD AUTO: ABNORMAL K/UL (ref 0–0.5)
EOSINOPHIL NFR BLD: 3 % (ref 0–8)
ERYTHROCYTE [DISTWIDTH] IN BLOOD BY AUTOMATED COUNT: 17 % (ref 11.5–14.5)
FINAL PATHOLOGIC DIAGNOSIS: NORMAL
GROSS: NORMAL
HCT VFR BLD AUTO: 23.8 % (ref 37–48.5)
HGB BLD-MCNC: 7.6 G/DL (ref 12–16)
HGB BLD-MCNC: 7.7 G/DL (ref 12–16)
HYPOCHROMIA BLD QL SMEAR: ABNORMAL
IMM GRANULOCYTES # BLD AUTO: ABNORMAL K/UL (ref 0–0.04)
IMM GRANULOCYTES NFR BLD AUTO: ABNORMAL % (ref 0–0.5)
LYMPHOCYTES # BLD AUTO: ABNORMAL K/UL (ref 1–4.8)
LYMPHOCYTES NFR BLD: 2 % (ref 18–48)
Lab: NORMAL
MCH RBC QN AUTO: 26.5 PG (ref 27–31)
MCHC RBC AUTO-ENTMCNC: 32.4 G/DL (ref 32–36)
MCV RBC AUTO: 82 FL (ref 82–98)
MONOCYTES # BLD AUTO: ABNORMAL K/UL (ref 0.3–1)
MONOCYTES NFR BLD: 1 % (ref 4–15)
NEUTROPHILS NFR BLD: 94 % (ref 38–73)
NRBC BLD-RTO: 0 /100 WBC
OVALOCYTES BLD QL SMEAR: ABNORMAL
PLATELET # BLD AUTO: 342 K/UL (ref 150–450)
PLATELET BLD QL SMEAR: ABNORMAL
PMV BLD AUTO: 9.4 FL (ref 9.2–12.9)
POIKILOCYTOSIS BLD QL SMEAR: SLIGHT
POLYCHROMASIA BLD QL SMEAR: ABNORMAL
RBC # BLD AUTO: 2.91 M/UL (ref 4–5.4)
RHEUMATOID FACT SERPL-ACNC: 16 IU/ML (ref 0–15)
WBC # BLD AUTO: 36.4 K/UL (ref 3.9–12.7)

## 2024-10-24 PROCEDURE — 25500020 PHARM REV CODE 255: Performed by: HOSPITALIST

## 2024-10-24 PROCEDURE — 85027 COMPLETE CBC AUTOMATED: CPT | Performed by: STUDENT IN AN ORGANIZED HEALTH CARE EDUCATION/TRAINING PROGRAM

## 2024-10-24 PROCEDURE — 25000003 PHARM REV CODE 250: Performed by: HOSPITALIST

## 2024-10-24 PROCEDURE — 85018 HEMOGLOBIN: CPT | Performed by: HOSPITALIST

## 2024-10-24 PROCEDURE — 36415 COLL VENOUS BLD VENIPUNCTURE: CPT | Performed by: HOSPITALIST

## 2024-10-24 PROCEDURE — S4991 NICOTINE PATCH NONLEGEND: HCPCS | Performed by: STUDENT IN AN ORGANIZED HEALTH CARE EDUCATION/TRAINING PROGRAM

## 2024-10-24 PROCEDURE — 82595 ASSAY OF CRYOGLOBULIN: CPT | Performed by: INTERNAL MEDICINE

## 2024-10-24 PROCEDURE — 99233 SBSQ HOSP IP/OBS HIGH 50: CPT | Mod: ,,, | Performed by: INTERNAL MEDICINE

## 2024-10-24 PROCEDURE — 94640 AIRWAY INHALATION TREATMENT: CPT

## 2024-10-24 PROCEDURE — 86160 COMPLEMENT ANTIGEN: CPT | Performed by: INTERNAL MEDICINE

## 2024-10-24 PROCEDURE — 36415 COLL VENOUS BLD VENIPUNCTURE: CPT | Performed by: STUDENT IN AN ORGANIZED HEALTH CARE EDUCATION/TRAINING PROGRAM

## 2024-10-24 PROCEDURE — 86431 RHEUMATOID FACTOR QUANT: CPT | Performed by: INTERNAL MEDICINE

## 2024-10-24 PROCEDURE — 85007 BL SMEAR W/DIFF WBC COUNT: CPT | Performed by: STUDENT IN AN ORGANIZED HEALTH CARE EDUCATION/TRAINING PROGRAM

## 2024-10-24 PROCEDURE — 11000001 HC ACUTE MED/SURG PRIVATE ROOM

## 2024-10-24 PROCEDURE — 25000003 PHARM REV CODE 250: Performed by: STUDENT IN AN ORGANIZED HEALTH CARE EDUCATION/TRAINING PROGRAM

## 2024-10-24 PROCEDURE — 94761 N-INVAS EAR/PLS OXIMETRY MLT: CPT

## 2024-10-24 PROCEDURE — 25000242 PHARM REV CODE 250 ALT 637 W/ HCPCS: Performed by: HOSPITALIST

## 2024-10-24 PROCEDURE — 86162 COMPLEMENT TOTAL (CH50): CPT | Performed by: INTERNAL MEDICINE

## 2024-10-24 RX ADMIN — LOSARTAN POTASSIUM 12.5 MG: 25 TABLET, FILM COATED ORAL at 08:10

## 2024-10-24 RX ADMIN — IPRATROPIUM BROMIDE AND ALBUTEROL SULFATE 3 ML: 2.5; .5 SOLUTION RESPIRATORY (INHALATION) at 07:10

## 2024-10-24 RX ADMIN — IPRATROPIUM BROMIDE AND ALBUTEROL SULFATE 3 ML: 2.5; .5 SOLUTION RESPIRATORY (INHALATION) at 12:10

## 2024-10-24 RX ADMIN — OXYBUTYNIN CHLORIDE 5 MG: 5 TABLET ORAL at 03:10

## 2024-10-24 RX ADMIN — HYDROCODONE BITARTRATE AND ACETAMINOPHEN 1 TABLET: 5; 325 TABLET ORAL at 11:10

## 2024-10-24 RX ADMIN — OXYBUTYNIN CHLORIDE 5 MG: 5 TABLET ORAL at 08:10

## 2024-10-24 RX ADMIN — HYDROCODONE BITARTRATE AND ACETAMINOPHEN 1 TABLET: 5; 325 TABLET ORAL at 01:10

## 2024-10-24 RX ADMIN — METOPROLOL SUCCINATE 25 MG: 25 TABLET, EXTENDED RELEASE ORAL at 08:10

## 2024-10-24 RX ADMIN — HYDROCODONE BITARTRATE AND ACETAMINOPHEN 1 TABLET: 5; 325 TABLET ORAL at 04:10

## 2024-10-24 RX ADMIN — NICOTINE 1 PATCH: 14 PATCH, EXTENDED RELEASE TRANSDERMAL at 08:10

## 2024-10-24 RX ADMIN — SPIRONOLACTONE 12.5 MG: 25 TABLET ORAL at 08:10

## 2024-10-24 RX ADMIN — HYDROCODONE BITARTRATE AND ACETAMINOPHEN 1 TABLET: 5; 325 TABLET ORAL at 08:10

## 2024-10-24 RX ADMIN — ATORVASTATIN CALCIUM 80 MG: 40 TABLET, FILM COATED ORAL at 08:10

## 2024-10-24 RX ADMIN — IOHEXOL 130 ML: 350 INJECTION, SOLUTION INTRAVENOUS at 05:10

## 2024-10-24 RX ADMIN — PANTOPRAZOLE SODIUM 40 MG: 40 TABLET, DELAYED RELEASE ORAL at 08:10

## 2024-10-24 RX ADMIN — TIOTROPIUM BROMIDE INHALATION SPRAY 2 PUFF: 3.12 SPRAY, METERED RESPIRATORY (INHALATION) at 07:10

## 2024-10-25 PROBLEM — K63.89 COLONIC MASS: Status: ACTIVE | Noted: 2024-10-25

## 2024-10-25 LAB
ANION GAP SERPL CALC-SCNC: 10 MMOL/L (ref 8–16)
BASOPHILS # BLD AUTO: 0.06 K/UL (ref 0–0.2)
BASOPHILS NFR BLD: 0.3 % (ref 0–1.9)
BUN SERPL-MCNC: 33 MG/DL (ref 6–20)
CALCIUM SERPL-MCNC: 8.5 MG/DL (ref 8.7–10.5)
CHLORIDE SERPL-SCNC: 99 MMOL/L (ref 95–110)
CO2 SERPL-SCNC: 23 MMOL/L (ref 23–29)
CREAT SERPL-MCNC: 1.4 MG/DL (ref 0.5–1.4)
DIFFERENTIAL METHOD BLD: ABNORMAL
EOSINOPHIL # BLD AUTO: 0.6 K/UL (ref 0–0.5)
EOSINOPHIL NFR BLD: 3.7 % (ref 0–8)
ERYTHROCYTE [DISTWIDTH] IN BLOOD BY AUTOMATED COUNT: 17.2 % (ref 11.5–14.5)
EST. GFR  (NO RACE VARIABLE): 44 ML/MIN/1.73 M^2
GLUCOSE SERPL-MCNC: 88 MG/DL (ref 70–110)
HCT VFR BLD AUTO: 23.7 % (ref 37–48.5)
HGB BLD-MCNC: 7.6 G/DL (ref 12–16)
IMM GRANULOCYTES # BLD AUTO: 0.12 K/UL (ref 0–0.04)
IMM GRANULOCYTES NFR BLD AUTO: 0.7 % (ref 0–0.5)
LYMPHOCYTES # BLD AUTO: 2.6 K/UL (ref 1–4.8)
LYMPHOCYTES NFR BLD: 14.7 % (ref 18–48)
MCH RBC QN AUTO: 26.4 PG (ref 27–31)
MCHC RBC AUTO-ENTMCNC: 32.1 G/DL (ref 32–36)
MCV RBC AUTO: 82 FL (ref 82–98)
MONOCYTES # BLD AUTO: 0.8 K/UL (ref 0.3–1)
MONOCYTES NFR BLD: 4.7 % (ref 4–15)
NEUTROPHILS # BLD AUTO: 13.3 K/UL (ref 1.8–7.7)
NEUTROPHILS NFR BLD: 75.9 % (ref 38–73)
NRBC BLD-RTO: 0 /100 WBC
PLATELET # BLD AUTO: 316 K/UL (ref 150–450)
PMV BLD AUTO: 9.6 FL (ref 9.2–12.9)
POTASSIUM SERPL-SCNC: 4.4 MMOL/L (ref 3.5–5.1)
RBC # BLD AUTO: 2.88 M/UL (ref 4–5.4)
SODIUM SERPL-SCNC: 132 MMOL/L (ref 136–145)
WBC # BLD AUTO: 17.52 K/UL (ref 3.9–12.7)

## 2024-10-25 PROCEDURE — 94640 AIRWAY INHALATION TREATMENT: CPT

## 2024-10-25 PROCEDURE — S4991 NICOTINE PATCH NONLEGEND: HCPCS | Performed by: STUDENT IN AN ORGANIZED HEALTH CARE EDUCATION/TRAINING PROGRAM

## 2024-10-25 PROCEDURE — 11000001 HC ACUTE MED/SURG PRIVATE ROOM

## 2024-10-25 PROCEDURE — 99223 1ST HOSP IP/OBS HIGH 75: CPT | Mod: GC,,, | Performed by: SURGERY

## 2024-10-25 PROCEDURE — 25000242 PHARM REV CODE 250 ALT 637 W/ HCPCS: Performed by: HOSPITALIST

## 2024-10-25 PROCEDURE — 36415 COLL VENOUS BLD VENIPUNCTURE: CPT | Performed by: HOSPITALIST

## 2024-10-25 PROCEDURE — 25000003 PHARM REV CODE 250: Performed by: HOSPITALIST

## 2024-10-25 PROCEDURE — 80048 BASIC METABOLIC PNL TOTAL CA: CPT | Performed by: HOSPITALIST

## 2024-10-25 PROCEDURE — 25000003 PHARM REV CODE 250: Performed by: STUDENT IN AN ORGANIZED HEALTH CARE EDUCATION/TRAINING PROGRAM

## 2024-10-25 PROCEDURE — 94761 N-INVAS EAR/PLS OXIMETRY MLT: CPT

## 2024-10-25 PROCEDURE — 85025 COMPLETE CBC W/AUTO DIFF WBC: CPT | Performed by: HOSPITALIST

## 2024-10-25 PROCEDURE — 99900035 HC TECH TIME PER 15 MIN (STAT)

## 2024-10-25 PROCEDURE — 99232 SBSQ HOSP IP/OBS MODERATE 35: CPT | Mod: ,,, | Performed by: INTERNAL MEDICINE

## 2024-10-25 RX ADMIN — HYDROCODONE BITARTRATE AND ACETAMINOPHEN 1 TABLET: 5; 325 TABLET ORAL at 08:10

## 2024-10-25 RX ADMIN — IPRATROPIUM BROMIDE AND ALBUTEROL SULFATE 3 ML: 2.5; .5 SOLUTION RESPIRATORY (INHALATION) at 07:10

## 2024-10-25 RX ADMIN — OXYBUTYNIN CHLORIDE 5 MG: 5 TABLET ORAL at 10:10

## 2024-10-25 RX ADMIN — ATORVASTATIN CALCIUM 80 MG: 40 TABLET, FILM COATED ORAL at 09:10

## 2024-10-25 RX ADMIN — PANTOPRAZOLE SODIUM 40 MG: 40 TABLET, DELAYED RELEASE ORAL at 09:10

## 2024-10-25 RX ADMIN — NICOTINE 1 PATCH: 14 PATCH, EXTENDED RELEASE TRANSDERMAL at 09:10

## 2024-10-25 RX ADMIN — SPIRONOLACTONE 12.5 MG: 25 TABLET ORAL at 09:10

## 2024-10-25 RX ADMIN — LOSARTAN POTASSIUM 12.5 MG: 25 TABLET, FILM COATED ORAL at 09:10

## 2024-10-25 RX ADMIN — IPRATROPIUM BROMIDE AND ALBUTEROL SULFATE 3 ML: 2.5; .5 SOLUTION RESPIRATORY (INHALATION) at 03:10

## 2024-10-25 RX ADMIN — OXYBUTYNIN CHLORIDE 5 MG: 5 TABLET ORAL at 09:10

## 2024-10-25 RX ADMIN — TIOTROPIUM BROMIDE INHALATION SPRAY 2 PUFF: 3.12 SPRAY, METERED RESPIRATORY (INHALATION) at 08:10

## 2024-10-25 RX ADMIN — HYDROCODONE BITARTRATE AND ACETAMINOPHEN 1 TABLET: 5; 325 TABLET ORAL at 05:10

## 2024-10-25 RX ADMIN — METOPROLOL SUCCINATE 25 MG: 25 TABLET, EXTENDED RELEASE ORAL at 09:10

## 2024-10-25 RX ADMIN — IPRATROPIUM BROMIDE AND ALBUTEROL SULFATE 3 ML: 2.5; .5 SOLUTION RESPIRATORY (INHALATION) at 08:10

## 2024-10-25 RX ADMIN — HYDROCODONE BITARTRATE AND ACETAMINOPHEN 1 TABLET: 5; 325 TABLET ORAL at 11:10

## 2024-10-25 RX ADMIN — OXYBUTYNIN CHLORIDE 5 MG: 5 TABLET ORAL at 03:10

## 2024-10-26 VITALS
TEMPERATURE: 98 F | WEIGHT: 166 LBS | OXYGEN SATURATION: 95 % | BODY MASS INDEX: 23.77 KG/M2 | HEIGHT: 70 IN | SYSTOLIC BLOOD PRESSURE: 124 MMHG | HEART RATE: 107 BPM | RESPIRATION RATE: 18 BRPM | DIASTOLIC BLOOD PRESSURE: 74 MMHG

## 2024-10-26 LAB
AFP SERPL-MCNC: 3.4 NG/ML (ref 0–8.4)
ANION GAP SERPL CALC-SCNC: 10 MMOL/L (ref 8–16)
BASOPHILS # BLD AUTO: 0.07 K/UL (ref 0–0.2)
BASOPHILS NFR BLD: 0.5 % (ref 0–1.9)
BUN SERPL-MCNC: 34 MG/DL (ref 6–20)
CALCIUM SERPL-MCNC: 8.4 MG/DL (ref 8.7–10.5)
CANCER AG19-9 SERPL-ACNC: 9.2 U/ML (ref 0–40)
CEA SERPL-MCNC: 3.2 NG/ML (ref 0–5)
CHLORIDE SERPL-SCNC: 99 MMOL/L (ref 95–110)
CO2 SERPL-SCNC: 23 MMOL/L (ref 23–29)
CREAT SERPL-MCNC: 1.5 MG/DL (ref 0.5–1.4)
DIFFERENTIAL METHOD BLD: ABNORMAL
EOSINOPHIL # BLD AUTO: 0.6 K/UL (ref 0–0.5)
EOSINOPHIL NFR BLD: 3.8 % (ref 0–8)
ERYTHROCYTE [DISTWIDTH] IN BLOOD BY AUTOMATED COUNT: 18 % (ref 11.5–14.5)
EST. GFR  (NO RACE VARIABLE): 40 ML/MIN/1.73 M^2
GLUCOSE SERPL-MCNC: 95 MG/DL (ref 70–110)
HCT VFR BLD AUTO: 22.8 % (ref 37–48.5)
HGB BLD-MCNC: 7.3 G/DL (ref 12–16)
IMM GRANULOCYTES # BLD AUTO: 0.09 K/UL (ref 0–0.04)
IMM GRANULOCYTES NFR BLD AUTO: 0.6 % (ref 0–0.5)
LYMPHOCYTES # BLD AUTO: 2.4 K/UL (ref 1–4.8)
LYMPHOCYTES NFR BLD: 16.2 % (ref 18–48)
MCH RBC QN AUTO: 26.6 PG (ref 27–31)
MCHC RBC AUTO-ENTMCNC: 32 G/DL (ref 32–36)
MCV RBC AUTO: 83 FL (ref 82–98)
MONOCYTES # BLD AUTO: 0.7 K/UL (ref 0.3–1)
MONOCYTES NFR BLD: 5 % (ref 4–15)
NEUTROPHILS # BLD AUTO: 11 K/UL (ref 1.8–7.7)
NEUTROPHILS NFR BLD: 73.9 % (ref 38–73)
NRBC BLD-RTO: 0 /100 WBC
PLATELET # BLD AUTO: 289 K/UL (ref 150–450)
PMV BLD AUTO: 9.9 FL (ref 9.2–12.9)
POTASSIUM SERPL-SCNC: 4.4 MMOL/L (ref 3.5–5.1)
RBC # BLD AUTO: 2.74 M/UL (ref 4–5.4)
SODIUM SERPL-SCNC: 132 MMOL/L (ref 136–145)
WBC # BLD AUTO: 14.88 K/UL (ref 3.9–12.7)

## 2024-10-26 PROCEDURE — 86301 IMMUNOASSAY TUMOR CA 19-9: CPT | Performed by: HOSPITALIST

## 2024-10-26 PROCEDURE — 25000242 PHARM REV CODE 250 ALT 637 W/ HCPCS: Performed by: HOSPITALIST

## 2024-10-26 PROCEDURE — 94761 N-INVAS EAR/PLS OXIMETRY MLT: CPT

## 2024-10-26 PROCEDURE — 94640 AIRWAY INHALATION TREATMENT: CPT

## 2024-10-26 PROCEDURE — 36415 COLL VENOUS BLD VENIPUNCTURE: CPT | Performed by: HOSPITALIST

## 2024-10-26 PROCEDURE — 80048 BASIC METABOLIC PNL TOTAL CA: CPT | Performed by: HOSPITALIST

## 2024-10-26 PROCEDURE — 25000003 PHARM REV CODE 250: Performed by: HOSPITALIST

## 2024-10-26 PROCEDURE — S4991 NICOTINE PATCH NONLEGEND: HCPCS | Performed by: STUDENT IN AN ORGANIZED HEALTH CARE EDUCATION/TRAINING PROGRAM

## 2024-10-26 PROCEDURE — 99900035 HC TECH TIME PER 15 MIN (STAT)

## 2024-10-26 PROCEDURE — 82378 CARCINOEMBRYONIC ANTIGEN: CPT | Performed by: HOSPITALIST

## 2024-10-26 PROCEDURE — 25000003 PHARM REV CODE 250: Performed by: STUDENT IN AN ORGANIZED HEALTH CARE EDUCATION/TRAINING PROGRAM

## 2024-10-26 PROCEDURE — 82105 ALPHA-FETOPROTEIN SERUM: CPT | Performed by: HOSPITALIST

## 2024-10-26 PROCEDURE — 85025 COMPLETE CBC W/AUTO DIFF WBC: CPT | Performed by: HOSPITALIST

## 2024-10-26 RX ORDER — SPIRONOLACTONE 25 MG/1
12.5 TABLET ORAL DAILY
Qty: 15 TABLET | Refills: 11 | Status: SHIPPED | OUTPATIENT
Start: 2024-10-27 | End: 2024-10-26

## 2024-10-26 RX ORDER — LOSARTAN POTASSIUM 25 MG/1
12.5 TABLET ORAL DAILY
Qty: 45 TABLET | Refills: 3 | Status: SHIPPED | OUTPATIENT
Start: 2024-10-27 | End: 2024-10-26

## 2024-10-26 RX ORDER — IBUPROFEN 200 MG
1 TABLET ORAL DAILY
Qty: 30 PATCH | Refills: 0 | Status: SHIPPED | OUTPATIENT
Start: 2024-10-27 | End: 2024-10-26

## 2024-10-26 RX ORDER — IBUPROFEN 200 MG
1 TABLET ORAL DAILY
Qty: 30 PATCH | Refills: 0 | Status: SHIPPED | OUTPATIENT
Start: 2024-10-27

## 2024-10-26 RX ORDER — ATORVASTATIN CALCIUM 80 MG/1
80 TABLET, FILM COATED ORAL DAILY
Qty: 90 TABLET | Refills: 3 | Status: SHIPPED | OUTPATIENT
Start: 2024-10-27 | End: 2024-10-26

## 2024-10-26 RX ORDER — SPIRONOLACTONE 25 MG/1
12.5 TABLET ORAL DAILY
Qty: 15 TABLET | Refills: 11 | Status: SHIPPED | OUTPATIENT
Start: 2024-10-27 | End: 2025-10-27

## 2024-10-26 RX ORDER — ATORVASTATIN CALCIUM 80 MG/1
80 TABLET, FILM COATED ORAL DAILY
Qty: 90 TABLET | Refills: 3 | Status: SHIPPED | OUTPATIENT
Start: 2024-10-27 | End: 2025-10-27

## 2024-10-26 RX ORDER — LOSARTAN POTASSIUM 25 MG/1
12.5 TABLET ORAL DAILY
Qty: 45 TABLET | Refills: 3 | Status: SHIPPED | OUTPATIENT
Start: 2024-10-27 | End: 2025-10-27

## 2024-10-26 RX ADMIN — HYDROCODONE BITARTRATE AND ACETAMINOPHEN 1 TABLET: 5; 325 TABLET ORAL at 06:10

## 2024-10-26 RX ADMIN — NICOTINE 1 PATCH: 14 PATCH, EXTENDED RELEASE TRANSDERMAL at 08:10

## 2024-10-26 RX ADMIN — METOPROLOL SUCCINATE 25 MG: 25 TABLET, EXTENDED RELEASE ORAL at 08:10

## 2024-10-26 RX ADMIN — PANTOPRAZOLE SODIUM 40 MG: 40 TABLET, DELAYED RELEASE ORAL at 08:10

## 2024-10-26 RX ADMIN — OXYBUTYNIN CHLORIDE 5 MG: 5 TABLET ORAL at 08:10

## 2024-10-26 RX ADMIN — IPRATROPIUM BROMIDE AND ALBUTEROL SULFATE 3 ML: 2.5; .5 SOLUTION RESPIRATORY (INHALATION) at 07:10

## 2024-10-26 RX ADMIN — TIOTROPIUM BROMIDE INHALATION SPRAY 2 PUFF: 3.12 SPRAY, METERED RESPIRATORY (INHALATION) at 07:10

## 2024-10-26 RX ADMIN — ATORVASTATIN CALCIUM 80 MG: 40 TABLET, FILM COATED ORAL at 08:10

## 2024-10-27 ENCOUNTER — NURSE TRIAGE (OUTPATIENT)
Dept: ADMINISTRATIVE | Facility: CLINIC | Age: 59
End: 2024-10-27
Payer: MEDICARE

## 2024-10-28 ENCOUNTER — TELEPHONE (OUTPATIENT)
Facility: CLINIC | Age: 59
End: 2024-10-28
Payer: MEDICARE

## 2024-10-31 LAB — CH50 SERPL-ACNC: 59 U/ML (ref 42–95)

## 2024-11-07 ENCOUNTER — HOSPITAL ENCOUNTER (INPATIENT)
Facility: HOSPITAL | Age: 59
LOS: 10 days | Discharge: HOME | DRG: 545 | End: 2024-11-18
Attending: EMERGENCY MEDICINE | Admitting: STUDENT IN AN ORGANIZED HEALTH CARE EDUCATION/TRAINING PROGRAM
Payer: MEDICARE

## 2024-11-07 DIAGNOSIS — D62 ABLA (ACUTE BLOOD LOSS ANEMIA): Primary | ICD-10-CM

## 2024-11-07 DIAGNOSIS — F31.9 BIPOLAR 1 DISORDER: ICD-10-CM

## 2024-11-07 DIAGNOSIS — E85.89 OTHER AMYLOIDOSIS: ICD-10-CM

## 2024-11-07 DIAGNOSIS — I21.4 NSTEMI (NON-ST ELEVATED MYOCARDIAL INFARCTION): ICD-10-CM

## 2024-11-07 DIAGNOSIS — E85.9 AMYLOIDOSIS, UNSPECIFIED TYPE: ICD-10-CM

## 2024-11-07 DIAGNOSIS — R06.02 SHORTNESS OF BREATH: ICD-10-CM

## 2024-11-07 DIAGNOSIS — D64.9 SYMPTOMATIC ANEMIA: ICD-10-CM

## 2024-11-07 DIAGNOSIS — K26.4 GASTROINTESTINAL HEMORRHAGE ASSOCIATED WITH DUODENAL ULCER: ICD-10-CM

## 2024-11-07 DIAGNOSIS — F17.200 TOBACCO DEPENDENCY: ICD-10-CM

## 2024-11-07 LAB
ABO + RH BLD: NORMAL
ALBUMIN SERPL BCP-MCNC: 2.1 G/DL (ref 3.5–5.2)
ALP SERPL-CCNC: 78 U/L (ref 40–150)
ALT SERPL W/O P-5'-P-CCNC: 13 U/L (ref 10–44)
ANION GAP SERPL CALC-SCNC: 9 MMOL/L (ref 8–16)
ANISOCYTOSIS BLD QL SMEAR: SLIGHT
APTT PPP: 30.5 SEC (ref 21–32)
AST SERPL-CCNC: 17 U/L (ref 10–40)
BASOPHILS # BLD AUTO: 0.07 K/UL (ref 0–0.2)
BASOPHILS NFR BLD: 0.4 % (ref 0–1.9)
BILIRUB SERPL-MCNC: 0.3 MG/DL (ref 0.1–1)
BLD GP AB SCN CELLS X3 SERPL QL: NORMAL
BNP SERPL-MCNC: 3425 PG/ML (ref 0–99)
BUN SERPL-MCNC: 20 MG/DL (ref 6–20)
CALCIUM SERPL-MCNC: 7.5 MG/DL (ref 8.7–10.5)
CHLORIDE SERPL-SCNC: 105 MMOL/L (ref 95–110)
CO2 SERPL-SCNC: 20 MMOL/L (ref 23–29)
CREAT SERPL-MCNC: 1.2 MG/DL (ref 0.5–1.4)
DIFFERENTIAL METHOD BLD: ABNORMAL
EOSINOPHIL # BLD AUTO: 0.1 K/UL (ref 0–0.5)
EOSINOPHIL NFR BLD: 0.6 % (ref 0–8)
ERYTHROCYTE [DISTWIDTH] IN BLOOD BY AUTOMATED COUNT: 17.5 % (ref 11.5–14.5)
EST. GFR  (NO RACE VARIABLE): 52 ML/MIN/1.73 M^2
GLUCOSE SERPL-MCNC: 117 MG/DL (ref 70–110)
HCT VFR BLD AUTO: 17.2 % (ref 37–48.5)
HGB BLD-MCNC: 5.6 G/DL (ref 12–16)
HYPOCHROMIA BLD QL SMEAR: ABNORMAL
IMM GRANULOCYTES # BLD AUTO: 0.12 K/UL (ref 0–0.04)
IMM GRANULOCYTES NFR BLD AUTO: 0.6 % (ref 0–0.5)
INR PPP: 1.1 (ref 0.8–1.2)
LACTATE SERPL-SCNC: 1 MMOL/L (ref 0.5–2.2)
LIPASE SERPL-CCNC: 20 U/L (ref 4–60)
LYMPHOCYTES # BLD AUTO: 1.9 K/UL (ref 1–4.8)
LYMPHOCYTES NFR BLD: 9.9 % (ref 18–48)
MAGNESIUM SERPL-MCNC: 1.6 MG/DL (ref 1.6–2.6)
MCH RBC QN AUTO: 26.7 PG (ref 27–31)
MCHC RBC AUTO-ENTMCNC: 32.6 G/DL (ref 32–36)
MCV RBC AUTO: 82 FL (ref 82–98)
MONOCYTES # BLD AUTO: 0.9 K/UL (ref 0.3–1)
MONOCYTES NFR BLD: 4.6 % (ref 4–15)
NEUTROPHILS # BLD AUTO: 16.3 K/UL (ref 1.8–7.7)
NEUTROPHILS NFR BLD: 83.9 % (ref 38–73)
NRBC BLD-RTO: 0 /100 WBC
OVALOCYTES BLD QL SMEAR: ABNORMAL
PLATELET # BLD AUTO: 391 K/UL (ref 150–450)
PLATELET BLD QL SMEAR: ABNORMAL
PMV BLD AUTO: 9.5 FL (ref 9.2–12.9)
POIKILOCYTOSIS BLD QL SMEAR: SLIGHT
POLYCHROMASIA BLD QL SMEAR: ABNORMAL
POTASSIUM SERPL-SCNC: 2.8 MMOL/L (ref 3.5–5.1)
PROT SERPL-MCNC: 6.3 G/DL (ref 6–8.4)
PROTHROMBIN TIME: 11.4 SEC (ref 9–12.5)
RBC # BLD AUTO: 2.1 M/UL (ref 4–5.4)
SODIUM SERPL-SCNC: 134 MMOL/L (ref 136–145)
SPECIMEN OUTDATE: NORMAL
TROPONIN I SERPL DL<=0.01 NG/ML-MCNC: 3.38 NG/ML (ref 0–0.03)
WBC # BLD AUTO: 19.41 K/UL (ref 3.9–12.7)

## 2024-11-07 PROCEDURE — 99285 EMERGENCY DEPT VISIT HI MDM: CPT | Mod: 25

## 2024-11-07 PROCEDURE — 36430 TRANSFUSION BLD/BLD COMPNT: CPT

## 2024-11-07 PROCEDURE — 93005 ELECTROCARDIOGRAM TRACING: CPT

## 2024-11-07 PROCEDURE — 83735 ASSAY OF MAGNESIUM: CPT | Performed by: EMERGENCY MEDICINE

## 2024-11-07 PROCEDURE — 85610 PROTHROMBIN TIME: CPT | Performed by: EMERGENCY MEDICINE

## 2024-11-07 PROCEDURE — 63600175 PHARM REV CODE 636 W HCPCS: Performed by: EMERGENCY MEDICINE

## 2024-11-07 PROCEDURE — 80053 COMPREHEN METABOLIC PANEL: CPT | Performed by: EMERGENCY MEDICINE

## 2024-11-07 PROCEDURE — 25000003 PHARM REV CODE 250: Performed by: EMERGENCY MEDICINE

## 2024-11-07 PROCEDURE — 96374 THER/PROPH/DIAG INJ IV PUSH: CPT

## 2024-11-07 PROCEDURE — 83880 ASSAY OF NATRIURETIC PEPTIDE: CPT | Performed by: EMERGENCY MEDICINE

## 2024-11-07 PROCEDURE — 85730 THROMBOPLASTIN TIME PARTIAL: CPT | Performed by: EMERGENCY MEDICINE

## 2024-11-07 PROCEDURE — 83690 ASSAY OF LIPASE: CPT | Performed by: EMERGENCY MEDICINE

## 2024-11-07 PROCEDURE — P9021 RED BLOOD CELLS UNIT: HCPCS | Performed by: EMERGENCY MEDICINE

## 2024-11-07 PROCEDURE — 86920 COMPATIBILITY TEST SPIN: CPT | Performed by: EMERGENCY MEDICINE

## 2024-11-07 PROCEDURE — 86850 RBC ANTIBODY SCREEN: CPT | Performed by: EMERGENCY MEDICINE

## 2024-11-07 PROCEDURE — 85025 COMPLETE CBC W/AUTO DIFF WBC: CPT | Performed by: EMERGENCY MEDICINE

## 2024-11-07 PROCEDURE — 96375 TX/PRO/DX INJ NEW DRUG ADDON: CPT

## 2024-11-07 PROCEDURE — 83605 ASSAY OF LACTIC ACID: CPT | Performed by: EMERGENCY MEDICINE

## 2024-11-07 PROCEDURE — 84484 ASSAY OF TROPONIN QUANT: CPT | Performed by: EMERGENCY MEDICINE

## 2024-11-07 PROCEDURE — 25500020 PHARM REV CODE 255: Performed by: EMERGENCY MEDICINE

## 2024-11-07 PROCEDURE — 93010 ELECTROCARDIOGRAM REPORT: CPT | Mod: ,,, | Performed by: INTERNAL MEDICINE

## 2024-11-07 RX ORDER — HYDROCODONE BITARTRATE AND ACETAMINOPHEN 500; 5 MG/1; MG/1
TABLET ORAL
Status: DISCONTINUED | OUTPATIENT
Start: 2024-11-07 | End: 2024-11-18 | Stop reason: HOSPADM

## 2024-11-07 RX ORDER — DROPERIDOL 2.5 MG/ML
1.25 INJECTION, SOLUTION INTRAMUSCULAR; INTRAVENOUS ONCE
Status: COMPLETED | OUTPATIENT
Start: 2024-11-07 | End: 2024-11-07

## 2024-11-07 RX ORDER — PANTOPRAZOLE SODIUM 40 MG/10ML
40 INJECTION, POWDER, LYOPHILIZED, FOR SOLUTION INTRAVENOUS
Status: COMPLETED | OUTPATIENT
Start: 2024-11-07 | End: 2024-11-07

## 2024-11-07 RX ORDER — METOPROLOL TARTRATE 1 MG/ML
5 INJECTION, SOLUTION INTRAVENOUS
Status: COMPLETED | OUTPATIENT
Start: 2024-11-08 | End: 2024-11-08

## 2024-11-07 RX ADMIN — POTASSIUM BICARBONATE 50 MEQ: 978 TABLET, EFFERVESCENT ORAL at 11:11

## 2024-11-07 RX ADMIN — DROPERIDOL 1.25 MG: 2.5 INJECTION, SOLUTION INTRAMUSCULAR; INTRAVENOUS at 10:11

## 2024-11-07 RX ADMIN — PANTOPRAZOLE SODIUM 40 MG: 40 INJECTION, POWDER, LYOPHILIZED, FOR SOLUTION INTRAVENOUS at 10:11

## 2024-11-07 RX ADMIN — IOHEXOL 150 ML: 350 INJECTION, SOLUTION INTRAVENOUS at 10:11

## 2024-11-08 ENCOUNTER — CLINICAL SUPPORT (OUTPATIENT)
Dept: SMOKING CESSATION | Facility: CLINIC | Age: 59
End: 2024-11-08

## 2024-11-08 DIAGNOSIS — F17.200 NICOTINE DEPENDENCE: Primary | ICD-10-CM

## 2024-11-08 PROBLEM — D62 ABLA (ACUTE BLOOD LOSS ANEMIA): Status: ACTIVE | Noted: 2024-11-08

## 2024-11-08 PROBLEM — K92.2 GIB (GASTROINTESTINAL BLEEDING): Status: ACTIVE | Noted: 2024-11-08

## 2024-11-08 LAB
AMPHET+METHAMPHET UR QL: ABNORMAL
ANION GAP SERPL CALC-SCNC: 9 MMOL/L (ref 8–16)
BACTERIA #/AREA URNS HPF: NORMAL /HPF
BARBITURATES UR QL SCN>200 NG/ML: NEGATIVE
BASOPHILS # BLD AUTO: 0.07 K/UL (ref 0–0.2)
BASOPHILS NFR BLD: 0.3 % (ref 0–1.9)
BENZODIAZ UR QL SCN>200 NG/ML: NEGATIVE
BILIRUB UR QL STRIP: NEGATIVE
BLD PROD TYP BPU: NORMAL
BLOOD UNIT EXPIRATION DATE: NORMAL
BLOOD UNIT TYPE CODE: 5100
BLOOD UNIT TYPE: NORMAL
BUN SERPL-MCNC: 20 MG/DL (ref 6–20)
BZE UR QL SCN: ABNORMAL
CALCIUM SERPL-MCNC: 7.6 MG/DL (ref 8.7–10.5)
CANNABINOIDS UR QL SCN: ABNORMAL
CHLORIDE SERPL-SCNC: 105 MMOL/L (ref 95–110)
CLARITY UR: CLEAR
CO2 SERPL-SCNC: 20 MMOL/L (ref 23–29)
CODING SYSTEM: NORMAL
COLOR UR: YELLOW
CREAT SERPL-MCNC: 1.3 MG/DL (ref 0.5–1.4)
CREAT UR-MCNC: 69 MG/DL (ref 15–325)
CROSSMATCH INTERPRETATION: NORMAL
DIFFERENTIAL METHOD BLD: ABNORMAL
DISPENSE STATUS: NORMAL
EOSINOPHIL # BLD AUTO: 0 K/UL (ref 0–0.5)
EOSINOPHIL NFR BLD: 0.1 % (ref 0–8)
ERYTHROCYTE [DISTWIDTH] IN BLOOD BY AUTOMATED COUNT: 16.4 % (ref 11.5–14.5)
EST. GFR  (NO RACE VARIABLE): 48 ML/MIN/1.73 M^2
ETHANOL UR-MCNC: <10 MG/DL
GLUCOSE SERPL-MCNC: 115 MG/DL (ref 70–110)
GLUCOSE UR QL STRIP: NEGATIVE
HCT VFR BLD AUTO: ABNORMAL % (ref 37–48.5)
HGB BLD-MCNC: ABNORMAL G/DL (ref 12–16)
HGB UR QL STRIP: NEGATIVE
HYALINE CASTS #/AREA URNS LPF: 0 /LPF
IMM GRANULOCYTES # BLD AUTO: 0.13 K/UL (ref 0–0.04)
IMM GRANULOCYTES NFR BLD AUTO: 0.6 % (ref 0–0.5)
KETONES UR QL STRIP: NEGATIVE
LEUKOCYTE ESTERASE UR QL STRIP: NEGATIVE
LYMPHOCYTES # BLD AUTO: 1.2 K/UL (ref 1–4.8)
LYMPHOCYTES NFR BLD: 5.8 % (ref 18–48)
MCH RBC QN AUTO: 28 PG (ref 27–31)
MCHC RBC AUTO-ENTMCNC: 32.7 G/DL (ref 32–36)
MCV RBC AUTO: 86 FL (ref 82–98)
METHADONE UR QL SCN>300 NG/ML: NEGATIVE
MICROSCOPIC COMMENT: NORMAL
MONOCYTES # BLD AUTO: 0.8 K/UL (ref 0.3–1)
MONOCYTES NFR BLD: 3.7 % (ref 4–15)
NEUTROPHILS # BLD AUTO: 18.5 K/UL (ref 1.8–7.7)
NEUTROPHILS NFR BLD: 89.5 % (ref 38–73)
NITRITE UR QL STRIP: NEGATIVE
NRBC BLD-RTO: 0 /100 WBC
OPIATES UR QL SCN: NEGATIVE
PCP UR QL SCN>25 NG/ML: NEGATIVE
PH UR STRIP: 6 [PH] (ref 5–8)
PLATELET # BLD AUTO: 399 K/UL (ref 150–450)
PMV BLD AUTO: 10 FL (ref 9.2–12.9)
POTASSIUM SERPL-SCNC: 3.6 MMOL/L (ref 3.5–5.1)
PROT UR QL STRIP: ABNORMAL
RBC # BLD AUTO: 3.25 M/UL (ref 4–5.4)
RBC #/AREA URNS HPF: 2 /HPF (ref 0–4)
SODIUM SERPL-SCNC: 134 MMOL/L (ref 136–145)
SP GR UR STRIP: >1.03 (ref 1–1.03)
SQUAMOUS #/AREA URNS HPF: 1 /HPF
TOXICOLOGY INFORMATION: ABNORMAL
TRANS ERYTHROCYTES VOL PATIENT: NORMAL ML
TROPONIN I SERPL DL<=0.01 NG/ML-MCNC: 3.45 NG/ML (ref 0–0.03)
TROPONIN I SERPL DL<=0.01 NG/ML-MCNC: 3.59 NG/ML (ref 0–0.03)
TROPONIN I SERPL DL<=0.01 NG/ML-MCNC: 4.53 NG/ML (ref 0–0.03)
URN SPEC COLLECT METH UR: ABNORMAL
UROBILINOGEN UR STRIP-ACNC: NEGATIVE EU/DL
WBC # BLD AUTO: 20.68 K/UL (ref 3.9–12.7)
WBC #/AREA URNS HPF: 1 /HPF (ref 0–5)

## 2024-11-08 PROCEDURE — 84484 ASSAY OF TROPONIN QUANT: CPT | Mod: 91 | Performed by: EMERGENCY MEDICINE

## 2024-11-08 PROCEDURE — 63600175 PHARM REV CODE 636 W HCPCS: Performed by: NURSE PRACTITIONER

## 2024-11-08 PROCEDURE — 84484 ASSAY OF TROPONIN QUANT: CPT | Performed by: REGISTERED NURSE

## 2024-11-08 PROCEDURE — 80048 BASIC METABOLIC PNL TOTAL CA: CPT | Performed by: REGISTERED NURSE

## 2024-11-08 PROCEDURE — 93005 ELECTROCARDIOGRAM TRACING: CPT

## 2024-11-08 PROCEDURE — 25000003 PHARM REV CODE 250: Performed by: EMERGENCY MEDICINE

## 2024-11-08 PROCEDURE — 11000001 HC ACUTE MED/SURG PRIVATE ROOM

## 2024-11-08 PROCEDURE — 25000003 PHARM REV CODE 250: Performed by: STUDENT IN AN ORGANIZED HEALTH CARE EDUCATION/TRAINING PROGRAM

## 2024-11-08 PROCEDURE — 81000 URINALYSIS NONAUTO W/SCOPE: CPT | Performed by: EMERGENCY MEDICINE

## 2024-11-08 PROCEDURE — 84484 ASSAY OF TROPONIN QUANT: CPT | Mod: 91 | Performed by: STUDENT IN AN ORGANIZED HEALTH CARE EDUCATION/TRAINING PROGRAM

## 2024-11-08 PROCEDURE — 36415 COLL VENOUS BLD VENIPUNCTURE: CPT | Performed by: REGISTERED NURSE

## 2024-11-08 PROCEDURE — 63600175 PHARM REV CODE 636 W HCPCS: Performed by: REGISTERED NURSE

## 2024-11-08 PROCEDURE — 36430 TRANSFUSION BLD/BLD COMPNT: CPT

## 2024-11-08 PROCEDURE — 99223 1ST HOSP IP/OBS HIGH 75: CPT | Mod: ,,, | Performed by: INTERNAL MEDICINE

## 2024-11-08 PROCEDURE — 85025 COMPLETE CBC W/AUTO DIFF WBC: CPT | Performed by: STUDENT IN AN ORGANIZED HEALTH CARE EDUCATION/TRAINING PROGRAM

## 2024-11-08 PROCEDURE — 27000207 HC ISOLATION

## 2024-11-08 PROCEDURE — 99223 1ST HOSP IP/OBS HIGH 75: CPT | Mod: GC,,, | Performed by: INTERNAL MEDICINE

## 2024-11-08 PROCEDURE — 80307 DRUG TEST PRSMV CHEM ANLYZR: CPT | Performed by: STUDENT IN AN ORGANIZED HEALTH CARE EDUCATION/TRAINING PROGRAM

## 2024-11-08 PROCEDURE — 96375 TX/PRO/DX INJ NEW DRUG ADDON: CPT

## 2024-11-08 PROCEDURE — 30233N1 TRANSFUSION OF NONAUTOLOGOUS RED BLOOD CELLS INTO PERIPHERAL VEIN, PERCUTANEOUS APPROACH: ICD-10-PCS | Performed by: STUDENT IN AN ORGANIZED HEALTH CARE EDUCATION/TRAINING PROGRAM

## 2024-11-08 PROCEDURE — 99999 PR PBB SHADOW E&M-EST. PATIENT-LVL I: CPT | Mod: PBBFAC,,,

## 2024-11-08 PROCEDURE — P9021 RED BLOOD CELLS UNIT: HCPCS | Performed by: EMERGENCY MEDICINE

## 2024-11-08 PROCEDURE — S4991 NICOTINE PATCH NONLEGEND: HCPCS | Performed by: STUDENT IN AN ORGANIZED HEALTH CARE EDUCATION/TRAINING PROGRAM

## 2024-11-08 PROCEDURE — 93010 ELECTROCARDIOGRAM REPORT: CPT | Mod: ,,, | Performed by: INTERNAL MEDICINE

## 2024-11-08 PROCEDURE — 36415 COLL VENOUS BLD VENIPUNCTURE: CPT | Mod: XB | Performed by: STUDENT IN AN ORGANIZED HEALTH CARE EDUCATION/TRAINING PROGRAM

## 2024-11-08 RX ORDER — IBUPROFEN 200 MG
1 TABLET ORAL DAILY
Status: DISCONTINUED | OUTPATIENT
Start: 2024-11-08 | End: 2024-11-18 | Stop reason: HOSPADM

## 2024-11-08 RX ORDER — FUROSEMIDE 10 MG/ML
40 INJECTION INTRAMUSCULAR; INTRAVENOUS ONCE
Status: COMPLETED | OUTPATIENT
Start: 2024-11-08 | End: 2024-11-08

## 2024-11-08 RX ORDER — FUROSEMIDE 40 MG/1
40 TABLET ORAL DAILY
Status: DISCONTINUED | OUTPATIENT
Start: 2024-11-09 | End: 2024-11-13

## 2024-11-08 RX ORDER — METOPROLOL SUCCINATE 25 MG/1
25 TABLET, EXTENDED RELEASE ORAL DAILY
Status: DISCONTINUED | OUTPATIENT
Start: 2024-11-08 | End: 2024-11-18 | Stop reason: HOSPADM

## 2024-11-08 RX ORDER — CEFTRIAXONE 1 G/1
1 INJECTION, POWDER, FOR SOLUTION INTRAMUSCULAR; INTRAVENOUS
Status: DISCONTINUED | OUTPATIENT
Start: 2024-11-08 | End: 2024-11-17

## 2024-11-08 RX ORDER — PANTOPRAZOLE SODIUM 40 MG/10ML
40 INJECTION, POWDER, LYOPHILIZED, FOR SOLUTION INTRAVENOUS 2 TIMES DAILY
Status: DISCONTINUED | OUTPATIENT
Start: 2024-11-08 | End: 2024-11-18 | Stop reason: HOSPADM

## 2024-11-08 RX ADMIN — PANTOPRAZOLE SODIUM 40 MG: 40 INJECTION, POWDER, LYOPHILIZED, FOR SOLUTION INTRAVENOUS at 09:11

## 2024-11-08 RX ADMIN — FUROSEMIDE 40 MG: 10 INJECTION, SOLUTION INTRAMUSCULAR; INTRAVENOUS at 09:11

## 2024-11-08 RX ADMIN — CEFTRIAXONE SODIUM 1 G: 1 INJECTION, POWDER, FOR SOLUTION INTRAMUSCULAR; INTRAVENOUS at 09:11

## 2024-11-08 RX ADMIN — METOPROLOL SUCCINATE 25 MG: 25 TABLET, EXTENDED RELEASE ORAL at 09:11

## 2024-11-08 RX ADMIN — PANTOPRAZOLE SODIUM 40 MG: 40 INJECTION, POWDER, LYOPHILIZED, FOR SOLUTION INTRAVENOUS at 08:11

## 2024-11-08 RX ADMIN — NICOTINE 1 PATCH: 21 PATCH, EXTENDED RELEASE TRANSDERMAL at 01:11

## 2024-11-08 RX ADMIN — METOROPROLOL TARTRATE 5 MG: 5 INJECTION, SOLUTION INTRAVENOUS at 12:11

## 2024-11-08 RX ADMIN — METOPROLOL SUCCINATE 25 MG: 25 TABLET, EXTENDED RELEASE ORAL at 12:11

## 2024-11-08 NOTE — PLAN OF CARE
VN cued in to pt's room with permission to complete admission questions. Questions initiated with pt but pt falling asleep frequently. Will wake up momentarily but falls back asleep. Bedside nurse notified. Will attempt later when pt is more alert.

## 2024-11-08 NOTE — ASSESSMENT & PLAN NOTE
EKG w/o ST elevation  Troponin elevated 3.379-->3.587-->3.449  Cards eval is appreciated, medical management is recommended

## 2024-11-08 NOTE — PROGRESS NOTES
[facility] Medicine  Progress Note    Patient Name: Mary Ellen Saini  MRN: [id number]  Patient Class: IP- Inpatient   Admission Date: 11/7/2024  Length of Stay: 0 days  Attending Physician: Thomas Velasquez MD  Primary Care Provider: Marlen, Primary Doctor        Subjective:     Principal Problem:<principal problem not specified>        HPI:  Dear year old female with a history of hypertension, bipolar, hep C, HFrEF presents to ED with complaints of chest pain, shortness a breath and blood in her stool.  Patient was recently admitted for GI bleed which she required blood transfusion.  Patient reports bleeding in stool has been having for the last several days however worsened today along with worsening abdominal pain. In ED labs remarkable for H&H 5.6 and 17.2.  Patient discharged 12 days ago with H&H of 7.3 and 22.8.  Blood pressure stable on admission however she is tachycardic 120.  Patient also with a chest pain.  EKG without ST elevation BNP 3425, troponin 3.379.  CTA of the abdomen done denies show any evidence of active GI bleed.  Patient typed and crossmatch for 3 units.  We will start PPI IV BID, transfuse PRBC, consult GI and Cards.    Overview/Hospital Course:  No notes on file    Interval History:   Patient is feeling okay  She has mild shortness of breath, she received 3 U PRBC, and given IV lasix    She had a BM which she said was brown in color with no blood    Review of Systems   Constitutional: Negative.    HENT: Negative.     Respiratory:  Positive for shortness of breath.    Cardiovascular: Negative.    Gastrointestinal: Negative.    Genitourinary: Negative.    Musculoskeletal: Negative.    Skin: Negative.    Neurological: Negative.    Psychiatric/Behavioral: Negative.       Objective:     Vital Signs (Most Recent):  Temp: 98 °F (36.7 °C) (11/08/24 1124)  Pulse: 107 (11/08/24 1257)  Resp: 20 (11/08/24 1124)  BP: (!) 144/98 (11/08/24 1125)  SpO2: 99 % (11/08/24 1125) Vital Signs (24h  Range):  Temp:  [97.4 °F (36.3 °C)-99 °F (37.2 °C)] 98 °F (36.7 °C)  Pulse:  [103-129] 107  Resp:  [18-32] 20  SpO2:  [88 %-100 %] 99 %  BP: (131-152)/() 144/98     Weight: 78.5 kg (173 lb 1 oz)  Body mass index is 24.83 kg/m².    Intake/Output Summary (Last 24 hours) at 11/8/2024 1316  Last data filed at 11/8/2024 0909  Gross per 24 hour   Intake 1099.5 ml   Output 300 ml   Net 799.5 ml         Physical Exam  Constitutional:       Appearance: Normal appearance.   Cardiovascular:      Rate and Rhythm: Normal rate and regular rhythm.   Pulmonary:      Breath sounds: Rhonchi present.   Abdominal:      General: Abdomen is flat.      Palpations: Abdomen is soft.   Skin:     General: Skin is warm and dry.   Neurological:      General: No focal deficit present.      Mental Status: She is alert and oriented to person, place, and time.             Significant Labs: All pertinent labs within the past 24 hours have been reviewed.  CBC:   Recent Labs   Lab 11/07/24 2153 11/08/24  1014   WBC 19.41* 20.68*   HGB 5.6* 9.1@RCBLD*   HCT 17.2* 27.8@RCBLD*    399     CMP:   Recent Labs   Lab 11/07/24 2153 11/08/24  0401   * 134*   K 2.8* 3.6    105   CO2 20* 20*   * 115*   BUN 20 20   CREATININE 1.2 1.3   CALCIUM 7.5* 7.6*   PROT 6.3  --    ALBUMIN 2.1*  --    BILITOT 0.3  --    ALKPHOS 78  --    AST 17  --    ALT 13  --    ANIONGAP 9 9       Significant Imaging: I have reviewed all pertinent imaging results/findings within the past 24 hours.    Assessment/Plan:      ABLA (acute blood loss anemia)  Anemia is likely due to acute blood loss which was from GIB . Most recent hemoglobin and hematocrit are listed below.  Recent Labs     11/07/24 2153 11/08/24  1014   HGB 5.6* 9.1@RCBLD*   HCT 17.2* 27.8@RCBLD*     CTA w/o active GIB  Plan  - Monitor serial CBC: Daily  - Transfuse PRBC if patient becomes hemodynamically unstable, symptomatic or H/H drops below 7/21.  - Patient has received 3 units of PRBCs  on 11/8/24  - Patient's anemia is currently stable  - Consult GI    GIB (gastrointestinal bleeding)  Patient's hemorrhage is due to gastrointestinal bleed, patient does not have a propensity for bleeding.. Patients most recent Hgb, Hct, platelets, and INR are listed below.  Recent Labs     11/07/24  2153 11/08/24  1014   HGB 5.6* 9.1@RCBLD*   HCT 17.2* 27.8@RCBLD*    399   INR 1.1  --        Plan  - Will trend hemoglobin/hematocrit Daily  - Will monitor and correct any coagulation defects  - Will transfuse if Hgb is <7g/dl (<8g/dl in cases of active ACS) or if patient has rapid bleeding leading to hemodynamic instability  - Consult GI  -Ppi BID  -patient received 3 U PRBC    Leukocytosis  No obvious infection  Started rocephin given pts hx hep c and GIB      NSTEMI (non-ST elevated myocardial infarction)  EKG w/o ST elevation  Troponin elevated 3.379-->3.587-->3.449  Cards eval is appreciated, medical management is recommended       HFrEF (heart failure with reduced ejection fraction)  Echo 08/2024  EF 34%  Continue BB  Given one dose iv lasix today    Smoking history  Nicotine patches  Smoking cessation counseling  Follow U tox        VTE Risk Mitigation (From admission, onward)           Ordered     IP VTE HIGH RISK PATIENT  Once         11/08/24 0137     Place sequential compression device  Until discontinued         11/08/24 0137                    Discharge Planning   JENAE:      Code Status: Full Code   Is the patient medically ready for discharge?:     Reason for patient still in hospital (select all that apply): Patient trending condition and Treatment                     Thomas Velasquez MD  Department of Hospital Medicine   [facility]

## 2024-11-08 NOTE — HPI
58yo female with NSTSEMI, GIB, ABLA, HFrEF EF 30% Life Vest placed in [facility], leukocytosis, COD, hepatitis C, atrial flutter not on OAC due to anemia who presented to the ER with multiple complaints: abdominal pain, blood in stool, chest pain and SOB. She reports feeling tired and weak for the past few days and noting blood in her stool for the past several days with worsening yesterday. She reports chest pain yesterday with the worsening blood in stool but no reports her chest pain is resolved. She has not taken her medications in the past 3 days. She relocated from [facility] earlier this year and has had social stressors but reports she is now living in [facility]. Labs CBC with H&H 5.6&17.2 BMP with K+ 3.6 BUN 20 creatinine 1.3 B P 3425 Troponin 3.379-3.587-3.449 EKG ST with ST depression Initial /97. Given PRBC transfusion and admitted to [facility] Medicine and Cardiology consulted for evaluation of chest pain     Of note, she is known to our service from previous admissions and was admitted in [facility] earlier this year with anemia, atrial flutter and new onset CHF with EF 30%- she did not undergo ischemic evaluation at that time due to anemia, KENY, methamphetamine use and non compliance. She had a Life Vest placed and was started on Entresto, Aldactone and BB but was eventually transitioned to ARB given financial constraints. No OAC was prescribed given anemia and conern for ?colon cancer

## 2024-11-08 NOTE — ASSESSMENT & PLAN NOTE
- history of CHF with EF 30-35% uncertain of ischemic or non ischemic etiology; global hypokinesis noted on echo  - on ARB, BB, Aldactone and Lasix as an outpatient reports medication noncompliance x few days  - continue on BB; SBP 130s-140s; anticipate resumption of ARB and Aldactone as creatinine permits  - IV Lasix given this AM and may need additional dose later today depending on response  - monitor strict I&Os and daily weights

## 2024-11-08 NOTE — HPI
Dear year old female with a history of hypertension, bipolar, hep C, HFrEF presents to ED with complaints of chest pain, shortness a breath and blood in her stool.  Patient was recently admitted for GI bleed which she required blood transfusion.  Patient reports bleeding in stool has been having for the last several days however worsened today along with worsening abdominal pain. In ED labs remarkable for H&H 5.6 and 17.2.  Patient discharged 12 days ago with H&H of 7.3 and 22.8.  Blood pressure stable on admission however she is tachycardic 120.  Patient also with a chest pain.  EKG without ST elevation BNP 3425, troponin 3.379.  CTA of the abdomen done denies show any evidence of active GI bleed.  Patient typed and crossmatch for 3 units.  We will start PPI IV BID, transfuse PRBC, consult GI and Cards.

## 2024-11-08 NOTE — SUBJECTIVE & OBJECTIVE
Past Medical History:   Diagnosis Date    Asthma     Bipolar disorder     Hypertension        Past Surgical History:   Procedure Laterality Date    CHOLECYSTECTOMY      COLONOSCOPY N/A 10/22/2024    Procedure: COLONOSCOPY;  Surgeon: Dayday Freed MD;  Location: [facility];  Service: Endoscopy;  Laterality: N/A;    ESOPHAGOGASTRODUODENOSCOPY N/A 10/22/2024    Procedure: EGD (ESOPHAGOGASTRODUODENOSCOPY);  Surgeon: Dayday Freed MD;  Location: [facility];  Service: Endoscopy;  Laterality: N/A;    SHOULDER SURGERY      TUBAL LIGATION         Review of patient's allergies indicates:  No Known Allergies    No current facility-administered medications on file prior to encounter.     Current Outpatient Medications on File Prior to Encounter   Medication Sig    albuterol (PROVENTIL/VENTOLIN HFA) 90 mcg/actuation inhaler Inhale 1-2 puffs into the lungs every 6 (six) hours as needed for Wheezing. Rescue    atorvastatin (LIPITOR) 80 MG tablet Take 1 tablet (80 mg total) by mouth once daily.    furosemide (LASIX) 20 MG tablet Take 2 tablets (40 mg total) by mouth once daily.    losartan (COZAAR) 25 MG tablet Take 0.5 tablets (12.5 mg total) by mouth once daily.    metoprolol succinate (TOPROL-XL) 25 MG 24 hr tablet Take 1 tablet (25 mg total) by mouth once daily.    naloxone (NARCAN) 4 mg/actuation Spry 4mg by nasal route as needed for opioid overdose; may repeat every 2-3 minutes in alternating nostrils until medical help arrives. Call 911    nicotine (NICODERM CQ) 14 mg/24 hr Place 1 patch onto the skin once daily.    oxybutynin (DITROPAN) 5 MG Tab Take 1 tablet (5 mg total) by mouth 3 (three) times daily.    pantoprazole (PROTONIX) 40 MG tablet Take 1 tablet (40 mg total) by mouth once daily.    polyethylene glycol (GLYCOLAX) 17 gram PwPk Take 1 packet by mouth once daily.    spironolactone (ALDACTONE) 25 MG tablet Take 0.5 tablets (12.5 mg total) by mouth once daily.    tiotropium (SPIRIVA) 18 mcg  inhalation capsule Inhale 1 capsule (18 mcg total) into the lungs once daily. Controller     Family History    None       Tobacco Use    Smoking status: Every Day     Current packs/day: 0.50     Average packs/day: 2.0 packs/day for 57.9 years (114.6 ttl pk-yrs)     Types: Cigarettes     Start date: 1967    Smokeless tobacco: Never    Tobacco comments:     Pt is a 2 pk/day cigarette smoker x 57 yrs. She states that she cut down to 0.5 pk/day, and is trying to quit. Ambulatory referral to Smoking Cessation clinic following hospital discharge.    Substance and Sexual Activity    Alcohol use: Yes     Comment: socailly    Drug use: Yes     Types: Cocaine, Methamphetamines     Comment: denies cocaine or meth. Reports maijurana use    Sexual activity: Yes     Review of Systems   Constitutional:  Positive for fatigue.   Respiratory:  Positive for shortness of breath.    Cardiovascular:  Positive for chest pain.   Gastrointestinal:  Positive for blood in stool.   Neurological:  Positive for light-headedness.   All other systems reviewed and are negative.    Objective:     Vital Signs (Most Recent):  Temp: 98.2 °F (36.8 °C) (11/08/24 0314)  Pulse: 108 (11/08/24 0331)  Resp: 20 (11/08/24 0314)  BP: (!) 142/99 (11/08/24 0314)  SpO2: 96 % (11/08/24 0314) Vital Signs (24h Range):  Temp:  [97.4 °F (36.3 °C)-99 °F (37.2 °C)] 98.2 °F (36.8 °C)  Pulse:  [103-129] 108  Resp:  [20-32] 20  SpO2:  [92 %-100 %] 96 %  BP: (131-152)/() 142/99     Weight: 78.5 kg (173 lb 1 oz)  Body mass index is 24.83 kg/m².     Physical Exam  Vitals reviewed.   Constitutional:       Appearance: Normal appearance. She is ill-appearing.   HENT:      Head: Normocephalic.      Mouth/Throat:      Mouth: Mucous membranes are moist.      Pharynx: Oropharynx is clear.   Eyes:      Pupils: Pupils are equal, round, and reactive to light.   Cardiovascular:      Rate and Rhythm: Regular rhythm. Tachycardia present.      Pulses: Normal pulses.      Heart sounds:  Normal heart sounds.   Pulmonary:      Effort: Pulmonary effort is normal.      Breath sounds: Normal breath sounds.   Abdominal:      General: Bowel sounds are normal.      Palpations: Abdomen is soft.   Musculoskeletal:         General: Normal range of motion.      Cervical back: Normal range of motion.   Skin:     General: Skin is dry.      Capillary Refill: Capillary refill takes less than 2 seconds.      Coloration: Skin is pale.   Neurological:      General: No focal deficit present.      Mental Status: She is alert and oriented to person, place, and time. Mental status is at baseline.   Psychiatric:         Mood and Affect: Mood normal.         Behavior: Behavior normal.              CRANIAL NERVES     CN III, IV, VI   Pupils are equal, round, and reactive to light.       Significant Labs: All pertinent labs within the past 24 hours have been reviewed.  Recent Lab Results  (Last 5 results in the past 24 hours)        11/08/24  0401   11/08/24  0249   11/08/24  0105   11/07/24  2231   11/07/24  2153        Unit Blood Type Code       5100                5100  [P]                5100  [P]         Unit Expiration       [id number]  [P]                [id number]  [P]         Unit Blood Type       O POS                O POS  [P]                O POS  [P]         Albumin         2.1       ALP         78       ALT         13       Anion Gap 9         9       Aniso         Slight       PTT         30.5  Comment: Refer to local heparin nomogram for intensity/dose specific   therapeutic   range.  LOT^050^APTT FSL^[id number]         AST         17       Baso #         0.07       Basophil %         0.4       BILIRUBIN TOTAL         0.3  Comment: For infants and newborns, interpretation of results should be based  on gestational age, weight and in agreement with clinical  observations.    Premature Infant recommended reference ranges:  Up to 24 hours.............<8.0 mg/dL  Up to 48  hours............<12.0 mg/dL  3-5 days..................<15.0 mg/dL  6-29 days.................<15.0 mg/dL         BNP         3,425  Comment: Values of less than 100 pg/ml are consistent with non-CHF populations.       BUN 20         20       Calcium 7.6         7.5       Chloride 105         105       CO2 20         20       CODING SYSTEM       [id number]                [id number]  [P]                [id number]  [P]         Creatinine 1.3         1.2       Crossmatch Interpretation       Compatible                Compatible  [P]                Compatible  [P]         Differential Method         Automated       DISPENSE STATUS       TRANSFUSED                ISSUED  [P]                CROSSMATCHED  [P]         eGFR 48         52       Eos #         0.1       Eos %         0.6       Glucose 115         117       Gran # (ANC)         16.3       Gran %         83.9       Group & Rh       O POS         Hematocrit         17.2  Comment: HGB and HCT critical result(s) called and verbal readback obtained   from LEE Gifford RN. by RE3 11/07/2024 22:19         Hemoglobin         5.6  Comment: HGB and HCT critical result(s) called and verbal readback obtained   from LEE Gifford RN. by RE3 11/07/2024 22:19         Hypo         Occasional       Immature Grans (Abs)         0.12  Comment: Mild elevation in immature granulocytes is non specific and   can be seen in a variety of conditions including stress response,   acute inflammation, trauma and pregnancy. Correlation with other   laboratory and clinical findings is essential.         Immature Granulocytes         0.6       INDIRECT MO       NEG         INR         1.1  Comment: Coumadin Therapy:  2.0 - 3.0 for INR for all indicators except mechanical heart valves  and antiphospholipid syndromes which should use 2.5 - 3.5.  LOT^040^PT Inn^[id number]         Lactic Acid Level         1.0  Comment: Falsely low lactic acid results can be found in samples   containing >=13.0 mg/dL total  bilirubin and/or >=3.5 mg/dL   direct bilirubin.         Lipase         20       Lymph #         1.9       Lymph %         9.9       Magnesium          1.6       MCH         26.7       MCHC         32.6       MCV         82       Mono #         0.9       Mono %         4.6       MPV         9.5       nRBC         0       Ovalocytes         Occasional       Platelet Estimate         Appears normal       Platelet Count         391       Poikilocytosis         Slight       Poly         Occasional       Potassium 3.6         2.8       Product Code       [id number]                [id number]  [P]                [id number]  [P]         PROTEIN TOTAL         6.3       PT         11.4       RBC         2.10       RDW         17.5       Sodium 134         134       Specimen Outdate       11/10/2024 23:59         Troponin I   3.449  Comment: The reference interval for Troponin I represents the 99th percentile   cutoff   for our facility and is consistent with 3rd generation assay   performance.     3.587  Comment: The reference interval for Troponin I represents the 99th percentile   cutoff   for our facility and is consistent with 3rd generation assay   performance.       3.379  Comment: The reference interval for Troponin I represents the 99th percentile   cutoff   for our facility and is consistent with 3rd generation assay   performance.         UNIT NUMBER       [id number]                [id number]  [P]                [id number]  [P]         WBC         19.41                               [P] - Preliminary Result               Significant Imaging: I have reviewed all pertinent imaging results/findings within the past 24 hours.  I have reviewed and interpreted all pertinent imaging results/findings within the past 24 hours.

## 2024-11-08 NOTE — H&P
[facility] Medicine  History & Physical    Patient Name: Mary Ellen Saini  MRN: [id number]  Patient Class: IP- Inpatient  Admission Date: 11/7/2024  Attending Physician: Thomas Velasquez MD   Primary Care Provider: Marlen, Primary Doctor         Patient information was obtained from patient and ER records.     Subjective:     Principal Problem:<principal problem not specified>    Chief Complaint:   Chief Complaint   Patient presents with    Fatigue     [facility] brought in a 58 y/o with c/o abd pain , diarrhea, chest pain, feeling SOB, history of CHF.  Hasn't taken meds for 3 days.  3 L NC O2 placed on patient.  Accu check 127, ASA, 2 sprays NTG given per EMS. O2 removed upon arrival to ED.        HPI: [age] year old female with a history of hypertension, bipolar, hep C, HFrEF presents to ED with complaints of chest pain, shortness a breath and blood in her stool.  Patient was recently admitted for GI bleed which she required blood transfusion.  Patient reports bleeding in stool has been having for the last several days however worsened today along with worsening abdominal pain. In ED labs remarkable for H&H 5.6 and 17.2.  Patient discharged 12 days ago with H&H of 7.3 and 22.8.  Blood pressure stable on admission however she is tachycardic 120.  Patient also with a chest pain.  EKG without ST elevation BNP 3425, troponin 3.379.  CTA of the abdomen done denies show any evidence of active GI bleed.  Patient typed and crossmatch for 3 units.  We will start PPI IV BID, transfuse PRBC, consult GI and Cards.    Past Medical History:   Diagnosis Date    Asthma     Bipolar disorder     Hypertension        Past Surgical History:   Procedure Laterality Date    CHOLECYSTECTOMY      COLONOSCOPY N/A 10/22/2024    Procedure: COLONOSCOPY;  Surgeon: Dayday Freed MD;  Location: [facility];  Service: Endoscopy;  Laterality: N/A;    ESOPHAGOGASTRODUODENOSCOPY N/A 10/22/2024    Procedure: EGD  (ESOPHAGOGASTRODUODENOSCOPY);  Surgeon: Dayday Freed MD;  Location: [facility];  Service: Endoscopy;  Laterality: N/A;    SHOULDER SURGERY      TUBAL LIGATION         Review of patient's allergies indicates:  No Known Allergies    No current facility-administered medications on file prior to encounter.     Current Outpatient Medications on File Prior to Encounter   Medication Sig    albuterol (PROVENTIL/VENTOLIN HFA) 90 mcg/actuation inhaler Inhale 1-2 puffs into the lungs every 6 (six) hours as needed for Wheezing. Rescue    atorvastatin (LIPITOR) 80 MG tablet Take 1 tablet (80 mg total) by mouth once daily.    furosemide (LASIX) 20 MG tablet Take 2 tablets (40 mg total) by mouth once daily.    losartan (COZAAR) 25 MG tablet Take 0.5 tablets (12.5 mg total) by mouth once daily.    metoprolol succinate (TOPROL-XL) 25 MG 24 hr tablet Take 1 tablet (25 mg total) by mouth once daily.    naloxone (NARCAN) 4 mg/actuation Spry 4mg by nasal route as needed for opioid overdose; may repeat every 2-3 minutes in alternating nostrils until medical help arrives. Call 911    nicotine (NICODERM CQ) 14 mg/24 hr Place 1 patch onto the skin once daily.    oxybutynin (DITROPAN) 5 MG Tab Take 1 tablet (5 mg total) by mouth 3 (three) times daily.    pantoprazole (PROTONIX) 40 MG tablet Take 1 tablet (40 mg total) by mouth once daily.    polyethylene glycol (GLYCOLAX) 17 gram PwPk Take 1 packet by mouth once daily.    spironolactone (ALDACTONE) 25 MG tablet Take 0.5 tablets (12.5 mg total) by mouth once daily.    tiotropium (SPIRIVA) 18 mcg inhalation capsule Inhale 1 capsule (18 mcg total) into the lungs once daily. Controller     Family History    None       Tobacco Use    Smoking status: Every Day     Current packs/day: 0.50     Average packs/day: 2.0 packs/day for 57.9 years (114.6 ttl pk-yrs)     Types: Cigarettes     Start date: 1967    Smokeless tobacco: Never    Tobacco comments:     Pt is a 2 pk/day cigarette smoker  x 58 yrs. She states that she cut down to 0.5 pk/day, and is trying to quit. Ambulatory referral to Smoking Cessation clinic following hospital discharge.    Substance and Sexual Activity    Alcohol use: Yes     Comment: socailly    Drug use: Yes     Types: Cocaine, Methamphetamines     Comment: denies cocaine or meth. Reports maijurana use    Sexual activity: Yes     Review of Systems   Constitutional:  Positive for fatigue.   Respiratory:  Positive for shortness of breath.    Cardiovascular:  Positive for chest pain.   Gastrointestinal:  Positive for blood in stool.   Neurological:  Positive for light-headedness.   All other systems reviewed and are negative.    Objective:     Vital Signs (Most Recent):  Temp: 98.2 °F (36.8 °C) (11/08/24 0314)  Pulse: 108 (11/08/24 0331)  Resp: 20 (11/08/24 0314)  BP: (!) 142/99 (11/08/24 0314)  SpO2: 96 % (11/08/24 0314) Vital Signs (24h Range):  Temp:  [97.4 °F (36.3 °C)-99 °F (37.2 °C)] 98.2 °F (36.8 °C)  Pulse:  [103-129] 108  Resp:  [20-32] 20  SpO2:  [92 %-100 %] 96 %  BP: (131-152)/() 142/99     Weight: 78.5 kg (173 lb 1 oz)  Body mass index is 24.83 kg/m².     Physical Exam  Vitals reviewed.   Constitutional:       Appearance: Normal appearance. She is ill-appearing.   HENT:      Head: Normocephalic.      Mouth/Throat:      Mouth: Mucous membranes are moist.      Pharynx: Oropharynx is clear.   Eyes:      Pupils: Pupils are equal, round, and reactive to light.   Cardiovascular:      Rate and Rhythm: Regular rhythm. Tachycardia present.      Pulses: Normal pulses.      Heart sounds: Normal heart sounds.   Pulmonary:      Effort: Pulmonary effort is normal, tachypnic , no distress.      Breath sounds: Rhonchi thoughout     On 3L NC.   Abdominal:      General: Bowel sounds are normal.      Palpations: Abdomen is soft.   Musculoskeletal:         General: Normal range of motion.      Cervical back: Normal range of motion.   Skin:     General: Skin is dry.      Capillary  Refill: Capillary refill takes less than 2 seconds.      Coloration: Skin is pale.   Neurological:      General: No focal deficit present.      Mental Status: She is alert and oriented to person, place, and time. Mental status is at baseline.   Psychiatric:         Mood and Affect: Mood normal.         Behavior: Behavior normal.              CRANIAL NERVES     CN III, IV, VI   Pupils are equal, round, and reactive to light.       Significant Labs: All pertinent labs within the past 24 hours have been reviewed.  Recent Lab Results  (Last 5 results in the past 24 hours)        11/08/24  0401   11/08/24  0249   11/08/24  0105   11/07/24  2231   11/07/24  2153        Unit Blood Type Code       5100                5100  [P]                5100  [P]         Unit Expiration       [id number]  [P]                [id number]  [P]         Unit Blood Type       O POS                O POS  [P]                O POS  [P]         Albumin         2.1       ALP         78       ALT         13       Anion Gap 9         9       Aniso         Slight       PTT         30.5  Comment: Refer to local heparin nomogram for intensity/dose specific   therapeutic   range.  LOT^050^APTT FSL^[id number]         AST         17       Baso #         0.07       Basophil %         0.4       BILIRUBIN TOTAL         0.3  Comment: For infants and newborns, interpretation of results should be based  on gestational age, weight and in agreement with clinical  observations.    Premature Infant recommended reference ranges:  Up to 24 hours.............<8.0 mg/dL  Up to 48 hours............<12.0 mg/dL  3-5 days..................<15.0 mg/dL  6-29 days.................<15.0 mg/dL         BNP         3,425  Comment: Values of less than 100 pg/ml are consistent with non-CHF populations.       BUN 20         20       Calcium 7.6         7.5       Chloride 105         105       CO2 20         20       CODING SYSTEM       [id number]                 [id number]  [P]                [id number]  [P]         Creatinine 1.3         1.2       Crossmatch Interpretation       Compatible                Compatible  [P]                Compatible  [P]         Differential Method         Automated       DISPENSE STATUS       TRANSFUSED                ISSUED  [P]                CROSSMATCHED  [P]         eGFR 48         52       Eos #         0.1       Eos %         0.6       Glucose 115         117       Gran # (ANC)         16.3       Gran %         83.9       Group & Rh       O POS         Hematocrit         17.2  Comment: HGB and HCT critical result(s) called and verbal readback obtained   from LEE Gifford RN. by RE3 11/07/2024 22:19         Hemoglobin         5.6  Comment: HGB and HCT critical result(s) called and verbal readback obtained   from LEE Gifford RN. by RE3 11/07/2024 22:19         Hypo         Occasional       Immature Grans (Abs)         0.12  Comment: Mild elevation in immature granulocytes is non specific and   can be seen in a variety of conditions including stress response,   acute inflammation, trauma and pregnancy. Correlation with other   laboratory and clinical findings is essential.         Immature Granulocytes         0.6       INDIRECT MO       NEG         INR         1.1  Comment: Coumadin Therapy:  2.0 - 3.0 for INR for all indicators except mechanical heart valves  and antiphospholipid syndromes which should use 2.5 - 3.5.  LOT^040^PT Inn^[id number]         Lactic Acid Level         1.0  Comment: Falsely low lactic acid results can be found in samples   containing >=13.0 mg/dL total bilirubin and/or >=3.5 mg/dL   direct bilirubin.         Lipase         20       Lymph #         1.9       Lymph %         9.9       Magnesium          1.6       MCH         26.7       MCHC         32.6       MCV         82       Mono #         0.9       Mono %         4.6       MPV         9.5       nRBC         0       Ovalocytes         Occasional       Platelet  Estimate         Appears normal       Platelet Count         391       Poikilocytosis         Slight       Poly         Occasional       Potassium 3.6         2.8       Product Code       A7935J21                [id number]  [P]                [id number]  [P]         PROTEIN TOTAL         6.3       PT         11.4       RBC         2.10       RDW         17.5       Sodium 134         134       Specimen Outdate       11/10/2024 23:59         Troponin I   3.449  Comment: The reference interval for Troponin I represents the 99th percentile   cutoff   for our facility and is consistent with 3rd generation assay   performance.     3.587  Comment: The reference interval for Troponin I represents the 99th percentile   cutoff   for our facility and is consistent with 3rd generation assay   performance.       3.379  Comment: The reference interval for Troponin I represents the 99th percentile   cutoff   for our facility and is consistent with 3rd generation assay   performance.         UNIT NUMBER       [id number]                [id number]  [P]                [id number]  [P]         WBC         19.41                               [P] - Preliminary Result               Significant Imaging: I have reviewed all pertinent imaging results/findings within the past 24 hours.  I have reviewed and interpreted all pertinent imaging results/findings within the past 24 hours.  Assessment/Plan:     ABLA (acute blood loss anemia)  Anemia is likely due to acute blood loss which was from GIB . Most recent hemoglobin and hematocrit are listed below.  Recent Labs     11/07/24  2153   HGB 5.6*   HCT 17.2*   CTA w/o active GIB  Plan  - Monitor serial CBC: Every 8 hours  - Transfuse PRBC if patient becomes hemodynamically unstable, symptomatic or H/H drops below 7/21.  - Patient has received 3 units of PRBCs on 11/8/24  - Patient's anemia is currently worsening. Will continue current treatment  - Consult GI    GIB (gastrointestinal  bleeding)  Patient's hemorrhage is due to gastrointestinal bleed, patient does not have a propensity for bleeding.. Patients most recent Hgb, Hct, platelets, and INR are listed below.  Recent Labs     11/07/24  2153   HGB 5.6*   HCT 17.2*      INR 1.1     Plan  - Will trend hemoglobin/hematocrit Every 6 hours  - Will monitor and correct any coagulation defects  - Will transfuse if Hgb is <7g/dl (<8g/dl in cases of active ACS) or if patient has rapid bleeding leading to hemodynamic instability  - Consult GI  -Ppi BID    Leukocytosis  No obvious infection  Started rocephin given pts hx hep c and GIB      NSTEMI (non-ST elevated myocardial infarction)  EKG w/o ST elevation  Troponin elevated 3.379-->3.587-->3.449  Cards consult  Unable to anticoagulate , pt currently with GIB. Await recs    HFrEF (heart failure with reduced ejection fraction)  Echo 08/2024  EF 34%  Continue ARB/statin/lasix/BB      VTE Risk Mitigation (From admission, onward)           Ordered     IP VTE HIGH RISK PATIENT  Once         11/08/24 0137     Place sequential compression device  Until discontinued         11/08/24 0137                                  11/08/24 0453   Admission   Initial VN Admission Questions Incomplete  (pending home medication reconciliation and [facility] questionaire)   Shift   Virtual Nurse - Patient Verbalized Approval Of Camera Use;VN Rounding   Safety/Activity   Patient Rounds bed in low position;call light in patient/parent reach;clutter free environment maintained;visualized patient;placement of personal items at bedside   Safety Promotion/Fall Prevention assistive device/personal item within reach;side rails raised x 2   Positioning   Body Position side-lying;right   Head of Bed (HOB) Positioning HOB at 30-45 degrees     Notified by bedside nurse that pt is still sleepy but is willing to attempt to answer admission questions. VN cued in to pt's room with permission. Continued with admission questions. Pt falling  asleep again. Able to complete admission questions with the exception of home medications and SDOH questionnaire. Bedside nurse notified. Will endorse to oncoming VN that home medications and SDOH needs to be completed when pt is awake.     Tavon Crawley NP  Department of [facility] Medicine  [place] [facility]

## 2024-11-08 NOTE — SUBJECTIVE & OBJECTIVE
Past Medical History:   Diagnosis Date    Asthma     Bipolar disorder     Hypertension        Past Surgical History:   Procedure Laterality Date    CHOLECYSTECTOMY      COLONOSCOPY N/A 10/22/2024    Procedure: COLONOSCOPY;  Surgeon: Dayday Freed MD;  Location: [facility];  Service: Endoscopy;  Laterality: N/A;    ESOPHAGOGASTRODUODENOSCOPY N/A 10/22/2024    Procedure: EGD (ESOPHAGOGASTRODUODENOSCOPY);  Surgeon: Dayday Freed MD;  Location: [facility];  Service: Endoscopy;  Laterality: N/A;    SHOULDER SURGERY      TUBAL LIGATION         Review of patient's allergies indicates:  No Known Allergies    No current facility-administered medications on file prior to encounter.     Current Outpatient Medications on File Prior to Encounter   Medication Sig    albuterol (PROVENTIL/VENTOLIN HFA) 90 mcg/actuation inhaler Inhale 1-2 puffs into the lungs every 6 (six) hours as needed for Wheezing. Rescue    atorvastatin (LIPITOR) 80 MG tablet Take 1 tablet (80 mg total) by mouth once daily.    furosemide (LASIX) 20 MG tablet Take 2 tablets (40 mg total) by mouth once daily.    losartan (COZAAR) 25 MG tablet Take 0.5 tablets (12.5 mg total) by mouth once daily.    metoprolol succinate (TOPROL-XL) 25 MG 24 hr tablet Take 1 tablet (25 mg total) by mouth once daily.    naloxone (NARCAN) 4 mg/actuation Spry 4mg by nasal route as needed for opioid overdose; may repeat every 2-3 minutes in alternating nostrils until medical help arrives. Call 911    nicotine (NICODERM CQ) 14 mg/24 hr Place 1 patch onto the skin once daily.    oxybutynin (DITROPAN) 5 MG Tab Take 1 tablet (5 mg total) by mouth 3 (three) times daily.    pantoprazole (PROTONIX) 40 MG tablet Take 1 tablet (40 mg total) by mouth once daily.    polyethylene glycol (GLYCOLAX) 17 gram PwPk Take 1 packet by mouth once daily.    spironolactone (ALDACTONE) 25 MG tablet Take 0.5 tablets (12.5 mg total) by mouth once daily.    tiotropium (SPIRIVA) 18 mcg  inhalation capsule Inhale 1 capsule (18 mcg total) into the lungs once daily. Controller     Family History    None       Tobacco Use    Smoking status: Every Day     Current packs/day: 0.50     Average packs/day: 2.0 packs/day for 57.9 years (114.6 ttl pk-yrs)     Types: Cigarettes     Start date: 1967    Smokeless tobacco: Never    Tobacco comments:     Pt is a 2 pk/day cigarette smoker x 57 yrs. She states that she cut down to 0.5 pk/day, and is trying to quit. Ambulatory referral to Smoking Cessation clinic following hospital discharge.    Substance and Sexual Activity    Alcohol use: Yes     Comment: socailly    Drug use: Yes     Types: Cocaine, Methamphetamines     Comment: denies cocaine or meth. Reports maijurana use    Sexual activity: Yes     Review of Systems   Constitutional: Positive for malaise/fatigue. Negative for chills, decreased appetite, diaphoresis, fever, weight gain and weight loss.   Cardiovascular:  Positive for chest pain. Negative for claudication, dyspnea on exertion, irregular heartbeat, leg swelling, near-syncope, orthopnea, palpitations and paroxysmal nocturnal dyspnea.   Respiratory:  Negative for cough, shortness of breath, snoring, sputum production and wheezing.    Endocrine: Negative for cold intolerance, heat intolerance, polydipsia, polyphagia and polyuria.   Skin:  Negative for color change, dry skin, itching, nail changes and poor wound healing.   Musculoskeletal:  Negative for back pain, gout, joint pain and joint swelling.   Gastrointestinal:  Positive for abdominal pain and melena. Negative for bloating, constipation, diarrhea, hematemesis, hematochezia, nausea and vomiting.   Genitourinary:  Negative for dysuria, hematuria and nocturia.   Neurological:  Negative for dizziness, headaches, light-headedness, numbness, paresthesias and weakness.   Psychiatric/Behavioral:  Negative for altered mental status, depression and memory loss.      Objective:     Vital Signs (Most  Recent):  Temp: 98.3 °F (36.8 °C) (11/08/24 0909)  Pulse: (!) 113 (11/08/24 0909)  Resp: 20 (11/08/24 0909)  BP: (!) 146/97 (11/08/24 0909)  SpO2: 97 % (11/08/24 0909) Vital Signs (24h Range):  Temp:  [97.4 °F (36.3 °C)-99 °F (37.2 °C)] 98.3 °F (36.8 °C)  Pulse:  [103-129] 113  Resp:  [18-32] 20  SpO2:  [88 %-100 %] 97 %  BP: (131-152)/() 146/97     Weight: 78.5 kg (173 lb 1 oz)  Body mass index is 24.83 kg/m².    SpO2: 97 %         Intake/Output Summary (Last 24 hours) at 11/8/2024 0923  Last data filed at 11/8/2024 0518  Gross per 24 hour   Intake 809.5 ml   Output --   Net 809.5 ml       Lines/Drains/Airways       Peripheral Intravenous Line  Duration                  Peripheral IV - Single Lumen 11/07/24 20 G Anterior;Right Forearm 1 day         Peripheral IV - Single Lumen 11/07/24 20 G Left Forearm 1 day                     Physical Exam  Constitutional:       General: She is not in acute distress.     Appearance: She is well-developed.   Cardiovascular:      Rate and Rhythm: Normal rate and regular rhythm.      Heart sounds: No murmur heard.     No gallop.   Pulmonary:      Effort: Pulmonary effort is normal. Tachypnea present. No respiratory distress.      Breath sounds: Wheezing and rales present.   Abdominal:      General: Bowel sounds are normal. There is no distension.      Palpations: Abdomen is soft.      Tenderness: There is no abdominal tenderness.   Skin:     General: Skin is warm and dry.   Neurological:      Mental Status: She is alert and oriented to person, place, and time.          Significant Labs: BMP:   Recent Labs   Lab 11/07/24 2153 11/08/24  0401   * 115*   * 134*   K 2.8* 3.6    105   CO2 20* 20*   BUN 20 20   CREATININE 1.2 1.3   CALCIUM 7.5* 7.6*   MG 1.6  --    , CBC   Recent Labs   Lab 11/07/24 2153   WBC 19.41*   HGB 5.6*   HCT 17.2*      , and Troponin   Recent Labs   Lab 11/07/24  2153 11/08/24  0105 11/08/24  0249   TROPONINI 3.379* 3.587* 3.449*        Significant Imaging: Echocardiogram: Transthoracic echo (TTE) complete (Cupid Only):   Results for orders placed or performed during the hospital encounter of 08/12/24   Echo   Result Value Ref Range    Jenkins's Biplane MOD Ejection Fraction 34 %    A2C EF 47 %    A4C EF 21 %    LVOT stroke volume 44.72 cm3    LVIDd 5.98 3.5 - 6.0 cm    LV Systolic Volume 150.13 mL    LVIDs 5.54 (A) 2.1 - 4.0 cm    LV ESV A2C 78.97 mL    LV Diastolic Volume 178.40 mL    LV ESV A4C 82.83 mL    LV EDV A2C 143.357291818929763 mL    LV EDV A4C 145.65 mL    Left Ventricular End Systolic Volume by Teichholz Method 150.13 mL    Left Ventricular End Diastolic Volume by Teichholz Method 178.40 mL    IVS 0.79 0.6 - 1.1 cm    LVOT diameter 2.01 cm    LVOT area 3.2 cm2    FS 7 (A) 28 - 44 %    Left Ventricle Relative Wall Thickness 0.34 cm    PW 1.03 0.6 - 1.1 cm    LV mass 217.51 g    MV Peak E Sukhdeep 0.76 m/s    TDI LATERAL 0.06 m/s    TDI SEPTAL 0.07 m/s    E/E' ratio 11.69 m/s    MV Peak A Sukhdeep 0.88 m/s    TR Max Sukhdeep 2.86 m/s    E/A ratio 0.86     IVRT 111.32 msec    E wave deceleration time 154.28 msec    LV SEPTAL E/E' RATIO 10.86 m/s    LV LATERAL E/E' RATIO 12.67 m/s    PV Peak S Sukhdeep 0.51 m/s    PV Peak D Sukhdeep 0.43 m/s    Pulm vein S/D ratio 1.19     LVOT peak sukhdeep 0.89 m/s    Left Ventricular Outflow Tract Mean Velocity 0.62 cm/s    Left Ventricular Outflow Tract Mean Gradient 1.74 mmHg    RV S' 7.35 cm/s    TAPSE 2.16 cm    RV/LV Ratio 0.54 cm    LA size 4.22 cm    Left Atrium Minor Axis 5.63 cm    Left Atrium Major Axis 5.97 cm    LA Vol (MOD) 84.22 cm3    RA Major Axis 5.27 cm    RA Width 4.99 cm    AV mean gradient 4 mmHg    AV peak gradient 8 mmHg    Ao peak sukhdeep 1.40 m/s    Ao VTI 22.90 cm    LVOT peak VTI 14.10 cm    AV valve area 1.95 cm²    AV Velocity Ratio 0.64     AV index (prosthetic) 0.62     BHARAT by Velocity Ratio 2.02 cm²    MV mean gradient 2 mmHg    MV peak gradient 3 mmHg    MV valve area by continuity eq 2.18 cm2     MV VTI 20.5 cm    Triscuspid Valve Regurgitation Peak Gradient 33 mmHg    Sinus 3.84 cm    STJ 3.50 cm    Ascending aorta 3.40 cm    Mean e' 0.07 m/s    LA area A4C 25.77 cm2    LA area A2C 23.67 cm2    RVDD 3.23 cm    BSA 1.97 m2    LV Systolic Volume Index 78.6 mL/m2    LV Diastolic Volume Index 93.40 mL/m2    LV Mass Index 114 g/m2    ANTOINETTE (MOD) 44.1 mL/m2    ZLVIDS 4.07     ZLVIDD 1.07     ANTOINETTE 52.2 mL/m2    LA Vol 99.78 cm3    LA WIDTH 4.8 cm    TV resting pulmonary artery pressure 36 mmHg    RV TB RVSP 6 mmHg    Est. RA pres 3 mmHg    Narrative      Left Ventricle: The left ventricle is moderately dilated. There is   eccentric hypertrophy. Moderate global hypokinesis present. There is   moderately reduced systolic function with a visually estimated ejection   fraction of 30 - 35%. Biplane (2D) method of discs ejection fraction is   34%. There is diastolic dysfunction but grade cannot be determined.    Right Ventricle: Normal right ventricular cavity size. Wall thickness   is normal. Systolic function is normal.    Left Atrium: Left atrium is severely dilated.    Right Atrium: Right atrium is moderately dilated.    Aortic Valve: There is mild stenosis. Aortic valve area by VTI is 1.95   cm². Aortic valve peak velocity is 1.40 m/s. Mean gradient is 4 mmHg. The   dimensionless index is 0.62.    Mitral Valve: There is mild regurgitation.    Tricuspid Valve: There is mild regurgitation.    Pulmonic Valve: There is mild regurgitation.    Pulmonary Artery: The estimated pulmonary artery systolic pressure is   36 mmHg.    IVC/SVC: Normal venous pressure at 3 mmHg.

## 2024-11-08 NOTE — ASSESSMENT & PLAN NOTE
Anemia is likely due to acute blood loss which was from GIB . Most recent hemoglobin and hematocrit are listed below.  Recent Labs     11/07/24  2153 11/08/24  1014   HGB 5.6* 9.1@RCBLD*   HCT 17.2* 27.8@RCBLD*     CTA w/o active GIB  Plan  - Monitor serial CBC: Daily  - Transfuse PRBC if patient becomes hemodynamically unstable, symptomatic or H/H drops below 7/21.  - Patient has received 3 units of PRBCs on 11/8/24  - Patient's anemia is currently stable  - Consult GI

## 2024-11-08 NOTE — CONSULTS
[facility] - Telemetry  Cardiology  Consult Note    Patient Name: Mary Ellen Saini  MRN: [id number]  Admission Date: 11/7/2024  Hospital Length of Stay: 0 days  Code Status: Full Code   Attending Provider: Thomas Velasquez MD   Consulting Provider: ANGIE Infante ANP  Primary Care Physician: No, Primary Doctor  Principal Problem:<principal problem not specified>    Patient information was obtained from patient, past medical records, and ER records.     Inpatient consult to [facility]  Consult performed by: Denise Acosta APRN, ANP  Consult ordered by: Tavon Crawley NP  Reason for consult: NSTEMI        Subjective:     Chief Complaint:  chest pain, SOB, abdominal pain and blood in stool      HPI:   60yo female with NSTSEMI, GIB, ABLA, HFrEF EF 30% Life Vest placed in [facility], leukocytosis, COD, hepatitis C, atrial flutter not on OAC due to anemia who presented to the ER with multiple complaints: abdominal pain, blood in stool, chest pain and SOB. She reports feeling tired and weak for the past few days and noting blood in her stool for the past several days with worsening yesterday. She reports chest pain yesterday with the worsening blood in stool but no reports her chest pain is resolved. She has not taken her medications in the past 3 days. She relocated from [facility] earlier this year and has had social stressors but reports she is now living in [facility]. Labs CBC with H&H 5.6&17.2 BMP with K+ 3.6 BUN 20 creatinine 1.3 B P 3425 Troponin 3.379-3.587-3.449 EKG ST with ST depression Initial /97. Given PRBC transfusion and admitted to [facility] and Cardiology consulted for evaluation of chest pain     Of note, she is known to our service from previous admissions and was admitted in [facility] earlier this year with anemia, atrial flutter and new onset CHF with EF 30%- she did not undergo ischemic evaluation at that time due to anemia, KENY, methamphetamine use and  non compliance. She had a Life Vest placed and was started on Entresto, Aldactone and BB but was eventually transitioned to ARB given financial constraints. No OAC was prescribed given anemia and conern for ?colon cancer     Past Medical History:   Diagnosis Date    Asthma     Bipolar disorder     Hypertension        Past Surgical History:   Procedure Laterality Date    CHOLECYSTECTOMY      COLONOSCOPY N/A 10/22/2024    Procedure: COLONOSCOPY;  Surgeon: Dayday Freed MD;  Location: [facility];  Service: Endoscopy;  Laterality: N/A;    ESOPHAGOGASTRODUODENOSCOPY N/A 10/22/2024    Procedure: EGD (ESOPHAGOGASTRODUODENOSCOPY);  Surgeon: Dayady Freed MD;  Location: [facility];  Service: Endoscopy;  Laterality: N/A;    SHOULDER SURGERY      TUBAL LIGATION         Review of patient's allergies indicates:  No Known Allergies    No current facility-administered medications on file prior to encounter.     Current Outpatient Medications on File Prior to Encounter   Medication Sig    albuterol (PROVENTIL/VENTOLIN HFA) 90 mcg/actuation inhaler Inhale 1-2 puffs into the lungs every 6 (six) hours as needed for Wheezing. Rescue    atorvastatin (LIPITOR) 80 MG tablet Take 1 tablet (80 mg total) by mouth once daily.    furosemide (LASIX) 20 MG tablet Take 2 tablets (40 mg total) by mouth once daily.    losartan (COZAAR) 25 MG tablet Take 0.5 tablets (12.5 mg total) by mouth once daily.    metoprolol succinate (TOPROL-XL) 25 MG 24 hr tablet Take 1 tablet (25 mg total) by mouth once daily.    naloxone (NARCAN) 4 mg/actuation Spry 4mg by nasal route as needed for opioid overdose; may repeat every 2-3 minutes in alternating nostrils until medical help arrives. Call 911    nicotine (NICODERM CQ) 14 mg/24 hr Place 1 patch onto the skin once daily.    oxybutynin (DITROPAN) 5 MG Tab Take 1 tablet (5 mg total) by mouth 3 (three) times daily.    pantoprazole (PROTONIX) 40 MG tablet Take 1 tablet (40 mg total) by mouth  once daily.    polyethylene glycol (GLYCOLAX) 17 gram PwPk Take 1 packet by mouth once daily.    spironolactone (ALDACTONE) 25 MG tablet Take 0.5 tablets (12.5 mg total) by mouth once daily.    tiotropium (SPIRIVA) 18 mcg inhalation capsule Inhale 1 capsule (18 mcg total) into the lungs once daily. Controller     Family History    None       Tobacco Use    Smoking status: Every Day     Current packs/day: 0.50     Average packs/day: 2.0 packs/day for 57.9 years (114.6 ttl pk-yrs)     Types: Cigarettes     Start date: 1967    Smokeless tobacco: Never    Tobacco comments:     Pt is a 2 pk/day cigarette smoker x 57 yrs. She states that she cut down to 0.5 pk/day, and is trying to quit. Ambulatory referral to Smoking Cessation clinic following hospital discharge.    Substance and Sexual Activity    Alcohol use: Yes     Comment: socailly    Drug use: Yes     Types: Cocaine, Methamphetamines     Comment: denies cocaine or meth. Reports maijurana use    Sexual activity: Yes     Review of Systems   Constitutional: Positive for malaise/fatigue. Negative for chills, decreased appetite, diaphoresis, fever, weight gain and weight loss.   Cardiovascular:  Positive for chest pain. Negative for claudication, dyspnea on exertion, irregular heartbeat, leg swelling, near-syncope, orthopnea, palpitations and paroxysmal nocturnal dyspnea.   Respiratory:  Negative for cough, shortness of breath, snoring, sputum production and wheezing.    Endocrine: Negative for cold intolerance, heat intolerance, polydipsia, polyphagia and polyuria.   Skin:  Negative for color change, dry skin, itching, nail changes and poor wound healing.   Musculoskeletal:  Negative for back pain, gout, joint pain and joint swelling.   Gastrointestinal:  Positive for abdominal pain and melena. Negative for bloating, constipation, diarrhea, hematemesis, hematochezia, nausea and vomiting.   Genitourinary:  Negative for dysuria, hematuria and nocturia.   Neurological:   Negative for dizziness, headaches, light-headedness, numbness, paresthesias and weakness.   Psychiatric/Behavioral:  Negative for altered mental status, depression and memory loss.      Objective:     Vital Signs (Most Recent):  Temp: 98.3 °F (36.8 °C) (11/08/24 0909)  Pulse: (!) 113 (11/08/24 0909)  Resp: 20 (11/08/24 0909)  BP: (!) 146/97 (11/08/24 0909)  SpO2: 97 % (11/08/24 0909) Vital Signs (24h Range):  Temp:  [97.4 °F (36.3 °C)-99 °F (37.2 °C)] 98.3 °F (36.8 °C)  Pulse:  [103-129] 113  Resp:  [18-32] 20  SpO2:  [88 %-100 %] 97 %  BP: (131-152)/() 146/97     Weight: 78.5 kg (173 lb 1 oz)  Body mass index is 24.83 kg/m².    SpO2: 97 %         Intake/Output Summary (Last 24 hours) at 11/8/2024 0923  Last data filed at 11/8/2024 0518  Gross per 24 hour   Intake 809.5 ml   Output --   Net 809.5 ml       Lines/Drains/Airways       Peripheral Intravenous Line  Duration                  Peripheral IV - Single Lumen 11/07/24 20 G Anterior;Right Forearm 1 day         Peripheral IV - Single Lumen 11/07/24 20 G Left Forearm 1 day                     Physical Exam  Constitutional:       General: She is not in acute distress.     Appearance: She is well-developed.   Cardiovascular:      Rate and Rhythm: Normal rate and regular rhythm.      Heart sounds: No murmur heard.     No gallop.   Pulmonary:      Effort: Pulmonary effort is normal. Tachypnea present. No respiratory distress.      Breath sounds: Wheezing and rales present.   Abdominal:      General: Bowel sounds are normal. There is no distension.      Palpations: Abdomen is soft.      Tenderness: There is no abdominal tenderness.   Skin:     General: Skin is warm and dry.   Neurological:      Mental Status: She is alert and oriented to person, place, and time.          Significant Labs: BMP:   Recent Labs   Lab 11/07/24  2153 11/08/24  0401   * 115*   * 134*   K 2.8* 3.6    105   CO2 20* 20*   BUN 20 20   CREATININE 1.2 1.3   CALCIUM 7.5*  7.6*   MG 1.6  --    , CBC   Recent Labs   Lab 11/07/24 2153   WBC 19.41*   HGB 5.6*   HCT 17.2*      , and Troponin   Recent Labs   Lab 11/07/24  2153 11/08/24  0105 11/08/24  0249   TROPONINI 3.379* 3.587* 3.449*       Significant Imaging: Echocardiogram: Transthoracic echo (TTE) complete (Cupid Only):   Results for orders placed or performed during the hospital encounter of 08/12/24   Echo   Result Value Ref Range    Jenkins's Biplane MOD Ejection Fraction 34 %    A2C EF 47 %    A4C EF 21 %    LVOT stroke volume 44.72 cm3    LVIDd 5.98 3.5 - 6.0 cm    LV Systolic Volume 150.13 mL    LVIDs 5.54 (A) 2.1 - 4.0 cm    LV ESV A2C 78.97 mL    LV Diastolic Volume 178.40 mL    LV ESV A4C 82.83 mL    LV EDV A2C 143.034830988608096 mL    LV EDV A4C 145.65 mL    Left Ventricular End Systolic Volume by Teichholz Method 150.13 mL    Left Ventricular End Diastolic Volume by Teichholz Method 178.40 mL    IVS 0.79 0.6 - 1.1 cm    LVOT diameter 2.01 cm    LVOT area 3.2 cm2    FS 7 (A) 28 - 44 %    Left Ventricle Relative Wall Thickness 0.34 cm    PW 1.03 0.6 - 1.1 cm    LV mass 217.51 g    MV Peak E Sukhdeep 0.76 m/s    TDI LATERAL 0.06 m/s    TDI SEPTAL 0.07 m/s    E/E' ratio 11.69 m/s    MV Peak A Sukhdeep 0.88 m/s    TR Max Sukhdeep 2.86 m/s    E/A ratio 0.86     IVRT 111.32 msec    E wave deceleration time 154.28 msec    LV SEPTAL E/E' RATIO 10.86 m/s    LV LATERAL E/E' RATIO 12.67 m/s    PV Peak S Sukhdeep 0.51 m/s    PV Peak D Sukhdeep 0.43 m/s    Pulm vein S/D ratio 1.19     LVOT peak sukhdeep 0.89 m/s    Left Ventricular Outflow Tract Mean Velocity 0.62 cm/s    Left Ventricular Outflow Tract Mean Gradient 1.74 mmHg    RV S' 7.35 cm/s    TAPSE 2.16 cm    RV/LV Ratio 0.54 cm    LA size 4.22 cm    Left Atrium Minor Axis 5.63 cm    Left Atrium Major Axis 5.97 cm    LA Vol (MOD) 84.22 cm3    RA Major Axis 5.27 cm    RA Width 4.99 cm    AV mean gradient 4 mmHg    AV peak gradient 8 mmHg    Ao peak sukhdeep 1.40 m/s    Ao VTI 22.90 cm    LVOT peak VTI  14.10 cm    AV valve area 1.95 cm²    AV Velocity Ratio 0.64     AV index (prosthetic) 0.62     BHARAT by Velocity Ratio 2.02 cm²    MV mean gradient 2 mmHg    MV peak gradient 3 mmHg    MV valve area by continuity eq 2.18 cm2    MV VTI 20.5 cm    Triscuspid Valve Regurgitation Peak Gradient 33 mmHg    Sinus 3.84 cm    STJ 3.50 cm    Ascending aorta 3.40 cm    Mean e' 0.07 m/s    LA area A4C 25.77 cm2    LA area A2C 23.67 cm2    RVDD 3.23 cm    BSA 1.97 m2    LV Systolic Volume Index 78.6 mL/m2    LV Diastolic Volume Index 93.40 mL/m2    LV Mass Index 114 g/m2    ANTOIENTTE (MOD) 44.1 mL/m2    ZLVIDS 4.07     ZLVIDD 1.07     ANTOINETTE 52.2 mL/m2    LA Vol 99.78 cm3    LA WIDTH 4.8 cm    TV resting pulmonary artery pressure 36 mmHg    RV TB RVSP 6 mmHg    Est. RA pres 3 mmHg    Narrative      Left Ventricle: The left ventricle is moderately dilated. There is   eccentric hypertrophy. Moderate global hypokinesis present. There is   moderately reduced systolic function with a visually estimated ejection   fraction of 30 - 35%. Biplane (2D) method of discs ejection fraction is   34%. There is diastolic dysfunction but grade cannot be determined.    Right Ventricle: Normal right ventricular cavity size. Wall thickness   is normal. Systolic function is normal.    Left Atrium: Left atrium is severely dilated.    Right Atrium: Right atrium is moderately dilated.    Aortic Valve: There is mild stenosis. Aortic valve area by VTI is 1.95   cm². Aortic valve peak velocity is 1.40 m/s. Mean gradient is 4 mmHg. The   dimensionless index is 0.62.    Mitral Valve: There is mild regurgitation.    Tricuspid Valve: There is mild regurgitation.    Pulmonic Valve: There is mild regurgitation.    Pulmonary Artery: The estimated pulmonary artery systolic pressure is   36 mmHg.    IVC/SVC: Normal venous pressure at 3 mmHg.       Assessment and Plan:     GIB (gastrointestinal bleeding)  - presented with abdominal pain, melena, fatigue, SOB and chest  pain  - initial H&H 5.6&17.2 with 3units PRBCs ordered with last PRBC in process   - on IV PPI BID; previous EGD and colonoscopy  - further management by primary team; will give dose of IV Lasix given tachypnea, rales and wheezing on PE; may need additional dose later depending on response     NSTEMI (non-ST elevated myocardial infarction)  - presented with abdominal pain, melena, fatigue, SOB and chest pain  - troponin 3.379-3.387-3.449 higher than previous admission  - concerned about possibility of underlying CAD but given presentation with anemia feel demand related etiology currently  - troponin flat; chest pain resolved; EKG with ST with ST depression to inferolateral leads slightly worsen than previous suspect demand etiology in setting of profound anemia- repeat EKG pending  - continue BB only; no ASA or Plavix given anemia; plan for medical management and no plans for invasive evaluation with [facility] at this time     HFrEF (heart failure with reduced ejection fraction)  - history of CHF with EF 30-35% uncertain of ischemic or non ischemic etiology; global hypokinesis noted on echo  - on ARB, BB, Aldactone and Lasix as an outpatient reports medication noncompliance x few days  - continue on BB; SBP 130s-140s; anticipate resumption of ARB and Aldactone as creatinine permits  - IV Lasix given this AM and may need additional dose later today depending on response  - monitor strict I&Os and daily weights         VTE Risk Mitigation (From admission, onward)           Ordered     IP VTE HIGH RISK PATIENT  Once         11/08/24 0137     Place sequential compression device  Until discontinued         11/08/24 0137                    Thank you for your consult. I will follow-up with patient. Please contact us if you have any additional questions.    ANGIE Infante, ANP  Cardiology   [facility] - Telemetry

## 2024-11-08 NOTE — ASSESSMENT & PLAN NOTE
Patient's hemorrhage is due to gastrointestinal bleed, patient does not have a propensity for bleeding.. Patients most recent Hgb, Hct, platelets, and INR are listed below.  Recent Labs     11/07/24  2153 11/08/24  1014   HGB 5.6* 9.1@RCBLD*   HCT 17.2* 27.8@RCBLD*    399   INR 1.1  --        Plan  - Will trend hemoglobin/hematocrit Daily  - Will monitor and correct any coagulation defects  - Will transfuse if Hgb is <7g/dl (<8g/dl in cases of active ACS) or if patient has rapid bleeding leading to hemodynamic instability  - Consult GI  -Ppi BID  -patient received 3 U PRBC

## 2024-11-08 NOTE — NURSING
Pt arrived to floor via gurney, able to transfer independently to bed. Pt is awake, lethargic. O2 @ 3L per nasal canula. Pt has no complaints of pain at this time. Patient oriented to room and use of call button,  bed locked and low, side rails x 2. Pt instructed to call before attempting to get up.

## 2024-11-08 NOTE — ASSESSMENT & PLAN NOTE
EKG w/o ST elevation  Troponin elevated 3.379-->3.587-->3.449  Cards consult  Unable to anticoagulate , pt currently with GIB. Await recs

## 2024-11-08 NOTE — ED NOTES
Provider at bedside to consent pt for blood. Risk and benefits explained. Pt closing eyes during explanation. Provider asked pt for verbal re back of risk and benefits. Pt verbalized understanding. Paper work signed and scanned in. Rn as witness

## 2024-11-08 NOTE — ASSESSMENT & PLAN NOTE
Patient's hemorrhage is due to gastrointestinal bleed, patient does not have a propensity for bleeding.. Patients most recent Hgb, Hct, platelets, and INR are listed below.  Recent Labs     11/07/24  2153   HGB 5.6*   HCT 17.2*      INR 1.1     Plan  - Will trend hemoglobin/hematocrit Every 6 hours  - Will monitor and correct any coagulation defects  - Will transfuse if Hgb is <7g/dl (<8g/dl in cases of active ACS) or if patient has rapid bleeding leading to hemodynamic instability  - Consult GI  -Ppi BID

## 2024-11-08 NOTE — SUBJECTIVE & OBJECTIVE
Interval History:   Patient is feeling okay  She has mild shortness of breath, she received 3 U PRBC, and given IV lasix    She had a BM which she said was brown in color with no blood    Review of Systems   Constitutional: Negative.    HENT: Negative.     Respiratory:  Positive for shortness of breath.    Cardiovascular: Negative.    Gastrointestinal: Negative.    Genitourinary: Negative.    Musculoskeletal: Negative.    Skin: Negative.    Neurological: Negative.    Psychiatric/Behavioral: Negative.       Objective:     Vital Signs (Most Recent):  Temp: 98 °F (36.7 °C) (11/08/24 1124)  Pulse: 107 (11/08/24 1257)  Resp: 20 (11/08/24 1124)  BP: (!) 144/98 (11/08/24 1125)  SpO2: 99 % (11/08/24 1125) Vital Signs (24h Range):  Temp:  [97.4 °F (36.3 °C)-99 °F (37.2 °C)] 98 °F (36.7 °C)  Pulse:  [103-129] 107  Resp:  [18-32] 20  SpO2:  [88 %-100 %] 99 %  BP: (131-152)/() 144/98     Weight: 78.5 kg (173 lb 1 oz)  Body mass index is 24.83 kg/m².    Intake/Output Summary (Last 24 hours) at 11/8/2024 1316  Last data filed at 11/8/2024 0909  Gross per 24 hour   Intake 1099.5 ml   Output 300 ml   Net 799.5 ml         Physical Exam  Constitutional:       Appearance: Normal appearance.   Cardiovascular:      Rate and Rhythm: Normal rate and regular rhythm.   Pulmonary:      Breath sounds: Rhonchi present.   Abdominal:      General: Abdomen is flat.      Palpations: Abdomen is soft.   Skin:     General: Skin is warm and dry.   Neurological:      General: No focal deficit present.      Mental Status: She is alert and oriented to person, place, and time.             Significant Labs: All pertinent labs within the past 24 hours have been reviewed.  CBC:   Recent Labs   Lab 11/07/24 2153 11/08/24  1014   WBC 19.41* 20.68*   HGB 5.6* 9.1@RCBLD*   HCT 17.2* 27.8@RCBLD*    399     CMP:   Recent Labs   Lab 11/07/24 2153 11/08/24  0401   * 134*   K 2.8* 3.6    105   CO2 20* 20*   * 115*   BUN 20 20    CREATININE 1.2 1.3   CALCIUM 7.5* 7.6*   PROT 6.3  --    ALBUMIN 2.1*  --    BILITOT 0.3  --    ALKPHOS 78  --    AST 17  --    ALT 13  --    ANIONGAP 9 9       Significant Imaging: I have reviewed all pertinent imaging results/findings within the past 24 hours.

## 2024-11-08 NOTE — ASSESSMENT & PLAN NOTE
- presented with abdominal pain, melena, fatigue, SOB and chest pain  - troponin 3.379-3.387-3.449 higher than previous admission  - concerned about possibility of underlying CAD but given presentation with anemia feel demand related etiology currently  - troponin flat; chest pain resolved; EKG with ST with ST depression to inferolateral leads slightly worsen than previous suspect demand etiology in setting of profound anemia- repeat EKG pending  - continue BB only; no ASA or Plavix given anemia; plan for medical management and no plans for invasive evaluation with [facility] at this time

## 2024-11-08 NOTE — ASSESSMENT & PLAN NOTE
Anemia is likely due to acute blood loss which was from GIB . Most recent hemoglobin and hematocrit are listed below.  Recent Labs     11/07/24  2153   HGB 5.6*   HCT 17.2*   CTA w/o active GIB  Plan  - Monitor serial CBC: Every 8 hours  - Transfuse PRBC if patient becomes hemodynamically unstable, symptomatic or H/H drops below 7/21.  - Patient has received 3 units of PRBCs on 11/8/24  - Patient's anemia is currently worsening. Will continue current treatment  - Consult GI

## 2024-11-08 NOTE — PLAN OF CARE
11/08/24 0915   Rounds   Attendance Provider;Nurse ;Assigned nurse   Discharge Plan A Home with family   Why the patient remains in the hospital Requires continued medical care   Transition of Care Barriers Transportation       0915  Patient awake & alert in bed when CM participated in SIBR with Dr Velasquez & nurse Jane. No family present. Pt was admitted with acute blood loss anemia & NSTEMI. Pt received 3U PRBC for H&H 5.6/17.2 at time of admit. Troponin 3.587. Pt is being followed by GI & cards. H&H 9.1/27.8, WBC 20.68, & pox 97% on 3L O2 via NC this AM.       [facility] - Telemetry  Initial Discharge Assessment       Primary Care Provider: Marlen, Primary Doctor    Admission Diagnosis: Shortness of breath [R06.02]  Symptomatic anemia [D64.9]    Admission Date: 11/7/2024  Expected Discharge Date: 11/10/2024    Transition of Care Barriers: Transportation    Consult: GI & cards    Payor: [employer] MANAGED MEDICARE / Plan: [employer] MEDICARE PLAN PPO / Product Type: Medicare Advantage /     Extended Emergency Contact Information  Primary Emergency Contact: TaeIain  Mobile Phone: [phone]  Relation: Brother  Secondary Emergency Contact: MarcioSheyla  Mobile Phone: [phone]  Relation: Sister  Preferred language: English   needed? No    Discharge Plan A: (P) Home with family  Discharge Plan B: (P) Home Health      [id number] - [place], [place] - [address] [facility] AT [facility] [address]  [address] [facility] [address]  Phone: [phone] Fax: [phone]      Initial Assessment (most recent)       Adult Discharge Assessment - 11/08/24 1210          Discharge Assessment    Assessment Type Discharge Planning Assessment (P)      Confirmed/corrected address, phone number and insurance Yes (P)      Confirmed Demographics Correct on Facesheet (P)      Source of Information patient (P)      Communicated JENAE with patient/caregiver Date not available/Unable to  "determine (P)      People in Home sibling(s) (P)    brother, Iain Strong ([phone])    Do you expect to return to your current living situation? Yes (P)      Do you have help at home or someone to help you manage your care at home? Yes (P)      Prior to hospitilization cognitive status: Alert/Oriented (P)      Current cognitive status: Alert/Oriented (P)      Equipment Currently Used at Home nebulizer;rollator (P)      Readmission within 30 days? Yes (P)      Patient currently being followed by outpatient case management? No (P)      Do you currently have service(s) that help you manage your care at home? No (P)      Do you take prescription medications? Yes (P)      Do you have prescription coverage? Yes (P)      Do you have any problems affording any of your prescribed medications? No (P)      Is the patient taking medications as prescribed? -- (P)    unsure    How do you get to doctors appointments? family or friend will provide (P)      Are you on dialysis? No (P)      Do you take coumadin? No (P)      Discharge Plan A Home with family (P)      Discharge Plan B Home Health (P)      DME Needed Upon Discharge  other (see comments) (P)    tbd    Discharge Plan discussed with: Patient (P)                    1210  Patient resting quietly in bed when CM rounded. No family present.    Patient lives with her brother, Iain Strong ([phone]), & his girlfriend, uses a rollator to assist with mobility, does not use home O2, & might need assistance with transportation at time of discharge.     Pt stated that she does not have a PCP.     CM updated patient's whiteboard with CM name & contact information.     1420  Message sent to the schedulers requesting a PCP hospfu appt. Awaiting response.     Order for "ambulatory referral to out-pt case management" entered.       Will continue to follow.                "

## 2024-11-08 NOTE — CONSULTS
"[facility]     CC: gastrointestinal bleed    HPI 59 y.o. female with PMHx of HFrEF, bipolar, and HTN who presented to hospital due to chest pain, shortness of breath, and blood in stool. She had a recent admit in October due to GIB that required transfusion. No active bleed seen on EGD/colonoscopy. She states she had continued bleeding since that admission, which recently worsened. She states she has dark red blood colored diarrhea that occurs 4-5x/day recently. She also described cramping abdominal pain. She denies emesis. She states she only took 2 ibuprofen one day. She denies family hx of colon cancer.        Past Medical History  Past Medical History:   Diagnosis Date    Asthma     Bipolar disorder     Hypertension            Physical Examination  BP (!) 144/98 (Patient Position: Lying)   Pulse 107   Temp 98 °F (36.7 °C) (Axillary)   Resp 20   Ht 5' 10" (1.778 m)   Wt 78.5 kg (173 lb 1 oz)   SpO2 99%   BMI 24.83 kg/m²   General appearace: well appearing, no acute distress, alert   Abdomen: soft, non-tender, non-distended abdomen with no rebound or guarding, active bowel sounds    Labs:  WBC 19.4  Hgb 5.6 --> 9.1  Hct 17.2 --> 27.8  Normal biliary and hepatic labs    Imaging:  CTA Abd/Pelvis: possible mass with inflammatory changes in ascending colon and splenic flexure; no active GI bleed    Endoscopic History:  Colonoscopy 10/22/2024: stool in entire colon, polypoid lesion in descending, transverse, and ascending colon with biopsy showing no malignancy    EGD 10/22/2024: Grade C esophagitis without bleeding, small hiatal hernia, erythematous mucosa in antrum, mucosal changes in duodenum      Assessment & Plan:    Gastrointestinal Bleed  Patient with recent hospitalization for GI bleed with EGD and colonoscopy without active bleed.  Patient with continued blood in stool since discharge. CT scan with concerns for a possible colon mass, which could have been missed due to heavy stool burden in " colon from previous colonoscopy.   - Trend Hgb and continue to transfuse to keep Hgb>7  - Will plan for colonoscopy on Monday. Will consider EGD as well.  - Continue protonix 40mg BID      Case discussed with Dr. Freed.       Yfn Dear, DO  [facility], PGY-1

## 2024-11-08 NOTE — ED NOTES
Pt laying in bed resting with eyes closed. Arouse able to voice reports that she is tired. Sitting up in bed often because of pain. Denies any new pain at this time.

## 2024-11-08 NOTE — ED PROVIDER NOTES
Encounter Date: 11/7/2024       History     Chief Complaint   Patient presents with    Fatigue     [facility] brought in a 60 y/o with c/o abd pain , diarrhea, chest pain, feeling SOB, history of CHF.  Hasn't taken meds for 3 days.  3 L NC O2 placed on patient.  Accu check 127, ASA, 2 sprays NTG given per EMS. O2 removed upon arrival to ED.     Mary Ellen Saini is a 59 y.o. female who  has a past medical history of Asthma, Bipolar disorder, and Hypertension.    The patient presents to the ED due to multiple complaints.  She reports her most significant complaint is abdominal pain with blood in her stool.  She was recently admitted for GI bleeding which required blood transfusion.  She states that she started having blood in her stool for the past couple of days however she began to have worsening abdominal pain today.  She comes by ambulance from home.  She also reports chest pain radiating from her mid chest to her left arm which occurred earlier today and is still present.  She is not vomiting but feels short of breath.  She reports a headache as well.  She is tired.     The history is provided by the patient and medical records.     Review of patient's allergies indicates:  No Known Allergies  Past Medical History:   Diagnosis Date    Asthma     Bipolar disorder     Hypertension      Past Surgical History:   Procedure Laterality Date    CHOLECYSTECTOMY      COLONOSCOPY N/A 10/22/2024    Procedure: COLONOSCOPY;  Surgeon: Dayday Freed MD;  Location: [facility];  Service: Endoscopy;  Laterality: N/A;    ESOPHAGOGASTRODUODENOSCOPY N/A 10/22/2024    Procedure: EGD (ESOPHAGOGASTRODUODENOSCOPY);  Surgeon: Dayday Freed MD;  Location: [facility];  Service: Endoscopy;  Laterality: N/A;    SHOULDER SURGERY      TUBAL LIGATION       No family history on file.  Social History     Tobacco Use    Smoking status: Every Day     Current packs/day: 0.50     Average packs/day: 2.0 packs/day for 57.9 years  (114.6 ttl pk-yrs)     Types: Cigarettes     Start date: 1967    Smokeless tobacco: Never    Tobacco comments:     Pt is a 2 pk/day cigarette smoker x 57 yrs. She states that she cut down to 0.5 pk/day, and is trying to quit. Ambulatory referral to Smoking Cessation clinic following hospital discharge.    Substance Use Topics    Alcohol use: Yes     Comment: socailly    Drug use: Yes     Types: Cocaine, Methamphetamines     Comment: denies cocaine or meth. Reports maijurana use     Review of Systems   Constitutional:  Positive for fatigue. Negative for fever.   HENT:  Negative for sore throat.    Respiratory:  Negative for shortness of breath.    Cardiovascular:  Positive for chest pain.   Gastrointestinal:  Positive for abdominal pain, blood in stool and nausea.   Genitourinary:  Negative for dysuria.   Musculoskeletal:  Negative for back pain.   Skin:  Negative for rash.   Neurological:  Positive for headaches. Negative for weakness.   Hematological:  Does not bruise/bleed easily.       Physical Exam     Initial Vitals [11/07/24 2111]   BP Pulse Resp Temp SpO2   (!) 140/92 (!) 119 (!) 24 98.5 °F (36.9 °C) 96 %      MAP       --         Physical Exam    Nursing note and vitals reviewed.  Constitutional: She is not diaphoretic.   Appears chronically ill    HENT:   Head: Normocephalic and atraumatic. Mouth/Throat: Oropharynx is clear and moist.   Eyes: EOM are normal. Pupils are equal, round, and reactive to light.   Neck: No tracheal deviation present.   Cardiovascular:  Normal rate, regular rhythm, normal heart sounds and intact distal pulses.           Pulmonary/Chest: Breath sounds normal. No stridor. No respiratory distress. She has no wheezes.   Abdominal: Abdomen is soft. Bowel sounds are normal. She exhibits no distension and no mass. There is abdominal tenderness.   Diffuse lower abdominal tenderness without rebound or guarding   Musculoskeletal:         General: No edema. Normal range of motion.      Neurological: She is alert and oriented to person, place, and time. No cranial nerve deficit or sensory deficit.   Skin: Skin is warm and dry. Capillary refill takes less than 2 seconds. No rash noted. There is pallor.   Psychiatric: She has a normal mood and affect.         ED Course   Procedures  Labs Reviewed   B-TYPE NATRIURETIC PEPTIDE - Abnormal       Result Value    BNP 3,425 (*)    CBC W/ AUTO DIFFERENTIAL - Abnormal    WBC 19.41 (*)     RBC 2.10 (*)     Hemoglobin 5.6 (*)     Hematocrit 17.2 (*)     MCV 82      MCH 26.7 (*)     MCHC 32.6      RDW 17.5 (*)     Platelets 391      MPV 9.5      Immature Granulocytes 0.6 (*)     Gran # (ANC) 16.3 (*)     Immature Grans (Abs) 0.12 (*)     Lymph # 1.9      Mono # 0.9      Eos # 0.1      Baso # 0.07      nRBC 0      Gran % 83.9 (*)     Lymph % 9.9 (*)     Mono % 4.6      Eosinophil % 0.6      Basophil % 0.4      Platelet Estimate Appears normal      Aniso Slight      Poik Slight      Poly Occasional      Hypo Occasional      Ovalocytes Occasional      Differential Method Automated      Narrative:     HGB and HCT critical result(s) called and verbal readback obtained   from LEE Gifford RN. by RE3 11/07/2024 22:19   COMPREHENSIVE METABOLIC PANEL - Abnormal    Sodium 134 (*)     Potassium 2.8 (*)     Chloride 105      CO2 20 (*)     Glucose 117 (*)     BUN 20      Creatinine 1.2      Calcium 7.5 (*)     Total Protein 6.3      Albumin 2.1 (*)     Total Bilirubin 0.3      Alkaline Phosphatase 78      AST 17      ALT 13      eGFR 52 (*)     Anion Gap 9     TROPONIN I - Abnormal    Troponin I 3.379 (*)    LIPASE    Lipase 20     LACTIC ACID, PLASMA    Lactate (Lactic Acid) 1.0     APTT    aPTT 30.5     PROTIME-INR    Prothrombin Time 11.4      INR 1.1     MAGNESIUM   MAGNESIUM    Magnesium 1.6     URINALYSIS, REFLEX TO URINE CULTURE   TROPONIN I   TYPE & SCREEN    Group & Rh O POS      Indirect Juanis NEG      Specimen Outdate 11/10/2024 23:59     PREPARE RBC SOFT     UNIT NUMBER [id number]      Product Code U0204Q25      DISPENSE STATUS ISSUED      CODING SYSTEM CFQY376      Unit Blood Type Code 5100      Unit Blood Type O POS      Unit Expiration 202411272359      CROSSMATCH INTERPRETATION Compatible      UNIT NUMBER [id number]      Product Code J1934O47      DISPENSE STATUS CROSSMATCHED      CODING SYSTEM [id number]      Unit Blood Type Code 5100      Unit Blood Type O POS      Unit Expiration 202411272359      CROSSMATCH INTERPRETATION Compatible      UNIT NUMBER [id number]      Product Code I9421Y10      DISPENSE STATUS CROSSMATCHED      CODING SYSTEM KHBQ657      Unit Blood Type Code 5100      Unit Blood Type O POS      Unit Expiration [id number]      CROSSMATCH INTERPRETATION Compatible            Imaging Results               CTA Acute GI [place], Abdomen and Pelvis (Final result)  Result time 11/07/24 23:35:08      Final result by Jax Carmichael MD (11/07/24 23:35:08)                   Impression:      Inflammatory changes of the ascending and splenic flexure of the colon with the previous suggested mass not confidently identified.    No evidence of active GI bleed.    No evidence of bowel obstruction.    Mild pleural thickening and/or fluid.    This report was flagged in Epic as abnormal.      Electronically signed by: Jax Carmichael  Date:    11/07/2024  Time:    23:35               Narrative:    EXAMINATION:  CTA ACUTE GI BLEED, ABDOMEN AND PELVIS    CLINICAL HISTORY:  GI bleeding;    TECHNIQUE:  Using 75 cc of  Omnipaque 350 IV, and multi-detector helical CT technique, axial CT angiogram images of the abdomen were obtained from the lung bases through the pelvis. Precontrast and portal venous phase images of the abdomen and pelvis also done. 2D post-processing coronal and sagittal reconstructions of the abdominal aorta and visceral arteries performed.    COMPARISON:  10/24/2024    FINDINGS:  The lung bases are well aerated.  No consolidation, suspicious  nodules, with small bilateral pleural effusion.  The visualized portions of the heart appear normal.    The esophagus, stomach, spleen, pancreas, and adrenal glands are unremarkable.    The liver is normal in size and attenuation without focal abnormality.  The portal veins appear patent.  The gallbladder shows no evidence of stones or cholecystitis.  No intra-or extrahepatic biliary ductal dilatation.    The kidneys demonstrate normal enhancement.  No renal mass, nephrolithiasis or hydronephrosis.  The ureters are normal in course and caliber. The urinary bladder appears unremarkable    No evidence of gastrointestinal hemorrhage or bowel obstruction.  Inflammation in the right colon through the hepatic flexure and the proximal portion of the transverse colon with more normal splenic flexure descending colon and rectosigmoid colon with a few diverticula in the sigmoid but without diverticulitis.  Definite mass is not identified as on the previous exam.  There is no ascites, free fluid, or intraperitoneal free air. No significant peritoneal or retroperitoneal adenopathy.    The abdominal aorta is normal in course and caliber without significant atherosclerotic calcifications.    The osseous structures and extraperitoneal soft tissues are unremarkable.                                       X-Ray Chest 1 View (Final result)  Result time 11/07/24 23:25:22      Final result by Nick Clifton DO (11/07/24 23:25:22)                   Impression:      Mild perihilar interstitial prominence, may be accentuated by hypoaeration.  Mild edema is not excluded.      Electronically signed by: Nick Clifton  Date:    11/07/2024  Time:    23:25               Narrative:    EXAMINATION:  XR CHEST 1 VIEW    CLINICAL HISTORY:  Shortness of breath    TECHNIQUE:  Single frontal view of the chest was performed.    COMPARISON:  10/19/2024.    FINDINGS:  The lungs are hypoexpanded.  There are perihilar interstitial opacities, may be  accentuated by hypoaeration changes.  The pleural spaces are clear.  The cardiac silhouette is enlarged.  Osseous structures demonstrate degenerative changes.                                       Medications   0.9%  NaCl infusion (for blood administration) (has no administration in time range)   metoprolol succinate (TOPROL-XL) 24 hr tablet 25 mg (has no administration in time range)   droPERidol injection 1.25 mg (1.25 mg Intravenous Given 11/7/24 2209)   pantoprazole injection 40 mg (40 mg Intravenous Given 11/7/24 2208)   potassium bicarbonate disintegrating tablet 50 mEq (50 mEq Oral Given 11/7/24 2304)   iohexoL (OMNIPAQUE 350) injection 130 mL (150 mLs Intravenous Given 11/7/24 2254)   metoprolol injection 5 mg (5 mg Intravenous Given 11/8/24 0025)     Medical Decision Making  Differential Diagnosis includes, but is not limited to:  Lower GI bleed (AVM, colitis, colon cancer, polyp, internal/external hemorrhoid, IBS, rectal injury/foreign body, chronic diarrhea, anal fissure), upper GI bleed (PUD, perforated ulcer, esophageal variceal bleed), Crohn's disease, ulcerative colitis, chronic diarrhea, dietary intake, ACS, CVA/ ICH, tension HA    Amount and/or Complexity of Data Reviewed  Labs: ordered. Decision-making details documented in ED Course.  Radiology: ordered. Decision-making details documented in ED Course.  Discussion of management or test interpretation with external provider(s): Spoke with Dr. Aguero given elevation of troponin.  EKG is without findings to suggest STEMI.  Likely demand ischemia in setting of GI bleed.  Will trend troponins.    Risk  Prescription drug management.  Decision regarding hospitalization.  Risk Details: Patient found to have anemia requiring transfusion, likely due to GI bleed.  CTA without evidence of acute bleed today.  Patient will be admitted for blood transfusion further evaluation of her symptoms.    Critical Care  Total time providing critical care: 40  minutes               ED Course as of 11/08/24 0040   Thu Nov 07, 2024   2221 CBC auto differential(!!)  Leukocytosis, anemia  [RN]   2230 Troponin I(!)  Troponin is significantly elevated. [RN]   2230 Brain natriuretic peptide(!)  BNP is elevated. [RN]   2242 EKG: Rate 123.  Sinus tachycardia.  Nonspecific ST depressions.  No STEMI. [RN]   2258 X-Ray Chest 1 View  Cardiomegaly, prominent interstitial opacities [RN]   Fri Nov 08, 2024   0033 Patient has been tachycardic despite receiving blood products.  CTA without evidence of acute GI bleed.  Patient with history of a flutter.  Given IV metoprolol with appropriate affect.  Will give her her home dose of metoprolol XL.  This time she appears stable for admission to telemetry. [RN]   0035 BP(!): 149/106 [RN]   0035 Pulse: 103 [RN]   0035 Resp(!): 32 [RN]   0035 SpO2: 96 % [RN]      ED Course User Index  [RN] Leobardo Martinez Jr., MD                             Clinical Impression:  Final diagnoses:  [R06.02] Shortness of breath  [D64.9] Symptomatic anemia          ED Disposition Condition    Admit Stable               Portions of this note were dictated using voice recognition software and may contain dictation related errors in spelling/grammar/syntax not found on text review       Leobardo Martinez Jr., MD  11/09/24 1300

## 2024-11-08 NOTE — PROGRESS NOTES
Individual Follow-Up Form    Quit Date:  To be determined    Clinical Status of Patient:  Inpatient    Length of Service:  15 minutes    Comments:  Smoking cessation education note: Smoking cessation education/handout provided. Patient is a  0.5 ppd cigarette smoker x 37 years. Obtained order for 21 mg nicotine patch Q day. She states she is ready to quit smoking.  Ambulatory referral to Smoking Cessation Program following hospital discharge.    Diagnosis:  F17.210

## 2024-11-08 NOTE — ASSESSMENT & PLAN NOTE
- presented with abdominal pain, melena, fatigue, SOB and chest pain  - initial H&H 5.6&17.2 with 3units PRBCs ordered with last PRBC in process   - on IV PPI BID; previous EGD and colonoscopy  - further management by primary team; will give dose of IV Lasix given tachypnea, rales and wheezing on PE; may need additional dose later depending on response

## 2024-11-08 NOTE — ED NOTES
Blood bank called to notify staff that 3/3 of the units of blood ordered are ready to release when needed. Verbal read back clarified.

## 2024-11-08 NOTE — PROGRESS NOTES
11/08/24 0453   Admission   Initial VN Admission Questions Incomplete  (pending home medication reconciliation and SDOH questionaire)   Shift   Virtual Nurse - Patient Verbalized Approval Of Camera Use;VN Rounding   Safety/Activity   Patient Rounds bed in low position;call light in patient/parent reach;clutter free environment maintained;visualized patient;placement of personal items at bedside   Safety Promotion/Fall Prevention assistive device/personal item within reach;side rails raised x 2   Positioning   Body Position side-lying;right   Head of Bed (HOB) Positioning HOB at 30-45 degrees     Notified by bedside nurse that pt is still sleepy but is willing to attempt to answer admission questions. VN cued in to pt's room with permission. Continued with admission questions. Pt falling asleep again. Able to complete admission questions with the exception of home medications and SDOH questionnaire. Bedside nurse notified. Will endorse to oncoming VN that home medications and SDOH needs to be completed when pt is awake.

## 2024-11-09 LAB
ANION GAP SERPL CALC-SCNC: 12 MMOL/L (ref 8–16)
BASOPHILS # BLD AUTO: 0.08 K/UL (ref 0–0.2)
BASOPHILS NFR BLD: 0.6 % (ref 0–1.9)
BUN SERPL-MCNC: 27 MG/DL (ref 6–20)
CALCIUM SERPL-MCNC: 7.7 MG/DL (ref 8.7–10.5)
CHLORIDE SERPL-SCNC: 101 MMOL/L (ref 95–110)
CO2 SERPL-SCNC: 19 MMOL/L (ref 23–29)
CREAT SERPL-MCNC: 1.5 MG/DL (ref 0.5–1.4)
DIFFERENTIAL METHOD BLD: ABNORMAL
EOSINOPHIL # BLD AUTO: 0.2 K/UL (ref 0–0.5)
EOSINOPHIL NFR BLD: 1.4 % (ref 0–8)
ERYTHROCYTE [DISTWIDTH] IN BLOOD BY AUTOMATED COUNT: 16.5 % (ref 11.5–14.5)
EST. GFR  (NO RACE VARIABLE): 40 ML/MIN/1.73 M^2
GLUCOSE SERPL-MCNC: 116 MG/DL (ref 70–110)
HCT VFR BLD AUTO: 26.4 % (ref 37–48.5)
HGB BLD-MCNC: 8.6 G/DL (ref 12–16)
IMM GRANULOCYTES # BLD AUTO: 0.08 K/UL (ref 0–0.04)
IMM GRANULOCYTES NFR BLD AUTO: 0.6 % (ref 0–0.5)
LYMPHOCYTES # BLD AUTO: 2 K/UL (ref 1–4.8)
LYMPHOCYTES NFR BLD: 14.5 % (ref 18–48)
MCH RBC QN AUTO: 27.8 PG (ref 27–31)
MCHC RBC AUTO-ENTMCNC: 32.6 G/DL (ref 32–36)
MCV RBC AUTO: 85 FL (ref 82–98)
MONOCYTES # BLD AUTO: 0.8 K/UL (ref 0.3–1)
MONOCYTES NFR BLD: 5.9 % (ref 4–15)
NEUTROPHILS # BLD AUTO: 10.8 K/UL (ref 1.8–7.7)
NEUTROPHILS NFR BLD: 77 % (ref 38–73)
NRBC BLD-RTO: 0 /100 WBC
PLATELET # BLD AUTO: 335 K/UL (ref 150–450)
PMV BLD AUTO: 9.9 FL (ref 9.2–12.9)
POTASSIUM SERPL-SCNC: 3.5 MMOL/L (ref 3.5–5.1)
RBC # BLD AUTO: 3.09 M/UL (ref 4–5.4)
SODIUM SERPL-SCNC: 132 MMOL/L (ref 136–145)
WBC # BLD AUTO: 13.97 K/UL (ref 3.9–12.7)

## 2024-11-09 PROCEDURE — 80048 BASIC METABOLIC PNL TOTAL CA: CPT | Performed by: REGISTERED NURSE

## 2024-11-09 PROCEDURE — 36415 COLL VENOUS BLD VENIPUNCTURE: CPT | Performed by: REGISTERED NURSE

## 2024-11-09 PROCEDURE — 63600175 PHARM REV CODE 636 W HCPCS: Performed by: REGISTERED NURSE

## 2024-11-09 PROCEDURE — 99233 SBSQ HOSP IP/OBS HIGH 50: CPT | Mod: ,,, | Performed by: INTERNAL MEDICINE

## 2024-11-09 PROCEDURE — 25000003 PHARM REV CODE 250: Performed by: EMERGENCY MEDICINE

## 2024-11-09 PROCEDURE — 25000003 PHARM REV CODE 250: Performed by: INTERNAL MEDICINE

## 2024-11-09 PROCEDURE — 25000003 PHARM REV CODE 250: Performed by: STUDENT IN AN ORGANIZED HEALTH CARE EDUCATION/TRAINING PROGRAM

## 2024-11-09 PROCEDURE — 27000207 HC ISOLATION

## 2024-11-09 PROCEDURE — S4991 NICOTINE PATCH NONLEGEND: HCPCS | Performed by: STUDENT IN AN ORGANIZED HEALTH CARE EDUCATION/TRAINING PROGRAM

## 2024-11-09 PROCEDURE — 11000001 HC ACUTE MED/SURG PRIVATE ROOM

## 2024-11-09 PROCEDURE — 85025 COMPLETE CBC W/AUTO DIFF WBC: CPT | Performed by: STUDENT IN AN ORGANIZED HEALTH CARE EDUCATION/TRAINING PROGRAM

## 2024-11-09 RX ORDER — ACETAMINOPHEN 325 MG/1
650 TABLET ORAL EVERY 6 HOURS PRN
Status: DISCONTINUED | OUTPATIENT
Start: 2024-11-09 | End: 2024-11-18 | Stop reason: HOSPADM

## 2024-11-09 RX ORDER — HYDROCODONE BITARTRATE AND ACETAMINOPHEN 10; 325 MG/1; MG/1
1 TABLET ORAL ONCE
Status: COMPLETED | OUTPATIENT
Start: 2024-11-09 | End: 2024-11-09

## 2024-11-09 RX ADMIN — CEFTRIAXONE SODIUM 1 G: 1 INJECTION, POWDER, FOR SOLUTION INTRAMUSCULAR; INTRAVENOUS at 03:11

## 2024-11-09 RX ADMIN — PANTOPRAZOLE SODIUM 40 MG: 40 INJECTION, POWDER, LYOPHILIZED, FOR SOLUTION INTRAVENOUS at 08:11

## 2024-11-09 RX ADMIN — HYDROCODONE BITARTRATE AND ACETAMINOPHEN 1 TABLET: 10; 325 TABLET ORAL at 09:11

## 2024-11-09 RX ADMIN — NICOTINE 1 PATCH: 21 PATCH, EXTENDED RELEASE TRANSDERMAL at 09:11

## 2024-11-09 RX ADMIN — PANTOPRAZOLE SODIUM 40 MG: 40 INJECTION, POWDER, LYOPHILIZED, FOR SOLUTION INTRAVENOUS at 09:11

## 2024-11-09 RX ADMIN — ACETAMINOPHEN 650 MG: 325 TABLET ORAL at 06:11

## 2024-11-09 RX ADMIN — METOPROLOL SUCCINATE 25 MG: 25 TABLET, EXTENDED RELEASE ORAL at 09:11

## 2024-11-09 RX ADMIN — FUROSEMIDE 40 MG: 40 TABLET ORAL at 09:11

## 2024-11-09 NOTE — SUBJECTIVE & OBJECTIVE
Interval History:     Patient is feeling fine, she is asking for a sleeping medication    She said that she had a brown bowel movement this morning    Review of Systems   Constitutional: Negative.    HENT: Negative.     Respiratory: Negative.     Cardiovascular: Negative.    Gastrointestinal: Negative.    Genitourinary: Negative.    Musculoskeletal: Negative.    Skin: Negative.    Neurological: Negative.    Psychiatric/Behavioral: Negative.       Objective:     Vital Signs (Most Recent):  Temp: 97.8 °F (36.6 °C) (11/09/24 1130)  Pulse: 107 (11/09/24 1231)  Resp: 18 (11/09/24 1130)  BP: (!) 146/100 (11/09/24 1130)  SpO2: 99 % (11/09/24 1130) Vital Signs (24h Range):  Temp:  [97.8 °F (36.6 °C)-98.5 °F (36.9 °C)] 97.8 °F (36.6 °C)  Pulse:  [105-109] 107  Resp:  [17-18] 18  SpO2:  [93 %-99 %] 99 %  BP: (118-146)/() 146/100     Weight: 78.5 kg (173 lb 1 oz)  Body mass index is 24.83 kg/m².    Intake/Output Summary (Last 24 hours) at 11/9/2024 1322  Last data filed at 11/9/2024 0800  Gross per 24 hour   Intake 480 ml   Output --   Net 480 ml         Physical Exam  Constitutional:       Appearance: Normal appearance.   Cardiovascular:      Rate and Rhythm: Normal rate and regular rhythm.   Pulmonary:      Effort: Pulmonary effort is normal. No respiratory distress.      Breath sounds: Normal breath sounds.   Abdominal:      General: Abdomen is flat.      Palpations: Abdomen is soft.   Skin:     General: Skin is warm and dry.   Neurological:      General: No focal deficit present.      Mental Status: She is alert and oriented to person, place, and time.             Significant Labs: All pertinent labs within the past 24 hours have been reviewed.  CBC:   Recent Labs   Lab 11/07/24  2153 11/08/24  1014 11/09/24  0236   WBC 19.41* 20.68* 13.97*   HGB 5.6* 9.1@RCBLD* 8.6*   HCT 17.2* 27.8@RCBLD* 26.4*    399 335     CMP:   Recent Labs   Lab 11/07/24  2153 11/08/24  0401 11/09/24  0236   * 134* 132*   K 2.8*  3.6 3.5    105 101   CO2 20* 20* 19*   * 115* 116*   BUN 20 20 27*   CREATININE 1.2 1.3 1.5*   CALCIUM 7.5* 7.6* 7.7*   PROT 6.3  --   --    ALBUMIN 2.1*  --   --    BILITOT 0.3  --   --    ALKPHOS 78  --   --    AST 17  --   --    ALT 13  --   --    ANIONGAP 9 9 12       Significant Imaging: I have reviewed all pertinent imaging results/findings within the past 24 hours.

## 2024-11-09 NOTE — PROGRESS NOTES
[facility] - Telemetry  Cardiology  Progress Note    Patient Name: Mary Ellen Saini  MRN: [id number]  Admission Date: 11/7/2024  Hospital Length of Stay: 1 days  Code Status: Full Code   Attending Physician: Thomas Velasquez MD   Primary Care Physician: Marlen, Primary Doctor  Expected Discharge Date: 11/10/2024  Principal Problem:<principal problem not specified>    Subjective:     Denies any chest pain.  On no supplemental oxygen.  No episodes of melena this morning.  Hemoglobin improved with PRBC transfusion.  Review of Systems   Constitutional: Negative for chills and fever.   HENT:  Negative for hearing loss and nosebleeds.    Eyes:  Negative for blurred vision.   Cardiovascular:  Negative for chest pain, leg swelling and palpitations.   Respiratory:  Negative for hemoptysis and shortness of breath.    Hematologic/Lymphatic: Negative for bleeding problem.   Skin:  Negative for itching.   Musculoskeletal:  Negative for falls.   Gastrointestinal:  Negative for abdominal pain and hematochezia.   Genitourinary:  Negative for hematuria.   Neurological:  Negative for dizziness and loss of balance.   Psychiatric/Behavioral:  Negative for altered mental status and depression.      Objective:     Vital Signs (Most Recent):  Temp: 97.8 °F (36.6 °C) (11/09/24 1130)  Pulse: 107 (11/09/24 1130)  Resp: 18 (11/09/24 1130)  BP: (!) 146/100 (11/09/24 1130)  SpO2: 99 % (11/09/24 1130) Vital Signs (24h Range):  Temp:  [97.8 °F (36.6 °C)-98.5 °F (36.9 °C)] 97.8 °F (36.6 °C)  Pulse:  [105-109] 107  Resp:  [17-18] 18  SpO2:  [93 %-99 %] 99 %  BP: (118-146)/() 146/100     Weight: 78.5 kg (173 lb 1 oz)  Body mass index is 24.83 kg/m².    SpO2: 99 %         Intake/Output Summary (Last 24 hours) at 11/9/2024 1204  Last data filed at 11/9/2024 0800  Gross per 24 hour   Intake 480 ml   Output --   Net 480 ml       Lines/Drains/Airways       Peripheral Intravenous Line  Duration                  Peripheral IV - Single Lumen 11/07/24 20 G  Anterior;Right Forearm 2 days         Peripheral IV - Single Lumen 11/07/24 20 G Left Forearm 2 days                    Physical Exam  Constitutional:       Appearance: She is well-developed.   HENT:      Head: Normocephalic and atraumatic.   Eyes:      Conjunctiva/sclera: Conjunctivae normal.   Neck:      Vascular: No carotid bruit or JVD.   Cardiovascular:      Rate and Rhythm: Normal rate and regular rhythm.      Pulses:           Carotid pulses are 2+ on the right side and 2+ on the left side.       Radial pulses are 2+ on the right side and 2+ on the left side.      Heart sounds: Normal heart sounds. No murmur heard.     No friction rub. No gallop.   Pulmonary:      Effort: Pulmonary effort is normal. No respiratory distress.      Breath sounds: Normal breath sounds. No stridor. No wheezing.   Musculoskeletal:      Cervical back: Neck supple.   Skin:     General: Skin is warm and dry.   Neurological:      Mental Status: She is alert and oriented to person, place, and time.   Psychiatric:         Behavior: Behavior normal.         Significant Labs: All pertinent lab results from the last 24 hours have been reviewed. and   Recent Lab Results         11/09/24  0236        Anion Gap 12       Baso # 0.08       Basophil % 0.6       BUN 27       Calcium 7.7       Chloride 101       CO2 19       Creatinine 1.5       Differential Method Automated       eGFR 40       Eos # 0.2       Eos % 1.4       Glucose 116       Gran # (ANC) 10.8       Gran % 77.0       Hematocrit 26.4       Hemoglobin 8.6       Immature Grans (Abs) 0.08  Comment: Mild elevation in immature granulocytes is non specific and   can be seen in a variety of conditions including stress response,   acute inflammation, trauma and pregnancy. Correlation with other   laboratory and clinical findings is essential.         Immature Granulocytes 0.6       Lymph # 2.0       Lymph % 14.5       MCH 27.8       MCHC 32.6       MCV 85       Mono # 0.8       Mono %  5.9       MPV 9.9       nRBC 0       Platelet Count 335       Potassium 3.5       RBC 3.09       RDW 16.5       Sodium 132       WBC 13.97               Significant Imaging: Echocardiogram: Transthoracic echo (TTE) complete ([facility] Only):   Results for orders placed or performed during the hospital encounter of 08/12/24   Echo   Result Value Ref Range    Jenkins's Biplane MOD Ejection Fraction 34 %    A2C EF 47 %    A4C EF 21 %    LVOT stroke volume 44.72 cm3    LVIDd 5.98 3.5 - 6.0 cm    LV Systolic Volume 150.13 mL    LVIDs 5.54 (A) 2.1 - 4.0 cm    LV ESV A2C 78.97 mL    LV Diastolic Volume 178.40 mL    LV ESV A4C 82.83 mL    LV EDV A2C 143.971141124323591 mL    LV EDV A4C 145.65 mL    Left Ventricular End Systolic Volume by Teichholz Method 150.13 mL    Left Ventricular End Diastolic Volume by Teichholz Method 178.40 mL    IVS 0.79 0.6 - 1.1 cm    LVOT diameter 2.01 cm    LVOT area 3.2 cm2    FS 7 (A) 28 - 44 %    Left Ventricle Relative Wall Thickness 0.34 cm    PW 1.03 0.6 - 1.1 cm    LV mass 217.51 g    MV Peak E Sukhdeep 0.76 m/s    TDI LATERAL 0.06 m/s    TDI SEPTAL 0.07 m/s    E/E' ratio 11.69 m/s    MV Peak A Sukhdeep 0.88 m/s    TR Max Sukhdeep 2.86 m/s    E/A ratio 0.86     IVRT 111.32 msec    E wave deceleration time 154.28 msec    LV SEPTAL E/E' RATIO 10.86 m/s    LV LATERAL E/E' RATIO 12.67 m/s    PV Peak S Sukhdeep 0.51 m/s    PV Peak D Sukhdeep 0.43 m/s    Pulm vein S/D ratio 1.19     LVOT peak sukhdeep 0.89 m/s    Left Ventricular Outflow Tract Mean Velocity 0.62 cm/s    Left Ventricular Outflow Tract Mean Gradient 1.74 mmHg    RV S' 7.35 cm/s    TAPSE 2.16 cm    RV/LV Ratio 0.54 cm    LA size 4.22 cm    Left Atrium Minor Axis 5.63 cm    Left Atrium Major Axis 5.97 cm    LA Vol (MOD) 84.22 cm3    RA Major Axis 5.27 cm    RA Width 4.99 cm    AV mean gradient 4 mmHg    AV peak gradient 8 mmHg    Ao peak sukhdeep 1.40 m/s    Ao VTI 22.90 cm    LVOT peak VTI 14.10 cm    AV valve area 1.95 cm²    AV Velocity Ratio 0.64     AV index  (prosthetic) 0.62     BHARAT by Velocity Ratio 2.02 cm²    MV mean gradient 2 mmHg    MV peak gradient 3 mmHg    MV valve area by continuity eq 2.18 cm2    MV VTI 20.5 cm    Triscuspid Valve Regurgitation Peak Gradient 33 mmHg    Sinus 3.84 cm    STJ 3.50 cm    Ascending aorta 3.40 cm    Mean e' 0.07 m/s    LA area A4C 25.77 cm2    LA area A2C 23.67 cm2    RVDD 3.23 cm    BSA 1.97 m2    LV Systolic Volume Index 78.6 mL/m2    LV Diastolic Volume Index 93.40 mL/m2    LV Mass Index 114 g/m2    ANTOINETTE (MOD) 44.1 mL/m2    ZLVIDS 4.07     ZLVIDD 1.07     ANTOINETTE 52.2 mL/m2    LA Vol 99.78 cm3    LA WIDTH 4.8 cm    TV resting pulmonary artery pressure 36 mmHg    RV TB RVSP 6 mmHg    Est. RA pres 3 mmHg    Narrative      Left Ventricle: The left ventricle is moderately dilated. There is   eccentric hypertrophy. Moderate global hypokinesis present. There is   moderately reduced systolic function with a visually estimated ejection   fraction of 30 - 35%. Biplane (2D) method of discs ejection fraction is   34%. There is diastolic dysfunction but grade cannot be determined.    Right Ventricle: Normal right ventricular cavity size. Wall thickness   is normal. Systolic function is normal.    Left Atrium: Left atrium is severely dilated.    Right Atrium: Right atrium is moderately dilated.    Aortic Valve: There is mild stenosis. Aortic valve area by VTI is 1.95   cm². Aortic valve peak velocity is 1.40 m/s. Mean gradient is 4 mmHg. The   dimensionless index is 0.62.    Mitral Valve: There is mild regurgitation.    Tricuspid Valve: There is mild regurgitation.    Pulmonic Valve: There is mild regurgitation.    Pulmonary Artery: The estimated pulmonary artery systolic pressure is   36 mmHg.    IVC/SVC: Normal venous pressure at 3 mmHg.       Assessment and Plan:     Brief HPI:     59-year-old female with heart failure with reduced ejection fraction, atrial flutter, hepatitis-C, left ventricular EF of 30%, previously on LifeVest, not on  oral anticoagulation because of significant anemia history in the past, previous admission to the hospital with NSTEMI, recent EGD and colonoscopy without any active bleeding source, admitted to the hospital with abdominal pain, melena, noted to have hemoglobin of 5.5, troponin of 3.4, cardiology consulted further recommendations.      - recommend holding off of aspirin Plavix or any other anticoagulation because of significant anemia.  GI planning on doing endoscopy/colonoscopy on Monday morning.  - resume Toprol-XL after colonoscopy/EGD as blood pressure allows.  Holding off of ACE inhibitor/ARB in the presence of renal dysfunction.   - continue on telemetry.  Patient likely had troponin elevation and EKG changes from demand supply mismatch rather than acute coronary syndrome.  - please call us for any question or concerns.  Cardiology will follow remotely    Active Diagnoses:    Diagnosis Date Noted POA    GIB (gastrointestinal bleeding) [K92.2] 11/08/2024 Yes    ABLA (acute blood loss anemia) [D62] 11/08/2024 Yes    Leukocytosis [D72.829] 10/24/2024 Yes    NSTEMI (non-ST elevated myocardial infarction) [I21.4] 10/19/2024 Yes    HFrEF (heart failure with reduced ejection fraction) [I50.20] 09/05/2024 Yes    Smoking history [Z87.891] 08/12/2024 Not Applicable      Problems Resolved During this Admission:       VTE Risk Mitigation (From admission, onward)           Ordered     IP VTE HIGH RISK PATIENT  Once         11/08/24 0137     Place sequential compression device  Until discontinued         11/08/24 0137                    Enmanuel Barraza MD  Cardiology  [place] - Telemetry

## 2024-11-09 NOTE — ASSESSMENT & PLAN NOTE
Anemia is likely due to acute blood loss which was from GIB . Most recent hemoglobin and hematocrit are listed below.  Recent Labs     11/07/24  2153 11/08/24  1014 11/09/24  0236   HGB 5.6* 9.1@RCBLD* 8.6*   HCT 17.2* 27.8@RCBLD* 26.4*     CTA w/o active GIB  Plan  - Monitor serial CBC: Daily  - Transfuse PRBC if patient becomes hemodynamically unstable, symptomatic or H/H drops below 7/21.  - Patient has received 3 units of PRBCs on 11/8/24  - Patient's anemia is currently stable  - stable

## 2024-11-09 NOTE — ASSESSMENT & PLAN NOTE
Nicotine patches  Smoking cessation counseling   U tox is positive for cocaine amphetamine and THC

## 2024-11-09 NOTE — PROGRESS NOTES
[facility]  Progress Note    Patient Name: Mary Ellen Saini  MRN: [id number]  Patient Class: IP- Inpatient   Admission Date: 11/7/2024  Length of Stay: 1 days  Attending Physician: Thomas Velasquez MD  Primary Care Provider: Marlen, Primary Doctor        Subjective:     Principal Problem:<principal problem not specified>        HPI:  [age] year old female with a history of hypertension, bipolar, hep C, HFrEF presents to ED with complaints of chest pain, shortness a breath and blood in her stool.  Patient was recently admitted for GI bleed which she required blood transfusion.  Patient reports bleeding in stool has been having for the last several days however worsened today along with worsening abdominal pain. In ED labs remarkable for H&H 5.6 and 17.2.  Patient discharged 12 days ago with H&H of 7.3 and 22.8.  Blood pressure stable on admission however she is tachycardic 120.  Patient also with a chest pain.  EKG without ST elevation BNP 3425, troponin 3.379.  CTA of the abdomen done denies show any evidence of active GI bleed.  Patient typed and crossmatch for 3 units.  We will start PPI IV BID, transfuse PRBC, consult GI and Cards.    Overview/Hospital Course:  No notes on file    Interval History:     Patient is feeling fine, she is asking for a sleeping medication    She said that she had a brown bowel movement this morning    Review of Systems   Constitutional: Negative.    HENT: Negative.     Respiratory: Negative.     Cardiovascular: Negative.    Gastrointestinal: Negative.    Genitourinary: Negative.    Musculoskeletal: Negative.    Skin: Negative.    Neurological: Negative.    Psychiatric/Behavioral: Negative.       Objective:     Vital Signs (Most Recent):  Temp: 97.8 °F (36.6 °C) (11/09/24 1130)  Pulse: 107 (11/09/24 1231)  Resp: 18 (11/09/24 1130)  BP: (!) 146/100 (11/09/24 1130)  SpO2: 99 % (11/09/24 1130) Vital Signs (24h Range):  Temp:  [97.8 °F (36.6 °C)-98.5 °F (36.9 °C)] 97.8  °F (36.6 °C)  Pulse:  [105-109] 107  Resp:  [17-18] 18  SpO2:  [93 %-99 %] 99 %  BP: (118-146)/() 146/100     Weight: 78.5 kg (173 lb 1 oz)  Body mass index is 24.83 kg/m².    Intake/Output Summary (Last 24 hours) at 11/9/2024 1322  Last data filed at 11/9/2024 0800  Gross per 24 hour   Intake 480 ml   Output --   Net 480 ml         Physical Exam  Constitutional:       Appearance: Normal appearance.   Cardiovascular:      Rate and Rhythm: Normal rate and regular rhythm.   Pulmonary:      Effort: Pulmonary effort is normal. No respiratory distress.      Breath sounds: Normal breath sounds.   Abdominal:      General: Abdomen is flat.      Palpations: Abdomen is soft.   Skin:     General: Skin is warm and dry.   Neurological:      General: No focal deficit present.      Mental Status: She is alert and oriented to person, place, and time.             Significant Labs: All pertinent labs within the past 24 hours have been reviewed.  CBC:   Recent Labs   Lab 11/07/24 2153 11/08/24  1014 11/09/24  0236   WBC 19.41* 20.68* 13.97*   HGB 5.6* 9.1@RCBLD* 8.6*   HCT 17.2* 27.8@RCBLD* 26.4*    399 335     CMP:   Recent Labs   Lab 11/07/24 2153 11/08/24  0401 11/09/24  0236   * 134* 132*   K 2.8* 3.6 3.5    105 101   CO2 20* 20* 19*   * 115* 116*   BUN 20 20 27*   CREATININE 1.2 1.3 1.5*   CALCIUM 7.5* 7.6* 7.7*   PROT 6.3  --   --    ALBUMIN 2.1*  --   --    BILITOT 0.3  --   --    ALKPHOS 78  --   --    AST 17  --   --    ALT 13  --   --    ANIONGAP 9 9 12       Significant Imaging: I have reviewed all pertinent imaging results/findings within the past 24 hours.    Assessment/Plan:      ABLA (acute blood loss anemia)  Anemia is likely due to acute blood loss which was from GIB . Most recent hemoglobin and hematocrit are listed below.  Recent Labs     11/07/24 2153 11/08/24  1014 11/09/24  0236   HGB 5.6* 9.1@RCBLD* 8.6*   HCT 17.2* 27.8@RCBLD* 26.4*     CTA w/o active GIB  Plan  - Monitor  serial CBC: Daily  - Transfuse PRBC if patient becomes hemodynamically unstable, symptomatic or H/H drops below 7/21.  - Patient has received 3 units of PRBCs on 11/8/24  - Patient's anemia is currently stable  - stable    GIB (gastrointestinal bleeding)  Patient's hemorrhage is due to gastrointestinal bleed, patient does not have a propensity for bleeding.. Patients most recent Hgb, Hct, platelets, and INR are listed below.  Recent Labs     11/07/24  2153 11/08/24  1014 11/09/24  0236   HGB 5.6* 9.1@RCBLD* 8.6*   HCT 17.2* 27.8@RCBLD* 26.4*    399 335   INR 1.1  --   --        Plan  - Will trend hemoglobin/hematocrit Daily  - Will monitor and correct any coagulation defects  - Will transfuse if Hgb is <7g/dl (<8g/dl in cases of active ACS) or if patient has rapid bleeding leading to hemodynamic instability  - Consult GI  -Ppi BID  -patient received 3 U PRBC  -patient was evaluated by GI, and scheduled for EGD/colonoscopy on Monday    Leukocytosis  No obvious infection  Started rocephin given pts hx hep c and GIB      NSTEMI (non-ST elevated myocardial infarction)  EKG w/o ST elevation  Troponin elevated 3.379-->3.587-->3.449  Cards eval is appreciated, medical management is recommended       HFrEF (heart failure with reduced ejection fraction)  Echo 08/2024  EF 34%  Continue BB  Given one dose iv lasix 10/08      Smoking history  Nicotine patches  Smoking cessation counseling   U tox is positive for cocaine amphetamine and THC        VTE Risk Mitigation (From admission, onward)           Ordered     IP VTE HIGH RISK PATIENT  Once         11/08/24 0137     Place sequential compression device  Until discontinued         11/08/24 0137                    Discharge Planning   JENAE: 11/10/2024     Code Status: Full Code   Is the patient medically ready for discharge?:     Reason for patient still in hospital (select all that apply): Patient trending condition  Discharge Plan A: Home with family                  Aya  YANA Velasquez MD  Department of [facility]   [place] [facility]

## 2024-11-09 NOTE — ASSESSMENT & PLAN NOTE
Patient's hemorrhage is due to gastrointestinal bleed, patient does not have a propensity for bleeding.. Patients most recent Hgb, Hct, platelets, and INR are listed below.  Recent Labs     11/07/24  2153 11/08/24  1014 11/09/24  0236   HGB 5.6* 9.1@RCBLD* 8.6*   HCT 17.2* 27.8@RCBLD* 26.4*    399 335   INR 1.1  --   --        Plan  - Will trend hemoglobin/hematocrit Daily  - Will monitor and correct any coagulation defects  - Will transfuse if Hgb is <7g/dl (<8g/dl in cases of active ACS) or if patient has rapid bleeding leading to hemodynamic instability  - Consult GI  -Ppi BID  -patient received 3 U PRBC  -patient was evaluated by GI, and scheduled for EGD/colonoscopy on Monday

## 2024-11-10 LAB
ANION GAP SERPL CALC-SCNC: 10 MMOL/L (ref 8–16)
BASOPHILS # BLD AUTO: 0.07 K/UL (ref 0–0.2)
BASOPHILS NFR BLD: 0.7 % (ref 0–1.9)
BUN SERPL-MCNC: 26 MG/DL (ref 6–20)
CALCIUM SERPL-MCNC: 7.7 MG/DL (ref 8.7–10.5)
CHLORIDE SERPL-SCNC: 101 MMOL/L (ref 95–110)
CO2 SERPL-SCNC: 20 MMOL/L (ref 23–29)
CREAT SERPL-MCNC: 1.4 MG/DL (ref 0.5–1.4)
DIFFERENTIAL METHOD BLD: ABNORMAL
EOSINOPHIL # BLD AUTO: 0.4 K/UL (ref 0–0.5)
EOSINOPHIL NFR BLD: 4 % (ref 0–8)
ERYTHROCYTE [DISTWIDTH] IN BLOOD BY AUTOMATED COUNT: 16.7 % (ref 11.5–14.5)
EST. GFR  (NO RACE VARIABLE): 44 ML/MIN/1.73 M^2
GLUCOSE SERPL-MCNC: 124 MG/DL (ref 70–110)
HCT VFR BLD AUTO: 26.3 % (ref 37–48.5)
HGB BLD-MCNC: 8.5 G/DL (ref 12–16)
IMM GRANULOCYTES # BLD AUTO: 0.09 K/UL (ref 0–0.04)
IMM GRANULOCYTES NFR BLD AUTO: 0.9 % (ref 0–0.5)
LYMPHOCYTES # BLD AUTO: 1.8 K/UL (ref 1–4.8)
LYMPHOCYTES NFR BLD: 17.1 % (ref 18–48)
MAGNESIUM SERPL-MCNC: 1.5 MG/DL (ref 1.6–2.6)
MCH RBC QN AUTO: 27.8 PG (ref 27–31)
MCHC RBC AUTO-ENTMCNC: 32.3 G/DL (ref 32–36)
MCV RBC AUTO: 86 FL (ref 82–98)
MONOCYTES # BLD AUTO: 0.7 K/UL (ref 0.3–1)
MONOCYTES NFR BLD: 6.7 % (ref 4–15)
NEUTROPHILS # BLD AUTO: 7.3 K/UL (ref 1.8–7.7)
NEUTROPHILS NFR BLD: 70.6 % (ref 38–73)
NRBC BLD-RTO: 0 /100 WBC
OHS QRS DURATION: 88 MS
OHS QRS DURATION: 90 MS
OHS QTC CALCULATION: 478 MS
OHS QTC CALCULATION: 492 MS
PLATELET # BLD AUTO: 354 K/UL (ref 150–450)
PMV BLD AUTO: 9.8 FL (ref 9.2–12.9)
POTASSIUM SERPL-SCNC: 3 MMOL/L (ref 3.5–5.1)
RBC # BLD AUTO: 3.06 M/UL (ref 4–5.4)
SODIUM SERPL-SCNC: 131 MMOL/L (ref 136–145)
WBC # BLD AUTO: 10.4 K/UL (ref 3.9–12.7)

## 2024-11-10 PROCEDURE — 36415 COLL VENOUS BLD VENIPUNCTURE: CPT | Performed by: REGISTERED NURSE

## 2024-11-10 PROCEDURE — 85025 COMPLETE CBC W/AUTO DIFF WBC: CPT | Performed by: STUDENT IN AN ORGANIZED HEALTH CARE EDUCATION/TRAINING PROGRAM

## 2024-11-10 PROCEDURE — 63600175 PHARM REV CODE 636 W HCPCS: Performed by: STUDENT IN AN ORGANIZED HEALTH CARE EDUCATION/TRAINING PROGRAM

## 2024-11-10 PROCEDURE — 25000003 PHARM REV CODE 250: Performed by: STUDENT IN AN ORGANIZED HEALTH CARE EDUCATION/TRAINING PROGRAM

## 2024-11-10 PROCEDURE — 80048 BASIC METABOLIC PNL TOTAL CA: CPT | Performed by: REGISTERED NURSE

## 2024-11-10 PROCEDURE — 11000001 HC ACUTE MED/SURG PRIVATE ROOM

## 2024-11-10 PROCEDURE — 25000003 PHARM REV CODE 250: Performed by: EMERGENCY MEDICINE

## 2024-11-10 PROCEDURE — S4991 NICOTINE PATCH NONLEGEND: HCPCS | Performed by: STUDENT IN AN ORGANIZED HEALTH CARE EDUCATION/TRAINING PROGRAM

## 2024-11-10 PROCEDURE — 63600175 PHARM REV CODE 636 W HCPCS: Performed by: REGISTERED NURSE

## 2024-11-10 PROCEDURE — 27000207 HC ISOLATION

## 2024-11-10 PROCEDURE — 25000003 PHARM REV CODE 250: Performed by: INTERNAL MEDICINE

## 2024-11-10 PROCEDURE — 83735 ASSAY OF MAGNESIUM: CPT | Performed by: REGISTERED NURSE

## 2024-11-10 RX ORDER — HYDROMORPHONE HYDROCHLORIDE 2 MG/1
2 TABLET ORAL EVERY 8 HOURS PRN
Status: DISCONTINUED | OUTPATIENT
Start: 2024-11-10 | End: 2024-11-14

## 2024-11-10 RX ORDER — BISACODYL 5 MG
10 TABLET, DELAYED RELEASE (ENTERIC COATED) ORAL ONCE
Status: COMPLETED | OUTPATIENT
Start: 2024-11-10 | End: 2024-11-10

## 2024-11-10 RX ORDER — HYDROMORPHONE HYDROCHLORIDE 2 MG/1
2 TABLET ORAL EVERY 8 HOURS
Status: DISCONTINUED | OUTPATIENT
Start: 2024-11-10 | End: 2024-11-10

## 2024-11-10 RX ORDER — POLYETHYLENE GLYCOL 3350, SODIUM SULFATE ANHYDROUS, SODIUM BICARBONATE, SODIUM CHLORIDE, POTASSIUM CHLORIDE 236; 22.74; 6.74; 5.86; 2.97 G/4L; G/4L; G/4L; G/4L; G/4L
4000 POWDER, FOR SOLUTION ORAL ONCE
Status: COMPLETED | OUTPATIENT
Start: 2024-11-10 | End: 2024-11-10

## 2024-11-10 RX ORDER — POTASSIUM CHLORIDE 7.45 MG/ML
10 INJECTION INTRAVENOUS
Status: DISCONTINUED | OUTPATIENT
Start: 2024-11-10 | End: 2024-11-10

## 2024-11-10 RX ORDER — POTASSIUM CHLORIDE 7.45 MG/ML
10 INJECTION INTRAVENOUS
Status: COMPLETED | OUTPATIENT
Start: 2024-11-10 | End: 2024-11-10

## 2024-11-10 RX ORDER — POTASSIUM CHLORIDE 20 MEQ/1
40 TABLET, EXTENDED RELEASE ORAL ONCE
Status: COMPLETED | OUTPATIENT
Start: 2024-11-10 | End: 2024-11-10

## 2024-11-10 RX ORDER — POTASSIUM CHLORIDE 7.45 MG/ML
10 INJECTION INTRAVENOUS ONCE
Status: DISCONTINUED | OUTPATIENT
Start: 2024-11-10 | End: 2024-11-10

## 2024-11-10 RX ADMIN — HYDROMORPHONE HYDROCHLORIDE 2 MG: 2 TABLET ORAL at 11:11

## 2024-11-10 RX ADMIN — POTASSIUM CHLORIDE 10 MEQ: 7.45 INJECTION INTRAVENOUS at 10:11

## 2024-11-10 RX ADMIN — POTASSIUM CHLORIDE 10 MEQ: 7.45 INJECTION INTRAVENOUS at 09:11

## 2024-11-10 RX ADMIN — PANTOPRAZOLE SODIUM 40 MG: 40 INJECTION, POWDER, LYOPHILIZED, FOR SOLUTION INTRAVENOUS at 11:11

## 2024-11-10 RX ADMIN — NICOTINE 1 PATCH: 21 PATCH, EXTENDED RELEASE TRANSDERMAL at 09:11

## 2024-11-10 RX ADMIN — HYDROMORPHONE HYDROCHLORIDE 2 MG: 2 TABLET ORAL at 04:11

## 2024-11-10 RX ADMIN — FUROSEMIDE 40 MG: 40 TABLET ORAL at 09:11

## 2024-11-10 RX ADMIN — BISACODYL 10 MG: 5 TABLET, COATED ORAL at 11:11

## 2024-11-10 RX ADMIN — POTASSIUM CHLORIDE 40 MEQ: 1500 TABLET, EXTENDED RELEASE ORAL at 06:11

## 2024-11-10 RX ADMIN — METOPROLOL SUCCINATE 25 MG: 25 TABLET, EXTENDED RELEASE ORAL at 09:11

## 2024-11-10 RX ADMIN — PANTOPRAZOLE SODIUM 40 MG: 40 INJECTION, POWDER, LYOPHILIZED, FOR SOLUTION INTRAVENOUS at 09:11

## 2024-11-10 RX ADMIN — POLYETHYLENE GLYCOL 3350, SODIUM SULFATE ANHYDROUS, SODIUM BICARBONATE, SODIUM CHLORIDE, POTASSIUM CHLORIDE 4000 ML: 236; 22.74; 6.74; 5.86; 2.97 POWDER, FOR SOLUTION ORAL at 06:11

## 2024-11-10 RX ADMIN — CEFTRIAXONE SODIUM 1 G: 1 INJECTION, POWDER, FOR SOLUTION INTRAMUSCULAR; INTRAVENOUS at 04:11

## 2024-11-10 RX ADMIN — POTASSIUM CHLORIDE 10 MEQ: 7.45 INJECTION INTRAVENOUS at 11:11

## 2024-11-10 NOTE — ASSESSMENT & PLAN NOTE
Patient's hemorrhage is due to gastrointestinal bleed, patient does not have a propensity for bleeding.. Patients most recent Hgb, Hct, platelets, and INR are listed below.  Recent Labs     11/07/24  2153 11/08/24  1014 11/09/24  0236 11/10/24  0220   HGB 5.6* 9.1@RCBLD* 8.6* 8.5*   HCT 17.2* 27.8@RCBLD* 26.4* 26.3*    399 335 354   INR 1.1  --   --   --        Plan  - Will trend hemoglobin/hematocrit Daily  - Will monitor and correct any coagulation defects  - Will transfuse if Hgb is <7g/dl (<8g/dl in cases of active ACS) or if patient has rapid bleeding leading to hemodynamic instability  - Consult GI  -Ppi BID  -patient received 3 U PRBC  -patient was evaluated by GI, and scheduled for EGD/colonoscopy tomorrow  -CLD today  -Bowel preop this evening

## 2024-11-10 NOTE — SUBJECTIVE & OBJECTIVE
Interval History: Virtual follow up visit for suspected Shortness of breath [R06.02]  Symptomatic anemia [D64.9] present on admission.   This service was provided by telemedicine.    The patient location is: [facility] A   Admitted 11/7/2024  9:15 PM    CC: GI bleed    The patient is able to provide adequate history. History was obtained from patient and past medical records.   No significant events overnight reported.  Patient complains of nothing new       Data  Details     [x]   Lab results reviewed 11/11/2024  Hypomagnesemia. Hyponatremia. sCr improved. H&H stable.    []   Micro reports reviewed 11/11/2024     []   Pathology reports reviewed 11/11/2024     []   Imaging reports reviewed 11/11/2024     [x]   Cardiology Procedure reports reviewed 11/11/2024  EF 30 - 35%.    []   Non- records/CareEverywhere notes reviewed 11/11/2024      []  Tests/studies orders placed or verified 11/11/2024       []  Independently viewed/assessed 11/11/2024      []  11/11/2024 Discussion of:      Review of Systems   Constitutional:  Negative for fever.   Respiratory:  Negative for shortness of breath.      Objective:     Vital Signs (Most Recent):  Temp: 97.4 °F (36.3 °C) (11/10/24 0731)  Pulse: 96 (11/10/24 0731)  Resp: 18 (11/10/24 0731)  BP: (!) 139/94 (11/10/24 0731)  SpO2: 96 % (11/10/24 0731) Vital Signs (24h Range):  Temp:  [97.4 °F (36.3 °C)-98.4 °F (36.9 °C)] 97.4 °F (36.3 °C)  Pulse:  [] 96  Resp:  [16-20] 18  SpO2:  [95 %-98 %] 96 %  BP: (132-139)/() 139/94     Weight: 78.5 kg (173 lb 1 oz)  Body mass index is 24.83 kg/m².    Intake/Output Summary (Last 24 hours) at 11/10/2024 1144  Last data filed at 11/10/2024 1009  Gross per 24 hour   Intake 94.03 ml   Output --   Net 94.03 ml         Physical Exam  Constitutional:       General: She is awake.      Appearance: She is ill-appearing.   Cardiovascular:      Comments: Monitor and/or Vital signs reviewed at time of visit  Pulmonary:      Effort: Pulmonary  effort is normal. No accessory muscle usage or respiratory distress.   Neurological:      Mental Status: She is alert. She is not disoriented.   Psychiatric:         Attention and Perception: Attention normal.         Behavior: Behavior is cooperative.         Significant Labs:   Recent Labs   Lab 10/20/24  0722   HGBA1C 5.4     Recent Labs   Lab 11/09/24  0236 11/10/24  0220 11/11/24  0226   WBC 13.97* 10.40 9.32   HGB 8.6* 8.5* 8.4*   HCT 26.4* 26.3* 26.6*    354 355     Recent Labs   Lab 11/09/24  0236 11/10/24  0220 11/11/24  0226   GRAN 77.0*  10.8* 70.6  7.3 66.1  6.2   LYMPH 14.5*  2.0 17.1*  1.8 19.2  1.8   MONO 5.9  0.8 6.7  0.7 8.4  0.8   EOS 0.2 0.4 0.5     Recent Labs   Lab 11/07/24 2153 11/08/24  0401 11/09/24  0236 11/10/24  0220 11/11/24  0226   *   < > 132* 131* 132*   K 2.8*   < > 3.5 3.0* 3.6      < > 101 101 100   CO2 20*   < > 19* 20* 22*   BUN 20   < > 27* 26* 24*   CREATININE 1.2   < > 1.5* 1.4 1.2   *   < > 116* 124* 88   CALCIUM 7.5*   < > 7.7* 7.7* 7.8*   ALBUMIN 2.1*  --   --   --   --    MG 1.6  --   --  1.5*  --     < > = values in this interval not displayed.     Recent Labs   Lab 11/07/24 2153   ALKPHOS 78   ALT 13   AST 17   PROT 6.3   BILITOT 0.3   INR 1.1   LIPASE 20     Recent Labs   Lab 11/07/24 2153 11/08/24  0105 11/08/24  0249 11/08/24  1014   TROPONINI 3.379* 3.587* 3.449* 4.533*   BNP 3,425*  --   --   --      Recent Labs   Lab 08/12/24  0534 08/19/24 2034 08/19/24 2233 08/26/24  1545 11/07/24  2153   PROCAL  --  0.10  --   --   --    LACTATE  --   --   --   --  1.0   DDIMER  --   --  0.72*  --   --    FERRITIN 112  --   --   --   --    SEDRATE  --   --   --  26  --      SARS-CoV-2 RNA, Amplification, Qual (no units)   Date Value   09/05/2024 Negative   08/19/2024 Negative     POC Rapid COVID (no units)   Date Value   08/29/2024 Negative   08/12/2024 Negative     ECG Results              EKG 12-lead (Final result)        Collection Time  Result Time QRS Duration OHS QTC Calculation    11/07/24 22:25:43 11/10/24 11:34:04 88 478                     Final result by Interface, Lab In [facility] (11/10/24 11:34:08)                   Narrative:    Test Reason : R06.02,    Vent. Rate : 123 BPM     Atrial Rate : 123 BPM     P-R Int : 132 ms          QRS Dur : 088 ms      QT Int : 334 ms       P-R-T Axes : 033 004 055 degrees     QTc Int : 478 ms    Sinus tachycardia  diffuse ST depression ,consider ischemia   Abnormal ECG  When compared with ECG of 19-OCT-2024 09:34,  Vent. rate has increased BY  54 BPM  Confirmed by Vinod Larry MD ([id number]) on 11/10/2024 11:34:03 AM    Referred By: [email or web address]   SELF           Confirmed By:Vinod Larry MD                                Results for orders placed during the hospital encounter of 08/12/24    Echo    Interpretation Summary    Left Ventricle: The left ventricle is moderately dilated. There is eccentric hypertrophy. Moderate global hypokinesis present. There is moderately reduced systolic function with a visually estimated ejection fraction of 30 - 35%. Biplane (2D) method of discs ejection fraction is 34%. There is diastolic dysfunction but grade cannot be determined.    Right Ventricle: Normal right ventricular cavity size. Wall thickness is normal. Systolic function is normal.    Left Atrium: Left atrium is severely dilated.    Right Atrium: Right atrium is moderately dilated.    Aortic Valve: There is mild stenosis. Aortic valve area by VTI is 1.95 cm². Aortic valve peak velocity is 1.40 m/s. Mean gradient is 4 mmHg. The dimensionless index is 0.62.    Mitral Valve: There is mild regurgitation.    Tricuspid Valve: There is mild regurgitation.    Pulmonic Valve: There is mild regurgitation.    Pulmonary Artery: The estimated pulmonary artery systolic pressure is 36 mmHg.    IVC/SVC: Normal venous pressure at 3 mmHg.      CTA Acute GI [place], Abdomen and Pelvis  Narrative: EXAMINATION:  CTA ACUTE GI  BLEED, ABDOMEN AND PELVIS    CLINICAL HISTORY:  GI bleeding;    TECHNIQUE:  Using 75 cc of  Omnipaque 350 IV, and multi-detector helical CT technique, axial CT angiogram images of the abdomen were obtained from the lung bases through the pelvis. Precontrast and portal venous phase images of the abdomen and pelvis also done. 2D post-processing coronal and sagittal reconstructions of the abdominal aorta and visceral arteries performed.    COMPARISON:  10/24/2024    FINDINGS:  The lung bases are well aerated.  No consolidation, suspicious nodules, with small bilateral pleural effusion.  The visualized portions of the heart appear normal.    The esophagus, stomach, spleen, pancreas, and adrenal glands are unremarkable.    The liver is normal in size and attenuation without focal abnormality.  The portal veins appear patent.  The gallbladder shows no evidence of stones or cholecystitis.  No intra-or extrahepatic biliary ductal dilatation.    The kidneys demonstrate normal enhancement.  No renal mass, nephrolithiasis or hydronephrosis.  The ureters are normal in course and caliber. The urinary bladder appears unremarkable    No evidence of gastrointestinal hemorrhage or bowel obstruction.  Inflammation in the right colon through the hepatic flexure and the proximal portion of the transverse colon with more normal splenic flexure descending colon and rectosigmoid colon with a few diverticula in the sigmoid but without diverticulitis.  Definite mass is not identified as on the previous exam.  There is no ascites, free fluid, or intraperitoneal free air. No significant peritoneal or retroperitoneal adenopathy.    The abdominal aorta is normal in course and caliber without significant atherosclerotic calcifications.    The osseous structures and extraperitoneal soft tissues are unremarkable.  Impression: Inflammatory changes of the ascending and splenic flexure of the colon with the previous suggested mass not confidently  identified.    No evidence of active GI bleed.    No evidence of bowel obstruction.    Mild pleural thickening and/or fluid.    This report was flagged in Epic as abnormal.    Electronically signed by: Jax Carmichael  Date:    11/07/2024  Time:    23:35  X-Ray Chest 1 View  Narrative: EXAMINATION:  XR CHEST 1 VIEW    CLINICAL HISTORY:  Shortness of breath    TECHNIQUE:  Single frontal view of the chest was performed.    COMPARISON:  10/19/2024.    FINDINGS:  The lungs are hypoexpanded.  There are perihilar interstitial opacities, may be accentuated by hypoaeration changes.  The pleural spaces are clear.  The cardiac silhouette is enlarged.  Osseous structures demonstrate degenerative changes.  Impression: Mild perihilar interstitial prominence, may be accentuated by hypoaeration.  Mild edema is not excluded.    Electronically signed by: Nick Clifton  Date:    11/07/2024  Time:    23:25      Labs and Imaging listed above were reviewed.

## 2024-11-10 NOTE — PROGRESS NOTES
"[facility]    CC: GI bld    HPI: Patient seen and examined at bedside. No acute events overnight.     Physical Examination  BP (!) 139/94 (BP Location: Left arm, Patient Position: Lying)   Pulse 96   Temp 97.4 °F (36.3 °C) (Oral)   Resp 18   Ht 5' 10" (1.778 m)   Wt 78.5 kg (173 lb 1 oz)   SpO2 96%   BMI 24.83 kg/m²   General appearance: alert, cooperative, no distress  Abdomen: soft, non-tender; bowel sounds normoactive; no organomegaly    Assessment:   Patient with recent hospitalization for GI bleed with EGD and colonoscopy without active bleed.  Patient with continued blood in stool since discharge. CT scan with concerns for a possible colon mass, which could have been missed due to heavy stool burden in colon from previous colonoscopy.   - Trend Hgb and continue to transfuse to keep Hgb>7  - Will plan for colonoscopy and EGD Monday  - CLD today, NPO midnight  - Prep ordered   - Continue protonix 40mg BID    Patient will be discussed and evaluated by Dr. Junior. Please see their attestation for any changes to the current assessment or  plan.     Danita Ugarte, DO  [facility], PGY IV                "

## 2024-11-10 NOTE — ASSESSMENT & PLAN NOTE
Patient's most recent potassium results are listed below.   Recent Labs     11/09/24  0236 11/10/24  0220 11/11/24  0226   K 3.5 3.0* 3.6     Plan  - Replete potassium per protocol  - Monitor potassium   - Patient's hypokalemia is stable

## 2024-11-10 NOTE — PROGRESS NOTES
"[facility]    CC: GI bld    HPI: Patient seen and examined at bedside. No bleeding overnight or acute events.     Physical Examination  BP (!) 139/100 (BP Location: Left arm, Patient Position: Lying)   Pulse 103   Temp 97.8 °F (36.6 °C) (Oral)   Resp 18   Ht 5' 10" (1.778 m)   Wt 78.5 kg (173 lb 1 oz)   SpO2 98%   BMI 24.83 kg/m²   General appearance: alert, cooperative, no distress  Abdomen: soft, non-tender; bowel sounds normoactive; no organomegaly    Assessment:   Gastrointestinal Bleed  Patient with recent hospitalization for GI bleed with EGD and colonoscopy without active bleed.  Patient with continued blood in stool since discharge. CT scan with concerns for a possible colon mass, which could have been missed due to heavy stool burden in colon from previous colonoscopy.   - Trend Hgb and continue to transfuse to keep Hgb>7  - Will plan for colonoscopy and EGD Monday   -Ok for regular diet.   - Continue protonix 40mg BID    Patient will be discussed and evaluated by Dr. Junior. Please see their attestation for any changes to the current assessment or  plan.     Danita Ugarte, DO  [facility], PGY IV                "

## 2024-11-10 NOTE — SUBJECTIVE & OBJECTIVE
Interval History:     Patient is complaining of lower abdominal pain, saying that tylenol is not helping and asking for stronger pain med.    Review of Systems   Constitutional: Negative.    HENT: Negative.     Respiratory: Negative.     Cardiovascular: Negative.    Gastrointestinal:  Positive for abdominal pain.   Genitourinary: Negative.    Musculoskeletal: Negative.    Skin: Negative.    Neurological: Negative.    Psychiatric/Behavioral: Negative.       Objective:     Vital Signs (Most Recent):  Temp: 97.4 °F (36.3 °C) (11/10/24 0731)  Pulse: 96 (11/10/24 0731)  Resp: 18 (11/10/24 0731)  BP: (!) 139/94 (11/10/24 0731)  SpO2: 96 % (11/10/24 0731) Vital Signs (24h Range):  Temp:  [97.4 °F (36.3 °C)-98.4 °F (36.9 °C)] 97.4 °F (36.3 °C)  Pulse:  [] 96  Resp:  [16-20] 18  SpO2:  [95 %-98 %] 96 %  BP: (132-139)/() 139/94     Weight: 78.5 kg (173 lb 1 oz)  Body mass index is 24.83 kg/m².    Intake/Output Summary (Last 24 hours) at 11/10/2024 1140  Last data filed at 11/10/2024 1009  Gross per 24 hour   Intake 94.03 ml   Output --   Net 94.03 ml         Physical Exam  Constitutional:       Appearance: She is ill-appearing.   Cardiovascular:      Rate and Rhythm: Normal rate and regular rhythm.   Pulmonary:      Effort: Pulmonary effort is normal. No respiratory distress.      Breath sounds: Normal breath sounds.   Abdominal:      General: Abdomen is flat. There is no distension.      Palpations: Abdomen is soft.      Tenderness: There is no abdominal tenderness.   Skin:     General: Skin is warm and dry.   Neurological:      General: No focal deficit present.      Mental Status: She is alert and oriented to person, place, and time.             Significant Labs: All pertinent labs within the past 24 hours have been reviewed.  CBC:   Recent Labs   Lab 11/09/24  0236 11/10/24  0220   WBC 13.97* 10.40   HGB 8.6* 8.5*   HCT 26.4* 26.3*    354     CMP:   Recent Labs   Lab 11/09/24  0236 11/10/24  0220   NA  132* 131*   K 3.5 3.0*    101   CO2 19* 20*   * 124*   BUN 27* 26*   CREATININE 1.5* 1.4   CALCIUM 7.7* 7.7*   ANIONGAP 12 10       Significant Imaging: I have reviewed all pertinent imaging results/findings within the past 24 hours.

## 2024-11-10 NOTE — PROGRESS NOTES
[facility] Medicine  Progress Note    Patient Name: Mary Ellen Saini  MRN: [id number]  Patient Class: IP- Inpatient   Admission Date: 11/7/2024  Length of Stay: 2 days  Attending Physician: Thomas Velasquez MD  Primary Care Provider: Marlen, Primary Doctor        Subjective:     Principal Problem:<principal problem not specified>        HPI:  [age] year old female with a history of hypertension, bipolar, hep C, HFrEF presents to ED with complaints of chest pain, shortness a breath and blood in her stool.  Patient was recently admitted for GI bleed which she required blood transfusion.  Patient reports bleeding in stool has been having for the last several days however worsened today along with worsening abdominal pain. In ED labs remarkable for H&H 5.6 and 17.2.  Patient discharged 12 days ago with H&H of 7.3 and 22.8.  Blood pressure stable on admission however she is tachycardic 120.  Patient also with a chest pain.  EKG without ST elevation BNP 3425, troponin 3.379.  CTA of the abdomen done denies show any evidence of active GI bleed.  Patient typed and crossmatch for 3 units.  We will start PPI IV BID, transfuse PRBC, consult GI and Cards.    Overview/Hospital Course:  No notes on file    Interval History:     Patient is complaining of lower abdominal pain, saying that tylenol is not helping and asking for stronger pain med.    Review of Systems   Constitutional: Negative.    HENT: Negative.     Respiratory: Negative.     Cardiovascular: Negative.    Gastrointestinal:  Positive for abdominal pain.   Genitourinary: Negative.    Musculoskeletal: Negative.    Skin: Negative.    Neurological: Negative.    Psychiatric/Behavioral: Negative.       Objective:     Vital Signs (Most Recent):  Temp: 97.4 °F (36.3 °C) (11/10/24 0731)  Pulse: 96 (11/10/24 0731)  Resp: 18 (11/10/24 0731)  BP: (!) 139/94 (11/10/24 0731)  SpO2: 96 % (11/10/24 0731) Vital Signs (24h Range):  Temp:  [97.4 °F (36.3 °C)-98.4 °F (36.9  °C)] 97.4 °F (36.3 °C)  Pulse:  [] 96  Resp:  [16-20] 18  SpO2:  [95 %-98 %] 96 %  BP: (132-139)/() 139/94     Weight: 78.5 kg (173 lb 1 oz)  Body mass index is 24.83 kg/m².    Intake/Output Summary (Last 24 hours) at 11/10/2024 1144  Last data filed at 11/10/2024 1009  Gross per 24 hour   Intake 94.03 ml   Output --   Net 94.03 ml         Physical Exam  Constitutional:       Appearance: She is ill-appearing.   Cardiovascular:      Rate and Rhythm: Normal rate and regular rhythm.   Pulmonary:      Effort: Pulmonary effort is normal. No respiratory distress.      Breath sounds: Normal breath sounds.   Abdominal:      General: Abdomen is flat. There is no distension.      Palpations: Abdomen is soft.      Tenderness: There is no abdominal tenderness.   Skin:     General: Skin is warm and dry.   Neurological:      General: No focal deficit present.      Mental Status: She is alert and oriented to person, place, and time.             Significant Labs: All pertinent labs within the past 24 hours have been reviewed.  CBC:   Recent Labs   Lab 11/09/24  0236 11/10/24  0220   WBC 13.97* 10.40   HGB 8.6* 8.5*   HCT 26.4* 26.3*    354     CMP:   Recent Labs   Lab 11/09/24  0236 11/10/24  0220   * 131*   K 3.5 3.0*    101   CO2 19* 20*   * 124*   BUN 27* 26*   CREATININE 1.5* 1.4   CALCIUM 7.7* 7.7*   ANIONGAP 12 10       Significant Imaging: I have reviewed all pertinent imaging results/findings within the past 24 hours.      Assessment/Plan:      ABLA (acute blood loss anemia)  Anemia is likely due to acute blood loss which was from GIB . Most recent hemoglobin and hematocrit are listed below.  Recent Labs     11/08/24  1014 11/09/24  0236 11/10/24  0220   HGB 9.1@RCBLD* 8.6* 8.5*   HCT 27.8@RCBLD* 26.4* 26.3*     CTA w/o active GIB  Plan  - Monitor serial CBC: Daily  - Transfuse PRBC if patient becomes hemodynamically unstable, symptomatic or H/H drops below 7/21.  - Patient has received  3 units of PRBCs on 11/8/24  - Patient's anemia is currently stable  - stable    GIB (gastrointestinal bleeding)  Patient's hemorrhage is due to gastrointestinal bleed, patient does not have a propensity for bleeding.. Patients most recent Hgb, Hct, platelets, and INR are listed below.  Recent Labs     11/07/24  2153 11/08/24  1014 11/09/24  0236 11/10/24  0220   HGB 5.6* 9.1@RCBLD* 8.6* 8.5*   HCT 17.2* 27.8@RCBLD* 26.4* 26.3*    399 335 354   INR 1.1  --   --   --        Plan  - Will trend hemoglobin/hematocrit Daily  - Will monitor and correct any coagulation defects  - Will transfuse if Hgb is <7g/dl (<8g/dl in cases of active ACS) or if patient has rapid bleeding leading to hemodynamic instability  - Consult GI  -Ppi BID  -patient received 3 U PRBC  -patient was evaluated by GI, and scheduled for EGD/colonoscopy tomorrow  -CLD today  -Bowel preop this evening     Leukocytosis  No obvious infection  Started rocephin given pts hx hep c and GIB      NSTEMI (non-ST elevated myocardial infarction)  EKG w/o ST elevation  Troponin elevated 3.379-->3.587-->3.449  Cards eval is appreciated, medical management is recommended       HFrEF (heart failure with reduced ejection fraction)  Echo 08/2024  EF 34%  Continue BB  Given one dose iv lasix 10/08      Smoking history  Nicotine patches  Smoking cessation counseling   U tox is positive for cocaine amphetamine and THC      Hypokalemia  Patient's most recent potassium results are listed below.   Recent Labs     11/08/24  0401 11/09/24  0236 11/10/24  0220   K 3.6 3.5 3.0*     Plan  - Replete potassium per protocol  - Monitor potassium Daily  - Patient's hypokalemia is worsening. Will adjust treatment as follows: give iv kcl        VTE Risk Mitigation (From admission, onward)           Ordered     IP VTE HIGH RISK PATIENT  Once         11/08/24 0137     Place sequential compression device  Until discontinued         11/08/24 0137                    Discharge Planning    JENAE: 11/10/2024     Code Status: Full Code   Is the patient medically ready for discharge?:     Reason for patient still in hospital (select all that apply): Patient trending condition and Treatment  Discharge Plan A: Home with family                  Thomas Velasquez MD  Department of [facility] Medicine   [facility]

## 2024-11-10 NOTE — PLAN OF CARE
Problem: Adult Inpatient Plan of Care  Goal: Plan of Care Review  Outcome: Progressing  Goal: Patient-Specific Goal (Individualized)  Outcome: Progressing  Goal: Absence of Hospital-Acquired Illness or Injury  Outcome: Progressing  Goal: Optimal Comfort and Wellbeing  Outcome: Progressing  Goal: Readiness for Transition of Care  Outcome: Progressing     Problem: Gastrointestinal Bleeding  Goal: Optimal Coping with Acute Illness  Outcome: Progressing  Goal: Hemostasis  Outcome: Progressing     Problem: Fall Injury Risk  Goal: Absence of Fall and Fall-Related Injury  Outcome: Progressing     Problem: Anemia  Goal: Anemia Symptom Improvement  Outcome: Progressing     Problem: Gas Exchange Impaired  Goal: Optimal Gas Exchange  Outcome: Progressing     Problem: Comorbidity Management  Goal: Maintenance of Asthma Control  Outcome: Progressing  Goal: Blood Pressure in Desired Range  Outcome: Progressing  Goal: Maintenance of Behavioral Health Symptom Control  Outcome: Progressing  Goal: Maintenance of COPD Symptom Control  Outcome: Progressing  Goal: Maintenance of Heart Failure Symptom Control  Outcome: Progressing     Problem: Electrolyte Imbalance  Goal: Electrolyte Balance  Outcome: Progressing     Problem: Skin Injury Risk Increased  Goal: Skin Health and Integrity  Outcome: Progressing     Problem: Acute Coronary Syndrome  Goal: Optimal Adaptation to Illness  Outcome: Progressing  Goal: Absence of Cardiac-Related Pain  Outcome: Progressing  Goal: Normalized Cardiac Rhythm  Outcome: Progressing  Goal: Effective Cardiac Pump Function  Outcome: Progressing  Goal: Adequate Tissue Perfusion  Outcome: Progressing     Problem: Infection  Goal: Absence of Infection Signs and Symptoms  Outcome: Progressing

## 2024-11-10 NOTE — ASSESSMENT & PLAN NOTE
Anemia is likely due to acute blood loss which was from GIB . Most recent hemoglobin and hematocrit are listed below.  Recent Labs     11/08/24  1014 11/09/24  0236 11/10/24  0220   HGB 9.1@RCBLD* 8.6* 8.5*   HCT 27.8@RCBLD* 26.4* 26.3*     CTA w/o active GIB  Plan  - Monitor serial CBC: Daily  - Transfuse PRBC if patient becomes hemodynamically unstable, symptomatic or H/H drops below 7/21.  - Patient has received 3 units of PRBCs on 11/8/24  - Patient's anemia is currently stable  - stable

## 2024-11-11 ENCOUNTER — ANESTHESIA EVENT (OUTPATIENT)
Dept: ENDOSCOPY | Facility: HOSPITAL | Age: 59
End: 2024-11-11
Payer: MEDICARE

## 2024-11-11 ENCOUNTER — ANESTHESIA (OUTPATIENT)
Dept: ENDOSCOPY | Facility: HOSPITAL | Age: 59
End: 2024-11-11
Payer: MEDICARE

## 2024-11-11 LAB
ANION GAP SERPL CALC-SCNC: 10 MMOL/L (ref 8–16)
BASOPHILS # BLD AUTO: 0.06 K/UL (ref 0–0.2)
BASOPHILS NFR BLD: 0.6 % (ref 0–1.9)
BUN SERPL-MCNC: 24 MG/DL (ref 6–20)
CALCIUM SERPL-MCNC: 7.8 MG/DL (ref 8.7–10.5)
CHLORIDE SERPL-SCNC: 100 MMOL/L (ref 95–110)
CO2 SERPL-SCNC: 22 MMOL/L (ref 23–29)
CREAT SERPL-MCNC: 1.2 MG/DL (ref 0.5–1.4)
DIFFERENTIAL METHOD BLD: ABNORMAL
EOSINOPHIL # BLD AUTO: 0.5 K/UL (ref 0–0.5)
EOSINOPHIL NFR BLD: 4.9 % (ref 0–8)
ERYTHROCYTE [DISTWIDTH] IN BLOOD BY AUTOMATED COUNT: 16.8 % (ref 11.5–14.5)
EST. GFR  (NO RACE VARIABLE): 52 ML/MIN/1.73 M^2
GLUCOSE SERPL-MCNC: 88 MG/DL (ref 70–110)
HCT VFR BLD AUTO: 26.6 % (ref 37–48.5)
HGB BLD-MCNC: 8.4 G/DL (ref 12–16)
IMM GRANULOCYTES # BLD AUTO: 0.07 K/UL (ref 0–0.04)
IMM GRANULOCYTES NFR BLD AUTO: 0.8 % (ref 0–0.5)
LYMPHOCYTES # BLD AUTO: 1.8 K/UL (ref 1–4.8)
LYMPHOCYTES NFR BLD: 19.2 % (ref 18–48)
MCH RBC QN AUTO: 27.5 PG (ref 27–31)
MCHC RBC AUTO-ENTMCNC: 31.6 G/DL (ref 32–36)
MCV RBC AUTO: 87 FL (ref 82–98)
MONOCYTES # BLD AUTO: 0.8 K/UL (ref 0.3–1)
MONOCYTES NFR BLD: 8.4 % (ref 4–15)
NEUTROPHILS # BLD AUTO: 6.2 K/UL (ref 1.8–7.7)
NEUTROPHILS NFR BLD: 66.1 % (ref 38–73)
NRBC BLD-RTO: 0 /100 WBC
OHS QRS DURATION: 94 MS
OHS QTC CALCULATION: 503 MS
PLATELET # BLD AUTO: 355 K/UL (ref 150–450)
PMV BLD AUTO: 9.5 FL (ref 9.2–12.9)
POTASSIUM SERPL-SCNC: 3.6 MMOL/L (ref 3.5–5.1)
RBC # BLD AUTO: 3.05 M/UL (ref 4–5.4)
SODIUM SERPL-SCNC: 132 MMOL/L (ref 136–145)
WBC # BLD AUTO: 9.32 K/UL (ref 3.9–12.7)

## 2024-11-11 PROCEDURE — 25000003 PHARM REV CODE 250: Performed by: NURSE ANESTHETIST, CERTIFIED REGISTERED

## 2024-11-11 PROCEDURE — D9220A PRA ANESTHESIA: Mod: ANES,,, | Performed by: STUDENT IN AN ORGANIZED HEALTH CARE EDUCATION/TRAINING PROGRAM

## 2024-11-11 PROCEDURE — 43239 EGD BIOPSY SINGLE/MULTIPLE: CPT | Performed by: INTERNAL MEDICINE

## 2024-11-11 PROCEDURE — 25000003 PHARM REV CODE 250: Performed by: STUDENT IN AN ORGANIZED HEALTH CARE EDUCATION/TRAINING PROGRAM

## 2024-11-11 PROCEDURE — 0DB98ZX EXCISION OF DUODENUM, VIA NATURAL OR ARTIFICIAL OPENING ENDOSCOPIC, DIAGNOSTIC: ICD-10-PCS | Performed by: INTERNAL MEDICINE

## 2024-11-11 PROCEDURE — 37000008 HC ANESTHESIA 1ST 15 MINUTES: Performed by: INTERNAL MEDICINE

## 2024-11-11 PROCEDURE — 0DBL8ZX EXCISION OF TRANSVERSE COLON, VIA NATURAL OR ARTIFICIAL OPENING ENDOSCOPIC, DIAGNOSTIC: ICD-10-PCS | Performed by: INTERNAL MEDICINE

## 2024-11-11 PROCEDURE — 27000207 HC ISOLATION

## 2024-11-11 PROCEDURE — 85025 COMPLETE CBC W/AUTO DIFF WBC: CPT | Performed by: STUDENT IN AN ORGANIZED HEALTH CARE EDUCATION/TRAINING PROGRAM

## 2024-11-11 PROCEDURE — 11000001 HC ACUTE MED/SURG PRIVATE ROOM

## 2024-11-11 PROCEDURE — 80048 BASIC METABOLIC PNL TOTAL CA: CPT | Performed by: REGISTERED NURSE

## 2024-11-11 PROCEDURE — 0DB78ZX EXCISION OF STOMACH, PYLORUS, VIA NATURAL OR ARTIFICIAL OPENING ENDOSCOPIC, DIAGNOSTIC: ICD-10-PCS | Performed by: INTERNAL MEDICINE

## 2024-11-11 PROCEDURE — 25000003 PHARM REV CODE 250: Performed by: EMERGENCY MEDICINE

## 2024-11-11 PROCEDURE — 63600175 PHARM REV CODE 636 W HCPCS: Performed by: INTERNAL MEDICINE

## 2024-11-11 PROCEDURE — 45380 COLONOSCOPY AND BIOPSY: CPT | Mod: ,,, | Performed by: INTERNAL MEDICINE

## 2024-11-11 PROCEDURE — 88313 SPECIAL STAINS GROUP 2: CPT | Performed by: PATHOLOGY

## 2024-11-11 PROCEDURE — S4991 NICOTINE PATCH NONLEGEND: HCPCS | Performed by: STUDENT IN AN ORGANIZED HEALTH CARE EDUCATION/TRAINING PROGRAM

## 2024-11-11 PROCEDURE — 0DBK8ZX EXCISION OF ASCENDING COLON, VIA NATURAL OR ARTIFICIAL OPENING ENDOSCOPIC, DIAGNOSTIC: ICD-10-PCS | Performed by: INTERNAL MEDICINE

## 2024-11-11 PROCEDURE — 36415 COLL VENOUS BLD VENIPUNCTURE: CPT | Performed by: REGISTERED NURSE

## 2024-11-11 PROCEDURE — 45380 COLONOSCOPY AND BIOPSY: CPT | Performed by: INTERNAL MEDICINE

## 2024-11-11 PROCEDURE — 27201012 HC FORCEPS, HOT/COLD, DISP: Performed by: INTERNAL MEDICINE

## 2024-11-11 PROCEDURE — 25000003 PHARM REV CODE 250: Performed by: INTERNAL MEDICINE

## 2024-11-11 PROCEDURE — 63600175 PHARM REV CODE 636 W HCPCS: Performed by: NURSE ANESTHETIST, CERTIFIED REGISTERED

## 2024-11-11 PROCEDURE — 88305 TISSUE EXAM BY PATHOLOGIST: CPT | Mod: 59 | Performed by: PATHOLOGY

## 2024-11-11 PROCEDURE — 43239 EGD BIOPSY SINGLE/MULTIPLE: CPT | Mod: 51,,, | Performed by: INTERNAL MEDICINE

## 2024-11-11 PROCEDURE — 88342 IMHCHEM/IMCYTCHM 1ST ANTB: CPT | Performed by: PATHOLOGY

## 2024-11-11 PROCEDURE — D9220A PRA ANESTHESIA: Mod: CRNA,,, | Performed by: NURSE ANESTHETIST, CERTIFIED REGISTERED

## 2024-11-11 PROCEDURE — 63600175 PHARM REV CODE 636 W HCPCS: Performed by: REGISTERED NURSE

## 2024-11-11 PROCEDURE — 37000009 HC ANESTHESIA EA ADD 15 MINS: Performed by: INTERNAL MEDICINE

## 2024-11-11 RX ORDER — TOPICAL ANESTHETIC 200 MG/ML
SPRAY DENTAL; PERIODONTAL
Status: DISCONTINUED | OUTPATIENT
Start: 2024-11-11 | End: 2024-11-11

## 2024-11-11 RX ORDER — PROPOFOL 10 MG/ML
VIAL (ML) INTRAVENOUS CONTINUOUS PRN
Status: DISCONTINUED | OUTPATIENT
Start: 2024-11-11 | End: 2024-11-11

## 2024-11-11 RX ORDER — MAGNESIUM SULFATE HEPTAHYDRATE 40 MG/ML
2 INJECTION, SOLUTION INTRAVENOUS EVERY 4 HOURS
Status: COMPLETED | OUTPATIENT
Start: 2024-11-11 | End: 2024-11-11

## 2024-11-11 RX ORDER — LIDOCAINE HYDROCHLORIDE 20 MG/ML
INJECTION, SOLUTION EPIDURAL; INFILTRATION; INTRACAUDAL; PERINEURAL
Status: DISCONTINUED | OUTPATIENT
Start: 2024-11-11 | End: 2024-11-11

## 2024-11-11 RX ORDER — PROPOFOL 10 MG/ML
VIAL (ML) INTRAVENOUS
Status: DISCONTINUED | OUTPATIENT
Start: 2024-11-11 | End: 2024-11-11

## 2024-11-11 RX ORDER — DEXTROMETHORPHAN/PSEUDOEPHED 2.5-7.5/.8
DROPS ORAL
Status: COMPLETED | OUTPATIENT
Start: 2024-11-11 | End: 2024-11-11

## 2024-11-11 RX ORDER — ETOMIDATE 2 MG/ML
INJECTION INTRAVENOUS
Status: DISCONTINUED | OUTPATIENT
Start: 2024-11-11 | End: 2024-11-11

## 2024-11-11 RX ORDER — PHENYLEPHRINE HYDROCHLORIDE 10 MG/ML
INJECTION INTRAVENOUS
Status: DISCONTINUED | OUTPATIENT
Start: 2024-11-11 | End: 2024-11-11

## 2024-11-11 RX ADMIN — SODIUM CHLORIDE: 0.9 INJECTION, SOLUTION INTRAVENOUS at 04:11

## 2024-11-11 RX ADMIN — HYDROMORPHONE HYDROCHLORIDE 2 MG: 2 TABLET ORAL at 08:11

## 2024-11-11 RX ADMIN — HYDROMORPHONE HYDROCHLORIDE 2 MG: 2 TABLET ORAL at 06:11

## 2024-11-11 RX ADMIN — PHENYLEPHRINE HYDROCHLORIDE 100 MCG: 10 INJECTION INTRAVENOUS at 05:11

## 2024-11-11 RX ADMIN — ETOMIDATE 2 MG: 2 INJECTION, SOLUTION INTRAVENOUS at 04:11

## 2024-11-11 RX ADMIN — PANTOPRAZOLE SODIUM 40 MG: 40 INJECTION, POWDER, LYOPHILIZED, FOR SOLUTION INTRAVENOUS at 10:11

## 2024-11-11 RX ADMIN — NICOTINE 1 PATCH: 21 PATCH, EXTENDED RELEASE TRANSDERMAL at 09:11

## 2024-11-11 RX ADMIN — MAGNESIUM SULFATE HEPTAHYDRATE 2 G: 40 INJECTION, SOLUTION INTRAVENOUS at 03:11

## 2024-11-11 RX ADMIN — LIDOCAINE HYDROCHLORIDE 40 MG: 20 INJECTION, SOLUTION EPIDURAL; INFILTRATION; INTRACAUDAL; PERINEURAL at 04:11

## 2024-11-11 RX ADMIN — FUROSEMIDE 40 MG: 40 TABLET ORAL at 09:11

## 2024-11-11 RX ADMIN — CEFTRIAXONE SODIUM 1 G: 1 INJECTION, POWDER, FOR SOLUTION INTRAMUSCULAR; INTRAVENOUS at 03:11

## 2024-11-11 RX ADMIN — PROPOFOL 20 MG: 10 INJECTION, EMULSION INTRAVENOUS at 04:11

## 2024-11-11 RX ADMIN — PROPOFOL 150 MCG/KG/MIN: 10 INJECTION, EMULSION INTRAVENOUS at 04:11

## 2024-11-11 RX ADMIN — ETOMIDATE 6 MG: 2 INJECTION, SOLUTION INTRAVENOUS at 04:11

## 2024-11-11 RX ADMIN — METOPROLOL SUCCINATE 25 MG: 25 TABLET, EXTENDED RELEASE ORAL at 09:11

## 2024-11-11 RX ADMIN — PROPOFOL 10 MG: 10 INJECTION, EMULSION INTRAVENOUS at 04:11

## 2024-11-11 RX ADMIN — LIDOCAINE HYDROCHLORIDE 60 MG: 20 INJECTION, SOLUTION EPIDURAL; INFILTRATION; INTRACAUDAL; PERINEURAL at 04:11

## 2024-11-11 RX ADMIN — TOPICAL ANESTHETIC 1 EACH: 200 SPRAY DENTAL; PERIODONTAL at 04:11

## 2024-11-11 RX ADMIN — PANTOPRAZOLE SODIUM 40 MG: 40 INJECTION, POWDER, LYOPHILIZED, FOR SOLUTION INTRAVENOUS at 09:11

## 2024-11-11 RX ADMIN — MAGNESIUM SULFATE HEPTAHYDRATE 2 G: 40 INJECTION, SOLUTION INTRAVENOUS at 06:11

## 2024-11-11 NOTE — PLAN OF CARE
Rounded at bedside, pt stated she is waiting to have her EGD and Colonoscopy done today. Refuses HH services when medically ready for discharge. Plans are to dc to home with family. CM will continue to follow pt and provide any discharge needs.      11/11/24 9437   Rounds   Attendance Nurse    Discharge Plan A Home;Home with family   Why the patient remains in the hospital Requires continued medical care   Transition of Care Barriers None

## 2024-11-11 NOTE — ANESTHESIA PREPROCEDURE EVALUATION
11/11/2024  Mary Ellen Saini is a 59 y.o.female for EGD (ESOPHAGOGASTRODUODENOSCOPY) (N/A), COLONOSCOPY (N/A)     H/o  heart failure with reduced ejection fraction, atrial flutter, hepatitis-C, left ventricular EF of 30%, previously on LifeVest, not on oral anticoagulation because of significant anemia history in the past, previous admission to the hospital with NSTEMI, recent EGD and colonoscopy without any active bleeding source, admitted to the hospital with abdominal pain, melena, noted to have hemoglobin of 5.5, troponin of 3.4, Pt has been transfused with 3 units PRBCs since admission.    Past Medical History:   Diagnosis Date    Asthma     Bipolar disorder     Hypertension      Past Surgical History:   Procedure Laterality Date    CHOLECYSTECTOMY      COLONOSCOPY N/A 10/22/2024    Procedure: COLONOSCOPY;  Surgeon: Dayday Freed MD;  Location: [facility];  Service: Endoscopy;  Laterality: N/A;    ESOPHAGOGASTRODUODENOSCOPY N/A 10/22/2024    Procedure: EGD (ESOPHAGOGASTRODUODENOSCOPY);  Surgeon: Dayday Freed MD;  Location: [facility];  Service: Endoscopy;  Laterality: N/A;    SHOULDER SURGERY      TUBAL LIGATION       Pre-op Assessment    I have reviewed the Patient Summary Reports.     I have reviewed the Nursing Notes. I have reviewed the NPO Status.   I have reviewed the Medications.     Review of Systems  Anesthesia Hx:  No problems with previous Anesthesia   History of prior surgery of interest to airway management or planning:          Denies Family Hx of Anesthesia complications.    Denies Personal Hx of Anesthesia complications.                    Social:  Smoker, Recreational Drugs       Cardiovascular:     Hypertension  Past MI CAD       CHF                                   Pulmonary:   COPD Asthma                    Renal/:  Chronic Renal Disease                 Hepatic/GI:      Liver Disease, Hepatitis, C              Neurological:  Denies TIA.  Denies CVA.    Denies Seizures.                                Endocrine:  Endocrine Normal Denies Diabetes. Denies Hypothyroidism.  Denies Hyperthyroidism.         Psych:  Psychiatric History                  Physical Exam  General: Well nourished, Cooperative, Alert and Oriented    Airway:  Mallampati: II   TM Distance: Normal  Neck ROM: Normal ROM    Dental:  Periodontal disease        Anesthesia Plan  Type of Anesthesia, risks & benefits discussed:    Anesthesia Type: Gen Natural Airway  Intra-op Monitoring Plan: Standard ASA Monitors  Post Op Pain Control Plan: multimodal analgesia  Induction:  IV  Informed Consent: Informed consent signed with the Patient and all parties understand the risks and agree with anesthesia plan.  All questions answered. Patient consented to blood products? Yes  ASA Score: 4  Day of Surgery Review of History & Physical: H&P Update referred to the surgeon/provider.  Anesthesia Plan Notes:   Mild nausea early morning secondary to bowel prep.    Gentle induction and will limit fluids.     Ready For Surgery From Anesthesia Perspective.     .

## 2024-11-11 NOTE — TRANSFER OF CARE
"Anesthesia Transfer of Care Note    Patient: Mary Ellen Saini    Procedure(s) Performed: Procedure(s) (LRB):  EGD (ESOPHAGOGASTRODUODENOSCOPY) (N/A)  COLONOSCOPY (N/A)    Patient location: GI    Anesthesia Type: general    Transport from OR: Transported from OR on room air with adequate spontaneous ventilation    Post pain: adequate analgesia    Post assessment: no apparent anesthetic complications and tolerated procedure well    Post vital signs: stable    Level of consciousness: sedated    Nausea/Vomiting: no nausea/vomiting    Complications: none    Transfer of care protocol was followed      Last vitals: Visit Vitals  BP (!) 150/100 (BP Location: Right arm, Patient Position: Lying)   Pulse 94   Temp 36.8 °C (98.2 °F) (Temporal)   Resp 18   Ht 5' 10" (1.778 m)   Wt 78.5 kg (173 lb 1 oz)   SpO2 99%   BMI 24.83 kg/m²     "

## 2024-11-11 NOTE — PROVATION PATIENT INSTRUCTIONS
Discharge Summary/Instructions after an Endoscopic Procedure  Patient Name: Mary Ellen Saini  Patient MRN: [id number]  Patient YOB: 1965  Monday, November 11, 2024  Reese Chen MD  Dear patient,  As a result of recent federal legislation (The Federal Cures Act), you may   receive lab or pathology results from your procedure in your [facility]   account before your physician is able to contact you. Your physician or   their representative will relay the results to you with their   recommendations at their soonest availability.  Thank you,  Your health is very important to us during the Covid Crisis. Following your   procedure today, you will receive a daily text for 2 weeks asking about   signs or symptoms of Covid 19.  Please respond to this text when you   receive it so we can follow up and keep you as safe as possible.   RESTRICTIONS:  During your procedure today, you received medications for sedation.  These   medications may affect your judgment, balance and coordination.  Therefore,   for 24 hours, you have the following restrictions:   - DO NOT drive a car, operate machinery, make legal/financial decisions,   sign important papers or drink alcohol.    ACTIVITY:  Today: no heavy lifting, straining or running due to procedural   sedation/anesthesia.  The following day: return to full activity including work.  DIET:  Eat and drink normally unless instructed otherwise.     TREATMENT FOR COMMON SIDE EFFECTS:  - Mild abdominal pain, nausea, belching, bloating or excessive gas:  rest,   eat lightly and use a heating pad.  - Sore Throat: treat with throat lozenges and/or gargle with warm salt   water.  - Because air was used during the procedure, expelling large amounts of air   from your rectum or belching is normal.  - If a bowel prep was taken, you may not have a bowel movement for 1-3 days.    This is normal.  SYMPTOMS TO WATCH FOR AND REPORT TO YOUR PHYSICIAN:  1. Abdominal pain or bloating, other  than gas cramps.  2. Chest pain.  3. Back pain.  4. Signs of infection such as: chills or fever occurring within 24 hours   after the procedure.  5. Rectal bleeding, which would show as bright red, maroon, or black stools.   (A tablespoon of blood from the rectum is not serious, especially if   hemorrhoids are present.)  6. Vomiting.  7. Weakness or dizziness.  GO DIRECTLY TO THE NEAREST EMERGENCY ROOM IF YOU HAVE ANY OF THE FOLLOWING:      Difficulty breathing              Chills and/or fever over 101 F   Persistent vomiting and/or vomiting blood   Severe abdominal pain   Severe chest pain   Black, tarry stools   Bleeding- more than one tablespoon   Any other symptom or condition that you feel may need urgent attention  Your doctor recommends these additional instructions:  If any biopsies were taken, your doctors clinic will contact you in 1 to 2   weeks with any results.  - [facility].   - Advance diet as tolerated.   - Continue present medications.   - Await pathology results.   - Perform a colonoscopy today.   - See the other procedure note for documentation of additional   recommendations.  For questions, problems or results please call your physician - Reese Chen MD.  EMERGENCY PHONE NUMBER: [phone],  LAB RESULTS: [phone]  IF A COMPLICATION OR EMERGENCY SITUATION ARISES AND YOU ARE UNABLE TO REACH   YOUR PHYSICIAN - GO DIRECTLY TO THE EMERGENCY ROOM.  Reese Chen MD  11/11/2024 5:43:37 PM  This report has been verified and signed electronically.  Dear patient,  As a result of recent federal legislation (The Federal Cures Act), you may   receive lab or pathology results from your procedure in your [facility]   account before your physician is able to contact you. Your physician or   their representative will relay the results to you with their   recommendations at their soonest availability.  Thank you,  PROVATION

## 2024-11-11 NOTE — PLAN OF CARE
1000  CM was informed by  Vielka of a hospfu appt scheduled for the patient with NP Lilliam Almazan on 11/19/2024 at 1400. Information added to the patient's discharge paperwork.

## 2024-11-11 NOTE — CONSULTS
[facility] Medicine  Telemedicine Consult Note    Patient Name: Mary Ellen Saini  MRN: [id number]  Admission Date: 11/7/2024  Hospital Length of Stay: 2 days  Attending Physician: Thomas Velasquez MD   Primary Care Provider: Marlen, Primary Doctor         Thank you for your consult to [facility]. We have reviewed the patient chart. This patient does meet criteria for [facility] service at this time.  Will assume care on 11/11/24 at 7AM.          Rhea Summers MD  Department of [facility] Medicine   [facility]

## 2024-11-11 NOTE — PROVATION PATIENT INSTRUCTIONS
Discharge Summary/Instructions after an Endoscopic Procedure  Patient Name: Mary Ellen Saini  Patient MRN: [id number]  Patient YOB: 1965  Monday, November 11, 2024  Reese Chen MD  Dear patient,  As a result of recent federal legislation (The Federal Cures Act), you may   receive lab or pathology results from your procedure in your [facility]   account before your physician is able to contact you. Your physician or   their representative will relay the results to you with their   recommendations at their soonest availability.  Thank you,  Your health is very important to us during the Covid Crisis. Following your   procedure today, you will receive a daily text for 2 weeks asking about   signs or symptoms of Covid 19.  Please respond to this text when you   receive it so we can follow up and keep you as safe as possible.   RESTRICTIONS:  During your procedure today, you received medications for sedation.  These   medications may affect your judgment, balance and coordination.  Therefore,   for 24 hours, you have the following restrictions:   - DO NOT drive a car, operate machinery, make legal/financial decisions,   sign important papers or drink alcohol.    ACTIVITY:  Today: no heavy lifting, straining or running due to procedural   sedation/anesthesia.  The following day: return to full activity including work.  DIET:  Eat and drink normally unless instructed otherwise.     TREATMENT FOR COMMON SIDE EFFECTS:  - Mild abdominal pain, nausea, belching, bloating or excessive gas:  rest,   eat lightly and use a heating pad.  - Sore Throat: treat with throat lozenges and/or gargle with warm salt   water.  - Because air was used during the procedure, expelling large amounts of air   from your rectum or belching is normal.  - If a bowel prep was taken, you may not have a bowel movement for 1-3 days.    This is normal.  SYMPTOMS TO WATCH FOR AND REPORT TO YOUR PHYSICIAN:  1. Abdominal pain or bloating, other  than gas cramps.  2. Chest pain.  3. Back pain.  4. Signs of infection such as: chills or fever occurring within 24 hours   after the procedure.  5. Rectal bleeding, which would show as bright red, maroon, or black stools.   (A tablespoon of blood from the rectum is not serious, especially if   hemorrhoids are present.)  6. Vomiting.  7. Weakness or dizziness.  GO DIRECTLY TO THE NEAREST EMERGENCY ROOM IF YOU HAVE ANY OF THE FOLLOWING:      Difficulty breathing              Chills and/or fever over 101 F   Persistent vomiting and/or vomiting blood   Severe abdominal pain   Severe chest pain   Black, tarry stools   Bleeding- more than one tablespoon   Any other symptom or condition that you feel may need urgent attention  Your doctor recommends these additional instructions:  If any biopsies were taken, your doctors clinic will contact you in 1 to 2   weeks with any results.  - Return patient to [facility] for ongoing care.   - Advance diet as tolerated.   - Continue present medications.   - Await pathology results.   - Repeat colonoscopy in 3 months to check healing.  For questions, problems or results please call your physician - Reese Chen MD.  EMERGENCY PHONE NUMBER: [phone],  LAB RESULTS: [phone]  IF A COMPLICATION OR EMERGENCY SITUATION ARISES AND YOU ARE UNABLE TO REACH   YOUR PHYSICIAN - GO DIRECTLY TO THE EMERGENCY ROOM.  Reese Chen MD  11/11/2024 5:42:44 PM  This report has been verified and signed electronically.  Dear patient,  As a result of recent federal legislation (The Federal Cures Act), you may   receive lab or pathology results from your procedure in your [facility]   account before your physician is able to contact you. Your physician or   their representative will relay the results to you with their   recommendations at their soonest availability.  Thank you,  PROVATION

## 2024-11-12 PROBLEM — K52.9 COLITIS: Status: ACTIVE | Noted: 2024-11-12

## 2024-11-12 LAB
ANION GAP SERPL CALC-SCNC: 11 MMOL/L (ref 8–16)
BASOPHILS # BLD AUTO: 0.08 K/UL (ref 0–0.2)
BASOPHILS NFR BLD: 0.7 % (ref 0–1.9)
BUN SERPL-MCNC: 18 MG/DL (ref 6–20)
C3 SERPL-MCNC: 89 MG/DL (ref 50–180)
C4 SERPL-MCNC: 11 MG/DL (ref 11–44)
CALCIUM SERPL-MCNC: 7.8 MG/DL (ref 8.7–10.5)
CHLORIDE SERPL-SCNC: 100 MMOL/L (ref 95–110)
CO2 SERPL-SCNC: 23 MMOL/L (ref 23–29)
CREAT SERPL-MCNC: 1.4 MG/DL (ref 0.5–1.4)
CRP SERPL-MCNC: 12.2 MG/L (ref 0–8.2)
DIFFERENTIAL METHOD BLD: ABNORMAL
EOSINOPHIL # BLD AUTO: 0.6 K/UL (ref 0–0.5)
EOSINOPHIL NFR BLD: 5 % (ref 0–8)
ERYTHROCYTE [DISTWIDTH] IN BLOOD BY AUTOMATED COUNT: 17 % (ref 11.5–14.5)
EST. GFR  (NO RACE VARIABLE): 44 ML/MIN/1.73 M^2
GLUCOSE SERPL-MCNC: 101 MG/DL (ref 70–110)
HBV SURFACE AG SERPL QL IA: NORMAL
HCT VFR BLD AUTO: 28.2 % (ref 37–48.5)
HGB BLD-MCNC: 9 G/DL (ref 12–16)
IMM GRANULOCYTES # BLD AUTO: 0.07 K/UL (ref 0–0.04)
IMM GRANULOCYTES NFR BLD AUTO: 0.6 % (ref 0–0.5)
LYMPHOCYTES # BLD AUTO: 2.1 K/UL (ref 1–4.8)
LYMPHOCYTES NFR BLD: 17.8 % (ref 18–48)
MCH RBC QN AUTO: 27.7 PG (ref 27–31)
MCHC RBC AUTO-ENTMCNC: 31.9 G/DL (ref 32–36)
MCV RBC AUTO: 87 FL (ref 82–98)
MONOCYTES # BLD AUTO: 0.9 K/UL (ref 0.3–1)
MONOCYTES NFR BLD: 7.6 % (ref 4–15)
NEUTROPHILS # BLD AUTO: 7.9 K/UL (ref 1.8–7.7)
NEUTROPHILS NFR BLD: 68.3 % (ref 38–73)
NRBC BLD-RTO: 0 /100 WBC
PLATELET # BLD AUTO: 405 K/UL (ref 150–450)
PMV BLD AUTO: 9.6 FL (ref 9.2–12.9)
POTASSIUM SERPL-SCNC: 3 MMOL/L (ref 3.5–5.1)
RBC # BLD AUTO: 3.25 M/UL (ref 4–5.4)
SODIUM SERPL-SCNC: 134 MMOL/L (ref 136–145)
WBC # BLD AUTO: 11.52 K/UL (ref 3.9–12.7)

## 2024-11-12 PROCEDURE — 86038 ANTINUCLEAR ANTIBODIES: CPT | Performed by: INTERNAL MEDICINE

## 2024-11-12 PROCEDURE — 27000207 HC ISOLATION

## 2024-11-12 PROCEDURE — 63600175 PHARM REV CODE 636 W HCPCS: Performed by: INTERNAL MEDICINE

## 2024-11-12 PROCEDURE — 87340 HEPATITIS B SURFACE AG IA: CPT | Performed by: INTERNAL MEDICINE

## 2024-11-12 PROCEDURE — 25000003 PHARM REV CODE 250: Performed by: INTERNAL MEDICINE

## 2024-11-12 PROCEDURE — S4991 NICOTINE PATCH NONLEGEND: HCPCS | Performed by: STUDENT IN AN ORGANIZED HEALTH CARE EDUCATION/TRAINING PROGRAM

## 2024-11-12 PROCEDURE — 86160 COMPLEMENT ANTIGEN: CPT | Mod: 59 | Performed by: INTERNAL MEDICINE

## 2024-11-12 PROCEDURE — 63600175 PHARM REV CODE 636 W HCPCS: Performed by: REGISTERED NURSE

## 2024-11-12 PROCEDURE — 99233 SBSQ HOSP IP/OBS HIGH 50: CPT | Mod: ,,, | Performed by: INTERNAL MEDICINE

## 2024-11-12 PROCEDURE — 86036 ANCA SCREEN EACH ANTIBODY: CPT | Performed by: INTERNAL MEDICINE

## 2024-11-12 PROCEDURE — 11000001 HC ACUTE MED/SURG PRIVATE ROOM

## 2024-11-12 PROCEDURE — 80048 BASIC METABOLIC PNL TOTAL CA: CPT | Performed by: REGISTERED NURSE

## 2024-11-12 PROCEDURE — 25000003 PHARM REV CODE 250: Performed by: STUDENT IN AN ORGANIZED HEALTH CARE EDUCATION/TRAINING PROGRAM

## 2024-11-12 PROCEDURE — 86140 C-REACTIVE PROTEIN: CPT | Performed by: INTERNAL MEDICINE

## 2024-11-12 PROCEDURE — 99232 SBSQ HOSP IP/OBS MODERATE 35: CPT | Mod: ,,, | Performed by: INTERNAL MEDICINE

## 2024-11-12 PROCEDURE — 85025 COMPLETE CBC W/AUTO DIFF WBC: CPT | Performed by: STUDENT IN AN ORGANIZED HEALTH CARE EDUCATION/TRAINING PROGRAM

## 2024-11-12 PROCEDURE — 25000003 PHARM REV CODE 250: Performed by: EMERGENCY MEDICINE

## 2024-11-12 PROCEDURE — 86160 COMPLEMENT ANTIGEN: CPT | Performed by: INTERNAL MEDICINE

## 2024-11-12 RX ORDER — MAGNESIUM SULFATE HEPTAHYDRATE 40 MG/ML
2 INJECTION, SOLUTION INTRAVENOUS ONCE
Status: COMPLETED | OUTPATIENT
Start: 2024-11-12 | End: 2024-11-12

## 2024-11-12 RX ORDER — POTASSIUM CHLORIDE 20 MEQ/1
40 TABLET, EXTENDED RELEASE ORAL ONCE
Status: COMPLETED | OUTPATIENT
Start: 2024-11-12 | End: 2024-11-12

## 2024-11-12 RX ADMIN — HYDROMORPHONE HYDROCHLORIDE 2 MG: 2 TABLET ORAL at 11:11

## 2024-11-12 RX ADMIN — POTASSIUM CHLORIDE 40 MEQ: 1500 TABLET, EXTENDED RELEASE ORAL at 09:11

## 2024-11-12 RX ADMIN — HYDROMORPHONE HYDROCHLORIDE 2 MG: 2 TABLET ORAL at 02:11

## 2024-11-12 RX ADMIN — MAGNESIUM SULFATE HEPTAHYDRATE 2 G: 40 INJECTION, SOLUTION INTRAVENOUS at 09:11

## 2024-11-12 RX ADMIN — HYDROMORPHONE HYDROCHLORIDE 2 MG: 2 TABLET ORAL at 08:11

## 2024-11-12 RX ADMIN — FUROSEMIDE 40 MG: 40 TABLET ORAL at 08:11

## 2024-11-12 RX ADMIN — METOPROLOL SUCCINATE 25 MG: 25 TABLET, EXTENDED RELEASE ORAL at 08:11

## 2024-11-12 RX ADMIN — PANTOPRAZOLE SODIUM 40 MG: 40 INJECTION, POWDER, LYOPHILIZED, FOR SOLUTION INTRAVENOUS at 08:11

## 2024-11-12 RX ADMIN — NICOTINE 1 PATCH: 21 PATCH, EXTENDED RELEASE TRANSDERMAL at 08:11

## 2024-11-12 RX ADMIN — CEFTRIAXONE SODIUM 1 G: 1 INJECTION, POWDER, FOR SOLUTION INTRAMUSCULAR; INTRAVENOUS at 02:11

## 2024-11-12 NOTE — PLAN OF CARE
Problem: Adult Inpatient Plan of Care  Goal: Plan of Care Review  Outcome: Progressing  Goal: Patient-Specific Goal (Individualized)  Outcome: Progressing  Goal: Absence of Hospital-Acquired Illness or Injury  Outcome: Progressing  Goal: Optimal Comfort and Wellbeing  Outcome: Progressing  Goal: Readiness for Transition of Care  Outcome: Progressing     Problem: Gastrointestinal Bleeding  Goal: Optimal Coping with Acute Illness  Outcome: Progressing  Goal: Hemostasis  Outcome: Progressing     Problem: Fall Injury Risk  Goal: Absence of Fall and Fall-Related Injury  Outcome: Progressing     Problem: Anemia  Goal: Anemia Symptom Improvement  Outcome: Progressing     Problem: Gas Exchange Impaired  Goal: Optimal Gas Exchange  Outcome: Progressing     Problem: Comorbidity Management  Goal: Maintenance of Asthma Control  Outcome: Progressing  Goal: Blood Pressure in Desired Range  Outcome: Progressing  Goal: Maintenance of Behavioral Health Symptom Control  Outcome: Progressing  Goal: Maintenance of COPD Symptom Control  Outcome: Progressing  Goal: Maintenance of Heart Failure Symptom Control  Outcome: Progressing     Problem: Electrolyte Imbalance  Goal: Electrolyte Balance  Outcome: Progressing     Problem: Skin Injury Risk Increased  Goal: Skin Health and Integrity  Outcome: Progressing     Problem: Acute Coronary Syndrome  Goal: Optimal Adaptation to Illness  Outcome: Progressing  Goal: Absence of Cardiac-Related Pain  Outcome: Progressing  Goal: Normalized Cardiac Rhythm  Outcome: Progressing  Goal: Effective Cardiac Pump Function  Outcome: Progressing  Goal: Adequate Tissue Perfusion  Outcome: Progressing     Problem: Infection  Goal: Absence of Infection Signs and Symptoms  Outcome: Progressing     Problem: Surgery Nonspecified  Goal: Absence of Bleeding  Outcome: Progressing  Goal: Effective Bowel Elimination  Outcome: Progressing  Goal: Fluid and Electrolyte Balance  Outcome: Progressing  Goal: Blood Glucose  Level Within Targeted Range  Outcome: Progressing  Goal: Absence of Infection Signs and Symptoms  Outcome: Progressing  Goal: Anesthesia/Sedation Recovery  Outcome: Progressing  Goal: Optimal Pain Control and Function  Outcome: Progressing  Goal: Nausea and Vomiting Relief  Outcome: Progressing  Goal: Effective Urinary Elimination  Outcome: Progressing  Goal: Effective Oxygenation and Ventilation  Outcome: Progressing

## 2024-11-12 NOTE — ANESTHESIA POSTPROCEDURE EVALUATION
Anesthesia Post Evaluation    Patient: Mary Ellen Saini    Procedure(s) Performed: Procedure(s) (LRB):  EGD (ESOPHAGOGASTRODUODENOSCOPY) (N/A)  COLONOSCOPY (N/A)    Final Anesthesia Type: general      Patient location during evaluation: PACU  Patient participation: Yes- Able to Participate  Level of consciousness: awake  Post-procedure vital signs: reviewed and stable  Pain management: adequate  Airway patency: patent    PONV status at discharge: No PONV  Anesthetic complications: no      Cardiovascular status: blood pressure returned to baseline  Respiratory status: unassisted  Hydration status: euvolemic  Follow-up not needed.              Vitals Value Taken Time   /78 11/12/24 1129   Temp 36.9 °C (98.4 °F) 11/12/24 1129   Pulse 99 11/12/24 1129   Resp 18 11/12/24 1134   SpO2 96 % 11/12/24 1129         Event Time   Out of Recovery 11/11/2024 18:08:51         Pain/Saroj Score: Pain Rating Prior to Med Admin: 7 (11/12/2024 11:34 AM)  Pain Rating Post Med Admin: 5 (11/11/2024 12:38 AM)  Saroj Score: 9 (11/11/2024  5:53 PM)

## 2024-11-12 NOTE — ASSESSMENT & PLAN NOTE
Initially had EGD colonoscopy 2 weeks ago, presented with gi hemorrhage, these procedures showed severe duodenitis and large hematomas in the colon    Now on re-eval egd/ colon, still significant duodenitis wihtout bleeding but with ulceration, and with large evolving ulcerations in the colon    Certainly atypical presentation, could represent vascular events (embolic? Vs vasculitis vs others) vs hematologic vs. Less likely primary GI event.    Recs  I have contacted path to rush the diagnosis    Protonix bid  Carafate TID  Supportive care    I suggest will need either hematology and / or rheumatology consultations

## 2024-11-12 NOTE — ASSESSMENT & PLAN NOTE
Anemia is likely due to acute blood loss which was from GIB . Most recent hemoglobin and hematocrit are listed below.  Recent Labs     11/09/24  0236 11/10/24  0220 11/11/24  0226   HGB 8.6* 8.5* 8.4*   HCT 26.4* 26.3* 26.6*     CTA w/o active GIB  - Monitor serial CBC:   - Transfuse PRBC if patient becomes hemodynamically unstable, symptomatic or H/H drops below 7/21.  - Patient has received 3 units of PRBCs on 11/8/24  - Patient's anemia is currently stable

## 2024-11-12 NOTE — SUBJECTIVE & OBJECTIVE
Subjective:     Interval History:   Still passing some blood in stool  Overall no new events  Feels about the same  No vomiting    Review of Systems   Constitutional:  Negative for chills and fever.   Respiratory:  Negative for choking and chest tightness.    Gastrointestinal:  Positive for blood in stool. Negative for nausea and vomiting.   Neurological:  Negative for dizziness and headaches.   Psychiatric/Behavioral:  Negative for agitation and behavioral problems.      Objective:     Vital Signs (Most Recent):  Temp: 97.4 °F (36.3 °C) (11/12/24 1559)  Pulse: 95 (11/12/24 1559)  Resp: 20 (11/12/24 1559)  BP: (!) 152/97 (11/12/24 1559)  SpO2: 100 % (11/12/24 1559) Vital Signs (24h Range):  Temp:  [97.1 °F (36.2 °C)-98.4 °F (36.9 °C)] 97.4 °F (36.3 °C)  Pulse:  [] 95  Resp:  [14-20] 20  SpO2:  [92 %-100 %] 100 %  BP: (114-152)/(66-97) 152/97     Weight: 78.5 kg (173 lb 1 oz) (11/08/24 0209)  Body mass index is 24.83 kg/m².      Intake/Output Summary (Last 24 hours) at 11/12/2024 1621  Last data filed at 11/12/2024 0800  Gross per 24 hour   Intake 340 ml   Output --   Net 340 ml       Lines/Drains/Airways       Peripheral Intravenous Line  Duration                  Peripheral IV - Single Lumen 11/07/24 18 G Left Forearm 5 days                     Physical Exam  Vitals reviewed.   Constitutional:       General: She is not in acute distress.  HENT:      Head: Normocephalic and atraumatic.   Eyes:      General: No scleral icterus.     Conjunctiva/sclera: Conjunctivae normal.   Abdominal:      General: There is no distension.      Palpations: Abdomen is soft.      Tenderness: There is no abdominal tenderness.   Skin:     General: Skin is warm.      Coloration: Skin is not pale.      Findings: No rash.   Neurological:      Mental Status: She is oriented to person, place, and time.      Gait: Gait normal.   Psychiatric:         Mood and Affect: Mood normal.         Behavior: Behavior normal.          Significant  Labs:  CBC:   Recent Labs   Lab 11/11/24 0226 11/12/24 0304   WBC 9.32 11.52   HGB 8.4* 9.0*   HCT 26.6* 28.2*    405     BMP:   Recent Labs   Lab 11/12/24 0304      *   K 3.0*      CO2 23   BUN 18   CREATININE 1.4   CALCIUM 7.8*     CMP:   Recent Labs   Lab 11/12/24 0304      CALCIUM 7.8*   *   K 3.0*   CO2 23      BUN 18   CREATININE 1.4         Significant Imaging:  Imaging results within the past 24 hours have been reviewed.

## 2024-11-12 NOTE — SUBJECTIVE & OBJECTIVE
Interval History: Virtual follow up visit for suspected Shortness of breath [R06.02]  Symptomatic anemia [D64.9] present on admission.   This service was provided by telemedicine.    The patient location is: [facility] A   Admitted 11/7/2024  9:15 PM    CC: GI bleed    The patient is able to provide adequate history. History was obtained from patient and past medical records.   No significant events overnight reported.  Patient complains of nothing new. No significant bleeding clinically.       Data  Details     [x]   Lab results reviewed 11/12/2024  Hypomagnesemia. Hyponatremia. Hypokalemia. sCr stable. H&H stable.    []   Micro reports reviewed 11/12/2024     [x]   Pathology reports reviewed 11/12/2024  Colon bx pending    []   Imaging reports reviewed 11/12/2024     []   Cardiology Procedure reports reviewed 11/12/2024      []   Non- records/CareEverywhere notes reviewed 11/12/2024      [x]  Tests/studies orders placed or verified 11/12/2024   ALEXUS, ANCA, CRP, Hep B, C3, C4    []  Independently viewed/assessed 11/12/2024      [x]  11/12/2024 Discussion of:  Endoscopy findings and plan with GI     Review of Systems   Constitutional:  Negative for fever.   Respiratory:  Negative for shortness of breath.      Objective:     Vital Signs (Most Recent):  Temp: 97.1 °F (36.2 °C) (11/12/24 0719)  Pulse: 100 (11/12/24 0719)  Resp: 20 (11/12/24 0719)  BP: (!) 139/91 (11/12/24 0719)  SpO2: 95 % (11/12/24 0719) Vital Signs (24h Range):  Temp:  [97.1 °F (36.2 °C)-98.4 °F (36.9 °C)] 97.1 °F (36.2 °C)  Pulse:  [] 100  Resp:  [14-20] 20  SpO2:  [92 %-100 %] 95 %  BP: (114-150)/() 139/91     Weight: 78.5 kg (173 lb 1 oz)  Body mass index is 24.83 kg/m².    Intake/Output Summary (Last 24 hours) at 11/12/2024 1127  Last data filed at 11/12/2024 0800  Gross per 24 hour   Intake 340 ml   Output --   Net 340 ml         Physical Exam  Constitutional:       General: She is awake.      Appearance: She is ill-appearing.    Cardiovascular:      Comments: Monitor and/or Vital signs reviewed at time of visit  Pulmonary:      Effort: Pulmonary effort is normal. No accessory muscle usage or respiratory distress.   Neurological:      Mental Status: She is alert. She is not disoriented.   Psychiatric:         Attention and Perception: Attention normal.         Behavior: Behavior is cooperative.         Significant Labs:   Recent Labs   Lab 10/20/24  0722   HGBA1C 5.4     Recent Labs   Lab 11/10/24  0220 11/11/24  0226 11/12/24  0304   WBC 10.40 9.32 11.52   HGB 8.5* 8.4* 9.0*   HCT 26.3* 26.6* 28.2*    355 405     Recent Labs   Lab 11/10/24  0220 11/11/24  0226 11/12/24  0304   GRAN 70.6  7.3 66.1  6.2 68.3  7.9*   LYMPH 17.1*  1.8 19.2  1.8 17.8*  2.1   MONO 6.7  0.7 8.4  0.8 7.6  0.9   EOS 0.4 0.5 0.6*     Recent Labs   Lab 11/07/24 2153 11/08/24  0401 11/10/24  0220 11/11/24  0226 11/12/24  0304   *   < > 131* 132* 134*   K 2.8*   < > 3.0* 3.6 3.0*      < > 101 100 100   CO2 20*   < > 20* 22* 23   BUN 20   < > 26* 24* 18   CREATININE 1.2   < > 1.4 1.2 1.4   *   < > 124* 88 101   CALCIUM 7.5*   < > 7.7* 7.8* 7.8*   ALBUMIN 2.1*  --   --   --   --    MG 1.6  --  1.5*  --   --     < > = values in this interval not displayed.     Recent Labs   Lab 11/07/24 2153   ALKPHOS 78   ALT 13   AST 17   PROT 6.3   BILITOT 0.3   INR 1.1   LIPASE 20     Recent Labs   Lab 11/07/24 2153 11/08/24  0105 11/08/24  0249 11/08/24  1014   TROPONINI 3.379* 3.587* 3.449* 4.533*   BNP 3,425*  --   --   --      Recent Labs   Lab 08/12/24  0534 08/19/24 2034 08/19/24 2233 08/26/24  1545 11/07/24 2153   PROCAL  --  0.10  --   --   --    LACTATE  --   --   --   --  1.0   DDIMER  --   --  0.72*  --   --    FERRITIN 112  --   --   --   --    SEDRATE  --   --   --  26  --      SARS-CoV-2 RNA, Amplification, Qual (no units)   Date Value   09/05/2024 Negative   08/19/2024 Negative     POC Rapid COVID (no units)   Date Value    08/29/2024 Negative   08/12/2024 Negative     ECG Results              EKG 12-lead (Final result)        Collection Time Result Time QRS Duration OHS QTC Calculation    11/07/24 22:25:43 11/10/24 11:34:04 88 478                     Final result by Interface, Lab In [facility] (11/10/24 11:34:08)                   Narrative:    Test Reason : R06.02,    Vent. Rate : 123 BPM     Atrial Rate : 123 BPM     P-R Int : 132 ms          QRS Dur : 088 ms      QT Int : 334 ms       P-R-T Axes : 033 004 055 degrees     QTc Int : 478 ms    Sinus tachycardia  diffuse ST depression ,consider ischemia   Abnormal ECG  When compared with ECG of 19-OCT-2024 09:34,  Vent. rate has increased BY  54 BPM  Confirmed by Vinod Larry MD ([id number]) on 11/10/2024 11:34:03 AM    Referred By: [email or web address]   SELF           Confirmed By:Vinod Larry MD                                Results for orders placed during the hospital encounter of 08/12/24    Echo    Interpretation Summary    Left Ventricle: The left ventricle is moderately dilated. There is eccentric hypertrophy. Moderate global hypokinesis present. There is moderately reduced systolic function with a visually estimated ejection fraction of 30 - 35%. Biplane (2D) method of discs ejection fraction is 34%. There is diastolic dysfunction but grade cannot be determined.    Right Ventricle: Normal right ventricular cavity size. Wall thickness is normal. Systolic function is normal.    Left Atrium: Left atrium is severely dilated.    Right Atrium: Right atrium is moderately dilated.    Aortic Valve: There is mild stenosis. Aortic valve area by VTI is 1.95 cm². Aortic valve peak velocity is 1.40 m/s. Mean gradient is 4 mmHg. The dimensionless index is 0.62.    Mitral Valve: There is mild regurgitation.    Tricuspid Valve: There is mild regurgitation.    Pulmonic Valve: There is mild regurgitation.    Pulmonary Artery: The estimated pulmonary artery systolic pressure is 36 mmHg.     IVC/SVC: Normal venous pressure at 3 mmHg.      CTA Acute GI [place], Abdomen and Pelvis  Narrative: EXAMINATION:  CTA ACUTE GI BLEED, ABDOMEN AND PELVIS    CLINICAL HISTORY:  GI bleeding;    TECHNIQUE:  Using 75 cc of  Omnipaque 350 IV, and multi-detector helical CT technique, axial CT angiogram images of the abdomen were obtained from the lung bases through the pelvis. Precontrast and portal venous phase images of the abdomen and pelvis also done. 2D post-processing coronal and sagittal reconstructions of the abdominal aorta and visceral arteries performed.    COMPARISON:  10/24/2024    FINDINGS:  The lung bases are well aerated.  No consolidation, suspicious nodules, with small bilateral pleural effusion.  The visualized portions of the heart appear normal.    The esophagus, stomach, spleen, pancreas, and adrenal glands are unremarkable.    The liver is normal in size and attenuation without focal abnormality.  The portal veins appear patent.  The gallbladder shows no evidence of stones or cholecystitis.  No intra-or extrahepatic biliary ductal dilatation.    The kidneys demonstrate normal enhancement.  No renal mass, nephrolithiasis or hydronephrosis.  The ureters are normal in course and caliber. The urinary bladder appears unremarkable    No evidence of gastrointestinal hemorrhage or bowel obstruction.  Inflammation in the right colon through the hepatic flexure and the proximal portion of the transverse colon with more normal splenic flexure descending colon and rectosigmoid colon with a few diverticula in the sigmoid but without diverticulitis.  Definite mass is not identified as on the previous exam.  There is no ascites, free fluid, or intraperitoneal free air. No significant peritoneal or retroperitoneal adenopathy.    The abdominal aorta is normal in course and caliber without significant atherosclerotic calcifications.    The osseous structures and extraperitoneal soft tissues are  unremarkable.  Impression: Inflammatory changes of the ascending and splenic flexure of the colon with the previous suggested mass not confidently identified.    No evidence of active GI bleed.    No evidence of bowel obstruction.    Mild pleural thickening and/or fluid.    This report was flagged in Epic as abnormal.    Electronically signed by: Jax Carmichael  Date:    11/07/2024  Time:    23:35  X-Ray Chest 1 View  Narrative: EXAMINATION:  XR CHEST 1 VIEW    CLINICAL HISTORY:  Shortness of breath    TECHNIQUE:  Single frontal view of the chest was performed.    COMPARISON:  10/19/2024.    FINDINGS:  The lungs are hypoexpanded.  There are perihilar interstitial opacities, may be accentuated by hypoaeration changes.  The pleural spaces are clear.  The cardiac silhouette is enlarged.  Osseous structures demonstrate degenerative changes.  Impression: Mild perihilar interstitial prominence, may be accentuated by hypoaeration.  Mild edema is not excluded.    Electronically signed by: Nick Clifton  Date:    11/07/2024  Time:    23:25      Labs and Imaging listed above were reviewed.

## 2024-11-12 NOTE — ASSESSMENT & PLAN NOTE
Echo 08/2024  EF 34%  Continue BB  Given one dose IV Lasix 10/08  Cardiac/Autonomic (From admission, onward)      Start     Stop Route Frequency Ordered    11/08/24 0030  metoprolol succinate (TOPROL-XL) 24 hr tablet 25 mg         -- Oral Daily 11/08/24 0029

## 2024-11-12 NOTE — PROGRESS NOTES
Job - Telemetry  Gastroenterology  Progress Note    Patient Name: Mary Ellen Saini  MRN: [id number]  Admission Date: 11/7/2024  Hospital Length of Stay: 4 days  Code Status: Full Code   Attending Provider: Rhea Summers MD  Consulting Provider: Reese Chen MD  Primary Care Physician: No, Primary Doctor  Principal Problem: GIB (gastrointestinal bleeding)        Subjective:     Interval History:   Still passing some blood in stool  Overall no new events  Feels about the same  No vomiting    Review of Systems   Constitutional:  Negative for chills and fever.   Respiratory:  Negative for choking and chest tightness.    Gastrointestinal:  Positive for blood in stool. Negative for nausea and vomiting.   Neurological:  Negative for dizziness and headaches.   Psychiatric/Behavioral:  Negative for agitation and behavioral problems.      Objective:     Vital Signs (Most Recent):  Temp: 97.4 °F (36.3 °C) (11/12/24 1559)  Pulse: 95 (11/12/24 1559)  Resp: 20 (11/12/24 1559)  BP: (!) 152/97 (11/12/24 1559)  SpO2: 100 % (11/12/24 1559) Vital Signs (24h Range):  Temp:  [97.1 °F (36.2 °C)-98.4 °F (36.9 °C)] 97.4 °F (36.3 °C)  Pulse:  [] 95  Resp:  [14-20] 20  SpO2:  [92 %-100 %] 100 %  BP: (114-152)/(66-97) 152/97     Weight: 78.5 kg (173 lb 1 oz) (11/08/24 0209)  Body mass index is 24.83 kg/m².      Intake/Output Summary (Last 24 hours) at 11/12/2024 1621  Last data filed at 11/12/2024 0800  Gross per 24 hour   Intake 340 ml   Output --   Net 340 ml       Lines/Drains/Airways       Peripheral Intravenous Line  Duration                  Peripheral IV - Single Lumen 11/07/24 18 G Left Forearm 5 days                     Physical Exam  Vitals reviewed.   Constitutional:       General: She is not in acute distress.  HENT:      Head: Normocephalic and atraumatic.   Eyes:      General: No scleral icterus.     Conjunctiva/sclera: Conjunctivae normal.   Abdominal:      General: There is no distension.      Palpations: Abdomen  is soft.      Tenderness: There is no abdominal tenderness.   Skin:     General: Skin is warm.      Coloration: Skin is not pale.      Findings: No rash.   Neurological:      Mental Status: She is oriented to person, place, and time.      Gait: Gait normal.   Psychiatric:         Mood and Affect: Mood normal.         Behavior: Behavior normal.          Significant Labs:  CBC:   Recent Labs   Lab 11/11/24  0226 11/12/24  0304   WBC 9.32 11.52   HGB 8.4* 9.0*   HCT 26.6* 28.2*    405     BMP:   Recent Labs   Lab 11/12/24  0304      *   K 3.0*      CO2 23   BUN 18   CREATININE 1.4   CALCIUM 7.8*     CMP:   Recent Labs   Lab 11/12/24 0304      CALCIUM 7.8*   *   K 3.0*   CO2 23      BUN 18   CREATININE 1.4         Significant Imaging:  Imaging results within the past 24 hours have been reviewed.  Assessment/Plan:     GI  Colitis  Initially had EGD colonoscopy 2 weeks ago, presented with gi hemorrhage, these procedures showed severe duodenitis and large hematomas in the colon    Now on re-eval egd/ colon, still significant duodenitis wihtout bleeding but with ulceration, and with large evolving ulcerations in the colon    Certainly atypical presentation, could represent vascular events (embolic? Vs vasculitis vs others) vs hematologic vs. Less likely primary GI event.    Recs  I have contacted path to rush the diagnosis    Protonix bid  Carafate TID  Supportive care    I suggest will need either hematology and / or rheumatology consultations        Thank you for your consult. I will follow-up with patient. Please contact us if you have any additional questions.    Reese Chen MD  Gastroenterology  [place] [facility] [place]

## 2024-11-12 NOTE — PLAN OF CARE
Rounded at bedside. Plans are to go home with family when medically ready for discharge. Biopsy pending. No DME or HH needed. CM will continue to follow pt and provide any discharge needs.     Lilliam Almazan, NP  Family Medicine [phone] [address] [facility] [address] [place] [address]      Next Steps: Follow up on 11/19/2024  Instructions: at 2:00pm; PCP hospital follow up appointment         11/12/24 1253   Rounds   Attendance Nurse    Discharge Plan A Home;Home with family   Why the patient remains in the hospital Requires continued medical care   Transition of Care Barriers None

## 2024-11-12 NOTE — ASSESSMENT & PLAN NOTE
Patient's hemorrhage is due to gastrointestinal bleed, patient does not have a propensity for bleeding.. Patients most recent Hgb, Hct, platelets, and INR are listed below.  Recent Labs     11/09/24  0236 11/10/24  0220 11/11/24 0226   HGB 8.6* 8.5* 8.4*   HCT 26.4* 26.3* 26.6*    354 355       Plan  - Will trend hemoglobin/hematocrit   - Will monitor and correct any coagulation defects  - Will transfuse if Hgb is <7g/dl (<8g/dl in cases of active ACS) or if patient has rapid bleeding leading to hemodynamic instability  - Consult GI  -Ppi BID  -patient received 3 U PRBC  -patient was evaluated by GI, and scheduled for EGD/colonoscopy 11/11

## 2024-11-12 NOTE — PROGRESS NOTES
[facility] Medicine  Telemedicine Progress Note    Patient Name: Mary Ellen Saini  MRN: [id number]  Patient Class: IP- Inpatient   Admission Date: 11/7/2024  Length of Stay: 3 days  Attending Physician: Rhea Summers MD  Primary Care Provider: Marlen, Primary Doctor    Subjective:     Principal Problem:GIB (gastrointestinal bleeding)    HPI:  [age] year old female with a history of hypertension, bipolar, hep C, HFrEF presents to ED with complaints of chest pain, shortness a breath and blood in her stool.  Patient was recently admitted for GI bleed which she required blood transfusion.  Patient reports bleeding in stool has been having for the last several days however worsened today along with worsening abdominal pain. In ED labs remarkable for H&H 5.6 and 17.2.  Patient discharged 12 days ago with H&H of 7.3 and 22.8.  Blood pressure stable on admission however she is tachycardic 120.  Patient also with a chest pain.  EKG without ST elevation BNP 3425, troponin 3.379.  CTA of the abdomen done denies show any evidence of active GI bleed.  Patient typed and crossmatch for 3 units.  We will start PPI IV BID, transfuse PRBC, consult GI and Cards.    Overview/Hospital Course:  No notes on file    Interval History: Virtual follow up visit for suspected Shortness of breath [R06.02]  Symptomatic anemia [D64.9] present on admission.   This service was provided by telemedicine.    The [facility]   Admitted 11/7/2024  9:15 PM    CC: GI bleed    The patient is able to provide adequate history. History was obtained from patient and past medical records.   No significant events overnight reported.  Patient complains of nothing new       Data  Details     [x]   Lab results reviewed 11/11/2024  Hypomagnesemia. Hyponatremia. sCr improved. H&H stable.    []   Micro reports reviewed 11/11/2024     []   Pathology reports reviewed 11/11/2024     []   Imaging reports reviewed 11/11/2024     [x]    Cardiology Procedure reports reviewed 11/11/2024  EF 30 - 35%.    []   Non-HM records/CareEverywhere notes reviewed 11/11/2024      []  Tests/studies orders placed or verified 11/11/2024       []  Independently viewed/assessed 11/11/2024      []  11/11/2024 Discussion of:      Review of Systems   Constitutional:  Negative for fever.   Respiratory:  Negative for shortness of breath.      Objective:     Vital Signs (Most Recent):  Temp: 97.4 °F (36.3 °C) (11/10/24 0731)  Pulse: 96 (11/10/24 0731)  Resp: 18 (11/10/24 0731)  BP: (!) 139/94 (11/10/24 0731)  SpO2: 96 % (11/10/24 0731) Vital Signs (24h Range):  Temp:  [97.4 °F (36.3 °C)-98.4 °F (36.9 °C)] 97.4 °F (36.3 °C)  Pulse:  [] 96  Resp:  [16-20] 18  SpO2:  [95 %-98 %] 96 %  BP: (132-139)/() 139/94     Weight: 78.5 kg (173 lb 1 oz)  Body mass index is 24.83 kg/m².    Intake/Output Summary (Last 24 hours) at 11/10/2024 1144  Last data filed at 11/10/2024 1009  Gross per 24 hour   Intake 94.03 ml   Output --   Net 94.03 ml         Physical Exam  Constitutional:       General: She is awake.      Appearance: She is ill-appearing.   Cardiovascular:      Comments: Monitor and/or Vital signs reviewed at time of visit  Pulmonary:      Effort: Pulmonary effort is normal. No accessory muscle usage or respiratory distress.   Neurological:      Mental Status: She is alert. She is not disoriented.   Psychiatric:         Attention and Perception: Attention normal.         Behavior: Behavior is cooperative.         Significant Labs:   Recent Labs   Lab 10/20/24  0722   HGBA1C 5.4     Recent Labs   Lab 11/09/24  0236 11/10/24  0220 11/11/24  0226   WBC 13.97* 10.40 9.32   HGB 8.6* 8.5* 8.4*   HCT 26.4* 26.3* 26.6*    354 355     Recent Labs   Lab 11/09/24 0236 11/10/24  0220 11/11/24  0226   GRAN 77.0*  10.8* 70.6  7.3 66.1  6.2   LYMPH 14.5*  2.0 17.1*  1.8 19.2  1.8   MONO 5.9  0.8 6.7  0.7 8.4  0.8   EOS 0.2 0.4 0.5     Recent Labs   Lab  11/07/24 2153 11/08/24  0401 11/09/24  0236 11/10/24  0220 11/11/24  0226   *   < > 132* 131* 132*   K 2.8*   < > 3.5 3.0* 3.6      < > 101 101 100   CO2 20*   < > 19* 20* 22*   BUN 20   < > 27* 26* 24*   CREATININE 1.2   < > 1.5* 1.4 1.2   *   < > 116* 124* 88   CALCIUM 7.5*   < > 7.7* 7.7* 7.8*   ALBUMIN 2.1*  --   --   --   --    MG 1.6  --   --  1.5*  --     < > = values in this interval not displayed.     Recent Labs   Lab 11/07/24 2153   ALKPHOS 78   ALT 13   AST 17   PROT 6.3   BILITOT 0.3   INR 1.1   LIPASE 20     Recent Labs   Lab 11/07/24 2153 11/08/24  0105 11/08/24  0249 11/08/24  1014   TROPONINI 3.379* 3.587* 3.449* 4.533*   BNP 3,425*  --   --   --      Recent Labs   Lab 08/12/24  0534 08/19/24  2034 08/19/24  2233 08/26/24  1545 11/07/24 2153   PROCAL  --  0.10  --   --   --    LACTATE  --   --   --   --  1.0   DDIMER  --   --  0.72*  --   --    FERRITIN 112  --   --   --   --    SEDRATE  --   --   --  26  --      SARS-CoV-2 RNA, Amplification, Qual (no units)   Date Value   09/05/2024 Negative   08/19/2024 Negative     POC Rapid COVID (no units)   Date Value   08/29/2024 Negative   08/12/2024 Negative     ECG Results              EKG 12-lead (Final result)        Collection Time Result Time QRS Duration OHS QTC Calculation    11/07/24 22:25:43 11/10/24 11:34:04 88 478                     Final result by Interface, Lab In [facility] (11/10/24 11:34:08)                   Narrative:    Test Reason : R06.02,    Vent. Rate : 123 BPM     Atrial Rate : 123 BPM     P-R Int : 132 ms          QRS Dur : 088 ms      QT Int : 334 ms       P-R-T Axes : 033 004 055 degrees     QTc Int : 478 ms    Sinus tachycardia  diffuse ST depression ,consider ischemia   Abnormal ECG  When compared with ECG of 19-OCT-2024 09:34,  Vent. rate has increased BY  54 BPM  Confirmed by Laron MATTSON, Vinod ([id number]) on 11/10/2024 11:34:03 AM    Referred By: JOHNNY   SELF           Confirmed By:Vinod  Laron MATTSON                                Results for orders placed during the hospital encounter of 08/12/24    Echo    Interpretation Summary    Left Ventricle: The left ventricle is moderately dilated. There is eccentric hypertrophy. Moderate global hypokinesis present. There is moderately reduced systolic function with a visually estimated ejection fraction of 30 - 35%. Biplane (2D) method of discs ejection fraction is 34%. There is diastolic dysfunction but grade cannot be determined.    Right Ventricle: Normal right ventricular cavity size. Wall thickness is normal. Systolic function is normal.    Left Atrium: Left atrium is severely dilated.    Right Atrium: Right atrium is moderately dilated.    Aortic Valve: There is mild stenosis. Aortic valve area by VTI is 1.95 cm². Aortic valve peak velocity is 1.40 m/s. Mean gradient is 4 mmHg. The dimensionless index is 0.62.    Mitral Valve: There is mild regurgitation.    Tricuspid Valve: There is mild regurgitation.    Pulmonic Valve: There is mild regurgitation.    Pulmonary Artery: The estimated pulmonary artery systolic pressure is 36 mmHg.    IVC/SVC: Normal venous pressure at 3 mmHg.      CTA Acute GI Startup, Abdomen and Pelvis  Narrative: EXAMINATION:  CTA ACUTE GI BLEED, ABDOMEN AND PELVIS    CLINICAL HISTORY:  GI bleeding;    TECHNIQUE:  Using 75 cc of  Omnipaque 350 IV, and multi-detector helical CT technique, axial CT angiogram images of the abdomen were obtained from the lung bases through the pelvis. Precontrast and portal venous phase images of the abdomen and pelvis also done. 2D post-processing coronal and sagittal reconstructions of the abdominal aorta and visceral arteries performed.    COMPARISON:  10/24/2024    FINDINGS:  The lung bases are well aerated.  No consolidation, suspicious nodules, with small bilateral pleural effusion.  The visualized portions of the heart appear normal.    The esophagus, stomach, spleen, pancreas, and adrenal  glands are unremarkable.    The liver is normal in size and attenuation without focal abnormality.  The portal veins appear patent.  The gallbladder shows no evidence of stones or cholecystitis.  No intra-or extrahepatic biliary ductal dilatation.    The kidneys demonstrate normal enhancement.  No renal mass, nephrolithiasis or hydronephrosis.  The ureters are normal in course and caliber. The urinary bladder appears unremarkable    No evidence of gastrointestinal hemorrhage or bowel obstruction.  Inflammation in the right colon through the hepatic flexure and the proximal portion of the transverse colon with more normal splenic flexure descending colon and rectosigmoid colon with a few diverticula in the sigmoid but without diverticulitis.  Definite mass is not identified as on the previous exam.  There is no ascites, free fluid, or intraperitoneal free air. No significant peritoneal or retroperitoneal adenopathy.    The abdominal aorta is normal in course and caliber without significant atherosclerotic calcifications.    The osseous structures and extraperitoneal soft tissues are unremarkable.  Impression: Inflammatory changes of the ascending and splenic flexure of the colon with the previous suggested mass not confidently identified.    No evidence of active GI bleed.    No evidence of bowel obstruction.    Mild pleural thickening and/or fluid.    This report was flagged in Epic as abnormal.    Electronically signed by: Jax Carmichael  Date:    11/07/2024  Time:    23:35  X-Ray Chest 1 View  Narrative: EXAMINATION:  XR CHEST 1 VIEW    CLINICAL HISTORY:  Shortness of breath    TECHNIQUE:  Single frontal view of the chest was performed.    COMPARISON:  10/19/2024.    FINDINGS:  The lungs are hypoexpanded.  There are perihilar interstitial opacities, may be accentuated by hypoaeration changes.  The pleural spaces are clear.  The cardiac silhouette is enlarged.  Osseous structures demonstrate degenerative  changes.  Impression: Mild perihilar interstitial prominence, may be accentuated by hypoaeration.  Mild edema is not excluded.    Electronically signed by: Nick Clifton  Date:    11/07/2024  Time:    23:25      Labs and Imaging listed above were reviewed.     Assessment/Plan:      * GIB (gastrointestinal bleeding)  Patient's hemorrhage is due to gastrointestinal bleed, patient does not have a propensity for bleeding.. Patients most recent Hgb, Hct, platelets, and INR are listed below.  Recent Labs     11/09/24  0236 11/10/24  0220 11/11/24  0226   HGB 8.6* 8.5* 8.4*   HCT 26.4* 26.3* 26.6*    354 355       Plan  - Will trend hemoglobin/hematocrit   - Will monitor and correct any coagulation defects  - Will transfuse if Hgb is <7g/dl (<8g/dl in cases of active ACS) or if patient has rapid bleeding leading to hemodynamic instability  - Consult GI  -Ppi BID  -patient received 3 U PRBC  -patient was evaluated by GI, and scheduled for EGD/colonoscopy 11/11    ABLA (acute blood loss anemia)  Anemia is likely due to acute blood loss which was from GIB . Most recent hemoglobin and hematocrit are listed below.  Recent Labs     11/09/24  0236 11/10/24  0220 11/11/24  0226   HGB 8.6* 8.5* 8.4*   HCT 26.4* 26.3* 26.6*     CTA w/o active GIB  - Monitor serial CBC:   - Transfuse PRBC if patient becomes hemodynamically unstable, symptomatic or H/H drops below 7/21.  - Patient has received 3 units of PRBCs on 11/8/24  - Patient's anemia is currently stable    Leukocytosis  No obvious infection  Started Rocephin given pts hx Hep C and GIB    NSTEMI (non-ST elevated myocardial infarction)  EKG w/o ST elevation  Troponin elevated 3.379-->3.587-->3.449  Cards eval is appreciated, medical management is recommended     HFrEF (heart failure with reduced ejection fraction)  Echo 08/2024  EF 34%  Continue BB  Given one dose IV Lasix 10/08  Cardiac/Autonomic (From admission, onward)      Start     Stop Route Frequency Ordered     11/08/24 0030  metoprolol succinate (TOPROL-XL) 24 hr tablet 25 mg         -- Oral Daily 11/08/24 0029              Smoking history  Nicotine patches  Smoking cessation counseling  U tox is positive for cocaine amphetamine and THC    Hypokalemia  Patient's most recent potassium results are listed below.   Recent Labs     11/09/24  0236 11/10/24  0220 11/11/24  0226   K 3.5 3.0* 3.6     Plan  - Replete potassium per protocol  - Monitor potassium   - Patient's hypokalemia is stable      Social Drivers of Health with Concerns     Tobacco Use: High Risk (11/8/2024)    Patient History     Smoking Tobacco Use: Every Day     Smokeless Tobacco Use: Never     Passive Exposure: Not on file   Financial Resource Strain: Medium Risk (11/10/2024)    Overall Financial Resource Strain (CARDIA)     Difficulty of Paying Living Expenses: Somewhat hard   Food Insecurity: Food Insecurity Present (11/10/2024)    Hunger Vital Sign     Worried About Running Out of Food in the Last Year: Sometimes true     Ran Out of Food in the Last Year: Never true   Transportation Needs: Unmet Transportation Needs (11/10/2024)    TRANSPORTATION NEEDS     Transportation : Yes, it has kept me from medical appointments or from getting my medications.   Physical Activity: Inactive (8/21/2024)    Exercise Vital Sign     Days of Exercise per Week: 0 days     Minutes of Exercise per Session: 20 min   Stress: Stress Concern Present (11/10/2024)    [employer] [place] [employer] - Occupational Stress Questionnaire     Feeling of Stress : Very much   Housing Stability: High Risk (11/10/2024)    Housing Stability Vital Sign     Unable to Pay for Housing in the Last Year: Yes     Homeless in the Last Year: Yes   Depression: Not on file   Health Literacy: Adequate Health Literacy (11/10/2024)     Health Literacy     Frequency of need for help with medical instructions: Never   Recent Concern: Health Literacy - Inadequate Health Literacy (8/12/2024)      Health Literacy     Frequency of need for help with medical instructions: Sometimes   Social Isolation: Somewhat Isolated (10/22/2024)    Social Isolation     Social Isolation: 3       Active Hospital Problems    Diagnosis  POA    *GIB (gastrointestinal bleeding) [K92.2]  Yes    ABLA (acute blood loss anemia) [D62]  Yes    Leukocytosis [D72.829]  Yes    NSTEMI (non-ST elevated myocardial infarction) [I21.4]  Yes    HFrEF (heart failure with reduced ejection fraction) [I50.20]  Yes    Smoking history [Z87.891]  Not Applicable    Hypokalemia [E87.6]  No      Resolved Hospital Problems   No resolved problems to display.       Inpatient Medications Prescribed for Management of Current Problems:     Scheduled Meds:   cefTRIAXone (Rocephin) IV (PEDS and ADULTS)  1 g Intravenous Q24H    furosemide  40 mg Oral Daily    magnesium sulfate IVPB  2 g Intravenous Q4H    metoprolol succinate  25 mg Oral Daily    nicotine  1 patch Transdermal Daily    pantoprazole  40 mg Intravenous BID     Continuous Infusions:  PRN Meds:.  Current Facility-Administered Medications:     0.9%  NaCl infusion (for blood administration), , Intravenous, Q24H PRN    acetaminophen, 650 mg, Oral, Q6H PRN    HYDROmorphone, 2 mg, Oral, Q8H PRN    influenza, 0.5 mL, Intramuscular, Prior to discharge    VTE Risk Mitigation (From admission, onward)           Ordered     IP VTE HIGH RISK PATIENT  Once         11/08/24 0137     Place sequential compression device  Until discontinued         11/08/24 0137                  I have completed this tele-visit without the assistance of a telepresenter.    The attending portion of this evaluation, treatment, and documentation was performed per Rhea Summers MD via Telemedicine AudioVisual using the secure [employer] software platform with 2 way audio/video. The provider was located off-site and the patient is located in the hospital. The aforementioned video software was utilized to document the relevant history  and physical exam    I spent a total of 38 minutes on the day of the visit.This includes face to face time and non-face to face time preparing to see the patient (eg, review of tests), obtaining and/or reviewing separately obtained history, documenting clinical information in the electronic or other health record, independently interpreting results and communicating results to the patient/family/caregiver, or care coordinator.      Rhea Summers MD  Department of [facility] Medicine   [facility]

## 2024-11-12 NOTE — PLAN OF CARE
Patient recovered to baseline.  VSS.  Patient seen by MD at bedside.  Patient escorted back to hospital room w/transporter.  Report given to bedside nurse.  Nurse verbalized understanding.  All questions answered.

## 2024-11-13 ENCOUNTER — CLINICAL SUPPORT (OUTPATIENT)
Dept: SMOKING CESSATION | Facility: CLINIC | Age: 59
End: 2024-11-13

## 2024-11-13 DIAGNOSIS — F17.210 CIGARETTE SMOKER: Primary | ICD-10-CM

## 2024-11-13 PROBLEM — K26.9 DUODENAL ULCER: Status: ACTIVE | Noted: 2024-11-13

## 2024-11-13 LAB
ALBUMIN SERPL BCP-MCNC: 2.1 G/DL (ref 3.5–5.2)
ALP SERPL-CCNC: 91 U/L (ref 40–150)
ALT SERPL W/O P-5'-P-CCNC: 24 U/L (ref 10–44)
ANA SER QL IF: NORMAL
ANION GAP SERPL CALC-SCNC: 9 MMOL/L (ref 8–16)
AST SERPL-CCNC: 14 U/L (ref 10–40)
BASOPHILS # BLD AUTO: 0.09 K/UL (ref 0–0.2)
BASOPHILS NFR BLD: 0.8 % (ref 0–1.9)
BILIRUB DIRECT SERPL-MCNC: 0.1 MG/DL (ref 0.1–0.3)
BILIRUB SERPL-MCNC: 0.2 MG/DL (ref 0.1–1)
BUN SERPL-MCNC: 19 MG/DL (ref 6–20)
CALCIUM SERPL-MCNC: 8 MG/DL (ref 8.7–10.5)
CHLORIDE SERPL-SCNC: 99 MMOL/L (ref 95–110)
CO2 SERPL-SCNC: 24 MMOL/L (ref 23–29)
CREAT SERPL-MCNC: 1.6 MG/DL (ref 0.5–1.4)
DIFFERENTIAL METHOD BLD: ABNORMAL
EOSINOPHIL # BLD AUTO: 0.6 K/UL (ref 0–0.5)
EOSINOPHIL NFR BLD: 5.8 % (ref 0–8)
ERYTHROCYTE [DISTWIDTH] IN BLOOD BY AUTOMATED COUNT: 16.7 % (ref 11.5–14.5)
EST. GFR  (NO RACE VARIABLE): 37 ML/MIN/1.73 M^2
GLUCOSE SERPL-MCNC: 103 MG/DL (ref 70–110)
HCT VFR BLD AUTO: 26.8 % (ref 37–48.5)
HGB BLD-MCNC: 8.7 G/DL (ref 12–16)
IMM GRANULOCYTES # BLD AUTO: 0.06 K/UL (ref 0–0.04)
IMM GRANULOCYTES NFR BLD AUTO: 0.6 % (ref 0–0.5)
LYMPHOCYTES # BLD AUTO: 2 K/UL (ref 1–4.8)
LYMPHOCYTES NFR BLD: 18.8 % (ref 18–48)
MAGNESIUM SERPL-MCNC: 2.1 MG/DL (ref 1.6–2.6)
MCH RBC QN AUTO: 27.8 PG (ref 27–31)
MCHC RBC AUTO-ENTMCNC: 32.5 G/DL (ref 32–36)
MCV RBC AUTO: 86 FL (ref 82–98)
MONOCYTES # BLD AUTO: 0.8 K/UL (ref 0.3–1)
MONOCYTES NFR BLD: 7.8 % (ref 4–15)
NEUTROPHILS # BLD AUTO: 7 K/UL (ref 1.8–7.7)
NEUTROPHILS NFR BLD: 66.2 % (ref 38–73)
NRBC BLD-RTO: 0 /100 WBC
PHOSPHATE SERPL-MCNC: 3.1 MG/DL (ref 2.7–4.5)
PLATELET # BLD AUTO: 381 K/UL (ref 150–450)
PMV BLD AUTO: 9.7 FL (ref 9.2–12.9)
POTASSIUM SERPL-SCNC: 3.8 MMOL/L (ref 3.5–5.1)
PROT SERPL-MCNC: 6.3 G/DL (ref 6–8.4)
RBC # BLD AUTO: 3.13 M/UL (ref 4–5.4)
SODIUM SERPL-SCNC: 132 MMOL/L (ref 136–145)
WBC # BLD AUTO: 10.64 K/UL (ref 3.9–12.7)

## 2024-11-13 PROCEDURE — 11000001 HC ACUTE MED/SURG PRIVATE ROOM

## 2024-11-13 PROCEDURE — 99232 SBSQ HOSP IP/OBS MODERATE 35: CPT | Mod: ,,, | Performed by: INTERNAL MEDICINE

## 2024-11-13 PROCEDURE — 25000003 PHARM REV CODE 250: Performed by: STUDENT IN AN ORGANIZED HEALTH CARE EDUCATION/TRAINING PROGRAM

## 2024-11-13 PROCEDURE — 80076 HEPATIC FUNCTION PANEL: CPT | Performed by: INTERNAL MEDICINE

## 2024-11-13 PROCEDURE — S4991 NICOTINE PATCH NONLEGEND: HCPCS | Performed by: STUDENT IN AN ORGANIZED HEALTH CARE EDUCATION/TRAINING PROGRAM

## 2024-11-13 PROCEDURE — 85025 COMPLETE CBC W/AUTO DIFF WBC: CPT | Performed by: STUDENT IN AN ORGANIZED HEALTH CARE EDUCATION/TRAINING PROGRAM

## 2024-11-13 PROCEDURE — 80048 BASIC METABOLIC PNL TOTAL CA: CPT | Performed by: REGISTERED NURSE

## 2024-11-13 PROCEDURE — 27000207 HC ISOLATION

## 2024-11-13 PROCEDURE — 99223 1ST HOSP IP/OBS HIGH 75: CPT | Mod: ,,, | Performed by: INTERNAL MEDICINE

## 2024-11-13 PROCEDURE — 63600175 PHARM REV CODE 636 W HCPCS: Performed by: REGISTERED NURSE

## 2024-11-13 PROCEDURE — 36415 COLL VENOUS BLD VENIPUNCTURE: CPT | Performed by: REGISTERED NURSE

## 2024-11-13 PROCEDURE — 84100 ASSAY OF PHOSPHORUS: CPT | Performed by: INTERNAL MEDICINE

## 2024-11-13 PROCEDURE — 25000003 PHARM REV CODE 250: Performed by: EMERGENCY MEDICINE

## 2024-11-13 PROCEDURE — 83735 ASSAY OF MAGNESIUM: CPT | Performed by: INTERNAL MEDICINE

## 2024-11-13 RX ORDER — FUROSEMIDE 40 MG/1
40 TABLET ORAL DAILY
Status: DISCONTINUED | OUTPATIENT
Start: 2024-11-14 | End: 2024-11-15

## 2024-11-13 RX ORDER — SUCRALFATE 1 G/1
1 TABLET ORAL
Status: DISCONTINUED | OUTPATIENT
Start: 2024-11-14 | End: 2024-11-18 | Stop reason: HOSPADM

## 2024-11-13 RX ADMIN — HYDROMORPHONE HYDROCHLORIDE 2 MG: 2 TABLET ORAL at 08:11

## 2024-11-13 RX ADMIN — NICOTINE 1 PATCH: 21 PATCH, EXTENDED RELEASE TRANSDERMAL at 10:11

## 2024-11-13 RX ADMIN — PANTOPRAZOLE SODIUM 40 MG: 40 INJECTION, POWDER, LYOPHILIZED, FOR SOLUTION INTRAVENOUS at 08:11

## 2024-11-13 RX ADMIN — METOPROLOL SUCCINATE 25 MG: 25 TABLET, EXTENDED RELEASE ORAL at 10:11

## 2024-11-13 RX ADMIN — CEFTRIAXONE SODIUM 1 G: 1 INJECTION, POWDER, FOR SOLUTION INTRAMUSCULAR; INTRAVENOUS at 02:11

## 2024-11-13 RX ADMIN — HYDROMORPHONE HYDROCHLORIDE 2 MG: 2 TABLET ORAL at 06:11

## 2024-11-13 RX ADMIN — PANTOPRAZOLE SODIUM 40 MG: 40 INJECTION, POWDER, LYOPHILIZED, FOR SOLUTION INTRAVENOUS at 10:11

## 2024-11-13 NOTE — HPI
59 year-old female was admitted for gastrointestinal bleeding. Upper GI endoscopy revealed gastritis and a duodenal ulcer. Preliminary pathology report suggests possible amyloidosis. Consult is for possible amyloidosis.

## 2024-11-13 NOTE — SUBJECTIVE & OBJECTIVE
Interval History: Virtual follow up visit for suspected Shortness of breath [R06.02]  Symptomatic anemia [D64.9] present on admission.   This service was provided by telemedicine.    The patient location is: [facility] A   Admitted 11/7/2024  9:15 PM    CC: GI bleed    The patient is able to provide adequate history. History was obtained from patient and past medical records.   No significant events overnight reported.  Patient complains of abdominal discomfort and malaise.       Data  Details     [x]   Lab results reviewed 11/13/2024  sCr increased. Hyponatremia. H&H stable.   CRP = 12.2.  Hep B, C3, C4 are neg / normal.    []   Micro reports reviewed 11/13/2024     [x]   Pathology reports reviewed 11/13/2024  Colon bx (preliminary consistent with amyloid)    []   Imaging reports reviewed 11/13/2024     []   Cardiology Procedure reports reviewed 11/13/2024      []   Non- records/CareEverywhere notes reviewed 11/13/2024      []  Tests/studies orders placed or verified 11/13/2024       []  Independently viewed/assessed 11/13/2024      [x]  11/13/2024 Discussion of:  Prelim path with GI     Review of Systems   Constitutional:  Positive for fatigue. Negative for fever.   Respiratory:  Negative for shortness of breath.    Gastrointestinal:  Positive for abdominal pain.     Objective:     Vital Signs (Most Recent):  Temp: 97.8 °F (36.6 °C) (11/13/24 0732)  Pulse: 101 (11/13/24 0732)  Resp: 18 (11/13/24 0732)  BP: 137/86 (11/13/24 0732)  SpO2: 96 % (11/13/24 0732) Vital Signs (24h Range):  Temp:  [97.1 °F (36.2 °C)-99 °F (37.2 °C)] 97.8 °F (36.6 °C)  Pulse:  [] 101  Resp:  [18-20] 18  SpO2:  [95 %-100 %] 96 %  BP: (122-152)/(75-97) 137/86     Weight: 83.9 kg (184 lb 15.5 oz)  Body mass index is 26.54 kg/m².    Intake/Output Summary (Last 24 hours) at 11/13/2024 1052  Last data filed at 11/13/2024 0432  Gross per 24 hour   Intake 300 ml   Output --   Net 300 ml         Physical Exam  Constitutional:       General:  She is awake.      Appearance: She is ill-appearing.   Cardiovascular:      Comments: Monitor and/or Vital signs reviewed at time of visit  Pulmonary:      Effort: Pulmonary effort is normal. No accessory muscle usage or respiratory distress.   Neurological:      Mental Status: She is alert. She is not disoriented.   Psychiatric:         Attention and Perception: Attention normal.         Behavior: Behavior is cooperative.         Significant Labs:   Recent Labs   Lab 10/20/24  0722   HGBA1C 5.4     Recent Labs   Lab 11/11/24 0226 11/12/24 0304 11/13/24 0226   WBC 9.32 11.52 10.64   HGB 8.4* 9.0* 8.7*   HCT 26.6* 28.2* 26.8*    405 381     Recent Labs   Lab 11/11/24 0226 11/12/24 0304 11/13/24 0226   GRAN 66.1  6.2 68.3  7.9* 66.2  7.0   LYMPH 19.2  1.8 17.8*  2.1 18.8  2.0   MONO 8.4  0.8 7.6  0.9 7.8  0.8   EOS 0.5 0.6* 0.6*     Recent Labs   Lab 11/07/24 2153 11/08/24  0401 11/10/24  0220 11/11/24 0226 11/12/24 0304 11/13/24 0226   *   < > 131* 132* 134* 132*   K 2.8*   < > 3.0* 3.6 3.0* 3.8      < > 101 100 100 99   CO2 20*   < > 20* 22* 23 24   BUN 20   < > 26* 24* 18 19   CREATININE 1.2   < > 1.4 1.2 1.4 1.6*   *   < > 124* 88 101 103   CALCIUM 7.5*   < > 7.7* 7.8* 7.8* 8.0*   ALBUMIN 2.1*  --   --   --   --  2.1*   MG 1.6  --  1.5*  --   --  2.1   PHOS  --   --   --   --   --  3.1    < > = values in this interval not displayed.     Recent Labs   Lab 11/07/24 2153 11/12/24  1300 11/13/24 0226   ALKPHOS 78  --  91   ALT 13  --  24   AST 17  --  14   PROT 6.3  --  6.3   BILITOT 0.3  --  0.2   INR 1.1  --   --    LIPASE 20  --   --    CRP  --  12.2*  --      Recent Labs   Lab 11/07/24  2153 11/08/24  0105 11/08/24  0249 11/08/24  1014   TROPONINI 3.379* 3.587* 3.449* 4.533*   BNP 3,425*  --   --   --      Recent Labs   Lab 08/12/24  0534 08/19/24  2034 08/19/24  2233 08/26/24  1545 11/07/24  2153   PROCAL  --  0.10  --   --   --    LACTATE  --   --   --   --  1.0    DDIMER  --   --  0.72*  --   --    FERRITIN 112  --   --   --   --    SEDRATE  --   --   --  26  --      SARS-CoV-2 RNA, Amplification, Qual (no units)   Date Value   09/05/2024 Negative   08/19/2024 Negative     POC Rapid COVID (no units)   Date Value   08/29/2024 Negative   08/12/2024 Negative     ECG Results              EKG 12-lead (Final result)        Collection Time Result Time QRS Duration OHS QTC Calculation    11/07/24 22:25:43 11/10/24 11:34:04 88 478                     Final result by Interface, Lab In [facility] (11/10/24 11:34:08)                   Narrative:    Test Reason : R06.02,    Vent. Rate : 123 BPM     Atrial Rate : 123 BPM     P-R Int : 132 ms          QRS Dur : 088 ms      QT Int : 334 ms       P-R-T Axes : 033 004 055 degrees     QTc Int : 478 ms    Sinus tachycardia  diffuse ST depression ,consider ischemia   Abnormal ECG  When compared with ECG of 19-OCT-2024 09:34,  Vent. rate has increased BY  54 BPM  Confirmed by Vinod Larry MD ([id number]) on 11/10/2024 11:34:03 AM    Referred By: [email or web address]   SELF           Confirmed By:Vinod Larry MD                                Results for orders placed during the hospital encounter of 08/12/24    Echo    Interpretation Summary    Left Ventricle: The left ventricle is moderately dilated. There is eccentric hypertrophy. Moderate global hypokinesis present. There is moderately reduced systolic function with a visually estimated ejection fraction of 30 - 35%. Biplane (2D) method of discs ejection fraction is 34%. There is diastolic dysfunction but grade cannot be determined.    Right Ventricle: Normal right ventricular cavity size. Wall thickness is normal. Systolic function is normal.    Left Atrium: Left atrium is severely dilated.    Right Atrium: Right atrium is moderately dilated.    Aortic Valve: There is mild stenosis. Aortic valve area by VTI is 1.95 cm². Aortic valve peak velocity is 1.40 m/s. Mean gradient is 4 mmHg. The  dimensionless index is 0.62.    Mitral Valve: There is mild regurgitation.    Tricuspid Valve: There is mild regurgitation.    Pulmonic Valve: There is mild regurgitation.    Pulmonary Artery: The estimated pulmonary artery systolic pressure is 36 mmHg.    IVC/SVC: Normal venous pressure at 3 mmHg.      CTA Acute GI [place], Abdomen and Pelvis  Narrative: EXAMINATION:  CTA ACUTE GI BLEED, ABDOMEN AND PELVIS    CLINICAL HISTORY:  GI bleeding;    TECHNIQUE:  Using 75 cc of  Omnipaque 350 IV, and multi-detector helical CT technique, axial CT angiogram images of the abdomen were obtained from the lung bases through the pelvis. Precontrast and portal venous phase images of the abdomen and pelvis also done. 2D post-processing coronal and sagittal reconstructions of the abdominal aorta and visceral arteries performed.    COMPARISON:  10/24/2024    FINDINGS:  The lung bases are well aerated.  No consolidation, suspicious nodules, with small bilateral pleural effusion.  The visualized portions of the heart appear normal.    The esophagus, stomach, spleen, pancreas, and adrenal glands are unremarkable.    The liver is normal in size and attenuation without focal abnormality.  The portal veins appear patent.  The gallbladder shows no evidence of stones or cholecystitis.  No intra-or extrahepatic biliary ductal dilatation.    The kidneys demonstrate normal enhancement.  No renal mass, nephrolithiasis or hydronephrosis.  The ureters are normal in course and caliber. The urinary bladder appears unremarkable    No evidence of gastrointestinal hemorrhage or bowel obstruction.  Inflammation in the right colon through the hepatic flexure and the proximal portion of the transverse colon with more normal splenic flexure descending colon and rectosigmoid colon with a few diverticula in the sigmoid but without diverticulitis.  Definite mass is not identified as on the previous exam.  There is no ascites, free fluid, or intraperitoneal free  air. No significant peritoneal or retroperitoneal adenopathy.    The abdominal aorta is normal in course and caliber without significant atherosclerotic calcifications.    The osseous structures and extraperitoneal soft tissues are unremarkable.  Impression: Inflammatory changes of the ascending and splenic flexure of the colon with the previous suggested mass not confidently identified.    No evidence of active GI bleed.    No evidence of bowel obstruction.    Mild pleural thickening and/or fluid.    This report was flagged in Epic as abnormal.    Electronically signed by: Jax Carmichael  Date:    11/07/2024  Time:    23:35  X-Ray Chest 1 View  Narrative: EXAMINATION:  XR CHEST 1 VIEW    CLINICAL HISTORY:  Shortness of breath    TECHNIQUE:  Single frontal view of the chest was performed.    COMPARISON:  10/19/2024.    FINDINGS:  The lungs are hypoexpanded.  There are perihilar interstitial opacities, may be accentuated by hypoaeration changes.  The pleural spaces are clear.  The cardiac silhouette is enlarged.  Osseous structures demonstrate degenerative changes.  Impression: Mild perihilar interstitial prominence, may be accentuated by hypoaeration.  Mild edema is not excluded.    Electronically signed by: Nick Clifton  Date:    11/07/2024  Time:    23:25      Labs and Imaging listed above were reviewed.

## 2024-11-13 NOTE — ASSESSMENT & PLAN NOTE
Patient's hemorrhage is due to gastrointestinal bleed, patient does not have a propensity for bleeding..   Patients most recent Hgb, Hct, platelets, and INR are listed below.  Recent Labs     11/10/24  0220 11/11/24  0226 11/12/24  0304   HGB 8.5* 8.4* 9.0*   HCT 26.3* 26.6* 28.2*    355 405       - trend hemoglobin/hematocrit   - monitor and correct any coagulation defects  - transfuse if Hgb is <7g/dl (<8g/dl in cases of active ACS) or if patient has rapid bleeding leading to hemodynamic instability  - Consult GI  -PPI BID  -patient received 3 U PRBC  -patient was evaluated by GI, EGD/colonoscopy 11/11  -endoscopy findings concerning for an unclear underlying process. Vasculitis-associated screening labs ordered. Plan to consult based on labs and biopsy results.

## 2024-11-13 NOTE — PROGRESS NOTES
[facility] - Telemetry  Gastroenterology  Progress Note    Patient Name: Mary Ellen Saini  MRN: [id number]  Admission Date: 11/7/2024  Hospital Length of Stay: 5 days  Code Status: Full Code   Attending Provider: Rhea Summers MD  Consulting Provider: Reese Chen MD  Primary Care Physician: No, Primary Doctor  Principal Problem: GIB (gastrointestinal bleeding)        Subjective:     Interval History:   Still some blood in stool but seems to be clearing  No vomiting  No new symptoms    Discussed with pathologist who confirms diagnosis of amyloidosis    Review of Systems   Constitutional:  Negative for chills and fever.   Respiratory:  Negative for choking and chest tightness.    Gastrointestinal:  Positive for blood in stool. Negative for nausea and vomiting.   Neurological:  Negative for dizziness and headaches.   Psychiatric/Behavioral:  Negative for agitation and behavioral problems.      Objective:     Vital Signs (Most Recent):  Temp: 97 °F (36.1 °C) (11/13/24 1122)  Pulse: 96 (11/13/24 1217)  Resp: 18 (11/13/24 1122)  BP: (!) 142/92 (11/13/24 1122)  SpO2: 98 % (11/13/24 1122) Vital Signs (24h Range):  Temp:  [97 °F (36.1 °C)-99 °F (37.2 °C)] 97 °F (36.1 °C)  Pulse:  [] 96  Resp:  [18-20] 18  SpO2:  [95 %-100 %] 98 %  BP: (122-152)/(75-97) 142/92     Weight: 83.9 kg (184 lb 15.5 oz) (11/12/24 2312)  Body mass index is 26.54 kg/m².      Intake/Output Summary (Last 24 hours) at 11/13/2024 1553  Last data filed at 11/13/2024 0432  Gross per 24 hour   Intake 300 ml   Output --   Net 300 ml       Lines/Drains/Airways       Peripheral Intravenous Line  Duration                  Peripheral IV - Single Lumen 11/07/24 18 G Left Forearm 6 days                     Physical Exam  Vitals reviewed.   Constitutional:       General: She is not in acute distress.  HENT:      Head: Normocephalic and atraumatic.   Eyes:      General: No scleral icterus.     Conjunctiva/sclera: Conjunctivae normal.   Abdominal:      General:  There is no distension.      Palpations: Abdomen is soft.      Tenderness: There is no abdominal tenderness.   Skin:     General: Skin is warm.      Coloration: Skin is not pale.      Findings: No rash.   Neurological:      Mental Status: She is oriented to person, place, and time.      Gait: Gait normal.   Psychiatric:         Mood and Affect: Mood normal.         Behavior: Behavior normal.          Significant Labs:  CBC:   Recent Labs   Lab 11/12/24  0304 11/13/24 0226   WBC 11.52 10.64   HGB 9.0* 8.7*   HCT 28.2* 26.8*    381     BMP:   Recent Labs   Lab 11/13/24 0226      *   K 3.8   CL 99   CO2 24   BUN 19   CREATININE 1.6*   CALCIUM 8.0*   MG 2.1     CMP:   Recent Labs   Lab 11/13/24 0226      CALCIUM 8.0*   ALBUMIN 2.1*   PROT 6.3   *   K 3.8   CO2 24   CL 99   BUN 19   CREATININE 1.6*   ALKPHOS 91   ALT 24   AST 14   BILITOT 0.2         Significant Imaging:  Imaging results within the past 24 hours have been reviewed.  Assessment/Plan:     GI  Colitis  Initially had EGD colonoscopy 2 weeks ago, presented with gi hemorrhage, these procedures showed severe duodenitis and large hematomas in the colon    Now on re-eval egd/ colon, still significant duodenitis wihtout bleeding but with ulceration, and with large evolving ulcerations in the colon    [facility] confirms a diagnosis of amyloidosis (final path to be rendered likely tomorrow)    Recs  Recommend hematology eval for amyloidosis  Protonix bid  Carafate TID  Supportive care          Thank you for your consult. I will sign off. Please contact us if you have any additional questions.    Reese Chen MD  Gastroenterology  [facility]

## 2024-11-13 NOTE — ASSESSMENT & PLAN NOTE
- admitted for gastrointestinal bleeding / symptomatic anemia  - upper GI endoscopy (11/11/24) revealed gastritis, mucosal changes to duodenum, and duodenal ulcer  - preliminary result from biopsy (per hospitalist) is possible amyloidosis  - follow up official pathology report  - will check labs to evaluate for monoclonal paraprotein  - if biopsy reveals amyloidosis, I will arrange for her to see an amyloidosis specialist at [facility] for further evaluation. This can happen in the outpatient setting.

## 2024-11-13 NOTE — PROGRESS NOTES
Individual Follow-Up Form     6/17/2024     Quit Date: To be determined     Clinical Status of Patient: Inpatient     Length of Service: 15 minutes     Comments: Smoking cessation education note: Education/follow-up visit; patient is a 0.5 ppd smoker x 37 years. She reports the 21 mg nicotine patch is working well for her while in the hospital, stating as long as she is wearing the patch she does not think about smoking. She has concerns about the cost of the nicotine patches when she gets out of the hospital. Ambulatory smoking cessation appointment scheduled on 11/25/2024 at 2:00 PM at [facility].      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

## 2024-11-13 NOTE — SUBJECTIVE & OBJECTIVE
- she endorses fatigue, weakness, dyspnea upon exertion. She denies chest pain, headache, diarrhea.        Oncology Treatment Plan:   [No matching plan found]    Medications:  Continuous Infusions:  Scheduled Meds:   cefTRIAXone (Rocephin) IV (PEDS and ADULTS)  1 g Intravenous Q24H    [START ON 11/14/2024] furosemide  40 mg Oral Daily    metoprolol succinate  25 mg Oral Daily    nicotine  1 patch Transdermal Daily    pantoprazole  40 mg Intravenous BID     PRN Meds:  Current Facility-Administered Medications:     0.9%  NaCl infusion (for blood administration), , Intravenous, Q24H PRN    acetaminophen, 650 mg, Oral, Q6H PRN    HYDROmorphone, 2 mg, Oral, Q8H PRN    influenza, 0.5 mL, Intramuscular, Prior to discharge     Review of patient's allergies indicates:  No Known Allergies     Past Medical History:   Diagnosis Date    Asthma     Bipolar disorder     Hypertension      Past Surgical History:   Procedure Laterality Date    CHOLECYSTECTOMY      COLONOSCOPY N/A 10/22/2024    Procedure: COLONOSCOPY;  Surgeon: Dayday Freed MD;  Location: [facility];  Service: Endoscopy;  Laterality: N/A;    COLONOSCOPY N/A 11/11/2024    Procedure: COLONOSCOPY;  Surgeon: Reese Chen MD;  Location: [facility];  Service: Endoscopy;  Laterality: N/A;    ESOPHAGOGASTRODUODENOSCOPY N/A 10/22/2024    Procedure: EGD (ESOPHAGOGASTRODUODENOSCOPY);  Surgeon: Dayday Freed MD;  Location: [facility];  Service: Endoscopy;  Laterality: N/A;    ESOPHAGOGASTRODUODENOSCOPY N/A 11/11/2024    Procedure: EGD (ESOPHAGOGASTRODUODENOSCOPY);  Surgeon: Reese Chen MD;  Location: [facility];  Service: Endoscopy;  Laterality: N/A;    SHOULDER SURGERY      TUBAL LIGATION       Family History    None       Tobacco Use    Smoking status: Every Day     Current packs/day: 0.50     Average packs/day: 2.0 packs/day for 57.9 years (114.6 ttl pk-yrs)     Types: Cigarettes     Start date: 1967    Smokeless tobacco: Never    Tobacco  comments:     Pt is a 2 pk/day cigarette smoker x 57 yrs. She states that she cut down to about 10 cig/day, and is trying to quit. Will obtain order for nicotine patch. Ambulatory referral to Smoking Cessation clinic following hospital discharge.    Substance and Sexual Activity    Alcohol use: Yes     Comment: socailly    Drug use: Yes     Types: Cocaine, Methamphetamines     Comment: denies cocaine or meth. Reports maijurana use    Sexual activity: Yes       Review of Systems   Constitutional:  Positive for fatigue.   HENT:  Negative for sore throat.    Eyes:  Negative for visual disturbance.   Respiratory:  Positive for shortness of breath. Negative for cough.    Cardiovascular:  Negative for chest pain.   Gastrointestinal:  Negative for abdominal pain, constipation, diarrhea, nausea and vomiting.   Genitourinary:  Negative for dysuria.   Musculoskeletal:  Negative for back pain.   Skin:  Negative for rash.   Neurological:  Positive for weakness. Negative for headaches.   Hematological:  Negative for adenopathy.   Psychiatric/Behavioral:  The patient is not nervous/anxious.      Objective:     Vital Signs (Most Recent):  Temp: 97 °F (36.1 °C) (11/13/24 1122)  Pulse: 96 (11/13/24 1217)  Resp: 18 (11/13/24 1122)  BP: (!) 142/92 (11/13/24 1122)  SpO2: 98 % (11/13/24 1122) Vital Signs (24h Range):  Temp:  [97 °F (36.1 °C)-99 °F (37.2 °C)] 97 °F (36.1 °C)  Pulse:  [] 96  Resp:  [18-20] 18  SpO2:  [95 %-100 %] 98 %  BP: (122-152)/(75-97) 142/92     Weight: 83.9 kg (184 lb 15.5 oz)  Body mass index is 26.54 kg/m².  Body surface area is 2.04 meters squared.      Intake/Output Summary (Last 24 hours) at 11/13/2024 1252  Last data filed at 11/13/2024 0432  Gross per 24 hour   Intake 300 ml   Output --   Net 300 ml        Physical Exam  Vitals and nursing note reviewed.   Constitutional:       Appearance: She is well-developed.   HENT:      Head: Normocephalic and atraumatic.   Eyes:      Pupils: Pupils are equal,  round, and reactive to light.   Cardiovascular:      Rate and Rhythm: Normal rate and regular rhythm.   Pulmonary:      Effort: Pulmonary effort is normal.      Breath sounds: Normal breath sounds.   Abdominal:      General: Bowel sounds are normal.      Palpations: Abdomen is soft.   Musculoskeletal:         General: Normal range of motion.      Cervical back: Normal range of motion and neck supple.   Skin:     General: Skin is warm and dry.   Neurological:      Mental Status: She is alert and oriented to person, place, and time.   Psychiatric:         Behavior: Behavior normal.         Thought Content: Thought content normal.         Judgment: Judgment normal.          Significant Labs:   CBC:   Recent Labs   Lab 11/12/24 0304 11/13/24 0226   WBC 11.52 10.64   HGB 9.0* 8.7*   HCT 28.2* 26.8*    381    and CMP:   Recent Labs   Lab 11/12/24 0304 11/13/24 0226   * 132*   K 3.0* 3.8    99   CO2 23 24    103   BUN 18 19   CREATININE 1.4 1.6*   CALCIUM 7.8* 8.0*   PROT  --  6.3   ALBUMIN  --  2.1*   BILITOT  --  0.2   ALKPHOS  --  91   AST  --  14   ALT  --  24   ANIONGAP 11 9       Diagnostic Results:  Upper GI endoscopy (11/11/24):    - Normal esophagus.                          - Small hiatal hernia.                          - Gastritis. Biopsied.                          - Non-bleeding duodenal ulcer with a clean ulcer                          base (Tacho Class III).                          - Mucosal changes in the duodenum. Biopsied.                          Today EGD for recurrent anemia                          there is still severe ulceration right past                          duodenal sweep on posterior portion, no bleeding                          on entry but the tissue is very friable with                          contact oozing.                          the whole 2nd portion is severe congestion,                          irregular mucosa with inflammation type changes of                           unclear etiology, biopsied again.                          yellow bile on entry to duodenum.                          Proceed with colonoscopy.                          Protonix BID and carafate TID.

## 2024-11-13 NOTE — PROGRESS NOTES
[facility] Medicine  Telemedicine Progress Note    Patient Name: Mary Ellen Saini  MRN: [id number]  Patient Class: IP- Inpatient   Admission Date: 11/7/2024  Length of Stay: 4 days  Attending Physician: Rhea Summers MD  Primary Care Provider: Marlen, Primary Doctor    Subjective:     Principal Problem:GIB (gastrointestinal bleeding)    HPI:  [age] year old female with a history of hypertension, bipolar, hep C, HFrEF presents to ED with complaints of chest pain, shortness a breath and blood in her stool.  Patient was recently admitted for GI bleed which she required blood transfusion.  Patient reports bleeding in stool has been having for the last several days however worsened today along with worsening abdominal pain. In ED labs remarkable for H&H 5.6 and 17.2.  Patient discharged 12 days ago with H&H of 7.3 and 22.8.  Blood pressure stable on admission however she is tachycardic 120.  Patient also with a chest pain.  EKG without ST elevation BNP 3425, troponin 3.379.  CTA of the abdomen done denies show any evidence of active GI bleed.  Patient typed and crossmatch for 3 units.  We will start PPI IV BID, transfuse PRBC, consult GI and Cards.    Overview/Hospital Course:  No notes on file    Interval History: Virtual follow up visit for suspected Shortness of breath [R06.02]  Symptomatic anemia [D64.9] present on admission.   This service was provided by telemedicine.    The [facility]   Admitted 11/7/2024  9:15 PM    CC: GI bleed    The patient is able to provide adequate history. History was obtained from patient and past medical records.   No significant events overnight reported.  Patient complains of nothing new. No significant bleeding clinically.       Data  Details     [x]   Lab results reviewed 11/12/2024  Hypomagnesemia. Hyponatremia. Hypokalemia. sCr stable. H&H stable.    []   Micro reports reviewed 11/12/2024     [x]   Pathology reports reviewed 11/12/2024   Colon bx pending    []   Imaging reports reviewed 11/12/2024     []   Cardiology Procedure reports reviewed 11/12/2024      []   Non-HM records/CareEverywhere notes reviewed 11/12/2024      [x]  Tests/studies orders placed or verified 11/12/2024   ALEXUS, ANCA, CRP, Hep B, C3, C4    []  Independently viewed/assessed 11/12/2024      [x]  11/12/2024 Discussion of:  Endoscopy findings and plan with GI     Review of Systems   Constitutional:  Negative for fever.   Respiratory:  Negative for shortness of breath.      Objective:     Vital Signs (Most Recent):  Temp: 97.1 °F (36.2 °C) (11/12/24 0719)  Pulse: 100 (11/12/24 0719)  Resp: 20 (11/12/24 0719)  BP: (!) 139/91 (11/12/24 0719)  SpO2: 95 % (11/12/24 0719) Vital Signs (24h Range):  Temp:  [97.1 °F (36.2 °C)-98.4 °F (36.9 °C)] 97.1 °F (36.2 °C)  Pulse:  [] 100  Resp:  [14-20] 20  SpO2:  [92 %-100 %] 95 %  BP: (114-150)/() 139/91     Weight: 78.5 kg (173 lb 1 oz)  Body mass index is 24.83 kg/m².    Intake/Output Summary (Last 24 hours) at 11/12/2024 1127  Last data filed at 11/12/2024 0800  Gross per 24 hour   Intake 340 ml   Output --   Net 340 ml         Physical Exam  Constitutional:       General: She is awake.      Appearance: She is ill-appearing.   Cardiovascular:      Comments: Monitor and/or Vital signs reviewed at time of visit  Pulmonary:      Effort: Pulmonary effort is normal. No accessory muscle usage or respiratory distress.   Neurological:      Mental Status: She is alert. She is not disoriented.   Psychiatric:         Attention and Perception: Attention normal.         Behavior: Behavior is cooperative.         Significant Labs:   Recent Labs   Lab 10/20/24  0722   HGBA1C 5.4     Recent Labs   Lab 11/10/24  0220 11/11/24  0226 11/12/24  0304   WBC 10.40 9.32 11.52   HGB 8.5* 8.4* 9.0*   HCT 26.3* 26.6* 28.2*    355 405     Recent Labs   Lab 11/10/24  0220 11/11/24  0226 11/12/24  0304   GRAN 70.6  7.3 66.1  6.2 68.3  7.9*   LYMPH  17.1*  1.8 19.2  1.8 17.8*  2.1   MONO 6.7  0.7 8.4  0.8 7.6  0.9   EOS 0.4 0.5 0.6*     Recent Labs   Lab 11/07/24 2153 11/08/24  0401 11/10/24  0220 11/11/24  0226 11/12/24  0304   *   < > 131* 132* 134*   K 2.8*   < > 3.0* 3.6 3.0*      < > 101 100 100   CO2 20*   < > 20* 22* 23   BUN 20   < > 26* 24* 18   CREATININE 1.2   < > 1.4 1.2 1.4   *   < > 124* 88 101   CALCIUM 7.5*   < > 7.7* 7.8* 7.8*   ALBUMIN 2.1*  --   --   --   --    MG 1.6  --  1.5*  --   --     < > = values in this interval not displayed.     Recent Labs   Lab 11/07/24 2153   ALKPHOS 78   ALT 13   AST 17   PROT 6.3   BILITOT 0.3   INR 1.1   LIPASE 20     Recent Labs   Lab 11/07/24 2153 11/08/24  0105 11/08/24  0249 11/08/24  1014   TROPONINI 3.379* 3.587* 3.449* 4.533*   BNP 3,425*  --   --   --      Recent Labs   Lab 08/12/24  0534 08/19/24  2034 08/19/24  2233 08/26/24  1545 11/07/24 2153   PROCAL  --  0.10  --   --   --    LACTATE  --   --   --   --  1.0   DDIMER  --   --  0.72*  --   --    FERRITIN 112  --   --   --   --    SEDRATE  --   --   --  26  --      SARS-CoV-2 RNA, Amplification, Qual (no units)   Date Value   09/05/2024 Negative   08/19/2024 Negative     POC Rapid COVID (no units)   Date Value   08/29/2024 Negative   08/12/2024 Negative     ECG Results              EKG 12-lead (Final result)        Collection Time Result Time QRS Duration OHS QTC Calculation    11/07/24 22:25:43 11/10/24 11:34:04 88 478                     Final result by Interface, Lab In [facility] (11/10/24 11:34:08)                   Narrative:    Test Reason : R06.02,    Vent. Rate : 123 BPM     Atrial Rate : 123 BPM     P-R Int : 132 ms          QRS Dur : 088 ms      QT Int : 334 ms       P-R-T Axes : 033 004 055 degrees     QTc Int : 478 ms    Sinus tachycardia  diffuse ST depression ,consider ischemia   Abnormal ECG  When compared with ECG of 19-OCT-2024 09:34,  Vent. rate has increased BY  54 BPM  Confirmed by Laron MATTSON,  Vinod ([id number]) on 11/10/2024 11:34:03 AM    Referred By: [email or web address]   SELF           Confirmed By:Vinod Larry MD                                Results for orders placed during the hospital encounter of 08/12/24    Echo    Interpretation Summary    Left Ventricle: The left ventricle is moderately dilated. There is eccentric hypertrophy. Moderate global hypokinesis present. There is moderately reduced systolic function with a visually estimated ejection fraction of 30 - 35%. Biplane (2D) method of discs ejection fraction is 34%. There is diastolic dysfunction but grade cannot be determined.    Right Ventricle: Normal right ventricular cavity size. Wall thickness is normal. Systolic function is normal.    Left Atrium: Left atrium is severely dilated.    Right Atrium: Right atrium is moderately dilated.    Aortic Valve: There is mild stenosis. Aortic valve area by VTI is 1.95 cm². Aortic valve peak velocity is 1.40 m/s. Mean gradient is 4 mmHg. The dimensionless index is 0.62.    Mitral Valve: There is mild regurgitation.    Tricuspid Valve: There is mild regurgitation.    Pulmonic Valve: There is mild regurgitation.    Pulmonary Artery: The estimated pulmonary artery systolic pressure is 36 mmHg.    IVC/SVC: Normal venous pressure at 3 mmHg.      CTA Acute GI [place], Abdomen and Pelvis  Narrative: EXAMINATION:  CTA ACUTE GI BLEED, ABDOMEN AND PELVIS    CLINICAL HISTORY:  GI bleeding;    TECHNIQUE:  Using 75 cc of  Omnipaque 350 IV, and multi-detector helical CT technique, axial CT angiogram images of the abdomen were obtained from the lung bases through the pelvis. Precontrast and portal venous phase images of the abdomen and pelvis also done. 2D post-processing coronal and sagittal reconstructions of the abdominal aorta and visceral arteries performed.    COMPARISON:  10/24/2024    FINDINGS:  The lung bases are well aerated.  No consolidation, suspicious nodules, with small bilateral pleural effusion.  The  visualized portions of the heart appear normal.    The esophagus, stomach, spleen, pancreas, and adrenal glands are unremarkable.    The liver is normal in size and attenuation without focal abnormality.  The portal veins appear patent.  The gallbladder shows no evidence of stones or cholecystitis.  No intra-or extrahepatic biliary ductal dilatation.    The kidneys demonstrate normal enhancement.  No renal mass, nephrolithiasis or hydronephrosis.  The ureters are normal in course and caliber. The urinary bladder appears unremarkable    No evidence of gastrointestinal hemorrhage or bowel obstruction.  Inflammation in the right colon through the hepatic flexure and the proximal portion of the transverse colon with more normal splenic flexure descending colon and rectosigmoid colon with a few diverticula in the sigmoid but without diverticulitis.  Definite mass is not identified as on the previous exam.  There is no ascites, free fluid, or intraperitoneal free air. No significant peritoneal or retroperitoneal adenopathy.    The abdominal aorta is normal in course and caliber without significant atherosclerotic calcifications.    The osseous structures and extraperitoneal soft tissues are unremarkable.  Impression: Inflammatory changes of the ascending and splenic flexure of the colon with the previous suggested mass not confidently identified.    No evidence of active GI bleed.    No evidence of bowel obstruction.    Mild pleural thickening and/or fluid.    This report was flagged in Epic as abnormal.    Electronically signed by: Jax Carmichael  Date:    11/07/2024  Time:    23:35  X-Ray Chest 1 View  Narrative: EXAMINATION:  XR CHEST 1 VIEW    CLINICAL HISTORY:  Shortness of breath    TECHNIQUE:  Single frontal view of the chest was performed.    COMPARISON:  10/19/2024.    FINDINGS:  The lungs are hypoexpanded.  There are perihilar interstitial opacities, may be accentuated by hypoaeration changes.  The pleural  spaces are clear.  The cardiac silhouette is enlarged.  Osseous structures demonstrate degenerative changes.  Impression: Mild perihilar interstitial prominence, may be accentuated by hypoaeration.  Mild edema is not excluded.    Electronically signed by: Nick Clifton  Date:    11/07/2024  Time:    23:25      Labs and Imaging listed above were reviewed.     Assessment/Plan:      * GIB (gastrointestinal bleeding)  Patient's hemorrhage is due to gastrointestinal bleed, patient does not have a propensity for bleeding..   Patients most recent Hgb, Hct, platelets, and INR are listed below.  Recent Labs     11/10/24  0220 11/11/24  0226 11/12/24  0304   HGB 8.5* 8.4* 9.0*   HCT 26.3* 26.6* 28.2*    355 405       - trend hemoglobin/hematocrit   - monitor and correct any coagulation defects  - transfuse if Hgb is <7g/dl (<8g/dl in cases of active ACS) or if patient has rapid bleeding leading to hemodynamic instability  - Consult GI  -PPI BID  -patient received 3 U PRBC  -patient was evaluated by GI, EGD/colonoscopy 11/11  -endoscopy findings concerning for an unclear underlying process. Vasculitis-associated screening labs ordered. Plan to consult based on labs and biopsy results.    Colitis  See GIB (gastrointestinal bleeding)     ABLA (acute blood loss anemia)  Anemia is likely due to acute blood loss which was from GIB . Most recent hemoglobin and hematocrit are listed below.  Recent Labs     11/10/24  0220 11/11/24  0226 11/12/24  0304   HGB 8.5* 8.4* 9.0*   HCT 26.3* 26.6* 28.2*     CTA w/o active GIB  - Monitor serial CBC:   - Transfuse PRBC if patient becomes hemodynamically unstable, symptomatic or H/H drops below 7/21.  - Patient has received 3 units of PRBCs on 11/8/24  - Patient's anemia is currently stable    Leukocytosis  No obvious infection  Started Rocephin given pts hx Hep C and GIB  Improved, no evidence of SBP    NSTEMI (non-ST elevated myocardial infarction)  EKG w/o ST elevation  Troponin  elevated 3.379-->3.587-->3.449  Cards eval is appreciated, medical management is recommended     HFrEF (heart failure with reduced ejection fraction)  Echo 08/2024  EF 34%  Continue BB  Given one dose IV Lasix 10/08  Cardiac/Autonomic (From admission, onward)      Start     Stop Route Frequency Ordered    11/08/24 0030  metoprolol succinate (TOPROL-XL) 24 hr tablet 25 mg         -- Oral Daily 11/08/24 0029        Continue Lasix      Hepatitis C     Latest Reference Range & Units 08/19/24 20:34   HCV Log <1.08 LogIU/mL 5.93 !   HCV Quantitative Result <12 IU/mL [id number] !   HCV, Qualitative Not Detected  Detected !   Hepatitis C Ab Non-reactive  Reactive !   HIV 1/2 Ag/Ab Non-reactive  Non-reactive     Needs follow up.    Smoking history  Nicotine patches  Smoking cessation counseling  U tox is positive for cocaine amphetamine and THC    Hypokalemia  Patient's most recent potassium results are listed below.   Recent Labs     11/10/24  0220 11/11/24  0226 11/12/24  0304   K 3.0* 3.6 3.0*       Plan  - Replete potassium per protocol  - Monitor potassium   - Patient's hypokalemia is worsening. Will adjust treatment as follows: replete K+ and Mg++      Social Drivers of Health with Concerns     Tobacco Use: High Risk (11/8/2024)    Patient History     Smoking Tobacco Use: Every Day     Smokeless Tobacco Use: Never     Passive Exposure: Not on file   Financial Resource Strain: Medium Risk (11/10/2024)    Overall Financial Resource Strain (CARDIA)     Difficulty of Paying Living Expenses: Somewhat hard   Food Insecurity: Food Insecurity Present (11/10/2024)    Hunger Vital Sign     Worried About Running Out of Food in the Last Year: Sometimes true     Ran Out of Food in the Last Year: Never true   Transportation Needs: Unmet Transportation Needs (11/10/2024)    TRANSPORTATION NEEDS     Transportation : Yes, it has kept me from medical appointments or from getting my medications.   Physical Activity: Inactive (8/21/2024)     Exercise Vital Sign     Days of Exercise per Week: 0 days     Minutes of Exercise per Session: 20 min   Stress: Stress Concern Present (11/10/2024)    [employer] - Occupational Stress Questionnaire     Feeling of Stress : Very much   Housing Stability: High Risk (11/10/2024)    Housing Stability Vital Sign     Unable to Pay for Housing in the Last Year: Yes     Homeless in the Last Year: Yes   Depression: Not on file   Health Literacy: Adequate Health Literacy (11/10/2024)     Health Literacy     Frequency of need for help with medical instructions: Never   Recent Concern: Health Literacy - Inadequate Health Literacy (8/12/2024)     Health Literacy     Frequency of need for help with medical instructions: Sometimes   Social Isolation: Somewhat Isolated (10/22/2024)    Social Isolation     Social Isolation: 3       Active Hospital Problems    Diagnosis  POA    *GIB (gastrointestinal bleeding) [K92.2]  Yes    Colitis [K52.9]  Yes    ABLA (acute blood loss anemia) [D62]  Yes    Leukocytosis [D72.829]  Yes    NSTEMI (non-ST elevated myocardial infarction) [I21.4]  Yes    HFrEF (heart failure with reduced ejection fraction) [I50.20]  Yes    Hepatitis C [B19.20]  Yes    Smoking history [Z87.891]  Not Applicable    Hypokalemia [E87.6]  No      Resolved Hospital Problems   No resolved problems to display.       Inpatient Medications Prescribed for Management of Current Problems:     Scheduled Meds:   cefTRIAXone (Rocephin) IV (PEDS and ADULTS)  1 g Intravenous Q24H    furosemide  40 mg Oral Daily    magnesium sulfate IVPB  2 g Intravenous Once    metoprolol succinate  25 mg Oral Daily    nicotine  1 patch Transdermal Daily    pantoprazole  40 mg Intravenous BID    potassium chloride  40 mEq Oral Once     Continuous Infusions:  PRN Meds:.  Current Facility-Administered Medications:     0.9%  NaCl infusion (for blood administration), , Intravenous, Q24H PRN    acetaminophen, 650 mg,  Oral, Q6H PRN    HYDROmorphone, 2 mg, Oral, Q8H PRN    influenza, 0.5 mL, Intramuscular, Prior to discharge    VTE Risk Mitigation (From admission, onward)           Ordered     IP VTE HIGH RISK PATIENT  Once         11/08/24 0137     Place sequential compression device  Until discontinued         11/08/24 0137                  I have completed this tele-visit without the assistance of a telepresenter.    The attending portion of this evaluation, treatment, and documentation was performed per Rhea Summers MD via Telemedicine AudioVisual using the secure [employer] software platform with 2 way audio/video. The provider was located off-site and the patient is located in the hospital. The aforementioned video software was utilized to document the relevant history and physical exam    I spent a total of 52 minutes on the day of the visit.This includes face to face time and non-face to face time preparing to see the patient (eg, review of tests), obtaining and/or reviewing separately obtained history, documenting clinical information in the electronic or other health record, independently interpreting results and communicating results to the patient/family/caregiver, or care coordinator.      Rhea Summers MD  Department of Hospital Medicine   [facility]

## 2024-11-13 NOTE — ASSESSMENT & PLAN NOTE
Anemia is likely due to acute blood loss which was from GIB . Most recent hemoglobin and hematocrit are listed below.  Recent Labs     11/10/24  0220 11/11/24  0226 11/12/24  0304   HGB 8.5* 8.4* 9.0*   HCT 26.3* 26.6* 28.2*     CTA w/o active GIB  - Monitor serial CBC:   - Transfuse PRBC if patient becomes hemodynamically unstable, symptomatic or H/H drops below 7/21.  - Patient has received 3 units of PRBCs on 11/8/24  - Patient's anemia is currently stable

## 2024-11-13 NOTE — ASSESSMENT & PLAN NOTE
Latest Reference Range & Units 08/19/24 20:34   HCV Log <1.08 LogIU/mL 5.93 !   HCV Quantitative Result <12 IU/mL [id number] !   HCV, Qualitative Not Detected  Detected !   Hepatitis C Ab Non-reactive  Reactive !   HIV 1/2 Ag/Ab Non-reactive  Non-reactive     Needs follow up.

## 2024-11-13 NOTE — PHYSICIAN QUERY
Please specify the diagnosis associated with the clinical findings.     Acute renal insufficiency

## 2024-11-13 NOTE — PLAN OF CARE
Problem: Adult Inpatient Plan of Care  Goal: Plan of Care Review  Outcome: Progressing     Problem: Gastrointestinal Bleeding  Goal: Optimal Coping with Acute Illness  Outcome: Progressing     Problem: Fall Injury Risk  Goal: Absence of Fall and Fall-Related Injury  Outcome: Progressing

## 2024-11-13 NOTE — ASSESSMENT & PLAN NOTE
Patient's most recent potassium results are listed below.   Recent Labs     11/10/24  0220 11/11/24  0226 11/12/24  0304   K 3.0* 3.6 3.0*       Plan  - Replete potassium per protocol  - Monitor potassium   - Patient's hypokalemia is worsening. Will adjust treatment as follows: replete K+ and Mg++

## 2024-11-13 NOTE — ASSESSMENT & PLAN NOTE
Initially had EGD colonoscopy 2 weeks ago, presented with gi hemorrhage, these procedures showed severe duodenitis and large hematomas in the colon    Now on re-eval egd/ colon, still significant duodenitis wihtout bleeding but with ulceration, and with large evolving ulcerations in the colon    [facility] confirms a diagnosis of amyloidosis (final path to be rendered likely tomorrow)    Recs  Recommend hematology eval for amyloidosis  Protonix bid  Carafate TID  Supportive care

## 2024-11-13 NOTE — ASSESSMENT & PLAN NOTE
Echo 08/2024  EF 34%  Continue BB  Given one dose IV Lasix 10/08  Cardiac/Autonomic (From admission, onward)      Start     Stop Route Frequency Ordered    11/08/24 0030  metoprolol succinate (TOPROL-XL) 24 hr tablet 25 mg         -- Oral Daily 11/08/24 0029        Continue Lasix

## 2024-11-13 NOTE — PLAN OF CARE
Rounded at bedside, waiting on final biopsy report before dc. Plans are to discharge to home with family. CM will continue to follow pt and provide any discharge needs.   Future Appointments   Date Time Provider Department Center   11/25/2024  2:00 PM Elmer Gavin [facility]      11/13/24 1341   Rounds   Attendance Nurse    Discharge Plan A Home;Home with family   Why the patient remains in the hospital Requires continued medical care   Transition of Care Barriers None

## 2024-11-13 NOTE — ASSESSMENT & PLAN NOTE
Patient's most recent potassium results are listed below.   Recent Labs     11/11/24 0226 11/12/24 0304 11/13/24 0226   K 3.6 3.0* 3.8       Plan  - Replete potassium per protocol  - Monitor potassium   - Patient's hypokalemia is improving

## 2024-11-13 NOTE — ASSESSMENT & PLAN NOTE
Latest Reference Range & Units 08/19/24 20:34   HCV Log <1.08 LogIU/mL 5.93 !   HCV Quantitative Result <12 IU/mL [id number] !   HCV, Qualitative Not Detected  Detected !   Hepatitis C Ab Non-reactive  Reactive !   HIV 1/2 Ag/Ab Non-reactive  Non-reactive     Needs follow up.

## 2024-11-13 NOTE — SUBJECTIVE & OBJECTIVE
Subjective:     Interval History:   Still some blood in stool but seems to be clearing  No vomiting  No new symptoms    Discussed with pathologist who confirms diagnosis of amyloidosis    Review of Systems   Constitutional:  Negative for chills and fever.   Respiratory:  Negative for choking and chest tightness.    Gastrointestinal:  Positive for blood in stool. Negative for nausea and vomiting.   Neurological:  Negative for dizziness and headaches.   Psychiatric/Behavioral:  Negative for agitation and behavioral problems.      Objective:     Vital Signs (Most Recent):  Temp: 97 °F (36.1 °C) (11/13/24 1122)  Pulse: 96 (11/13/24 1217)  Resp: 18 (11/13/24 1122)  BP: (!) 142/92 (11/13/24 1122)  SpO2: 98 % (11/13/24 1122) Vital Signs (24h Range):  Temp:  [97 °F (36.1 °C)-99 °F (37.2 °C)] 97 °F (36.1 °C)  Pulse:  [] 96  Resp:  [18-20] 18  SpO2:  [95 %-100 %] 98 %  BP: (122-152)/(75-97) 142/92     Weight: 83.9 kg (184 lb 15.5 oz) (11/12/24 2312)  Body mass index is 26.54 kg/m².      Intake/Output Summary (Last 24 hours) at 11/13/2024 1553  Last data filed at 11/13/2024 0432  Gross per 24 hour   Intake 300 ml   Output --   Net 300 ml       Lines/Drains/Airways       Peripheral Intravenous Line  Duration                  Peripheral IV - Single Lumen 11/07/24 18 G Left Forearm 6 days                     Physical Exam  Vitals reviewed.   Constitutional:       General: She is not in acute distress.  HENT:      Head: Normocephalic and atraumatic.   Eyes:      General: No scleral icterus.     Conjunctiva/sclera: Conjunctivae normal.   Abdominal:      General: There is no distension.      Palpations: Abdomen is soft.      Tenderness: There is no abdominal tenderness.   Skin:     General: Skin is warm.      Coloration: Skin is not pale.      Findings: No rash.   Neurological:      Mental Status: She is oriented to person, place, and time.      Gait: Gait normal.   Psychiatric:         Mood and Affect: Mood normal.          Behavior: Behavior normal.          Significant Labs:  CBC:   Recent Labs   Lab 11/12/24  0304 11/13/24 0226   WBC 11.52 10.64   HGB 9.0* 8.7*   HCT 28.2* 26.8*    381     BMP:   Recent Labs   Lab 11/13/24 0226      *   K 3.8   CL 99   CO2 24   BUN 19   CREATININE 1.6*   CALCIUM 8.0*   MG 2.1     CMP:   Recent Labs   Lab 11/13/24 0226      CALCIUM 8.0*   ALBUMIN 2.1*   PROT 6.3   *   K 3.8   CO2 24   CL 99   BUN 19   CREATININE 1.6*   ALKPHOS 91   ALT 24   AST 14   BILITOT 0.2         Significant Imaging:  Imaging results within the past 24 hours have been reviewed.

## 2024-11-13 NOTE — ASSESSMENT & PLAN NOTE
Echo 08/2024  EF 34%  Continue BB  Given one dose IV Lasix 10/08  Cardiac/Autonomic (From admission, onward)      Start     Stop Route Frequency Ordered    11/08/24 0030  metoprolol succinate (TOPROL-XL) 24 hr tablet 25 mg         -- Oral Daily 11/08/24 0029        Continued oral Lasix  Renal insufficiency on 11/13 after recent NPO status; held Lasix on 11/13. Scheduled to resume on 11/14.

## 2024-11-13 NOTE — PROGRESS NOTES
[facility] Medicine  Telemedicine Progress Note    Patient Name: Mary Ellen Saini  MRN: [id number]  Patient Class: IP- Inpatient   Admission Date: 11/7/2024  Length of Stay: 5 days  Attending Physician: Rhea Summers MD  Primary Care Provider: Marlen, Primary Doctor      Subjective:     Principal Problem:GIB (gastrointestinal bleeding)    HPI:  [age] year old female with a history of hypertension, bipolar, hep C, HFrEF presents to ED with complaints of chest pain, shortness a breath and blood in her stool.  Patient was recently admitted for GI bleed which she required blood transfusion.  Patient reports bleeding in stool has been having for the last several days however worsened today along with worsening abdominal pain. In ED labs remarkable for H&H 5.6 and 17.2.  Patient discharged 12 days ago with H&H of 7.3 and 22.8.  Blood pressure stable on admission however she is tachycardic 120.  Patient also with a chest pain.  EKG without ST elevation BNP 3425, troponin 3.379.  CTA of the abdomen done denies show any evidence of active GI bleed.  Patient typed and crossmatch for 3 units.  We will start PPI IV BID, transfuse PRBC, consult GI and Cards.    Overview/Hospital Course:  No notes on file    Interval History: Virtual follow up visit for suspected Shortness of breath [R06.02]  Symptomatic anemia [D64.9] present on admission.   This service was provided by telemedicine.    The [facility]   Admitted 11/7/2024  9:15 PM    CC: GI bleed    The patient is able to provide adequate history. History was obtained from patient and past medical records.   No significant events overnight reported.  Patient complains of abdominal discomfort and malaise.       Data  Details     [x]   Lab results reviewed 11/13/2024  sCr increased. Hyponatremia. H&H stable.   CRP = 12.2.  Hep B, C3, C4 are neg / normal.    []   Micro reports reviewed 11/13/2024     [x]   Pathology reports reviewed  11/13/2024  Colon bx (preliminary consistent with amyloid)    []   Imaging reports reviewed 11/13/2024     []   Cardiology Procedure reports reviewed 11/13/2024      []   Non- records/CareEverywhere notes reviewed 11/13/2024      []  Tests/studies orders placed or verified 11/13/2024       []  Independently viewed/assessed 11/13/2024      [x]  11/13/2024 Discussion of:  Prelim path with GI     Review of Systems   Constitutional:  Positive for fatigue. Negative for fever.   Respiratory:  Negative for shortness of breath.    Gastrointestinal:  Positive for abdominal pain.     Objective:     Vital Signs (Most Recent):  Temp: 97.8 °F (36.6 °C) (11/13/24 0732)  Pulse: 101 (11/13/24 0732)  Resp: 18 (11/13/24 0732)  BP: 137/86 (11/13/24 0732)  SpO2: 96 % (11/13/24 0732) Vital Signs (24h Range):  Temp:  [97.1 °F (36.2 °C)-99 °F (37.2 °C)] 97.8 °F (36.6 °C)  Pulse:  [] 101  Resp:  [18-20] 18  SpO2:  [95 %-100 %] 96 %  BP: (122-152)/(75-97) 137/86     Weight: 83.9 kg (184 lb 15.5 oz)  Body mass index is 26.54 kg/m².    Intake/Output Summary (Last 24 hours) at 11/13/2024 1052  Last data filed at 11/13/2024 0432  Gross per 24 hour   Intake 300 ml   Output --   Net 300 ml         Physical Exam  Constitutional:       General: She is awake.      Appearance: She is ill-appearing.   Cardiovascular:      Comments: Monitor and/or Vital signs reviewed at time of visit  Pulmonary:      Effort: Pulmonary effort is normal. No accessory muscle usage or respiratory distress.   Neurological:      Mental Status: She is alert. She is not disoriented.   Psychiatric:         Attention and Perception: Attention normal.         Behavior: Behavior is cooperative.         Significant Labs:   Recent Labs   Lab 10/20/24  0722   HGBA1C 5.4     Recent Labs   Lab 11/11/24  0226 11/12/24  0304 11/13/24 0226   WBC 9.32 11.52 10.64   HGB 8.4* 9.0* 8.7*   HCT 26.6* 28.2* 26.8*    405 381     Recent Labs   Lab 11/11/24 0226 11/12/24  4592  11/13/24 0226   GRAN 66.1  6.2 68.3  7.9* 66.2  7.0   LYMPH 19.2  1.8 17.8*  2.1 18.8  2.0   MONO 8.4  0.8 7.6  0.9 7.8  0.8   EOS 0.5 0.6* 0.6*     Recent Labs   Lab 11/07/24 2153 11/08/24  0401 11/10/24  0220 11/11/24  0226 11/12/24  0304 11/13/24  0226   *   < > 131* 132* 134* 132*   K 2.8*   < > 3.0* 3.6 3.0* 3.8      < > 101 100 100 99   CO2 20*   < > 20* 22* 23 24   BUN 20   < > 26* 24* 18 19   CREATININE 1.2   < > 1.4 1.2 1.4 1.6*   *   < > 124* 88 101 103   CALCIUM 7.5*   < > 7.7* 7.8* 7.8* 8.0*   ALBUMIN 2.1*  --   --   --   --  2.1*   MG 1.6  --  1.5*  --   --  2.1   PHOS  --   --   --   --   --  3.1    < > = values in this interval not displayed.     Recent Labs   Lab 11/07/24 2153 11/12/24  1300 11/13/24 0226   ALKPHOS 78  --  91   ALT 13  --  24   AST 17  --  14   PROT 6.3  --  6.3   BILITOT 0.3  --  0.2   INR 1.1  --   --    LIPASE 20  --   --    CRP  --  12.2*  --      Recent Labs   Lab 11/07/24 2153 11/08/24  0105 11/08/24  0249 11/08/24  1014   TROPONINI 3.379* 3.587* 3.449* 4.533*   BNP 3,425*  --   --   --      Recent Labs   Lab 08/12/24  0534 08/19/24  2034 08/19/24 2233 08/26/24  1545 11/07/24 2153   PROCAL  --  0.10  --   --   --    LACTATE  --   --   --   --  1.0   DDIMER  --   --  0.72*  --   --    FERRITIN 112  --   --   --   --    SEDRATE  --   --   --  26  --      SARS-CoV-2 RNA, Amplification, Qual (no units)   Date Value   09/05/2024 Negative   08/19/2024 Negative     POC Rapid COVID (no units)   Date Value   08/29/2024 Negative   08/12/2024 Negative     ECG Results              EKG 12-lead (Final result)        Collection Time Result Time QRS Duration OHS QTC Calculation    11/07/24 22:25:43 11/10/24 11:34:04 88 478                     Final result by Interface, Lab In [facility] (11/10/24 11:34:08)                   Narrative:    Test Reason : R06.02,    Vent. Rate : 123 BPM     Atrial Rate : 123 BPM     P-R Int : 132 ms          QRS Dur : 088 ms      QT  Int : 334 ms       P-R-T Axes : 033 004 055 degrees     QTc Int : 478 ms    Sinus tachycardia  diffuse ST depression ,consider ischemia   Abnormal ECG  When compared with ECG of 19-OCT-2024 09:34,  Vent. rate has increased BY  54 BPM  Confirmed by Vinod Larry MD ([id number]) on 11/10/2024 11:34:03 AM    Referred By: [email or web address]   SELF           Confirmed By:Vinod Larry MD                                Results for orders placed during the hospital encounter of 08/12/24    Echo    Interpretation Summary    Left Ventricle: The left ventricle is moderately dilated. There is eccentric hypertrophy. Moderate global hypokinesis present. There is moderately reduced systolic function with a visually estimated ejection fraction of 30 - 35%. Biplane (2D) method of discs ejection fraction is 34%. There is diastolic dysfunction but grade cannot be determined.    Right Ventricle: Normal right ventricular cavity size. Wall thickness is normal. Systolic function is normal.    Left Atrium: Left atrium is severely dilated.    Right Atrium: Right atrium is moderately dilated.    Aortic Valve: There is mild stenosis. Aortic valve area by VTI is 1.95 cm². Aortic valve peak velocity is 1.40 m/s. Mean gradient is 4 mmHg. The dimensionless index is 0.62.    Mitral Valve: There is mild regurgitation.    Tricuspid Valve: There is mild regurgitation.    Pulmonic Valve: There is mild regurgitation.    Pulmonary Artery: The estimated pulmonary artery systolic pressure is 36 mmHg.    IVC/SVC: Normal venous pressure at 3 mmHg.      CTA Acute GI [place], Abdomen and Pelvis  Narrative: EXAMINATION:  CTA ACUTE GI BLEED, ABDOMEN AND PELVIS    CLINICAL HISTORY:  GI bleeding;    TECHNIQUE:  Using 75 cc of  Omnipaque 350 IV, and multi-detector helical CT technique, axial CT angiogram images of the abdomen were obtained from the lung bases through the pelvis. Precontrast and portal venous phase images of the abdomen and pelvis also done. 2D  post-processing coronal and sagittal reconstructions of the abdominal aorta and visceral arteries performed.    COMPARISON:  10/24/2024    FINDINGS:  The lung bases are well aerated.  No consolidation, suspicious nodules, with small bilateral pleural effusion.  The visualized portions of the heart appear normal.    The esophagus, stomach, spleen, pancreas, and adrenal glands are unremarkable.    The liver is normal in size and attenuation without focal abnormality.  The portal veins appear patent.  The gallbladder shows no evidence of stones or cholecystitis.  No intra-or extrahepatic biliary ductal dilatation.    The kidneys demonstrate normal enhancement.  No renal mass, nephrolithiasis or hydronephrosis.  The ureters are normal in course and caliber. The urinary bladder appears unremarkable    No evidence of gastrointestinal hemorrhage or bowel obstruction.  Inflammation in the right colon through the hepatic flexure and the proximal portion of the transverse colon with more normal splenic flexure descending colon and rectosigmoid colon with a few diverticula in the sigmoid but without diverticulitis.  Definite mass is not identified as on the previous exam.  There is no ascites, free fluid, or intraperitoneal free air. No significant peritoneal or retroperitoneal adenopathy.    The abdominal aorta is normal in course and caliber without significant atherosclerotic calcifications.    The osseous structures and extraperitoneal soft tissues are unremarkable.  Impression: Inflammatory changes of the ascending and splenic flexure of the colon with the previous suggested mass not confidently identified.    No evidence of active GI bleed.    No evidence of bowel obstruction.    Mild pleural thickening and/or fluid.    This report was flagged in Epic as abnormal.    Electronically signed by: Jax Carmichael  Date:    11/07/2024  Time:    23:35  X-Ray Chest 1 View  Narrative: EXAMINATION:  XR CHEST 1 VIEW    CLINICAL  HISTORY:  Shortness of breath    TECHNIQUE:  Single frontal view of the chest was performed.    COMPARISON:  10/19/2024.    FINDINGS:  The lungs are hypoexpanded.  There are perihilar interstitial opacities, may be accentuated by hypoaeration changes.  The pleural spaces are clear.  The cardiac silhouette is enlarged.  Osseous structures demonstrate degenerative changes.  Impression: Mild perihilar interstitial prominence, may be accentuated by hypoaeration.  Mild edema is not excluded.    Electronically signed by: Nick Clifton  Date:    11/07/2024  Time:    23:25      Labs and Imaging listed above were reviewed.     Assessment/Plan:      * GIB (gastrointestinal bleeding)  Patient's hemorrhage is due to gastrointestinal bleed, patient does not have a propensity for bleeding..   Patients most recent Hgb, Hct, platelets are listed below.  Recent Labs     11/11/24 0226 11/12/24 0304 11/13/24 0226   HGB 8.4* 9.0* 8.7*   HCT 26.6* 28.2* 26.8*    405 381       - trend hemoglobin/hematocrit   - monitor and correct any coagulation defects  - transfuse if Hgb is <7g/dl (<8g/dl in cases of active ACS) or if patient has rapid bleeding leading to hemodynamic instability  - Consult GI  -PPI BID  -patient received 3 U PRBC  -patient was evaluated by GI, EGD/colonoscopy 11/11  -endoscopy findings concerning for an unclear underlying process. Vasculitis-associated screening labs ordered. Plan to consult based on labs and biopsy results.  -prelim colon bx suggestive of amyloid - consulting Heme/Onc when path confirmed    Colitis  See GIB (gastrointestinal bleeding)     ABLA (acute blood loss anemia)  Anemia is likely due to acute blood loss which was from GIB . Most recent hemoglobin and hematocrit are listed below.  Recent Labs     11/11/24 0226 11/12/24 0304 11/13/24 0226   HGB 8.4* 9.0* 8.7*   HCT 26.6* 28.2* 26.8*     CTA w/o active GIB  - Monitor serial CBC:   - Transfuse PRBC if patient becomes hemodynamically  unstable, symptomatic or H/H drops below 7/21.  - Patient has received 3 units of PRBCs on 11/8/24  - Patient's anemia is currently stable    Leukocytosis  No obvious infection  Started Rocephin given pts hx Hep C and GIB  Improved, no evidence of SBP    NSTEMI (non-ST elevated myocardial infarction)  EKG w/o ST elevation  Troponin elevated 3.379-->3.587-->3.449  Cards eval is appreciated, medical management is recommended     HFrEF (heart failure with reduced ejection fraction)  Echo 08/2024  EF 34%  Continue BB  Given one dose IV Lasix 10/08  Cardiac/Autonomic (From admission, onward)      Start     Stop Route Frequency Ordered    11/08/24 0030  metoprolol succinate (TOPROL-XL) 24 hr tablet 25 mg         -- Oral Daily 11/08/24 0029        Continued oral Lasix  Renal insufficiency on 11/13 after recent NPO status; held Lasix on 11/13. Scheduled to resume on 11/14.    Hepatitis C     Latest Reference Range & Units 08/19/24 20:34   HCV Log <1.08 LogIU/mL 5.93 !   HCV Quantitative Result <12 IU/mL [id number] !   HCV, Qualitative Not Detected  Detected !   Hepatitis C Ab Non-reactive  Reactive !   HIV 1/2 Ag/Ab Non-reactive  Non-reactive     Needs follow up.    Smoking history  Nicotine patches  Smoking cessation counseling  U tox is positive for cocaine amphetamine and THC    Hypokalemia  Patient's most recent potassium results are listed below.   Recent Labs     11/11/24  0226 11/12/24  0304 11/13/24  0226   K 3.6 3.0* 3.8       Plan  - Replete potassium per protocol  - Monitor potassium   - Patient's hypokalemia is improving      Social Drivers of Health with Concerns     Tobacco Use: High Risk (11/8/2024)    Patient History     Smoking Tobacco Use: Every Day     Smokeless Tobacco Use: Never     Passive Exposure: Not on file   Financial Resource Strain: Medium Risk (11/10/2024)    Overall Financial Resource Strain (CARDIA)     Difficulty of Paying Living Expenses: Somewhat hard   Food Insecurity: Food Insecurity Present  (11/10/2024)    Hunger Vital Sign     Worried About Running Out of Food in the Last Year: Sometimes true     Ran Out of Food in the Last Year: Never true   Transportation Needs: Unmet Transportation Needs (11/10/2024)    TRANSPORTATION NEEDS     Transportation : Yes, it has kept me from medical appointments or from getting my medications.   Physical Activity: Inactive (8/21/2024)    Exercise Vital Sign     Days of Exercise per Week: 0 days     Minutes of Exercise per Session: 20 min   Stress: Stress Concern Present (11/10/2024)    [employer] [place] [employer] - Occupational Stress Questionnaire     Feeling of Stress : Very much   Housing Stability: High Risk (11/10/2024)    Housing Stability Vital Sign     Unable to Pay for Housing in the Last Year: Yes     Homeless in the Last Year: Yes   Depression: Not on file   Health Literacy: Adequate Health Literacy (11/10/2024)     Health Literacy     Frequency of need for help with medical instructions: Never   Recent Concern: Health Literacy - Inadequate Health Literacy (8/12/2024)     Health Literacy     Frequency of need for help with medical instructions: Sometimes   Social Isolation: Somewhat Isolated (10/22/2024)    Social Isolation     Social Isolation: 3       Active Hospital Problems    Diagnosis  POA    *GIB (gastrointestinal bleeding) [K92.2]  Yes    Colitis [K52.9]  Yes    ABLA (acute blood loss anemia) [D62]  Yes    Leukocytosis [D72.829]  Yes    NSTEMI (non-ST elevated myocardial infarction) [I21.4]  Yes    HFrEF (heart failure with reduced ejection fraction) [I50.20]  Yes    Hepatitis C [B19.20]  Yes    Smoking history [Z87.891]  Not Applicable    Hypokalemia [E87.6]  No      Resolved Hospital Problems   No resolved problems to display.       Inpatient Medications Prescribed for Management of Current Problems:     Scheduled Meds:   cefTRIAXone (Rocephin) IV (PEDS and ADULTS)  1 g Intravenous Q24H    [START ON 11/14/2024] furosemide  40  mg Oral Daily    metoprolol succinate  25 mg Oral Daily    nicotine  1 patch Transdermal Daily    pantoprazole  40 mg Intravenous BID     Continuous Infusions:  PRN Meds:.  Current Facility-Administered Medications:     0.9%  NaCl infusion (for blood administration), , Intravenous, Q24H PRN    acetaminophen, 650 mg, Oral, Q6H PRN    HYDROmorphone, 2 mg, Oral, Q8H PRN    influenza, 0.5 mL, Intramuscular, Prior to discharge    VTE Risk Mitigation (From admission, onward)           Ordered     IP VTE HIGH RISK PATIENT  Once         11/08/24 0137     Place sequential compression device  Until discontinued         11/08/24 0137                  I have completed this tele-visit without the assistance of a telepresenter.    The attending portion of this evaluation, treatment, and documentation was performed per Rhea Summers MD via Telemedicine AudioVisual using the secure [employer] software platform with 2 way audio/video. The provider was located off-site and the patient is located in the hospital. The aforementioned video software was utilized to document the relevant history and physical exam    I spent a total of 54 minutes on the day of the visit.This includes face to face time and non-face to face time preparing to see the patient (eg, review of tests), obtaining and/or reviewing separately obtained history, documenting clinical information in the electronic or other health record, independently interpreting results and communicating results to the patient/family/caregiver, or care coordinator.      Rhea Summers MD  Department of [place] [facility] Medicine   [facility]

## 2024-11-13 NOTE — ASSESSMENT & PLAN NOTE
Patient's hemorrhage is due to gastrointestinal bleed, patient does not have a propensity for bleeding..   Patients most recent Hgb, Hct, platelets are listed below.  Recent Labs     11/11/24 0226 11/12/24 0304 11/13/24 0226   HGB 8.4* 9.0* 8.7*   HCT 26.6* 28.2* 26.8*    405 381       - trend hemoglobin/hematocrit   - monitor and correct any coagulation defects  - transfuse if Hgb is <7g/dl (<8g/dl in cases of active ACS) or if patient has rapid bleeding leading to hemodynamic instability  - Consult GI  -PPI BID  -patient received 3 U PRBC  -patient was evaluated by GI, EGD/colonoscopy 11/11  -endoscopy findings concerning for an unclear underlying process. Vasculitis-associated screening labs ordered. Plan to consult based on labs and biopsy results.  -prelim colon bx suggestive of amyloid - consulting Heme/Onc when path confirmed

## 2024-11-13 NOTE — CONSULTS
[facility]  [facility]ology[facility]On[facility]  Consult Note    Patient Name: Mary Ellen Saini  MRN: [id number]  Admission Date: 11/7/2024  Hospital Length of Stay: 5 days  Code Status: Full Code   Attending Provider: Rhea Summers MD  Consulting Provider: Octavio Hays MD  Primary Care Physician: Marlen, Primary Doctor  Principal Problem:GIB (gastrointestinal bleeding)    Inpatient consult to Hematology/Oncology  Consult performed by: Octavio Hays MD  Consult ordered by: Rhea Summers MD  Reason for consult: possible amyloidosis        Subjective:     HPI:  59 year-old female was admitted for gastrointestinal bleeding. Upper GI endoscopy revealed gastritis and a duodenal ulcer. Preliminary pathology report suggests possible amyloidosis. Consult is for possible amyloidosis.    - she endorses fatigue, weakness, dyspnea upon exertion. She denies chest pain, headache, diarrhea.        Oncology Treatment Plan:   [No matching plan found]    Medications:  Continuous Infusions:  Scheduled Meds:   cefTRIAXone (Rocephin) IV (PEDS and ADULTS)  1 g Intravenous Q24H    [START ON 11/14/2024] furosemide  40 mg Oral Daily    metoprolol succinate  25 mg Oral Daily    nicotine  1 patch Transdermal Daily    pantoprazole  40 mg Intravenous BID     PRN Meds:  Current Facility-Administered Medications:     0.9%  NaCl infusion (for blood administration), , Intravenous, Q24H PRN    acetaminophen, 650 mg, Oral, Q6H PRN    HYDROmorphone, 2 mg, Oral, Q8H PRN    influenza, 0.5 mL, Intramuscular, Prior to discharge     Review of patient's allergies indicates:  No Known Allergies     Past Medical History:   Diagnosis Date    Asthma     Bipolar disorder     Hypertension      Past Surgical History:   Procedure Laterality Date    CHOLECYSTECTOMY      COLONOSCOPY N/A 10/22/2024    Procedure: COLONOSCOPY;  Surgeon: Dayday Freed MD;  Location: [facility];  Service: Endoscopy;  Laterality: N/A;    COLONOSCOPY N/A 11/11/2024    Procedure:  COLONOSCOPY;  Surgeon: Reese Chen MD;  Location: [facility];  Service: Endoscopy;  Laterality: N/A;    ESOPHAGOGASTRODUODENOSCOPY N/A 10/22/2024    Procedure: EGD (ESOPHAGOGASTRODUODENOSCOPY);  Surgeon: Dayday Freed MD;  Location: [facility] ENDO;  Service: Endoscopy;  Laterality: N/A;    ESOPHAGOGASTRODUODENOSCOPY N/A 11/11/2024    Procedure: EGD (ESOPHAGOGASTRODUODENOSCOPY);  Surgeon: Reese Chen MD;  Location: [facility];  Service: Endoscopy;  Laterality: N/A;    SHOULDER SURGERY      TUBAL LIGATION       Family History    None       Tobacco Use    Smoking status: Every Day     Current packs/day: 0.50     Average packs/day: 2.0 packs/day for 57.9 years (114.6 ttl pk-yrs)     Types: Cigarettes     Start date: 1967    Smokeless tobacco: Never    Tobacco comments:     Pt is a 2 pk/day cigarette smoker x 57 yrs. She states that she cut down to about 10 cig/day, and is trying to quit. Will obtain order for nicotine patch. Ambulatory referral to Smoking Cessation clinic following hospital discharge.    Substance and Sexual Activity    Alcohol use: Yes     Comment: socailly    Drug use: Yes     Types: Cocaine, Methamphetamines     Comment: denies cocaine or meth. Reports maijurana use    Sexual activity: Yes       Review of Systems   Constitutional:  Positive for fatigue.   HENT:  Negative for sore throat.    Eyes:  Negative for visual disturbance.   Respiratory:  Positive for shortness of breath. Negative for cough.    Cardiovascular:  Negative for chest pain.   Gastrointestinal:  Negative for abdominal pain, constipation, diarrhea, nausea and vomiting.   Genitourinary:  Negative for dysuria.   Musculoskeletal:  Negative for back pain.   Skin:  Negative for rash.   Neurological:  Positive for weakness. Negative for headaches.   Hematological:  Negative for adenopathy.   Psychiatric/Behavioral:  The patient is not nervous/anxious.      Objective:     Vital Signs (Most Recent):  Temp: 97 °F (36.1 °C)  (11/13/24 1122)  Pulse: 96 (11/13/24 1217)  Resp: 18 (11/13/24 1122)  BP: (!) 142/92 (11/13/24 1122)  SpO2: 98 % (11/13/24 1122) Vital Signs (24h Range):  Temp:  [97 °F (36.1 °C)-99 °F (37.2 °C)] 97 °F (36.1 °C)  Pulse:  [] 96  Resp:  [18-20] 18  SpO2:  [95 %-100 %] 98 %  BP: (122-152)/(75-97) 142/92     Weight: 83.9 kg (184 lb 15.5 oz)  Body mass index is 26.54 kg/m².  Body surface area is 2.04 meters squared.      Intake/Output Summary (Last 24 hours) at 11/13/2024 1252  Last data filed at 11/13/2024 0432  Gross per 24 hour   Intake 300 ml   Output --   Net 300 ml        Physical Exam  Vitals and nursing note reviewed.   Constitutional:       Appearance: She is well-developed.   HENT:      Head: Normocephalic and atraumatic.   Eyes:      Pupils: Pupils are equal, round, and reactive to light.   Cardiovascular:      Rate and Rhythm: Normal rate and regular rhythm.   Pulmonary:      Effort: Pulmonary effort is normal.      Breath sounds: Normal breath sounds.   Abdominal:      General: Bowel sounds are normal.      Palpations: Abdomen is soft.   Musculoskeletal:         General: Normal range of motion.      Cervical back: Normal range of motion and neck supple.   Skin:     General: Skin is warm and dry.   Neurological:      Mental Status: She is alert and oriented to person, place, and time.   Psychiatric:         Behavior: Behavior normal.         Thought Content: Thought content normal.         Judgment: Judgment normal.          Significant Labs:   CBC:   Recent Labs   Lab 11/12/24 0304 11/13/24 0226   WBC 11.52 10.64   HGB 9.0* 8.7*   HCT 28.2* 26.8*    381    and CMP:   Recent Labs   Lab 11/12/24 0304 11/13/24 0226   * 132*   K 3.0* 3.8    99   CO2 23 24    103   BUN 18 19   CREATININE 1.4 1.6*   CALCIUM 7.8* 8.0*   PROT  --  6.3   ALBUMIN  --  2.1*   BILITOT  --  0.2   ALKPHOS  --  91   AST  --  14   ALT  --  24   ANIONGAP 11 9       Diagnostic Results:  Upper GI endoscopy  (11/11/24):    - Normal esophagus.                          - Small hiatal hernia.                          - Gastritis. Biopsied.                          - Non-bleeding duodenal ulcer with a clean ulcer                          base (Tacho Class III).                          - Mucosal changes in the duodenum. Biopsied.                          Today EGD for recurrent anemia                          there is still severe ulceration right past                          duodenal sweep on posterior portion, no bleeding                          on entry but the tissue is very friable with                          contact oozing.                          the whole 2nd portion is severe congestion,                          irregular mucosa with inflammation type changes of                          unclear etiology, biopsied again.                          yellow bile on entry to duodenum.                          Proceed with colonoscopy.                          Protonix BID and carafate TID.   Assessment/Plan:     * GIB (gastrointestinal bleeding)  - will repeat iron studies and consider IV iron if iron studies are decreased.    Duodenal ulcer  - admitted for gastrointestinal bleeding / symptomatic anemia  - upper GI endoscopy (11/11/24) revealed gastritis, mucosal changes to duodenum, and duodenal ulcer  - preliminary result from biopsy (per hospitalist) is possible amyloidosis  - follow up official pathology report  - will check labs to evaluate for monoclonal paraprotein  - if biopsy reveals amyloidosis, I will arrange for her to see an amyloidosis specialist at [facility] for further evaluation. This can happen in the outpatient setting.        Thank you for your consult.     Octavio Hays MD  Hematology/Oncology  [place] - Telemetry

## 2024-11-13 NOTE — ASSESSMENT & PLAN NOTE
Anemia is likely due to acute blood loss which was from GIB . Most recent hemoglobin and hematocrit are listed below.  Recent Labs     11/11/24  0226 11/12/24  0304 11/13/24  0226   HGB 8.4* 9.0* 8.7*   HCT 26.6* 28.2* 26.8*     CTA w/o active GIB  - Monitor serial CBC:   - Transfuse PRBC if patient becomes hemodynamically unstable, symptomatic or H/H drops below 7/21.  - Patient has received 3 units of PRBCs on 11/8/24  - Patient's anemia is currently stable

## 2024-11-14 ENCOUNTER — CLINICAL SUPPORT (OUTPATIENT)
Dept: SMOKING CESSATION | Facility: CLINIC | Age: 59
End: 2024-11-14

## 2024-11-14 DIAGNOSIS — F17.210 CIGARETTE SMOKER: Primary | ICD-10-CM

## 2024-11-14 PROBLEM — E85.9 AMYLOIDOSIS: Status: ACTIVE | Noted: 2024-11-12

## 2024-11-14 LAB
ANION GAP SERPL CALC-SCNC: 7 MMOL/L (ref 8–16)
BASOPHILS # BLD AUTO: 0.07 K/UL (ref 0–0.2)
BASOPHILS NFR BLD: 0.6 % (ref 0–1.9)
BUN SERPL-MCNC: 24 MG/DL (ref 6–20)
CALCIUM SERPL-MCNC: 7.8 MG/DL (ref 8.7–10.5)
CHLORIDE SERPL-SCNC: 99 MMOL/L (ref 95–110)
CO2 SERPL-SCNC: 25 MMOL/L (ref 23–29)
CREAT SERPL-MCNC: 1.2 MG/DL (ref 0.5–1.4)
DIFFERENTIAL METHOD BLD: ABNORMAL
EOSINOPHIL # BLD AUTO: 0.5 K/UL (ref 0–0.5)
EOSINOPHIL NFR BLD: 4.4 % (ref 0–8)
ERYTHROCYTE [DISTWIDTH] IN BLOOD BY AUTOMATED COUNT: 16.6 % (ref 11.5–14.5)
EST. GFR  (NO RACE VARIABLE): 52 ML/MIN/1.73 M^2
FERRITIN SERPL-MCNC: 78 NG/ML (ref 20–300)
GLUCOSE SERPL-MCNC: 106 MG/DL (ref 70–110)
HCT VFR BLD AUTO: 26.7 % (ref 37–48.5)
HGB BLD-MCNC: 8.7 G/DL (ref 12–16)
IGA SERPL-MCNC: 51 MG/DL (ref 40–350)
IGG SERPL-MCNC: 1833 MG/DL (ref 650–1600)
IGM SERPL-MCNC: 65 MG/DL (ref 50–300)
IMM GRANULOCYTES # BLD AUTO: 0.07 K/UL (ref 0–0.04)
IMM GRANULOCYTES NFR BLD AUTO: 0.6 % (ref 0–0.5)
IRON SERPL-MCNC: 18 UG/DL (ref 30–160)
LYMPHOCYTES # BLD AUTO: 2 K/UL (ref 1–4.8)
LYMPHOCYTES NFR BLD: 17.4 % (ref 18–48)
MCH RBC QN AUTO: 27.7 PG (ref 27–31)
MCHC RBC AUTO-ENTMCNC: 32.6 G/DL (ref 32–36)
MCV RBC AUTO: 85 FL (ref 82–98)
MONOCYTES # BLD AUTO: 0.8 K/UL (ref 0.3–1)
MONOCYTES NFR BLD: 7.1 % (ref 4–15)
NEUTROPHILS # BLD AUTO: 8 K/UL (ref 1.8–7.7)
NEUTROPHILS NFR BLD: 69.9 % (ref 38–73)
NRBC BLD-RTO: 0 /100 WBC
PLATELET # BLD AUTO: 370 K/UL (ref 150–450)
PMV BLD AUTO: 9.1 FL (ref 9.2–12.9)
POTASSIUM SERPL-SCNC: 4 MMOL/L (ref 3.5–5.1)
RBC # BLD AUTO: 3.14 M/UL (ref 4–5.4)
SATURATED IRON: 5 % (ref 20–50)
SODIUM SERPL-SCNC: 131 MMOL/L (ref 136–145)
TOTAL IRON BINDING CAPACITY: 337 UG/DL (ref 250–450)
TRANSFERRIN SERPL-MCNC: 228 MG/DL (ref 200–375)
WBC # BLD AUTO: 11.49 K/UL (ref 3.9–12.7)

## 2024-11-14 PROCEDURE — 80048 BASIC METABOLIC PNL TOTAL CA: CPT | Performed by: REGISTERED NURSE

## 2024-11-14 PROCEDURE — 25000003 PHARM REV CODE 250: Performed by: STUDENT IN AN ORGANIZED HEALTH CARE EDUCATION/TRAINING PROGRAM

## 2024-11-14 PROCEDURE — 82784 ASSAY IGA/IGD/IGG/IGM EACH: CPT | Performed by: INTERNAL MEDICINE

## 2024-11-14 PROCEDURE — 85025 COMPLETE CBC W/AUTO DIFF WBC: CPT | Performed by: STUDENT IN AN ORGANIZED HEALTH CARE EDUCATION/TRAINING PROGRAM

## 2024-11-14 PROCEDURE — 84165 PROTEIN E-PHORESIS SERUM: CPT | Mod: 26,,, | Performed by: PATHOLOGY

## 2024-11-14 PROCEDURE — 83521 IG LIGHT CHAINS FREE EACH: CPT | Performed by: INTERNAL MEDICINE

## 2024-11-14 PROCEDURE — 63600175 PHARM REV CODE 636 W HCPCS: Performed by: REGISTERED NURSE

## 2024-11-14 PROCEDURE — 84466 ASSAY OF TRANSFERRIN: CPT | Performed by: INTERNAL MEDICINE

## 2024-11-14 PROCEDURE — 25000003 PHARM REV CODE 250: Performed by: INTERNAL MEDICINE

## 2024-11-14 PROCEDURE — 36415 COLL VENOUS BLD VENIPUNCTURE: CPT | Performed by: REGISTERED NURSE

## 2024-11-14 PROCEDURE — 82728 ASSAY OF FERRITIN: CPT | Performed by: INTERNAL MEDICINE

## 2024-11-14 PROCEDURE — 11000001 HC ACUTE MED/SURG PRIVATE ROOM

## 2024-11-14 PROCEDURE — 25000003 PHARM REV CODE 250: Performed by: EMERGENCY MEDICINE

## 2024-11-14 PROCEDURE — 84165 PROTEIN E-PHORESIS SERUM: CPT | Performed by: INTERNAL MEDICINE

## 2024-11-14 PROCEDURE — 86334 IMMUNOFIX E-PHORESIS SERUM: CPT | Performed by: INTERNAL MEDICINE

## 2024-11-14 PROCEDURE — 86334 IMMUNOFIX E-PHORESIS SERUM: CPT | Mod: 26,,, | Performed by: PATHOLOGY

## 2024-11-14 PROCEDURE — S4991 NICOTINE PATCH NONLEGEND: HCPCS | Performed by: STUDENT IN AN ORGANIZED HEALTH CARE EDUCATION/TRAINING PROGRAM

## 2024-11-14 RX ORDER — HYDROMORPHONE HYDROCHLORIDE 2 MG/1
2 TABLET ORAL EVERY 4 HOURS PRN
Status: DISCONTINUED | OUTPATIENT
Start: 2024-11-14 | End: 2024-11-16

## 2024-11-14 RX ADMIN — PANTOPRAZOLE SODIUM 40 MG: 40 INJECTION, POWDER, LYOPHILIZED, FOR SOLUTION INTRAVENOUS at 08:11

## 2024-11-14 RX ADMIN — CEFTRIAXONE SODIUM 1 G: 1 INJECTION, POWDER, FOR SOLUTION INTRAMUSCULAR; INTRAVENOUS at 03:11

## 2024-11-14 RX ADMIN — HYDROMORPHONE HYDROCHLORIDE 2 MG: 2 TABLET ORAL at 07:11

## 2024-11-14 RX ADMIN — METOPROLOL SUCCINATE 25 MG: 25 TABLET, EXTENDED RELEASE ORAL at 08:11

## 2024-11-14 RX ADMIN — NICOTINE 1 PATCH: 21 PATCH, EXTENDED RELEASE TRANSDERMAL at 08:11

## 2024-11-14 RX ADMIN — SUCRALFATE 1 G: 1 TABLET ORAL at 05:11

## 2024-11-14 RX ADMIN — HYDROMORPHONE HYDROCHLORIDE 2 MG: 2 TABLET ORAL at 08:11

## 2024-11-14 RX ADMIN — HYDROMORPHONE HYDROCHLORIDE 2 MG: 2 TABLET ORAL at 03:11

## 2024-11-14 RX ADMIN — SUCRALFATE 1 G: 1 TABLET ORAL at 03:11

## 2024-11-14 RX ADMIN — SUCRALFATE 1 G: 1 TABLET ORAL at 11:11

## 2024-11-14 NOTE — ASSESSMENT & PLAN NOTE
Echo 08/2024  EF 34%  Continue BB  Given one dose IV Lasix 10/08  Cardiac/Autonomic (From admission, onward)      Start     Stop Route Frequency Ordered    11/08/24 0030  metoprolol succinate (TOPROL-XL) 24 hr tablet 25 mg         -- Oral Daily 11/08/24 0029        Continued oral Lasix.  Renal insufficiency on 11/13 after recent NPO status; held Lasix on 11/13. Scheduled to resume on 11/14.

## 2024-11-14 NOTE — PROGRESS NOTES
[facility] Medicine  Telemedicine Progress Note    Patient Name: Mary Ellen Saini  MRN: [id number]  Patient Class: IP- Inpatient   Admission Date: 11/7/2024  Length of Stay: 6 days  Attending Physician: Ashley Spencer MD  Primary Care Provider: Marlen, Primary Doctor          Subjective:     Principal Problem:GIB (gastrointestinal bleeding)        HPI:  [age] year old female with a history of hypertension, bipolar, hep C, HFrEF presents to ED with complaints of chest pain, shortness a breath and blood in her stool.  Patient was recently admitted for GI bleed which she required blood transfusion.  Patient reports bleeding in stool has been having for the last several days however worsened today along with worsening abdominal pain. In ED labs remarkable for H&H 5.6 and 17.2.  Patient discharged 12 days ago with H&H of 7.3 and 22.8.  Blood pressure stable on admission however she is tachycardic 120.  Patient also with a chest pain.  EKG without ST elevation BNP 3425, troponin 3.379.  CTA of the abdomen done denies show any evidence of active GI bleed.  Patient typed and crossmatch for 3 units.  We will start PPI IV BID, transfuse PRBC, consult GI and Cards.    Overview/Hospital Course:  No notes on file    Interval History: Virtual follow up visit for suspected Shortness of breath [R06.02]  Symptomatic anemia [D64.9] present on admission.   This service was provided by telemedicine.    [facility]   Admitted 11/7/2024  9:15 PM    CC: GI bleed    The patient is able to provide adequate history. History was obtained from patient and past medical records.   No significant events overnight reported.  Patient complains of abd pain but controlled with oral dilaudid - no blood in stool so far today - she did have some blood in stool last pm.        Data  Details     [x]   Lab results reviewed 11/14/2024  sCr improved. Hyponatremia. H&H stable.         []   Micro reports reviewed  11/14/2024     [x]   Pathology reports reviewed 11/14/2024  Colon bx c/w amyloid    []   Imaging reports reviewed 11/14/2024     []   Cardiology Procedure reports reviewed 11/14/2024      []   Non- records/CareEverywhere notes reviewed 11/14/2024      []  Tests/studies orders placed or verified 11/14/2024       [x]  Independently viewed/assessed 11/14/2024  Corrected Ca: 9.3    []  11/14/2024 Discussion of:       Review of Systems   Constitutional:  Positive for fatigue. Negative for fever.   Respiratory:  Negative for shortness of breath.    Gastrointestinal:  Positive for abdominal pain.     Objective:     Vital Signs (Most Recent):  Temp: 97.5 °F (36.4 °C) (11/14/24 1613)  Pulse: 95 (11/14/24 1636)  Resp: 18 (11/14/24 1613)  BP: 127/75 (11/14/24 1613)  SpO2: 96 % (11/14/24 1613) Vital Signs (24h Range):  Temp:  [97.5 °F (36.4 °C)-98.4 °F (36.9 °C)] 97.5 °F (36.4 °C)  Pulse:  [83-98] 95  Resp:  [18-20] 18  SpO2:  [94 %-96 %] 96 %  BP: (125-131)/(73-82) 127/75     Weight: 83.9 kg (184 lb 15.5 oz)  Body mass index is 26.54 kg/m².  No intake or output data in the 24 hours ending 11/14/24 1649        Physical Exam  Constitutional:       General: She is awake.      Appearance: She is ill-appearing.   Cardiovascular:      Comments: Monitor and/or Vital signs reviewed at time of visit  Pulmonary:      Effort: Pulmonary effort is normal. No accessory muscle usage or respiratory distress.   Neurological:      Mental Status: She is alert. She is not disoriented.   Psychiatric:         Attention and Perception: Attention normal.         Behavior: Behavior is cooperative.         Significant Labs:   Recent Labs   Lab 10/20/24  0722   HGBA1C 5.4     Recent Labs   Lab 11/12/24  0304 11/13/24  0226 11/14/24  0231   WBC 11.52 10.64 11.49   HGB 9.0* 8.7* 8.7*   HCT 28.2* 26.8* 26.7*    381 370     Recent Labs   Lab 11/12/24  0304 11/13/24  0226 11/14/24  0231   GRAN 68.3  7.9* 66.2  7.0 69.9  8.0*   LYMPH 17.8*  2.1  18.8  2.0 17.4*  2.0   MONO 7.6  0.9 7.8  0.8 7.1  0.8   EOS 0.6* 0.6* 0.5     Recent Labs   Lab 11/07/24 2153 11/08/24  0401 11/10/24  0220 11/11/24 0226 11/12/24  0304 11/13/24 0226 11/14/24  0231   *   < > 131*   < > 134* 132* 131*   K 2.8*   < > 3.0*   < > 3.0* 3.8 4.0      < > 101   < > 100 99 99   CO2 20*   < > 20*   < > 23 24 25   BUN 20   < > 26*   < > 18 19 24*   CREATININE 1.2   < > 1.4   < > 1.4 1.6* 1.2   *   < > 124*   < > 101 103 106   CALCIUM 7.5*   < > 7.7*   < > 7.8* 8.0* 7.8*   ALBUMIN 2.1*  --   --   --   --  2.1*  --    MG 1.6  --  1.5*  --   --  2.1  --    PHOS  --   --   --   --   --  3.1  --     < > = values in this interval not displayed.     Recent Labs   Lab 11/07/24 2153 11/12/24  1300 11/13/24 0226   ALKPHOS 78  --  91   ALT 13  --  24   AST 17  --  14   PROT 6.3  --  6.3   BILITOT 0.3  --  0.2   INR 1.1  --   --    LIPASE 20  --   --    CRP  --  12.2*  --      Recent Labs   Lab 11/07/24 2153 11/08/24  0105 11/08/24  0249 11/08/24  1014   TROPONINI 3.379* 3.587* 3.449* 4.533*   BNP 3,425*  --   --   --      Recent Labs   Lab 08/12/24  0534 08/19/24  2034 08/19/24  2233 08/26/24  1545 11/07/24 2153 11/14/24 0231   PROCAL  --  0.10  --   --   --   --    LACTATE  --   --   --   --  1.0  --    DDIMER  --   --  0.72*  --   --   --    FERRITIN 112  --   --   --   --  78   SEDRATE  --   --   --  26  --   --      SARS-CoV-2 RNA, Amplification, Qual (no units)   Date Value   09/05/2024 Negative   08/19/2024 Negative     POC Rapid COVID (no units)   Date Value   08/29/2024 Negative   08/12/2024 Negative     ECG Results              EKG 12-lead (Final result)        Collection Time Result Time QRS Duration OHS QTC Calculation    11/07/24 22:25:43 11/10/24 11:34:04 88 478                     Final result by Interface, Lab In [facility] (11/10/24 11:34:08)                   Narrative:    Test Reason : R06.02,    Vent. Rate : 123 BPM     Atrial Rate : 123 BPM     P-R Int :  132 ms          QRS Dur : 088 ms      QT Int : 334 ms       P-R-T Axes : 033 004 055 degrees     QTc Int : 478 ms    Sinus tachycardia  diffuse ST depression ,consider ischemia   Abnormal ECG  When compared with ECG of 19-OCT-2024 09:34,  Vent. rate has increased BY  54 BPM  Confirmed by Vinod Larry MD ([id number]) on 11/10/2024 11:34:03 AM    Referred By: [email or web address]   SELF           Confirmed By:Vinod Larry MD                                Results for orders placed during the hospital encounter of 08/12/24    Echo    Interpretation Summary    Left Ventricle: The left ventricle is moderately dilated. There is eccentric hypertrophy. Moderate global hypokinesis present. There is moderately reduced systolic function with a visually estimated ejection fraction of 30 - 35%. Biplane (2D) method of discs ejection fraction is 34%. There is diastolic dysfunction but grade cannot be determined.    Right Ventricle: Normal right ventricular cavity size. Wall thickness is normal. Systolic function is normal.    Left Atrium: Left atrium is severely dilated.    Right Atrium: Right atrium is moderately dilated.    Aortic Valve: There is mild stenosis. Aortic valve area by VTI is 1.95 cm². Aortic valve peak velocity is 1.40 m/s. Mean gradient is 4 mmHg. The dimensionless index is 0.62.    Mitral Valve: There is mild regurgitation.    Tricuspid Valve: There is mild regurgitation.    Pulmonic Valve: There is mild regurgitation.    Pulmonary Artery: The estimated pulmonary artery systolic pressure is 36 mmHg.    IVC/SVC: Normal venous pressure at 3 mmHg.      CTA Acute GI [place], Abdomen and Pelvis  Narrative: EXAMINATION:  CTA ACUTE GI BLEED, ABDOMEN AND PELVIS    CLINICAL HISTORY:  GI bleeding;    TECHNIQUE:  Using 75 cc of  Omnipaque 350 IV, and multi-detector helical CT technique, axial CT angiogram images of the abdomen were obtained from the lung bases through the pelvis. Precontrast and portal venous phase images of  the abdomen and pelvis also done. 2D post-processing coronal and sagittal reconstructions of the abdominal aorta and visceral arteries performed.    COMPARISON:  10/24/2024    FINDINGS:  The lung bases are well aerated.  No consolidation, suspicious nodules, with small bilateral pleural effusion.  The visualized portions of the heart appear normal.    The esophagus, stomach, spleen, pancreas, and adrenal glands are unremarkable.    The liver is normal in size and attenuation without focal abnormality.  The portal veins appear patent.  The gallbladder shows no evidence of stones or cholecystitis.  No intra-or extrahepatic biliary ductal dilatation.    The kidneys demonstrate normal enhancement.  No renal mass, nephrolithiasis or hydronephrosis.  The ureters are normal in course and caliber. The urinary bladder appears unremarkable    No evidence of gastrointestinal hemorrhage or bowel obstruction.  Inflammation in the right colon through the hepatic flexure and the proximal portion of the transverse colon with more normal splenic flexure descending colon and rectosigmoid colon with a few diverticula in the sigmoid but without diverticulitis.  Definite mass is not identified as on the previous exam.  There is no ascites, free fluid, or intraperitoneal free air. No significant peritoneal or retroperitoneal adenopathy.    The abdominal aorta is normal in course and caliber without significant atherosclerotic calcifications.    The osseous structures and extraperitoneal soft tissues are unremarkable.  Impression: Inflammatory changes of the ascending and splenic flexure of the colon with the previous suggested mass not confidently identified.    No evidence of active GI bleed.    No evidence of bowel obstruction.    Mild pleural thickening and/or fluid.    This report was flagged in Epic as abnormal.    Electronically signed by: Jax Carmichael  Date:    11/07/2024  Time:    23:35  X-Ray Chest 1 View  Narrative:  EXAMINATION:  XR CHEST 1 VIEW    CLINICAL HISTORY:  Shortness of breath    TECHNIQUE:  Single frontal view of the chest was performed.    COMPARISON:  10/19/2024.    FINDINGS:  The lungs are hypoexpanded.  There are perihilar interstitial opacities, may be accentuated by hypoaeration changes.  The pleural spaces are clear.  The cardiac silhouette is enlarged.  Osseous structures demonstrate degenerative changes.  Impression: Mild perihilar interstitial prominence, may be accentuated by hypoaeration.  Mild edema is not excluded.    Electronically signed by: Nick Clifton  Date:    11/07/2024  Time:    23:25      Labs and Imaging listed above were reviewed.       Assessment/Plan:      * GIB (gastrointestinal bleeding)  Patient's hemorrhage is due to gastrointestinal bleed, patient does not have a propensity for bleeding..   Patients most recent Hgb, Hct, platelets are listed below.  Recent Labs     11/12/24 0304 11/13/24 0226 11/14/24 0231   HGB 9.0* 8.7* 8.7*   HCT 28.2* 26.8* 26.7*    381 370       - trend hemoglobin/hematocrit   - monitor and correct any coagulation defects  - transfuse if Hgb is <7g/dl (<8g/dl in cases of active ACS) or if patient has rapid bleeding leading to hemodynamic instability  - Consult GI  -PPI BID and carafate TID  -patient received 3 U PRBC  -patient was evaluated by GI, EGD/colonoscopy 11/11  -endoscopy findings concerning for an unclear underlying process. Vasculitis-associated screening labs ordered. Plan to consult based on labs and biopsy results.  -colon bx with amyloidosis - consulted Heme/Onc  - patient needs to f/u with amyloidosis    Amyloidosis  See GIB (gastrointestinal bleeding)     ABLA (acute blood loss anemia)  Anemia is likely due to acute blood loss which was from GIB . Most recent hemoglobin and hematocrit are listed below.  Recent Labs     11/12/24 0304 11/13/24 0226 11/14/24 0231   HGB 9.0* 8.7* 8.7*   HCT 28.2* 26.8* 26.7*     CTA w/o active GIB  -  Monitor serial CBC:   - Transfuse PRBC if patient becomes hemodynamically unstable, symptomatic or H/H drops below 7/21.  - Patient has received 3 units of PRBCs on 11/8/24  - Patient's anemia is currently stable    Leukocytosis  No obvious infection  Started Rocephin given pts hx Hep C and GIB  Improved, no evidence of SBP    NSTEMI (non-ST elevated myocardial infarction)  EKG w/o ST elevation  Troponin elevated 3.379-->3.587-->3.449  Cards eval is appreciated, medical management is recommended     HFrEF (heart failure with reduced ejection fraction)  Echo 08/2024  EF 34%  Continue BB  Given one dose IV Lasix 10/08  Cardiac/Autonomic (From admission, onward)      Start     Stop Route Frequency Ordered    11/08/24 0030  metoprolol succinate (TOPROL-XL) 24 hr tablet 25 mg         -- Oral Daily 11/08/24 0029        Continued oral Lasix.  Renal insufficiency on 11/13 after recent NPO status; held Lasix on 11/13. Scheduled to resume on 11/14.    Hepatitis C     Latest Reference Range & Units 08/19/24 20:34   HCV Log <1.08 LogIU/mL 5.93 !   HCV Quantitative Result <12 IU/mL [id number] !   HCV, Qualitative Not Detected  Detected !   Hepatitis C Ab Non-reactive  Reactive !   HIV 1/2 Ag/Ab Non-reactive  Non-reactive     Needs follow up with hematology.    Smoking history  Nicotine patches  Smoking cessation counseling  U tox is positive for cocaine amphetamine and THC    Hypokalemia  Patient's most recent potassium results are listed below.   Recent Labs     11/12/24  0304 11/13/24  0226 11/14/24  0231   K 3.0* 3.8 4.0       Plan  - Replete potassium per protocol  - Monitor potassium   - Patient's hypokalemia is improving      VTE Risk Mitigation (From admission, onward)           Ordered     IP VTE HIGH RISK PATIENT  Once         11/08/24 0137     Place sequential compression device  Until discontinued         11/08/24 0137                      I have completed this tele-visit without the assistance of a telepresenter.    The  attending portion of this evaluation, treatment, and documentation was performed per Ashley Spencer MD via Telemedicine AudioVisual using the secure [employer] software platform with 2 way audio/video. The provider was located off-site and the patient is located in the hospital. The aforementioned video software was utilized to document the relevant history and physical exam    Ashley Spencer MD  Department of [facility] Medicine   [facility]

## 2024-11-14 NOTE — PLAN OF CARE
"Rounded at bedside, pt awake and alert. Not medically ready for discharge to home today. Pt stated," I don't have cancer, but I have problems with my proteins and suppose to see a specialist before I discharge home" CM will continue to follow pt and provide any discharge needs.   Future Appointments   Date Time Provider Department Center   12/3/2024 11:00 AM Yandel Bonds, RRT [facility]      11/14/24 2706   Rounds   Attendance Nurse    Discharge Plan A Home;Home with family   Why the patient remains in the hospital Requires continued medical care   Transition of Care Barriers None         "

## 2024-11-14 NOTE — ASSESSMENT & PLAN NOTE
Latest Reference Range & Units 08/19/24 20:34   HCV Log <1.08 LogIU/mL 5.93 !   HCV Quantitative Result <12 IU/mL [id number] !   HCV, Qualitative Not Detected  Detected !   Hepatitis C Ab Non-reactive  Reactive !   HIV 1/2 Ag/Ab Non-reactive  Non-reactive     Needs follow up with hematology.

## 2024-11-14 NOTE — ASSESSMENT & PLAN NOTE
Patient's most recent potassium results are listed below.   Recent Labs     11/12/24  0304 11/13/24  0226 11/14/24  0231   K 3.0* 3.8 4.0       Plan  - Replete potassium per protocol  - Monitor potassium   - Patient's hypokalemia is improving

## 2024-11-14 NOTE — PROGRESS NOTES
Smoking cessation education follow-up: Order requested upon discharge for 21 mg nicotine patch Q day. Ambulatory Smoking Cessation cinic appointment changed from Job to Alessandra at pt's request.

## 2024-11-14 NOTE — ASSESSMENT & PLAN NOTE
Patient's hemorrhage is due to gastrointestinal bleed, patient does not have a propensity for bleeding..   Patients most recent Hgb, Hct, platelets are listed below.  Recent Labs     11/12/24  0304 11/13/24  0226 11/14/24  0231   HGB 9.0* 8.7* 8.7*   HCT 28.2* 26.8* 26.7*    381 370       - trend hemoglobin/hematocrit   - monitor and correct any coagulation defects  - transfuse if Hgb is <7g/dl (<8g/dl in cases of active ACS) or if patient has rapid bleeding leading to hemodynamic instability  - Consult GI  -PPI BID and carafate TID  -patient received 3 U PRBC  -patient was evaluated by GI, EGD/colonoscopy 11/11  -endoscopy findings concerning for an unclear underlying process. Vasculitis-associated screening labs ordered. Plan to consult based on labs and biopsy results.  -colon bx with amyloidosis - consulted Heme/Onc  - patient needs to f/u with amyloidosis

## 2024-11-14 NOTE — ASSESSMENT & PLAN NOTE
Anemia is likely due to acute blood loss which was from GIB . Most recent hemoglobin and hematocrit are listed below.  Recent Labs     11/12/24  0304 11/13/24  0226 11/14/24  0231   HGB 9.0* 8.7* 8.7*   HCT 28.2* 26.8* 26.7*     CTA w/o active GIB  - Monitor serial CBC:   - Transfuse PRBC if patient becomes hemodynamically unstable, symptomatic or H/H drops below 7/21.  - Patient has received 3 units of PRBCs on 11/8/24  - Patient's anemia is currently stable

## 2024-11-14 NOTE — SUBJECTIVE & OBJECTIVE
Interval History: Virtual follow up visit for suspected Shortness of breath [R06.02]  Symptomatic anemia [D64.9] present on admission.   This service was provided by telemedicine.    The patient location is: K[facility]/K[facility] A   Admitted 11/7/2024  9:15 PM    CC: GI bleed    The patient is able to provide adequate history. History was obtained from patient and past medical records.   No significant events overnight reported.  Patient complains of abd pain but controlled with oral dilaudid - no blood in stool so far today - she did have some blood in stool last pm.        Data  Details     [x]   Lab results reviewed 11/14/2024  sCr improved. Hyponatremia. H&H stable.         []   Micro reports reviewed 11/14/2024     [x]   Pathology reports reviewed 11/14/2024  Colon bx c/w amyloid    []   Imaging reports reviewed 11/14/2024     []   Cardiology Procedure reports reviewed 11/14/2024      []   Non- records/CareEverywhere notes reviewed 11/14/2024      []  Tests/studies orders placed or verified 11/14/2024       [x]  Independently viewed/assessed 11/14/2024  Corrected Ca: 9.3    []  11/14/2024 Discussion of:       Review of Systems   Constitutional:  Positive for fatigue. Negative for fever.   Respiratory:  Negative for shortness of breath.    Gastrointestinal:  Positive for abdominal pain.     Objective:     Vital Signs (Most Recent):  Temp: 97.5 °F (36.4 °C) (11/14/24 1613)  Pulse: 95 (11/14/24 1636)  Resp: 18 (11/14/24 1613)  BP: 127/75 (11/14/24 1613)  SpO2: 96 % (11/14/24 1613) Vital Signs (24h Range):  Temp:  [97.5 °F (36.4 °C)-98.4 °F (36.9 °C)] 97.5 °F (36.4 °C)  Pulse:  [83-98] 95  Resp:  [18-20] 18  SpO2:  [94 %-96 %] 96 %  BP: (125-131)/(73-82) 127/75     Weight: 83.9 kg (184 lb 15.5 oz)  Body mass index is 26.54 kg/m².  No intake or output data in the 24 hours ending 11/14/24 1649        Physical Exam  Constitutional:       General: She is awake.      Appearance: She is ill-appearing.   Cardiovascular:       Comments: Monitor and/or Vital signs reviewed at time of visit  Pulmonary:      Effort: Pulmonary effort is normal. No accessory muscle usage or respiratory distress.   Neurological:      Mental Status: She is alert. She is not disoriented.   Psychiatric:         Attention and Perception: Attention normal.         Behavior: Behavior is cooperative.         Significant Labs:   Recent Labs   Lab 10/20/24  0722   HGBA1C 5.4     Recent Labs   Lab 11/12/24  0304 11/13/24 0226 11/14/24  0231   WBC 11.52 10.64 11.49   HGB 9.0* 8.7* 8.7*   HCT 28.2* 26.8* 26.7*    381 370     Recent Labs   Lab 11/12/24 0304 11/13/24 0226 11/14/24  0231   GRAN 68.3  7.9* 66.2  7.0 69.9  8.0*   LYMPH 17.8*  2.1 18.8  2.0 17.4*  2.0   MONO 7.6  0.9 7.8  0.8 7.1  0.8   EOS 0.6* 0.6* 0.5     Recent Labs   Lab 11/07/24 2153 11/08/24  0401 11/10/24  0220 11/11/24 0226 11/12/24 0304 11/13/24 0226 11/14/24  0231   *   < > 131*   < > 134* 132* 131*   K 2.8*   < > 3.0*   < > 3.0* 3.8 4.0      < > 101   < > 100 99 99   CO2 20*   < > 20*   < > 23 24 25   BUN 20   < > 26*   < > 18 19 24*   CREATININE 1.2   < > 1.4   < > 1.4 1.6* 1.2   *   < > 124*   < > 101 103 106   CALCIUM 7.5*   < > 7.7*   < > 7.8* 8.0* 7.8*   ALBUMIN 2.1*  --   --   --   --  2.1*  --    MG 1.6  --  1.5*  --   --  2.1  --    PHOS  --   --   --   --   --  3.1  --     < > = values in this interval not displayed.     Recent Labs   Lab 11/07/24 2153 11/12/24  1300 11/13/24  0226   ALKPHOS 78  --  91   ALT 13  --  24   AST 17  --  14   PROT 6.3  --  6.3   BILITOT 0.3  --  0.2   INR 1.1  --   --    LIPASE 20  --   --    CRP  --  12.2*  --      Recent Labs   Lab 11/07/24  2153 11/08/24  0105 11/08/24  0249 11/08/24  1014   TROPONINI 3.379* 3.587* 3.449* 4.533*   BNP 3,425*  --   --   --      Recent Labs   Lab 08/12/24  0534 08/19/24  2034 08/19/24  2233 08/26/24  1545 11/07/24  2153 11/14/24  0231   PROCAL  --  0.10  --   --   --   --    LACTATE  --    --   --   --  1.0  --    DDIMER  --   --  0.72*  --   --   --    FERRITIN 112  --   --   --   --  78   SEDRATE  --   --   --  26  --   --      SARS-CoV-2 RNA, Amplification, Qual (no units)   Date Value   09/05/2024 Negative   08/19/2024 Negative     POC Rapid COVID (no units)   Date Value   08/29/2024 Negative   08/12/2024 Negative     ECG Results              EKG 12-lead (Final result)        Collection Time Result Time QRS Duration OHS QTC Calculation    11/07/24 22:25:43 11/10/24 11:34:04 88 478                     Final result by Interface, Lab In [facility] (11/10/24 11:34:08)                   Narrative:    Test Reason : R06.02,    Vent. Rate : 123 BPM     Atrial Rate : 123 BPM     P-R Int : 132 ms          QRS Dur : 088 ms      QT Int : 334 ms       P-R-T Axes : 033 004 055 degrees     QTc Int : 478 ms    Sinus tachycardia  diffuse ST depression ,consider ischemia   Abnormal ECG  When compared with ECG of 19-OCT-2024 09:34,  Vent. rate has increased BY  54 BPM  Confirmed by Vinod Larry MD ([id number]) on 11/10/2024 11:34:03 AM    Referred By: [email or web address]   SELF           Confirmed By:Vinod Larry MD                                Results for orders placed during the hospital encounter of 08/12/24    Echo    Interpretation Summary    Left Ventricle: The left ventricle is moderately dilated. There is eccentric hypertrophy. Moderate global hypokinesis present. There is moderately reduced systolic function with a visually estimated ejection fraction of 30 - 35%. Biplane (2D) method of discs ejection fraction is 34%. There is diastolic dysfunction but grade cannot be determined.    Right Ventricle: Normal right ventricular cavity size. Wall thickness is normal. Systolic function is normal.    Left Atrium: Left atrium is severely dilated.    Right Atrium: Right atrium is moderately dilated.    Aortic Valve: There is mild stenosis. Aortic valve area by VTI is 1.95 cm². Aortic valve peak velocity is 1.40  m/s. Mean gradient is 4 mmHg. The dimensionless index is 0.62.    Mitral Valve: There is mild regurgitation.    Tricuspid Valve: There is mild regurgitation.    Pulmonic Valve: There is mild regurgitation.    Pulmonary Artery: The estimated pulmonary artery systolic pressure is 36 mmHg.    IVC/SVC: Normal venous pressure at 3 mmHg.      CTA Acute GI Lapel, Abdomen and Pelvis  Narrative: EXAMINATION:  CTA ACUTE GI BLEED, ABDOMEN AND PELVIS    CLINICAL HISTORY:  GI bleeding;    TECHNIQUE:  Using 75 cc of  Omnipaque 350 IV, and multi-detector helical CT technique, axial CT angiogram images of the abdomen were obtained from the lung bases through the pelvis. Precontrast and portal venous phase images of the abdomen and pelvis also done. 2D post-processing coronal and sagittal reconstructions of the abdominal aorta and visceral arteries performed.    COMPARISON:  10/24/2024    FINDINGS:  The lung bases are well aerated.  No consolidation, suspicious nodules, with small bilateral pleural effusion.  The visualized portions of the heart appear normal.    The esophagus, stomach, spleen, pancreas, and adrenal glands are unremarkable.    The liver is normal in size and attenuation without focal abnormality.  The portal veins appear patent.  The gallbladder shows no evidence of stones or cholecystitis.  No intra-or extrahepatic biliary ductal dilatation.    The kidneys demonstrate normal enhancement.  No renal mass, nephrolithiasis or hydronephrosis.  The ureters are normal in course and caliber. The urinary bladder appears unremarkable    No evidence of gastrointestinal hemorrhage or bowel obstruction.  Inflammation in the right colon through the hepatic flexure and the proximal portion of the transverse colon with more normal splenic flexure descending colon and rectosigmoid colon with a few diverticula in the sigmoid but without diverticulitis.  Definite mass is not identified as on the previous exam.  There is no ascites,  free fluid, or intraperitoneal free air. No significant peritoneal or retroperitoneal adenopathy.    The abdominal aorta is normal in course and caliber without significant atherosclerotic calcifications.    The osseous structures and extraperitoneal soft tissues are unremarkable.  Impression: Inflammatory changes of the ascending and splenic flexure of the colon with the previous suggested mass not confidently identified.    No evidence of active GI bleed.    No evidence of bowel obstruction.    Mild pleural thickening and/or fluid.    This report was flagged in Epic as abnormal.    Electronically signed by: Jax Carmichael  Date:    11/07/2024  Time:    23:35  X-Ray Chest 1 View  Narrative: EXAMINATION:  XR CHEST 1 VIEW    CLINICAL HISTORY:  Shortness of breath    TECHNIQUE:  Single frontal view of the chest was performed.    COMPARISON:  10/19/2024.    FINDINGS:  The lungs are hypoexpanded.  There are perihilar interstitial opacities, may be accentuated by hypoaeration changes.  The pleural spaces are clear.  The cardiac silhouette is enlarged.  Osseous structures demonstrate degenerative changes.  Impression: Mild perihilar interstitial prominence, may be accentuated by hypoaeration.  Mild edema is not excluded.    Electronically signed by: Nick Clifton  Date:    11/07/2024  Time:    23:25      Labs and Imaging listed above were reviewed.

## 2024-11-15 ENCOUNTER — CLINICAL SUPPORT (OUTPATIENT)
Dept: SMOKING CESSATION | Facility: CLINIC | Age: 59
End: 2024-11-15

## 2024-11-15 DIAGNOSIS — F17.210 CIGARETTE SMOKER: Primary | ICD-10-CM

## 2024-11-15 PROBLEM — E85.89 OTHER AMYLOIDOSIS: Status: ACTIVE | Noted: 2024-11-12

## 2024-11-15 LAB
ALBUMIN SERPL BCP-MCNC: 2 G/DL (ref 3.5–5.2)
ALBUMIN SERPL ELPH-MCNC: 2.02 G/DL (ref 3.35–5.55)
ALP SERPL-CCNC: 83 U/L (ref 40–150)
ALPHA1 GLOB SERPL ELPH-MCNC: 0.4 G/DL (ref 0.17–0.41)
ALPHA2 GLOB SERPL ELPH-MCNC: 0.62 G/DL (ref 0.43–0.99)
ALT SERPL W/O P-5'-P-CCNC: 13 U/L (ref 10–44)
ANCA AB TITR SER IF: NORMAL TITER
ANION GAP SERPL CALC-SCNC: 6 MMOL/L (ref 8–16)
AST SERPL-CCNC: 17 U/L (ref 10–40)
B-GLOBULIN SERPL ELPH-MCNC: 2.22 G/DL (ref 0.5–1.1)
BASOPHILS # BLD AUTO: 0.09 K/UL (ref 0–0.2)
BASOPHILS NFR BLD: 0.8 % (ref 0–1.9)
BILIRUB DIRECT SERPL-MCNC: 0.1 MG/DL (ref 0.1–0.3)
BILIRUB SERPL-MCNC: 0.2 MG/DL (ref 0.1–1)
BUN SERPL-MCNC: 27 MG/DL (ref 6–20)
CALCIUM SERPL-MCNC: 7.9 MG/DL (ref 8.7–10.5)
CHLORIDE SERPL-SCNC: 100 MMOL/L (ref 95–110)
CO2 SERPL-SCNC: 25 MMOL/L (ref 23–29)
CREAT SERPL-MCNC: 1.4 MG/DL (ref 0.5–1.4)
DIFFERENTIAL METHOD BLD: ABNORMAL
EOSINOPHIL # BLD AUTO: 0.5 K/UL (ref 0–0.5)
EOSINOPHIL NFR BLD: 4.1 % (ref 0–8)
ERYTHROCYTE [DISTWIDTH] IN BLOOD BY AUTOMATED COUNT: 16.4 % (ref 11.5–14.5)
EST. GFR  (NO RACE VARIABLE): 44 ML/MIN/1.73 M^2
GAMMA GLOB SERPL ELPH-MCNC: 0.24 G/DL (ref 0.67–1.58)
GLUCOSE SERPL-MCNC: 95 MG/DL (ref 70–110)
HCT VFR BLD AUTO: 29.6 % (ref 37–48.5)
HGB BLD-MCNC: 9.2 G/DL (ref 12–16)
IMM GRANULOCYTES # BLD AUTO: 0.07 K/UL (ref 0–0.04)
IMM GRANULOCYTES NFR BLD AUTO: 0.6 % (ref 0–0.5)
INTERPRETATION SERPL IFE-IMP: NORMAL
KAPPA LC SER QL IA: 101.72 MG/DL (ref 0.33–1.94)
KAPPA LC/LAMBDA SER IA: 33.79 (ref 0.26–1.65)
LAMBDA LC SER QL IA: 3.01 MG/DL (ref 0.57–2.63)
LYMPHOCYTES # BLD AUTO: 2.2 K/UL (ref 1–4.8)
LYMPHOCYTES NFR BLD: 19 % (ref 18–48)
MCH RBC QN AUTO: 27.1 PG (ref 27–31)
MCHC RBC AUTO-ENTMCNC: 31.1 G/DL (ref 32–36)
MCV RBC AUTO: 87 FL (ref 82–98)
MONOCYTES # BLD AUTO: 0.9 K/UL (ref 0.3–1)
MONOCYTES NFR BLD: 7.5 % (ref 4–15)
NEUTROPHILS # BLD AUTO: 7.9 K/UL (ref 1.8–7.7)
NEUTROPHILS NFR BLD: 68 % (ref 38–73)
NRBC BLD-RTO: 0 /100 WBC
P-ANCA TITR SER IF: NORMAL TITER
PLATELET # BLD AUTO: 365 K/UL (ref 150–450)
PMV BLD AUTO: 9.3 FL (ref 9.2–12.9)
POTASSIUM SERPL-SCNC: 3.9 MMOL/L (ref 3.5–5.1)
PROT SERPL-MCNC: 5.5 G/DL (ref 6–8.4)
PROT SERPL-MCNC: 6 G/DL (ref 6–8.4)
RBC # BLD AUTO: 3.39 M/UL (ref 4–5.4)
SODIUM SERPL-SCNC: 131 MMOL/L (ref 136–145)
WBC # BLD AUTO: 11.64 K/UL (ref 3.9–12.7)

## 2024-11-15 PROCEDURE — 80076 HEPATIC FUNCTION PANEL: CPT | Performed by: INTERNAL MEDICINE

## 2024-11-15 PROCEDURE — 85025 COMPLETE CBC W/AUTO DIFF WBC: CPT | Performed by: STUDENT IN AN ORGANIZED HEALTH CARE EDUCATION/TRAINING PROGRAM

## 2024-11-15 PROCEDURE — 99233 SBSQ HOSP IP/OBS HIGH 50: CPT | Mod: ,,, | Performed by: INTERNAL MEDICINE

## 2024-11-15 PROCEDURE — 11000001 HC ACUTE MED/SURG PRIVATE ROOM

## 2024-11-15 PROCEDURE — 63600175 PHARM REV CODE 636 W HCPCS: Performed by: REGISTERED NURSE

## 2024-11-15 PROCEDURE — 25000003 PHARM REV CODE 250: Performed by: INTERNAL MEDICINE

## 2024-11-15 PROCEDURE — S4991 NICOTINE PATCH NONLEGEND: HCPCS | Performed by: STUDENT IN AN ORGANIZED HEALTH CARE EDUCATION/TRAINING PROGRAM

## 2024-11-15 PROCEDURE — 25000003 PHARM REV CODE 250: Performed by: STUDENT IN AN ORGANIZED HEALTH CARE EDUCATION/TRAINING PROGRAM

## 2024-11-15 PROCEDURE — 63600175 PHARM REV CODE 636 W HCPCS: Performed by: INTERNAL MEDICINE

## 2024-11-15 PROCEDURE — 25000003 PHARM REV CODE 250: Performed by: EMERGENCY MEDICINE

## 2024-11-15 PROCEDURE — 80048 BASIC METABOLIC PNL TOTAL CA: CPT | Performed by: REGISTERED NURSE

## 2024-11-15 RX ADMIN — HYDROMORPHONE HYDROCHLORIDE 2 MG: 2 TABLET ORAL at 02:11

## 2024-11-15 RX ADMIN — PANTOPRAZOLE SODIUM 40 MG: 40 INJECTION, POWDER, LYOPHILIZED, FOR SOLUTION INTRAVENOUS at 08:11

## 2024-11-15 RX ADMIN — PANTOPRAZOLE SODIUM 40 MG: 40 INJECTION, POWDER, LYOPHILIZED, FOR SOLUTION INTRAVENOUS at 07:11

## 2024-11-15 RX ADMIN — SUCRALFATE 1 G: 1 TABLET ORAL at 05:11

## 2024-11-15 RX ADMIN — HYDROMORPHONE HYDROCHLORIDE 2 MG: 2 TABLET ORAL at 07:11

## 2024-11-15 RX ADMIN — METOPROLOL SUCCINATE 25 MG: 25 TABLET, EXTENDED RELEASE ORAL at 07:11

## 2024-11-15 RX ADMIN — HYDROMORPHONE HYDROCHLORIDE 2 MG: 2 TABLET ORAL at 03:11

## 2024-11-15 RX ADMIN — NICOTINE 1 PATCH: 21 PATCH, EXTENDED RELEASE TRANSDERMAL at 07:11

## 2024-11-15 RX ADMIN — CEFTRIAXONE SODIUM 1 G: 1 INJECTION, POWDER, FOR SOLUTION INTRAMUSCULAR; INTRAVENOUS at 03:11

## 2024-11-15 RX ADMIN — SODIUM CHLORIDE 125 MG: 9 INJECTION, SOLUTION INTRAVENOUS at 08:11

## 2024-11-15 RX ADMIN — HYDROMORPHONE HYDROCHLORIDE 2 MG: 2 TABLET ORAL at 08:11

## 2024-11-15 RX ADMIN — SUCRALFATE 1 G: 1 TABLET ORAL at 11:11

## 2024-11-15 NOTE — PROGRESS NOTES
[facility]  Hematology/Oncology  Progress Note    Patient Name: Mary Ellen Saini  Admission Date: 11/7/2024  Hospital Length of Stay: 7 days  Code Status: Full Code     Subjective:     HPI:  59 year-old female was admitted for gastrointestinal bleeding. Upper GI endoscopy revealed gastritis and a duodenal ulcer. Preliminary pathology report suggests possible amyloidosis. Consult is for possible amyloidosis.    Interval History:     - she states she does not feel good. No big improvements since initial visit.    Oncology Treatment Plan:   [No matching plan found]    Medications:  Continuous Infusions:  Scheduled Meds:   cefTRIAXone (Rocephin) IV (PEDS and ADULTS)  1 g Intravenous Q24H    metoprolol succinate  25 mg Oral Daily    nicotine  1 patch Transdermal Daily    pantoprazole  40 mg Intravenous BID    sucralfate  1 g Oral TID AC     PRN Meds:  Current Facility-Administered Medications:     0.9%  NaCl infusion (for blood administration), , Intravenous, Q24H PRN    acetaminophen, 650 mg, Oral, Q6H PRN    HYDROmorphone, 2 mg, Oral, Q4H PRN    influenza, 0.5 mL, Intramuscular, Prior to discharge     Review of Systems   Constitutional:  Positive for fatigue.   HENT:  Negative for sore throat.    Eyes:  Negative for visual disturbance.   Respiratory:  Positive for shortness of breath. Negative for cough.    Cardiovascular:  Negative for chest pain.   Gastrointestinal:  Negative for abdominal pain, constipation, diarrhea, nausea and vomiting.   Genitourinary:  Negative for dysuria.   Musculoskeletal:  Negative for back pain.   Skin:  Negative for rash.   Neurological:  Positive for weakness. Negative for headaches.   Hematological:  Negative for adenopathy.   Psychiatric/Behavioral:  The patient is not nervous/anxious.      Objective:     Vital Signs (Most Recent):  Temp: 97.5 °F (36.4 °C) (11/15/24 0724)  Pulse: 105 (11/15/24 0724)  Resp: 18 (11/15/24 0756)  BP: (!) 140/84 (11/15/24 0724)  SpO2: 96 % (11/15/24  0724) Vital Signs (24h Range):  Temp:  [97.5 °F (36.4 °C)-98.5 °F (36.9 °C)] 97.5 °F (36.4 °C)  Pulse:  [] 105  Resp:  [16-20] 18  SpO2:  [95 %-100 %] 96 %  BP: (125-140)/(75-86) 140/84     Weight: 83.9 kg (184 lb 15.5 oz)  Body mass index is 26.54 kg/m².  Body surface area is 2.04 meters squared.      Intake/Output Summary (Last 24 hours) at 11/15/2024 1008  Last data filed at 11/14/2024 2324  Gross per 24 hour   Intake 840 ml   Output --   Net 840 ml        Physical Exam  Vitals and nursing note reviewed.   Constitutional:       Appearance: She is well-developed.   HENT:      Head: Normocephalic and atraumatic.   Eyes:      Pupils: Pupils are equal, round, and reactive to light.   Cardiovascular:      Rate and Rhythm: Normal rate and regular rhythm.   Pulmonary:      Effort: Pulmonary effort is normal.      Breath sounds: Normal breath sounds.   Abdominal:      General: Bowel sounds are normal.      Palpations: Abdomen is soft.   Musculoskeletal:         General: Normal range of motion.      Cervical back: Normal range of motion and neck supple.   Skin:     General: Skin is warm and dry.   Neurological:      Mental Status: She is alert and oriented to person, place, and time.   Psychiatric:         Behavior: Behavior normal.         Thought Content: Thought content normal.         Judgment: Judgment normal.          Significant Labs:   CBC:   Recent Labs   Lab 11/14/24  0231 11/15/24  0300   WBC 11.49 11.64   HGB 8.7* 9.2*   HCT 26.7* 29.6*    365    and CMP:   Recent Labs   Lab 11/14/24  0231 11/15/24  0300   * 131*   K 4.0 3.9   CL 99 100   CO2 25 25    95   BUN 24* 27*   CREATININE 1.2 1.4   CALCIUM 7.8* 7.9*   PROT  --  6.0   ALBUMIN  --  2.0*   BILITOT  --  0.2   ALKPHOS  --  83   AST  --  17   ALT  --  13   ANIONGAP 7* 6*       Diagnostic Results:  11/11/24:  1. DUODENUM, 2ND PORTION, BIOPSY:  - POSITIVE for amyloid deposition (see comment)  - Background duodenal mucosa with foci of  foveolar metaplasia and reactive changes  - No evidence of intraepithelial lymphocytosis    2. STOMACH, BIOPSY:  - POSITIVE for amyloid deposition (see comment)  - Background gastric mucosa with mild chronic inflammation  - No evidence of Helicobacter-like organisms (an H. pylori immunohistochemical study is negative)    3. COLON, RIGHT, BIOPSY:  - POSITIVE for amyloid deposition (see comment)  - Background colonic mucosa with ulceration, focally reactive/regenerative epithelial changes, and detached necroinflammatory debris  - No evidence of dysplasia    4. COLON, TRANSVERSE, BIOPSY:  - POSITIVE for amyloid deposition (see comment)  - Background colonic mucosa with ulceration, focally reactive/regenerative epithelial changes, and detached necroinflammatory debris  - No evidence of dysplasia    Comment:  The presence of amyloid in parts 1-4 is confirmed with Congo red cytochemical studies. Tissue will be sent to [facility] for amyloid protein identification and subtyping by mass spectrometry , and results will be issued in a supplemental  report.         Echocardiogram (8/12/24):    Left Ventricle: The left ventricle is moderately dilated. There is eccentric hypertrophy. Moderate global hypokinesis present. There is moderately reduced systolic function with a visually estimated ejection fraction of 30 - 35%. Biplane (2D) method of discs ejection fraction is 34%. There is diastolic dysfunction but grade cannot be determined.    Right Ventricle: Normal right ventricular cavity size. Wall thickness is normal. Systolic function is normal.    Left Atrium: Left atrium is severely dilated.    Right Atrium: Right atrium is moderately dilated.    Aortic Valve: There is mild stenosis. Aortic valve area by VTI is 1.95 cm². Aortic valve peak velocity is 1.40 m/s. Mean gradient is 4 mmHg. The dimensionless index is 0.62.    Mitral Valve: There is mild regurgitation.    Tricuspid Valve: There is mild  regurgitation.    Pulmonic Valve: There is mild regurgitation.    Pulmonary Artery: The estimated pulmonary artery systolic pressure is 36 mmHg.    IVC/SVC: Normal venous pressure at 3 mmHg.  Assessment/Plan:     * GIB (gastrointestinal bleeding)  - iron studies show normal ferritin with decreased iron/saturated. Okay to give ferric gluconate daily x 2 days while she is in the hospital    Other amyloidosis  - biopsy of stomach, small intestine, colon (11/11/24) revealed amyloid deposition. I reviewed the pathology results with her.  - tissue has been sent to [facility] for confirmatory testing.  - she also has heart failure, which may be related to amyloid deposition  - follow up SPEP, immunofixation, quantitative immunoglobulins. I have also ordered 24-hr urine studies and other labs for further workup.  - will arrange for follow-up appointment with me as well as amyloidosis team at [facility].        Thank you for your consult.     Octavio Hays MD  Hematology/Oncology  [place] - Telemetry

## 2024-11-15 NOTE — PLAN OF CARE
Not medically ready for discharge to home today. More test needed per Dr Spencer. CM will continue to follow pt and provide any discharge needs. Plans are to discharge back to home when medically ready.   Future Appointments   Date Time Provider Department Center   12/3/2024 11:00 AM Yandel Bonds, RRT [facility]      11/15/24 1304   Rounds   Attendance Nurse    Discharge Plan A Home;Home with family   Why the patient remains in the hospital Requires continued medical care   Transition of Care Barriers None

## 2024-11-15 NOTE — SUBJECTIVE & OBJECTIVE
Interval History: Virtual follow up visit for suspected Shortness of breath [R06.02]  Symptomatic anemia [D64.9] present on admission.   This service was provided by telemedicine.    The patient location is: [facility] [address]   Admitted 11/7/2024  9:15 PM    CC: GI bleed    The patient is able to provide adequate history. History was obtained from patient and past medical records.   No significant events overnight reported.  No blood in stool in past 24 hours - she remains feeling weak but is ambulatory in her room.  Abd pain controlled with current regimen.  Labs and 24 hr urine ordered by hematology. Lying flat in bed without respiratory problems.       Data  Details     [x]   Lab results reviewed 11/15/2024  sCr increasing. Hyponatremia stable. H&H mildly improved.        []   Micro reports reviewed 11/15/2024     [x]   Pathology reports reviewed 11/15/2024  Colon bx c/w amyloid    []   Imaging reports reviewed 11/15/2024     []   Cardiology Procedure reports reviewed 11/15/2024      []   Non- records/CareEverywhere notes reviewed 11/15/2024      []  Tests/studies orders placed or verified 11/15/2024       []  Independently viewed/assessed 11/15/2024  Corrected Ca: 9.3    []  11/15/2024 Discussion of:       Review of Systems   Constitutional:  Positive for fatigue. Negative for fever.   Respiratory:  Negative for shortness of breath.    Cardiovascular:  Negative for leg swelling.   Gastrointestinal:  Positive for abdominal pain. Negative for blood in stool.     Objective:     Vital Signs (Most Recent):  Temp: 97.5 °F (36.4 °C) (11/15/24 0724)  Pulse: 105 (11/15/24 0724)  Resp: 18 (11/15/24 0756)  BP: (!) 140/84 (11/15/24 0724)  SpO2: 96 % (11/15/24 0724) Vital Signs (24h Range):  Temp:  [97.5 °F (36.4 °C)-98.5 °F (36.9 °C)] 97.5 °F (36.4 °C)  Pulse:  [] 105  Resp:  [16-20] 18  SpO2:  [95 %-100 %] 96 %  BP: (125-140)/(75-86) 140/84     Weight: 83.9 kg (184 lb 15.5 oz)  Body mass index is 26.54  "kg/m².    Intake/Output Summary (Last 24 hours) at 11/15/2024 1027  Last data filed at 11/14/2024 2324  Gross per 24 hour   Intake 840 ml   Output --   Net 840 ml           Physical Exam  Constitutional:       General: She is awake.      Appearance: She is ill-appearing.   Cardiovascular:      Comments: Monitor and/or Vital signs reviewed at time of visit  Pulmonary:      Effort: Pulmonary effort is normal. No accessory muscle usage or respiratory distress.   Neurological:      Mental Status: She is alert. She is not disoriented.   Psychiatric:         Attention and Perception: Attention normal.         Behavior: Behavior is cooperative.         Significant Labs:   Recent Labs   Lab 10/20/24  0722   HGBA1C 5.4     Recent Labs   Lab 11/13/24  0226 11/14/24  0231 11/15/24  0300   WBC 10.64 11.49 11.64   HGB 8.7* 8.7* 9.2*   HCT 26.8* 26.7* 29.6*    370 365     Recent Labs   Lab 11/13/24  0226 11/14/24  0231 11/15/24  0300   GRAN 66.2  7.0 69.9  8.0* 68.0  7.9*   LYMPH 18.8  2.0 17.4*  2.0 19.0  2.2   MONO 7.8  0.8 7.1  0.8 7.5  0.9   EOS 0.6* 0.5 0.5     Recent Labs   Lab 11/10/24  0220 11/11/24  0226 11/13/24  0226 11/14/24  0231 11/15/24  0300   *   < > 132* 131* 131*   K 3.0*   < > 3.8 4.0 3.9      < > 99 99 100   CO2 20*   < > 24 25 25   BUN 26*   < > 19 24* 27*   CREATININE 1.4   < > 1.6* 1.2 1.4   *   < > 103 106 95   CALCIUM 7.7*   < > 8.0* 7.8* 7.9*   ALBUMIN  --   --  2.1*  --  2.0*   MG 1.5*  --  2.1  --   --    PHOS  --   --  3.1  --   --     < > = values in this interval not displayed.     Recent Labs   Lab 11/12/24  1300 11/13/24  0226 11/15/24  0300   ALKPHOS  --  91 83   ALT  --  24 13   AST  --  14 17   PROT  --  6.3 6.0   BILITOT  --  0.2 0.2   CRP 12.2*  --   --      No results for input(s): "CPK", "TROPONINI", "BNP" in the last 168 hours.    Recent Labs   Lab 08/12/24  0534 08/19/24  2034 08/19/24  2233 08/26/24  1545 11/07/24  2153 11/14/24  0231   PROCAL  --  " 0.10  --   --   --   --    LACTATE  --   --   --   --  1.0  --    DDIMER  --   --  0.72*  --   --   --    FERRITIN 112  --   --   --   --  78   SEDRATE  --   --   --  26  --   --      SARS-CoV-2 RNA, Amplification, Qual (no units)   Date Value   09/05/2024 Negative   08/19/2024 Negative     POC Rapid COVID (no units)   Date Value   08/29/2024 Negative   08/12/2024 Negative     ECG Results              EKG 12-lead (Final result)        Collection Time Result Time QRS Duration OHS QTC Calculation    11/07/24 22:25:43 11/10/24 11:34:04 88 478                     Final result by Interface, Lab In [facility] (11/10/24 11:34:08)                   Narrative:    Test Reason : R06.02,    Vent. Rate : 123 BPM     Atrial Rate : 123 BPM     P-R Int : 132 ms          QRS Dur : 088 ms      QT Int : 334 ms       P-R-T Axes : 033 004 055 degrees     QTc Int : 478 ms    Sinus tachycardia  diffuse ST depression ,consider ischemia   Abnormal ECG  When compared with ECG of 19-OCT-2024 09:34,  Vent. rate has increased BY  54 BPM  Confirmed by Vinod Larry MD ([id number]) on 11/10/2024 11:34:03 AM    Referred By: [email or web address]   SELF           Confirmed By:Vinod Larry MD                                Results for orders placed during the hospital encounter of 08/12/24    Echo    Interpretation Summary    Left Ventricle: The left ventricle is moderately dilated. There is eccentric hypertrophy. Moderate global hypokinesis present. There is moderately reduced systolic function with a visually estimated ejection fraction of 30 - 35%. Biplane (2D) method of discs ejection fraction is 34%. There is diastolic dysfunction but grade cannot be determined.    Right Ventricle: Normal right ventricular cavity size. Wall thickness is normal. Systolic function is normal.    Left Atrium: Left atrium is severely dilated.    Right Atrium: Right atrium is moderately dilated.    Aortic Valve: There is mild stenosis. Aortic valve area by VTI is 1.95  cm². Aortic valve peak velocity is 1.40 m/s. Mean gradient is 4 mmHg. The dimensionless index is 0.62.    Mitral Valve: There is mild regurgitation.    Tricuspid Valve: There is mild regurgitation.    Pulmonic Valve: There is mild regurgitation.    Pulmonary Artery: The estimated pulmonary artery systolic pressure is 36 mmHg.    IVC/SVC: Normal venous pressure at 3 mmHg.      CTA Acute GI [place], Abdomen and Pelvis  Narrative: EXAMINATION:  CTA ACUTE GI BLEED, ABDOMEN AND PELVIS    CLINICAL HISTORY:  GI bleeding;    TECHNIQUE:  Using 75 cc of  Omnipaque 350 IV, and multi-detector helical CT technique, axial CT angiogram images of the abdomen were obtained from the lung bases through the pelvis. Precontrast and portal venous phase images of the abdomen and pelvis also done. 2D post-processing coronal and sagittal reconstructions of the abdominal aorta and visceral arteries performed.    COMPARISON:  10/24/2024    FINDINGS:  The lung bases are well aerated.  No consolidation, suspicious nodules, with small bilateral pleural effusion.  The visualized portions of the heart appear normal.    The esophagus, stomach, spleen, pancreas, and adrenal glands are unremarkable.    The liver is normal in size and attenuation without focal abnormality.  The portal veins appear patent.  The gallbladder shows no evidence of stones or cholecystitis.  No intra-or extrahepatic biliary ductal dilatation.    The kidneys demonstrate normal enhancement.  No renal mass, nephrolithiasis or hydronephrosis.  The ureters are normal in course and caliber. The urinary bladder appears unremarkable    No evidence of gastrointestinal hemorrhage or bowel obstruction.  Inflammation in the right colon through the hepatic flexure and the proximal portion of the transverse colon with more normal splenic flexure descending colon and rectosigmoid colon with a few diverticula in the sigmoid but without diverticulitis.  Definite mass is not identified as on  the previous exam.  There is no ascites, free fluid, or intraperitoneal free air. No significant peritoneal or retroperitoneal adenopathy.    The abdominal aorta is normal in course and caliber without significant atherosclerotic calcifications.    The osseous structures and extraperitoneal soft tissues are unremarkable.  Impression: Inflammatory changes of the ascending and splenic flexure of the colon with the previous suggested mass not confidently identified.    No evidence of active GI bleed.    No evidence of bowel obstruction.    Mild pleural thickening and/or fluid.    This report was flagged in Epic as abnormal.    Electronically signed by: Jax Carmichael  Date:    11/07/2024  Time:    23:35  X-Ray Chest 1 View  Narrative: EXAMINATION:  XR CHEST 1 VIEW    CLINICAL HISTORY:  Shortness of breath    TECHNIQUE:  Single frontal view of the chest was performed.    COMPARISON:  10/19/2024.    FINDINGS:  The lungs are hypoexpanded.  There are perihilar interstitial opacities, may be accentuated by hypoaeration changes.  The pleural spaces are clear.  The cardiac silhouette is enlarged.  Osseous structures demonstrate degenerative changes.  Impression: Mild perihilar interstitial prominence, may be accentuated by hypoaeration.  Mild edema is not excluded.    Electronically signed by: Nick Clifton  Date:    11/07/2024  Time:    23:25      Labs and Imaging listed above were reviewed.

## 2024-11-15 NOTE — ASSESSMENT & PLAN NOTE
Latest Reference Range & Units 08/19/24 20:34   HCV Log <1.08 LogIU/mL 5.93 !   HCV Quantitative Result <12 IU/mL [id number] !   HCV, Qualitative Not Detected  Detected !   Hepatitis C Ab Non-reactive  Reactive !   HIV 1/2 Ag/Ab Non-reactive  Non-reactive     Needs follow up with hematology.

## 2024-11-15 NOTE — SUBJECTIVE & OBJECTIVE
Interval History:     - she states she does not feel good. No big improvements since initial visit.    Oncology Treatment Plan:   [No matching plan found]    Medications:  Continuous Infusions:  Scheduled Meds:   cefTRIAXone (Rocephin) IV (PEDS and ADULTS)  1 g Intravenous Q24H    metoprolol succinate  25 mg Oral Daily    nicotine  1 patch Transdermal Daily    pantoprazole  40 mg Intravenous BID    sucralfate  1 g Oral TID AC     PRN Meds:  Current Facility-Administered Medications:     0.9%  NaCl infusion (for blood administration), , Intravenous, Q24H PRN    acetaminophen, 650 mg, Oral, Q6H PRN    HYDROmorphone, 2 mg, Oral, Q4H PRN    influenza, 0.5 mL, Intramuscular, Prior to discharge     Review of Systems   Constitutional:  Positive for fatigue.   HENT:  Negative for sore throat.    Eyes:  Negative for visual disturbance.   Respiratory:  Positive for shortness of breath. Negative for cough.    Cardiovascular:  Negative for chest pain.   Gastrointestinal:  Negative for abdominal pain, constipation, diarrhea, nausea and vomiting.   Genitourinary:  Negative for dysuria.   Musculoskeletal:  Negative for back pain.   Skin:  Negative for rash.   Neurological:  Positive for weakness. Negative for headaches.   Hematological:  Negative for adenopathy.   Psychiatric/Behavioral:  The patient is not nervous/anxious.      Objective:     Vital Signs (Most Recent):  Temp: 97.5 °F (36.4 °C) (11/15/24 0724)  Pulse: 105 (11/15/24 0724)  Resp: 18 (11/15/24 0756)  BP: (!) 140/84 (11/15/24 0724)  SpO2: 96 % (11/15/24 0724) Vital Signs (24h Range):  Temp:  [97.5 °F (36.4 °C)-98.5 °F (36.9 °C)] 97.5 °F (36.4 °C)  Pulse:  [] 105  Resp:  [16-20] 18  SpO2:  [95 %-100 %] 96 %  BP: (125-140)/(75-86) 140/84     Weight: 83.9 kg (184 lb 15.5 oz)  Body mass index is 26.54 kg/m².  Body surface area is 2.04 meters squared.      Intake/Output Summary (Last 24 hours) at 11/15/2024 1008  Last data filed at 11/14/2024 2324  Gross per 24 hour    Intake 840 ml   Output --   Net 840 ml        Physical Exam  Vitals and nursing note reviewed.   Constitutional:       Appearance: She is well-developed.   HENT:      Head: Normocephalic and atraumatic.   Eyes:      Pupils: Pupils are equal, round, and reactive to light.   Cardiovascular:      Rate and Rhythm: Normal rate and regular rhythm.   Pulmonary:      Effort: Pulmonary effort is normal.      Breath sounds: Normal breath sounds.   Abdominal:      General: Bowel sounds are normal.      Palpations: Abdomen is soft.   Musculoskeletal:         General: Normal range of motion.      Cervical back: Normal range of motion and neck supple.   Skin:     General: Skin is warm and dry.   Neurological:      Mental Status: She is alert and oriented to person, place, and time.   Psychiatric:         Behavior: Behavior normal.         Thought Content: Thought content normal.         Judgment: Judgment normal.          Significant Labs:   CBC:   Recent Labs   Lab 11/14/24  0231 11/15/24  0300   WBC 11.49 11.64   HGB 8.7* 9.2*   HCT 26.7* 29.6*    365    and CMP:   Recent Labs   Lab 11/14/24  0231 11/15/24  0300   * 131*   K 4.0 3.9   CL 99 100   CO2 25 25    95   BUN 24* 27*   CREATININE 1.2 1.4   CALCIUM 7.8* 7.9*   PROT  --  6.0   ALBUMIN  --  2.0*   BILITOT  --  0.2   ALKPHOS  --  83   AST  --  17   ALT  --  13   ANIONGAP 7* 6*       Diagnostic Results:  11/11/24:  1. DUODENUM, 2ND PORTION, BIOPSY:  - POSITIVE for amyloid deposition (see comment)  - Background duodenal mucosa with foci of foveolar metaplasia and reactive changes  - No evidence of intraepithelial lymphocytosis    2. STOMACH, BIOPSY:  - POSITIVE for amyloid deposition (see comment)  - Background gastric mucosa with mild chronic inflammation  - No evidence of Helicobacter-like organisms (an H. pylori immunohistochemical study is negative)    3. COLON, RIGHT, BIOPSY:  - POSITIVE for amyloid deposition (see comment)  - Background colonic  mucosa with ulceration, focally reactive/regenerative epithelial changes, and detached necroinflammatory debris  - No evidence of dysplasia    4. COLON, TRANSVERSE, BIOPSY:  - POSITIVE for amyloid deposition (see comment)  - Background colonic mucosa with ulceration, focally reactive/regenerative epithelial changes, and detached necroinflammatory debris  - No evidence of dysplasia    Comment:  The presence of amyloid in parts 1-4 is confirmed with [place] red cytochemical studies. Tissue will be sent to [facility] for amyloid protein identification and subtyping by mass spectrometry , and results will be issued in a supplemental  report.         Echocardiogram (8/12/24):    Left Ventricle: The left ventricle is moderately dilated. There is eccentric hypertrophy. Moderate global hypokinesis present. There is moderately reduced systolic function with a visually estimated ejection fraction of 30 - 35%. Biplane (2D) method of discs ejection fraction is 34%. There is diastolic dysfunction but grade cannot be determined.    Right Ventricle: Normal right ventricular cavity size. Wall thickness is normal. Systolic function is normal.    Left Atrium: Left atrium is severely dilated.    Right Atrium: Right atrium is moderately dilated.    Aortic Valve: There is mild stenosis. Aortic valve area by VTI is 1.95 cm². Aortic valve peak velocity is 1.40 m/s. Mean gradient is 4 mmHg. The dimensionless index is 0.62.    Mitral Valve: There is mild regurgitation.    Tricuspid Valve: There is mild regurgitation.    Pulmonic Valve: There is mild regurgitation.    Pulmonary Artery: The estimated pulmonary artery systolic pressure is 36 mmHg.    IVC/SVC: Normal venous pressure at 3 mmHg.

## 2024-11-15 NOTE — ASSESSMENT & PLAN NOTE
Patient's hemorrhage is due to gastrointestinal bleed, patient does not have a propensity for bleeding..   Patients most recent Hgb, Hct, platelets are listed below.  Recent Labs     11/13/24  0226 11/14/24  0231 11/15/24  0300   HGB 8.7* 8.7* 9.2*   HCT 26.8* 26.7* 29.6*    370 365       - trend hemoglobin/hematocrit   - monitor and correct any coagulation defects  - transfuse if Hgb is <7g/dl (<8g/dl in cases of active ACS) or if patient has rapid bleeding leading to hemodynamic instability  - Consult GI  -PPI BID and carafate TID  -patient received 3 U PRBC  -patient was evaluated by GI, EGD/colonoscopy 11/11  -endoscopy findings concerning for an unclear underlying process. Vasculitis-associated screening labs ordered.   -24 hr urine and serology ordered by heme/onc  -colon bx with amyloidosis - consulted Heme/Onc  - patient needs to f/u with amyloidosis provider

## 2024-11-15 NOTE — NURSING
24 hour urine collection started at this time. Education provided to patient on proper collection and she verbalized understanding. Urine collection container placed on ice and in restroom. PCT update on POC to assist in proper collection as well at this time.

## 2024-11-15 NOTE — PROGRESS NOTES
[facility] Medicine  Telemedicine Progress Note    Patient Name: Mary Ellen Saini  MRN: [id number]  Patient Class: IP- Inpatient   Admission Date: 11/7/2024  Length of Stay: 7 days  Attending Physician: Ashley Spencer MD  Primary Care Provider: Marlen, Primary Doctor          Subjective:     Principal Problem:GIB (gastrointestinal bleeding)        HPI:  [age] year old female with a history of hypertension, bipolar, hep C, HFrEF presents to ED with complaints of chest pain, shortness a breath and blood in her stool.  Patient was recently admitted for GI bleed which she required blood transfusion.  Patient reports bleeding in stool has been having for the last several days however worsened today along with worsening abdominal pain. In ED labs remarkable for H&H 5.6 and 17.2.  Patient discharged 12 days ago with H&H of 7.3 and 22.8.  Blood pressure stable on admission however she is tachycardic 120.  Patient also with a chest pain.  EKG without ST elevation BNP 3425, troponin 3.379.  CTA of the abdomen done denies show any evidence of active GI bleed.  Patient typed and crossmatch for 3 units.  We will start PPI IV BID, transfuse PRBC, consult GI and Cards.    Overview/Hospital Course:  No notes on file    Interval History: Virtual follow up visit for suspected Shortness of breath [R06.02]  Symptomatic anemia [D64.9] present on admission.   This service was provided by telemedicine.    The patient location is: [facility]/[address]   Admitted 11/7/2024  9:15 PM    CC: GI bleed    The patient is able to provide adequate history. History was obtained from patient and past medical records.   No significant events overnight reported.  No blood in stool in past 24 hours - she remains feeling weak but is ambulatory in her room.  Abd pain controlled with current regimen.  Labs and 24 hr urine ordered by hematology. Lying flat in bed without respiratory problems.       Data  Details     [x]   Lab results reviewed  11/15/2024  sCr increasing. Hyponatremia stable. H&H mildly improved.        []   Micro reports reviewed 11/15/2024     [x]   Pathology reports reviewed 11/15/2024  Colon bx c/w amyloid    []   Imaging reports reviewed 11/15/2024     []   Cardiology Procedure reports reviewed 11/15/2024      []   Non- records/CareEverywhere notes reviewed 11/15/2024      []  Tests/studies orders placed or verified 11/15/2024       []  Independently viewed/assessed 11/15/2024  Corrected Ca: 9.3    []  11/15/2024 Discussion of:       Review of Systems   Constitutional:  Positive for fatigue. Negative for fever.   Respiratory:  Negative for shortness of breath.    Cardiovascular:  Negative for leg swelling.   Gastrointestinal:  Positive for abdominal pain. Negative for blood in stool.     Objective:     Vital Signs (Most Recent):  Temp: 97.5 °F (36.4 °C) (11/15/24 0724)  Pulse: 105 (11/15/24 0724)  Resp: 18 (11/15/24 0756)  BP: (!) 140/84 (11/15/24 0724)  SpO2: 96 % (11/15/24 0724) Vital Signs (24h Range):  Temp:  [97.5 °F (36.4 °C)-98.5 °F (36.9 °C)] 97.5 °F (36.4 °C)  Pulse:  [] 105  Resp:  [16-20] 18  SpO2:  [95 %-100 %] 96 %  BP: (125-140)/(75-86) 140/84     Weight: 83.9 kg (184 lb 15.5 oz)  Body mass index is 26.54 kg/m².    Intake/Output Summary (Last 24 hours) at 11/15/2024 1027  Last data filed at 11/14/2024 2324  Gross per 24 hour   Intake 840 ml   Output --   Net 840 ml           Physical Exam  Constitutional:       General: She is awake.      Appearance: She is ill-appearing.   Cardiovascular:      Comments: Monitor and/or Vital signs reviewed at time of visit  Pulmonary:      Effort: Pulmonary effort is normal. No accessory muscle usage or respiratory distress.   Neurological:      Mental Status: She is alert. She is not disoriented.   Psychiatric:         Attention and Perception: Attention normal.         Behavior: Behavior is cooperative.         Significant Labs:   Recent Labs   Lab 10/20/24  0722   HGBA1C 5.4  "    Recent Labs   Lab 11/13/24  0226 11/14/24  0231 11/15/24  0300   WBC 10.64 11.49 11.64   HGB 8.7* 8.7* 9.2*   HCT 26.8* 26.7* 29.6*    370 365     Recent Labs   Lab 11/13/24  0226 11/14/24  0231 11/15/24  0300   GRAN 66.2  7.0 69.9  8.0* 68.0  7.9*   LYMPH 18.8  2.0 17.4*  2.0 19.0  2.2   MONO 7.8  0.8 7.1  0.8 7.5  0.9   EOS 0.6* 0.5 0.5     Recent Labs   Lab 11/10/24  0220 11/11/24  0226 11/13/24  0226 11/14/24  0231 11/15/24  0300   *   < > 132* 131* 131*   K 3.0*   < > 3.8 4.0 3.9      < > 99 99 100   CO2 20*   < > 24 25 25   BUN 26*   < > 19 24* 27*   CREATININE 1.4   < > 1.6* 1.2 1.4   *   < > 103 106 95   CALCIUM 7.7*   < > 8.0* 7.8* 7.9*   ALBUMIN  --   --  2.1*  --  2.0*   MG 1.5*  --  2.1  --   --    PHOS  --   --  3.1  --   --     < > = values in this interval not displayed.     Recent Labs   Lab 11/12/24  1300 11/13/24  0226 11/15/24  0300   ALKPHOS  --  91 83   ALT  --  24 13   AST  --  14 17   PROT  --  6.3 6.0   BILITOT  --  0.2 0.2   CRP 12.2*  --   --      No results for input(s): "CPK", "TROPONINI", "BNP" in the last 168 hours.    Recent Labs   Lab 08/12/24  0534 08/19/24 2034 08/19/24  2233 08/26/24  1545 11/07/24  2153 11/14/24 0231   PROCAL  --  0.10  --   --   --   --    LACTATE  --   --   --   --  1.0  --    DDIMER  --   --  0.72*  --   --   --    FERRITIN 112  --   --   --   --  78   SEDRATE  --   --   --  26  --   --      SARS-CoV-2 RNA, Amplification, Qual (no units)   Date Value   09/05/2024 Negative   08/19/2024 Negative     POC Rapid COVID (no units)   Date Value   08/29/2024 Negative   08/12/2024 Negative     ECG Results              EKG 12-lead (Final result)        Collection Time Result Time QRS Duration OHS QTC Calculation    11/07/24 22:25:43 11/10/24 11:34:04 88 478                     Final result by Interface, Lab In [facility] (11/10/24 11:34:08)                   Narrative:    Test Reason : R06.02,    Vent. Rate : 123 BPM     Atrial Rate : " 123 BPM     P-R Int : 132 ms          QRS Dur : 088 ms      QT Int : 334 ms       P-R-T Axes : 033 004 055 degrees     QTc Int : 478 ms    Sinus tachycardia  diffuse ST depression ,consider ischemia   Abnormal ECG  When compared with ECG of 19-OCT-2024 09:34,  Vent. rate has increased BY  54 BPM  Confirmed by Vinod Larry MD ([id number]) on 11/10/2024 11:34:03 AM    Referred By: [email or web address]   SELF           Confirmed By:Vinod Larry MD                                Results for orders placed during the hospital encounter of 08/12/24    Echo    Interpretation Summary    Left Ventricle: The left ventricle is moderately dilated. There is eccentric hypertrophy. Moderate global hypokinesis present. There is moderately reduced systolic function with a visually estimated ejection fraction of 30 - 35%. Biplane (2D) method of discs ejection fraction is 34%. There is diastolic dysfunction but grade cannot be determined.    Right Ventricle: Normal right ventricular cavity size. Wall thickness is normal. Systolic function is normal.    Left Atrium: Left atrium is severely dilated.    Right Atrium: Right atrium is moderately dilated.    Aortic Valve: There is mild stenosis. Aortic valve area by VTI is 1.95 cm². Aortic valve peak velocity is 1.40 m/s. Mean gradient is 4 mmHg. The dimensionless index is 0.62.    Mitral Valve: There is mild regurgitation.    Tricuspid Valve: There is mild regurgitation.    Pulmonic Valve: There is mild regurgitation.    Pulmonary Artery: The estimated pulmonary artery systolic pressure is 36 mmHg.    IVC/SVC: Normal venous pressure at 3 mmHg.      CTA Acute GI [place], Abdomen and Pelvis  Narrative: EXAMINATION:  CTA ACUTE GI BLEED, ABDOMEN AND PELVIS    CLINICAL HISTORY:  GI bleeding;    TECHNIQUE:  Using 75 cc of  Omnipaque 350 IV, and multi-detector helical CT technique, axial CT angiogram images of the abdomen were obtained from the lung bases through the pelvis. Precontrast and portal  venous phase images of the abdomen and pelvis also done. 2D post-processing coronal and sagittal reconstructions of the abdominal aorta and visceral arteries performed.    COMPARISON:  10/24/2024    FINDINGS:  The lung bases are well aerated.  No consolidation, suspicious nodules, with small bilateral pleural effusion.  The visualized portions of the heart appear normal.    The esophagus, stomach, spleen, pancreas, and adrenal glands are unremarkable.    The liver is normal in size and attenuation without focal abnormality.  The portal veins appear patent.  The gallbladder shows no evidence of stones or cholecystitis.  No intra-or extrahepatic biliary ductal dilatation.    The kidneys demonstrate normal enhancement.  No renal mass, nephrolithiasis or hydronephrosis.  The ureters are normal in course and caliber. The urinary bladder appears unremarkable    No evidence of gastrointestinal hemorrhage or bowel obstruction.  Inflammation in the right colon through the hepatic flexure and the proximal portion of the transverse colon with more normal splenic flexure descending colon and rectosigmoid colon with a few diverticula in the sigmoid but without diverticulitis.  Definite mass is not identified as on the previous exam.  There is no ascites, free fluid, or intraperitoneal free air. No significant peritoneal or retroperitoneal adenopathy.    The abdominal aorta is normal in course and caliber without significant atherosclerotic calcifications.    The osseous structures and extraperitoneal soft tissues are unremarkable.  Impression: Inflammatory changes of the ascending and splenic flexure of the colon with the previous suggested mass not confidently identified.    No evidence of active GI bleed.    No evidence of bowel obstruction.    Mild pleural thickening and/or fluid.    This report was flagged in Epic as abnormal.    Electronically signed by: Jax Carmichael  Date:    11/07/2024  Time:    23:35  X-Ray Chest 1  View  Narrative: EXAMINATION:  XR CHEST 1 VIEW    CLINICAL HISTORY:  Shortness of breath    TECHNIQUE:  Single frontal view of the chest was performed.    COMPARISON:  10/19/2024.    FINDINGS:  The lungs are hypoexpanded.  There are perihilar interstitial opacities, may be accentuated by hypoaeration changes.  The pleural spaces are clear.  The cardiac silhouette is enlarged.  Osseous structures demonstrate degenerative changes.  Impression: Mild perihilar interstitial prominence, may be accentuated by hypoaeration.  Mild edema is not excluded.    Electronically signed by: Nick Clifton  Date:    11/07/2024  Time:    23:25      Labs and Imaging listed above were reviewed.       Assessment/Plan:      * GIB (gastrointestinal bleeding)  Patient's hemorrhage is due to gastrointestinal bleed, patient does not have a propensity for bleeding..   Patients most recent Hgb, Hct, platelets are listed below.  Recent Labs     11/13/24  0226 11/14/24  0231 11/15/24  0300   HGB 8.7* 8.7* 9.2*   HCT 26.8* 26.7* 29.6*    370 365       - trend hemoglobin/hematocrit   - monitor and correct any coagulation defects  - transfuse if Hgb is <7g/dl (<8g/dl in cases of active ACS) or if patient has rapid bleeding leading to hemodynamic instability  - Consult GI  -PPI BID and carafate TID  -patient received 3 U PRBC  -patient was evaluated by GI, EGD/colonoscopy 11/11  -endoscopy findings concerning for an unclear underlying process. Vasculitis-associated screening labs ordered.   -24 hr urine and serology ordered by heme/onc  -colon bx with amyloidosis - consulted Heme/Onc  - patient needs to f/u with amyloidosis provider    Other amyloidosis  See GIB (gastrointestinal bleeding)     ABLA (acute blood loss anemia)  Anemia is likely due to acute blood loss which was from GIB . Most recent hemoglobin and hematocrit are listed below.  Recent Labs     11/12/24  0304 11/13/24 0226 11/14/24  0231   HGB 9.0* 8.7* 8.7*   HCT 28.2* 26.8* 26.7*      CTA w/o active GIB  - Monitor serial CBC:   - Transfuse PRBC if patient becomes hemodynamically unstable, symptomatic or H/H drops below 7/21.  - Patient has received 3 units of PRBCs on 11/8/24  - Patient's anemia is currently stable    Leukocytosis  No obvious infection  Started Rocephin given pts hx Hep C and GIB  Improved, no evidence of SBP    NSTEMI (non-ST elevated myocardial infarction)  EKG w/o ST elevation  Troponin elevated 3.379-->3.587-->3.449  Cards eval is appreciated, medical management is recommended     HFrEF (heart failure with reduced ejection fraction)  Echo 08/2024  EF 34%  Continue BB  Given one dose IV Lasix 10/08  Cardiac/Autonomic (From admission, onward)      Start     Stop Route Frequency Ordered    11/08/24 0030  metoprolol succinate (TOPROL-XL) 24 hr tablet 25 mg         -- Oral Daily 11/08/24 0029        Continued oral Lasix.  Renal insufficiency on 11/13 after recent NPO status; held Lasix on 11/13. Scheduled to resume on 11/14 with rising Cr again - hold lasix  -amyloidosis may also be cause of her recently diagnosed heart failure    Hepatitis C     Latest Reference Range & Units 08/19/24 20:34   HCV Log <1.08 LogIU/mL 5.93 !   HCV Quantitative Result <12 IU/mL [id number] !   HCV, Qualitative Not Detected  Detected !   Hepatitis C Ab Non-reactive  Reactive !   HIV 1/2 Ag/Ab Non-reactive  Non-reactive     Needs follow up with hematology.    Smoking history  Nicotine patches  Smoking cessation counseling  U tox is positive for cocaine amphetamine and THC    Hypokalemia  Patient's most recent potassium results are listed below.   Recent Labs     11/13/24  0226 11/14/24  0231 11/15/24  0300   K 3.8 4.0 3.9       Plan  - Replete potassium per protocol  - Monitor potassium   - Patient's hypokalemia is improving      VTE Risk Mitigation (From admission, onward)           Ordered     IP VTE HIGH RISK PATIENT  Once         11/08/24 0137     Place sequential compression device  Until  discontinued         11/08/24 0137                  High Risk Conditions:  Patient is currently receiving parenteral controlled substances: Dilaudid      I have completed this tele-visit with the assistance of a telepresenter.    The attending portion of this evaluation, treatment, and documentation was performed per Ashley Spencer MD via Telemedicine AudioVisual using the secure [facility] software platform with 2 way audio/video. The provider was located off-site and the patient is located in the hospital. The aforementioned video software was utilized to document the relevant history and physical exam    Ashley Spencer MD  Department of [facility] Medicine   [facility]

## 2024-11-15 NOTE — PROGRESS NOTES
Smoking cessation education follow-up; Ambulatory Smoking Cessation clinic appointment changed to [facility] clinic, 12/3/24 at 11 am. Order requested upon discharge for 21 mg nicotine patch Q day.

## 2024-11-15 NOTE — ASSESSMENT & PLAN NOTE
Patient's most recent potassium results are listed below.   Recent Labs     11/13/24  0226 11/14/24  0231 11/15/24  0300   K 3.8 4.0 3.9       Plan  - Replete potassium per protocol  - Monitor potassium   - Patient's hypokalemia is improving

## 2024-11-15 NOTE — ASSESSMENT & PLAN NOTE
- biopsy of stomach, small intestine, colon (11/11/24) revealed amyloid deposition. I reviewed the pathology results with her.  - tissue has been sent to [facility] for confirmatory testing.  - she also has heart failure, which may be related to amyloid deposition  - follow up SPEP, immunofixation, quantitative immunoglobulins. I have also ordered 24-hr urine studies and other labs for further workup.  - will arrange for follow-up appointment with me as well as amyloidosis team at [facility].

## 2024-11-15 NOTE — ASSESSMENT & PLAN NOTE
Echo 08/2024  EF 34%  Continue BB  Given one dose IV Lasix 10/08  Cardiac/Autonomic (From admission, onward)      Start     Stop Route Frequency Ordered    11/08/24 0030  metoprolol succinate (TOPROL-XL) 24 hr tablet 25 mg         -- Oral Daily 11/08/24 0029        Continued oral Lasix.  Renal insufficiency on 11/13 after recent NPO status; held Lasix on 11/13. Scheduled to resume on 11/14 with rising Cr again - hold lasix  -amyloidosis may also be cause of her recently diagnosed heart failure

## 2024-11-16 LAB
ANION GAP SERPL CALC-SCNC: 8 MMOL/L (ref 8–16)
B2 MICROGLOB SERPL-MCNC: 9.1 UG/ML (ref 0–2.5)
BASOPHILS # BLD AUTO: 0.08 K/UL (ref 0–0.2)
BASOPHILS NFR BLD: 0.8 % (ref 0–1.9)
BUN SERPL-MCNC: 32 MG/DL (ref 6–20)
CALCIUM SERPL-MCNC: 8 MG/DL (ref 8.7–10.5)
CHLORIDE SERPL-SCNC: 98 MMOL/L (ref 95–110)
CO2 SERPL-SCNC: 26 MMOL/L (ref 23–29)
CREAT SERPL-MCNC: 1.1 MG/DL (ref 0.5–1.4)
DIFFERENTIAL METHOD BLD: ABNORMAL
EOSINOPHIL # BLD AUTO: 0.6 K/UL (ref 0–0.5)
EOSINOPHIL NFR BLD: 5.5 % (ref 0–8)
ERYTHROCYTE [DISTWIDTH] IN BLOOD BY AUTOMATED COUNT: 16.2 % (ref 11.5–14.5)
EST. GFR  (NO RACE VARIABLE): 58 ML/MIN/1.73 M^2
GLUCOSE SERPL-MCNC: 95 MG/DL (ref 70–110)
HCT VFR BLD AUTO: 26.1 % (ref 37–48.5)
HGB BLD-MCNC: 8.6 G/DL (ref 12–16)
IMM GRANULOCYTES # BLD AUTO: 0.07 K/UL (ref 0–0.04)
IMM GRANULOCYTES NFR BLD AUTO: 0.7 % (ref 0–0.5)
LDH SERPL L TO P-CCNC: 143 U/L (ref 110–260)
LYMPHOCYTES # BLD AUTO: 1.9 K/UL (ref 1–4.8)
LYMPHOCYTES NFR BLD: 18.8 % (ref 18–48)
MCH RBC QN AUTO: 27.7 PG (ref 27–31)
MCHC RBC AUTO-ENTMCNC: 33 G/DL (ref 32–36)
MCV RBC AUTO: 84 FL (ref 82–98)
MONOCYTES # BLD AUTO: 0.8 K/UL (ref 0.3–1)
MONOCYTES NFR BLD: 7.7 % (ref 4–15)
NEUTROPHILS # BLD AUTO: 6.9 K/UL (ref 1.8–7.7)
NEUTROPHILS NFR BLD: 66.5 % (ref 38–73)
NRBC BLD-RTO: 0 /100 WBC
PLATELET # BLD AUTO: 368 K/UL (ref 150–450)
PMV BLD AUTO: 9.3 FL (ref 9.2–12.9)
POTASSIUM SERPL-SCNC: 3.7 MMOL/L (ref 3.5–5.1)
PROT 24H UR-MRATE: 540 MG/SPEC (ref 0–100)
PROT UR-MCNC: 18 MG/DL (ref 0–15)
RBC # BLD AUTO: 3.1 M/UL (ref 4–5.4)
SODIUM SERPL-SCNC: 132 MMOL/L (ref 136–145)
URINE COLLECTION DURATION: 24 HR
URINE VOLUME: 3000 ML
WBC # BLD AUTO: 10.34 K/UL (ref 3.9–12.7)

## 2024-11-16 PROCEDURE — 84156 ASSAY OF PROTEIN URINE: CPT | Performed by: INTERNAL MEDICINE

## 2024-11-16 PROCEDURE — 84166 PROTEIN E-PHORESIS/URINE/CSF: CPT | Mod: 26,,, | Performed by: PATHOLOGY

## 2024-11-16 PROCEDURE — 85025 COMPLETE CBC W/AUTO DIFF WBC: CPT | Performed by: STUDENT IN AN ORGANIZED HEALTH CARE EDUCATION/TRAINING PROGRAM

## 2024-11-16 PROCEDURE — 25000003 PHARM REV CODE 250: Performed by: INTERNAL MEDICINE

## 2024-11-16 PROCEDURE — 82232 ASSAY OF BETA-2 PROTEIN: CPT | Performed by: INTERNAL MEDICINE

## 2024-11-16 PROCEDURE — 25000003 PHARM REV CODE 250: Performed by: STUDENT IN AN ORGANIZED HEALTH CARE EDUCATION/TRAINING PROGRAM

## 2024-11-16 PROCEDURE — 86335 IMMUNFIX E-PHORSIS/URINE/CSF: CPT | Mod: 26,,, | Performed by: PATHOLOGY

## 2024-11-16 PROCEDURE — S4991 NICOTINE PATCH NONLEGEND: HCPCS | Performed by: STUDENT IN AN ORGANIZED HEALTH CARE EDUCATION/TRAINING PROGRAM

## 2024-11-16 PROCEDURE — 83615 LACTATE (LD) (LDH) ENZYME: CPT | Performed by: INTERNAL MEDICINE

## 2024-11-16 PROCEDURE — 25000003 PHARM REV CODE 250: Performed by: EMERGENCY MEDICINE

## 2024-11-16 PROCEDURE — 83880 ASSAY OF NATRIURETIC PEPTIDE: CPT | Performed by: INTERNAL MEDICINE

## 2024-11-16 PROCEDURE — 84484 ASSAY OF TROPONIN QUANT: CPT | Performed by: INTERNAL MEDICINE

## 2024-11-16 PROCEDURE — 63600175 PHARM REV CODE 636 W HCPCS: Performed by: INTERNAL MEDICINE

## 2024-11-16 PROCEDURE — 84166 PROTEIN E-PHORESIS/URINE/CSF: CPT | Performed by: INTERNAL MEDICINE

## 2024-11-16 PROCEDURE — 63600175 PHARM REV CODE 636 W HCPCS: Performed by: REGISTERED NURSE

## 2024-11-16 PROCEDURE — 11000001 HC ACUTE MED/SURG PRIVATE ROOM

## 2024-11-16 PROCEDURE — 80048 BASIC METABOLIC PNL TOTAL CA: CPT | Performed by: REGISTERED NURSE

## 2024-11-16 PROCEDURE — 36415 COLL VENOUS BLD VENIPUNCTURE: CPT | Performed by: REGISTERED NURSE

## 2024-11-16 PROCEDURE — 86335 IMMUNFIX E-PHORSIS/URINE/CSF: CPT | Performed by: INTERNAL MEDICINE

## 2024-11-16 RX ORDER — OXYCODONE HYDROCHLORIDE 5 MG/1
5 TABLET ORAL EVERY 4 HOURS PRN
Status: DISCONTINUED | OUTPATIENT
Start: 2024-11-16 | End: 2024-11-18 | Stop reason: HOSPADM

## 2024-11-16 RX ORDER — LIDOCAINE 50 MG/G
2 PATCH TOPICAL
Status: DISCONTINUED | OUTPATIENT
Start: 2024-11-16 | End: 2024-11-18 | Stop reason: HOSPADM

## 2024-11-16 RX ORDER — TALC
6 POWDER (GRAM) TOPICAL NIGHTLY PRN
Status: DISCONTINUED | OUTPATIENT
Start: 2024-11-16 | End: 2024-11-18 | Stop reason: HOSPADM

## 2024-11-16 RX ADMIN — Medication 6 MG: at 09:11

## 2024-11-16 RX ADMIN — SUCRALFATE 1 G: 1 TABLET ORAL at 10:11

## 2024-11-16 RX ADMIN — HYDROMORPHONE HYDROCHLORIDE 2 MG: 2 TABLET ORAL at 05:11

## 2024-11-16 RX ADMIN — OXYCODONE 5 MG: 5 TABLET ORAL at 05:11

## 2024-11-16 RX ADMIN — PANTOPRAZOLE SODIUM 40 MG: 40 INJECTION, POWDER, LYOPHILIZED, FOR SOLUTION INTRAVENOUS at 09:11

## 2024-11-16 RX ADMIN — OXYCODONE 5 MG: 5 TABLET ORAL at 10:11

## 2024-11-16 RX ADMIN — SUCRALFATE 1 G: 1 TABLET ORAL at 05:11

## 2024-11-16 RX ADMIN — HYDROMORPHONE HYDROCHLORIDE 2 MG: 2 TABLET ORAL at 12:11

## 2024-11-16 RX ADMIN — CEFTRIAXONE SODIUM 1 G: 1 INJECTION, POWDER, FOR SOLUTION INTRAMUSCULAR; INTRAVENOUS at 03:11

## 2024-11-16 RX ADMIN — OXYCODONE 5 MG: 5 TABLET ORAL at 09:11

## 2024-11-16 RX ADMIN — PANTOPRAZOLE SODIUM 40 MG: 40 INJECTION, POWDER, LYOPHILIZED, FOR SOLUTION INTRAVENOUS at 08:11

## 2024-11-16 RX ADMIN — NICOTINE 1 PATCH: 21 PATCH, EXTENDED RELEASE TRANSDERMAL at 10:11

## 2024-11-16 RX ADMIN — METOPROLOL SUCCINATE 25 MG: 25 TABLET, EXTENDED RELEASE ORAL at 10:11

## 2024-11-16 RX ADMIN — LIDOCAINE 2 PATCH: 50 PATCH CUTANEOUS at 10:11

## 2024-11-16 RX ADMIN — SODIUM CHLORIDE 125 MG: 9 INJECTION, SOLUTION INTRAVENOUS at 09:11

## 2024-11-16 NOTE — ASSESSMENT & PLAN NOTE
Patient's most recent potassium results are listed below.   Recent Labs     11/14/24  0231 11/15/24  0300 11/16/24  0315   K 4.0 3.9 3.7       Plan  - Replete potassium per protocol  - Monitor potassium   - Patient's hypokalemia is improving

## 2024-11-16 NOTE — ASSESSMENT & PLAN NOTE
Echo 08/2024  EF 34%  Continue BB  Given one dose IV Lasix 10/08  Cardiac/Autonomic (From admission, onward)      Start     Stop Route Frequency Ordered    11/08/24 0030  metoprolol succinate (TOPROL-XL) 24 hr tablet 25 mg         -- Oral Daily 11/08/24 0029        Continued oral Lasix.  Renal insufficiency on 11/13 after recent NPO status; held Lasix on 11/13. Scheduled to resume on 11/14 with rising Cr again - hold lasix for now  -amyloidosis may also be cause of her recently diagnosed heart failure

## 2024-11-16 NOTE — ASSESSMENT & PLAN NOTE
Patient's hemorrhage is due to gastrointestinal bleed, patient does not have a propensity for bleeding..   Patients most recent Hgb, Hct, platelets are listed below.  Recent Labs     11/14/24  0231 11/15/24  0300 11/16/24  0315   HGB 8.7* 9.2* 8.6*   HCT 26.7* 29.6* 26.1*    365 368       - trend hemoglobin/hematocrit   - monitor and correct any coagulation defects  - transfuse if Hgb is <7g/dl (<8g/dl in cases of active ACS) or if patient has rapid bleeding leading to hemodynamic instability  - Consult GI  -PPI BID and carafate TID  -patient received 3 U PRBC  -patient was evaluated by GI, EGD/colonoscopy 11/11  -endoscopy findings concerning for an unclear underlying process. Vasculitis-associated screening labs ordered.   -24 hr urine and serology ordered by heme/onc  -colon bx with amyloidosis - consulted Heme/Onc  - patient needs to f/u with amyloidosis provider

## 2024-11-16 NOTE — SUBJECTIVE & OBJECTIVE
Interval History: Virtual follow up visit for suspected Shortness of breath [R06.02]  Symptomatic anemia [D64.9] present on admission.   This service was provided by telemedicine.    The patient location is: [place] [address]   Admitted 11/7/2024  9:15 PM    CC: GI bleed    The patient is able to provide adequate history. History was obtained from patient and past medical records.   No significant events overnight reported.  Saw traces of blood after BM yesterday in toilet but markedly diminished from admit.  She remains with abd pain which is controlled with current regimen.  She denies taking cocaine/amphetamines as on admit tox screen and states it must have been in the THC she smokes 2-3 times a week.  She denies feeling dyspnea and denies LE edema - good UOP. Trial of oxycodone today in anticipation of home soon.       Data  Details     [x]   Lab results reviewed 11/16/2024  sCr improved. Hyponatremia stable. H&H diminshed.        []   Micro reports reviewed 11/16/2024     [x]   Pathology reports reviewed 11/16/2024  Colon bx c/w amyloid    []   Imaging reports reviewed 11/16/2024     []   Cardiology Procedure reports reviewed 11/16/2024      []   Non-HM records/CareEverywhere notes reviewed 11/16/2024      []  Tests/studies orders placed or verified 11/16/2024       [x]  Independently viewed/assessed 11/16/2024  Corrected Ca: 9.6    []  11/16/2024 Discussion of:       Review of Systems   Constitutional:  Positive for fatigue. Negative for fever.   Respiratory:  Negative for shortness of breath.    Cardiovascular:  Negative for leg swelling.   Gastrointestinal:  Positive for abdominal pain and blood in stool.   Psychiatric/Behavioral:  Positive for sleep disturbance.      Objective:     Vital Signs (Most Recent):  Temp: 98.1 °F (36.7 °C) (11/16/24 0739)  Pulse: 93 (11/16/24 0739)  Resp: 18 (11/16/24 0739)  BP: 131/73 (11/16/24 0739)  SpO2: (!) 93 % (11/16/24 0739) Vital Signs (24h Range):  Temp:  [97.5 °F (36.4  °C)-98.2 °F (36.8 °C)] 98.1 °F (36.7 °C)  Pulse:  [85-99] 93  Resp:  [18] 18  SpO2:  [93 %-98 %] 93 %  BP: (113-136)/(59-83) 131/73     Weight: 83.9 kg (184 lb 15.5 oz)  Body mass index is 26.54 kg/m².    Intake/Output Summary (Last 24 hours) at 11/16/2024 0939  Last data filed at 11/16/2024 0527  Gross per 24 hour   Intake --   Output 3200 ml   Net -3200 ml           Physical Exam  Constitutional:       General: She is awake.      Appearance: She is ill-appearing.   Cardiovascular:      Comments: Monitor and/or Vital signs reviewed at time of visit  Pulmonary:      Effort: Pulmonary effort is normal. No accessory muscle usage or respiratory distress.   Neurological:      Mental Status: She is alert. She is not disoriented.   Psychiatric:         Attention and Perception: Attention normal.         Behavior: Behavior is cooperative.         Significant Labs:   Recent Labs   Lab 10/20/24  0722   HGBA1C 5.4     Recent Labs   Lab 11/14/24  0231 11/15/24  0300 11/16/24  0315   WBC 11.49 11.64 10.34   HGB 8.7* 9.2* 8.6*   HCT 26.7* 29.6* 26.1*    365 368     Recent Labs   Lab 11/14/24  0231 11/15/24  0300 11/16/24  0315   GRAN 69.9  8.0* 68.0  7.9* 66.5  6.9   LYMPH 17.4*  2.0 19.0  2.2 18.8  1.9   MONO 7.1  0.8 7.5  0.9 7.7  0.8   EOS 0.5 0.5 0.6*     Recent Labs   Lab 11/10/24  0220 11/11/24  0226 11/13/24  0226 11/14/24  0231 11/15/24  0300 11/16/24  0315   *   < > 132* 131* 131* 132*   K 3.0*   < > 3.8 4.0 3.9 3.7      < > 99 99 100 98   CO2 20*   < > 24 25 25 26   BUN 26*   < > 19 24* 27* 32*   CREATININE 1.4   < > 1.6* 1.2 1.4 1.1   *   < > 103 106 95 95   CALCIUM 7.7*   < > 8.0* 7.8* 7.9* 8.0*   ALBUMIN  --   --  2.1*  --  2.0*  --    MG 1.5*  --  2.1  --   --   --    PHOS  --   --  3.1  --   --   --     < > = values in this interval not displayed.     Recent Labs   Lab 11/12/24  1300 11/13/24  0226 11/15/24  0300 11/16/24 0315   ALKPHOS  --  91 83  --    ALT  --  24 13  --   "  AST  --  14 17  --    PROT  --  6.3 6.0  --    BILITOT  --  0.2 0.2  --    LDH  --   --   --  143   CRP 12.2*  --   --   --      No results for input(s): "CPK", "TROPONINI", "BNP" in the last 168 hours.    Recent Labs   Lab 08/12/24  0534 08/19/24 2034 08/19/24  2233 08/26/24  1545 11/07/24  2153 11/14/24  0231   PROCAL  --  0.10  --   --   --   --    LACTATE  --   --   --   --  1.0  --    DDIMER  --   --  0.72*  --   --   --    FERRITIN 112  --   --   --   --  78   SEDRATE  --   --   --  26  --   --      SARS-CoV-2 RNA, Amplification, Qual (no units)   Date Value   09/05/2024 Negative   08/19/2024 Negative     POC Rapid COVID (no units)   Date Value   08/29/2024 Negative   08/12/2024 Negative     ECG Results              EKG 12-lead (Final result)        Collection Time Result Time QRS Duration OHS QTC Calculation    11/07/24 22:25:43 11/10/24 11:34:04 88 478                     Final result by Interface, Lab In [facility] (11/10/24 11:34:08)                   Narrative:    Test Reason : R06.02,    Vent. Rate : 123 BPM     Atrial Rate : 123 BPM     P-R Int : 132 ms          QRS Dur : 088 ms      QT Int : 334 ms       P-R-T Axes : 033 004 055 degrees     QTc Int : 478 ms    Sinus tachycardia  diffuse ST depression ,consider ischemia   Abnormal ECG  When compared with ECG of 19-OCT-2024 09:34,  Vent. rate has increased BY  54 BPM  Confirmed by Vinod Larry MD ([id number]) on 11/10/2024 11:34:03 AM    Referred By: [email or web address]   SELF           Confirmed By:Vinod Larry MD                                Results for orders placed during the hospital encounter of 08/12/24    Echo    Interpretation Summary    Left Ventricle: The left ventricle is moderately dilated. There is eccentric hypertrophy. Moderate global hypokinesis present. There is moderately reduced systolic function with a visually estimated ejection fraction of 30 - 35%. Biplane (2D) method of discs ejection fraction is 34%. There is diastolic " dysfunction but grade cannot be determined.    Right Ventricle: Normal right ventricular cavity size. Wall thickness is normal. Systolic function is normal.    Left Atrium: Left atrium is severely dilated.    Right Atrium: Right atrium is moderately dilated.    Aortic Valve: There is mild stenosis. Aortic valve area by VTI is 1.95 cm². Aortic valve peak velocity is 1.40 m/s. Mean gradient is 4 mmHg. The dimensionless index is 0.62.    Mitral Valve: There is mild regurgitation.    Tricuspid Valve: There is mild regurgitation.    Pulmonic Valve: There is mild regurgitation.    Pulmonary Artery: The estimated pulmonary artery systolic pressure is 36 mmHg.    IVC/SVC: Normal venous pressure at 3 mmHg.      CTA Acute GI [place], Abdomen and Pelvis  Narrative: EXAMINATION:  CTA ACUTE GI BLEED, ABDOMEN AND PELVIS    CLINICAL HISTORY:  GI bleeding;    TECHNIQUE:  Using 75 cc of  Omnipaque 350 IV, and multi-detector helical CT technique, axial CT angiogram images of the abdomen were obtained from the lung bases through the pelvis. Precontrast and portal venous phase images of the abdomen and pelvis also done. 2D post-processing coronal and sagittal reconstructions of the abdominal aorta and visceral arteries performed.    COMPARISON:  10/24/2024    FINDINGS:  The lung bases are well aerated.  No consolidation, suspicious nodules, with small bilateral pleural effusion.  The visualized portions of the heart appear normal.    The esophagus, stomach, spleen, pancreas, and adrenal glands are unremarkable.    The liver is normal in size and attenuation without focal abnormality.  The portal veins appear patent.  The gallbladder shows no evidence of stones or cholecystitis.  No intra-or extrahepatic biliary ductal dilatation.    The kidneys demonstrate normal enhancement.  No renal mass, nephrolithiasis or hydronephrosis.  The ureters are normal in course and caliber. The urinary bladder appears unremarkable    No evidence of  gastrointestinal hemorrhage or bowel obstruction.  Inflammation in the right colon through the hepatic flexure and the proximal portion of the transverse colon with more normal splenic flexure descending colon and rectosigmoid colon with a few diverticula in the sigmoid but without diverticulitis.  Definite mass is not identified as on the previous exam.  There is no ascites, free fluid, or intraperitoneal free air. No significant peritoneal or retroperitoneal adenopathy.    The abdominal aorta is normal in course and caliber without significant atherosclerotic calcifications.    The osseous structures and extraperitoneal soft tissues are unremarkable.  Impression: Inflammatory changes of the ascending and splenic flexure of the colon with the previous suggested mass not confidently identified.    No evidence of active GI bleed.    No evidence of bowel obstruction.    Mild pleural thickening and/or fluid.    This report was flagged in Epic as abnormal.    Electronically signed by: Jax Carmichael  Date:    11/07/2024  Time:    23:35  X-Ray Chest 1 View  Narrative: EXAMINATION:  XR CHEST 1 VIEW    CLINICAL HISTORY:  Shortness of breath    TECHNIQUE:  Single frontal view of the chest was performed.    COMPARISON:  10/19/2024.    FINDINGS:  The lungs are hypoexpanded.  There are perihilar interstitial opacities, may be accentuated by hypoaeration changes.  The pleural spaces are clear.  The cardiac silhouette is enlarged.  Osseous structures demonstrate degenerative changes.  Impression: Mild perihilar interstitial prominence, may be accentuated by hypoaeration.  Mild edema is not excluded.    Electronically signed by: Nick Clifton  Date:    11/07/2024  Time:    23:25      Labs and Imaging listed above were reviewed.

## 2024-11-16 NOTE — PLAN OF CARE
Problem: Adult Inpatient Plan of Care  Goal: Plan of Care Review  Outcome: Progressing  Goal: Absence of Hospital-Acquired Illness or Injury  Outcome: Progressing     Problem: Gastrointestinal Bleeding  Goal: Optimal Coping with Acute Illness  Outcome: Progressing     Problem: Fall Injury Risk  Goal: Absence of Fall and Fall-Related Injury  Outcome: Progressing     Problem: Anemia  Goal: Anemia Symptom Improvement  Outcome: Progressing

## 2024-11-16 NOTE — ASSESSMENT & PLAN NOTE
Latest Reference Range & Units 08/19/24 20:34   HCV Log <1.08 LogIU/mL 5.93 !   HCV Quantitative Result <12 IU/mL [id number] !   HCV, Qualitative Not Detected  Detected !   Hepatitis C Ab Non-reactive  Reactive !   HIV 1/2 Ag/Ab Non-reactive  Non-reactive     Needs follow up with hematology.

## 2024-11-16 NOTE — ASSESSMENT & PLAN NOTE
Anemia is likely due to acute blood loss which was from GIB . Most recent hemoglobin and hematocrit are listed below.  Recent Labs     11/14/24  0231 11/15/24  0300 11/16/24  0315   HGB 8.7* 9.2* 8.6*   HCT 26.7* 29.6* 26.1*     CTA w/o active GIB  - Monitor serial CBC:   - Transfuse PRBC if patient becomes hemodynamically unstable, symptomatic or H/H drops below 7/21.  - Patient has received 3 units of PRBCs on 11/8/24  - Patient's anemia is currently stable  -IV iron X 2 doses while admitted

## 2024-11-16 NOTE — ASSESSMENT & PLAN NOTE
Nicotine patches  Smoking cessation counseling  U tox is positive for cocaine, amphetamine and THC; patient denies taking cocaine/amphetamine intentionally but does smoke THC 2-3 times a week and suspects it was in the THC

## 2024-11-16 NOTE — PROGRESS NOTES
[facility] Medicine  Telemedicine Progress Note    Patient Name: Mary Ellen Saini  MRN: [id number]  Patient Class: IP- Inpatient   Admission Date: 11/7/2024  Length of Stay: 8 days  Attending Physician: Ashley Spencer MD  Primary Care Provider: Marlen, Primary Doctor          Subjective:     Principal Problem:GIB (gastrointestinal bleeding)        HPI:  [age] year old female with a history of hypertension, bipolar, hep C, HFrEF presents to ED with complaints of chest pain, shortness a breath and blood in her stool.  Patient was recently admitted for GI bleed which she required blood transfusion.  Patient reports bleeding in stool has been having for the last several days however worsened today along with worsening abdominal pain. In ED labs remarkable for H&H 5.6 and 17.2.  Patient discharged 12 days ago with H&H of 7.3 and 22.8.  Blood pressure stable on admission however she is tachycardic 120.  Patient also with a chest pain.  EKG without ST elevation BNP 3425, troponin 3.379.  CTA of the abdomen done denies show any evidence of active GI bleed.  Patient typed and crossmatch for 3 units.  We will start PPI IV BID, transfuse PRBC, consult GI and Cards.    Overview/Hospital Course:  No notes on file    Interval History: Virtual follow up visit for suspected Shortness of breath [R06.02]  Symptomatic anemia [D64.9] present on admission.   This service was provided by telemedicine.    [facility]   Admitted 11/7/2024  9:15 PM    CC: GI bleed    The patient is able to provide adequate history. History was obtained from patient and past medical records.   No significant events overnight reported.  Saw traces of blood after BM yesterday in toilet but markedly diminished from admit.  She remains with abd pain which is controlled with current regimen.  She denies taking cocaine/amphetamines as on admit tox screen and states it must have been in the THC she smokes 2-3 times a week.   She denies feeling dyspnea and denies LE edema - good UOP. Trial of oxycodone today in anticipation of home soon.       Data  Details     [x]   Lab results reviewed 11/16/2024  sCr improved. Hyponatremia stable. H&H diminshed.        []   Micro reports reviewed 11/16/2024     [x]   Pathology reports reviewed 11/16/2024  Colon bx c/w amyloid    []   Imaging reports reviewed 11/16/2024     []   Cardiology Procedure reports reviewed 11/16/2024      []   Non- records/CareEverywhere notes reviewed 11/16/2024      []  Tests/studies orders placed or verified 11/16/2024       [x]  Independently viewed/assessed 11/16/2024  Corrected Ca: 9.6    []  11/16/2024 Discussion of:       Review of Systems   Constitutional:  Positive for fatigue. Negative for fever.   Respiratory:  Negative for shortness of breath.    Cardiovascular:  Negative for leg swelling.   Gastrointestinal:  Positive for abdominal pain and blood in stool.   Psychiatric/Behavioral:  Positive for sleep disturbance.      Objective:     Vital Signs (Most Recent):  Temp: 98.1 °F (36.7 °C) (11/16/24 0739)  Pulse: 93 (11/16/24 0739)  Resp: 18 (11/16/24 0739)  BP: 131/73 (11/16/24 0739)  SpO2: (!) 93 % (11/16/24 0739) Vital Signs (24h Range):  Temp:  [97.5 °F (36.4 °C)-98.2 °F (36.8 °C)] 98.1 °F (36.7 °C)  Pulse:  [85-99] 93  Resp:  [18] 18  SpO2:  [93 %-98 %] 93 %  BP: (113-136)/(59-83) 131/73     Weight: 83.9 kg (184 lb 15.5 oz)  Body mass index is 26.54 kg/m².    Intake/Output Summary (Last 24 hours) at 11/16/2024 0939  Last data filed at 11/16/2024 0527  Gross per 24 hour   Intake --   Output 3200 ml   Net -3200 ml           Physical Exam  Constitutional:       General: She is awake.      Appearance: She is ill-appearing.   Cardiovascular:      Comments: Monitor and/or Vital signs reviewed at time of visit  Pulmonary:      Effort: Pulmonary effort is normal. No accessory muscle usage or respiratory distress.   Neurological:      Mental Status: She is alert. She  "is not disoriented.   Psychiatric:         Attention and Perception: Attention normal.         Behavior: Behavior is cooperative.         Significant Labs:   Recent Labs   Lab 10/20/24  0722   HGBA1C 5.4     Recent Labs   Lab 11/14/24  0231 11/15/24  0300 11/16/24  0315   WBC 11.49 11.64 10.34   HGB 8.7* 9.2* 8.6*   HCT 26.7* 29.6* 26.1*    365 368     Recent Labs   Lab 11/14/24  0231 11/15/24  0300 11/16/24  0315   GRAN 69.9  8.0* 68.0  7.9* 66.5  6.9   LYMPH 17.4*  2.0 19.0  2.2 18.8  1.9   MONO 7.1  0.8 7.5  0.9 7.7  0.8   EOS 0.5 0.5 0.6*     Recent Labs   Lab 11/10/24  0220 11/11/24  0226 11/13/24  0226 11/14/24  0231 11/15/24  0300 11/16/24  0315   *   < > 132* 131* 131* 132*   K 3.0*   < > 3.8 4.0 3.9 3.7      < > 99 99 100 98   CO2 20*   < > 24 25 25 26   BUN 26*   < > 19 24* 27* 32*   CREATININE 1.4   < > 1.6* 1.2 1.4 1.1   *   < > 103 106 95 95   CALCIUM 7.7*   < > 8.0* 7.8* 7.9* 8.0*   ALBUMIN  --   --  2.1*  --  2.0*  --    MG 1.5*  --  2.1  --   --   --    PHOS  --   --  3.1  --   --   --     < > = values in this interval not displayed.     Recent Labs   Lab 11/12/24  1300 11/13/24  0226 11/15/24  0300 11/16/24  0315   ALKPHOS  --  91 83  --    ALT  --  24 13  --    AST  --  14 17  --    PROT  --  6.3 6.0  --    BILITOT  --  0.2 0.2  --    LDH  --   --   --  143   CRP 12.2*  --   --   --      No results for input(s): "CPK", "TROPONINI", "BNP" in the last 168 hours.    Recent Labs   Lab 08/12/24  0534 08/19/24 2034 08/19/24  2233 08/26/24  1545 11/07/24  2153 11/14/24  0231   PROCAL  --  0.10  --   --   --   --    LACTATE  --   --   --   --  1.0  --    DDIMER  --   --  0.72*  --   --   --    FERRITIN 112  --   --   --   --  78   SEDRATE  --   --   --  26  --   --      SARS-CoV-2 RNA, Amplification, Qual (no units)   Date Value   09/05/2024 Negative   08/19/2024 Negative     POC Rapid COVID (no units)   Date Value   08/29/2024 Negative   08/12/2024 Negative     ECG " Results              EKG 12-lead (Final result)        Collection Time Result Time QRS Duration OHS QTC Calculation    11/07/24 22:25:43 11/10/24 11:34:04 88 478                     Final result by Interface, Lab In [facility] (11/10/24 11:34:08)                   Narrative:    Test Reason : R06.02,    Vent. Rate : 123 BPM     Atrial Rate : 123 BPM     P-R Int : 132 ms          QRS Dur : 088 ms      QT Int : 334 ms       P-R-T Axes : 033 004 055 degrees     QTc Int : 478 ms    Sinus tachycardia  diffuse ST depression ,consider ischemia   Abnormal ECG  When compared with ECG of 19-OCT-2024 09:34,  Vent. rate has increased BY  54 BPM  Confirmed by Vinod Larry MD ([id number]) on 11/10/2024 11:34:03 AM    Referred By: [email or web address]   SELF           Confirmed By:Vinod Larry MD                                Results for orders placed during the hospital encounter of 08/12/24    Echo    Interpretation Summary    Left Ventricle: The left ventricle is moderately dilated. There is eccentric hypertrophy. Moderate global hypokinesis present. There is moderately reduced systolic function with a visually estimated ejection fraction of 30 - 35%. Biplane (2D) method of discs ejection fraction is 34%. There is diastolic dysfunction but grade cannot be determined.    Right Ventricle: Normal right ventricular cavity size. Wall thickness is normal. Systolic function is normal.    Left Atrium: Left atrium is severely dilated.    Right Atrium: Right atrium is moderately dilated.    Aortic Valve: There is mild stenosis. Aortic valve area by VTI is 1.95 cm². Aortic valve peak velocity is 1.40 m/s. Mean gradient is 4 mmHg. The dimensionless index is 0.62.    Mitral Valve: There is mild regurgitation.    Tricuspid Valve: There is mild regurgitation.    Pulmonic Valve: There is mild regurgitation.    Pulmonary Artery: The estimated pulmonary artery systolic pressure is 36 mmHg.    IVC/SVC: Normal venous pressure at 3 mmHg.      CTA  Acute GI Frannie, Abdomen and Pelvis  Narrative: EXAMINATION:  CTA ACUTE GI BLEED, ABDOMEN AND PELVIS    CLINICAL HISTORY:  GI bleeding;    TECHNIQUE:  Using 75 cc of  Omnipaque 350 IV, and multi-detector helical CT technique, axial CT angiogram images of the abdomen were obtained from the lung bases through the pelvis. Precontrast and portal venous phase images of the abdomen and pelvis also done. 2D post-processing coronal and sagittal reconstructions of the abdominal aorta and visceral arteries performed.    COMPARISON:  10/24/2024    FINDINGS:  The lung bases are well aerated.  No consolidation, suspicious nodules, with small bilateral pleural effusion.  The visualized portions of the heart appear normal.    The esophagus, stomach, spleen, pancreas, and adrenal glands are unremarkable.    The liver is normal in size and attenuation without focal abnormality.  The portal veins appear patent.  The gallbladder shows no evidence of stones or cholecystitis.  No intra-or extrahepatic biliary ductal dilatation.    The kidneys demonstrate normal enhancement.  No renal mass, nephrolithiasis or hydronephrosis.  The ureters are normal in course and caliber. The urinary bladder appears unremarkable    No evidence of gastrointestinal hemorrhage or bowel obstruction.  Inflammation in the right colon through the hepatic flexure and the proximal portion of the transverse colon with more normal splenic flexure descending colon and rectosigmoid colon with a few diverticula in the sigmoid but without diverticulitis.  Definite mass is not identified as on the previous exam.  There is no ascites, free fluid, or intraperitoneal free air. No significant peritoneal or retroperitoneal adenopathy.    The abdominal aorta is normal in course and caliber without significant atherosclerotic calcifications.    The osseous structures and extraperitoneal soft tissues are unremarkable.  Impression: Inflammatory changes of the ascending and splenic  flexure of the colon with the previous suggested mass not confidently identified.    No evidence of active GI bleed.    No evidence of bowel obstruction.    Mild pleural thickening and/or fluid.    This report was flagged in Epic as abnormal.    Electronically signed by: Jax Carmichael  Date:    11/07/2024  Time:    23:35  X-Ray Chest 1 View  Narrative: EXAMINATION:  XR CHEST 1 VIEW    CLINICAL HISTORY:  Shortness of breath    TECHNIQUE:  Single frontal view of the chest was performed.    COMPARISON:  10/19/2024.    FINDINGS:  The lungs are hypoexpanded.  There are perihilar interstitial opacities, may be accentuated by hypoaeration changes.  The pleural spaces are clear.  The cardiac silhouette is enlarged.  Osseous structures demonstrate degenerative changes.  Impression: Mild perihilar interstitial prominence, may be accentuated by hypoaeration.  Mild edema is not excluded.    Electronically signed by: Nick Clifton  Date:    11/07/2024  Time:    23:25      Labs and Imaging listed above were reviewed.       Assessment/Plan:      * GIB (gastrointestinal bleeding)  Patient's hemorrhage is due to gastrointestinal bleed, patient does not have a propensity for bleeding..   Patients most recent Hgb, Hct, platelets are listed below.  Recent Labs     11/14/24  0231 11/15/24  0300 11/16/24  0315   HGB 8.7* 9.2* 8.6*   HCT 26.7* 29.6* 26.1*    365 368       - trend hemoglobin/hematocrit   - monitor and correct any coagulation defects  - transfuse if Hgb is <7g/dl (<8g/dl in cases of active ACS) or if patient has rapid bleeding leading to hemodynamic instability  - Consult GI  -PPI BID and carafate TID  -patient received 3 U PRBC  -patient was evaluated by GI, EGD/colonoscopy 11/11  -endoscopy findings concerning for an unclear underlying process. Vasculitis-associated screening labs ordered.   -24 hr urine and serology ordered by heme/onc  -colon bx with amyloidosis - consulted Heme/Onc  - patient needs to f/u with  amyloidosis provider    Other amyloidosis  See GIB (gastrointestinal bleeding)     ABLA (acute blood loss anemia)  Anemia is likely due to acute blood loss which was from GIB . Most recent hemoglobin and hematocrit are listed below.  Recent Labs     11/14/24  0231 11/15/24  0300 11/16/24  0315   HGB 8.7* 9.2* 8.6*   HCT 26.7* 29.6* 26.1*     CTA w/o active GIB  - Monitor serial CBC:   - Transfuse PRBC if patient becomes hemodynamically unstable, symptomatic or H/H drops below 7/21.  - Patient has received 3 units of PRBCs on 11/8/24  - Patient's anemia is currently stable  -IV iron X 2 doses while admitted    Leukocytosis  No obvious infection  Started Rocephin given pts hx Hep C and GIB  Improved, no evidence of SBP    NSTEMI (non-ST elevated myocardial infarction)  EKG w/o ST elevation  Troponin elevated 3.379-->3.587-->3.449  Cards eval is appreciated, medical management is recommended     HFrEF (heart failure with reduced ejection fraction)  Echo 08/2024  EF 34%  Continue BB  Given one dose IV Lasix 10/08  Cardiac/Autonomic (From admission, onward)      Start     Stop Route Frequency Ordered    11/08/24 0030  metoprolol succinate (TOPROL-XL) 24 hr tablet 25 mg         -- Oral Daily 11/08/24 0029        Continued oral Lasix.  Renal insufficiency on 11/13 after recent NPO status; held Lasix on 11/13. Scheduled to resume on 11/14 with rising Cr again - hold lasix for now  -amyloidosis may also be cause of her recently diagnosed heart failure    Hepatitis C     Latest Reference Range & Units 08/19/24 20:34   HCV Log <1.08 LogIU/mL 5.93 !   HCV Quantitative Result <12 IU/mL 368638 !   HCV, Qualitative Not Detected  Detected !   Hepatitis C Ab Non-reactive  Reactive !   HIV 1/2 Ag/Ab Non-reactive  Non-reactive     Needs follow up with hematology.    Smoking history  Nicotine patches  Smoking cessation counseling  U tox is positive for cocaine, amphetamine and THC; patient denies taking cocaine/amphetamine  intentionally but does smoke THC 2-3 times a week and suspects it was in the THC    Hypokalemia  Patient's most recent potassium results are listed below.   Recent Labs     11/14/24  0231 11/15/24  0300 11/16/24  0315   K 4.0 3.9 3.7       Plan  - Replete potassium per protocol  - Monitor potassium   - Patient's hypokalemia is improving      VTE Risk Mitigation (From admission, onward)           Ordered     IP VTE HIGH RISK PATIENT  Once         11/08/24 0137     Place sequential compression device  Until discontinued         11/08/24 0137                      I have completed this tele-visit without the assistance of a telepresenter.    The attending portion of this evaluation, treatment, and documentation was performed per Ashley Spencer MD via Telemedicine AudioVisual using the secure [employer] software platform with 2 way audio/video. The provider was located off-site and the patient is located in the hospital. The aforementioned video software was utilized to document the relevant history and physical exam    Ashley Spencer MD  Department of Hospital Medicine   [facility]

## 2024-11-17 LAB
ANION GAP SERPL CALC-SCNC: 7 MMOL/L (ref 8–16)
BASOPHILS # BLD AUTO: 0.08 K/UL (ref 0–0.2)
BASOPHILS NFR BLD: 0.8 % (ref 0–1.9)
BUN SERPL-MCNC: 30 MG/DL (ref 6–20)
CALCIUM SERPL-MCNC: 7.9 MG/DL (ref 8.7–10.5)
CHLORIDE SERPL-SCNC: 99 MMOL/L (ref 95–110)
CO2 SERPL-SCNC: 26 MMOL/L (ref 23–29)
CREAT SERPL-MCNC: 1.3 MG/DL (ref 0.5–1.4)
DIFFERENTIAL METHOD BLD: ABNORMAL
EOSINOPHIL # BLD AUTO: 0.4 K/UL (ref 0–0.5)
EOSINOPHIL NFR BLD: 4.6 % (ref 0–8)
ERYTHROCYTE [DISTWIDTH] IN BLOOD BY AUTOMATED COUNT: 16 % (ref 11.5–14.5)
EST. GFR  (NO RACE VARIABLE): 48 ML/MIN/1.73 M^2
GLUCOSE SERPL-MCNC: 89 MG/DL (ref 70–110)
HCT VFR BLD AUTO: 25 % (ref 37–48.5)
HGB BLD-MCNC: 8.2 G/DL (ref 12–16)
IMM GRANULOCYTES # BLD AUTO: 0.07 K/UL (ref 0–0.04)
IMM GRANULOCYTES NFR BLD AUTO: 0.7 % (ref 0–0.5)
LYMPHOCYTES # BLD AUTO: 2.1 K/UL (ref 1–4.8)
LYMPHOCYTES NFR BLD: 22.7 % (ref 18–48)
MCH RBC QN AUTO: 27.7 PG (ref 27–31)
MCHC RBC AUTO-ENTMCNC: 32.8 G/DL (ref 32–36)
MCV RBC AUTO: 85 FL (ref 82–98)
MONOCYTES # BLD AUTO: 0.7 K/UL (ref 0.3–1)
MONOCYTES NFR BLD: 7.9 % (ref 4–15)
NEUTROPHILS # BLD AUTO: 6 K/UL (ref 1.8–7.7)
NEUTROPHILS NFR BLD: 63.3 % (ref 38–73)
NRBC BLD-RTO: 0 /100 WBC
PLATELET # BLD AUTO: 347 K/UL (ref 150–450)
PMV BLD AUTO: 9.7 FL (ref 9.2–12.9)
POTASSIUM SERPL-SCNC: 3.6 MMOL/L (ref 3.5–5.1)
RBC # BLD AUTO: 2.96 M/UL (ref 4–5.4)
SODIUM SERPL-SCNC: 132 MMOL/L (ref 136–145)
WBC # BLD AUTO: 9.42 K/UL (ref 3.9–12.7)

## 2024-11-17 PROCEDURE — 85025 COMPLETE CBC W/AUTO DIFF WBC: CPT | Performed by: STUDENT IN AN ORGANIZED HEALTH CARE EDUCATION/TRAINING PROGRAM

## 2024-11-17 PROCEDURE — 63600175 PHARM REV CODE 636 W HCPCS: Performed by: REGISTERED NURSE

## 2024-11-17 PROCEDURE — 11000001 HC ACUTE MED/SURG PRIVATE ROOM

## 2024-11-17 PROCEDURE — 25000003 PHARM REV CODE 250: Performed by: STUDENT IN AN ORGANIZED HEALTH CARE EDUCATION/TRAINING PROGRAM

## 2024-11-17 PROCEDURE — 25000003 PHARM REV CODE 250: Performed by: INTERNAL MEDICINE

## 2024-11-17 PROCEDURE — S4991 NICOTINE PATCH NONLEGEND: HCPCS | Performed by: STUDENT IN AN ORGANIZED HEALTH CARE EDUCATION/TRAINING PROGRAM

## 2024-11-17 PROCEDURE — 80048 BASIC METABOLIC PNL TOTAL CA: CPT | Performed by: REGISTERED NURSE

## 2024-11-17 PROCEDURE — 25000003 PHARM REV CODE 250: Performed by: EMERGENCY MEDICINE

## 2024-11-17 RX ORDER — HYDROMORPHONE HYDROCHLORIDE 2 MG/1
2 TABLET ORAL EVERY 6 HOURS PRN
Status: DISCONTINUED | OUTPATIENT
Start: 2024-11-17 | End: 2024-11-18 | Stop reason: HOSPADM

## 2024-11-17 RX ADMIN — PANTOPRAZOLE SODIUM 40 MG: 40 INJECTION, POWDER, LYOPHILIZED, FOR SOLUTION INTRAVENOUS at 08:11

## 2024-11-17 RX ADMIN — NICOTINE 1 PATCH: 21 PATCH, EXTENDED RELEASE TRANSDERMAL at 12:11

## 2024-11-17 RX ADMIN — LIDOCAINE 2 PATCH: 50 PATCH CUTANEOUS at 12:11

## 2024-11-17 RX ADMIN — PANTOPRAZOLE SODIUM 40 MG: 40 INJECTION, POWDER, LYOPHILIZED, FOR SOLUTION INTRAVENOUS at 12:11

## 2024-11-17 RX ADMIN — METOPROLOL SUCCINATE 25 MG: 25 TABLET, EXTENDED RELEASE ORAL at 12:11

## 2024-11-17 RX ADMIN — OXYCODONE 5 MG: 5 TABLET ORAL at 12:11

## 2024-11-17 RX ADMIN — SUCRALFATE 1 G: 1 TABLET ORAL at 03:11

## 2024-11-17 RX ADMIN — Medication 6 MG: at 08:11

## 2024-11-17 RX ADMIN — OXYCODONE 5 MG: 5 TABLET ORAL at 08:11

## 2024-11-17 RX ADMIN — SUCRALFATE 1 G: 1 TABLET ORAL at 12:11

## 2024-11-17 RX ADMIN — CEFTRIAXONE SODIUM 1 G: 1 INJECTION, POWDER, FOR SOLUTION INTRAMUSCULAR; INTRAVENOUS at 04:11

## 2024-11-17 RX ADMIN — OXYCODONE 5 MG: 5 TABLET ORAL at 03:11

## 2024-11-17 RX ADMIN — SUCRALFATE 1 G: 1 TABLET ORAL at 04:11

## 2024-11-17 RX ADMIN — OXYCODONE 5 MG: 5 TABLET ORAL at 04:11

## 2024-11-17 NOTE — PLAN OF CARE
Problem: Gastrointestinal Bleeding  Goal: Optimal Coping with Acute Illness  Outcome: Progressing  Goal: Hemostasis  Outcome: Progressing     Problem: Fall Injury Risk  Goal: Absence of Fall and Fall-Related Injury  Outcome: Progressing     Problem: Anemia  Goal: Anemia Symptom Improvement  Outcome: Progressing     Problem: Skin Injury Risk Increased  Goal: Skin Health and Integrity  Outcome: Progressing     Problem: Infection  Goal: Absence of Infection Signs and Symptoms  Outcome: Progressing

## 2024-11-17 NOTE — PLAN OF CARE
Problem: Adult Inpatient Plan of Care  Goal: Plan of Care Review  Outcome: Not Progressing  Goal: Patient-Specific Goal (Individualized)  Outcome: Not Progressing  Goal: Absence of Hospital-Acquired Illness or Injury  Outcome: Not Progressing  Goal: Optimal Comfort and Wellbeing  Outcome: Not Progressing  Goal: Readiness for Transition of Care  Outcome: Not Progressing     Problem: Gastrointestinal Bleeding  Goal: Optimal Coping with Acute Illness  Outcome: Not Progressing  Goal: Hemostasis  Outcome: Not Progressing     Problem: Fall Injury Risk  Goal: Absence of Fall and Fall-Related Injury  Outcome: Not Progressing     Problem: Anemia  Goal: Anemia Symptom Improvement  Outcome: Not Progressing     Problem: Gas Exchange Impaired  Goal: Optimal Gas Exchange  Outcome: Not Progressing     Problem: Comorbidity Management  Goal: Maintenance of Asthma Control  Outcome: Not Progressing  Goal: Blood Pressure in Desired Range  Outcome: Not Progressing  Goal: Maintenance of Behavioral Health Symptom Control  Outcome: Not Progressing  Goal: Maintenance of COPD Symptom Control  Outcome: Not Progressing  Goal: Maintenance of Heart Failure Symptom Control  Outcome: Not Progressing     Problem: Electrolyte Imbalance  Goal: Electrolyte Balance  Outcome: Not Progressing     Problem: Skin Injury Risk Increased  Goal: Skin Health and Integrity  Outcome: Not Progressing     Problem: Acute Coronary Syndrome  Goal: Optimal Adaptation to Illness  Outcome: Not Progressing  Goal: Absence of Cardiac-Related Pain  Outcome: Not Progressing  Goal: Normalized Cardiac Rhythm  Outcome: Not Progressing  Goal: Effective Cardiac Pump Function  Outcome: Not Progressing  Goal: Adequate Tissue Perfusion  Outcome: Not Progressing     Problem: Infection  Goal: Absence of Infection Signs and Symptoms  Outcome: Not Progressing

## 2024-11-18 VITALS
BODY MASS INDEX: 26.48 KG/M2 | RESPIRATION RATE: 18 BRPM | DIASTOLIC BLOOD PRESSURE: 71 MMHG | WEIGHT: 184.94 LBS | HEART RATE: 90 BPM | SYSTOLIC BLOOD PRESSURE: 123 MMHG | HEIGHT: 70 IN | OXYGEN SATURATION: 96 % | TEMPERATURE: 98 F

## 2024-11-18 LAB
ANION GAP SERPL CALC-SCNC: 7 MMOL/L (ref 8–16)
BASOPHILS # BLD AUTO: 0.08 K/UL (ref 0–0.2)
BASOPHILS NFR BLD: 0.8 % (ref 0–1.9)
BUN SERPL-MCNC: 31 MG/DL (ref 6–20)
CALCIUM SERPL-MCNC: 8.3 MG/DL (ref 8.7–10.5)
CHLORIDE SERPL-SCNC: 98 MMOL/L (ref 95–110)
CO2 SERPL-SCNC: 28 MMOL/L (ref 23–29)
CREAT SERPL-MCNC: 1.4 MG/DL (ref 0.5–1.4)
DIFFERENTIAL METHOD BLD: ABNORMAL
EOSINOPHIL # BLD AUTO: 0.4 K/UL (ref 0–0.5)
EOSINOPHIL NFR BLD: 4.3 % (ref 0–8)
ERYTHROCYTE [DISTWIDTH] IN BLOOD BY AUTOMATED COUNT: 16.4 % (ref 11.5–14.5)
EST. GFR  (NO RACE VARIABLE): 44 ML/MIN/1.73 M^2
GLUCOSE SERPL-MCNC: 95 MG/DL (ref 70–110)
HCT VFR BLD AUTO: 29 % (ref 37–48.5)
HGB BLD-MCNC: 9 G/DL (ref 12–16)
IMM GRANULOCYTES # BLD AUTO: 0.05 K/UL (ref 0–0.04)
IMM GRANULOCYTES NFR BLD AUTO: 0.5 % (ref 0–0.5)
INTERPRETATION UR IFE-IMP: NORMAL
LYMPHOCYTES # BLD AUTO: 2.3 K/UL (ref 1–4.8)
LYMPHOCYTES NFR BLD: 23.1 % (ref 18–48)
MCH RBC QN AUTO: 27.2 PG (ref 27–31)
MCHC RBC AUTO-ENTMCNC: 31 G/DL (ref 32–36)
MCV RBC AUTO: 88 FL (ref 82–98)
MONOCYTES # BLD AUTO: 0.8 K/UL (ref 0.3–1)
MONOCYTES NFR BLD: 8.2 % (ref 4–15)
NEUTROPHILS # BLD AUTO: 6.3 K/UL (ref 1.8–7.7)
NEUTROPHILS NFR BLD: 63.1 % (ref 38–73)
NRBC BLD-RTO: 0 /100 WBC
PATHOLOGIST INTERPRETATION IFE: NORMAL
PATHOLOGIST INTERPRETATION SPE: NORMAL
PLATELET # BLD AUTO: 370 K/UL (ref 150–450)
PMV BLD AUTO: 9.7 FL (ref 9.2–12.9)
POTASSIUM SERPL-SCNC: 4 MMOL/L (ref 3.5–5.1)
PROT PATTERN UR ELPH-IMP: NORMAL
RBC # BLD AUTO: 3.31 M/UL (ref 4–5.4)
SODIUM SERPL-SCNC: 133 MMOL/L (ref 136–145)
WBC # BLD AUTO: 9.98 K/UL (ref 3.9–12.7)

## 2024-11-18 PROCEDURE — 36415 COLL VENOUS BLD VENIPUNCTURE: CPT | Performed by: REGISTERED NURSE

## 2024-11-18 PROCEDURE — 63600175 PHARM REV CODE 636 W HCPCS: Performed by: STUDENT IN AN ORGANIZED HEALTH CARE EDUCATION/TRAINING PROGRAM

## 2024-11-18 PROCEDURE — 90656 IIV3 VACC NO PRSV 0.5 ML IM: CPT | Performed by: STUDENT IN AN ORGANIZED HEALTH CARE EDUCATION/TRAINING PROGRAM

## 2024-11-18 PROCEDURE — 25000003 PHARM REV CODE 250: Performed by: INTERNAL MEDICINE

## 2024-11-18 PROCEDURE — G0008 ADMIN INFLUENZA VIRUS VAC: HCPCS | Performed by: STUDENT IN AN ORGANIZED HEALTH CARE EDUCATION/TRAINING PROGRAM

## 2024-11-18 PROCEDURE — S4991 NICOTINE PATCH NONLEGEND: HCPCS | Performed by: STUDENT IN AN ORGANIZED HEALTH CARE EDUCATION/TRAINING PROGRAM

## 2024-11-18 PROCEDURE — 90471 IMMUNIZATION ADMIN: CPT | Performed by: STUDENT IN AN ORGANIZED HEALTH CARE EDUCATION/TRAINING PROGRAM

## 2024-11-18 PROCEDURE — 85025 COMPLETE CBC W/AUTO DIFF WBC: CPT | Performed by: STUDENT IN AN ORGANIZED HEALTH CARE EDUCATION/TRAINING PROGRAM

## 2024-11-18 PROCEDURE — 63600175 PHARM REV CODE 636 W HCPCS: Performed by: REGISTERED NURSE

## 2024-11-18 PROCEDURE — 25000003 PHARM REV CODE 250: Performed by: STUDENT IN AN ORGANIZED HEALTH CARE EDUCATION/TRAINING PROGRAM

## 2024-11-18 PROCEDURE — 80048 BASIC METABOLIC PNL TOTAL CA: CPT | Performed by: REGISTERED NURSE

## 2024-11-18 PROCEDURE — 3E02340 INTRODUCTION OF INFLUENZA VACCINE INTO MUSCLE, PERCUTANEOUS APPROACH: ICD-10-PCS | Performed by: INTERNAL MEDICINE

## 2024-11-18 PROCEDURE — 25000003 PHARM REV CODE 250: Performed by: EMERGENCY MEDICINE

## 2024-11-18 RX ORDER — LIDOCAINE 50 MG/G
2 PATCH TOPICAL NIGHTLY
Qty: 30 PATCH | Refills: 1 | Status: SHIPPED | OUTPATIENT
Start: 2024-11-18

## 2024-11-18 RX ORDER — PANTOPRAZOLE SODIUM 40 MG/1
40 TABLET, DELAYED RELEASE ORAL 2 TIMES DAILY
Qty: 60 TABLET | Refills: 1 | Status: SHIPPED | OUTPATIENT
Start: 2024-11-18 | End: 2025-01-17

## 2024-11-18 RX ORDER — LOSARTAN POTASSIUM 25 MG/1
12.5 TABLET ORAL NIGHTLY
COMMUNITY
Start: 2024-11-18

## 2024-11-18 RX ORDER — SUCRALFATE 1 G/1
1 TABLET ORAL
Qty: 90 TABLET | Refills: 1 | Status: SHIPPED | OUTPATIENT
Start: 2024-11-18

## 2024-11-18 RX ORDER — IBUPROFEN 200 MG
1 TABLET ORAL DAILY
Qty: 28 PATCH | Refills: 0 | Status: SHIPPED | OUTPATIENT
Start: 2024-11-19

## 2024-11-18 RX ORDER — TALC
6 POWDER (GRAM) TOPICAL NIGHTLY PRN
Qty: 30 TABLET | Refills: 1 | Status: SHIPPED | OUTPATIENT
Start: 2024-11-18

## 2024-11-18 RX ORDER — OXYCODONE HYDROCHLORIDE 5 MG/1
5 TABLET ORAL EVERY 6 HOURS PRN
Qty: 31 TABLET | Refills: 0 | Status: SHIPPED | OUTPATIENT
Start: 2024-11-18

## 2024-11-18 RX ORDER — POLYETHYLENE GLYCOL 3350 17 G/17G
17 POWDER, FOR SOLUTION ORAL 2 TIMES DAILY PRN
COMMUNITY
Start: 2024-11-18

## 2024-11-18 RX ORDER — OXYBUTYNIN CHLORIDE 5 MG/1
5 TABLET ORAL 3 TIMES DAILY
Qty: 90 TABLET | Refills: 1 | Status: SHIPPED | OUTPATIENT
Start: 2024-11-18 | End: 2025-01-17

## 2024-11-18 RX ORDER — FERROUS SULFATE 325(65) MG
325 TABLET ORAL
COMMUNITY
Start: 2024-11-18

## 2024-11-18 RX ADMIN — INFLUENZA VIRUS VACCINE 0.5 ML: 15; 15; 15 SUSPENSION INTRAMUSCULAR at 11:11

## 2024-11-18 RX ADMIN — SUCRALFATE 1 G: 1 TABLET ORAL at 10:11

## 2024-11-18 RX ADMIN — METOPROLOL SUCCINATE 25 MG: 25 TABLET, EXTENDED RELEASE ORAL at 08:11

## 2024-11-18 RX ADMIN — PANTOPRAZOLE SODIUM 40 MG: 40 INJECTION, POWDER, LYOPHILIZED, FOR SOLUTION INTRAVENOUS at 08:11

## 2024-11-18 RX ADMIN — LIDOCAINE 2 PATCH: 50 PATCH CUTANEOUS at 10:11

## 2024-11-18 RX ADMIN — OXYCODONE 5 MG: 5 TABLET ORAL at 08:11

## 2024-11-18 RX ADMIN — OXYCODONE 5 MG: 5 TABLET ORAL at 02:11

## 2024-11-18 RX ADMIN — SUCRALFATE 1 G: 1 TABLET ORAL at 06:11

## 2024-11-18 RX ADMIN — NICOTINE 1 PATCH: 21 PATCH, EXTENDED RELEASE TRANSDERMAL at 08:11

## 2024-11-18 NOTE — PLAN OF CARE
Problem: Adult Inpatient Plan of Care  Goal: Plan of Care Review  Outcome: Progressing  Goal: Patient-Specific Goal (Individualized)  Outcome: Progressing  Goal: Absence of Hospital-Acquired Illness or Injury  Outcome: Progressing  Goal: Optimal Comfort and Wellbeing  Outcome: Progressing  Goal: Readiness for Transition of Care  Outcome: Progressing     Problem: Gastrointestinal Bleeding  Goal: Optimal Coping with Acute Illness  Outcome: Progressing  Goal: Hemostasis  Outcome: Progressing     Problem: Fall Injury Risk  Goal: Absence of Fall and Fall-Related Injury  Outcome: Progressing     Problem: Anemia  Goal: Anemia Symptom Improvement  Outcome: Progressing     Problem: Gas Exchange Impaired  Goal: Optimal Gas Exchange  Outcome: Progressing     Problem: Comorbidity Management  Goal: Maintenance of Asthma Control  Outcome: Progressing  Goal: Blood Pressure in Desired Range  Outcome: Progressing  Goal: Maintenance of Behavioral Health Symptom Control  Outcome: Progressing  Goal: Maintenance of COPD Symptom Control  Outcome: Progressing  Goal: Maintenance of Heart Failure Symptom Control  Outcome: Progressing     Problem: Electrolyte Imbalance  Goal: Electrolyte Balance  Outcome: Progressing     Problem: Skin Injury Risk Increased  Goal: Skin Health and Integrity  Outcome: Progressing     Problem: Acute Coronary Syndrome  Goal: Optimal Adaptation to Illness  Outcome: Progressing  Goal: Absence of Cardiac-Related Pain  Outcome: Progressing  Goal: Normalized Cardiac Rhythm  Outcome: Progressing  Goal: Effective Cardiac Pump Function  Outcome: Progressing  Goal: Adequate Tissue Perfusion  Outcome: Progressing     Problem: Infection  Goal: Absence of Infection Signs and Symptoms  Outcome: Progressing     Problem: Pain Acute  Goal: Optimal Pain Control and Function  Outcome: Progressing

## 2024-11-18 NOTE — PLAN OF CARE
VN reviewed discharge instructions with pt. Using teach back method.  AVS printed and handed to pt by bedside nurse.  Reviewed follow-up appointments, medications, diet, and importance of medication compliance.  Reviewed home care instructions, treatment plan, self-management, and when to seek medical attention.  Allowed time for questions.  All questions answered.  Patient verbalized complete understanding of discharge instructions and voices no concerns.     Discharge instructions complete.  Bedside delivery done. Pt waiting on ride home..  Bedside nurse notified.

## 2024-11-18 NOTE — PROGRESS NOTES
[facility] Medicine  Telemedicine Progress Note    Patient Name: Mary Ellen Saini  MRN: [id number]  Patient Class: IP- Inpatient   Admission Date: 11/7/2024  Length of Stay: 9 days  Attending Physician: Ashley Spencer MD  Primary Care Provider: Marlen, Primary Doctor          Subjective:     Principal Problem:GIB (gastrointestinal bleeding)        HPI:  [age] year old female with a history of hypertension, bipolar, hep C, HFrEF presents to ED with complaints of chest pain, shortness a breath and blood in her stool.  Patient was recently admitted for GI bleed which she required blood transfusion.  Patient reports bleeding in stool has been having for the last several days however worsened today along with worsening abdominal pain. In ED labs remarkable for H&H 5.6 and 17.2.  Patient discharged 12 days ago with H&H of 7.3 and 22.8.  Blood pressure stable on admission however she is tachycardic 120.  Patient also with a chest pain.  EKG without ST elevation BNP 3425, troponin 3.379.  CTA of the abdomen done denies show any evidence of active GI bleed.  Patient typed and crossmatch for 3 units.  We will start PPI IV BID, transfuse PRBC, consult GI and Cards.    Overview/Hospital Course:  No notes on file    Interval History: Virtual follow up visit for suspected Shortness of breath [R06.02]  Symptomatic anemia [D64.9] present on admission.   This service was provided by telemedicine.    The patient location is: [facility]/K4 A   Admitted 11/7/2024  9:15 PM    CC: GI bleed    The patient is able to provide adequate history. History was obtained from patient and past medical records.   No significant events overnight reported.  No obvious bleeding with bowel movements.  Hemoglobin is slowly declining.  Patient remains with abdominal pain but is currently controlled with trial of oxycodone. Cr mildly elevated to 1.3.      Data  Details     [x]   Lab results reviewed 11/17/2024  sCr improved. Hyponatremia  stable. H&H diminshed.        []   Micro reports reviewed 11/17/2024     [x]   Pathology reports reviewed 11/17/2024  Colon bx c/w amyloid    []   Imaging reports reviewed 11/17/2024     []   Cardiology Procedure reports reviewed 11/17/2024      []   Non- records/CareEverywhere notes reviewed 11/17/2024      []  Tests/studies orders placed or verified 11/17/2024       [x]  Independently viewed/assessed 11/17/2024  Corrected Ca: 9.5    []  11/17/2024 Discussion of:       Review of Systems   Constitutional:  Positive for fatigue. Negative for fever.   Respiratory:  Negative for shortness of breath.    Cardiovascular:  Negative for leg swelling.   Gastrointestinal:  Positive for abdominal pain and blood in stool.   Psychiatric/Behavioral:  Positive for sleep disturbance.      Objective:     Vital Signs (Most Recent):  Temp: 98.3 °F (36.8 °C) (11/17/24 1658)  Pulse: 85 (11/17/24 1658)  Resp: 18 (11/17/24 1658)  BP: 116/70 (11/17/24 1658)  SpO2: 98 % (11/17/24 1658) Vital Signs (24h Range):  Temp:  [97.6 °F (36.4 °C)-98.7 °F (37.1 °C)] 98.3 °F (36.8 °C)  Pulse:  [81-94] 85  Resp:  [16-18] 18  SpO2:  [95 %-100 %] 98 %  BP: (102-122)/(58-84) 116/70     Weight: 83.9 kg (184 lb 15.5 oz)  Body mass index is 26.54 kg/m².  No intake or output data in the 24 hours ending 11/17/24 1955          Physical Exam  Constitutional:       General: She is awake.      Appearance: She is ill-appearing.   Cardiovascular:      Comments: Monitor and/or Vital signs reviewed at time of visit  Pulmonary:      Effort: Pulmonary effort is normal. No accessory muscle usage or respiratory distress.   Neurological:      Mental Status: She is alert. She is not disoriented.   Psychiatric:         Attention and Perception: Attention normal.         Behavior: Behavior is cooperative.         Significant Labs:   Recent Labs   Lab 10/20/24  0722   HGBA1C 5.4     Recent Labs   Lab 11/15/24  0300 11/16/24  0315 11/17/24  0252   WBC 11.64 10.34 9.42   HGB  "9.2* 8.6* 8.2*   HCT 29.6* 26.1* 25.0*    368 347     Recent Labs   Lab 11/15/24  0300 11/16/24  0315 11/17/24  0252   GRAN 68.0  7.9* 66.5  6.9 63.3  6.0   LYMPH 19.0  2.2 18.8  1.9 22.7  2.1   MONO 7.5  0.9 7.7  0.8 7.9  0.7   EOS 0.5 0.6* 0.4     Recent Labs   Lab 11/13/24  0226 11/14/24  0231 11/15/24  0300 11/16/24  0315 11/17/24  0252   *   < > 131* 132* 132*   K 3.8   < > 3.9 3.7 3.6   CL 99   < > 100 98 99   CO2 24   < > 25 26 26   BUN 19   < > 27* 32* 30*   CREATININE 1.6*   < > 1.4 1.1 1.3      < > 95 95 89   CALCIUM 8.0*   < > 7.9* 8.0* 7.9*   ALBUMIN 2.1*  --  2.0*  --   --    MG 2.1  --   --   --   --    PHOS 3.1  --   --   --   --     < > = values in this interval not displayed.     Recent Labs   Lab 11/12/24  1300 11/13/24  0226 11/15/24  0300 11/16/24  0315   ALKPHOS  --  91 83  --    ALT  --  24 13  --    AST  --  14 17  --    PROT  --  6.3 6.0  --    BILITOT  --  0.2 0.2  --    LDH  --   --   --  143   CRP 12.2*  --   --   --      No results for input(s): "CPK", "TROPONINI", "BNP" in the last 168 hours.    Recent Labs   Lab 08/12/24  0534 08/19/24 2034 08/19/24 2233 08/26/24  1545 11/07/24 2153 11/14/24 0231   PROCAL  --  0.10  --   --   --   --    LACTATE  --   --   --   --  1.0  --    DDIMER  --   --  0.72*  --   --   --    FERRITIN 112  --   --   --   --  78   SEDRATE  --   --   --  26  --   --      SARS-CoV-2 RNA, Amplification, Qual (no units)   Date Value   09/05/2024 Negative   08/19/2024 Negative     POC Rapid COVID (no units)   Date Value   08/29/2024 Negative   08/12/2024 Negative     ECG Results              EKG 12-lead (Final result)        Collection Time Result Time QRS Duration OHS QTC Calculation    11/07/24 22:25:43 11/10/24 11:34:04 88 478                     Final result by Interface, Lab In [facility] (11/10/24 11:34:08)                   Narrative:    Test Reason : R06.02,    Vent. Rate : 123 BPM     Atrial Rate : 123 BPM     P-R Int : 132 ms       "    QRS Dur : 088 ms      QT Int : 334 ms       P-R-T Axes : 033 004 055 degrees     QTc Int : 478 ms    Sinus tachycardia  diffuse ST depression ,consider ischemia   Abnormal ECG  When compared with ECG of 19-OCT-2024 09:34,  Vent. rate has increased BY  54 BPM  Confirmed by Vinod Larry MD ([id number]) on 11/10/2024 11:34:03 AM    Referred By: [email or web address]   SELF           Confirmed By:Vinod Larry MD                                Results for orders placed during the hospital encounter of 08/12/24    Echo    Interpretation Summary    Left Ventricle: The left ventricle is moderately dilated. There is eccentric hypertrophy. Moderate global hypokinesis present. There is moderately reduced systolic function with a visually estimated ejection fraction of 30 - 35%. Biplane (2D) method of discs ejection fraction is 34%. There is diastolic dysfunction but grade cannot be determined.    Right Ventricle: Normal right ventricular cavity size. Wall thickness is normal. Systolic function is normal.    Left Atrium: Left atrium is severely dilated.    Right Atrium: Right atrium is moderately dilated.    Aortic Valve: There is mild stenosis. Aortic valve area by VTI is 1.95 cm². Aortic valve peak velocity is 1.40 m/s. Mean gradient is 4 mmHg. The dimensionless index is 0.62.    Mitral Valve: There is mild regurgitation.    Tricuspid Valve: There is mild regurgitation.    Pulmonic Valve: There is mild regurgitation.    Pulmonary Artery: The estimated pulmonary artery systolic pressure is 36 mmHg.    IVC/SVC: Normal venous pressure at 3 mmHg.      CTA Acute GI Dorothy, Abdomen and Pelvis  Narrative: EXAMINATION:  CTA ACUTE GI BLEED, ABDOMEN AND PELVIS    CLINICAL HISTORY:  GI bleeding;    TECHNIQUE:  Using 75 cc of  Omnipaque 350 IV, and multi-detector helical CT technique, axial CT angiogram images of the abdomen were obtained from the lung bases through the pelvis. Precontrast and portal venous phase images of the abdomen  and pelvis also done. 2D post-processing coronal and sagittal reconstructions of the abdominal aorta and visceral arteries performed.    COMPARISON:  10/24/2024    FINDINGS:  The lung bases are well aerated.  No consolidation, suspicious nodules, with small bilateral pleural effusion.  The visualized portions of the heart appear normal.    The esophagus, stomach, spleen, pancreas, and adrenal glands are unremarkable.    The liver is normal in size and attenuation without focal abnormality.  The portal veins appear patent.  The gallbladder shows no evidence of stones or cholecystitis.  No intra-or extrahepatic biliary ductal dilatation.    The kidneys demonstrate normal enhancement.  No renal mass, nephrolithiasis or hydronephrosis.  The ureters are normal in course and caliber. The urinary bladder appears unremarkable    No evidence of gastrointestinal hemorrhage or bowel obstruction.  Inflammation in the right colon through the hepatic flexure and the proximal portion of the transverse colon with more normal splenic flexure descending colon and rectosigmoid colon with a few diverticula in the sigmoid but without diverticulitis.  Definite mass is not identified as on the previous exam.  There is no ascites, free fluid, or intraperitoneal free air. No significant peritoneal or retroperitoneal adenopathy.    The abdominal aorta is normal in course and caliber without significant atherosclerotic calcifications.    The osseous structures and extraperitoneal soft tissues are unremarkable.  Impression: Inflammatory changes of the ascending and splenic flexure of the colon with the previous suggested mass not confidently identified.    No evidence of active GI bleed.    No evidence of bowel obstruction.    Mild pleural thickening and/or fluid.    This report was flagged in Epic as abnormal.    Electronically signed by: Jax Carmichael  Date:    11/07/2024  Time:    23:35  X-Ray Chest 1 View  Narrative: EXAMINATION:  XR  CHEST 1 VIEW    CLINICAL HISTORY:  Shortness of breath    TECHNIQUE:  Single frontal view of the chest was performed.    COMPARISON:  10/19/2024.    FINDINGS:  The lungs are hypoexpanded.  There are perihilar interstitial opacities, may be accentuated by hypoaeration changes.  The pleural spaces are clear.  The cardiac silhouette is enlarged.  Osseous structures demonstrate degenerative changes.  Impression: Mild perihilar interstitial prominence, may be accentuated by hypoaeration.  Mild edema is not excluded.    Electronically signed by: Nick Clifton  Date:    11/07/2024  Time:    23:25      Labs and Imaging listed above were reviewed.       Assessment/Plan:      * GIB (gastrointestinal bleeding)  Patient's hemorrhage is due to gastrointestinal bleed, patient does not have a propensity for bleeding..   Patients most recent Hgb, Hct, platelets are listed below.  Recent Labs     11/15/24  0300 11/16/24  0315 11/17/24  0252   HGB 9.2* 8.6* 8.2*   HCT 29.6* 26.1* 25.0*    368 347       - trend hemoglobin/hematocrit   - monitor and correct any coagulation defects  - transfuse if Hgb is <7g/dl (<8g/dl in cases of active ACS) or if patient has rapid bleeding leading to hemodynamic instability  - Consult GI  -PPI BID and carafate TID  -patient received 3 U PRBC  -patient was evaluated by GI, EGD/colonoscopy 11/11  -endoscopy findings concerning for an unclear underlying process. Vasculitis-associated screening labs ordered.   -24 hr urine and serology ordered by heme/onc  -colon bx with amyloidosis - consulted Heme/Onc  - patient needs to f/u with amyloidosis provider    Other amyloidosis  See GIB (gastrointestinal bleeding)     ABLA (acute blood loss anemia)  Anemia is likely due to acute blood loss which was from GIB . Most recent hemoglobin and hematocrit are listed below.  Recent Labs     11/15/24  0300 11/16/24  0315 11/17/24  0252   HGB 9.2* 8.6* 8.2*   HCT 29.6* 26.1* 25.0*     CTA w/o active GIB  - Monitor  serial CBC:   - Transfuse PRBC if patient becomes hemodynamically unstable, symptomatic or H/H drops below 7/21.  - Patient has received 3 units of PRBCs on 11/8/24  - Patient's anemia is currently stable  -IV iron X 2 doses while admitted    Leukocytosis  No obvious infection  Started Rocephin given pts hx Hep C and GIB  Improved, no evidence of SBP    NSTEMI (non-ST elevated myocardial infarction)  EKG w/o ST elevation  Troponin elevated 3.379-->3.587-->3.449  Cards eval is appreciated, medical management is recommended     HFrEF (heart failure with reduced ejection fraction)  Echo 08/2024  EF 34%  Continue BB  Given one dose IV Lasix 10/08  Cardiac/Autonomic (From admission, onward)      Start     Stop Route Frequency Ordered    11/08/24 0030  metoprolol succinate (TOPROL-XL) 24 hr tablet 25 mg         -- Oral Daily 11/08/24 0029        Continued oral Lasix.  Renal insufficiency on 11/13 after recent NPO status; held Lasix on 11/13. Scheduled to resume on 11/14 with rising Cr again - hold lasix for now  -amyloidosis may also be cause of her recently diagnosed heart failure    Hepatitis C     Latest Reference Range & Units 08/19/24 20:34   HCV Log <1.08 LogIU/mL 5.93 !   HCV Quantitative Result <12 IU/mL 045068 !   HCV, Qualitative Not Detected  Detected !   Hepatitis C Ab Non-reactive  Reactive !   HIV 1/2 Ag/Ab Non-reactive  Non-reactive     Needs follow up with hematology.    Smoking history  Nicotine patches  Smoking cessation counseling  U tox is positive for cocaine, amphetamine and THC; patient denies taking cocaine/amphetamine intentionally but does smoke THC 2-3 times a week and suspects it was in the THC    Hypokalemia  Patient's most recent potassium results are listed below.   Recent Labs     11/15/24  0300 11/16/24  0315 11/17/24  0252   K 3.9 3.7 3.6       Plan  - Replete potassium per protocol  - Monitor potassium   - Patient's hypokalemia is improving      VTE Risk Mitigation (From admission,  onward)           Ordered     IP VTE HIGH RISK PATIENT  Once         11/08/24 0137     Place sequential compression device  Until discontinued         11/08/24 0137                    I have completed this tele-visit without the assistance of a telepresenter.    The attending portion of this evaluation, treatment, and documentation was performed per Ashley Spencer MD via Telemedicine AudioVisual using the secure [employer] software platform with 2 way audio/video. The provider was located off-site and the patient is located in the hospital. The aforementioned video software was utilized to document the relevant history and physical exam    Ashley Spencer MD  Department of [facility] Medicine   [facility] [place]

## 2024-11-18 NOTE — ASSESSMENT & PLAN NOTE
Anemia is likely due to acute blood loss which was from GIB . Most recent hemoglobin and hematocrit are listed below.  Recent Labs     11/15/24  0300 11/16/24  0315 11/17/24  0252   HGB 9.2* 8.6* 8.2*   HCT 29.6* 26.1* 25.0*     CTA w/o active GIB  - Monitor serial CBC:   - Transfuse PRBC if patient becomes hemodynamically unstable, symptomatic or H/H drops below 7/21.  - Patient has received 3 units of PRBCs on 11/8/24  - Patient's anemia is currently stable  -IV iron X 2 doses while admitted

## 2024-11-18 NOTE — ASSESSMENT & PLAN NOTE
Patient's most recent potassium results are listed below.   Recent Labs     11/15/24  0300 11/16/24  0315 11/17/24  0252   K 3.9 3.7 3.6       Plan  - Replete potassium per protocol  - Monitor potassium   - Patient's hypokalemia is improving

## 2024-11-18 NOTE — PLAN OF CARE
D/c orders noted, no DME, no HH.    SW met with pt to discuss d/c plans. Pt stated she feels good and is ready to discharge home. SW informed pt of her upcoming follow up appointments. Pt verbalized understanding. Pt stated her friend will provide transportation home following discharge.     Pt is cleared to go from CM standpoint.     Future Appointments   Date Time Provider Department Center   12/3/2024 11:00 AM Yandel Bonds RRT [facility] S[facility]   12/12/2024  9:20 AM Octavio Hays MD [facility] HEM ONC [facility]         11/18/24 1135   Final Note   Assessment Type Final Discharge Note   Anticipated Discharge Disposition Home   Post-Acute Status   Discharge Delays None known at this time

## 2024-11-18 NOTE — PLAN OF CARE
VN note: Chart review completed. Patient labs and notes reviewed. Care plan and goals updated. VN available to intervene as needed.      Problem: Adult Inpatient Plan of Care  Goal: Plan of Care Review  Outcome: Progressing

## 2024-11-18 NOTE — SUBJECTIVE & OBJECTIVE
Interval History: Virtual follow up visit for suspected Shortness of breath [R06.02]  Symptomatic anemia [D64.9] present on admission.   This service was provided by telemedicine.    The patient location is: [facility] A   Admitted 11/7/2024  9:15 PM    CC: GI bleed    The patient is able to provide adequate history. History was obtained from patient and past medical records.   No significant events overnight reported.  No obvious bleeding with bowel movements.  Hemoglobin is slowly declining.  Patient remains with abdominal pain but is currently controlled with trial of oxycodone. Cr mildly elevated to 1.3.      Data  Details     [x]   Lab results reviewed 11/17/2024  sCr improved. Hyponatremia stable. H&H diminshed.        []   Micro reports reviewed 11/17/2024     [x]   Pathology reports reviewed 11/17/2024  Colon bx c/w amyloid    []   Imaging reports reviewed 11/17/2024     []   Cardiology Procedure reports reviewed 11/17/2024      []   Non- records/CareEverywhere notes reviewed 11/17/2024      []  Tests/studies orders placed or verified 11/17/2024       [x]  Independently viewed/assessed 11/17/2024  Corrected Ca: 9.5    []  11/17/2024 Discussion of:       Review of Systems   Constitutional:  Positive for fatigue. Negative for fever.   Respiratory:  Negative for shortness of breath.    Cardiovascular:  Negative for leg swelling.   Gastrointestinal:  Positive for abdominal pain and blood in stool.   Psychiatric/Behavioral:  Positive for sleep disturbance.      Objective:     Vital Signs (Most Recent):  Temp: 98.3 °F (36.8 °C) (11/17/24 1658)  Pulse: 85 (11/17/24 1658)  Resp: 18 (11/17/24 1658)  BP: 116/70 (11/17/24 1658)  SpO2: 98 % (11/17/24 1658) Vital Signs (24h Range):  Temp:  [97.6 °F (36.4 °C)-98.7 °F (37.1 °C)] 98.3 °F (36.8 °C)  Pulse:  [81-94] 85  Resp:  [16-18] 18  SpO2:  [95 %-100 %] 98 %  BP: (102-122)/(58-84) 116/70     Weight: 83.9 kg (184 lb 15.5 oz)  Body mass index is 26.54 kg/m².  No intake or  "output data in the 24 hours ending 11/17/24 1955          Physical Exam  Constitutional:       General: She is awake.      Appearance: She is ill-appearing.   Cardiovascular:      Comments: Monitor and/or Vital signs reviewed at time of visit  Pulmonary:      Effort: Pulmonary effort is normal. No accessory muscle usage or respiratory distress.   Neurological:      Mental Status: She is alert. She is not disoriented.   Psychiatric:         Attention and Perception: Attention normal.         Behavior: Behavior is cooperative.         Significant Labs:   Recent Labs   Lab 10/20/24  0722   HGBA1C 5.4     Recent Labs   Lab 11/15/24  0300 11/16/24  0315 11/17/24  0252   WBC 11.64 10.34 9.42   HGB 9.2* 8.6* 8.2*   HCT 29.6* 26.1* 25.0*    368 347     Recent Labs   Lab 11/15/24  0300 11/16/24  0315 11/17/24  0252   GRAN 68.0  7.9* 66.5  6.9 63.3  6.0   LYMPH 19.0  2.2 18.8  1.9 22.7  2.1   MONO 7.5  0.9 7.7  0.8 7.9  0.7   EOS 0.5 0.6* 0.4     Recent Labs   Lab 11/13/24  0226 11/14/24  0231 11/15/24  0300 11/16/24  0315 11/17/24  0252   *   < > 131* 132* 132*   K 3.8   < > 3.9 3.7 3.6   CL 99   < > 100 98 99   CO2 24   < > 25 26 26   BUN 19   < > 27* 32* 30*   CREATININE 1.6*   < > 1.4 1.1 1.3      < > 95 95 89   CALCIUM 8.0*   < > 7.9* 8.0* 7.9*   ALBUMIN 2.1*  --  2.0*  --   --    MG 2.1  --   --   --   --    PHOS 3.1  --   --   --   --     < > = values in this interval not displayed.     Recent Labs   Lab 11/12/24  1300 11/13/24  0226 11/15/24  0300 11/16/24  0315   ALKPHOS  --  91 83  --    ALT  --  24 13  --    AST  --  14 17  --    PROT  --  6.3 6.0  --    BILITOT  --  0.2 0.2  --    LDH  --   --   --  143   CRP 12.2*  --   --   --      No results for input(s): "CPK", "TROPONINI", "BNP" in the last 168 hours.    Recent Labs   Lab 08/12/24  0534 08/19/24  2034 08/19/24  2233 08/26/24  1545 11/07/24  2153 11/14/24  0231   PROCAL  --  0.10  --   --   --   --    LACTATE  --   --   --   --  " 1.0  --    DDIMER  --   --  0.72*  --   --   --    FERRITIN 112  --   --   --   --  78   SEDRATE  --   --   --  26  --   --      SARS-CoV-2 RNA, Amplification, Qual (no units)   Date Value   09/05/2024 Negative   08/19/2024 Negative     POC Rapid COVID (no units)   Date Value   08/29/2024 Negative   08/12/2024 Negative     ECG Results              EKG 12-lead (Final result)        Collection Time Result Time QRS Duration OHS QTC Calculation    11/07/24 22:25:43 11/10/24 11:34:04 88 478                     Final result by Interface, Lab In [facility] (11/10/24 11:34:08)                   Narrative:    Test Reason : R06.02,    Vent. Rate : 123 BPM     Atrial Rate : 123 BPM     P-R Int : 132 ms          QRS Dur : 088 ms      QT Int : 334 ms       P-R-T Axes : 033 004 055 degrees     QTc Int : 478 ms    Sinus tachycardia  diffuse ST depression ,consider ischemia   Abnormal ECG  When compared with ECG of 19-OCT-2024 09:34,  Vent. rate has increased BY  54 BPM  Confirmed by Vinod Larry MD ([id number]) on 11/10/2024 11:34:03 AM    Referred By: [email or web address]   SELF           Confirmed By:Vinod Larry MD                                Results for orders placed during the hospital encounter of 08/12/24    Echo    Interpretation Summary    Left Ventricle: The left ventricle is moderately dilated. There is eccentric hypertrophy. Moderate global hypokinesis present. There is moderately reduced systolic function with a visually estimated ejection fraction of 30 - 35%. Biplane (2D) method of discs ejection fraction is 34%. There is diastolic dysfunction but grade cannot be determined.    Right Ventricle: Normal right ventricular cavity size. Wall thickness is normal. Systolic function is normal.    Left Atrium: Left atrium is severely dilated.    Right Atrium: Right atrium is moderately dilated.    Aortic Valve: There is mild stenosis. Aortic valve area by VTI is 1.95 cm². Aortic valve peak velocity is 1.40 m/s. Mean gradient  is 4 mmHg. The dimensionless index is 0.62.    Mitral Valve: There is mild regurgitation.    Tricuspid Valve: There is mild regurgitation.    Pulmonic Valve: There is mild regurgitation.    Pulmonary Artery: The estimated pulmonary artery systolic pressure is 36 mmHg.    IVC/SVC: Normal venous pressure at 3 mmHg.      CTA Acute GI Taft, Abdomen and Pelvis  Narrative: EXAMINATION:  CTA ACUTE GI BLEED, ABDOMEN AND PELVIS    CLINICAL HISTORY:  GI bleeding;    TECHNIQUE:  Using 75 cc of  Omnipaque 350 IV, and multi-detector helical CT technique, axial CT angiogram images of the abdomen were obtained from the lung bases through the pelvis. Precontrast and portal venous phase images of the abdomen and pelvis also done. 2D post-processing coronal and sagittal reconstructions of the abdominal aorta and visceral arteries performed.    COMPARISON:  10/24/2024    FINDINGS:  The lung bases are well aerated.  No consolidation, suspicious nodules, with small bilateral pleural effusion.  The visualized portions of the heart appear normal.    The esophagus, stomach, spleen, pancreas, and adrenal glands are unremarkable.    The liver is normal in size and attenuation without focal abnormality.  The portal veins appear patent.  The gallbladder shows no evidence of stones or cholecystitis.  No intra-or extrahepatic biliary ductal dilatation.    The kidneys demonstrate normal enhancement.  No renal mass, nephrolithiasis or hydronephrosis.  The ureters are normal in course and caliber. The urinary bladder appears unremarkable    No evidence of gastrointestinal hemorrhage or bowel obstruction.  Inflammation in the right colon through the hepatic flexure and the proximal portion of the transverse colon with more normal splenic flexure descending colon and rectosigmoid colon with a few diverticula in the sigmoid but without diverticulitis.  Definite mass is not identified as on the previous exam.  There is no ascites, free fluid, or  intraperitoneal free air. No significant peritoneal or retroperitoneal adenopathy.    The abdominal aorta is normal in course and caliber without significant atherosclerotic calcifications.    The osseous structures and extraperitoneal soft tissues are unremarkable.  Impression: Inflammatory changes of the ascending and splenic flexure of the colon with the previous suggested mass not confidently identified.    No evidence of active GI bleed.    No evidence of bowel obstruction.    Mild pleural thickening and/or fluid.    This report was flagged in Epic as abnormal.    Electronically signed by: Jax Carmichael  Date:    11/07/2024  Time:    23:35  X-Ray Chest 1 View  Narrative: EXAMINATION:  XR CHEST 1 VIEW    CLINICAL HISTORY:  Shortness of breath    TECHNIQUE:  Single frontal view of the chest was performed.    COMPARISON:  10/19/2024.    FINDINGS:  The lungs are hypoexpanded.  There are perihilar interstitial opacities, may be accentuated by hypoaeration changes.  The pleural spaces are clear.  The cardiac silhouette is enlarged.  Osseous structures demonstrate degenerative changes.  Impression: Mild perihilar interstitial prominence, may be accentuated by hypoaeration.  Mild edema is not excluded.    Electronically signed by: Nick Clifton  Date:    11/07/2024  Time:    23:25      Labs and Imaging listed above were reviewed.

## 2024-11-19 ENCOUNTER — PATIENT OUTREACH (OUTPATIENT)
Dept: ADMINISTRATIVE | Facility: OTHER | Age: 59
End: 2024-11-19
Payer: MEDICARE

## 2024-11-19 LAB
NT-PROBNP SERPL IA-MCNC: ABNORMAL PG/ML
PATHOLOGIST INTERPRETATION UIFE: NORMAL
PATHOLOGIST INTERPRETATION UPE: NORMAL
TROPONIN T SERPL-MCNC: 170 NG/L

## 2024-11-21 NOTE — PROGRESS NOTES
CHW - Outreach Attempt    Community Health Worker left a voicemail message for 1st attempt to contact patient regarding:     Utilities  Food  Housing  Transportation  Prescription Assistance/Medication  Mental Health       Community Health Worker to attempt to contact patient on: 11/19/24    
CHW - Outreach Attempt    Community Health Worker left a voicemail message for 2nd attempt to contact patient regarding:   Utilities  Food  Housing  Transportation  Prescription Assistance/Medication  Mental Health   Community Health Worker to attempt to contact patient on: 11/20/24    
CHW - Unable to Contact    Community Health Worker to close episode at this time due to three missed attempts for patient contact.       
OLINDA

## 2024-11-22 NOTE — PHYSICIAN QUERY
Question: Please clarify the Cardiac diagnosis.    Provider Query Response:  NSTEMI/Myocardial infarction Type 2 due to (please specify): anemia

## 2024-11-26 DIAGNOSIS — R76.8 HEPATITIS C ANTIBODY POSITIVE IN BLOOD: Primary | ICD-10-CM

## 2024-11-26 LAB
FINAL PATHOLOGIC DIAGNOSIS: NORMAL
GROSS: NORMAL
Lab: NORMAL
MICROSCOPIC EXAM: NORMAL
SUPPLEMENTAL DIAGNOSIS: NORMAL

## 2024-11-26 NOTE — ASSESSMENT & PLAN NOTE
Patient's hemorrhage is due to gastrointestinal bleed, patient does not have a propensity for bleeding..   Patients most recent Hgb, Hct, platelets were reviewed.      - trend hemoglobin/hematocrit   - monitor and correct any coagulation defects  - transfuse if Hgb is <7g/dl (<8g/dl in cases of active ACS) or if patient has rapid bleeding leading to hemodynamic instability  - Consult GI  -PPI BID and carafate TID  -patient received 3 U PRBC  -patient was evaluated by GI, EGD/colonoscopy 11/11  -endoscopy findings concerning for an unclear underlying process. Vasculitis-associated screening labs ordered.   -24 hr urine and serology ordered by heme/onc  -colon bx with amyloidosis - consulted Heme/Onc  - patient needs to f/u with amyloidosis provider

## 2024-11-26 NOTE — ASSESSMENT & PLAN NOTE
Latest Reference Range & Units 08/19/24 20:34   HCV Log <1.08 LogIU/mL 5.93 !   HCV Quantitative Result <12 IU/mL [id number] !   HCV, Qualitative Not Detected  Detected !   Hepatitis C Ab Non-reactive  Reactive !   HIV 1/2 Ag/Ab Non-reactive  Non-reactive     Needs follow up with hematology - referral placed.

## 2024-11-26 NOTE — ASSESSMENT & PLAN NOTE
See GIB (gastrointestinal bleeding)   F/u with heme/onc for results of testing and treatment plan.

## 2024-11-26 NOTE — ASSESSMENT & PLAN NOTE
Patient's most recent potassium results were reviewed.     Plan  - Replete potassium per protocol  - Monitor potassium   - Patient's hypokalemia is improving

## 2024-11-26 NOTE — ASSESSMENT & PLAN NOTE
Anemia is likely due to acute blood loss which was from GIB . Most recent hemoglobin and hematocrit were reviewed.  CTA w/o active GIB  - Monitor serial CBC:   - Transfuse PRBC if patient becomes hemodynamically unstable, symptomatic or H/H drops below 7/21.  - Patient has received 3 units of PRBCs on 11/8/24  - Patient's anemia is currently stable  -IV iron X 2 doses while admitted

## 2024-11-26 NOTE — DISCHARGE SUMMARY
[facility] Medicine  Discharge Summary      Patient Name: Mary Ellen Saini  MRN: [id number]  Patient Class: IP- Inpatient  Admission Date: 11/7/2024  Hospital Length of Stay: 10 days  Discharge Date and Time: 11/18/2024  3:04 PM  Attending Physician: Marlen att. providers found   Discharging Provider: Ashley Spencer MD  Primary Care Provider: Marlen, Primary Doctor      HPI:   [age] year old female with a history of hypertension, bipolar, hep C, HFrEF presents to ED with complaints of chest pain, shortness a breath and blood in her stool.  Patient was recently admitted for GI bleed which she required blood transfusion.  Patient reports bleeding in stool has been having for the last several days however worsened today along with worsening abdominal pain. In ED labs remarkable for H&H 5.6 and 17.2.  Patient discharged 12 days ago with H&H of 7.3 and 22.8.  Blood pressure stable on admission however she is tachycardic 120.  Patient also with a chest pain.  EKG without ST elevation BNP 3425, troponin 3.379.  CTA of the abdomen done denies show any evidence of active GI bleed.  Patient typed and crossmatch for 3 units.  We will start PPI IV BID, transfuse PRBC, consult GI and Cards.    Procedure(s) (LRB):  EGD (ESOPHAGOGASTRODUODENOSCOPY) (N/A)  COLONOSCOPY (N/A)      Hospital Course:   See problem based hospital course.     Goals of Care Treatment Preferences:  Code Status: Full Code          What is most important right now is to focus on remaining as independent as possible, symptom/pain control, extending life as long as possible, even it it means sacrificing quality.  Accordingly, we have decided that the best plan to meet the patient's goals includes continuing with treatment.      Consults:   Consults (From admission, onward)          Status Ordering Provider     Inpatient consult to Hematology/Oncology  Once        Provider:  (Not yet assigned)    GERALDINE Limon     Inpatient virtual consult to  Hospital Medicine  Once        Provider:  (Not yet assigned)    Completed AL-ABDWALEM, AYA     Inpatient consult to Social Work  Once        Provider:  (Not yet assigned)    Completed AL-ABDWALEM, AYA     [facility]  Once        Provider:  (Not yet assigned)    Completed HARJINDER GELLER     Inpatient consult to Social Work/Case Management  Once        Provider:  (Not yet assigned)    Completed AL-MARGARETM, AYA     Inpatient consult to Gastroenterology  Once        Provider:  (Not yet assigned)    Completed HARJINDER GELLER            Cardiac/Vascular  NSTEMI (non-ST elevated myocardial infarction)  EKG w/o ST elevation  Troponin elevated 3.379-->3.587-->3.449  Cards eval is appreciated, medical management is recommended     Renal/  Hypokalemia  Patient's most recent potassium results were reviewed.     Plan  - Replete potassium per protocol  - Monitor potassium   - Patient's hypokalemia is improving    Immunology/Multi System  Other amyloidosis  See GIB (gastrointestinal bleeding)   F/u with heme/onc for results of testing and treatment plan.    Oncology  ABLA (acute blood loss anemia)  Anemia is likely due to acute blood loss which was from GIB . Most recent hemoglobin and hematocrit were reviewed.  CTA w/o active GIB  - Monitor serial CBC:   - Transfuse PRBC if patient becomes hemodynamically unstable, symptomatic or H/H drops below 7/21.  - Patient has received 3 units of PRBCs on 11/8/24  - Patient's anemia is currently stable  -IV iron X 2 doses while admitted    Leukocytosis  No obvious infection  Started Rocephin given pts hx Hep C and GIB  Improved, no evidence of SBP    GI  * GIB (gastrointestinal bleeding)  Patient's hemorrhage is due to gastrointestinal bleed, patient does not have a propensity for bleeding..   Patients most recent Hgb, Hct, platelets were reviewed.      - trend hemoglobin/hematocrit   - monitor and correct any coagulation defects  -  transfuse if Hgb is <7g/dl (<8g/dl in cases of active ACS) or if patient has rapid bleeding leading to hemodynamic instability  - Consult GI  -PPI BID and carafate TID  -patient received 3 U PRBC  -patient was evaluated by GI, EGD/colonoscopy 11/11  -endoscopy findings concerning for an unclear underlying process. Vasculitis-associated screening labs ordered.   -24 hr urine and serology ordered by heme/onc  -colon bx with amyloidosis - consulted Heme/Onc  - patient needs to f/u with amyloidosis provider    Hepatitis C     Latest Reference Range & Units 08/19/24 20:34   HCV Log <1.08 LogIU/mL 5.93 !   HCV Quantitative Result <12 IU/mL [id number] !   HCV, Qualitative Not Detected  Detected !   Hepatitis C Ab Non-reactive  Reactive !   HIV 1/2 Ag/Ab Non-reactive  Non-reactive     Needs follow up with hematology - referral placed.    Other  Smoking history  Nicotine patches  Smoking cessation counseling  U tox is positive for cocaine, amphetamine and THC; patient denies taking cocaine/amphetamine intentionally but does smoke THC 2-3 times a week and suspects it was in the THC      Final Active Diagnoses:    Diagnosis Date Noted POA    PRINCIPAL PROBLEM:  GIB (gastrointestinal bleeding) [K92.2] 11/08/2024 Yes    Duodenal ulcer [K26.9] 11/13/2024 Yes    Other amyloidosis [E85.89] 11/12/2024 Yes    ABLA (acute blood loss anemia) [D62] 11/08/2024 Yes    Leukocytosis [D72.829] 10/24/2024 Yes    NSTEMI (non-ST elevated myocardial infarction) [I21.4] 10/19/2024 Yes    HFrEF (heart failure with reduced ejection fraction) [I50.20] 09/05/2024 Yes    Hepatitis C [B19.20] 09/05/2024 Yes    Smoking history [Z87.891] 08/12/2024 Not Applicable    Hypokalemia [E87.6] 08/12/2024 No      Problems Resolved During this Admission:       Discharged Condition: stable    Disposition: Home or Self Care    Follow Up:   Follow-up Information       Lilliam Almazan NP Follow up on 11/19/2024.    Specialty: Family Medicine  Why: at 2:00pm; [facility]  follow up appointment  Contact information:  [address] [place], [place] [address] [address]  [place] [address]  [phone]                           Patient Instructions:      Ambulatory referral/consult to Outpatient Case Management   Referral Priority: Routine Referral Type: Consultation   Referral Reason: Specialty Services Required   Number of Visits Requested: 1     Ambulatory referral/consult to Hematology / Oncology   Standing Status: Future   Referral Priority: Routine Referral Type: Consultation   Referral Reason: Specialty Services Required   Requested Specialty: Hematology and Oncology   Number of Visits Requested: 1     Ambulatory referral/consult to Smoking Cessation Program   Standing Status: Future   Referral Priority: Routine Referral Type: Consultation   Referral Reason: Specialty Services Required   Requested Specialty: CTTS   Number of Visits Requested: 1     Diet Cardiac     Notify your health care provider if you experience any of the following:  temperature >100.4     Notify your health care provider if you experience any of the following:  persistent nausea and vomiting or diarrhea     Notify your health care provider if you experience any of the following:  severe uncontrolled pain     Notify your health care provider if you experience any of the following:  difficulty breathing or increased cough     Notify your health care provider if you experience any of the following:  severe persistent headache     Notify your health care provider if you experience any of the following:  worsening rash     Notify your health care provider if you experience any of the following:  persistent dizziness, light-headedness, or visual disturbances     Notify your health care provider if you experience any of the following:  increased confusion or weakness     Activity as tolerated       Significant Diagnostic Studies: as above    Pending Diagnostic Studies:       None           Medications:  Reconciled Home Medications:       Medication List        START taking these medications      LIDOcaine 5 %  Commonly known as: LIDODERM  Place 2 patches onto the skin every evening. Remove & Discard patch within 12 hours or as directed by MD     melatonin 3 mg tablet  Commonly known as: MELATIN  Take 2 tablets (6 mg total) by mouth nightly as needed for Insomnia.     nicotine 21 mg/24 hr  Commonly known as: NICODERM CQ  Place 1 patch onto the skin once daily.  Replaces: nicotine 14 mg/24 hr     oxyCODONE 5 MG immediate release tablet  Commonly known as: ROXICODONE  Take 1 tablet (5 mg total) by mouth every 6 (six) hours as needed for Pain.     sucralfate 1 gram tablet  Commonly known as: CARAFATE  Take 1 tablet (1 g total) by mouth 3 (three) times daily before meals.            CHANGE how you take these medications      losartan 25 MG tablet  Commonly known as: COZAAR  Take 0.5 tablets (12.5 mg total) by mouth every evening.  What changed: when to take this     pantoprazole 40 MG tablet  Commonly known as: PROTONIX  Take 1 tablet (40 mg total) by mouth 2 (two) times daily.  What changed: when to take this     polyethylene glycol 17 gram Pwpk  Commonly known as: GLYCOLAX  Take 17 g by mouth 2 (two) times daily as needed for Constipation.  What changed:   when to take this  reasons to take this            CONTINUE taking these medications      albuterol 90 mcg/actuation inhaler  Commonly known as: PROVENTIL/VENTOLIN HFA  Inhale 1-2 puffs into the lungs every 6 (six) hours as needed for Wheezing. Rescue     atorvastatin 80 MG tablet  Commonly known as: LIPITOR  Take 1 tablet (80 mg total) by mouth once daily.     ferrous sulfate 325 mg (65 mg iron) Tab tablet  Commonly known as: FEOSOL  Take 1 tablet (325 mg total) by mouth daily with breakfast.     furosemide 20 MG tablet  Commonly known as: LASIX  Take 2 tablets (40 mg total) by mouth once daily.     metoprolol succinate 25 MG 24 hr tablet  Commonly known as: TOPROL-XL  Take 1 tablet (25 mg total)  by mouth once daily.     naloxone 4 mg/actuation Spry  Commonly known as: NARCAN  4mg by nasal route as needed for opioid overdose; may repeat every 2-3 minutes in alternating nostrils until medical help arrives. Call 911     oxybutynin 5 MG Tab  Commonly known as: DITROPAN  Take 1 tablet (5 mg total) by mouth 3 (three) times daily.     spironolactone 25 MG tablet  Commonly known as: ALDACTONE  Take 0.5 tablets (12.5 mg total) by mouth once daily.     tiotropium 18 mcg inhalation capsule  Commonly known as: SPIRIVA  Inhale 1 capsule (18 mcg total) into the lungs once daily. Controller            STOP taking these medications      nicotine 14 mg/24 hr  Commonly known as: NICODERM CQ  Replaced by: nicotine 21 mg/24 hr              Indwelling Lines/Drains at time of discharge:   Lines/Drains/Airways       None                   Time spent on the discharge of patient: 40 minutes         The attending portion of this evaluation, treatment, and documentation was performed per Ashley Spencer MD via Telemedicine AudioVisual using the secure [employer] software platform with 2 way audio/video. The provider was located off-site and the patient is located in the hospital. The aforementioned video software was utilized to document the relevant history and physical exam    Ashley Spencer MD  Department of Hospital Medicine  [facility]

## 2024-11-27 ENCOUNTER — TELEPHONE (OUTPATIENT)
Dept: HEPATOLOGY | Facility: CLINIC | Age: 59
End: 2024-11-27
Payer: MEDICARE

## 2024-11-27 NOTE — TELEPHONE ENCOUNTER
Dr. Ashley Spencer ordered that patient be scheduled for a hepatology consult visit for hep c.  Patient hep c quant positive.  Attempt made to schedule patient for a visit with PA Scheuermann at [facility].  The patient has [employer] Medicare coverage.  They have no benefits for services at this location. The patient will have to pay out of pocket for this visit so consult visit not scheduled.  I spoke with patient and the above relayed. Referral info will be faxed to Stephie Paniagua at [facility] so that patient can be scheduled there for a visit. This message is being sent to the referring provider.

## 2024-11-30 DIAGNOSIS — E85.81 LIGHT CHAIN (AL) AMYLOIDOSIS: Primary | ICD-10-CM

## 2024-12-03 ENCOUNTER — TELEPHONE (OUTPATIENT)
Dept: SMOKING CESSATION | Facility: CLINIC | Age: 59
End: 2024-12-03
Payer: MEDICARE

## 2024-12-16 ENCOUNTER — TELEPHONE (OUTPATIENT)
Dept: SMOKING CESSATION | Facility: CLINIC | Age: 59
End: 2024-12-16
Payer: MEDICARE

## 2024-12-16 NOTE — TELEPHONE ENCOUNTER
Spoke with PT's daughter who said her mom was in the hospital both times, but she wants to quit. Scheduled PT for Thursday

## 2024-12-17 ENCOUNTER — TELEPHONE (OUTPATIENT)
Dept: HEMATOLOGY/ONCOLOGY | Facility: CLINIC | Age: 59
End: 2024-12-17
Payer: MEDICARE

## 2024-12-29 ENCOUNTER — HOSPITAL ENCOUNTER (INPATIENT)
Facility: HOSPITAL | Age: 59
LOS: 4 days | Discharge: HOME | DRG: 280 | End: 2025-01-03
Attending: STUDENT IN AN ORGANIZED HEALTH CARE EDUCATION/TRAINING PROGRAM | Admitting: FAMILY MEDICINE
Payer: MEDICARE

## 2024-12-29 DIAGNOSIS — I50.9 CHF EXACERBATION: ICD-10-CM

## 2024-12-29 DIAGNOSIS — N17.9 ACUTE KIDNEY INJURY: ICD-10-CM

## 2024-12-29 DIAGNOSIS — J44.1 CHRONIC OBSTRUCTIVE PULMONARY DISEASE WITH ACUTE EXACERBATION: ICD-10-CM

## 2024-12-29 DIAGNOSIS — I21.4 NSTEMI (NON-ST ELEVATED MYOCARDIAL INFARCTION): ICD-10-CM

## 2024-12-29 DIAGNOSIS — K26.4 GASTROINTESTINAL HEMORRHAGE ASSOCIATED WITH DUODENAL ULCER: ICD-10-CM

## 2024-12-29 DIAGNOSIS — E85.89 OTHER AMYLOIDOSIS: Primary | ICD-10-CM

## 2024-12-29 DIAGNOSIS — R07.9 CHEST PAIN: ICD-10-CM

## 2024-12-29 DIAGNOSIS — N17.9 AKI (ACUTE KIDNEY INJURY): ICD-10-CM

## 2024-12-29 LAB
ABO + RH BLD: NORMAL
ALBUMIN SERPL BCP-MCNC: 2.7 G/DL (ref 3.5–5.2)
ALP SERPL-CCNC: 102 U/L (ref 40–150)
ALT SERPL W/O P-5'-P-CCNC: 114 U/L (ref 10–44)
ANION GAP SERPL CALC-SCNC: 11 MMOL/L (ref 8–16)
APTT PPP: 30.4 SEC (ref 21–32)
AST SERPL-CCNC: 171 U/L (ref 10–40)
BASOPHILS # BLD AUTO: 0.04 K/UL (ref 0–0.2)
BASOPHILS # BLD AUTO: 0.05 K/UL (ref 0–0.2)
BASOPHILS NFR BLD: 0.4 % (ref 0–1.9)
BASOPHILS NFR BLD: 0.5 % (ref 0–1.9)
BILIRUB SERPL-MCNC: 0.5 MG/DL (ref 0.1–1)
BLD GP AB SCN CELLS X3 SERPL QL: NORMAL
BNP SERPL-MCNC: 2556 PG/ML (ref 0–99)
BUN SERPL-MCNC: 23 MG/DL (ref 6–20)
CALCIUM SERPL-MCNC: 8 MG/DL (ref 8.7–10.5)
CHLORIDE SERPL-SCNC: 103 MMOL/L (ref 95–110)
CO2 SERPL-SCNC: 21 MMOL/L (ref 23–29)
CREAT SERPL-MCNC: 1.8 MG/DL (ref 0.5–1.4)
D DIMER PPP IA.FEU-MCNC: 1.38 MG/L FEU
DIFFERENTIAL METHOD BLD: ABNORMAL
DIFFERENTIAL METHOD BLD: ABNORMAL
EOSINOPHIL # BLD AUTO: 0.1 K/UL (ref 0–0.5)
EOSINOPHIL # BLD AUTO: 0.1 K/UL (ref 0–0.5)
EOSINOPHIL NFR BLD: 0.5 % (ref 0–8)
EOSINOPHIL NFR BLD: 0.5 % (ref 0–8)
ERYTHROCYTE [DISTWIDTH] IN BLOOD BY AUTOMATED COUNT: 14.2 % (ref 11.5–14.5)
ERYTHROCYTE [DISTWIDTH] IN BLOOD BY AUTOMATED COUNT: 14.4 % (ref 11.5–14.5)
EST. GFR  (NO RACE VARIABLE): 32 ML/MIN/1.73 M^2
GLUCOSE SERPL-MCNC: 110 MG/DL (ref 70–110)
HCT VFR BLD AUTO: 22.5 % (ref 37–48.5)
HCT VFR BLD AUTO: 23.3 % (ref 37–48.5)
HGB BLD-MCNC: 7.2 G/DL (ref 12–16)
HGB BLD-MCNC: 7.4 G/DL (ref 12–16)
IMM GRANULOCYTES # BLD AUTO: 0.05 K/UL (ref 0–0.04)
IMM GRANULOCYTES # BLD AUTO: 0.06 K/UL (ref 0–0.04)
IMM GRANULOCYTES NFR BLD AUTO: 0.5 % (ref 0–0.5)
IMM GRANULOCYTES NFR BLD AUTO: 0.6 % (ref 0–0.5)
INR PPP: 1.3 (ref 0.8–1.2)
LYMPHOCYTES # BLD AUTO: 1.5 K/UL (ref 1–4.8)
LYMPHOCYTES # BLD AUTO: 1.9 K/UL (ref 1–4.8)
LYMPHOCYTES NFR BLD: 14.3 % (ref 18–48)
LYMPHOCYTES NFR BLD: 18.6 % (ref 18–48)
MAGNESIUM SERPL-MCNC: 1.6 MG/DL (ref 1.6–2.6)
MCH RBC QN AUTO: 25.1 PG (ref 27–31)
MCH RBC QN AUTO: 25.2 PG (ref 27–31)
MCHC RBC AUTO-ENTMCNC: 31.8 G/DL (ref 32–36)
MCHC RBC AUTO-ENTMCNC: 32 G/DL (ref 32–36)
MCV RBC AUTO: 78 FL (ref 82–98)
MCV RBC AUTO: 79 FL (ref 82–98)
MONOCYTES # BLD AUTO: 0.5 K/UL (ref 0.3–1)
MONOCYTES # BLD AUTO: 0.6 K/UL (ref 0.3–1)
MONOCYTES NFR BLD: 4.4 % (ref 4–15)
MONOCYTES NFR BLD: 5.6 % (ref 4–15)
NEUTROPHILS # BLD AUTO: 7.8 K/UL (ref 1.8–7.7)
NEUTROPHILS # BLD AUTO: 8.1 K/UL (ref 1.8–7.7)
NEUTROPHILS NFR BLD: 75.5 % (ref 38–73)
NEUTROPHILS NFR BLD: 78.6 % (ref 38–73)
NRBC BLD-RTO: 0 /100 WBC
NRBC BLD-RTO: 0 /100 WBC
PLATELET # BLD AUTO: 291 K/UL (ref 150–450)
PLATELET # BLD AUTO: 294 K/UL (ref 150–450)
PMV BLD AUTO: 10.5 FL (ref 9.2–12.9)
PMV BLD AUTO: 10.6 FL (ref 9.2–12.9)
POTASSIUM SERPL-SCNC: 3.4 MMOL/L (ref 3.5–5.1)
PROT SERPL-MCNC: 7.2 G/DL (ref 6–8.4)
PROTHROMBIN TIME: 14.1 SEC (ref 9–12.5)
RBC # BLD AUTO: 2.87 M/UL (ref 4–5.4)
RBC # BLD AUTO: 2.94 M/UL (ref 4–5.4)
SODIUM SERPL-SCNC: 135 MMOL/L (ref 136–145)
SPECIMEN OUTDATE: NORMAL
TROPONIN I SERPL DL<=0.01 NG/ML-MCNC: 0.21 NG/ML (ref 0–0.03)
TROPONIN I SERPL DL<=0.01 NG/ML-MCNC: 0.22 NG/ML (ref 0–0.03)
WBC # BLD AUTO: 10.35 K/UL (ref 3.9–12.7)
WBC # BLD AUTO: 10.36 K/UL (ref 3.9–12.7)

## 2024-12-29 PROCEDURE — 85025 COMPLETE CBC W/AUTO DIFF WBC: CPT | Performed by: NURSE PRACTITIONER

## 2024-12-29 PROCEDURE — 25000003 PHARM REV CODE 250: Performed by: NURSE PRACTITIONER

## 2024-12-29 PROCEDURE — G0378 HOSPITAL OBSERVATION PER HR: HCPCS

## 2024-12-29 PROCEDURE — 83735 ASSAY OF MAGNESIUM: CPT | Performed by: NURSE PRACTITIONER

## 2024-12-29 PROCEDURE — 84484 ASSAY OF TROPONIN QUANT: CPT | Performed by: STUDENT IN AN ORGANIZED HEALTH CARE EDUCATION/TRAINING PROGRAM

## 2024-12-29 PROCEDURE — 25000003 PHARM REV CODE 250

## 2024-12-29 PROCEDURE — 25000003 PHARM REV CODE 250: Performed by: STUDENT IN AN ORGANIZED HEALTH CARE EDUCATION/TRAINING PROGRAM

## 2024-12-29 PROCEDURE — 93010 ELECTROCARDIOGRAM REPORT: CPT | Mod: 76,,, | Performed by: STUDENT IN AN ORGANIZED HEALTH CARE EDUCATION/TRAINING PROGRAM

## 2024-12-29 PROCEDURE — 85730 THROMBOPLASTIN TIME PARTIAL: CPT | Performed by: STUDENT IN AN ORGANIZED HEALTH CARE EDUCATION/TRAINING PROGRAM

## 2024-12-29 PROCEDURE — 85379 FIBRIN DEGRADATION QUANT: CPT | Performed by: STUDENT IN AN ORGANIZED HEALTH CARE EDUCATION/TRAINING PROGRAM

## 2024-12-29 PROCEDURE — 99285 EMERGENCY DEPT VISIT HI MDM: CPT | Mod: 25

## 2024-12-29 PROCEDURE — 96374 THER/PROPH/DIAG INJ IV PUSH: CPT

## 2024-12-29 PROCEDURE — 85610 PROTHROMBIN TIME: CPT | Performed by: STUDENT IN AN ORGANIZED HEALTH CARE EDUCATION/TRAINING PROGRAM

## 2024-12-29 PROCEDURE — 96375 TX/PRO/DX INJ NEW DRUG ADDON: CPT

## 2024-12-29 PROCEDURE — 93005 ELECTROCARDIOGRAM TRACING: CPT

## 2024-12-29 PROCEDURE — 63600175 PHARM REV CODE 636 W HCPCS: Performed by: NURSE PRACTITIONER

## 2024-12-29 PROCEDURE — 86850 RBC ANTIBODY SCREEN: CPT | Performed by: NURSE PRACTITIONER

## 2024-12-29 PROCEDURE — 83880 ASSAY OF NATRIURETIC PEPTIDE: CPT | Performed by: STUDENT IN AN ORGANIZED HEALTH CARE EDUCATION/TRAINING PROGRAM

## 2024-12-29 PROCEDURE — 85025 COMPLETE CBC W/AUTO DIFF WBC: CPT | Mod: 91 | Performed by: STUDENT IN AN ORGANIZED HEALTH CARE EDUCATION/TRAINING PROGRAM

## 2024-12-29 PROCEDURE — 80053 COMPREHEN METABOLIC PANEL: CPT | Performed by: STUDENT IN AN ORGANIZED HEALTH CARE EDUCATION/TRAINING PROGRAM

## 2024-12-29 RX ORDER — DILTIAZEM HYDROCHLORIDE 5 MG/ML
20 INJECTION INTRAVENOUS
Status: COMPLETED | OUTPATIENT
Start: 2024-12-29 | End: 2024-12-29

## 2024-12-29 RX ORDER — VERAPAMIL HYDROCHLORIDE 2.5 MG/ML
5 INJECTION, SOLUTION INTRAVENOUS ONCE
Status: DISCONTINUED | OUTPATIENT
Start: 2024-12-29 | End: 2024-12-29

## 2024-12-29 RX ORDER — PANTOPRAZOLE SODIUM 40 MG/10ML
40 INJECTION, POWDER, LYOPHILIZED, FOR SOLUTION INTRAVENOUS 2 TIMES DAILY
Status: DISCONTINUED | OUTPATIENT
Start: 2024-12-29 | End: 2024-12-30

## 2024-12-29 RX ORDER — POTASSIUM CHLORIDE 20 MEQ/1
40 TABLET, EXTENDED RELEASE ORAL ONCE
Status: COMPLETED | OUTPATIENT
Start: 2024-12-29 | End: 2024-12-29

## 2024-12-29 RX ORDER — MORPHINE SULFATE 2 MG/ML
2 INJECTION, SOLUTION INTRAMUSCULAR; INTRAVENOUS EVERY 4 HOURS PRN
Status: DISCONTINUED | OUTPATIENT
Start: 2024-12-29 | End: 2025-01-03

## 2024-12-29 RX ORDER — TRAMADOL HYDROCHLORIDE AND ACETAMINOPHEN 37.5; 325 MG/1; MG/1
1 TABLET, FILM COATED ORAL EVERY 6 HOURS PRN
Status: ON HOLD | COMMUNITY
End: 2024-12-31

## 2024-12-29 RX ORDER — FAMOTIDINE 10 MG/ML
20 INJECTION INTRAVENOUS ONCE
Status: COMPLETED | OUTPATIENT
Start: 2024-12-29 | End: 2024-12-29

## 2024-12-29 RX ORDER — NYSTATIN 100000 U/G
CREAM TOPICAL 2 TIMES DAILY
Status: ON HOLD | COMMUNITY
End: 2025-01-03

## 2024-12-29 RX ORDER — METOPROLOL SUCCINATE 25 MG/1
25 TABLET, EXTENDED RELEASE ORAL DAILY
Status: ON HOLD | COMMUNITY
Start: 2024-08-25 | End: 2025-01-03

## 2024-12-29 RX ORDER — DILTIAZEM HYDROCHLORIDE 5 MG/ML
INJECTION INTRAVENOUS
Status: COMPLETED
Start: 2024-12-29 | End: 2024-12-29

## 2024-12-29 RX ORDER — ONDANSETRON HYDROCHLORIDE 2 MG/ML
4 INJECTION, SOLUTION INTRAVENOUS EVERY 6 HOURS PRN
Status: DISCONTINUED | OUTPATIENT
Start: 2024-12-29 | End: 2025-01-03 | Stop reason: HOSPADM

## 2024-12-29 RX ORDER — IPRATROPIUM BROMIDE AND ALBUTEROL SULFATE 2.5; .5 MG/3ML; MG/3ML
3 SOLUTION RESPIRATORY (INHALATION) EVERY 4 HOURS PRN
Status: DISCONTINUED | OUTPATIENT
Start: 2024-12-29 | End: 2025-01-03 | Stop reason: HOSPADM

## 2024-12-29 RX ORDER — DILTIAZEM HCL 1 MG/ML
INJECTION, SOLUTION INTRAVENOUS
Status: DISCONTINUED
Start: 2024-12-29 | End: 2024-12-29 | Stop reason: WASHOUT

## 2024-12-29 RX ORDER — FUROSEMIDE 10 MG/ML
40 INJECTION INTRAMUSCULAR; INTRAVENOUS ONCE
Status: COMPLETED | OUTPATIENT
Start: 2024-12-29 | End: 2024-12-29

## 2024-12-29 RX ADMIN — DILTIAZEM HYDROCHLORIDE 20 MG: 5 INJECTION INTRAVENOUS at 04:12

## 2024-12-29 RX ADMIN — FAMOTIDINE 20 MG: 10 INJECTION, SOLUTION INTRAVENOUS at 06:12

## 2024-12-29 RX ADMIN — MORPHINE SULFATE 2 MG: 2 INJECTION, SOLUTION INTRAMUSCULAR; INTRAVENOUS at 07:12

## 2024-12-29 RX ADMIN — FUROSEMIDE 40 MG: 10 INJECTION, SOLUTION INTRAMUSCULAR; INTRAVENOUS at 08:12

## 2024-12-29 RX ADMIN — PANTOPRAZOLE SODIUM 40 MG: 40 INJECTION, POWDER, LYOPHILIZED, FOR SOLUTION INTRAVENOUS at 08:12

## 2024-12-29 RX ADMIN — MORPHINE SULFATE 2 MG: 2 INJECTION, SOLUTION INTRAMUSCULAR; INTRAVENOUS at 11:12

## 2024-12-29 RX ADMIN — POTASSIUM CHLORIDE 40 MEQ: 1500 TABLET, EXTENDED RELEASE ORAL at 08:12

## 2024-12-29 NOTE — ED NOTES
"Patient refusing rectal exam, stating "Why do we need to do that? I already know I'm bleeding back there. This is ridiculous. I should of stayed home."  "

## 2024-12-29 NOTE — ED NOTES
Patient converted to sinus rhythm. Chest pain subsided. NAD noted. Voices no complaints at this time

## 2024-12-29 NOTE — ED NOTES
Psych: mild distress is noted. Pt is anxious but cooperative, good eye contact. Hx of anxiety. Patient states her son was arrested today and that worked her up    HEENT: Denies HEENT complaint or injury. No HA, no dizziness, no blurred vision. Denies LOC    LOC: The patient is awake, alert and aware of environment with an appropriate affect.    MUSCULOSKELETAL:  Normal range of motion noted. Moves all extremities well, No swelling, deformity or tenderness noted. FROM, generalized weakness. Patient c/o tingling in arms, c/o pain and soreness to right ankle/shin area. Mildly warm to touch. Denies tenderness or pain moving up calf    RESPIRATORY: Airway is open and patent, respirations are spontaneous; patient has a normal effort and rate. Pink nailbeds. Clear BBS noted. Denies SOB    CARDIAC: c/o mild sternal CP, with pain down left arm.     GI/ : Soft and non tender to palpation, no distention noted. Denies N/V/D at this time. Patient hx of chronic rectal bleeding issues, noticed blood in toilet yesterday.     PULSES: 2+  And symmetrical in all extremities, strong and bounding    NEUROLOGIC:   Follows commands without difficulty. Speech is clear. No neuro deficits observed.

## 2024-12-29 NOTE — ED NOTES
Pt resting in stretcher watching TV   Reports she feels better   Remains on continuous cardiac monitor, pulse ox, and BP cuff   Side rails up x 2 and call light within reach

## 2024-12-29 NOTE — ED PROVIDER NOTES
ED Provider Note - 12/29/2024    History     Chief Complaint   Patient presents with    Rectal Bleeding     Chest and SOB, on EMS arrival pt was hyperventilating and c/o of hand cramping. Pt reports has been having bright red blood in stool.        JONO Saini is a 59 y.o. year old female with past medical and surgical history as seen below, presenting with chief complaint of chest pain.  Associated with shortness of breath.  States he has had similar chest pains in the past.  Told nursing that it started after finding out that her son has been arrested.  Has been constant for the past 2-1/2-3-1/2 hours.  Secondarily complained of rectal bleeding.  Bright red.  Unchanged from chronic rectal bleeding.  Has seen GI multiple times for this in the past.        Past Medical History:   Diagnosis Date    Asthma     Bipolar disorder     Hypertension      Past Surgical History:   Procedure Laterality Date    CHOLECYSTECTOMY      COLONOSCOPY N/A 10/22/2024    Procedure: COLONOSCOPY;  Surgeon: Dayday Freed MD;  Location: [facility];  Service: Endoscopy;  Laterality: N/A;    COLONOSCOPY N/A 11/11/2024    Procedure: COLONOSCOPY;  Surgeon: Reese Chen MD;  Location: [facility];  Service: Endoscopy;  Laterality: N/A;    ESOPHAGOGASTRODUODENOSCOPY N/A 10/22/2024    Procedure: EGD (ESOPHAGOGASTRODUODENOSCOPY);  Surgeon: Dayday Freed MD;  Location: [facility];  Service: Endoscopy;  Laterality: N/A;    ESOPHAGOGASTRODUODENOSCOPY N/A 11/11/2024    Procedure: EGD (ESOPHAGOGASTRODUODENOSCOPY);  Surgeon: Reese Chen MD;  Location: [facility];  Service: Endoscopy;  Laterality: N/A;    SHOULDER SURGERY      TUBAL LIGATION           No family history on file.  Social History     Tobacco Use    Smoking status: Every Day     Current packs/day: 0.50     Average packs/day: 2.0 packs/day for 58.0 years (114.6 ttl pk-yrs)     Types: Cigarettes     Start date: 1967    Smokeless tobacco: Never    Tobacco  comments:     Pt is a 2 pk/day cigarette smoker x 57 yrs. She states that she cut down to about 10 cig/day, and is trying to quit. Will obtain order for nicotine patch. Ambulatory referral to Smoking Cessation clinic following hospital discharge.    Substance Use Topics    Alcohol use: Yes     Comment: socailly    Drug use: Yes     Types: Methamphetamines, Marijuana     Comment: denies cocaine or meth. Reports maijurana use     Social Drivers of Health with Concerns     Tobacco Use: High Risk (12/29/2024)    Patient History     Smoking Tobacco Use: Every Day     Smokeless Tobacco Use: Never     Passive Exposure: Not on file   Financial Resource Strain: Medium Risk (11/10/2024)    Overall Financial Resource Strain (CARDIA)     Difficulty of Paying Living Expenses: Somewhat hard   Food Insecurity: Food Insecurity Present (11/10/2024)    Hunger Vital Sign     Worried About Running Out of Food in the Last Year: Sometimes true     Ran Out of Food in the Last Year: Never true   Transportation Needs: Unmet Transportation Needs (11/10/2024)    TRANSPORTATION NEEDS     Transportation : Yes, it has kept me from medical appointments or from getting my medications.   Physical Activity: Inactive (8/21/2024)    Exercise Vital Sign     Days of Exercise per Week: 0 days     Minutes of Exercise per Session: 20 min   Stress: Stress Concern Present (11/10/2024)    [employer] [place] [employer] - Occupational Stress Questionnaire     Feeling of Stress : Very much   Housing Stability: High Risk (11/10/2024)    Housing Stability Vital Sign     Unable to Pay for Housing in the Last Year: Yes     Homeless in the Last Year: Yes   Depression: Not on file   Health Literacy: Adequate Health Literacy (11/10/2024)     Health Literacy     Frequency of need for help with medical instructions: Never   Recent Concern: Health Literacy - Inadequate Health Literacy (8/12/2024)     Health Literacy     Frequency of need for help with  "medical instructions: Sometimes   Social Isolation: Somewhat Isolated (10/22/2024)    Social Isolation     Social Isolation: 3      Review of patient's allergies indicates:  No Known Allergies    Review of Systems     A full Review of Systems (ROS) was performed and was negative unless otherwise stated in the HPI.      Physical Exam     Vitals:    12/29/24 1605 12/29/24 1606 12/29/24 1625   BP: (!) 155/99 (!) 155/99 (!) 138/95   BP Location: Right arm Left arm    Patient Position: Sitting     Pulse: (!) 112 (!) 112 (!) 111   Resp:  20    Temp: 98.1 °F (36.7 °C) 98.1 °F (36.7 °C)    TempSrc: Oral Oral    SpO2: 98% 98% 100%   Weight: 74.4 kg (164 lb) 74.4 kg (164 lb)    Height: 5' 11" (1.803 m) 5' 10" (1.778 m)         Physical Exam    Nursing note and vitals reviewed.  Constitutional: She appears well-developed and well-nourished. No distress.   HENT:   Head: Normocephalic and atraumatic.   Right Ear: External ear normal.   Left Ear: External ear normal.   Nose: Nose normal. Mouth/Throat: Oropharynx is clear and moist.   Eyes: Conjunctivae and EOM are normal. Pupils are equal, round, and reactive to light.   Neck: Neck supple.   Normal range of motion.  Cardiovascular:  Regular rhythm, normal heart sounds and intact distal pulses.   Tachycardia present.         Pulmonary/Chest: Breath sounds normal. No stridor. No respiratory distress. She has no wheezes. She has no rhonchi. She has no rales.   Abdominal: Abdomen is soft. Bowel sounds are normal. There is no abdominal tenderness.   Genitourinary:    Genitourinary Comments: Patient declined     Musculoskeletal:         General: No tenderness or edema. Normal range of motion.      Cervical back: Normal range of motion and neck supple.     Neurological: She is alert and oriented to person, place, and time. She has normal strength. No cranial nerve deficit or sensory deficit.   Skin: Skin is warm and dry. No rash noted.   Psychiatric: She has a normal mood and affect. " Thought content normal.       Lab Results- Independently reviewed by myself      Labs Reviewed   CBC W/ AUTO DIFFERENTIAL - Abnormal       Result Value    WBC 10.35      RBC 2.87 (*)     Hemoglobin 7.2 (*)     Hematocrit 22.5 (*)     MCV 78 (*)     MCH 25.1 (*)     MCHC 32.0      RDW 14.2      Platelets 291      MPV 10.5      Immature Granulocytes 0.5      Gran # (ANC) 8.1 (*)     Immature Grans (Abs) 0.05 (*)     Lymph # 1.5      Mono # 0.6      Eos # 0.1      Baso # 0.05      nRBC 0      Gran % 78.6 (*)     Lymph % 14.3 (*)     Mono % 5.6      Eosinophil % 0.5      Basophil % 0.5      Differential Method Automated     COMPREHENSIVE METABOLIC PANEL - Abnormal    Sodium 135 (*)     Potassium 3.4 (*)     Chloride 103      CO2 21 (*)     Glucose 110      BUN 23 (*)     Creatinine 1.8 (*)     Calcium 8.0 (*)     Total Protein 7.2      Albumin 2.7 (*)     Total Bilirubin 0.5      Alkaline Phosphatase 102       (*)      (*)     eGFR 32 (*)     Anion Gap 11     TROPONIN I - Abnormal    Troponin I 0.208 (*)    TROPONIN I - Abnormal    Troponin I 0.216 (*)    B-TYPE NATRIURETIC PEPTIDE - Abnormal    BNP 2,556 (*)    D DIMER, QUANTITATIVE - Abnormal    D-Dimer 1.38 (*)    PROTIME-INR - Abnormal    Prothrombin Time 14.1 (*)     INR 1.3 (*)    CBC W/ AUTO DIFFERENTIAL - Abnormal    WBC 10.36      RBC 2.94 (*)     Hemoglobin 7.4 (*)     Hematocrit 23.3 (*)     MCV 79 (*)     MCH 25.2 (*)     MCHC 31.8 (*)     RDW 14.4      Platelets 294      MPV 10.6      Immature Granulocytes 0.6 (*)     Gran # (ANC) 7.8 (*)     Immature Grans (Abs) 0.06 (*)     Lymph # 1.9      Mono # 0.5      Eos # 0.1      Baso # 0.04      nRBC 0      Gran % 75.5 (*)     Lymph % 18.6      Mono % 4.4      Eosinophil % 0.5      Basophil % 0.4      Differential Method Automated     APTT    aPTT 30.4     MAGNESIUM   MAGNESIUM    Magnesium 1.6     OCCULT BLOOD X 1, STOOL           Imaging     Imaging Results              X-Ray Chest AP Portable (In  process)                                ED Course         Critical Care    Date/Time: 12/29/2024 6:57 PM    Performed by: Jorge Vasquez MD  Authorized by: Jorge Vasquez MD  Direct patient critical care time: 12 minutes  Additional history critical care time: 7 minutes  Ordering / reviewing critical care time: 7 minutes  Documentation critical care time: 8 minutes  Consulting other physicians critical care time: 7 minutes  Total critical care time (exclusive of procedural time) : 41 minutes  Critical care time was exclusive of separately billable procedures and treating other patients and teaching time.  Critical care was necessary to treat or prevent imminent or life-threatening deterioration of the following conditions: renal failure (Lower GI bleed and SVT versus AFib with RVR requiring IV rate control).  Critical care was time spent personally by me on the following activities: blood draw for specimens, discussions with consultants, interpretation of cardiac output measurements, evaluation of patient's response to treatment, examination of patient, obtaining history from patient or surrogate, ordering and review of laboratory studies, ordering and performing treatments and interventions, ordering and review of radiographic studies, pulse oximetry, re-evaluation of patient's condition and review of old charts.               Orders Placed This Encounter    X-Ray Chest AP Portable    CBC auto differential    Comprehensive metabolic panel    Troponin I #1    Troponin I #2    BNP    D dimer, quantitative    Occult blood x 1, stool    Protime-INR    APTT    Diet NPO    Vital signs    Cardiac Monitoring - Adult    Pulse Oximetry Continuous    EKG 12-lead    Saline lock IV          ED Course as of 12/30/24 2023   Sun Dec 29, 2024   1615 Reviewed hematology/oncology notes from recent admission in November noting findings of possible amyloidosis. [KB]   1617 Reviewed EGD results from recent hospitalization in  November:  Impression:            - Normal esophagus.                          - Small hiatal hernia.                          - Gastritis. Biopsied.                          - Non-bleeding duodenal ulcer with a clean ulcer                          base (Tacho Class III).                          - Mucosal changes in the duodenum. Biopsied.                          Today EGD for recurrent anemia                          there is still severe ulceration right past                          duodenal sweep on posterior portion, no bleeding                          on entry but the tissue is very friable with                          contact oozing.                          the whole 2nd portion is severe congestion,                          irregular mucosa with inflammation type changes of                          unclear etiology, biopsied again.                          yellow bile on entry to duodenum.                          Proceed with colonoscopy.                          Protonix BID and carafate TID.   Recommendation:        - [facility].                          - Advance diet as tolerated.                          - Continue present medications.                          - Await pathology results.                          - Perform a colonoscopy today.                          - See the other procedure note for documentation                          of additional recommendations.    [KB]   1610 Reviewed colonoscopy from recent admission:   Impression:    - The examined portion of the ileum was normal.                          - Erythematous, eroded, friable (with contact                          bleeding) and ulcerated mucosa at the hepatic                          flexure and in the ascending colon. Biopsied.                          - Erythematous, eroded, friable (with contact                          bleeding) and ulcerated mucosa in the transverse                           colon. Biopsied.                          - Diverticulosis in the sigmoid colon.                          - One 15 mm polyp in the proximal ascending colon.                          Resection not attempted.                          Severe ulcerations at the right colon and                          tranverse colon, there is some areas of submucosal                          hematoma formation (smaller than 2 weeks ago) ,                          these areas bleed on contact.                          Unclear etiology of this severe presentation,                          there is significant oozing just on contact, and                          thus raise the question of some type of vascular                          event / hematologic event, causing severe oozing                          wiht hematoma formation now turned to ulcerations.                          (did not see ulcerations 2 weeks ago on                          colonoscopy, just saw what appeared to be by my                          review hematoma formation)                          thus I wonder if further vascular / vasculitis /                          hematology workup is indicated.                          There is 15mm polyp NOT removed just 1 fold distal                          to IC valve (because of bleeding), will need                          repeat in 3-6 months for furhter eval / check                          healing / polyp removal.    [KB]   1624 EKG 12-lead  Independent interpretation of EKG by myself:   Rate 112   Sinus tachycardia      Normal axis   No STEMI [KB]   1637 Patient is deferring rectal examination.  States bleeding is bright, red, and unchanged in volume from chronic bleeds. [KB]   1637 CBC auto differential(!)  Decrease in hemoglobin to 7.2 from baseline of what appears to be between 8 and 9. [KB]   1637 CBC auto differential(!)  Normal platelets and no leukocytosis. [KB]   1657 Troponin I(!):  0.208  Decreased from values obtained 1 month ago [KB]   1657 BNP(!): 2,556  Within the normal range for patient's prior results [KB]   1657 Repeat EKG was obtained by nursing due to an acute change in rhythm and rate.  Repeat EKG shows rate 177  Normal axis      Slightly irregular rhythm, , so feel less likely SVT and more likely an underlying AFib/flutter exacerbation.  Will trial rate control agents to see effect. [KB]   1659 Another repeat EKG was given following 20 mg diltiazem dosing:   Rate 97   Normal sinus rhythm     Normal axis   No STEMI [KB]   1709 Comprehensive metabolic panel(!)  Creatinine increased to 1.8 from baseline 1.3 indicating acute kidney injury.  Mild transaminitis also noted. [KB]      ED Course User Index  [KB] Jorge Vasquez MD              Medical Decision Making       The patient's list of active medical problems, social history, medications, and allergies as documented per RN staff has been reviewed.           Medical Decision Making  59-year-old female presents with chest pain and shortness of breath.  Went into an episode of an irregular appearing SVT that I think was more likely to be an underlying AFib/flutter with RVR.  Rate control agent given with return to normal sinus rhythm.  Found to have decrease of hemoglobin and elevation of troponin.  Given all these factors, I have discussed the case with Hospital Medicine who will continue patient's evaluation and management.    Amount and/or Complexity of Data Reviewed  Labs: ordered. Decision-making details documented in ED Course.  Radiology: ordered.  ECG/medicine tests:  Decision-making details documented in ED Course.    Risk  Prescription drug management.         Additional MDM:   Differential Diagnosis:   Symptom: Chest pain. <> The follow diagnoses were considered and will be evaluated: Acute MI, Atypical Chest Pain, Chest Wall Pain, Costochondritis, Myocarditis, Pericarditis, Pleural Effusion, Pleurisy,  Pneumonia, Pneumothorax, Pulmonary Embolism, ST Elevation MI and Unstable Angina.                  Clinical Impression         Disposition   ED Disposition Condition    Observation               Final diagnoses:  [R07.9] Chest pain  [N17.9] Acute kidney injury  [N17.9] KENY (acute kidney injury)        Jorge Vasquez MD        12/29/2024          DISCLAIMER: This note was prepared with [employer] voice recognition transcription software. Garbled syntax, mangled pronouns, and other bizarre constructions may be attributed to that software system.       Jorge Vasquez MD  12/30/24 1593

## 2024-12-29 NOTE — ED NOTES
"This RN responded to bedside noted increase in HR on monitor  Code cart brought to room   Zoll pads placed on pt   Pt remains on continuous cardiac monitor, BP cuff, and pulse ox   Pt stating "help me"   Dr Vasquez called to bedside for further eval   "

## 2024-12-29 NOTE — ED NOTES
Patient c/o intense chest pain, HR currently 178, MD notified. Attempted to do vagal maneuvers, hold patient bear down, blow through a syringe; no success. Code cart placed at the bedside, patient placed on defib pads.

## 2024-12-30 ENCOUNTER — CLINICAL SUPPORT (OUTPATIENT)
Dept: SMOKING CESSATION | Facility: CLINIC | Age: 59
End: 2024-12-30
Payer: MEDICARE

## 2024-12-30 DIAGNOSIS — F17.210 CIGARETTE SMOKER: Primary | ICD-10-CM

## 2024-12-30 PROBLEM — K92.1 HEMATOCHEZIA: Status: ACTIVE | Noted: 2024-12-30

## 2024-12-30 PROBLEM — I47.10 SVT (SUPRAVENTRICULAR TACHYCARDIA): Status: ACTIVE | Noted: 2024-12-30

## 2024-12-30 LAB
ALBUMIN SERPL BCP-MCNC: 2.8 G/DL (ref 3.5–5.2)
ALP SERPL-CCNC: 108 U/L (ref 40–150)
ALT SERPL W/O P-5'-P-CCNC: 162 U/L (ref 10–44)
AMPHET+METHAMPHET UR QL: ABNORMAL
ANION GAP SERPL CALC-SCNC: 16 MMOL/L (ref 8–16)
AST SERPL-CCNC: 206 U/L (ref 10–40)
B2 MICROGLOB SERPL-MCNC: 9.6 UG/ML (ref 0–2.5)
BARBITURATES UR QL SCN>200 NG/ML: NEGATIVE
BASOPHILS # BLD AUTO: 0.08 K/UL (ref 0–0.2)
BASOPHILS NFR BLD: 0.7 % (ref 0–1.9)
BENZODIAZ UR QL SCN>200 NG/ML: NEGATIVE
BILIRUB SERPL-MCNC: 1 MG/DL (ref 0.1–1)
BILIRUB UR QL STRIP: NEGATIVE
BUN SERPL-MCNC: 22 MG/DL (ref 6–20)
BZE UR QL SCN: NEGATIVE
CALCIUM SERPL-MCNC: 8.4 MG/DL (ref 8.7–10.5)
CANNABINOIDS UR QL SCN: NEGATIVE
CHLORIDE SERPL-SCNC: 101 MMOL/L (ref 95–110)
CLARITY UR: CLEAR
CO2 SERPL-SCNC: 19 MMOL/L (ref 23–29)
COLOR UR: YELLOW
CREAT SERPL-MCNC: 1.8 MG/DL (ref 0.5–1.4)
CREAT UR-MCNC: 43 MG/DL (ref 15–325)
DIFFERENTIAL METHOD BLD: ABNORMAL
EOSINOPHIL # BLD AUTO: 0 K/UL (ref 0–0.5)
EOSINOPHIL NFR BLD: 0.2 % (ref 0–8)
ERYTHROCYTE [DISTWIDTH] IN BLOOD BY AUTOMATED COUNT: 14.5 % (ref 11.5–14.5)
EST. GFR  (NO RACE VARIABLE): 32 ML/MIN/1.73 M^2
ETHANOL UR-MCNC: <10 MG/DL
GLUCOSE SERPL-MCNC: 111 MG/DL (ref 70–110)
GLUCOSE UR QL STRIP: NEGATIVE
HCT VFR BLD AUTO: 23.2 % (ref 37–48.5)
HGB BLD-MCNC: 7.5 G/DL (ref 12–16)
HGB UR QL STRIP: NEGATIVE
IGA SERPL-MCNC: 61 MG/DL (ref 40–350)
IGG SERPL-MCNC: 2589 MG/DL (ref 650–1600)
IGM SERPL-MCNC: 65 MG/DL (ref 50–300)
IMM GRANULOCYTES # BLD AUTO: 0.04 K/UL (ref 0–0.04)
IMM GRANULOCYTES NFR BLD AUTO: 0.3 % (ref 0–0.5)
KETONES UR QL STRIP: NEGATIVE
LEUKOCYTE ESTERASE UR QL STRIP: NEGATIVE
LYMPHOCYTES # BLD AUTO: 1.6 K/UL (ref 1–4.8)
LYMPHOCYTES NFR BLD: 13.4 % (ref 18–48)
MCH RBC QN AUTO: 25.3 PG (ref 27–31)
MCHC RBC AUTO-ENTMCNC: 32.3 G/DL (ref 32–36)
MCV RBC AUTO: 78 FL (ref 82–98)
METHADONE UR QL SCN>300 NG/ML: NEGATIVE
MONOCYTES # BLD AUTO: 0.7 K/UL (ref 0.3–1)
MONOCYTES NFR BLD: 5.8 % (ref 4–15)
NEUTROPHILS # BLD AUTO: 9.2 K/UL (ref 1.8–7.7)
NEUTROPHILS NFR BLD: 79.6 % (ref 38–73)
NITRITE UR QL STRIP: NEGATIVE
NRBC BLD-RTO: 0 /100 WBC
OHS QRS DURATION: 90 MS
OHS QRS DURATION: 94 MS
OHS QTC CALCULATION: 459 MS
OHS QTC CALCULATION: 496 MS
OPIATES UR QL SCN: ABNORMAL
PCP UR QL SCN>25 NG/ML: NEGATIVE
PH UR STRIP: 6 [PH] (ref 5–8)
PLATELET # BLD AUTO: 319 K/UL (ref 150–450)
PMV BLD AUTO: 10.5 FL (ref 9.2–12.9)
POTASSIUM SERPL-SCNC: 3.4 MMOL/L (ref 3.5–5.1)
PROT SERPL-MCNC: 7.6 G/DL (ref 6–8.4)
PROT UR QL STRIP: NEGATIVE
RBC # BLD AUTO: 2.97 M/UL (ref 4–5.4)
SODIUM SERPL-SCNC: 136 MMOL/L (ref 136–145)
SP GR UR STRIP: 1.01 (ref 1–1.03)
TOXICOLOGY INFORMATION: ABNORMAL
TROPONIN I SERPL DL<=0.01 NG/ML-MCNC: 0.28 NG/ML (ref 0–0.03)
URN SPEC COLLECT METH UR: NORMAL
UROBILINOGEN UR STRIP-ACNC: NEGATIVE EU/DL
WBC # BLD AUTO: 11.54 K/UL (ref 3.9–12.7)

## 2024-12-30 PROCEDURE — 83521 IG LIGHT CHAINS FREE EACH: CPT | Mod: 59 | Performed by: INTERNAL MEDICINE

## 2024-12-30 PROCEDURE — 36415 COLL VENOUS BLD VENIPUNCTURE: CPT | Performed by: NURSE PRACTITIONER

## 2024-12-30 PROCEDURE — 80307 DRUG TEST PRSMV CHEM ANLYZR: CPT | Performed by: NURSE PRACTITIONER

## 2024-12-30 PROCEDURE — 99222 1ST HOSP IP/OBS MODERATE 55: CPT | Mod: GC,,, | Performed by: INTERNAL MEDICINE

## 2024-12-30 PROCEDURE — 82784 ASSAY IGA/IGD/IGG/IGM EACH: CPT | Mod: 59 | Performed by: INTERNAL MEDICINE

## 2024-12-30 PROCEDURE — 25000003 PHARM REV CODE 250: Performed by: NURSE PRACTITIONER

## 2024-12-30 PROCEDURE — 36415 COLL VENOUS BLD VENIPUNCTURE: CPT | Performed by: INTERNAL MEDICINE

## 2024-12-30 PROCEDURE — 80053 COMPREHEN METABOLIC PANEL: CPT | Performed by: NURSE PRACTITIONER

## 2024-12-30 PROCEDURE — 94761 N-INVAS EAR/PLS OXIMETRY MLT: CPT

## 2024-12-30 PROCEDURE — 84484 ASSAY OF TROPONIN QUANT: CPT | Performed by: NURSE PRACTITIONER

## 2024-12-30 PROCEDURE — 63600175 PHARM REV CODE 636 W HCPCS: Performed by: NURSE PRACTITIONER

## 2024-12-30 PROCEDURE — 11000001 HC ACUTE MED/SURG PRIVATE ROOM

## 2024-12-30 PROCEDURE — 99223 1ST HOSP IP/OBS HIGH 75: CPT | Mod: FS,,, | Performed by: INTERNAL MEDICINE

## 2024-12-30 PROCEDURE — 85260 CLOT FACTOR X STUART-POWER: CPT | Performed by: INTERNAL MEDICINE

## 2024-12-30 PROCEDURE — 81003 URINALYSIS AUTO W/O SCOPE: CPT | Performed by: NURSE PRACTITIONER

## 2024-12-30 PROCEDURE — S4991 NICOTINE PATCH NONLEGEND: HCPCS | Performed by: FAMILY MEDICINE

## 2024-12-30 PROCEDURE — 86334 IMMUNOFIX E-PHORESIS SERUM: CPT | Mod: 26,,, | Performed by: PATHOLOGY

## 2024-12-30 PROCEDURE — 86334 IMMUNOFIX E-PHORESIS SERUM: CPT | Performed by: INTERNAL MEDICINE

## 2024-12-30 PROCEDURE — 63600175 PHARM REV CODE 636 W HCPCS: Performed by: FAMILY MEDICINE

## 2024-12-30 PROCEDURE — 99222 1ST HOSP IP/OBS MODERATE 55: CPT | Mod: ,,, | Performed by: INTERNAL MEDICINE

## 2024-12-30 PROCEDURE — 84165 PROTEIN E-PHORESIS SERUM: CPT | Performed by: INTERNAL MEDICINE

## 2024-12-30 PROCEDURE — 82232 ASSAY OF BETA-2 PROTEIN: CPT | Performed by: INTERNAL MEDICINE

## 2024-12-30 PROCEDURE — 25000003 PHARM REV CODE 250: Performed by: REGISTERED NURSE

## 2024-12-30 PROCEDURE — 25000003 PHARM REV CODE 250: Performed by: FAMILY MEDICINE

## 2024-12-30 PROCEDURE — 85025 COMPLETE CBC W/AUTO DIFF WBC: CPT | Performed by: NURSE PRACTITIONER

## 2024-12-30 PROCEDURE — 84165 PROTEIN E-PHORESIS SERUM: CPT | Mod: 26,,, | Performed by: PATHOLOGY

## 2024-12-30 RX ORDER — METOPROLOL SUCCINATE 25 MG/1
25 TABLET, EXTENDED RELEASE ORAL DAILY
Status: DISCONTINUED | OUTPATIENT
Start: 2024-12-30 | End: 2025-01-03 | Stop reason: HOSPADM

## 2024-12-30 RX ORDER — ENOXAPARIN SODIUM 100 MG/ML
1 INJECTION SUBCUTANEOUS ONCE
Status: DISCONTINUED | OUTPATIENT
Start: 2024-12-30 | End: 2024-12-30

## 2024-12-30 RX ORDER — PANTOPRAZOLE SODIUM 40 MG/1
40 TABLET, DELAYED RELEASE ORAL 2 TIMES DAILY
Status: DISCONTINUED | OUTPATIENT
Start: 2024-12-30 | End: 2025-01-03 | Stop reason: HOSPADM

## 2024-12-30 RX ORDER — LANOLIN ALCOHOL/MO/W.PET/CERES
1 CREAM (GRAM) TOPICAL DAILY
Status: DISCONTINUED | OUTPATIENT
Start: 2024-12-30 | End: 2025-01-02

## 2024-12-30 RX ORDER — ATORVASTATIN CALCIUM 40 MG/1
80 TABLET, FILM COATED ORAL DAILY
Status: DISCONTINUED | OUTPATIENT
Start: 2024-12-30 | End: 2024-12-31

## 2024-12-30 RX ORDER — OXYBUTYNIN CHLORIDE 5 MG/1
5 TABLET ORAL 3 TIMES DAILY
Status: DISCONTINUED | OUTPATIENT
Start: 2024-12-30 | End: 2025-01-03 | Stop reason: HOSPADM

## 2024-12-30 RX ORDER — IBUPROFEN 200 MG
1 TABLET ORAL DAILY
Status: DISCONTINUED | OUTPATIENT
Start: 2024-12-30 | End: 2025-01-03 | Stop reason: HOSPADM

## 2024-12-30 RX ORDER — FUROSEMIDE 10 MG/ML
40 INJECTION INTRAMUSCULAR; INTRAVENOUS EVERY 12 HOURS
Status: DISCONTINUED | OUTPATIENT
Start: 2024-12-30 | End: 2024-12-31

## 2024-12-30 RX ORDER — METOPROLOL TARTRATE 1 MG/ML
5 INJECTION, SOLUTION INTRAVENOUS ONCE
Status: COMPLETED | OUTPATIENT
Start: 2024-12-30 | End: 2024-12-30

## 2024-12-30 RX ADMIN — PANTOPRAZOLE SODIUM 40 MG: 40 TABLET, DELAYED RELEASE ORAL at 09:12

## 2024-12-30 RX ADMIN — FUROSEMIDE 40 MG: 10 INJECTION, SOLUTION INTRAMUSCULAR; INTRAVENOUS at 12:12

## 2024-12-30 RX ADMIN — OXYBUTYNIN CHLORIDE 5 MG: 5 TABLET ORAL at 09:12

## 2024-12-30 RX ADMIN — FERROUS SULFATE TAB 325 MG (65 MG ELEMENTAL FE) 1 EACH: 325 (65 FE) TAB at 02:12

## 2024-12-30 RX ADMIN — OXYBUTYNIN CHLORIDE 5 MG: 5 TABLET ORAL at 02:12

## 2024-12-30 RX ADMIN — MORPHINE SULFATE 2 MG: 2 INJECTION, SOLUTION INTRAMUSCULAR; INTRAVENOUS at 09:12

## 2024-12-30 RX ADMIN — DIPHENHYDRAMINE HCL 10 ML: 12.5 LIQUID ORAL at 12:12

## 2024-12-30 RX ADMIN — FUROSEMIDE 40 MG: 10 INJECTION, SOLUTION INTRAMUSCULAR; INTRAVENOUS at 09:12

## 2024-12-30 RX ADMIN — NICOTINE 1 PATCH: 21 PATCH, EXTENDED RELEASE TRANSDERMAL at 12:12

## 2024-12-30 RX ADMIN — DIPHENHYDRAMINE HCL 10 ML: 12.5 LIQUID ORAL at 09:12

## 2024-12-30 RX ADMIN — METOPROLOL SUCCINATE 25 MG: 25 TABLET, EXTENDED RELEASE ORAL at 09:12

## 2024-12-30 RX ADMIN — DIPHENHYDRAMINE HCL 10 ML: 12.5 LIQUID ORAL at 04:12

## 2024-12-30 RX ADMIN — METOROPROLOL TARTRATE 5 MG: 5 INJECTION, SOLUTION INTRAVENOUS at 01:12

## 2024-12-30 RX ADMIN — ATORVASTATIN CALCIUM 80 MG: 40 TABLET, FILM COATED ORAL at 02:12

## 2024-12-30 RX ADMIN — PANTOPRAZOLE SODIUM 40 MG: 40 INJECTION, POWDER, LYOPHILIZED, FOR SOLUTION INTRAVENOUS at 09:12

## 2024-12-30 RX ADMIN — POTASSIUM BICARBONATE 25 MEQ: 978 TABLET, EFFERVESCENT ORAL at 09:12

## 2024-12-30 NOTE — CONSULTS
[place] - Telemetry  Cardiology  Consult Note    Patient Name: Mary Ellen Saini  MRN: [id number]  Admission Date: 12/29/2024  Hospital Length of Stay: 0 days  Code Status: Full Code   Attending Provider: Jessica Charles*   Consulting Provider: William Flores NP  Primary Care Physician: Marlen, Primary Doctor  Principal Problem:NSTEMI (non-ST elevated myocardial infarction)    Patient information was obtained from patient, past medical records, and ER records.     Inpatient consult to [facility]iology[facility]  Consult performed by: William Flores NP  Consult ordered by: Claire Kennedy NP        Subjective:     Chief Complaint:  Chest Pain     HPI:   60 yo female with PMH of HCV, HFrEF (30-35%) supposed to have a LifeVest, CAD, AFib, COPD, medication nonadherence, methamphetamine use, homelessness. Patient presented to the ED with CP. She reports CP began when she found out her son was arrested. She reports that she is currently has no stable means for housing, medications, food. CP is described as sharp with associated SOB. She denies radiation, diaphoresis, NV. Patient does report BRBPR.  She is followed by GI, last EGD/colonoscopy 11/2024, biopsy concerning for amyloidosis.   Troponin flat at 0.2. EKG ST. UDS pending.          Past Medical History:   Diagnosis Date    Asthma     Bipolar disorder     Hypertension        Past Surgical History:   Procedure Laterality Date    CHOLECYSTECTOMY      COLONOSCOPY N/A 10/22/2024    Procedure: COLONOSCOPY;  Surgeon: Dayday Freed MD;  Location: [facility];  Service: Endoscopy;  Laterality: N/A;    COLONOSCOPY N/A 11/11/2024    Procedure: COLONOSCOPY;  Surgeon: Reese Chen MD;  Location: [facility];  Service: Endoscopy;  Laterality: N/A;    ESOPHAGOGASTRODUODENOSCOPY N/A 10/22/2024    Procedure: EGD (ESOPHAGOGASTRODUODENOSCOPY);  Surgeon: Dayday Freed MD;  Location: [facility];  Service: Endoscopy;  Laterality: N/A;     ESOPHAGOGASTRODUODENOSCOPY N/A 11/11/2024    Procedure: EGD (ESOPHAGOGASTRODUODENOSCOPY);  Surgeon: Reese Chen MD;  Location: [facility];  Service: Endoscopy;  Laterality: N/A;    SHOULDER SURGERY      TUBAL LIGATION         Review of patient's allergies indicates:  No Known Allergies    No current facility-administered medications on file prior to encounter.     Current Outpatient Medications on File Prior to Encounter   Medication Sig    albuterol (PROVENTIL/VENTOLIN HFA) 90 mcg/actuation inhaler Inhale 1-2 puffs into the lungs every 6 (six) hours as needed for Wheezing. Rescue    atorvastatin (LIPITOR) 80 MG tablet Take 1 tablet (80 mg total) by mouth once daily.    ferrous sulfate (FEOSOL) 325 mg (65 mg iron) Tab tablet Take 1 tablet (325 mg total) by mouth daily with breakfast.    furosemide (LASIX) 20 MG tablet Take 2 tablets (40 mg total) by mouth once daily.    LIDOcaine (LIDODERM) 5 % Place 2 patches onto the skin every evening. Remove & Discard patch within 12 hours or as directed by MD    losartan (COZAAR) 25 MG tablet Take 0.5 tablets (12.5 mg total) by mouth every evening.    metoprolol succinate (TOPROL-XL) 25 MG 24 hr tablet Take 25 mg by mouth once daily.    oxybutynin (DITROPAN) 5 MG Tab Take 1 tablet (5 mg total) by mouth 3 (three) times daily.    oxyCODONE (ROXICODONE) 5 MG immediate release tablet Take 1 tablet (5 mg total) by mouth every 6 (six) hours as needed for Pain.    pantoprazole (PROTONIX) 40 MG tablet Take 1 tablet (40 mg total) by mouth 2 (two) times daily.    polyethylene glycol (GLYCOLAX) 17 gram PwPk Take 17 g by mouth 2 (two) times daily as needed for Constipation.    sucralfate (CARAFATE) 1 gram tablet Take 1 tablet (1 g total) by mouth 3 (three) times daily before meals.    tiotropium (SPIRIVA) 18 mcg inhalation capsule Inhale 1 capsule (18 mcg total) into the lungs once daily. Controller    naloxone (NARCAN) 4 mg/actuation Spry 4mg by nasal route as needed for opioid  overdose; may repeat every 2-3 minutes in alternating nostrils until medical help arrives. Call 911    nicotine (NICODERM CQ) 21 mg/24 hr Place 1 patch onto the skin once daily. (Patient not taking: Reported on 12/29/2024)    nystatin (MYCOSTATIN) cream Apply topically 2 (two) times daily.    spironolactone (ALDACTONE) 25 MG tablet Take 0.5 tablets (12.5 mg total) by mouth once daily. (Patient not taking: Reported on 12/29/2024)    tramadol-acetaminophen 37.5-325 mg (ULTRACET) 37.5-325 mg Tab Take 1 tablet by mouth every 6 (six) hours as needed. (Patient not taking: Reported on 12/29/2024)     Family History    None       Tobacco Use    Smoking status: Every Day     Current packs/day: 0.50     Average packs/day: 2.0 packs/day for 58.0 years (114.6 ttl pk-yrs)     Types: Cigarettes     Start date: 1967    Smokeless tobacco: Never    Tobacco comments:     Pt is a 2 pk/day cigarette smoker x 57 yrs. She states that she cut down to about 10 cig/day, and is trying to quit. Will obtain order for nicotine patch. Ambulatory referral to Smoking Cessation clinic following hospital discharge.    Substance and Sexual Activity    Alcohol use: Yes     Comment: socailly    Drug use: Yes     Types: Methamphetamines, Marijuana     Comment: denies cocaine or meth. Reports maijurana use    Sexual activity: Yes     Review of Systems   Constitutional: Negative. Negative for chills, diaphoresis and fever.   HENT: Negative.     Eyes: Negative.    Cardiovascular:  Positive for chest pain. Negative for leg swelling, near-syncope, orthopnea, palpitations, paroxysmal nocturnal dyspnea and syncope.   Respiratory:  Positive for shortness of breath.    Endocrine: Negative.    Gastrointestinal: Negative.  Negative for bloating, nausea and vomiting.   Genitourinary: Negative.    Neurological:  Negative for dizziness and focal weakness.     Objective:     Vital Signs (Most Recent):  Temp: 97.8 °F (36.6 °C) (12/30/24 0807)  Pulse: 101 (12/30/24  0807)  Resp: 18 (12/30/24 0947)  BP: 122/80 (12/30/24 0807)  SpO2: (!) 94 % (12/30/24 0807) Vital Signs (24h Range):  Temp:  [97.8 °F (36.6 °C)-98.7 °F (37.1 °C)] 97.8 °F (36.6 °C)  Pulse:  [] 101  Resp:  [16-33] 18  SpO2:  [90 %-100 %] 94 %  BP: (117-163)/(70-99) 122/80     Weight: 72.6 kg (160 lb 0.9 oz)  Body mass index is 22.97 kg/m².    SpO2: (!) 94 %       No intake or output data in the 24 hours ending 12/30/24 1001    Lines/Drains/Airways       Peripheral Intravenous Line  Duration                  Peripheral IV - Single Lumen 12/29/24 18 G Anterior;Left Forearm 1 day         Peripheral IV - Single Lumen 12/29/24 1646 18 G Right Antecubital <1 day                     Physical Exam  Constitutional:       General: She is not in acute distress.     Appearance: She is not ill-appearing or diaphoretic.   HENT:      Head: Atraumatic.   Eyes:      General:         Right eye: No discharge.         Left eye: No discharge.   Cardiovascular:      Rate and Rhythm: Normal rate and regular rhythm.   Pulmonary:      Effort: Pulmonary effort is normal.      Breath sounds: Normal breath sounds.   Abdominal:      General: Bowel sounds are normal.      Palpations: Abdomen is soft.   Skin:     General: Skin is warm and dry.   Neurological:      Mental Status: She is alert.   Psychiatric:         Mood and Affect: Mood normal.          Significant Labs: BMP:   Recent Labs   Lab 12/29/24  1620 12/29/24  1912 12/30/24  0336     --  111*   *  --  136   K 3.4*  --  3.4*     --  101   CO2 21*  --  19*   BUN 23*  --  22*   CREATININE 1.8*  --  1.8*   CALCIUM 8.0*  --  8.4*   MG  --  1.6  --    , CMP   Recent Labs   Lab 12/29/24  1620 12/30/24  0336   * 136   K 3.4* 3.4*    101   CO2 21* 19*    111*   BUN 23* 22*   CREATININE 1.8* 1.8*   CALCIUM 8.0* 8.4*   PROT 7.2 7.6   ALBUMIN 2.7* 2.8*   BILITOT 0.5 1.0   ALKPHOS 102 108   * 206*   * 162*   ANIONGAP 11 16   , CBC   Recent  "Labs   Lab 12/29/24  1620 12/29/24  1912 12/30/24  0336   WBC 10.35 10.36 11.54   HGB 7.2* 7.4* 7.5*   HCT 22.5* 23.3* 23.2*    294 319   , INR   Recent Labs   Lab 12/29/24  1624   INR 1.3*   , and Lipid Panel No results for input(s): "CHOL", "HDL", "LDLCALC", "TRIG", "CHOLHDL" in the last 48 hours.    Significant Imaging: Echocardiogram: Transthoracic echo (TTE) complete (Cupid Only):   Results for orders placed or performed during the hospital encounter of 08/12/24   Echo   Result Value Ref Range    Jenkins's Biplane MOD Ejection Fraction 34 %    A2C EF 47 %    A4C EF 21 %    LVOT stroke volume 44.72 cm3    LVIDd 5.98 3.5 - 6.0 cm    LV Systolic Volume 150.13 mL    LVIDs 5.54 (A) 2.1 - 4.0 cm    LV ESV A2C 78.97 mL    LV Diastolic Volume 178.40 mL    LV ESV A4C 82.83 mL    LV EDV A2C 143.111566126246266 mL    LV EDV A4C 145.65 mL    Left Ventricular End Systolic Volume by Teichholz Method 150.13 mL    Left Ventricular End Diastolic Volume by Teichholz Method 178.40 mL    IVS 0.79 0.6 - 1.1 cm    LVOT diameter 2.01 cm    LVOT area 3.2 cm2    FS 7 (A) 28 - 44 %    Left Ventricle Relative Wall Thickness 0.34 cm    PW 1.03 0.6 - 1.1 cm    LV mass 217.51 g    MV Peak E Sukhdeep 0.76 m/s    TDI LATERAL 0.06 m/s    TDI SEPTAL 0.07 m/s    E/E' ratio 11.69 m/s    MV Peak A Sukhdeep 0.88 m/s    TR Max Sukhdeep 2.86 m/s    E/A ratio 0.86     IVRT 111.32 msec    E wave deceleration time 154.28 msec    LV SEPTAL E/E' RATIO 10.86 m/s    LV LATERAL E/E' RATIO 12.67 m/s    PV Peak S Sukhdeep 0.51 m/s    PV Peak D Sukhdeep 0.43 m/s    Pulm vein S/D ratio 1.19     LVOT peak sukhdeep 0.89 m/s    Left Ventricular Outflow Tract Mean Velocity 0.62 cm/s    Left Ventricular Outflow Tract Mean Gradient 1.74 mmHg    RV S' 7.35 cm/s    TAPSE 2.16 cm    RV/LV Ratio 0.54 cm    LA size 4.22 cm    Left Atrium Minor Axis 5.63 cm    Left Atrium Major Axis 5.97 cm    LA Vol (MOD) 84.22 cm3    RA Major Axis 5.27 cm    RA Width 4.99 cm    AV mean gradient 4 mmHg    AV " peak gradient 8 mmHg    Ao peak jesusita 1.40 m/s    Ao VTI 22.90 cm    LVOT peak VTI 14.10 cm    AV valve area 1.95 cm²    AV Velocity Ratio 0.64     AV index (prosthetic) 0.62     BHARAT by Velocity Ratio 2.02 cm²    MV mean gradient 2 mmHg    MV peak gradient 3 mmHg    MV valve area by continuity eq 2.18 cm2    MV VTI 20.5 cm    Triscuspid Valve Regurgitation Peak Gradient 33 mmHg    Sinus 3.84 cm    STJ 3.50 cm    Ascending aorta 3.40 cm    Mean e' 0.07 m/s    LA area A4C 25.77 cm2    LA area A2C 23.67 cm2    RVDD 3.23 cm    BSA 1.97 m2    LV Systolic Volume Index 78.6 mL/m2    LV Diastolic Volume Index 93.40 mL/m2    LV Mass Index 114 g/m2    ANTOINETTE (MOD) 44.1 mL/m2    ZLVIDS 4.07     ZLVIDD 1.07     ANTOINETTE 52.2 mL/m2    LA Vol 99.78 cm3    LA WIDTH 4.8 cm    TV resting pulmonary artery pressure 36 mmHg    RV TB RVSP 6 mmHg    Est. RA pres 3 mmHg    Narrative      Left Ventricle: The left ventricle is moderately dilated. There is   eccentric hypertrophy. Moderate global hypokinesis present. There is   moderately reduced systolic function with a visually estimated ejection   fraction of 30 - 35%. Biplane (2D) method of discs ejection fraction is   34%. There is diastolic dysfunction but grade cannot be determined.    Right Ventricle: Normal right ventricular cavity size. Wall thickness   is normal. Systolic function is normal.    Left Atrium: Left atrium is severely dilated.    Right Atrium: Right atrium is moderately dilated.    Aortic Valve: There is mild stenosis. Aortic valve area by VTI is 1.95   cm². Aortic valve peak velocity is 1.40 m/s. Mean gradient is 4 mmHg. The   dimensionless index is 0.62.    Mitral Valve: There is mild regurgitation.    Tricuspid Valve: There is mild regurgitation.    Pulmonic Valve: There is mild regurgitation.    Pulmonary Artery: The estimated pulmonary artery systolic pressure is   36 mmHg.    IVC/SVC: Normal venous pressure at 3 mmHg.       Assessment and Plan:     * NSTEMI (non-ST  elevated myocardial infarction)  Troponin flat at 0.2  Methamphetamine use  EKG without acute ischemic changes  Ischemic evaluation as OP with stress test  Rifton to be type II in setting of drug abuse, KENY      SVT (supraventricular tachycardia)  Continue BB  Patient with methamphetamine use  UDS pending  Patient homeless without means for food/medications. Needs case management for assistance, if possible.     Tobacco dependency  Cessation advised  NRT PRN    Hypokalemia  Replace K+ to goal  >4+, Mg >2    Acute on chronic combined systolic and diastolic congestive heart failure  Patient has Combined Systolic and Diastolic heart failure that is Chronic. On presentation their CHF was well compensated. Most recent BNP and echo results are listed below.  Recent Labs     12/29/24  1620   BNP 2,556*     Latest ECHO  Results for orders placed during the hospital encounter of 08/12/24    Echo    Interpretation Summary    Left Ventricle: The left ventricle is moderately dilated. There is eccentric hypertrophy. Moderate global hypokinesis present. There is moderately reduced systolic function with a visually estimated ejection fraction of 30 - 35%. Biplane (2D) method of discs ejection fraction is 34%. There is diastolic dysfunction but grade cannot be determined.    Right Ventricle: Normal right ventricular cavity size. Wall thickness is normal. Systolic function is normal.    Left Atrium: Left atrium is severely dilated.    Right Atrium: Right atrium is moderately dilated.    Aortic Valve: There is mild stenosis. Aortic valve area by VTI is 1.95 cm². Aortic valve peak velocity is 1.40 m/s. Mean gradient is 4 mmHg. The dimensionless index is 0.62.    Mitral Valve: There is mild regurgitation.    Tricuspid Valve: There is mild regurgitation.    Pulmonic Valve: There is mild regurgitation.    Pulmonary Artery: The estimated pulmonary artery systolic pressure is 36 mmHg.    IVC/SVC: Normal venous pressure at 3 mmHg.    Current  Heart Failure Medications  , Daily, Oral  metoprolol succinate (TOPROL-XL) 24 hr tablet 25 mg, Daily, Oral    Plan  - Monitor strict I&Os and daily weights.    - Place on telemetry  - Low sodium diet  - Place on fluid restriction of 2 L.   - Cardiology has been consulted  - The patient's volume status is at their baseline  - No ACI/ARB given renal function          VTE Risk Mitigation (From admission, onward)           Ordered     IP VTE HIGH RISK PATIENT  Once         12/29/24 1908     Place sequential compression device  Until discontinued         12/29/24 1908                    Thank you for your consult. I will follow-up with patient. Please contact us if you have any additional questions.    William Flores NP  Cardiology   Job - Telemetry

## 2024-12-30 NOTE — PHARMACY MED REC
"  [facility]           Pharmacy  Admission Medication History     The home medication history was taken by Caitlin Stinson.      Medication history obtained from Medications listed below were obtained from: Patient/family    Based on information gathered for medication list, you may go to "Admission" then "Reconcile Home Medications" tabs to review and/or act upon those items.     The home medication list has been updated by the Pharmacy department.   Please read ALL comments highlighted in yellow.   Please address this information as you see fit.    Feel free to contact us if you have any questions or require assistance.    The medications listed below were removed from the home medication list.  Please reorder if appropriate:    Patient reports NOT TAKING the following medication(s):  Melatonin 3mg      No current facility-administered medications on file prior to encounter.     Current Outpatient Medications on File Prior to Encounter   Medication Sig Dispense Refill    albuterol (PROVENTIL/VENTOLIN HFA) 90 mcg/actuation inhaler Inhale 1-2 puffs into the lungs every 6 (six) hours as needed for Wheezing. Rescue 18 g 1    atorvastatin (LIPITOR) 80 MG tablet Take 1 tablet (80 mg total) by mouth once daily. 90 tablet 3    ferrous sulfate (FEOSOL) 325 mg (65 mg iron) Tab tablet Take 1 tablet (325 mg total) by mouth daily with breakfast.      furosemide (LASIX) 20 MG tablet Take 2 tablets (40 mg total) by mouth once daily. 60 tablet 11    LIDOcaine (LIDODERM) 5 % Place 2 patches onto the skin every evening. Remove & Discard patch within 12 hours or as directed by MD 30 patch 1    losartan (COZAAR) 25 MG tablet Take 0.5 tablets (12.5 mg total) by mouth every evening.      metoprolol succinate (TOPROL-XL) 25 MG 24 hr tablet Take 25 mg by mouth once daily.      oxybutynin (DITROPAN) 5 MG Tab Take 1 tablet (5 mg total) by mouth 3 (three) times daily. 90 tablet 1    oxyCODONE (ROXICODONE) 5 MG immediate " release tablet Take 1 tablet (5 mg total) by mouth every 6 (six) hours as needed for Pain. 31 tablet 0    pantoprazole (PROTONIX) 40 MG tablet Take 1 tablet (40 mg total) by mouth 2 (two) times daily. 60 tablet 1    polyethylene glycol (GLYCOLAX) 17 gram PwPk Take 17 g by mouth 2 (two) times daily as needed for Constipation.      sucralfate (CARAFATE) 1 gram tablet Take 1 tablet (1 g total) by mouth 3 (three) times daily before meals. 90 tablet 1    tiotropium (SPIRIVA) 18 mcg inhalation capsule Inhale 1 capsule (18 mcg total) into the lungs once daily. Controller 90 capsule 3    naloxone (NARCAN) 4 mg/actuation Spry 4mg by nasal route as needed for opioid overdose; may repeat every 2-3 minutes in alternating nostrils until medical help arrives. Call 911 2 each 11    nystatin (MYCOSTATIN) cream Apply topically 2 (two) times daily.         Please address this information as you see fit.  Feel free to contact us if you have any questions or require assistance.    Caitlin Stinson  [phone]                .

## 2024-12-30 NOTE — ASSESSMENT & PLAN NOTE
Patient's most recent potassium results are listed below.   Recent Labs     12/29/24  1620   K 3.4*     Plan  - Replete potassium per protocol  - Monitor potassium Daily  - Patient's hypokalemia is stable

## 2024-12-30 NOTE — ASSESSMENT & PLAN NOTE
-EKG shows no ST elevation  -Patient states chest pain resolved on my exam   -Troponin 0.208> 0.216  -Trend troponin, monitor telemetry  -Hold ASA, plavix and anticoagulation 2/2 concern for active GI bleeding

## 2024-12-30 NOTE — ASSESSMENT & PLAN NOTE
Patient has Combined Systolic and Diastolic heart failure that is Acute on chronic. On presentation their CHF was decompensated. Evidence of decompensated CHF on presentation includes: edema, dyspnea on exertion (MONSON), and shortness of breath. The etiology of their decompensation is likely cardiac arrhythmia (SVT on EKG) . Most recent BNP and echo results are listed below.  Recent Labs     12/29/24  1620   BNP 2,556*     Latest ECHO  Results for orders placed during the hospital encounter of 08/12/24    Echo    Interpretation Summary    Left Ventricle: The left ventricle is moderately dilated. There is eccentric hypertrophy. Moderate global hypokinesis present. There is moderately reduced systolic function with a visually estimated ejection fraction of 30 - 35%. Biplane (2D) method of discs ejection fraction is 34%. There is diastolic dysfunction but grade cannot be determined.    Right Ventricle: Normal right ventricular cavity size. Wall thickness is normal. Systolic function is normal.    Left Atrium: Left atrium is severely dilated.    Right Atrium: Right atrium is moderately dilated.    Aortic Valve: There is mild stenosis. Aortic valve area by VTI is 1.95 cm². Aortic valve peak velocity is 1.40 m/s. Mean gradient is 4 mmHg. The dimensionless index is 0.62.    Mitral Valve: There is mild regurgitation.    Tricuspid Valve: There is mild regurgitation.    Pulmonic Valve: There is mild regurgitation.    Pulmonary Artery: The estimated pulmonary artery systolic pressure is 36 mmHg.    IVC/SVC: Normal venous pressure at 3 mmHg.    Current Heart Failure Medications  , Daily, Oral  metoprolol succinate (TOPROL-XL) 24 hr tablet 25 mg, Daily, Oral    Plan  - Monitor strict I&Os and daily weights.    - Place on telemetry  -patient NPO 2/2 concern for active GI bleeding   -she received 60 mg furosemide in ED

## 2024-12-30 NOTE — SUBJECTIVE & OBJECTIVE
Past Medical History:   Diagnosis Date    Asthma     Bipolar disorder     Hypertension        Past Surgical History:   Procedure Laterality Date    CHOLECYSTECTOMY      COLONOSCOPY N/A 10/22/2024    Procedure: COLONOSCOPY;  Surgeon: Dayday Freed MD;  Location: [facility] ENDO;  Service: Endoscopy;  Laterality: N/A;    COLONOSCOPY N/A 11/11/2024    Procedure: COLONOSCOPY;  Surgeon: Reese Chen MD;  Location: [facility] ENDO;  Service: Endoscopy;  Laterality: N/A;    ESOPHAGOGASTRODUODENOSCOPY N/A 10/22/2024    Procedure: EGD (ESOPHAGOGASTRODUODENOSCOPY);  Surgeon: Dayday Freed MD;  Location: [facility] ENDO;  Service: Endoscopy;  Laterality: N/A;    ESOPHAGOGASTRODUODENOSCOPY N/A 11/11/2024    Procedure: EGD (ESOPHAGOGASTRODUODENOSCOPY);  Surgeon: Reese Chen MD;  Location: [facility];  Service: Endoscopy;  Laterality: N/A;    SHOULDER SURGERY      TUBAL LIGATION         Review of patient's allergies indicates:  No Known Allergies    No current facility-administered medications on file prior to encounter.     Current Outpatient Medications on File Prior to Encounter   Medication Sig    albuterol (PROVENTIL/VENTOLIN HFA) 90 mcg/actuation inhaler Inhale 1-2 puffs into the lungs every 6 (six) hours as needed for Wheezing. Rescue    atorvastatin (LIPITOR) 80 MG tablet Take 1 tablet (80 mg total) by mouth once daily.    ferrous sulfate (FEOSOL) 325 mg (65 mg iron) Tab tablet Take 1 tablet (325 mg total) by mouth daily with breakfast.    furosemide (LASIX) 20 MG tablet Take 2 tablets (40 mg total) by mouth once daily.    LIDOcaine (LIDODERM) 5 % Place 2 patches onto the skin every evening. Remove & Discard patch within 12 hours or as directed by MD    losartan (COZAAR) 25 MG tablet Take 0.5 tablets (12.5 mg total) by mouth every evening.    metoprolol succinate (TOPROL-XL) 25 MG 24 hr tablet Take 25 mg by mouth once daily.    oxybutynin (DITROPAN) 5 MG Tab Take 1 tablet (5 mg total) by mouth 3 (three)  times daily.    oxyCODONE (ROXICODONE) 5 MG immediate release tablet Take 1 tablet (5 mg total) by mouth every 6 (six) hours as needed for Pain.    pantoprazole (PROTONIX) 40 MG tablet Take 1 tablet (40 mg total) by mouth 2 (two) times daily.    polyethylene glycol (GLYCOLAX) 17 gram PwPk Take 17 g by mouth 2 (two) times daily as needed for Constipation.    sucralfate (CARAFATE) 1 gram tablet Take 1 tablet (1 g total) by mouth 3 (three) times daily before meals.    tiotropium (SPIRIVA) 18 mcg inhalation capsule Inhale 1 capsule (18 mcg total) into the lungs once daily. Controller    naloxone (NARCAN) 4 mg/actuation Spry 4mg by nasal route as needed for opioid overdose; may repeat every 2-3 minutes in alternating nostrils until medical help arrives. Call 911    nicotine (NICODERM CQ) 21 mg/24 hr Place 1 patch onto the skin once daily. (Patient not taking: Reported on 12/29/2024)    nystatin (MYCOSTATIN) cream Apply topically 2 (two) times daily.    spironolactone (ALDACTONE) 25 MG tablet Take 0.5 tablets (12.5 mg total) by mouth once daily. (Patient not taking: Reported on 12/29/2024)    tramadol-acetaminophen 37.5-325 mg (ULTRACET) 37.5-325 mg Tab Take 1 tablet by mouth every 6 (six) hours as needed. (Patient not taking: Reported on 12/29/2024)     Family History    None       Tobacco Use    Smoking status: Every Day     Current packs/day: 0.50     Average packs/day: 2.0 packs/day for 58.0 years (114.6 ttl pk-yrs)     Types: Cigarettes     Start date: 1967    Smokeless tobacco: Never    Tobacco comments:     Pt is a 2 pk/day cigarette smoker x 57 yrs. She states that she cut down to about 10 cig/day, and is trying to quit. Will obtain order for nicotine patch. Ambulatory referral to Smoking Cessation clinic following hospital discharge.    Substance and Sexual Activity    Alcohol use: Yes     Comment: socailly    Drug use: Yes     Types: Methamphetamines, Marijuana     Comment: denies cocaine or meth. Reports  santiagoa use    Sexual activity: Yes     Review of Systems   Constitutional: Negative. Negative for chills, diaphoresis and fever.   HENT: Negative.     Eyes: Negative.    Cardiovascular:  Positive for chest pain. Negative for leg swelling, near-syncope, orthopnea, palpitations, paroxysmal nocturnal dyspnea and syncope.   Respiratory:  Positive for shortness of breath.    Endocrine: Negative.    Gastrointestinal: Negative.  Negative for bloating, nausea and vomiting.   Genitourinary: Negative.    Neurological:  Negative for dizziness and focal weakness.     Objective:     Vital Signs (Most Recent):  Temp: 97.8 °F (36.6 °C) (12/30/24 0807)  Pulse: 101 (12/30/24 0807)  Resp: 18 (12/30/24 0947)  BP: 122/80 (12/30/24 0807)  SpO2: (!) 94 % (12/30/24 0807) Vital Signs (24h Range):  Temp:  [97.8 °F (36.6 °C)-98.7 °F (37.1 °C)] 97.8 °F (36.6 °C)  Pulse:  [] 101  Resp:  [16-33] 18  SpO2:  [90 %-100 %] 94 %  BP: (117-163)/(70-99) 122/80     Weight: 72.6 kg (160 lb 0.9 oz)  Body mass index is 22.97 kg/m².    SpO2: (!) 94 %       No intake or output data in the 24 hours ending 12/30/24 1001    Lines/Drains/Airways       Peripheral Intravenous Line  Duration                  Peripheral IV - Single Lumen 12/29/24 18 G Anterior;Left Forearm 1 day         Peripheral IV - Single Lumen 12/29/24 1646 18 G Right Antecubital <1 day                     Physical Exam  Constitutional:       General: She is not in acute distress.     Appearance: She is not ill-appearing or diaphoretic.   HENT:      Head: Atraumatic.   Eyes:      General:         Right eye: No discharge.         Left eye: No discharge.   Cardiovascular:      Rate and Rhythm: Normal rate and regular rhythm.   Pulmonary:      Effort: Pulmonary effort is normal.      Breath sounds: Normal breath sounds.   Abdominal:      General: Bowel sounds are normal.      Palpations: Abdomen is soft.   Skin:     General: Skin is warm and dry.   Neurological:      Mental Status: She  "is alert.   Psychiatric:         Mood and Affect: Mood normal.          Significant Labs: BMP:   Recent Labs   Lab 12/29/24  1620 12/29/24 1912 12/30/24  0336     --  111*   *  --  136   K 3.4*  --  3.4*     --  101   CO2 21*  --  19*   BUN 23*  --  22*   CREATININE 1.8*  --  1.8*   CALCIUM 8.0*  --  8.4*   MG  --  1.6  --    , CMP   Recent Labs   Lab 12/29/24  1620 12/30/24  0336   * 136   K 3.4* 3.4*    101   CO2 21* 19*    111*   BUN 23* 22*   CREATININE 1.8* 1.8*   CALCIUM 8.0* 8.4*   PROT 7.2 7.6   ALBUMIN 2.7* 2.8*   BILITOT 0.5 1.0   ALKPHOS 102 108   * 206*   * 162*   ANIONGAP 11 16   , CBC   Recent Labs   Lab 12/29/24  1620 12/29/24 1912 12/30/24  0336   WBC 10.35 10.36 11.54   HGB 7.2* 7.4* 7.5*   HCT 22.5* 23.3* 23.2*    294 319   , INR   Recent Labs   Lab 12/29/24  1624   INR 1.3*   , and Lipid Panel No results for input(s): "CHOL", "HDL", "LDLCALC", "TRIG", "CHOLHDL" in the last 48 hours.    Significant Imaging: Echocardiogram: Transthoracic echo (TTE) complete (Cupid Only):   Results for orders placed or performed during the hospital encounter of 08/12/24   Echo   Result Value Ref Range    Jenkins's Biplane MOD Ejection Fraction 34 %    A2C EF 47 %    A4C EF 21 %    LVOT stroke volume 44.72 cm3    LVIDd 5.98 3.5 - 6.0 cm    LV Systolic Volume 150.13 mL    LVIDs 5.54 (A) 2.1 - 4.0 cm    LV ESV A2C 78.97 mL    LV Diastolic Volume 178.40 mL    LV ESV A4C 82.83 mL    LV EDV A2C 143.138027791673109 mL    LV EDV A4C 145.65 mL    Left Ventricular End Systolic Volume by Teichholz Method 150.13 mL    Left Ventricular End Diastolic Volume by Teichholz Method 178.40 mL    IVS 0.79 0.6 - 1.1 cm    LVOT diameter 2.01 cm    LVOT area 3.2 cm2    FS 7 (A) 28 - 44 %    Left Ventricle Relative Wall Thickness 0.34 cm    PW 1.03 0.6 - 1.1 cm    LV mass 217.51 g    MV Peak E Sukhdeep 0.76 m/s    TDI LATERAL 0.06 m/s    TDI SEPTAL 0.07 m/s    E/E' ratio 11.69 m/s    MV " Peak A Sukhdeep 0.88 m/s    TR Max Sukhdeep 2.86 m/s    E/A ratio 0.86     IVRT 111.32 msec    E wave deceleration time 154.28 msec    LV SEPTAL E/E' RATIO 10.86 m/s    LV LATERAL E/E' RATIO 12.67 m/s    PV Peak S Sukhdeep 0.51 m/s    PV Peak D Sukhdeep 0.43 m/s    Pulm vein S/D ratio 1.19     LVOT peak sukhdeep 0.89 m/s    Left Ventricular Outflow Tract Mean Velocity 0.62 cm/s    Left Ventricular Outflow Tract Mean Gradient 1.74 mmHg    RV S' 7.35 cm/s    TAPSE 2.16 cm    RV/LV Ratio 0.54 cm    LA size 4.22 cm    Left Atrium Minor Axis 5.63 cm    Left Atrium Major Axis 5.97 cm    LA Vol (MOD) 84.22 cm3    RA Major Axis 5.27 cm    RA Width 4.99 cm    AV mean gradient 4 mmHg    AV peak gradient 8 mmHg    Ao peak sukhdeep 1.40 m/s    Ao VTI 22.90 cm    LVOT peak VTI 14.10 cm    AV valve area 1.95 cm²    AV Velocity Ratio 0.64     AV index (prosthetic) 0.62     BHARAT by Velocity Ratio 2.02 cm²    MV mean gradient 2 mmHg    MV peak gradient 3 mmHg    MV valve area by continuity eq 2.18 cm2    MV VTI 20.5 cm    Triscuspid Valve Regurgitation Peak Gradient 33 mmHg    Sinus 3.84 cm    STJ 3.50 cm    Ascending aorta 3.40 cm    Mean e' 0.07 m/s    LA area A4C 25.77 cm2    LA area A2C 23.67 cm2    RVDD 3.23 cm    BSA 1.97 m2    LV Systolic Volume Index 78.6 mL/m2    LV Diastolic Volume Index 93.40 mL/m2    LV Mass Index 114 g/m2    ANTOINETTE (MOD) 44.1 mL/m2    ZLVIDS 4.07     ZLVIDD 1.07     ANTOINETTE 52.2 mL/m2    LA Vol 99.78 cm3    LA WIDTH 4.8 cm    TV resting pulmonary artery pressure 36 mmHg    RV TB RVSP 6 mmHg    Est. RA pres 3 mmHg    Narrative      Left Ventricle: The left ventricle is moderately dilated. There is   eccentric hypertrophy. Moderate global hypokinesis present. There is   moderately reduced systolic function with a visually estimated ejection   fraction of 30 - 35%. Biplane (2D) method of discs ejection fraction is   34%. There is diastolic dysfunction but grade cannot be determined.    Right Ventricle: Normal right ventricular cavity size.  Wall thickness   is normal. Systolic function is normal.    Left Atrium: Left atrium is severely dilated.    Right Atrium: Right atrium is moderately dilated.    Aortic Valve: There is mild stenosis. Aortic valve area by VTI is 1.95   cm². Aortic valve peak velocity is 1.40 m/s. Mean gradient is 4 mmHg. The   dimensionless index is 0.62.    Mitral Valve: There is mild regurgitation.    Tricuspid Valve: There is mild regurgitation.    Pulmonic Valve: There is mild regurgitation.    Pulmonary Artery: The estimated pulmonary artery systolic pressure is   36 mmHg.    IVC/SVC: Normal venous pressure at 3 mmHg.

## 2024-12-30 NOTE — ASSESSMENT & PLAN NOTE
-EKG shows no ST elevation  -Patient states chest pain resolved on my exam   -Troponin 0.208> 0.2160 > 0.281  -Trend troponin, monitor telemetry  -Hold ASA, plavix and anticoagulation 2/2 concern for active GI bleeding

## 2024-12-30 NOTE — NURSING
Got a call from [employer] about pt's HR sustaining in 170s and 180s. Pt was sleeping, no chest pain. BP stable. Tavon DANIEL notified. Said he will order one time dose of IV metoprolol.

## 2024-12-30 NOTE — ASSESSMENT & PLAN NOTE
Patient has Combined Systolic and Diastolic heart failure that is Acute on chronic. On presentation their CHF was decompensated. Evidence of decompensated CHF on presentation includes: edema, dyspnea on exertion (MONSON), and shortness of breath. The etiology of their decompensation is likely cardiac arrhythmia (SVT on EKG) . Most recent BNP and echo results are listed below.  Recent Labs     12/29/24  1620   BNP 2,556*       Latest ECHO  Results for orders placed during the hospital encounter of 08/12/24    Echo    Interpretation Summary    Left Ventricle: The left ventricle is moderately dilated. There is eccentric hypertrophy. Moderate global hypokinesis present. There is moderately reduced systolic function with a visually estimated ejection fraction of 30 - 35%. Biplane (2D) method of discs ejection fraction is 34%. There is diastolic dysfunction but grade cannot be determined.    Right Ventricle: Normal right ventricular cavity size. Wall thickness is normal. Systolic function is normal.    Left Atrium: Left atrium is severely dilated.    Right Atrium: Right atrium is moderately dilated.    Aortic Valve: There is mild stenosis. Aortic valve area by VTI is 1.95 cm². Aortic valve peak velocity is 1.40 m/s. Mean gradient is 4 mmHg. The dimensionless index is 0.62.    Mitral Valve: There is mild regurgitation.    Tricuspid Valve: There is mild regurgitation.    Pulmonic Valve: There is mild regurgitation.    Pulmonary Artery: The estimated pulmonary artery systolic pressure is 36 mmHg.    IVC/SVC: Normal venous pressure at 3 mmHg.    Current Heart Failure Medications  , Daily, Oral  metoprolol succinate (TOPROL-XL) 24 hr tablet 25 mg, Daily, Oral  furosemide injection 40 mg, Every 12 hours, Intravenous    Plan  - Monitor strict I&Os and daily weights.    - Place on telemetry  -patient NPO 2/2 concern for active GI bleeding   -she received 60 mg furosemide in ED   TTE in August with EF of 30-35%  Holding ACEi / ARB  given renal function    Inpatient Upgrade Note    Mary Ellen Saini has warranted treatment spanning two or more midnights of hospital level care for the management of KENY. She continues to require daily labs, monitoring of vital signs, IV pain medication, medication adjustments, and further evaluation by consultants. Her condition is also complicated by the following comorbidities: Hypertension, Chronic respiratory disease, and polypharmacy.     -12/31 12/31 patient had repeat echocardiogram, cardiology is on board, we will continue to hold losartan due to worsening KENY, Nephrology was consulted, patient is responding well to diuresis.

## 2024-12-30 NOTE — HPI
58 yo female with PMH of HCV, HFrEF (30-35%) supposed to have a LifeVest, CAD, AFib, COPD, colorectal cancer and medication nonadherence presented to ED with complaint of chest pain. Pt reports substernal chest pain described as sharp, pressure associated with SOB. She has had similar symptoms in the past. Symptoms reportedly started after patient informed her son had been arrested. She denies associated n/v, cough/congestion, dizziness, diaphoresis and syncope. Pt denies active chest pain on my exam however she notes lower abdominal pain rated 10/10. Also reports bright red blood per rectum over the last 2 days. This is a recurring issue. She is followed by GI, last EGD/colonoscopy 11/2024, biopsy concerning for amyloidosis.       ED evaluation: Patient tachycardic on arrival. Initial EKG shows sinus tach rate 112, subsequent EKG remarkable for SVT rate 180s resolved with 20 mg diltiazem. Hbg/Hct 22/7 (baseline Hbg 8-9), K 3.4, BUN/Cr 23/1.8, BNP 2556, troponin 0.208, d-dimer 1.38. CXR negative for acute findings. Venous doppler LE negative for DVT. Patient received 60 mg furosemide, KCL and protonix. GI consulted. Patient admitted to hospital medicine for further evaluation and care.

## 2024-12-30 NOTE — PLAN OF CARE
Care assumed for patient.  NAD. Resp even and unlabored. Care plan reviewed with patient. Pt verbalizes understanding.

## 2024-12-30 NOTE — ASSESSMENT & PLAN NOTE
Troponin flat at 0.2  Methamphetamine use  EKG without acute ischemic changes  Ischemic evaluation as OP with stress test  [place]umbird to be type II in setting of drug abuse, KENY

## 2024-12-30 NOTE — ASSESSMENT & PLAN NOTE
Continue BB  Patient with methamphetamine use  UDS pending  Patient homeless without means for food/medications. Needs case management for assistance, if possible.

## 2024-12-30 NOTE — ASSESSMENT & PLAN NOTE
Patient has Combined Systolic and Diastolic heart failure that is Acute on chronic. On presentation their CHF was decompensated. Evidence of decompensated CHF on presentation includes: edema, dyspnea on exertion (MONSON), and shortness of breath. The etiology of their decompensation is likely cardiac arrhythmia (SVT on EKG) . Most recent BNP and echo results are listed below.  Recent Labs     12/29/24  1620   BNP 2,556*       Latest ECHO  Results for orders placed during the hospital encounter of 08/12/24    Echo    Interpretation Summary    Left Ventricle: The left ventricle is moderately dilated. There is eccentric hypertrophy. Moderate global hypokinesis present. There is moderately reduced systolic function with a visually estimated ejection fraction of 30 - 35%. Biplane (2D) method of discs ejection fraction is 34%. There is diastolic dysfunction but grade cannot be determined.    Right Ventricle: Normal right ventricular cavity size. Wall thickness is normal. Systolic function is normal.    Left Atrium: Left atrium is severely dilated.    Right Atrium: Right atrium is moderately dilated.    Aortic Valve: There is mild stenosis. Aortic valve area by VTI is 1.95 cm². Aortic valve peak velocity is 1.40 m/s. Mean gradient is 4 mmHg. The dimensionless index is 0.62.    Mitral Valve: There is mild regurgitation.    Tricuspid Valve: There is mild regurgitation.    Pulmonic Valve: There is mild regurgitation.    Pulmonary Artery: The estimated pulmonary artery systolic pressure is 36 mmHg.    IVC/SVC: Normal venous pressure at 3 mmHg.    Current Heart Failure Medications  , Daily, Oral  metoprolol succinate (TOPROL-XL) 24 hr tablet 25 mg, Daily, Oral    Plan  - Monitor strict I&Os and daily weights.    - Place on telemetry  -patient NPO 2/2 concern for active GI bleeding   -she received 60 mg furosemide in ED   TTE in August with EF of 30-35%  Holding ACEi / ARB given renal function

## 2024-12-30 NOTE — SUBJECTIVE & OBJECTIVE
Interval History:  Awake, alert, patient report she has not taken her medication for 2 days,   BNP 2556- continue IV Lasix   Troponin 0.208 > 0.216 > 0.281- likely due to demand ischemia-await Cardiology recs  Checks x-ray with no acute abnormality  TTE in August with EF of 30-35%  Holding ACEi / ARB given renal function  Holding aspirin and Plavix concern for GI bleed- patient refused rectal exam and occult stool-consult GI  H/H stable  Replace potassium  Appreciate GI recs -no endoscopic intervention at this time, PPI b.i.d. for recent duodenal ulcer    Review of Systems   Constitutional:  Negative for chills, diaphoresis and fever.   Respiratory:  Negative for cough, chest tightness, shortness of breath and wheezing.    Cardiovascular:  Negative for chest pain, palpitations and leg swelling.   Gastrointestinal:  Negative for abdominal pain, blood in stool, diarrhea, nausea and vomiting.   Genitourinary:  Negative for dysuria, flank pain, frequency and hematuria.   Musculoskeletal:  Negative for back pain and myalgias.   Neurological:  Negative for dizziness, syncope, light-headedness and headaches.   Psychiatric/Behavioral:  Negative for confusion.      Objective:     Vital Signs (Most Recent):  Temp: 97.8 °F (36.6 °C) (12/30/24 0807)  Pulse: 101 (12/30/24 0807)  Resp: 18 (12/30/24 0807)  BP: 122/80 (12/30/24 0807)  SpO2: (!) 94 % (12/30/24 0807) Vital Signs (24h Range):  Temp:  [97.8 °F (36.6 °C)-98.7 °F (37.1 °C)] 97.8 °F (36.6 °C)  Pulse:  [] 101  Resp:  [16-33] 18  SpO2:  [90 %-100 %] 94 %  BP: (117-163)/(70-99) 122/80     Weight: 72.6 kg (160 lb 0.9 oz)  Body mass index is 22.97 kg/m².  No intake or output data in the 24 hours ending 12/30/24 0821      Physical Exam  Vitals and nursing note reviewed.   Constitutional:       Appearance: She is well-developed.   HENT:      Head: Normocephalic and atraumatic.   Neck:      Vascular: No JVD.   Cardiovascular:      Rate and Rhythm: Normal rate and regular  "rhythm.      Heart sounds: Normal heart sounds.      Comments: Bilateral LE edema R>L   Pulmonary:      Effort: Pulmonary effort is normal. No respiratory distress.      Breath sounds: No wheezing.   Abdominal:      General: Bowel sounds are normal. There is no distension.      Palpations: Abdomen is soft.      Tenderness: There is no abdominal tenderness. There is no guarding.   Musculoskeletal:         General: No tenderness. Normal range of motion.      Cervical back: Normal range of motion.      Right lower leg: Edema present.      Left lower leg: Edema present.   Skin:     General: Skin is warm and dry.      Capillary Refill: Capillary refill takes less than 2 seconds.   Neurological:      Mental Status: She is alert and oriented to person, place, and time.   Psychiatric:         Behavior: Behavior normal.             Significant Labs: A1C:   Recent Labs   Lab 10/20/24  0722   HGBA1C 5.4     ABGs: No results for input(s): "PH", "PCO2", "HCO3", "POCSATURATED", "BE", "TOTALHB", "COHB", "METHB", "O2HB", "POCFIO2", "PO2" in the last 48 hours.  Blood Culture: No results for input(s): "LABBLOO" in the last 48 hours.  CBC:   Recent Labs   Lab 12/29/24  1620 12/29/24  1912 12/30/24  0336   WBC 10.35 10.36 11.54   HGB 7.2* 7.4* 7.5*   HCT 22.5* 23.3* 23.2*    294 319     CMP:   Recent Labs   Lab 12/29/24  1620 12/30/24  0336   * 136   K 3.4* 3.4*    101   CO2 21* 19*    111*   BUN 23* 22*   CREATININE 1.8* 1.8*   CALCIUM 8.0* 8.4*   PROT 7.2 7.6   ALBUMIN 2.7* 2.8*   BILITOT 0.5 1.0   ALKPHOS 102 108   * 206*   * 162*   ANIONGAP 11 16     Coagulation:   Recent Labs   Lab 12/29/24  1624   INR 1.3*   APTT 30.4     Lipase: No results for input(s): "LIPASE" in the last 48 hours.  Lipid Panel: No results for input(s): "CHOL", "HDL", "LDLCALC", "TRIG", "CHOLHDL" in the last 48 hours.  Magnesium:   Recent Labs   Lab 12/29/24  1912   MG 1.6     Troponin:   Recent Labs   Lab 12/29/24  1620 " "12/29/24  1912 12/30/24  0336   TROPONINI 0.208* 0.216* 0.281*     TSH: No results for input(s): "TSH" in the last 4320 hours.  Urine Culture: No results for input(s): "LABURIN" in the last 48 hours.  Urine Studies:   Recent Labs   Lab 12/30/24  0634   COLORU Yellow   APPEARANCEUA Clear   PHUR 6.0   SPECGRAV 1.010   PROTEINUA Negative   GLUCUA Negative   KETONESU Negative   BILIRUBINUA Negative   OCCULTUA Negative   NITRITE Negative   UROBILINOGEN Negative   LEUKOCYTESUR Negative       Significant Imaging: I have reviewed all pertinent imaging results/findings within the past 24 hours.  "

## 2024-12-30 NOTE — ASSESSMENT & PLAN NOTE
59 yr F presenting to ED with chest pain and diagnosed with NSTEMI 2/2 IVDU. GI consulted for hematochezia. Known recent diagnosis of amyloidosis (bx confirmed during colonoscopy). Bleeding is likely from ulcerations seen on recent colonoscopy. She has not followed up with GI or oncology to initiate therapy as currently homeless and no transportation. H/H stable since admission without requiring pRBC transfusion.     Plan:  Recommend oncology evaluation for amyloidosis   No endoscopic intervention at this time   PPI 40 mg BID as recent duodenal ulcer appreciated on EGD (needs BID therapy for total of 8 weeks)  Transfuse for hemoglobin less than 7   CBC daily

## 2024-12-30 NOTE — ASSESSMENT & PLAN NOTE
Anemia is likely due to acute blood loss which was from rectum . Most recent hemoglobin and hematocrit are listed below.  Recent Labs     12/29/24  1620 12/29/24  1912 12/30/24  0336   HGB 7.2* 7.4* 7.5*   HCT 22.5* 23.3* 23.2*       Plan  - Monitor serial CBC: Every 6 hours  - Transfuse PRBC if patient becomes hemodynamically unstable, symptomatic or H/H drops below 7/21.  - Patient has not received any PRBC transfusions to date  - Patient's anemia is currently stable

## 2024-12-30 NOTE — PLAN OF CARE
Problem: Adult Inpatient Plan of Care  Goal: Plan of Care Review  Outcome: Progressing  Goal: Optimal Comfort and Wellbeing  Outcome: Progressing     Problem: Fall Injury Risk  Goal: Absence of Fall and Fall-Related Injury  Outcome: Progressing     Problem: Anemia  Goal: Anemia Symptom Improvement  Outcome: Progressing

## 2024-12-30 NOTE — H&P
[facility] Medicine  History & Physical    Patient Name: Mary Ellen Saini  MRN: [id number]  Patient Class: OP- Observation  Admission Date: 12/29/2024  Attending Physician: Veronica Garcia MD   Primary Care Provider: Marlen Primary Doctor         Patient information was obtained from patient, past medical records, and ER records.     Subjective:     Principal Problem:NSTEMI (non-ST elevated myocardial infarction)    Chief Complaint:   Chief Complaint   Patient presents with    Rectal Bleeding     Chest and SOB, on EMS arrival pt was hyperventilating and c/o of hand cramping. Pt reports has been having bright red blood in stool.         HPI: 60 yo female with PMH of HCV, HFrEF (30-35%) supposed to have a LifeVest, CAD, AFib, COPD, colorectal cancer and medication nonadherence presented to ED with complaint of chest pain. Pt reports substernal chest pain described as sharp, pressure associated with SOB. She has had similar symptoms in the past. Symptoms reportedly started after patient informed her son had been arrested. She denies associated n/v, cough/congestion, dizziness, diaphoresis and syncope. Pt denies active chest pain on my exam however she notes lower abdominal pain rated 10/10. Also reports bright red blood per rectum over the last 2 days. This is a recurring issue. She is followed by GI, last EGD/colonoscopy 11/2024, biopsy concerning for amyloidosis.       ED evaluation: Patient tachycardic on arrival. Initial EKG shows sinus tach rate 112, subsequent EKG remarkable for SVT rate 180s resolved with 20 mg diltiazem. Hbg/Hct 22/7 (baseline Hbg 8-9), K 3.4, BUN/Cr 23/1.8, BNP 2556, troponin 0.208, d-dimer 1.38. CXR negative for acute findings. Venous doppler LE negative for DVT. Patient received 60 mg furosemide, KCL and protonix. GI consulted. Patient admitted to hospital medicine for further evaluation and care.       Past Medical History:   Diagnosis Date    Asthma     Bipolar disorder      Hypertension        Past Surgical History:   Procedure Laterality Date    CHOLECYSTECTOMY      COLONOSCOPY N/A 10/22/2024    Procedure: COLONOSCOPY;  Surgeon: Dayday Freed MD;  Location: [facility];  Service: Endoscopy;  Laterality: N/A;    COLONOSCOPY N/A 11/11/2024    Procedure: COLONOSCOPY;  Surgeon: Reese Chen MD;  Location: [facility];  Service: Endoscopy;  Laterality: N/A;    ESOPHAGOGASTRODUODENOSCOPY N/A 10/22/2024    Procedure: EGD (ESOPHAGOGASTRODUODENOSCOPY);  Surgeon: Dayday Freed MD;  Location: [facility];  Service: Endoscopy;  Laterality: N/A;    ESOPHAGOGASTRODUODENOSCOPY N/A 11/11/2024    Procedure: EGD (ESOPHAGOGASTRODUODENOSCOPY);  Surgeon: Reese Chen MD;  Location: [facility];  Service: Endoscopy;  Laterality: N/A;    SHOULDER SURGERY      TUBAL LIGATION         Review of patient's allergies indicates:  No Known Allergies    No current facility-administered medications on file prior to encounter.     Current Outpatient Medications on File Prior to Encounter   Medication Sig    albuterol (PROVENTIL/VENTOLIN HFA) 90 mcg/actuation inhaler Inhale 1-2 puffs into the lungs every 6 (six) hours as needed for Wheezing. Rescue    atorvastatin (LIPITOR) 80 MG tablet Take 1 tablet (80 mg total) by mouth once daily.    ferrous sulfate (FEOSOL) 325 mg (65 mg iron) Tab tablet Take 1 tablet (325 mg total) by mouth daily with breakfast.    furosemide (LASIX) 20 MG tablet Take 2 tablets (40 mg total) by mouth once daily.    LIDOcaine (LIDODERM) 5 % Place 2 patches onto the skin every evening. Remove & Discard patch within 12 hours or as directed by MD    losartan (COZAAR) 25 MG tablet Take 0.5 tablets (12.5 mg total) by mouth every evening.    metoprolol succinate (TOPROL-XL) 25 MG 24 hr tablet Take 25 mg by mouth once daily.    oxybutynin (DITROPAN) 5 MG Tab Take 1 tablet (5 mg total) by mouth 3 (three) times daily.    oxyCODONE (ROXICODONE) 5 MG immediate release tablet Take 1  tablet (5 mg total) by mouth every 6 (six) hours as needed for Pain.    pantoprazole (PROTONIX) 40 MG tablet Take 1 tablet (40 mg total) by mouth 2 (two) times daily.    polyethylene glycol (GLYCOLAX) 17 gram PwPk Take 17 g by mouth 2 (two) times daily as needed for Constipation.    sucralfate (CARAFATE) 1 gram tablet Take 1 tablet (1 g total) by mouth 3 (three) times daily before meals.    tiotropium (SPIRIVA) 18 mcg inhalation capsule Inhale 1 capsule (18 mcg total) into the lungs once daily. Controller    naloxone (NARCAN) 4 mg/actuation Spry 4mg by nasal route as needed for opioid overdose; may repeat every 2-3 minutes in alternating nostrils until medical help arrives. Call 911    nicotine (NICODERM CQ) 21 mg/24 hr Place 1 patch onto the skin once daily. (Patient not taking: Reported on 12/29/2024)    nystatin (MYCOSTATIN) cream Apply topically 2 (two) times daily.    spironolactone (ALDACTONE) 25 MG tablet Take 0.5 tablets (12.5 mg total) by mouth once daily. (Patient not taking: Reported on 12/29/2024)    tramadol-acetaminophen 37.5-325 mg (ULTRACET) 37.5-325 mg Tab Take 1 tablet by mouth every 6 (six) hours as needed. (Patient not taking: Reported on 12/29/2024)     Family History    None       Tobacco Use    Smoking status: Every Day     Current packs/day: 0.50     Average packs/day: 2.0 packs/day for 58.0 years (114.6 ttl pk-yrs)     Types: Cigarettes     Start date: 1967    Smokeless tobacco: Never    Tobacco comments:     Pt is a 2 pk/day cigarette smoker x 57 yrs. She states that she cut down to about 10 cig/day, and is trying to quit. Will obtain order for nicotine patch. Ambulatory referral to Smoking Cessation clinic following hospital discharge.    Substance and Sexual Activity    Alcohol use: Yes     Comment: socailly    Drug use: Yes     Types: Methamphetamines, Marijuana     Comment: denies cocaine or meth. Reports maijurana use    Sexual activity: Yes     Review of Systems   Constitutional:   Negative for chills, diaphoresis and fever.   Eyes:  Negative for photophobia.   Respiratory:  Positive for shortness of breath. Negative for cough, chest tightness and wheezing.    Cardiovascular:  Positive for chest pain. Negative for palpitations and leg swelling.   Gastrointestinal:  Positive for abdominal pain and blood in stool. Negative for diarrhea, nausea and vomiting.   Genitourinary:  Negative for dysuria, flank pain, frequency and hematuria.   Musculoskeletal:  Negative for back pain and myalgias.   Neurological:  Negative for dizziness, syncope, light-headedness and headaches.   Psychiatric/Behavioral:  Negative for confusion.      Objective:     Vital Signs (Most Recent):  Temp: 98.4 °F (36.9 °C) (12/30/24 0112)  Pulse: (!) 114 (12/30/24 0128)  Resp: 18 (12/29/24 2338)  BP: (!) 129/95 (12/30/24 0112)  SpO2: 98 % (12/30/24 0112) Vital Signs (24h Range):  Temp:  [97.9 °F (36.6 °C)-98.4 °F (36.9 °C)] 98.4 °F (36.9 °C)  Pulse:  [] 114  Resp:  [16-33] 18  SpO2:  [90 %-100 %] 98 %  BP: (117-163)/(70-99) 129/95     Weight: 72.6 kg (160 lb 0.9 oz)  Body mass index is 22.97 kg/m².     Physical Exam  Vitals and nursing note reviewed.   Constitutional:       Appearance: She is well-developed. She is ill-appearing.   HENT:      Head: Normocephalic and atraumatic.   Eyes:      Conjunctiva/sclera: Conjunctivae normal.      Pupils: Pupils are equal, round, and reactive to light.   Neck:      Vascular: No JVD.   Cardiovascular:      Rate and Rhythm: Normal rate and regular rhythm.      Heart sounds: Normal heart sounds.      Comments: Bilateral LE edema R>L   Pulmonary:      Effort: Pulmonary effort is normal. No respiratory distress.      Breath sounds: No wheezing.   Abdominal:      General: Bowel sounds are normal. There is no distension.      Palpations: Abdomen is soft.      Tenderness: There is abdominal tenderness (suprapubic). There is no guarding.   Musculoskeletal:         General: No tenderness.  Normal range of motion.      Cervical back: Normal range of motion.      Right lower leg: Edema present.      Left lower leg: Edema present.   Skin:     General: Skin is warm and dry.      Capillary Refill: Capillary refill takes less than 2 seconds.   Neurological:      Mental Status: She is alert and oriented to person, place, and time.   Psychiatric:         Behavior: Behavior normal.              CRANIAL NERVES     CN III, IV, VI   Pupils are equal, round, and reactive to light.       Significant Labs: All pertinent labs within the past 24 hours have been reviewed.  CBC:   Recent Labs   Lab 12/29/24 1620 12/29/24 1912   WBC 10.35 10.36   HGB 7.2* 7.4*   HCT 22.5* 23.3*    294     CMP:   Recent Labs   Lab 12/29/24 1620   *   K 3.4*      CO2 21*      BUN 23*   CREATININE 1.8*   CALCIUM 8.0*   PROT 7.2   ALBUMIN 2.7*   BILITOT 0.5   ALKPHOS 102   *   *   ANIONGAP 11     Cardiac Markers:   Recent Labs   Lab 12/29/24 1620   BNP 2,556*       Magnesium:   Recent Labs   Lab 12/29/24 1912   MG 1.6     Troponin:   Recent Labs   Lab 12/29/24  1620 12/29/24 1912   TROPONINI 0.208* 0.216*         Significant Imaging: I have reviewed all pertinent imaging results/findings within the past 24 hours.  Assessment/Plan:     * NSTEMI (non-ST elevated myocardial infarction)  -EKG shows no ST elevation  -Patient states chest pain resolved on my exam   -Troponin 0.208> 0.216  -Trend troponin, monitor telemetry  -Hold ASA, plavix and anticoagulation 2/2 concern for active GI bleeding         ABLA (acute blood loss anemia)  Anemia is likely due to acute blood loss which was from rectum . Most recent hemoglobin and hematocrit are listed below.  Recent Labs     12/29/24  1620 12/29/24 1912   HGB 7.2* 7.4*   HCT 22.5* 23.3*     Plan  - Monitor serial CBC: Every 6 hours  - Transfuse PRBC if patient becomes hemodynamically unstable, symptomatic or H/H drops below 7/21.  - Patient has not  received any PRBC transfusions to date  - Patient's anemia is currently stable      GIB (gastrointestinal bleeding)  Patient's hemorrhage is due to gastrointestinal bleed, patient does have a propensity for bleeding due to duodenal ulcers.. Patients most recent Hgb, Hct, platelets, and INR are listed below.  Recent Labs     12/29/24  1620 12/29/24  1624 12/29/24  1912   HGB 7.2*  --  7.4*   HCT 22.5*  --  23.3*     --  294   INR  --  1.3*  --      Plan  - Will trend hemoglobin/hematocrit Every 6 hours  - Will monitor and correct any coagulation defects  - Will transfuse if Hgb is <7g/dl (<8g/dl in cases of active ACS) or if patient has rapid bleeding leading to hemodynamic instability  - upper GI endoscopy (11/11/24) revealed gastritis, mucosal changes to duodenum, and duodenal ulcer, biopsy concerning for amyloidosis   -patient refused rectal exam and occult stool   -consult GI  -continue PPI   -keep NPO after MN     Colorectal carcinoma  -patient followed by heme/onc       Hypokalemia  Patient's most recent potassium results are listed below.   Recent Labs     12/29/24  1620   K 3.4*     Plan  - Replete potassium per protocol  - Monitor potassium Daily  - Patient's hypokalemia is stable      Acute on chronic combined systolic and diastolic congestive heart failure  Patient has Combined Systolic and Diastolic heart failure that is Acute on chronic. On presentation their CHF was decompensated. Evidence of decompensated CHF on presentation includes: edema, dyspnea on exertion (MONSON), and shortness of breath. The etiology of their decompensation is likely cardiac arrhythmia (SVT on EKG) . Most recent BNP and echo results are listed below.  Recent Labs     12/29/24  1620   BNP 2,556*     Latest ECHO  Results for orders placed during the hospital encounter of 08/12/24    Echo    Interpretation Summary    Left Ventricle: The left ventricle is moderately dilated. There is eccentric hypertrophy. Moderate global  hypokinesis present. There is moderately reduced systolic function with a visually estimated ejection fraction of 30 - 35%. Biplane (2D) method of discs ejection fraction is 34%. There is diastolic dysfunction but grade cannot be determined.    Right Ventricle: Normal right ventricular cavity size. Wall thickness is normal. Systolic function is normal.    Left Atrium: Left atrium is severely dilated.    Right Atrium: Right atrium is moderately dilated.    Aortic Valve: There is mild stenosis. Aortic valve area by VTI is 1.95 cm². Aortic valve peak velocity is 1.40 m/s. Mean gradient is 4 mmHg. The dimensionless index is 0.62.    Mitral Valve: There is mild regurgitation.    Tricuspid Valve: There is mild regurgitation.    Pulmonic Valve: There is mild regurgitation.    Pulmonary Artery: The estimated pulmonary artery systolic pressure is 36 mmHg.    IVC/SVC: Normal venous pressure at 3 mmHg.    Current Heart Failure Medications  , Daily, Oral  metoprolol succinate (TOPROL-XL) 24 hr tablet 25 mg, Daily, Oral    Plan  - Monitor strict I&Os and daily weights.    - Place on telemetry  -patient NPO 2/2 concern for active GI bleeding   -she received 60 mg furosemide in ED         VTE Risk Mitigation (From admission, onward)           Ordered     IP VTE HIGH RISK PATIENT  Once         12/29/24 1908     Place sequential compression device  Until discontinued         12/29/24 1908                         On 12/30/2024, patient should be placed in hospital observation services under my care in collaboration with Dr. Veronica Garcia.           Claire Kennedy NP  Department of Hospital Medicine  [place] - Telemetry

## 2024-12-30 NOTE — SUBJECTIVE & OBJECTIVE
Past Medical History:   Diagnosis Date    Asthma     Bipolar disorder     Hypertension        Past Surgical History:   Procedure Laterality Date    CHOLECYSTECTOMY      COLONOSCOPY N/A 10/22/2024    Procedure: COLONOSCOPY;  Surgeon: Dayday Freed MD;  Location: [facility];  Service: Endoscopy;  Laterality: N/A;    COLONOSCOPY N/A 11/11/2024    Procedure: COLONOSCOPY;  Surgeon: Reese Chen MD;  Location: [facility];  Service: Endoscopy;  Laterality: N/A;    ESOPHAGOGASTRODUODENOSCOPY N/A 10/22/2024    Procedure: EGD (ESOPHAGOGASTRODUODENOSCOPY);  Surgeon: Dayday Freed MD;  Location: [facility];  Service: Endoscopy;  Laterality: N/A;    ESOPHAGOGASTRODUODENOSCOPY N/A 11/11/2024    Procedure: EGD (ESOPHAGOGASTRODUODENOSCOPY);  Surgeon: Reese Chen MD;  Location: [facility];  Service: Endoscopy;  Laterality: N/A;    SHOULDER SURGERY      TUBAL LIGATION         Review of patient's allergies indicates:  No Known Allergies    No current facility-administered medications on file prior to encounter.     Current Outpatient Medications on File Prior to Encounter   Medication Sig    albuterol (PROVENTIL/VENTOLIN HFA) 90 mcg/actuation inhaler Inhale 1-2 puffs into the lungs every 6 (six) hours as needed for Wheezing. Rescue    atorvastatin (LIPITOR) 80 MG tablet Take 1 tablet (80 mg total) by mouth once daily.    ferrous sulfate (FEOSOL) 325 mg (65 mg iron) Tab tablet Take 1 tablet (325 mg total) by mouth daily with breakfast.    furosemide (LASIX) 20 MG tablet Take 2 tablets (40 mg total) by mouth once daily.    LIDOcaine (LIDODERM) 5 % Place 2 patches onto the skin every evening. Remove & Discard patch within 12 hours or as directed by MD    losartan (COZAAR) 25 MG tablet Take 0.5 tablets (12.5 mg total) by mouth every evening.    metoprolol succinate (TOPROL-XL) 25 MG 24 hr tablet Take 25 mg by mouth once daily.    oxybutynin (DITROPAN) 5 MG Tab Take 1 tablet (5 mg total) by mouth 3 (three)  times daily.    oxyCODONE (ROXICODONE) 5 MG immediate release tablet Take 1 tablet (5 mg total) by mouth every 6 (six) hours as needed for Pain.    pantoprazole (PROTONIX) 40 MG tablet Take 1 tablet (40 mg total) by mouth 2 (two) times daily.    polyethylene glycol (GLYCOLAX) 17 gram PwPk Take 17 g by mouth 2 (two) times daily as needed for Constipation.    sucralfate (CARAFATE) 1 gram tablet Take 1 tablet (1 g total) by mouth 3 (three) times daily before meals.    tiotropium (SPIRIVA) 18 mcg inhalation capsule Inhale 1 capsule (18 mcg total) into the lungs once daily. Controller    naloxone (NARCAN) 4 mg/actuation Spry 4mg by nasal route as needed for opioid overdose; may repeat every 2-3 minutes in alternating nostrils until medical help arrives. Call 911    nicotine (NICODERM CQ) 21 mg/24 hr Place 1 patch onto the skin once daily. (Patient not taking: Reported on 12/29/2024)    nystatin (MYCOSTATIN) cream Apply topically 2 (two) times daily.    spironolactone (ALDACTONE) 25 MG tablet Take 0.5 tablets (12.5 mg total) by mouth once daily. (Patient not taking: Reported on 12/29/2024)    tramadol-acetaminophen 37.5-325 mg (ULTRACET) 37.5-325 mg Tab Take 1 tablet by mouth every 6 (six) hours as needed. (Patient not taking: Reported on 12/29/2024)     Family History    None       Tobacco Use    Smoking status: Every Day     Current packs/day: 0.50     Average packs/day: 2.0 packs/day for 58.0 years (114.6 ttl pk-yrs)     Types: Cigarettes     Start date: 1967    Smokeless tobacco: Never    Tobacco comments:     Pt is a 2 pk/day cigarette smoker x 57 yrs. She states that she cut down to about 10 cig/day, and is trying to quit. Will obtain order for nicotine patch. Ambulatory referral to Smoking Cessation clinic following hospital discharge.    Substance and Sexual Activity    Alcohol use: Yes     Comment: socailly    Drug use: Yes     Types: Methamphetamines, Marijuana     Comment: denies cocaine or meth. Reports  matranurana use    Sexual activity: Yes     Review of Systems   Constitutional:  Negative for chills, diaphoresis and fever.   Eyes:  Negative for photophobia.   Respiratory:  Positive for shortness of breath. Negative for cough, chest tightness and wheezing.    Cardiovascular:  Positive for chest pain. Negative for palpitations and leg swelling.   Gastrointestinal:  Positive for abdominal pain and blood in stool. Negative for diarrhea, nausea and vomiting.   Genitourinary:  Negative for dysuria, flank pain, frequency and hematuria.   Musculoskeletal:  Negative for back pain and myalgias.   Neurological:  Negative for dizziness, syncope, light-headedness and headaches.   Psychiatric/Behavioral:  Negative for confusion.      Objective:     Vital Signs (Most Recent):  Temp: 98.4 °F (36.9 °C) (12/30/24 0112)  Pulse: (!) 114 (12/30/24 0128)  Resp: 18 (12/29/24 2338)  BP: (!) 129/95 (12/30/24 0112)  SpO2: 98 % (12/30/24 0112) Vital Signs (24h Range):  Temp:  [97.9 °F (36.6 °C)-98.4 °F (36.9 °C)] 98.4 °F (36.9 °C)  Pulse:  [] 114  Resp:  [16-33] 18  SpO2:  [90 %-100 %] 98 %  BP: (117-163)/(70-99) 129/95     Weight: 72.6 kg (160 lb 0.9 oz)  Body mass index is 22.97 kg/m².     Physical Exam  Vitals and nursing note reviewed.   Constitutional:       Appearance: She is well-developed. She is ill-appearing.   HENT:      Head: Normocephalic and atraumatic.   Eyes:      Conjunctiva/sclera: Conjunctivae normal.      Pupils: Pupils are equal, round, and reactive to light.   Neck:      Vascular: No JVD.   Cardiovascular:      Rate and Rhythm: Normal rate and regular rhythm.      Heart sounds: Normal heart sounds.      Comments: Bilateral LE edema R>L   Pulmonary:      Effort: Pulmonary effort is normal. No respiratory distress.      Breath sounds: No wheezing.   Abdominal:      General: Bowel sounds are normal. There is no distension.      Palpations: Abdomen is soft.      Tenderness: There is abdominal tenderness (suprapubic).  There is no guarding.   Musculoskeletal:         General: No tenderness. Normal range of motion.      Cervical back: Normal range of motion.      Right lower leg: Edema present.      Left lower leg: Edema present.   Skin:     General: Skin is warm and dry.      Capillary Refill: Capillary refill takes less than 2 seconds.   Neurological:      Mental Status: She is alert and oriented to person, place, and time.   Psychiatric:         Behavior: Behavior normal.              CRANIAL NERVES     CN III, IV, VI   Pupils are equal, round, and reactive to light.       Significant Labs: All pertinent labs within the past 24 hours have been reviewed.  CBC:   Recent Labs   Lab 12/29/24 1620 12/29/24 1912   WBC 10.35 10.36   HGB 7.2* 7.4*   HCT 22.5* 23.3*    294     CMP:   Recent Labs   Lab 12/29/24 1620   *   K 3.4*      CO2 21*      BUN 23*   CREATININE 1.8*   CALCIUM 8.0*   PROT 7.2   ALBUMIN 2.7*   BILITOT 0.5   ALKPHOS 102   *   *   ANIONGAP 11     Cardiac Markers:   Recent Labs   Lab 12/29/24 1620   BNP 2,556*       Magnesium:   Recent Labs   Lab 12/29/24 1912   MG 1.6     Troponin:   Recent Labs   Lab 12/29/24  1620 12/29/24 1912   TROPONINI 0.208* 0.216*         Significant Imaging: I have reviewed all pertinent imaging results/findings within the past 24 hours.

## 2024-12-30 NOTE — CONSULTS
[place] [facility]  Gastroenterology  Consult Note    Patient Name: Mary Ellen Saini  MRN: [id number]  Admission Date: 12/29/2024  Hospital Length of Stay: 0 days  Code Status: Full Code   Attending Provider: Jessica Charles*   Consulting Provider: Reese Chen MD  Primary Care Physician: Marlen, Primary Doctor  Principal Problem:NSTEMI (non-ST elevated myocardial infarction)    Inpatient consult to Gastroenterology  Consult performed by: Danita Ugarte MD  Consult ordered by: Claire Kennedy NP        Subjective:     HPI:  59 yr F with history of meth use, HFrEF (EF 30%), HCV, CAD, Afib, COPD,currently homeless, who presented to the ED with chest pain after her son was arrested in which diagnosed with NSTEMI. GI consulted for hematochezia. Patient reports for the last couple of months she has been homeless and this is why she has missed her outpatient appointments with GI for her amyloidosis. She recently was hospitalized for hematochezia in which colonoscopy performed with biopsies confirming amyloidosis. She has BL LQ abdominal pain that has been persistent since last hospitalization. She is having normal consistency stools with blood streaks. She has not required pRBC since admission.     Past Medical History:   Diagnosis Date    Asthma     Bipolar disorder     Hypertension        Past Surgical History:   Procedure Laterality Date    CHOLECYSTECTOMY      COLONOSCOPY N/A 10/22/2024    Procedure: COLONOSCOPY;  Surgeon: Dayday Freed MD;  Location: [facility];  Service: Endoscopy;  Laterality: N/A;    COLONOSCOPY N/A 11/11/2024    Procedure: COLONOSCOPY;  Surgeon: Reese Chen MD;  Location: [facility];  Service: Endoscopy;  Laterality: N/A;    ESOPHAGOGASTRODUODENOSCOPY N/A 10/22/2024    Procedure: EGD (ESOPHAGOGASTRODUODENOSCOPY);  Surgeon: Dayday Freed MD;  Location: [facility];  Service: Endoscopy;  Laterality: N/A;    ESOPHAGOGASTRODUODENOSCOPY N/A 11/11/2024     Procedure: EGD (ESOPHAGOGASTRODUODENOSCOPY);  Surgeon: Reese Chen MD;  Location: [facility];  Service: Endoscopy;  Laterality: N/A;    SHOULDER SURGERY      TUBAL LIGATION         Review of patient's allergies indicates:  No Known Allergies  Family History    None       Tobacco Use    Smoking status: Every Day     Current packs/day: 0.50     Average packs/day: 2.0 packs/day for 58.0 years (114.6 ttl pk-yrs)     Types: Cigarettes     Start date: 1967    Smokeless tobacco: Never    Tobacco comments:     Pt is a 2 pk/day cigarette smoker x 57 yrs. She states that she cut down to about 10 cig/day, and is trying to quit. Will obtain order for nicotine patch. Ambulatory referral to Smoking Cessation clinic following hospital discharge.    Substance and Sexual Activity    Alcohol use: Yes     Comment: socailly    Drug use: Yes     Types: Methamphetamines, Marijuana     Comment: denies cocaine or meth. Reports maijurana use    Sexual activity: Yes     Review of Systems   Constitutional:  Negative for activity change and appetite change.   HENT:  Negative for congestion.    Eyes:  Negative for discharge and itching.   Respiratory:  Negative for apnea and chest tightness.    Cardiovascular:  Negative for chest pain and leg swelling.   Gastrointestinal:  Positive for abdominal pain and blood in stool. Negative for abdominal distention, constipation, diarrhea, nausea, rectal pain and vomiting.   Endocrine: Negative for cold intolerance.   Genitourinary:  Negative for difficulty urinating and dyspareunia.   Musculoskeletal:  Negative for arthralgias and back pain.   Skin:  Negative for color change.   Allergic/Immunologic: Negative for environmental allergies.   Neurological:  Negative for dizziness.   Hematological:  Negative for adenopathy.   Psychiatric/Behavioral:  Negative for agitation.      Objective:     Vital Signs (Most Recent):  Temp: 97.8 °F (36.6 °C) (12/30/24 0807)  Pulse: 101 (12/30/24 0807)  Resp: 18  (12/30/24 0947)  BP: 122/80 (12/30/24 0807)  SpO2: (!) 94 % (12/30/24 0807) Vital Signs (24h Range):  Temp:  [97.8 °F (36.6 °C)-98.7 °F (37.1 °C)] 97.8 °F (36.6 °C)  Pulse:  [] 101  Resp:  [16-33] 18  SpO2:  [90 %-100 %] 94 %  BP: (117-163)/(70-99) 122/80     Weight: 72.6 kg (160 lb 0.9 oz) (12/29/24 2250)  Body mass index is 22.97 kg/m².    No intake or output data in the 24 hours ending 12/30/24 1037    Lines/Drains/Airways       Peripheral Intravenous Line  Duration                  Peripheral IV - Single Lumen 12/29/24 18 G Anterior;Left Forearm 1 day         Peripheral IV - Single Lumen 12/29/24 1646 18 G Right Antecubital <1 day                     Physical Exam  Vitals reviewed.   Constitutional:       Appearance: She is normal weight.   HENT:      Head: Normocephalic.      Nose: Nose normal.      Mouth/Throat:      Mouth: Mucous membranes are moist.   Eyes:      Pupils: Pupils are equal, round, and reactive to light.   Cardiovascular:      Rate and Rhythm: Normal rate and regular rhythm.      Pulses: Normal pulses.      Heart sounds: Normal heart sounds.   Pulmonary:      Effort: Pulmonary effort is normal.      Breath sounds: Normal breath sounds.   Abdominal:      General: Abdomen is flat. Bowel sounds are normal. There is no distension.      Palpations: Abdomen is soft. There is no mass.      Tenderness: There is no abdominal tenderness.      Hernia: No hernia is present.   Musculoskeletal:         General: Normal range of motion.      Cervical back: Normal range of motion and neck supple.   Skin:     General: Skin is warm.      Capillary Refill: Capillary refill takes less than 2 seconds.   Neurological:      General: No focal deficit present.      Mental Status: She is alert and oriented to person, place, and time.   Psychiatric:         Mood and Affect: Mood normal.          Significant Labs:  CMP:   Recent Labs   Lab 12/30/24  0336   *   CALCIUM 8.4*   ALBUMIN 2.8*   PROT 7.6      K  3.4*   CO2 19*      BUN 22*   CREATININE 1.8*   ALKPHOS 108   *   *   BILITOT 1.0       Hemoglobin 7.2- 7.4- today 7.5.      Significant Imaging:  Imaging results within the past 24 hours have been reviewed.  Assessment/Plan:     GI  Hematochezia  59 yr F presenting to ED with chest pain and diagnosed with NSTEMI 2/2 IVDU. GI consulted for hematochezia. Known recent diagnosis of amyloidosis (bx confirmed during colonoscopy). Bleeding is likely from ulcerations seen on recent colonoscopy (11/11/24). She has not followed up with GI or oncology to initiate therapy as currently homeless and no transportation. H/H stable since admission without requiring pRBC transfusion.     Plan:  Recommend oncology evaluation for amyloidosis   No endoscopic intervention at this time   PPI 40 mg BID as recent duodenal ulcer appreciated on EGD (needs BID therapy for total of 8 weeks)  Transfuse for hemoglobin less than 7   CBC daily           Thank you for your consult. I will follow-up with patient. Please contact us if you have any additional questions.    Danita Ugarte MD  [employer] Gastroenterology Fellow

## 2024-12-30 NOTE — PLAN OF CARE
Pt AAO x4.  VSS.  Pt remained afebrile throughout this shift.   IV administered per order.   Pt remained free of falls this shift.   Pt with pain this shift.   Pain meds administered as ordered.   Plan of care reviewed. Patient verbalizes understanding.   Pt moving/turing. Frequent weight shifting encouraged.  Patient SR 90s on monitor.   Urien sent for utox. 24 hour urine started @1450.  Up to bathroom with assist. No BM noted. But minimal amount of blood per rectum noted when wiped.   Bed low, side rails up x 2, wheels locked, call light in reach.   Bed alarm maintained for safety.   Patient instructed to call for assistance.   Hourly rounding completed.   24 hour chart check completed.  Will continue to monitor.      Problem: Adult Inpatient Plan of Care  Goal: Plan of Care Review  Outcome: Progressing  Goal: Patient-Specific Goal (Individualized)  Outcome: Progressing  Goal: Absence of Hospital-Acquired Illness or Injury  Outcome: Progressing  Goal: Optimal Comfort and Wellbeing  Outcome: Progressing  Goal: Readiness for Transition of Care  Outcome: Progressing

## 2024-12-30 NOTE — CONSULTS
Hematology / Oncology   Consult Note    Consult Requested By: Jessica Charles MD  Reason for Consult: amyloidosis    SUBJECTIVE:   Admit date: 12/29/2024  History of Present Illness: Ms. Saini is a 59 y.o. female w/ prior history of substance abuse, HFrEF, HCV, CAD, Afib, COPD. Currently admitted with NSTEMI. Recent hospitalization for hematochezia and biopsies done during that admission showed kappa light chain amyloidosis. Heme/onc consutled for amyloid. Patient seen in room 464 this afternoon. She expresses no complaints. No longer having chest pain.       PTA Medications   Medication Sig    albuterol (PROVENTIL/VENTOLIN HFA) 90 mcg/actuation inhaler Inhale 1-2 puffs into the lungs every 6 (six) hours as needed for Wheezing. Rescue    atorvastatin (LIPITOR) 80 MG tablet Take 1 tablet (80 mg total) by mouth once daily.    ferrous sulfate (FEOSOL) 325 mg (65 mg iron) Tab tablet Take 1 tablet (325 mg total) by mouth daily with breakfast.    furosemide (LASIX) 20 MG tablet Take 2 tablets (40 mg total) by mouth once daily.    LIDOcaine (LIDODERM) 5 % Place 2 patches onto the skin every evening. Remove & Discard patch within 12 hours or as directed by MD    losartan (COZAAR) 25 MG tablet Take 0.5 tablets (12.5 mg total) by mouth every evening.    metoprolol succinate (TOPROL-XL) 25 MG 24 hr tablet Take 25 mg by mouth once daily.    oxybutynin (DITROPAN) 5 MG Tab Take 1 tablet (5 mg total) by mouth 3 (three) times daily.    oxyCODONE (ROXICODONE) 5 MG immediate release tablet Take 1 tablet (5 mg total) by mouth every 6 (six) hours as needed for Pain.    pantoprazole (PROTONIX) 40 MG tablet Take 1 tablet (40 mg total) by mouth 2 (two) times daily.    polyethylene glycol (GLYCOLAX) 17 gram PwPk Take 17 g by mouth 2 (two) times daily as needed for Constipation.    sucralfate (CARAFATE) 1 gram tablet Take 1 tablet (1 g total) by mouth 3 (three) times daily before meals.    tiotropium (SPIRIVA) 18 mcg inhalation  capsule Inhale 1 capsule (18 mcg total) into the lungs once daily. Controller    naloxone (NARCAN) 4 mg/actuation Spry 4mg by nasal route as needed for opioid overdose; may repeat every 2-3 minutes in alternating nostrils until medical help arrives. Call 911    nicotine (NICODERM CQ) 21 mg/24 hr Place 1 patch onto the skin once daily. (Patient not taking: Reported on 12/29/2024)    nystatin (MYCOSTATIN) cream Apply topically 2 (two) times daily.    spironolactone (ALDACTONE) 25 MG tablet Take 0.5 tablets (12.5 mg total) by mouth once daily. (Patient not taking: Reported on 12/29/2024)    tramadol-acetaminophen 37.5-325 mg (ULTRACET) 37.5-325 mg Tab Take 1 tablet by mouth every 6 (six) hours as needed. (Patient not taking: Reported on 12/29/2024)      Review of patient's allergies indicates:  No Known Allergies    Past Medical History:   Diagnosis Date    Asthma     Bipolar disorder     Hypertension        Past Surgical History:   Procedure Laterality Date    CHOLECYSTECTOMY      COLONOSCOPY N/A 10/22/2024    Procedure: COLONOSCOPY;  Surgeon: Dayday Freed MD;  Location: [facility];  Service: Endoscopy;  Laterality: N/A;    COLONOSCOPY N/A 11/11/2024    Procedure: COLONOSCOPY;  Surgeon: Reese Chen MD;  Location: [facility];  Service: Endoscopy;  Laterality: N/A;    ESOPHAGOGASTRODUODENOSCOPY N/A 10/22/2024    Procedure: EGD (ESOPHAGOGASTRODUODENOSCOPY);  Surgeon: Dayday Freed MD;  Location: [facility];  Service: Endoscopy;  Laterality: N/A;    ESOPHAGOGASTRODUODENOSCOPY N/A 11/11/2024    Procedure: EGD (ESOPHAGOGASTRODUODENOSCOPY);  Surgeon: Reese Chen MD;  Location: [facility];  Service: Endoscopy;  Laterality: N/A;    SHOULDER SURGERY      TUBAL LIGATION        No family history on file.    Social History     Tobacco Use    Smoking status: Every Day     Current packs/day: 1.00     Average packs/day: 2.0 packs/day for 58.0 years (115.1 ttl pk-yrs)     Types: Cigarettes     Start  "date: 1967    Smokeless tobacco: Never    Tobacco comments:     Pt is a 2 pk/day cigarette smoker x 57 yrs. She states that she cut down to about 1 pk/day, and is trying to quit.  Ambulatory referral to Smoking Cessation clinic following hospital discharge.     Substance Use Topics    Alcohol use: Yes     Comment: socailly    Drug use: Yes     Types: Methamphetamines, Marijuana     Comment: denies cocaine or meth. Reports maijurana use        OBJECTIVE:     Vital Signs (Most Recent)   Temp: 98.3 °F (36.8 °C) (12/30/24 1133)  Pulse: 90 (12/30/24 1133)  Resp: 18 (12/30/24 1133)  BP: 111/71 (12/30/24 1133)  SpO2: (!) 92 % (12/30/24 1133)  Body mass index is 22.97 kg/m².      Physical Exam:  Physical Exam  Vitals and nursing note reviewed.   Constitutional:       General: She is not in acute distress.     Appearance: Normal appearance. She is not toxic-appearing.   HENT:      Head: Normocephalic and atraumatic.   Eyes:      General: No scleral icterus.     Conjunctiva/sclera: Conjunctivae normal.   Pulmonary:      Effort: Pulmonary effort is normal. No respiratory distress.   Abdominal:      General: There is no distension.   Musculoskeletal:         General: No swelling or deformity.   Skin:     Coloration: Skin is not jaundiced.      Findings: No erythema.   Neurological:      General: No focal deficit present.      Mental Status: She is alert and oriented to person, place, and time.      Motor: No weakness.   Psychiatric:         Mood and Affect: Mood normal.         Behavior: Behavior normal.         Cardiac Enzymes: Ejection Fractions:    Recent Labs     12/29/24  1620 12/29/24 1912 12/30/24  0336   TROPONINI 0.208* 0.216* 0.281*    No results found for: "EF"     Chemistries:   Recent Labs   Lab 12/29/24  1620 12/29/24 1912 12/30/24  0336   *  --  136   K 3.4*  --  3.4*     --  101   CO2 21*  --  19*   BUN 23*  --  22*   CREATININE 1.8*  --  1.8*   CALCIUM 8.0*  --  8.4*   PROT 7.2  --  7.6   BILITOT " "0.5  --  1.0   ALKPHOS 102  --  108   *  --  162*   *  --  206*   MG  --  1.6  --         CBC/Anemia Labs: Coags:    Recent Labs   Lab 12/29/24  1620 12/29/24  1912 12/30/24  0336   WBC 10.35 10.36 11.54   HGB 7.2* 7.4* 7.5*   HCT 22.5* 23.3* 23.2*    294 319   MCV 78* 79* 78*   RDW 14.2 14.4 14.5    Recent Labs   Lab 12/29/24  1624   INR 1.3*   APTT 30.4        POCT Glucose: HbA1c:    No results for input(s): "POCTGLUCOSE" in the last 168 hours. Hemoglobin A1C   Date Value Ref Range Status   10/20/2024 5.4 4.0 - 5.6 % Final     Comment:     ADA Screening Guidelines:  5.7-6.4%  Consistent with prediabetes  >or=6.5%  Consistent with diabetes    High levels of fetal hemoglobin interfere with the HbA1C  assay. Heterozygous hemoglobin variants (HbS, HgC, etc)do  not significantly interfere with this assay.   However, presence of multiple variants may affect accuracy.          Lines/Drains:       Peripheral IV - Single Lumen 12/29/24 18 G Anterior;Left Forearm (Active)   Site Assessment Clean;Intact;Dry 12/30/24 0715   Line Securement Device Secured with sutureless device 12/29/24 2100   Extremity Assessment Distal to IV No warmth;No swelling;No redness 12/29/24 2100   Line Status Flushed 12/29/24 2100   Dressing Status Clean;Dry;Intact 12/29/24 2100   Dressing Intervention Integrity maintained 12/29/24 2100   Dressing Change Due 01/02/25 12/29/24 2100   Site Change Due 01/02/25 12/29/24 2100   Reason Not Rotated Not due 12/29/24 2100   Number of days: 1            Peripheral IV - Single Lumen 12/29/24 1646 18 G Right Antecubital (Active)   Site Assessment Clean;Dry;Intact 12/29/24 2100   Line Securement Device Secured with sutureless device 12/29/24 2100   Extremity Assessment Distal to IV No warmth;No swelling;No abnormal discoloration;No redness 12/29/24 2100   Line Status Flushed 12/29/24 2100   Dressing Status Clean;Intact;Dry 12/29/24 2100   Dressing Intervention Integrity maintained 12/29/24 " 2100   Dressing Change Due 01/02/25 12/29/24 2100   Site Change Due 01/02/25 12/29/24 2100   Reason Not Rotated Not due 12/29/24 2100   Number of days: 0     Specimen to Pathology, Surgery Gastrointestinal tract  Order: [id number]  Status: Edited Result - FINAL       Visible to patient: No (inaccessible in MyChart)       Next appt: 01/16/2025 at 09:00 AM in Smoking Cessation (Yandel Bonds, ISIAH)    2 Result Notes      Component 1 mo ago   Final Pathologic Diagnosis     1. DUODENUM, 2ND PORTION, BIOPSY:  - POSITIVE for amyloid deposition (see comment)  - Background duodenal mucosa with foci of foveolar metaplasia and reactive changes  - No evidence of intraepithelial lymphocytosis    2. STOMACH, BIOPSY:  - POSITIVE for amyloid deposition (see comment)  - Background gastric mucosa with mild chronic inflammation  - No evidence of Helicobacter-like organisms (an H. pylori immunohistochemical study is negative)    3. COLON, RIGHT, BIOPSY:  - POSITIVE for amyloid deposition (see comment)  - Background colonic mucosa with ulceration, focally reactive/regenerative epithelial changes, and detached necroinflammatory debris  - No evidence of dysplasia    4. COLON, TRANSVERSE, BIOPSY:  - POSITIVE for amyloid deposition (see comment)  - Background colonic mucosa with ulceration, focally reactive/regenerative epithelial changes, and detached necroinflammatory debris  - No evidence of dysplasia    Comment:  The presence of amyloid in parts 1-4 is confirmed with Congo red cytochemical studies. Tissue will be sent to [facility] for amyloid protein identification and subtyping by mass spectrometry , and results will be issued in a supplemental  report. VC      Comment: Interp By Endy Zhang M.D., Signed on 11/26/2024 at 10:09   Supplemental Diagnosis     Supplemental #1:    [facility] - Amyloid Protein ID    Interpretation  Duodenum, specimen for amyloid typing ([id number]; 11/11/2024): Involved  by amyloidosis, AL (kappa)-type.    A Congo red stain was performed on paraffin sections of the specimen. Congo red-positive amyloid deposits are present.    Liquid chromatography tandem mass spectrometry (LC MS/MS) was performed on peptides extracted from Congo red-positive, microdissected areas of the paraffin-embedded specimen.    LC MS/MS detected a peptide profile consistent with AL (kappa)-type amyloid deposition.    These findings support the diagnosis of amyloidosis and indicate AL (kappa)-type amyloid deposition.    Comment: Correlation of the mass spectrometry findings with all clinical and laboratory features is recommended to distinguish systemic from localized AL amyloidosis.    If there are any questions about the analysis or the diagnosis in this case, please call the Department of Laboratory Medicine and Pathology, [facility] at [phone].    Report electronically signed by  Natalia Puentes M.D.    Note: Please see attached [facility] report for additional information.    Performing location:  [address] [address]              ASSESSMENT/PLAN:     Active Hospital Problems    Diagnosis  POA    *NSTEMI (non-ST elevated myocardial infarction) [I21.4]  Yes    SVT (supraventricular tachycardia) [I47.10]  Yes    Hematochezia [K92.1]  Yes    Other amyloidosis [E85.89]  Yes    ABLA (acute blood loss anemia) [D62]  Yes    GIB (gastrointestinal bleeding) [K92.2]  Yes    Tobacco dependency [F17.200]  Yes    Colorectal carcinoma [C19]  Yes    Acute on chronic combined systolic and diastolic congestive heart failure [I50.43]  Yes    Hypokalemia [E87.6]  Yes      Resolved Hospital Problems   No resolved problems to display.       SUMMARY: 59 y.o. female here with NSTEMI (non-ST elevated myocardial infarction)  Heme/onc consulted for amyloidosis. I have placed some orders to evaluate status of disease. She is scheduled  to follow-up in clinic with me on Jan 22 at which time we will discuss initiation of amyloidosis treatment.          Micah Luna MD, FACP  Hematology & Medical Oncology  [facility]

## 2024-12-30 NOTE — ASSESSMENT & PLAN NOTE
Patient's hemorrhage is due to gastrointestinal bleed, patient does have a propensity for bleeding due to duodenal ulcers.. Patients most recent Hgb, Hct, platelets, and INR are listed below.  Recent Labs     12/29/24  1620 12/29/24  1624 12/29/24  1912 12/30/24  0336   HGB 7.2*  --  7.4* 7.5*   HCT 22.5*  --  23.3* 23.2*     --  294 319   INR  --  1.3*  --   --        Plan  - Will trend hemoglobin/hematocrit Every 6 hours  - Will monitor and correct any coagulation defects  - Will transfuse if Hgb is <7g/dl (<8g/dl in cases of active ACS) or if patient has rapid bleeding leading to hemodynamic instability  - upper GI endoscopy (11/11/24) revealed gastritis, mucosal changes to duodenum, and duodenal ulcer, biopsy concerning for amyloidosis   -patient refused rectal exam and occult stool   -consult GI: Appreciate recs  Recommend oncology evaluation for amyloidosis   No endoscopic intervention at this time   PPI 40 mg BID as recent duodenal ulcer appreciated on EGD (needs BID therapy for total of 8 weeks)  Transfuse for hemoglobin less than 7   CBC daily   -continue PPI   -keep NPO after MN -okay to feed per GI,no endoscopic intervention at this time, PPI b.i.d. for recent duodenal ulcer

## 2024-12-30 NOTE — ASSESSMENT & PLAN NOTE
Patient has Combined Systolic and Diastolic heart failure that is Chronic. On presentation their CHF was well compensated. Most recent BNP and echo results are listed below.  Recent Labs     12/29/24  1620   BNP 2,556*     Latest ECHO  Results for orders placed during the hospital encounter of 08/12/24    Echo    Interpretation Summary    Left Ventricle: The left ventricle is moderately dilated. There is eccentric hypertrophy. Moderate global hypokinesis present. There is moderately reduced systolic function with a visually estimated ejection fraction of 30 - 35%. Biplane (2D) method of discs ejection fraction is 34%. There is diastolic dysfunction but grade cannot be determined.    Right Ventricle: Normal right ventricular cavity size. Wall thickness is normal. Systolic function is normal.    Left Atrium: Left atrium is severely dilated.    Right Atrium: Right atrium is moderately dilated.    Aortic Valve: There is mild stenosis. Aortic valve area by VTI is 1.95 cm². Aortic valve peak velocity is 1.40 m/s. Mean gradient is 4 mmHg. The dimensionless index is 0.62.    Mitral Valve: There is mild regurgitation.    Tricuspid Valve: There is mild regurgitation.    Pulmonic Valve: There is mild regurgitation.    Pulmonary Artery: The estimated pulmonary artery systolic pressure is 36 mmHg.    IVC/SVC: Normal venous pressure at 3 mmHg.    Current Heart Failure Medications  , Daily, Oral  metoprolol succinate (TOPROL-XL) 24 hr tablet 25 mg, Daily, Oral    Plan  - Monitor strict I&Os and daily weights.    - Place on telemetry  - Low sodium diet  - Place on fluid restriction of 2 L.   - Cardiology has been consulted  - The patient's volume status is at their baseline

## 2024-12-30 NOTE — PROGRESS NOTES
"   12/29/24 2440   Admission   Initial VN Admission Questions Complete   Shift   Pain Management Interventions pain management plan reviewed with patient/caregiver   Virtual Nurse - Patient Verbalized Approval Of Camera Use;VN Rounding   Safety/Activity   Patient Rounds bed in low position;call light in patient/parent reach;clutter free environment maintained;visualized patient;placement of personal items at bedside   Safety Promotion/Fall Prevention assistive device/personal item within reach;Fall Risk reviewed with patient/family;side rails raised x 2   Positioning   Body Position supine   Head of Bed (HOB) Positioning HOB at 30-45 degrees     VN cued in to pt's room with permission. Admission questions completed. Plan of care reviewed with pt.. Call bell w/in reach. Instructed to call for needs/assist oob. Pt reports she "lives on the streets". Consult to case management placed. Pt also reports that her mouth is hurting her and is saying it feels raw and hurts real bad. Pt also reports that the admit before last they ordered nystatin for it and is asking for something. ALEX Kennedy NP notified. Bedside nurse made aware.  "

## 2024-12-30 NOTE — ASSESSMENT & PLAN NOTE
Anemia is likely due to acute blood loss which was from rectum . Most recent hemoglobin and hematocrit are listed below.  Recent Labs     12/29/24  1620 12/29/24  1912   HGB 7.2* 7.4*   HCT 22.5* 23.3*     Plan  - Monitor serial CBC: Every 6 hours  - Transfuse PRBC if patient becomes hemodynamically unstable, symptomatic or H/H drops below 7/21.  - Patient has not received any PRBC transfusions to date  - Patient's anemia is currently stable

## 2024-12-30 NOTE — ASSESSMENT & PLAN NOTE
Patient's hemorrhage is due to gastrointestinal bleed, patient does have a propensity for bleeding due to duodenal ulcers.. Patients most recent Hgb, Hct, platelets, and INR are listed below.  Recent Labs     12/29/24  1620 12/29/24  1624 12/29/24  1912   HGB 7.2*  --  7.4*   HCT 22.5*  --  23.3*     --  294   INR  --  1.3*  --      Plan  - Will trend hemoglobin/hematocrit Every 6 hours  - Will monitor and correct any coagulation defects  - Will transfuse if Hgb is <7g/dl (<8g/dl in cases of active ACS) or if patient has rapid bleeding leading to hemodynamic instability  - upper GI endoscopy (11/11/24) revealed gastritis, mucosal changes to duodenum, and duodenal ulcer, biopsy concerning for amyloidosis   -patient refused rectal exam and occult stool   -consult GI  -continue PPI   -keep NPO after MN

## 2024-12-30 NOTE — PROGRESS NOTES
[facility] Medicine  Progress Note    Patient Name: Mary Ellen Saini  MRN: [id number]  Patient Class: OP- Observation   Admission Date: 12/29/2024  Length of Stay: 0 days  Attending Physician: Jessica Charles*  Primary Care Provider: Marlen, Primary Doctor        Subjective     Principal Problem:NSTEMI (non-ST elevated myocardial infarction)        HPI:  60 yo female with PMH of HCV, HFrEF (30-35%) supposed to have a LifeVest, CAD, AFib, COPD, colorectal cancer and medication nonadherence presented to ED with complaint of chest pain. Pt reports substernal chest pain described as sharp, pressure associated with SOB. She has had similar symptoms in the past. Symptoms reportedly started after patient informed her son had been arrested. She denies associated n/v, cough/congestion, dizziness, diaphoresis and syncope. Pt denies active chest pain on my exam however she notes lower abdominal pain rated 10/10. Also reports bright red blood per rectum over the last 2 days. This is a recurring issue. She is followed by GI, last EGD/colonoscopy 11/2024, biopsy concerning for amyloidosis.       ED evaluation: Patient tachycardic on arrival. Initial EKG shows sinus tach rate 112, subsequent EKG remarkable for SVT rate 180s resolved with 20 mg diltiazem. Hbg/Hct 22/7 (baseline Hbg 8-9), K 3.4, BUN/Cr 23/1.8, BNP 2556, troponin 0.208, d-dimer 1.38. CXR negative for acute findings. Venous doppler LE negative for DVT. Patient received 60 mg furosemide, KCL and protonix. GI consulted. Patient admitted to hospital medicine for further evaluation and care.       Overview/Hospital Course:  No notes on file    Interval History:  Awake, alert, patient report she has not taken her medication for 2 days,   BNP 2556- continue IV Lasix   Troponin 0.208 > 0.216 > 0.281- likely due to demand ischemia-await Cardiology recs  Checks x-ray with no acute abnormality  TTE in August with EF of 30-35%  Holding ACEi / ARB given renal  function  Holding aspirin and Plavix concern for GI bleed- patient refused rectal exam and occult stool-consult GI  H/H stable  Replace potassium  Appreciate GI recs -no endoscopic intervention at this time, PPI b.i.d. for recent duodenal ulcer    Review of Systems   Constitutional:  Negative for chills, diaphoresis and fever.   Respiratory:  Negative for cough, chest tightness, shortness of breath and wheezing.    Cardiovascular:  Negative for chest pain, palpitations and leg swelling.   Gastrointestinal:  Negative for abdominal pain, blood in stool, diarrhea, nausea and vomiting.   Genitourinary:  Negative for dysuria, flank pain, frequency and hematuria.   Musculoskeletal:  Negative for back pain and myalgias.   Neurological:  Negative for dizziness, syncope, light-headedness and headaches.   Psychiatric/Behavioral:  Negative for confusion.      Objective:     Vital Signs (Most Recent):  Temp: 97.8 °F (36.6 °C) (12/30/24 0807)  Pulse: 101 (12/30/24 0807)  Resp: 18 (12/30/24 0807)  BP: 122/80 (12/30/24 0807)  SpO2: (!) 94 % (12/30/24 0807) Vital Signs (24h Range):  Temp:  [97.8 °F (36.6 °C)-98.7 °F (37.1 °C)] 97.8 °F (36.6 °C)  Pulse:  [] 101  Resp:  [16-33] 18  SpO2:  [90 %-100 %] 94 %  BP: (117-163)/(70-99) 122/80     Weight: 72.6 kg (160 lb 0.9 oz)  Body mass index is 22.97 kg/m².  No intake or output data in the 24 hours ending 12/30/24 0821      Physical Exam  Vitals and nursing note reviewed.   Constitutional:       Appearance: She is well-developed.   HENT:      Head: Normocephalic and atraumatic.   Neck:      Vascular: No JVD.   Cardiovascular:      Rate and Rhythm: Normal rate and regular rhythm.      Heart sounds: Normal heart sounds.      Comments: Bilateral LE edema R>L   Pulmonary:      Effort: Pulmonary effort is normal. No respiratory distress.      Breath sounds: No wheezing.   Abdominal:      General: Bowel sounds are normal. There is no distension.      Palpations: Abdomen is soft.       "Tenderness: There is no abdominal tenderness. There is no guarding.   Musculoskeletal:         General: No tenderness. Normal range of motion.      Cervical back: Normal range of motion.      Right lower leg: Edema present.      Left lower leg: Edema present.   Skin:     General: Skin is warm and dry.      Capillary Refill: Capillary refill takes less than 2 seconds.   Neurological:      Mental Status: She is alert and oriented to person, place, and time.   Psychiatric:         Behavior: Behavior normal.             Significant Labs: A1C:   Recent Labs   Lab 10/20/24  0722   HGBA1C 5.4     ABGs: No results for input(s): "PH", "PCO2", "HCO3", "POCSATURATED", "BE", "TOTALHB", "COHB", "METHB", "O2HB", "POCFIO2", "PO2" in the last 48 hours.  Blood Culture: No results for input(s): "LABBLOO" in the last 48 hours.  CBC:   Recent Labs   Lab 12/29/24 1620 12/29/24 1912 12/30/24 0336   WBC 10.35 10.36 11.54   HGB 7.2* 7.4* 7.5*   HCT 22.5* 23.3* 23.2*    294 319     CMP:   Recent Labs   Lab 12/29/24 1620 12/30/24  0336   * 136   K 3.4* 3.4*    101   CO2 21* 19*    111*   BUN 23* 22*   CREATININE 1.8* 1.8*   CALCIUM 8.0* 8.4*   PROT 7.2 7.6   ALBUMIN 2.7* 2.8*   BILITOT 0.5 1.0   ALKPHOS 102 108   * 206*   * 162*   ANIONGAP 11 16     Coagulation:   Recent Labs   Lab 12/29/24 1624   INR 1.3*   APTT 30.4     Lipase: No results for input(s): "LIPASE" in the last 48 hours.  Lipid Panel: No results for input(s): "CHOL", "HDL", "LDLCALC", "TRIG", "CHOLHDL" in the last 48 hours.  Magnesium:   Recent Labs   Lab 12/29/24 1912   MG 1.6     Troponin:   Recent Labs   Lab 12/29/24 1620 12/29/24 1912 12/30/24  0336   TROPONINI 0.208* 0.216* 0.281*     TSH: No results for input(s): "TSH" in the last 4320 hours.  Urine Culture: No results for input(s): "LABURIN" in the last 48 hours.  Urine Studies:   Recent Labs   Lab 12/30/24  0634   COLORU Yellow   APPEARANCEUA Clear   PHUR 6.0   SPECGRAV " 1.010   PROTEINUA Negative   GLUCUA Negative   KETONESU Negative   BILIRUBINUA Negative   OCCULTUA Negative   NITRITE Negative   UROBILINOGEN Negative   LEUKOCYTESUR Negative       Significant Imaging: I have reviewed all pertinent imaging results/findings within the past 24 hours.    Assessment and Plan     * NSTEMI (non-ST elevated myocardial infarction)  -EKG shows no ST elevation  -Patient states chest pain resolved on my exam   -Troponin 0.208> 0.2160 > 0.281  -Trend troponin, monitor telemetry  -Hold ASA, plavix and anticoagulation 2/2 concern for active GI bleeding         ABLA (acute blood loss anemia)  Anemia is likely due to acute blood loss which was from rectum . Most recent hemoglobin and hematocrit are listed below.  Recent Labs     12/29/24  1620 12/29/24 1912 12/30/24  0336   HGB 7.2* 7.4* 7.5*   HCT 22.5* 23.3* 23.2*       Plan  - Monitor serial CBC: Every 6 hours  - Transfuse PRBC if patient becomes hemodynamically unstable, symptomatic or H/H drops below 7/21.  - Patient has not received any PRBC transfusions to date  - Patient's anemia is currently stable      GIB (gastrointestinal bleeding)  Patient's hemorrhage is due to gastrointestinal bleed, patient does have a propensity for bleeding due to duodenal ulcers.. Patients most recent Hgb, Hct, platelets, and INR are listed below.  Recent Labs     12/29/24  1620 12/29/24  1624 12/29/24 1912 12/30/24  0336   HGB 7.2*  --  7.4* 7.5*   HCT 22.5*  --  23.3* 23.2*     --  294 319   INR  --  1.3*  --   --        Plan  - Will trend hemoglobin/hematocrit Every 6 hours  - Will monitor and correct any coagulation defects  - Will transfuse if Hgb is <7g/dl (<8g/dl in cases of active ACS) or if patient has rapid bleeding leading to hemodynamic instability  - upper GI endoscopy (11/11/24) revealed gastritis, mucosal changes to duodenum, and duodenal ulcer, biopsy concerning for amyloidosis   -patient refused rectal exam and occult stool   -consult  GI: Appreciate recs  Recommend oncology evaluation for amyloidosis   No endoscopic intervention at this time   PPI 40 mg BID as recent duodenal ulcer appreciated on EGD (needs BID therapy for total of 8 weeks)  Transfuse for hemoglobin less than 7   CBC daily   -continue PPI   -keep NPO after MN -okay to feed per GI,no endoscopic intervention at this time, PPI b.i.d. for recent duodenal ulcer    Colorectal carcinoma  -patient followed by heme/onc       Hypokalemia  Patient's most recent potassium results are listed below.   Recent Labs     12/29/24  1620   K 3.4*     Plan  - Replete potassium per protocol  - Monitor potassium Daily  - Patient's hypokalemia is stable      Acute on chronic combined systolic and diastolic congestive heart failure  Patient has Combined Systolic and Diastolic heart failure that is Acute on chronic. On presentation their CHF was decompensated. Evidence of decompensated CHF on presentation includes: edema, dyspnea on exertion (MONSON), and shortness of breath. The etiology of their decompensation is likely cardiac arrhythmia (SVT on EKG) . Most recent BNP and echo results are listed below.  Recent Labs     12/29/24  1620   BNP 2,556*       Latest ECHO  Results for orders placed during the hospital encounter of 08/12/24    Echo    Interpretation Summary    Left Ventricle: The left ventricle is moderately dilated. There is eccentric hypertrophy. Moderate global hypokinesis present. There is moderately reduced systolic function with a visually estimated ejection fraction of 30 - 35%. Biplane (2D) method of discs ejection fraction is 34%. There is diastolic dysfunction but grade cannot be determined.    Right Ventricle: Normal right ventricular cavity size. Wall thickness is normal. Systolic function is normal.    Left Atrium: Left atrium is severely dilated.    Right Atrium: Right atrium is moderately dilated.    Aortic Valve: There is mild stenosis. Aortic valve area by VTI is 1.95 cm². Aortic  valve peak velocity is 1.40 m/s. Mean gradient is 4 mmHg. The dimensionless index is 0.62.    Mitral Valve: There is mild regurgitation.    Tricuspid Valve: There is mild regurgitation.    Pulmonic Valve: There is mild regurgitation.    Pulmonary Artery: The estimated pulmonary artery systolic pressure is 36 mmHg.    IVC/SVC: Normal venous pressure at 3 mmHg.    Current Heart Failure Medications  , Daily, Oral  metoprolol succinate (TOPROL-XL) 24 hr tablet 25 mg, Daily, Oral  furosemide injection 40 mg, Every 12 hours, Intravenous    Plan  - Monitor strict I&Os and daily weights.    - Place on telemetry  -patient NPO 2/2 concern for active GI bleeding   -she received 60 mg furosemide in ED   TTE in August with EF of 30-35%  Holding ACEi / ARB given renal function    Inpatient Upgrade Note    Mary Ellen Saini has warranted treatment spanning two or more midnights of hospital level care for the management of KENY. She continues to require daily labs, monitoring of vital signs, IV pain medication, medication adjustments, and further evaluation by consultants. Her condition is also complicated by the following comorbidities: Hypertension, Chronic respiratory disease, and polypharmacy.         VTE Risk Mitigation (From admission, onward)           Ordered     IP VTE HIGH RISK PATIENT  Once         12/29/24 1908     Place sequential compression device  Until discontinued         12/29/24 1908                    Discharge Planning   JENAE: 12/31/2024     Code Status: Full Code   Medical Readiness for Discharge Date:   Discharge Plan A: Home                        Jessica Charles MD  Department of Hospital Medicine   [place] [facility]

## 2024-12-30 NOTE — DISCHARGE INSTRUCTIONS
[employer] in [facility] [place] Time [facility] [address] [address] [address] 4th Sat 9:00 AM -12:00 PM     [facility] [address] [address] [address] Tues and Thurs 11:00 AM - 2:59 PM     [place] [employer] [address] [address] [place] 1st, 2nd and 3rd Tues 9:00 AM - 12:00 PM     [place] [facility] [address] [address] [place] 2nd and 4th Thurs 5:00 PM - 7:00 PM    Pending sale to [facility] [address] [facility] [address] [place] 3rd Thurs, Fri 8:00 AM - 10:00 AM Apr, June, Aug, Oct, Dec

## 2024-12-30 NOTE — HPI
59 yr F with history of meth use, HFrEF (EF 30%), HCV, CAD, Afib, COPD,currently homeless, who presented to the ED with chest pain after her son was arrested in which diagnosed with NSTEMI. GI consulted for hematochezia. Patient reports for the last couple of months she has been homeless and this is why she has missed her outpatient appointments with GI for her amyloidosis. She recently was hospitalized for hematochezia in which colonoscopy performed with biopsies confirming amyloidosis. She has BL LQ abdominal pain that has been persistent since last hospitalization. She is having normal consistency stools with blood streaks. She has not required pRBC since admission.     GI re-consulted for elevated LFTs. Known HCV + however has not gone to outpatient hepatology apt. Still actively drinking ETOH and amphetamine + on drug screen. Patient denies RUQ pain. She denies known fevers or chills. Does have history of IVDU.

## 2024-12-30 NOTE — HPI
58 yo female with PMH of HCV, HFrEF (30-35%) supposed to have a LifeVest, CAD, AFib, COPD, medication nonadherence, methamphetamine use, homelessness. Patient presented to the ED with CP. She reports CP began when she found out her son was arrested. She reports that she is currently has no stable means for housing, medications, food. CP is described as sharp with associated SOB. She denies radiation, diaphoresis, NV. Patient does report BRBPR.  She is followed by GI, last EGD/colonoscopy 11/2024, biopsy concerning for amyloidosis.   Troponin flat at 0.2. EKG ST. UDS pending.

## 2024-12-30 NOTE — SUBJECTIVE & OBJECTIVE
Past Medical History:   Diagnosis Date    Asthma     Bipolar disorder     Hypertension        Past Surgical History:   Procedure Laterality Date    CHOLECYSTECTOMY      COLONOSCOPY N/A 10/22/2024    Procedure: COLONOSCOPY;  Surgeon: Dayday Freed MD;  Location: [facility] ENDO;  Service: Endoscopy;  Laterality: N/A;    COLONOSCOPY N/A 11/11/2024    Procedure: COLONOSCOPY;  Surgeon: Reese Chen MD;  Location: [facility];  Service: Endoscopy;  Laterality: N/A;    ESOPHAGOGASTRODUODENOSCOPY N/A 10/22/2024    Procedure: EGD (ESOPHAGOGASTRODUODENOSCOPY);  Surgeon: Dayday Freed MD;  Location: [facility] ENDO;  Service: Endoscopy;  Laterality: N/A;    ESOPHAGOGASTRODUODENOSCOPY N/A 11/11/2024    Procedure: EGD (ESOPHAGOGASTRODUODENOSCOPY);  Surgeon: Reese Chen MD;  Location: [facility];  Service: Endoscopy;  Laterality: N/A;    SHOULDER SURGERY      TUBAL LIGATION         Review of patient's allergies indicates:  No Known Allergies  Family History    None       Tobacco Use    Smoking status: Every Day     Current packs/day: 0.50     Average packs/day: 2.0 packs/day for 58.0 years (114.6 ttl pk-yrs)     Types: Cigarettes     Start date: 1967    Smokeless tobacco: Never    Tobacco comments:     Pt is a 2 pk/day cigarette smoker x 57 yrs. She states that she cut down to about 10 cig/day, and is trying to quit. Will obtain order for nicotine patch. Ambulatory referral to Smoking Cessation clinic following hospital discharge.    Substance and Sexual Activity    Alcohol use: Yes     Comment: socailly    Drug use: Yes     Types: Methamphetamines, Marijuana     Comment: denies cocaine or meth. Reports maijurana use    Sexual activity: Yes     Review of Systems   Constitutional:  Negative for activity change and appetite change.   HENT:  Negative for congestion.    Eyes:  Negative for discharge and itching.   Respiratory:  Negative for apnea and chest tightness.    Cardiovascular:  Negative for chest pain  and leg swelling.   Gastrointestinal:  Positive for abdominal pain and blood in stool. Negative for abdominal distention, constipation, diarrhea, nausea, rectal pain and vomiting.   Endocrine: Negative for cold intolerance.   Genitourinary:  Negative for difficulty urinating and dyspareunia.   Musculoskeletal:  Negative for arthralgias and back pain.   Skin:  Negative for color change.   Allergic/Immunologic: Negative for environmental allergies.   Neurological:  Negative for dizziness.   Hematological:  Negative for adenopathy.   Psychiatric/Behavioral:  Negative for agitation.      Objective:     Vital Signs (Most Recent):  Temp: 97.8 °F (36.6 °C) (12/30/24 0807)  Pulse: 101 (12/30/24 0807)  Resp: 18 (12/30/24 0947)  BP: 122/80 (12/30/24 0807)  SpO2: (!) 94 % (12/30/24 0807) Vital Signs (24h Range):  Temp:  [97.8 °F (36.6 °C)-98.7 °F (37.1 °C)] 97.8 °F (36.6 °C)  Pulse:  [] 101  Resp:  [16-33] 18  SpO2:  [90 %-100 %] 94 %  BP: (117-163)/(70-99) 122/80     Weight: 72.6 kg (160 lb 0.9 oz) (12/29/24 2250)  Body mass index is 22.97 kg/m².    No intake or output data in the 24 hours ending 12/30/24 1037    Lines/Drains/Airways       Peripheral Intravenous Line  Duration                  Peripheral IV - Single Lumen 12/29/24 18 G Anterior;Left Forearm 1 day         Peripheral IV - Single Lumen 12/29/24 1646 18 G Right Antecubital <1 day                     Physical Exam  Vitals reviewed.   Constitutional:       Appearance: She is normal weight.   HENT:      Head: Normocephalic.      Nose: Nose normal.      Mouth/Throat:      Mouth: Mucous membranes are moist.   Eyes:      Pupils: Pupils are equal, round, and reactive to light.   Cardiovascular:      Rate and Rhythm: Normal rate and regular rhythm.      Pulses: Normal pulses.      Heart sounds: Normal heart sounds.   Pulmonary:      Effort: Pulmonary effort is normal.      Breath sounds: Normal breath sounds.   Abdominal:      General: Abdomen is flat. Bowel sounds  are normal. There is no distension.      Palpations: Abdomen is soft. There is no mass.      Tenderness: There is no abdominal tenderness.      Hernia: No hernia is present.   Musculoskeletal:         General: Normal range of motion.      Cervical back: Normal range of motion and neck supple.   Skin:     General: Skin is warm.      Capillary Refill: Capillary refill takes less than 2 seconds.   Neurological:      General: No focal deficit present.      Mental Status: She is alert and oriented to person, place, and time.   Psychiatric:         Mood and Affect: Mood normal.          Significant Labs:  CMP:   Recent Labs   Lab 12/30/24  0336   *   CALCIUM 8.4*   ALBUMIN 2.8*   PROT 7.6      K 3.4*   CO2 19*      BUN 22*   CREATININE 1.8*   ALKPHOS 108   *   *   BILITOT 1.0       Hemoglobin 7.2- 7.4- today 7.5.      Significant Imaging:  Imaging results within the past 24 hours have been reviewed.

## 2024-12-30 NOTE — PROGRESS NOTES
Smoking cessation education note: Pt admitted post NSTEMI. Pt is a 2 p/day cigarette smoker x 57 yrs. She states that she has cut down to 1 pk/day, and is reporting nicotine withdrawal symptoms. Order requested for 21 mg nicotine patch Q day. Pt states ready to quit smoking. She states that she is unable to afford nicotine patches and is interested in trying Chantix. Ambulatory referral to Smoking Cessation clinic following hospital discharge.

## 2024-12-31 ENCOUNTER — CLINICAL SUPPORT (OUTPATIENT)
Dept: SMOKING CESSATION | Facility: CLINIC | Age: 59
End: 2024-12-31
Payer: MEDICARE

## 2024-12-31 DIAGNOSIS — F17.210 CIGARETTE SMOKER: Primary | ICD-10-CM

## 2024-12-31 PROBLEM — K72.00 SHOCK LIVER: Status: ACTIVE | Noted: 2024-12-31

## 2024-12-31 LAB
ALBUMIN SERPL BCP-MCNC: 2.7 G/DL (ref 3.5–5.2)
ALBUMIN SERPL BCP-MCNC: 2.8 G/DL (ref 3.5–5.2)
ALBUMIN SERPL ELPH-MCNC: 3.07 G/DL (ref 3.35–5.55)
ALP SERPL-CCNC: 130 U/L (ref 40–150)
ALP SERPL-CCNC: 139 U/L (ref 40–150)
ALPHA1 GLOB SERPL ELPH-MCNC: 0.38 G/DL (ref 0.17–0.41)
ALPHA2 GLOB SERPL ELPH-MCNC: 0.65 G/DL (ref 0.43–0.99)
ALT SERPL W/O P-5'-P-CCNC: 707 U/L (ref 10–44)
ALT SERPL W/O P-5'-P-CCNC: 754 U/L (ref 10–44)
AMMONIA PLAS-SCNC: 67 UMOL/L (ref 10–50)
ANION GAP SERPL CALC-SCNC: 13 MMOL/L (ref 8–16)
ANION GAP SERPL CALC-SCNC: 14 MMOL/L (ref 8–16)
APAP SERPL-MCNC: <3 UG/ML (ref 10–20)
APICAL FOUR CHAMBER EJECTION FRACTION: 30 %
APICAL TWO CHAMBER EJECTION FRACTION: 26 %
AST SERPL-CCNC: 1129 U/L (ref 10–40)
AST SERPL-CCNC: 755 U/L (ref 10–40)
AV INDEX (PROSTH): 0.8
AV MEAN GRADIENT: 4.5 MMHG
AV PEAK GRADIENT: 6.8 MMHG
AV REGURGITATION PRESSURE HALF TIME: 1532.51 MS
AV VALVE AREA BY VELOCITY RATIO: 3.5 CM²
AV VALVE AREA: 3.3 CM²
AV VELOCITY RATIO: 0.85
B-GLOBULIN SERPL ELPH-MCNC: 2.82 G/DL (ref 0.5–1.1)
BILIRUB SERPL-MCNC: 0.7 MG/DL (ref 0.1–1)
BILIRUB SERPL-MCNC: 0.8 MG/DL (ref 0.1–1)
BSA FOR ECHO PROCEDURE: 1.89 M2
BUN SERPL-MCNC: 26 MG/DL (ref 6–20)
BUN SERPL-MCNC: 27 MG/DL (ref 6–20)
CALCIUM SERPL-MCNC: 8.1 MG/DL (ref 8.7–10.5)
CALCIUM SERPL-MCNC: 8.5 MG/DL (ref 8.7–10.5)
CHLORIDE SERPL-SCNC: 95 MMOL/L (ref 95–110)
CHLORIDE SERPL-SCNC: 95 MMOL/L (ref 95–110)
CK SERPL-CCNC: 194 U/L (ref 20–180)
CO2 SERPL-SCNC: 23 MMOL/L (ref 23–29)
CO2 SERPL-SCNC: 24 MMOL/L (ref 23–29)
CREAT SERPL-MCNC: 1.9 MG/DL (ref 0.5–1.4)
CREAT SERPL-MCNC: 2 MG/DL (ref 0.5–1.4)
CV ECHO LV RWT: 0.35 CM
DOP CALC AO PEAK VEL: 1.3 M/S
DOP CALC AO VTI: 21.9 CM
DOP CALC LVOT AREA: 4.2 CM2
DOP CALC LVOT DIAMETER: 2.3 CM
DOP CALC LVOT PEAK VEL: 1.1 M/S
DOP CALC LVOT STROKE VOLUME: 72.7 CM3
DOP CALCLVOT PEAK VEL VTI: 17.5 CM
E WAVE DECELERATION TIME: 117.79 MSEC
E/A RATIO: 1.47
E/E' RATIO: 17.17 M/S
ECHO LV POSTERIOR WALL: 1 CM (ref 0.6–1.1)
ERYTHROCYTE [DISTWIDTH] IN BLOOD BY AUTOMATED COUNT: 14.6 % (ref 11.5–14.5)
EST. GFR  (NO RACE VARIABLE): 28 ML/MIN/1.73 M^2
EST. GFR  (NO RACE VARIABLE): 30 ML/MIN/1.73 M^2
FACT X ACT/NOR PPP: 53 % (ref 75–196)
FRACTIONAL SHORTENING: 17.5 % (ref 28–44)
GAMMA GLOB SERPL ELPH-MCNC: 0.39 G/DL (ref 0.67–1.58)
GLUCOSE SERPL-MCNC: 104 MG/DL (ref 70–110)
GLUCOSE SERPL-MCNC: 131 MG/DL (ref 70–110)
HAV IGM SERPL QL IA: ABNORMAL
HBV CORE IGM SERPL QL IA: ABNORMAL
HBV SURFACE AG SERPL QL IA: ABNORMAL
HCT VFR BLD AUTO: 26.4 % (ref 37–48.5)
HCV AB SERPL QL IA: REACTIVE
HGB BLD-MCNC: 8.4 G/DL (ref 12–16)
INR PPP: 1.5 (ref 0.8–1.2)
INTERPRETATION SERPL IFE-IMP: NORMAL
INTERVENTRICULAR SEPTUM: 0.9 CM (ref 0.6–1.1)
IVRT: 93.24 MSEC
KAPPA LC SER QL IA: 108.4 MG/DL (ref 0.33–1.94)
KAPPA LC/LAMBDA SER IA: 35.08 (ref 0.26–1.65)
LACTATE SERPL-SCNC: 1.3 MMOL/L (ref 0.5–2.2)
LAMBDA LC SER QL IA: 3.09 MG/DL (ref 0.57–2.63)
LEFT ATRIUM AREA SYSTOLIC (APICAL 2 CHAMBER): 30.82 CM2
LEFT ATRIUM AREA SYSTOLIC (APICAL 4 CHAMBER): 29.43 CM2
LEFT ATRIUM VOLUME INDEX MOD: 58.5 ML/M2
LEFT ATRIUM VOLUME MOD: 111.23 ML
LEFT INTERNAL DIMENSION IN SYSTOLE: 4.7 CM (ref 2.1–4)
LEFT VENTRICLE DIASTOLIC VOLUME INDEX: 82.74 ML/M2
LEFT VENTRICLE DIASTOLIC VOLUME: 157.2 ML
LEFT VENTRICLE END DIASTOLIC VOLUME APICAL 2 CHAMBER: 141.3 ML
LEFT VENTRICLE END DIASTOLIC VOLUME APICAL 4 CHAMBER INDEX BSA: 80 ML/M2
LEFT VENTRICLE END DIASTOLIC VOLUME APICAL 4 CHAMBER: 152.27 ML
LEFT VENTRICLE END SYSTOLIC VOLUME APICAL 2 CHAMBER: 108.38 ML
LEFT VENTRICLE END SYSTOLIC VOLUME APICAL 4 CHAMBER: 104.66 ML
LEFT VENTRICLE MASS INDEX: 111.4 G/M2
LEFT VENTRICLE SYSTOLIC VOLUME INDEX: 54.6 ML/M2
LEFT VENTRICLE SYSTOLIC VOLUME: 103.72 ML
LEFT VENTRICULAR INTERNAL DIMENSION IN DIASTOLE: 5.7 CM (ref 3.5–6)
LEFT VENTRICULAR MASS: 211.7 G
LV LATERAL E/E' RATIO: 12.88 M/S
LV SEPTAL E/E' RATIO: 25.75 M/S
LVED V (TEICH): 157.2 ML
LVES V (TEICH): 103.72 ML
LVOT MG: 2.75 MMHG
LVOT MV: 0.78 CM/S
MAGNESIUM SERPL-MCNC: 1.4 MG/DL (ref 1.6–2.6)
MAGNESIUM SERPL-MCNC: 1.7 MG/DL (ref 1.6–2.6)
MCH RBC QN AUTO: 24.6 PG (ref 27–31)
MCHC RBC AUTO-ENTMCNC: 31.8 G/DL (ref 32–36)
MCV RBC AUTO: 77 FL (ref 82–98)
MV PEAK A VEL: 0.7 M/S
MV PEAK E VEL: 1.03 M/S
MV STENOSIS PRESSURE HALF TIME: 34.16 MS
MV VALVE AREA P 1/2 METHOD: 6.44 CM2
OHS CV RV/LV RATIO: 0.67 CM
OHS LV EJECTION FRACTION SIMPSONS BIPLANE MOD: 28 %
PISA AR MAX VEL: 3.44 M/S
PISA TR MAX VEL: 3.3 M/S
PLATELET # BLD AUTO: 337 K/UL (ref 150–450)
PMV BLD AUTO: 10.5 FL (ref 9.2–12.9)
POTASSIUM SERPL-SCNC: 2.8 MMOL/L (ref 3.5–5.1)
POTASSIUM SERPL-SCNC: 3.6 MMOL/L (ref 3.5–5.1)
PROT 24H UR-MRATE: 392 MG/SPEC (ref 0–100)
PROT SERPL-MCNC: 7.3 G/DL (ref 6–8.4)
PROT SERPL-MCNC: 7.7 G/DL (ref 6–8.4)
PROT SERPL-MCNC: 8 G/DL (ref 6–8.4)
PROT UR-MCNC: 14 MG/DL (ref 0–15)
PROTHROMBIN TIME: 15.6 SEC (ref 9–12.5)
RA PRESSURE ESTIMATED: 3 MMHG
RBC # BLD AUTO: 3.42 M/UL (ref 4–5.4)
RIGHT VENTRICLE DIASTOLIC BASEL DIMENSION: 3.8 CM
RV TB RVSP: 6 MMHG
RV TISSUE DOPPLER FREE WALL SYSTOLIC VELOCITY 1 (APICAL 4 CHAMBER VIEW): 12.04 CM/S
SINUS: 3.72 CM
SODIUM SERPL-SCNC: 131 MMOL/L (ref 136–145)
SODIUM SERPL-SCNC: 133 MMOL/L (ref 136–145)
STJ: 2.84 CM
TDI LATERAL: 0.08 M/S
TDI SEPTAL: 0.04 M/S
TDI: 0.06 M/S
TR MAX PG: 44 MMHG
TRICUSPID ANNULAR PLANE SYSTOLIC EXCURSION: 1.49 CM
TV REST PULMONARY ARTERY PRESSURE: 47 MMHG
URINE COLLECTION DURATION: 24 HR
URINE VOLUME: 2800 ML
WBC # BLD AUTO: 11.89 K/UL (ref 3.9–12.7)
Z-SCORE OF LEFT VENTRICULAR DIMENSION IN END DIASTOLE: 0.67
Z-SCORE OF LEFT VENTRICULAR DIMENSION IN END SYSTOLE: 2.83

## 2024-12-31 PROCEDURE — 82140 ASSAY OF AMMONIA: CPT

## 2024-12-31 PROCEDURE — 63600175 PHARM REV CODE 636 W HCPCS: Performed by: FAMILY MEDICINE

## 2024-12-31 PROCEDURE — 25000003 PHARM REV CODE 250: Performed by: FAMILY MEDICINE

## 2024-12-31 PROCEDURE — 36415 COLL VENOUS BLD VENIPUNCTURE: CPT | Mod: XB | Performed by: STUDENT IN AN ORGANIZED HEALTH CARE EDUCATION/TRAINING PROGRAM

## 2024-12-31 PROCEDURE — 25000003 PHARM REV CODE 250: Performed by: STUDENT IN AN ORGANIZED HEALTH CARE EDUCATION/TRAINING PROGRAM

## 2024-12-31 PROCEDURE — 63600175 PHARM REV CODE 636 W HCPCS: Performed by: NURSE PRACTITIONER

## 2024-12-31 PROCEDURE — 11000001 HC ACUTE MED/SURG PRIVATE ROOM

## 2024-12-31 PROCEDURE — 87522 HEPATITIS C REVRS TRNSCRPJ: CPT | Performed by: STUDENT IN AN ORGANIZED HEALTH CARE EDUCATION/TRAINING PROGRAM

## 2024-12-31 PROCEDURE — 82550 ASSAY OF CK (CPK): CPT | Performed by: STUDENT IN AN ORGANIZED HEALTH CARE EDUCATION/TRAINING PROGRAM

## 2024-12-31 PROCEDURE — 83605 ASSAY OF LACTIC ACID: CPT | Performed by: STUDENT IN AN ORGANIZED HEALTH CARE EDUCATION/TRAINING PROGRAM

## 2024-12-31 PROCEDURE — 25000242 PHARM REV CODE 250 ALT 637 W/ HCPCS: Performed by: FAMILY MEDICINE

## 2024-12-31 PROCEDURE — 84156 ASSAY OF PROTEIN URINE: CPT | Performed by: INTERNAL MEDICINE

## 2024-12-31 PROCEDURE — 63600175 PHARM REV CODE 636 W HCPCS

## 2024-12-31 PROCEDURE — 36415 COLL VENOUS BLD VENIPUNCTURE: CPT

## 2024-12-31 PROCEDURE — 85027 COMPLETE CBC AUTOMATED: CPT

## 2024-12-31 PROCEDURE — 80053 COMPREHEN METABOLIC PANEL: CPT

## 2024-12-31 PROCEDURE — 83735 ASSAY OF MAGNESIUM: CPT

## 2024-12-31 PROCEDURE — S4991 NICOTINE PATCH NONLEGEND: HCPCS | Performed by: FAMILY MEDICINE

## 2024-12-31 PROCEDURE — 25000003 PHARM REV CODE 250: Performed by: REGISTERED NURSE

## 2024-12-31 PROCEDURE — 99900035 HC TECH TIME PER 15 MIN (STAT)

## 2024-12-31 PROCEDURE — 80074 ACUTE HEPATITIS PANEL: CPT | Performed by: STUDENT IN AN ORGANIZED HEALTH CARE EDUCATION/TRAINING PROGRAM

## 2024-12-31 PROCEDURE — 25000003 PHARM REV CODE 250

## 2024-12-31 PROCEDURE — 86335 IMMUNFIX E-PHORSIS/URINE/CSF: CPT | Mod: 26,,, | Performed by: PATHOLOGY

## 2024-12-31 PROCEDURE — 25000003 PHARM REV CODE 250: Performed by: NURSE PRACTITIONER

## 2024-12-31 PROCEDURE — 94761 N-INVAS EAR/PLS OXIMETRY MLT: CPT

## 2024-12-31 PROCEDURE — 80143 DRUG ASSAY ACETAMINOPHEN: CPT

## 2024-12-31 PROCEDURE — 25000242 PHARM REV CODE 250 ALT 637 W/ HCPCS

## 2024-12-31 PROCEDURE — 94640 AIRWAY INHALATION TREATMENT: CPT

## 2024-12-31 PROCEDURE — 86335 IMMUNFIX E-PHORSIS/URINE/CSF: CPT | Performed by: INTERNAL MEDICINE

## 2024-12-31 PROCEDURE — 85610 PROTHROMBIN TIME: CPT | Performed by: STUDENT IN AN ORGANIZED HEALTH CARE EDUCATION/TRAINING PROGRAM

## 2024-12-31 RX ORDER — IBUPROFEN 200 MG
16 TABLET ORAL
Status: DISCONTINUED | OUTPATIENT
Start: 2024-12-31 | End: 2025-01-03 | Stop reason: HOSPADM

## 2024-12-31 RX ORDER — ACETYLCYSTEINE 100 MG/ML
70 SOLUTION ORAL; RESPIRATORY (INHALATION) EVERY 4 HOURS
Status: DISCONTINUED | OUTPATIENT
Start: 2024-12-31 | End: 2024-12-31

## 2024-12-31 RX ORDER — GLUCAGON 1 MG
1 KIT INJECTION
Status: DISCONTINUED | OUTPATIENT
Start: 2024-12-31 | End: 2025-01-03 | Stop reason: HOSPADM

## 2024-12-31 RX ORDER — POTASSIUM CHLORIDE 20 MEQ/1
60 TABLET, EXTENDED RELEASE ORAL ONCE
Status: COMPLETED | OUTPATIENT
Start: 2024-12-31 | End: 2024-12-31

## 2024-12-31 RX ORDER — POLYETHYLENE GLYCOL 3350 17 G/17G
17 POWDER, FOR SOLUTION ORAL DAILY
Status: DISCONTINUED | OUTPATIENT
Start: 2024-12-31 | End: 2025-01-01

## 2024-12-31 RX ORDER — FUROSEMIDE 10 MG/ML
40 INJECTION INTRAMUSCULAR; INTRAVENOUS EVERY 12 HOURS
Status: DISCONTINUED | OUTPATIENT
Start: 2024-12-31 | End: 2025-01-02

## 2024-12-31 RX ORDER — ACETYLCYSTEINE 200 MG/ML
140 SOLUTION ORAL; RESPIRATORY (INHALATION) ONCE
Status: DISCONTINUED | OUTPATIENT
Start: 2024-12-31 | End: 2024-12-31

## 2024-12-31 RX ORDER — FUROSEMIDE 10 MG/ML
120 INJECTION INTRAMUSCULAR; INTRAVENOUS EVERY 12 HOURS
Status: DISCONTINUED | OUTPATIENT
Start: 2024-12-31 | End: 2024-12-31

## 2024-12-31 RX ORDER — DEXTROSE MONOHYDRATE 100 MG/ML
INJECTION, SOLUTION INTRAVENOUS CONTINUOUS
Status: DISCONTINUED | OUTPATIENT
Start: 2024-12-31 | End: 2024-12-31

## 2024-12-31 RX ORDER — IBUPROFEN 200 MG
24 TABLET ORAL
Status: DISCONTINUED | OUTPATIENT
Start: 2024-12-31 | End: 2025-01-03 | Stop reason: HOSPADM

## 2024-12-31 RX ORDER — ACETYLCYSTEINE 200 MG/ML
70 SOLUTION ORAL; RESPIRATORY (INHALATION)
Status: DISCONTINUED | OUTPATIENT
Start: 2024-12-31 | End: 2024-12-31

## 2024-12-31 RX ORDER — POTASSIUM CHLORIDE 20 MEQ/1
40 TABLET, EXTENDED RELEASE ORAL ONCE
Status: COMPLETED | OUTPATIENT
Start: 2024-12-31 | End: 2024-12-31

## 2024-12-31 RX ORDER — LACTULOSE 10 G/15ML
20 SOLUTION ORAL 3 TIMES DAILY
Status: DISCONTINUED | OUTPATIENT
Start: 2024-12-31 | End: 2025-01-03 | Stop reason: HOSPADM

## 2024-12-31 RX ORDER — ACETYLCYSTEINE 100 MG/ML
140 SOLUTION ORAL; RESPIRATORY (INHALATION) ONCE
Status: DISCONTINUED | OUTPATIENT
Start: 2024-12-31 | End: 2024-12-31

## 2024-12-31 RX ORDER — MAGNESIUM SULFATE HEPTAHYDRATE 40 MG/ML
2 INJECTION, SOLUTION INTRAVENOUS ONCE
Status: COMPLETED | OUTPATIENT
Start: 2024-12-31 | End: 2024-12-31

## 2024-12-31 RX ORDER — SENNOSIDES 8.6 MG/1
8.6 TABLET ORAL DAILY PRN
Status: DISCONTINUED | OUTPATIENT
Start: 2024-12-31 | End: 2024-12-31

## 2024-12-31 RX ADMIN — MORPHINE SULFATE 2 MG: 2 INJECTION, SOLUTION INTRAMUSCULAR; INTRAVENOUS at 11:12

## 2024-12-31 RX ADMIN — METOPROLOL SUCCINATE 25 MG: 25 TABLET, EXTENDED RELEASE ORAL at 09:12

## 2024-12-31 RX ADMIN — DIPHENHYDRAMINE HCL 10 ML: 12.5 LIQUID ORAL at 02:12

## 2024-12-31 RX ADMIN — LACTULOSE 20 G: 20 SOLUTION ORAL at 04:12

## 2024-12-31 RX ADMIN — FUROSEMIDE 40 MG: 10 INJECTION, SOLUTION INTRAMUSCULAR; INTRAVENOUS at 09:12

## 2024-12-31 RX ADMIN — PANTOPRAZOLE SODIUM 40 MG: 40 TABLET, DELAYED RELEASE ORAL at 09:12

## 2024-12-31 RX ADMIN — DEXTROSE MONOHYDRATE: 100 INJECTION, SOLUTION INTRAVENOUS at 05:12

## 2024-12-31 RX ADMIN — FERROUS SULFATE TAB 325 MG (65 MG ELEMENTAL FE) 1 EACH: 325 (65 FE) TAB at 09:12

## 2024-12-31 RX ADMIN — MAGNESIUM SULFATE HEPTAHYDRATE 2 G: 40 INJECTION, SOLUTION INTRAVENOUS at 03:12

## 2024-12-31 RX ADMIN — LACTULOSE 20 G: 20 SOLUTION ORAL at 08:12

## 2024-12-31 RX ADMIN — POTASSIUM CHLORIDE 60 MEQ: 1500 TABLET, EXTENDED RELEASE ORAL at 03:12

## 2024-12-31 RX ADMIN — OXYBUTYNIN CHLORIDE 5 MG: 5 TABLET ORAL at 08:12

## 2024-12-31 RX ADMIN — POLYETHYLENE GLYCOL 3350 17 G: 17 POWDER, FOR SOLUTION ORAL at 10:12

## 2024-12-31 RX ADMIN — NICOTINE 1 PATCH: 21 PATCH, EXTENDED RELEASE TRANSDERMAL at 09:12

## 2024-12-31 RX ADMIN — MORPHINE SULFATE 2 MG: 2 INJECTION, SOLUTION INTRAMUSCULAR; INTRAVENOUS at 09:12

## 2024-12-31 RX ADMIN — POTASSIUM CHLORIDE 40 MEQ: 1500 TABLET, EXTENDED RELEASE ORAL at 11:12

## 2024-12-31 RX ADMIN — DIPHENHYDRAMINE HCL 10 ML: 12.5 LIQUID ORAL at 08:12

## 2024-12-31 RX ADMIN — MORPHINE SULFATE 2 MG: 2 INJECTION, SOLUTION INTRAMUSCULAR; INTRAVENOUS at 06:12

## 2024-12-31 RX ADMIN — TIOTROPIUM BROMIDE INHALATION SPRAY 2 PUFF: 3.12 SPRAY, METERED RESPIRATORY (INHALATION) at 08:12

## 2024-12-31 RX ADMIN — OXYBUTYNIN CHLORIDE 5 MG: 5 TABLET ORAL at 03:12

## 2024-12-31 RX ADMIN — PANTOPRAZOLE SODIUM 40 MG: 40 TABLET, DELAYED RELEASE ORAL at 08:12

## 2024-12-31 RX ADMIN — OXYBUTYNIN CHLORIDE 5 MG: 5 TABLET ORAL at 09:12

## 2024-12-31 RX ADMIN — ATORVASTATIN CALCIUM 80 MG: 40 TABLET, FILM COATED ORAL at 09:12

## 2024-12-31 RX ADMIN — DIPHENHYDRAMINE HCL 10 ML: 12.5 LIQUID ORAL at 06:12

## 2024-12-31 RX ADMIN — ACETYLCYSTEINE 10160 MG: 200 SOLUTION ORAL; RESPIRATORY (INHALATION) at 04:12

## 2024-12-31 RX ADMIN — DIPHENHYDRAMINE HCL 10 ML: 12.5 LIQUID ORAL at 09:12

## 2024-12-31 RX ADMIN — FUROSEMIDE 40 MG: 10 INJECTION, SOLUTION INTRAMUSCULAR; INTRAVENOUS at 08:12

## 2024-12-31 RX ADMIN — SENNOSIDES 8.6 MG: 8.6 TABLET, FILM COATED ORAL at 09:12

## 2024-12-31 NOTE — SUBJECTIVE & OBJECTIVE
Interval History:  Patient was seen in the morning complaining of fatigue patient had 2+ edema of the lower limbs, patient is alert and oriented times 4 denies any chest pain no nausea vomiting.  Patient has been diuresing well with Lasix liver function came back elevated can be secondary to her acute on chronic decompensated heart failure cardiology on board GI was consulted.    Review of Systems   Constitutional:  Negative for chills, diaphoresis and fever.   Respiratory:  Negative for cough, chest tightness, shortness of breath and wheezing.    Cardiovascular:  Negative for chest pain, palpitations and leg swelling.   Gastrointestinal:  Negative for abdominal pain, blood in stool, diarrhea, nausea and vomiting.   Genitourinary:  Negative for dysuria, flank pain, frequency and hematuria.   Musculoskeletal:  Negative for back pain and myalgias.   Neurological:  Negative for dizziness, syncope, light-headedness and headaches.   Psychiatric/Behavioral:  Negative for confusion.      Objective:     Vital Signs (Most Recent):  Temp: 97.4 °F (36.3 °C) (12/31/24 1115)  Pulse: 88 (12/31/24 1610)  Resp: 18 (12/31/24 1115)  BP: 132/82 (12/31/24 1115)  SpO2: 97 % (12/31/24 1115) Vital Signs (24h Range):  Temp:  [97.4 °F (36.3 °C)-98.2 °F (36.8 °C)] 97.4 °F (36.3 °C)  Pulse:  [79-99] 88  Resp:  [16-20] 18  SpO2:  [94 %-99 %] 97 %  BP: (110-132)/(60-82) 132/82     Weight: 72.6 kg (160 lb)  Body mass index is 22.96 kg/m².  No intake or output data in the 24 hours ending 12/31/24 1704      Physical Exam  Vitals and nursing note reviewed.   Constitutional:       Appearance: She is well-developed.   HENT:      Head: Normocephalic and atraumatic.   Neck:      Vascular: No JVD.   Cardiovascular:      Rate and Rhythm: Normal rate and regular rhythm.      Heart sounds: Normal heart sounds.      Comments: Bilateral LE edema R>L   Pulmonary:      Effort: Pulmonary effort is normal. No respiratory distress.      Breath sounds: No  wheezing.   Abdominal:      General: Bowel sounds are normal. There is no distension.      Palpations: Abdomen is soft.      Tenderness: There is no abdominal tenderness. There is no guarding.   Musculoskeletal:         General: No tenderness. Normal range of motion.      Cervical back: Normal range of motion.      Right lower leg: Edema present.      Left lower leg: Edema present.   Skin:     General: Skin is warm and dry.      Capillary Refill: Capillary refill takes less than 2 seconds.   Neurological:      Mental Status: She is alert and oriented to person, place, and time.   Psychiatric:         Behavior: Behavior normal.             Significant Labs: All pertinent labs within the past 24 hours have been reviewed.  Recent Lab Results  (Last 5 results in the past 24 hours)        12/31/24  1522   12/31/24  1455   12/31/24  1450   12/31/24  0930   12/31/24  0847        Urine Protein, Timed     392           A2C EF       26         A4C EF       30         Acetaminophen Level   <3.0  Comment: Toxic Levels:  Adults (4 hr post-ingestion).........>150 ug/mL  Adults (12 hr post-ingestion)........>40 ug/mL  Peds (2 hr post-ingestion, liquid)...>225 ug/mL               Albumin         2.8       ALP         130       ALT         754       Ammonia   67             Anion Gap         13       Ao peak sukhdeep       1.3         Ao VTI       21.9         AR Max Sukhdeep       3.44         AST         1,129       AV valve area       3.3         BHARAT by Velocity Ratio       3.5         AV mean gradient       4.5         AV index (prosthetic)       0.80         AV peak gradient       6.8         AV regurgitation pressure 1/2 time       1,[id number]         AV Velocity Ratio       0.85         BILIRUBIN TOTAL         0.8  Comment: For infants and newborns, interpretation of results should be based  on gestational age, weight and in agreement with clinical  observations.    Premature Infant recommended reference ranges:  Up to 24  hours.............<8.0 mg/dL  Up to 48 hours............<12.0 mg/dL  3-5 days..................<15.0 mg/dL  6-29 days.................<15.0 mg/dL         BSA       1.89         BUN         27       Calcium         8.1       Chloride         95       CO2         23       CPK   194             Creatinine         1.9       Left Ventricle Relative Wall Thickness       0.35         E/A ratio       1.47         E/E' ratio       17.17         eGFR         30       E wave deceleration time       117.79         FS       17.5         Glucose         104       Hematocrit         26.4       Hemoglobin         8.4       IVRT       93.24         IVSd       0.9         LA area A2C       30.82         LA area A4C       29.43         Lactic Acid Level 1.3  Comment: Falsely low lactic acid results can be found in samples   containing >=13.0 mg/dL total bilirubin and/or >=3.5 mg/dL   direct bilirubin.                 LA Vol       111.23         ANTOINETTE (MOD)       58.5         LVOT area       4.2         LV EDV index A4C       80.0         LV LATERAL E/E' RATIO       12.88         LV SEPTAL E/E' RATIO       25.75         LV EDV BP       157.20         LV Diastolic Volume Index       82.74         Left Ventricular End Diastolic Volume by Teichholz Method       157.20         LV EDV A2C       [id number]         LV EDV A4C       152.27         Left Ventricular End Systolic Volume by Teichholz Method       103.72         LV ESV A2C       108.38         LV ESV A4C       104.66         LVIDd       5.7         LVIDs       4.7         LV mass       211.7         LV Mass Index       111.4         Left Ventricular Outflow Tract Mean Gradient       2.75         Left Ventricular Outflow Tract Mean Velocity       0.78         LVOT diameter       2.3         LVOT peak jesusita       1.1         LVOT stroke volume       72.7         LVOT peak VTI       17.5         LV ESV BP       103.72         LV Systolic Volume Index       54.6         Magnesium           1.4       MCH         24.6       MCHC         31.8       MCV         77       Mean e'       0.06         MPV         10.5       MV valve area p 1/2 method       6.44         MV Peak A Sukhdeep       0.70         MV Peak E Sukhdeep       1.03         MV stenosis pressure 1/2 time       34.16         Jenkins's Biplane MOD Ejection Fraction       28         Platelet Count         337       Potassium         2.8       PROTEIN TOTAL         7.7       PROTEIN URINE     14           PW       1.0         Est. RA pres       3         RBC         3.42       RDW         14.6       RV S'       12.04         RV TB RVSP       6         RV/LV Ratio       0.67         RV- diaz basal diam       3.8         Sinus       3.72         Sodium         131       STJ       2.84         TAPSE       1.49         TDI SEPTAL       0.04         TDI LATERAL       0.08         Triscuspid Valve Regurgitation Peak Gradient       44         TR Max Sukhdeep       3.30         TV resting pulmonary artery pressure       47         Urine Collection Duration     24           Urine Total Volume     2800           WBC         11.89       ZLVIDD       0.67         ZLVIDS       2.83                                Significant Imaging: I have reviewed all pertinent imaging results/findings within the past 24 hours.    Imaging Results              US Lower Extremity Veins Bilateral (Final result)  Result time 12/29/24 20:21:11      Final result by Nick Clifton DO (12/29/24 20:21:11)                   Impression:      No evidence of deep venous thrombosis in either lower extremity.      Electronically signed by: Nick Clifton  Date:    12/29/2024  Time:    20:21               Narrative:    EXAMINATION:  US LOWER EXTREMITY VEINS BILATERAL    CLINICAL HISTORY:  lower extremity edema  R> L r/o DVT;    TECHNIQUE:  Duplex and color flow Doppler and dynamic compression was performed of the bilateral lower extremity veins was  performed.    COMPARISON:  None    FINDINGS:  Right thigh veins: The common femoral, femoral, popliteal, upper greater saphenous, and deep femoral veins are patent and free of thrombus. The veins are normally compressible and have normal phasic flow and augmentation response.    Right calf veins: The visualized calf veins are patent.    Left thigh veins: The common femoral, femoral, popliteal, upper greater saphenous, and deep femoral veins are patent and free of thrombus. The veins are normally compressible and have normal phasic flow and augmentation response.    Left calf veins: The visualized calf veins are patent.    Miscellaneous: None                                       X-Ray Chest AP Portable (Final result)  Result time 12/29/24 17:33:24      Final result by Nick Clifton DO (12/29/24 17:33:24)                   Impression:      No acute abnormality.      Electronically signed by: Nick Clifton  Date:    12/29/2024  Time:    17:33               Narrative:    EXAMINATION:  XR CHEST AP PORTABLE    CLINICAL HISTORY:  Chest Pain;    TECHNIQUE:  Single frontal view of the chest was performed.    COMPARISON:  11/07/2024.    FINDINGS:  The lungs are well expanded and clear. No focal opacities are seen. The pleural spaces are clear. The cardiac silhouette is enlarged.  The visualized osseous structures demonstrate degenerative changes.                                      Echo    Result Date: 12/31/2024    Left Ventricle: The left ventricle is mildly dilated. Mildly increased   wall thickness. Severe global hypokinesis present. There is severely   reduced systolic function. Quantitated ejection fraction is 28%. Grade II   diastolic dysfunction.    Right Ventricle: Normal right ventricular cavity size. Right ventricle   wall motion has global hypokinesis. Systolic function is mildly reduced.    Left Atrium: Left atrium is severely dilated.    Right Atrium: Right atrium is dilated.    Tricuspid Valve: There is  moderate regurgitation.    Pulmonary Artery: There is mild pulmonary hypertension. The estimated   pulmonary artery systolic pressure is 47 mmHg.

## 2024-12-31 NOTE — ASSESSMENT & PLAN NOTE
GI was consulted no need for intervention at the moment per their note continue to hold anticoagulants, hemoglobin is stable

## 2024-12-31 NOTE — PLAN OF CARE
MOON Message     Medicare Outpatient and Observation Notification regarding financial responsibility Explained to patient/caregiver; Signed/date by patient/caregiver   Date HSIEH was signed 12/30/2024   Time HSIEH was signed [id number]

## 2024-12-31 NOTE — PLAN OF CARE
Medicare Message     Important Message from Medicare regarding Discharge Appeal Rights -- Explained to patient/caregiver; Signed/date by patient/caregiver   Date IMM was signed -- 12/31/2024   Time IMM was signed -- [id number]

## 2024-12-31 NOTE — PROGRESS NOTES
Pulmonary/Critical Care Consult Note:    Ms. Saini is a 58 yo F with PMHx of tobacco use disorder (>50 PY hx), drug use disorder (intermittent methamphetamine use), bipolar disorder, untreated hepatitis C, GI amyloidosis (biopsies performed in 11/2024), hx of duodenal ulcer on BID PPI, aflutter (not on AC due to hx of GI bleeds), HTN, HFrEF (LVEF 30%, RV hypokinesis, diastolic dysfunction) complicated by pulmonary hypertension who presented to [facility] on 12/29 with chest pain, palpitations, and rectal bleeding. In the ED, patient was in SVT with HR up to 178 s/p diltiazem. Initial labs notable for Cr 1.8, , , and BNP 2556. Patient has been diuresed with lasix 40 mg IV BID during this admission with good UOP per patient.    PCCM was consulted on 12/31 due to significant change in lab work. AST is now elevated to 1129 and ALT is elevated to 754. No elevation in bilirubin or alkaline phosphatase.     Patient does not exhibit any obvious signs of shock. Vitals remain stable. Patient is afebrile, HR between 75-90, and MAPs have remained >85. Lactic acid is 1.3. Upon speaking with patient, she does not have any new complaints and states that she is feeling better than she did at admission. She has not had alcohol in many years. Her pitting edema is improved compared to admission and she states that she is urinating well with lasix 40 mg IV BID dosing. Tylenol level (-). INR is up to 1.5. .    Recommendations:  In regard to the elevated AST and ALT:  Could this be a delayed response to poor perfusion in the setting of SVT at admission? No current signs of shock noted - patient is mentating well, walking back and forth to the bathroom without symptoms, and lactic acid was normal. Additionally, Cr hasn't significantly increased.  Agree with acquiring liver U/S for better characterization. Would hold off on NAC administration at this time.   Patient has untreated hep C with a quant of 800,000 in  August. Repeating HCV quant PCR today. No signs of acute encephalopathy. Getting GI on board to see whether patient's worsening transaminitis could be related to HCV infection.  Continue lasix 40 mg IV BID. Please schedule KCl replacement BID with diuresis.  Stop D10 drip - recommend glucose checks AC/HS  Hold off on ICU transfer at this time - patient does not have any acute critical care needs currently. Can re-evaluate if clinical status changes.    60 minutes of critical care time was spent  in the critical care management of the patient. This included management of organ failures related to critical illness, titration of continuous infusions, management of mechanical ventilation, review of pertinent labs and imaging studies, discussion of the patient with consulting services, and discussion of the patients condition with the patient and family.     Monet Blue MD  [facility] PCCM Attending  [phone]

## 2024-12-31 NOTE — PROGRESS NOTES
[facility] Medicine  Progress Note    Patient Name: Mary Ellen Saini  MRN: [id number]  Patient Class: IP- Inpatient   Admission Date: 12/29/2024  Length of Stay: 1 days  Attending Physician: Toshia Rees MD  Primary Care Provider: Marlen, Primary Doctor        Subjective     Principal Problem:NSTEMI (non-ST elevated myocardial infarction)        HPI:  58 yo female with PMH of HCV, HFrEF (30-35%) supposed to have a LifeVest, CAD, AFib, COPD, colorectal cancer and medication nonadherence presented to ED with complaint of chest pain. Pt reports substernal chest pain described as sharp, pressure associated with SOB. She has had similar symptoms in the past. Symptoms reportedly started after patient informed her son had been arrested. She denies associated n/v, cough/congestion, dizziness, diaphoresis and syncope. Pt denies active chest pain on my exam however she notes lower abdominal pain rated 10/10. Also reports bright red blood per rectum over the last 2 days. This is a recurring issue. She is followed by GI, last EGD/colonoscopy 11/2024, biopsy concerning for amyloidosis.       ED evaluation: Patient tachycardic on arrival. Initial EKG shows sinus tach rate 112, subsequent EKG remarkable for SVT rate 180s resolved with 20 mg diltiazem. Hbg/Hct 22/7 (baseline Hbg 8-9), K 3.4, BUN/Cr 23/1.8, BNP 2556, troponin 0.208, d-dimer 1.38. CXR negative for acute findings. Venous doppler LE negative for DVT. Patient received 60 mg furosemide, KCL and protonix. GI consulted. Patient admitted to hospital medicine for further evaluation and care.       Overview/Hospital Course:  No notes on file    Interval History:  Patient was seen in the morning complaining of fatigue patient had 2+ edema of the lower limbs, patient is alert and oriented times 4 denies any chest pain no nausea vomiting.  Patient has been diuresing well with Lasix liver function came back elevated can be secondary to her acute on chronic  decompensated heart failure cardiology on board GI was consulted.    Review of Systems   Constitutional:  Negative for chills, diaphoresis and fever.   Respiratory:  Negative for cough, chest tightness, shortness of breath and wheezing.    Cardiovascular:  Negative for chest pain, palpitations and leg swelling.   Gastrointestinal:  Negative for abdominal pain, blood in stool, diarrhea, nausea and vomiting.   Genitourinary:  Negative for dysuria, flank pain, frequency and hematuria.   Musculoskeletal:  Negative for back pain and myalgias.   Neurological:  Negative for dizziness, syncope, light-headedness and headaches.   Psychiatric/Behavioral:  Negative for confusion.      Objective:     Vital Signs (Most Recent):  Temp: 97.4 °F (36.3 °C) (12/31/24 1115)  Pulse: 88 (12/31/24 1610)  Resp: 18 (12/31/24 1115)  BP: 132/82 (12/31/24 1115)  SpO2: 97 % (12/31/24 1115) Vital Signs (24h Range):  Temp:  [97.4 °F (36.3 °C)-98.2 °F (36.8 °C)] 97.4 °F (36.3 °C)  Pulse:  [79-99] 88  Resp:  [16-20] 18  SpO2:  [94 %-99 %] 97 %  BP: (110-132)/(60-82) 132/82     Weight: 72.6 kg (160 lb)  Body mass index is 22.96 kg/m².  No intake or output data in the 24 hours ending 12/31/24 1704      Physical Exam  Vitals and nursing note reviewed.   Constitutional:       Appearance: She is well-developed.   HENT:      Head: Normocephalic and atraumatic.   Neck:      Vascular: No JVD.   Cardiovascular:      Rate and Rhythm: Normal rate and regular rhythm.      Heart sounds: Normal heart sounds.      Comments: Bilateral LE edema R>L   Pulmonary:      Effort: Pulmonary effort is normal. No respiratory distress.      Breath sounds: No wheezing.   Abdominal:      General: Bowel sounds are normal. There is no distension.      Palpations: Abdomen is soft.      Tenderness: There is no abdominal tenderness. There is no guarding.   Musculoskeletal:         General: No tenderness. Normal range of motion.      Cervical back: Normal range of motion.      Right  lower leg: Edema present.      Left lower leg: Edema present.   Skin:     General: Skin is warm and dry.      Capillary Refill: Capillary refill takes less than 2 seconds.   Neurological:      Mental Status: She is alert and oriented to person, place, and time.   Psychiatric:         Behavior: Behavior normal.             Significant Labs: All pertinent labs within the past 24 hours have been reviewed.  Recent Lab Results  (Last 5 results in the past 24 hours)        12/31/24  1522   12/31/24  1455   12/31/24  1450   12/31/24  0930   12/31/24  0847        Urine Protein, Timed     392           A2C EF       26         A4C EF       30         Acetaminophen Level   <3.0  Comment: Toxic Levels:  Adults (4 hr post-ingestion).........>150 ug/mL  Adults (12 hr post-ingestion)........>40 ug/mL  Peds (2 hr post-ingestion, liquid)...>225 ug/mL               Albumin         2.8       ALP         130       ALT         754       Ammonia   67             Anion Gap         13       Ao peak sukhdeep       1.3         Ao VTI       21.9         AR Max Sukhdeep       3.44         AST         1,129       AV valve area       3.3         BHARAT by Velocity Ratio       3.5         AV mean gradient       4.5         AV index (prosthetic)       0.80         AV peak gradient       6.8         AV regurgitation pressure 1/2 time       1,[id number]         AV Velocity Ratio       0.85         BILIRUBIN TOTAL         0.8  Comment: For infants and newborns, interpretation of results should be based  on gestational age, weight and in agreement with clinical  observations.    Premature Infant recommended reference ranges:  Up to 24 hours.............<8.0 mg/dL  Up to 48 hours............<12.0 mg/dL  3-5 days..................<15.0 mg/dL  6-29 days.................<15.0 mg/dL         BSA       1.89         BUN         27       Calcium         8.1       Chloride         95       CO2         23       CPK   194             Creatinine         1.9       Left  Ventricle Relative Wall Thickness       0.35         E/A ratio       1.47         E/E' ratio       17.17         eGFR         30       E wave deceleration time       117.79         FS       17.5         Glucose         104       Hematocrit         26.4       Hemoglobin         8.4       IVRT       93.24         IVSd       0.9         LA area A2C       30.82         LA area A4C       29.43         Lactic Acid Level 1.3  Comment: Falsely low lactic acid results can be found in samples   containing >=13.0 mg/dL total bilirubin and/or >=3.5 mg/dL   direct bilirubin.                 LA Vol       111.23         ANTOINETTE (MOD)       58.5         LVOT area       4.2         LV EDV index A4C       80.0         LV LATERAL E/E' RATIO       12.88         LV SEPTAL E/E' RATIO       25.75         LV EDV BP       157.20         LV Diastolic Volume Index       82.74         Left Ventricular End Diastolic Volume by Teichholz Method       157.20         LV EDV A2C       [id number]         LV EDV A4C       152.27         Left Ventricular End Systolic Volume by Teichholz Method       103.72         LV ESV A2C       108.38         LV ESV A4C       104.66         LVIDd       5.7         LVIDs       4.7         LV mass       211.7         LV Mass Index       111.4         Left Ventricular Outflow Tract Mean Gradient       2.75         Left Ventricular Outflow Tract Mean Velocity       0.78         LVOT diameter       2.3         LVOT peak jesusita       1.1         LVOT stroke volume       72.7         LVOT peak VTI       17.5         LV ESV BP       103.72         LV Systolic Volume Index       54.6         Magnesium          1.4       MCH         24.6       MCHC         31.8       MCV         77       Mean e'       0.06         MPV         10.5       MV valve area p 1/2 method       6.44         MV Peak A Jesusita       0.70         MV Peak E Jesusita       1.03         MV stenosis pressure 1/2 time       34.16         Jenkins's Biplane MOD  Ejection Fraction       28         Platelet Count         337       Potassium         2.8       PROTEIN TOTAL         7.7       PROTEIN URINE     14           PW       1.0         Est. RA pres       3         RBC         3.42       RDW         14.6       RV S'       12.04         RV TB RVSP       6         RV/LV Ratio       0.67         RV- diaz basal diam       3.8         Sinus       3.72         Sodium         131       STJ       2.84         TAPSE       1.49         TDI SEPTAL       0.04         TDI LATERAL       0.08         Triscuspid Valve Regurgitation Peak Gradient       44         TR Max Sukhdeep       3.30         TV resting pulmonary artery pressure       47         Urine Collection Duration     24           Urine Total Volume     2800           WBC         11.89       ZLVIDD       0.67         ZLVIDS       2.83                                Significant Imaging: I have reviewed all pertinent imaging results/findings within the past 24 hours.    Imaging Results              US Lower Extremity Veins Bilateral (Final result)  Result time 12/29/24 20:21:11      Final result by Nick Clifton DO (12/29/24 20:21:11)                   Impression:      No evidence of deep venous thrombosis in either lower extremity.      Electronically signed by: Nick Clifton  Date:    12/29/2024  Time:    20:21               Narrative:    EXAMINATION:  US LOWER EXTREMITY VEINS BILATERAL    CLINICAL HISTORY:  lower extremity edema  R> L r/o DVT;    TECHNIQUE:  Duplex and color flow Doppler and dynamic compression was performed of the bilateral lower extremity veins was performed.    COMPARISON:  None    FINDINGS:  Right thigh veins: The common femoral, femoral, popliteal, upper greater saphenous, and deep femoral veins are patent and free of thrombus. The veins are normally compressible and have normal phasic flow and augmentation response.    Right calf veins: The visualized calf veins are patent.    Left thigh veins: The  common femoral, femoral, popliteal, upper greater saphenous, and deep femoral veins are patent and free of thrombus. The veins are normally compressible and have normal phasic flow and augmentation response.    Left calf veins: The visualized calf veins are patent.    Miscellaneous: None                                       X-Ray Chest AP Portable (Final result)  Result time 12/29/24 17:33:24      Final result by Nick Clifton DO (12/29/24 17:33:24)                   Impression:      No acute abnormality.      Electronically signed by: Nick Clifton  Date:    12/29/2024  Time:    17:33               Narrative:    EXAMINATION:  XR CHEST AP PORTABLE    CLINICAL HISTORY:  Chest Pain;    TECHNIQUE:  Single frontal view of the chest was performed.    COMPARISON:  11/07/2024.    FINDINGS:  The lungs are well expanded and clear. No focal opacities are seen. The pleural spaces are clear. The cardiac silhouette is enlarged.  The visualized osseous structures demonstrate degenerative changes.                                      Echo    Result Date: 12/31/2024    Left Ventricle: The left ventricle is mildly dilated. Mildly increased   wall thickness. Severe global hypokinesis present. There is severely   reduced systolic function. Quantitated ejection fraction is 28%. Grade II   diastolic dysfunction.    Right Ventricle: Normal right ventricular cavity size. Right ventricle   wall motion has global hypokinesis. Systolic function is mildly reduced.    Left Atrium: Left atrium is severely dilated.    Right Atrium: Right atrium is dilated.    Tricuspid Valve: There is moderate regurgitation.    Pulmonary Artery: There is mild pulmonary hypertension. The estimated   pulmonary artery systolic pressure is 47 mmHg.         Assessment and Plan     * NSTEMI (non-ST elevated myocardial infarction)  -EKG shows no ST elevation  -Patient states chest pain resolved on my exam   -Troponin 0.208> 0.2160 > 0.281  -Trend troponin,  monitor telemetry  -Hold ASA, plavix and anticoagulation 2/2 concern for active GI bleeding         Shock liver  Patient is likely to have ischemic hepatitis as evidenced by abnormalities in AST and ALT. Etiology includes cardiogenic shock. Continue to monitor liver function while treating underlying condition. Daily labs to include CMP, Liver Function Panel, and PT/INR.    AST   Date Value Ref Range Status   12/31/2024 1,129 (H) 10 - 40 U/L Final     ALT   Date Value Ref Range Status   12/31/2024 754 (H) 10 - 44 U/L Final     -ultrasound of the liver ordered lactic acid was within the normal GI was consulted about starting patient on NAC given the actual volume from the management  -ammonia was elevated patient was started on lactulose    Hematochezia  GI was consulted no need for intervention at the moment per their note continue to hold anticoagulants, hemoglobin is stable      SVT (supraventricular tachycardia)    Secondary to acute decompensated heart failure cardiology is on board patient is currently on tele monitor    Other amyloidosis    Oncology was consulted nephrology was consulted appreciate recommendations    ABLA (acute blood loss anemia)  Anemia is likely due to acute blood loss which was from rectum . Most recent hemoglobin and hematocrit are listed below.  Recent Labs     12/29/24  1620 12/29/24 1912 12/30/24  0336   HGB 7.2* 7.4* 7.5*   HCT 22.5* 23.3* 23.2*       Plan  - Monitor serial CBC: Every 6 hours  - Transfuse PRBC if patient becomes hemodynamically unstable, symptomatic or H/H drops below 7/21.  - Patient has not received any PRBC transfusions to date  - Patient's anemia is currently stable      GIB (gastrointestinal bleeding)  Patient's hemorrhage is due to gastrointestinal bleed, patient does have a propensity for bleeding due to duodenal ulcers.. Patients most recent Hgb, Hct, platelets, and INR are listed below.  Recent Labs     12/29/24  1624 12/29/24 1912 12/30/24  0336  12/31/24  0847   HGB  --  7.4* 7.5* 8.4*   HCT  --  23.3* 23.2* 26.4*   PLT  --  294 319 337   INR 1.3*  --   --   --        Plan  - Will trend hemoglobin/hematocrit Every 6 hours  - Will monitor and correct any coagulation defects  - Will transfuse if Hgb is <7g/dl (<8g/dl in cases of active ACS) or if patient has rapid bleeding leading to hemodynamic instability  - upper GI endoscopy (11/11/24) revealed gastritis, mucosal changes to duodenum, and duodenal ulcer, biopsy concerning for amyloidosis   -patient refused rectal exam and occult stool   -consult GI: Appreciate recs  Recommend oncology evaluation for amyloidosis   No endoscopic intervention at this time   PPI 40 mg BID as recent duodenal ulcer appreciated on EGD (needs BID therapy for total of 8 weeks)  Transfuse for hemoglobin less than 7   CBC daily   -continue PPI   --okay to feed per GI,no endoscopic intervention at this time, PPI b.i.d. for recent duodenal ulcer      Tobacco dependency  Dangers of cigarette smoking were reviewed with patient in detail. Patient was Counseled for 3-10 minutes. Nicotine replacement options were discussed. Nicotine replacement was discussed- prescribed    Colorectal carcinoma  -patient followed by heme/onc       Hypokalemia  Patient's most recent potassium results are listed below.   Recent Labs     12/29/24  1620 12/30/24  0336 12/31/24  0847   K 3.4* 3.4* 2.8*       Plan  - Replete potassium per protocol  - Monitor potassium Daily  - Patient's hypokalemia is stable      Acute on chronic combined systolic and diastolic congestive heart failure  Patient has Combined Systolic and Diastolic heart failure that is Acute on chronic. On presentation their CHF was decompensated. Evidence of decompensated CHF on presentation includes: edema, dyspnea on exertion (MONSON), and shortness of breath. The etiology of their decompensation is likely cardiac arrhythmia (SVT on EKG) . Most recent BNP and echo results are listed below.  Recent  Labs     12/29/24  1620   BNP 2,556*       Latest ECHO  Results for orders placed during the hospital encounter of 08/12/24    Echo    Interpretation Summary    Left Ventricle: The left ventricle is moderately dilated. There is eccentric hypertrophy. Moderate global hypokinesis present. There is moderately reduced systolic function with a visually estimated ejection fraction of 30 - 35%. Biplane (2D) method of discs ejection fraction is 34%. There is diastolic dysfunction but grade cannot be determined.    Right Ventricle: Normal right ventricular cavity size. Wall thickness is normal. Systolic function is normal.    Left Atrium: Left atrium is severely dilated.    Right Atrium: Right atrium is moderately dilated.    Aortic Valve: There is mild stenosis. Aortic valve area by VTI is 1.95 cm². Aortic valve peak velocity is 1.40 m/s. Mean gradient is 4 mmHg. The dimensionless index is 0.62.    Mitral Valve: There is mild regurgitation.    Tricuspid Valve: There is mild regurgitation.    Pulmonic Valve: There is mild regurgitation.    Pulmonary Artery: The estimated pulmonary artery systolic pressure is 36 mmHg.    IVC/SVC: Normal venous pressure at 3 mmHg.    Current Heart Failure Medications  , Daily, Oral  metoprolol succinate (TOPROL-XL) 24 hr tablet 25 mg, Daily, Oral  furosemide injection 40 mg, Every 12 hours, Intravenous    Plan  - Monitor strict I&Os and daily weights.    - Place on telemetry  -patient NPO 2/2 concern for active GI bleeding   -she received 60 mg furosemide in ED   TTE in August with EF of 30-35%  Holding ACEi / ARB given renal function    Inpatient Upgrade Note    Mary Ellen Saini has warranted treatment spanning two or more midnights of hospital level care for the management of KENY. She continues to require daily labs, monitoring of vital signs, IV pain medication, medication adjustments, and further evaluation by consultants. Her condition is also complicated by the following comorbidities:  Hypertension, Chronic respiratory disease, and polypharmacy.     -12/31 12/31 patient had repeat echocardiogram, cardiology is on board, we will continue to hold losartan due to worsening KENY, Nephrology was consulted, patient is responding well to diuresis.      Elevated troponin    Secondary to acute decompensated heart failure and NSTEMI cardiology is on board holding anticoagulants due to GI bleed      VTE Risk Mitigation (From admission, onward)           Ordered     IP VTE HIGH RISK PATIENT  Once         12/29/24 1908     Place sequential compression device  Until discontinued         12/29/24 1908                    Discharge Planning   JENAE: 12/31/2024     Code Status: Full Code   Medical Readiness for Discharge Date:   Discharge Plan A: Zachary Fofana MD  Department of Hospital Medicine   [facility]

## 2024-12-31 NOTE — NURSING
Notified Dr. LUCILA Fofana of potassium of 3.4 (collected 12/30) and that IV lasix is due this morning. Dr. Fofana came to the bedside states okay to give IV lasix dose and that when K labs result today they will replace if needed. IV lasix admin to pt.

## 2024-12-31 NOTE — PLAN OF CARE
Pt not medically ready for discharge today due to 24 hr urine in progress and ends tomorrow at 1450. Pt is homeless and refuses to go to a shelter. CM will continue to follow pt and provide any dc needs.   Future Appointments   Date Time Provider Department Center   1/16/2025  9:00 AM Yandel Bonds, ISIAH [facility] SMOKE [facility]   1/16/2025  2:20 PM Oneal Tee DNP [facility] CARDIO [facility]   1/22/2025  8:00 AM Micah Luna MD [facility] HEM ONC [facility]      12/31/24 1358   Rounds   Attendance Nurse    Discharge Plan A Other   Why the patient remains in the hospital Requires continued medical care   Transition of Care Barriers None

## 2024-12-31 NOTE — PROGRESS NOTES
Smoking cessation education follow-up: Pt denies nicotine withdrawal symptoms with patch in use. Order requested on discharge for 21 mg nicotine patch Q day. Reviewed details of pt's Ambulatory Smoking Cessation clinic appointment: 1/16/25 at 0900, [facility].

## 2024-12-31 NOTE — ASSESSMENT & PLAN NOTE
Secondary to acute decompensated heart failure and NSTEMI cardiology is on board holding anticoagulants due to GI bleed

## 2024-12-31 NOTE — ASSESSMENT & PLAN NOTE
Patient's most recent potassium results are listed below.   Recent Labs     12/29/24  1620 12/30/24  0336 12/31/24  0847   K 3.4* 3.4* 2.8*       Plan  - Replete potassium per protocol  - Monitor potassium Daily  - Patient's hypokalemia is stable

## 2024-12-31 NOTE — PLAN OF CARE
Problem: Adult Inpatient Plan of Care  Goal: Plan of Care Review  Outcome: Progressing  Goal: Absence of Hospital-Acquired Illness or Injury  Outcome: Progressing  Goal: Optimal Comfort and Wellbeing  Outcome: Progressing     Problem: Gastrointestinal Bleeding  Goal: Optimal Coping with Acute Illness  Outcome: Progressing     Problem: Acute Kidney Injury/Impairment  Goal: Fluid and Electrolyte Balance  Outcome: Progressing     Problem: Fall Injury Risk  Goal: Absence of Fall and Fall-Related Injury  Outcome: Progressing

## 2024-12-31 NOTE — ASSESSMENT & PLAN NOTE
Patient is likely to have ischemic hepatitis as evidenced by abnormalities in AST and ALT. Etiology includes cardiogenic shock. Continue to monitor liver function while treating underlying condition. Daily labs to include CMP, Liver Function Panel, and PT/INR.    AST   Date Value Ref Range Status   12/31/2024 1,129 (H) 10 - 40 U/L Final     ALT   Date Value Ref Range Status   12/31/2024 754 (H) 10 - 44 U/L Final     -ultrasound of the liver ordered lactic acid was within the normal GI was consulted about starting patient on NAC given the actual volume from the management  -ammonia was elevated patient was started on lactulose

## 2024-12-31 NOTE — ASSESSMENT & PLAN NOTE
Secondary to acute decompensated heart failure cardiology is on board patient is currently on tele monitor

## 2024-12-31 NOTE — ASSESSMENT & PLAN NOTE
Patient's hemorrhage is due to gastrointestinal bleed, patient does have a propensity for bleeding due to duodenal ulcers.. Patients most recent Hgb, Hct, platelets, and INR are listed below.  Recent Labs     12/29/24  1624 12/29/24  1912 12/30/24  0336 12/31/24  0847   HGB  --  7.4* 7.5* 8.4*   HCT  --  23.3* 23.2* 26.4*   PLT  --  294 319 337   INR 1.3*  --   --   --        Plan  - Will trend hemoglobin/hematocrit Every 6 hours  - Will monitor and correct any coagulation defects  - Will transfuse if Hgb is <7g/dl (<8g/dl in cases of active ACS) or if patient has rapid bleeding leading to hemodynamic instability  - upper GI endoscopy (11/11/24) revealed gastritis, mucosal changes to duodenum, and duodenal ulcer, biopsy concerning for amyloidosis   -patient refused rectal exam and occult stool   -consult GI: Appreciate recs  Recommend oncology evaluation for amyloidosis   No endoscopic intervention at this time   PPI 40 mg BID as recent duodenal ulcer appreciated on EGD (needs BID therapy for total of 8 weeks)  Transfuse for hemoglobin less than 7   CBC daily   -continue PPI   --okay to feed per GI,no endoscopic intervention at this time, PPI b.i.d. for recent duodenal ulcer

## 2025-01-01 ENCOUNTER — HOSPITAL ENCOUNTER (INPATIENT)
Facility: HOSPITAL | Age: 60
LOS: 4 days | DRG: 871 | End: 2025-02-23
Attending: EMERGENCY MEDICINE | Admitting: INTERNAL MEDICINE
Payer: MEDICARE

## 2025-01-01 VITALS
BODY MASS INDEX: 21.6 KG/M2 | OXYGEN SATURATION: 97 % | HEIGHT: 71 IN | TEMPERATURE: 98 F | DIASTOLIC BLOOD PRESSURE: 61 MMHG | RESPIRATION RATE: 35 BRPM | WEIGHT: 154.31 LBS | SYSTOLIC BLOOD PRESSURE: 112 MMHG | HEART RATE: 87 BPM

## 2025-01-01 DIAGNOSIS — R07.9 CHEST PAIN: ICD-10-CM

## 2025-01-01 DIAGNOSIS — K52.9 ENTEROCOLITIS: ICD-10-CM

## 2025-01-01 DIAGNOSIS — N17.9 AKI (ACUTE KIDNEY INJURY): ICD-10-CM

## 2025-01-01 DIAGNOSIS — I95.9 HYPOTENSION, UNSPECIFIED HYPOTENSION TYPE: ICD-10-CM

## 2025-01-01 DIAGNOSIS — K92.2 GI BLEED: Primary | ICD-10-CM

## 2025-01-01 LAB
ABO + RH BLD: NORMAL
ABO + RH BLD: NORMAL
ACANTHOCYTES BLD QL SMEAR: ABNORMAL
ALBUMIN SERPL BCP-MCNC: 1.2 G/DL (ref 3.5–5.2)
ALBUMIN SERPL BCP-MCNC: 1.2 G/DL (ref 3.5–5.2)
ALBUMIN SERPL BCP-MCNC: 1.3 G/DL (ref 3.5–5.2)
ALBUMIN SERPL BCP-MCNC: 1.4 G/DL (ref 3.5–5.2)
ALBUMIN SERPL BCP-MCNC: 2.5 G/DL (ref 3.5–5.2)
ALP SERPL-CCNC: 111 U/L (ref 40–150)
ALP SERPL-CCNC: 128 U/L (ref 40–150)
ALP SERPL-CCNC: 72 U/L (ref 40–150)
ALP SERPL-CCNC: 75 U/L (ref 40–150)
ALP SERPL-CCNC: 78 U/L (ref 40–150)
ALP SERPL-CCNC: 80 U/L (ref 40–150)
ALP SERPL-CCNC: 85 U/L (ref 40–150)
ALT SERPL W/O P-5'-P-CCNC: 19 U/L (ref 10–44)
ALT SERPL W/O P-5'-P-CCNC: 19 U/L (ref 10–44)
ALT SERPL W/O P-5'-P-CCNC: 21 U/L (ref 10–44)
ALT SERPL W/O P-5'-P-CCNC: 28 U/L (ref 10–44)
ALT SERPL W/O P-5'-P-CCNC: 40 U/L (ref 10–44)
ALT SERPL W/O P-5'-P-CCNC: 40 U/L (ref 10–44)
ALT SERPL W/O P-5'-P-CCNC: 603 U/L (ref 10–44)
ANION GAP SERPL CALC-SCNC: 12 MMOL/L (ref 8–16)
ANION GAP SERPL CALC-SCNC: 13 MMOL/L (ref 8–16)
ANION GAP SERPL CALC-SCNC: 14 MMOL/L (ref 8–16)
ANION GAP SERPL CALC-SCNC: 14 MMOL/L (ref 8–16)
ANION GAP SERPL CALC-SCNC: 15 MMOL/L (ref 8–16)
ANION GAP SERPL CALC-SCNC: 15 MMOL/L (ref 8–16)
ANION GAP SERPL CALC-SCNC: 16 MMOL/L (ref 8–16)
ANION GAP SERPL CALC-SCNC: 16 MMOL/L (ref 8–16)
ANION GAP SERPL CALC-SCNC: 20 MMOL/L (ref 8–16)
ANISOCYTOSIS BLD QL SMEAR: ABNORMAL
ANISOCYTOSIS BLD QL SMEAR: SLIGHT
AST SERPL-CCNC: 21 U/L (ref 10–40)
AST SERPL-CCNC: 21 U/L (ref 10–40)
AST SERPL-CCNC: 25 U/L (ref 10–40)
AST SERPL-CCNC: 42 U/L (ref 10–40)
AST SERPL-CCNC: 566 U/L (ref 10–40)
AST SERPL-CCNC: 69 U/L (ref 10–40)
AST SERPL-CCNC: 87 U/L (ref 10–40)
AUER BODIES BLD QL SMEAR: ABNORMAL
BACTERIA #/AREA URNS HPF: ABNORMAL /HPF
BACTERIA UR CULT: NORMAL
BACTERIA UR CULT: NORMAL
BASO STIPL BLD QL SMEAR: ABNORMAL
BASOPHILS # BLD AUTO: 0.02 K/UL (ref 0–0.2)
BASOPHILS # BLD AUTO: 0.07 K/UL (ref 0–0.2)
BASOPHILS # BLD AUTO: 0.07 K/UL (ref 0–0.2)
BASOPHILS # BLD AUTO: ABNORMAL K/UL (ref 0–0.2)
BASOPHILS # BLD AUTO: ABNORMAL K/UL (ref 0–0.2)
BASOPHILS NFR BLD: 0 % (ref 0–1.9)
BASOPHILS NFR BLD: 0 % (ref 0–1.9)
BASOPHILS NFR BLD: 0.2 % (ref 0–1.9)
BASOPHILS NFR BLD: 0.4 % (ref 0–1.9)
BASOPHILS NFR BLD: 0.6 % (ref 0–1.9)
BASOPHILS NFR BLD: ABNORMAL % (ref 0–1.9)
BILIRUB SERPL-MCNC: 0.6 MG/DL (ref 0.1–1)
BILIRUB SERPL-MCNC: 0.7 MG/DL (ref 0.1–1)
BILIRUB SERPL-MCNC: 0.7 MG/DL (ref 0.1–1)
BILIRUB SERPL-MCNC: 0.8 MG/DL (ref 0.1–1)
BILIRUB SERPL-MCNC: 0.9 MG/DL (ref 0.1–1)
BILIRUB SERPL-MCNC: 0.9 MG/DL (ref 0.1–1)
BILIRUB SERPL-MCNC: 1 MG/DL (ref 0.1–1)
BILIRUB UR QL STRIP: NEGATIVE
BLASTS NFR BLD MANUAL: ABNORMAL %
BLD GP AB SCN CELLS X3 SERPL QL: NORMAL
BLD GP AB SCN CELLS X3 SERPL QL: NORMAL
BLD PROD TYP BPU: NORMAL
BLOOD UNIT EXPIRATION DATE: NORMAL
BLOOD UNIT TYPE CODE: 5100
BLOOD UNIT TYPE: NORMAL
BUN SERPL-MCNC: 29 MG/DL (ref 6–20)
BUN SERPL-MCNC: 44 MG/DL (ref 6–20)
BUN SERPL-MCNC: 47 MG/DL (ref 6–20)
BUN SERPL-MCNC: 51 MG/DL (ref 6–20)
BUN SERPL-MCNC: 66 MG/DL (ref 6–20)
BUN SERPL-MCNC: 69 MG/DL (ref 6–20)
BUN SERPL-MCNC: 71 MG/DL (ref 6–20)
BUN SERPL-MCNC: 93 MG/DL (ref 6–20)
BUN SERPL-MCNC: 95 MG/DL (ref 6–20)
BURR CELLS BLD QL SMEAR: ABNORMAL
C DIFF GDH STL QL: NEGATIVE
C DIFF TOX A+B STL QL IA: NEGATIVE
C3 SERPL-MCNC: 72 MG/DL (ref 50–180)
C4 SERPL-MCNC: 8 MG/DL (ref 11–44)
CABOT RINGS BLD QL SMEAR: ABNORMAL
CALCIUM SERPL-MCNC: 6.3 MG/DL (ref 8.7–10.5)
CALCIUM SERPL-MCNC: 6.8 MG/DL (ref 8.7–10.5)
CALCIUM SERPL-MCNC: 6.9 MG/DL (ref 8.7–10.5)
CALCIUM SERPL-MCNC: 7 MG/DL (ref 8.7–10.5)
CALCIUM SERPL-MCNC: 7.1 MG/DL (ref 8.7–10.5)
CALCIUM SERPL-MCNC: 7.2 MG/DL (ref 8.7–10.5)
CALCIUM SERPL-MCNC: 8.2 MG/DL (ref 8.7–10.5)
CALPROTECTIN INTERPRETATION (OHS): ABNORMAL
CALPROTECTIN: >800
CHLORIDE SERPL-SCNC: 85 MMOL/L (ref 95–110)
CHLORIDE SERPL-SCNC: 86 MMOL/L (ref 95–110)
CHLORIDE SERPL-SCNC: 87 MMOL/L (ref 95–110)
CHLORIDE SERPL-SCNC: 91 MMOL/L (ref 95–110)
CHLORIDE SERPL-SCNC: 92 MMOL/L (ref 95–110)
CHLORIDE SERPL-SCNC: 94 MMOL/L (ref 95–110)
CHLORIDE SERPL-SCNC: 95 MMOL/L (ref 95–110)
CHLORIDE SERPL-SCNC: 97 MMOL/L (ref 95–110)
CHLORIDE SERPL-SCNC: 97 MMOL/L (ref 95–110)
CHOLEST SERPL-MCNC: 16 MG/DL (ref 120–199)
CHOLEST/HDLC SERPL: ABNORMAL {RATIO} (ref 2–5)
CLARITY UR: ABNORMAL
CO2 SERPL-SCNC: 10 MMOL/L (ref 23–29)
CO2 SERPL-SCNC: 12 MMOL/L (ref 23–29)
CO2 SERPL-SCNC: 13 MMOL/L (ref 23–29)
CO2 SERPL-SCNC: 14 MMOL/L (ref 23–29)
CO2 SERPL-SCNC: 15 MMOL/L (ref 23–29)
CO2 SERPL-SCNC: 17 MMOL/L (ref 23–29)
CO2 SERPL-SCNC: 23 MMOL/L (ref 23–29)
CODING SYSTEM: NORMAL
COLOR UR: YELLOW
CORTIS SERPL-MCNC: 27.7 UG/DL
CREAT SERPL-MCNC: 2 MG/DL (ref 0.5–1.4)
CREAT SERPL-MCNC: 2.3 MG/DL (ref 0.5–1.4)
CREAT SERPL-MCNC: 2.3 MG/DL (ref 0.5–1.4)
CREAT SERPL-MCNC: 2.5 MG/DL (ref 0.5–1.4)
CREAT SERPL-MCNC: 2.9 MG/DL (ref 0.5–1.4)
CREAT SERPL-MCNC: 3.2 MG/DL (ref 0.5–1.4)
CREAT SERPL-MCNC: 3.2 MG/DL (ref 0.5–1.4)
CREAT SERPL-MCNC: 3.6 MG/DL (ref 0.5–1.4)
CREAT SERPL-MCNC: 3.7 MG/DL (ref 0.5–1.4)
CROSSMATCH INTERPRETATION: NORMAL
CRYPTOSP AG STL QL IA: NEGATIVE
DACRYOCYTES BLD QL SMEAR: ABNORMAL
DIFFERENTIAL METHOD BLD: ABNORMAL
DISPENSE STATUS: NORMAL
DOHLE BOD BLD QL SMEAR: ABNORMAL
E COLI SXT1 STL QL IA: NEGATIVE
E COLI SXT2 STL QL IA: NEGATIVE
ELASTASE 1, FECAL: 2
ELASTASE INTERPRETATION: ABNORMAL
EOSINOPHIL # BLD AUTO: 0 K/UL (ref 0–0.5)
EOSINOPHIL # BLD AUTO: ABNORMAL K/UL (ref 0–0.5)
EOSINOPHIL # BLD AUTO: ABNORMAL K/UL (ref 0–0.5)
EOSINOPHIL NFR BLD: 0 % (ref 0–8)
EOSINOPHIL NFR BLD: 0.2 % (ref 0–8)
EOSINOPHIL NFR BLD: 0.3 % (ref 0–8)
EOSINOPHIL NFR BLD: 0.4 % (ref 0–8)
EOSINOPHIL NFR BLD: 1 % (ref 0–8)
EOSINOPHIL NFR BLD: ABNORMAL % (ref 0–8)
ERYTHROCYTE [DISTWIDTH] IN BLOOD BY AUTOMATED COUNT: 14.6 % (ref 11.5–14.5)
ERYTHROCYTE [DISTWIDTH] IN BLOOD BY AUTOMATED COUNT: 17.2 % (ref 11.5–14.5)
ERYTHROCYTE [DISTWIDTH] IN BLOOD BY AUTOMATED COUNT: 17.3 % (ref 11.5–14.5)
ERYTHROCYTE [DISTWIDTH] IN BLOOD BY AUTOMATED COUNT: 17.4 % (ref 11.5–14.5)
ERYTHROCYTE [DISTWIDTH] IN BLOOD BY AUTOMATED COUNT: 17.5 % (ref 11.5–14.5)
ERYTHROCYTE [DISTWIDTH] IN BLOOD BY AUTOMATED COUNT: 17.6 % (ref 11.5–14.5)
ERYTHROCYTE [DISTWIDTH] IN BLOOD BY AUTOMATED COUNT: ABNORMAL % (ref 11.5–14.5)
EST. GFR  (NO RACE VARIABLE): 14 ML/MIN/1.73 M^2
EST. GFR  (NO RACE VARIABLE): 14 ML/MIN/1.73 M^2
EST. GFR  (NO RACE VARIABLE): 16 ML/MIN/1.73 M^2
EST. GFR  (NO RACE VARIABLE): 16 ML/MIN/1.73 M^2
EST. GFR  (NO RACE VARIABLE): 18 ML/MIN/1.73 M^2
EST. GFR  (NO RACE VARIABLE): 22 ML/MIN/1.73 M^2
EST. GFR  (NO RACE VARIABLE): 24 ML/MIN/1.73 M^2
EST. GFR  (NO RACE VARIABLE): 24 ML/MIN/1.73 M^2
EST. GFR  (NO RACE VARIABLE): 28 ML/MIN/1.73 M^2
FIO2: 21 %
G LAMBLIA AG STL QL IA: NEGATIVE
GIANT PLATELETS BLD QL SMEAR: ABNORMAL
GLUCOSE SERPL-MCNC: 109 MG/DL (ref 70–110)
GLUCOSE SERPL-MCNC: 109 MG/DL (ref 70–110)
GLUCOSE SERPL-MCNC: 113 MG/DL (ref 70–110)
GLUCOSE SERPL-MCNC: 117 MG/DL (ref 70–110)
GLUCOSE SERPL-MCNC: 118 MG/DL (ref 70–110)
GLUCOSE SERPL-MCNC: 118 MG/DL (ref 70–110)
GLUCOSE SERPL-MCNC: 196 MG/DL (ref 70–110)
GLUCOSE SERPL-MCNC: 379 MG/DL (ref 70–110)
GLUCOSE SERPL-MCNC: 443 MG/DL (ref 70–110)
GLUCOSE UR QL STRIP: NEGATIVE
HCT VFR BLD AUTO: 22.7 % (ref 37–48.5)
HCT VFR BLD AUTO: 25.5 % (ref 37–48.5)
HCT VFR BLD AUTO: 25.8 % (ref 37–48.5)
HCT VFR BLD AUTO: 26 % (ref 37–48.5)
HCT VFR BLD AUTO: 28.1 % (ref 37–48.5)
HCT VFR BLD AUTO: 28.4 % (ref 37–48.5)
HCT VFR BLD AUTO: 28.7 % (ref 37–48.5)
HCT VFR BLD AUTO: 29.2 % (ref 37–48.5)
HCT VFR BLD AUTO: ABNORMAL % (ref 37–48.5)
HDLC SERPL-MCNC: <5 MG/DL (ref 40–75)
HDLC SERPL: ABNORMAL % (ref 20–50)
HEINZ BOD BLD QL SMEAR: ABNORMAL
HGB BLD-MCNC: 7.1 G/DL (ref 12–16)
HGB BLD-MCNC: 8.2 G/DL (ref 12–16)
HGB BLD-MCNC: 8.4 G/DL (ref 12–16)
HGB BLD-MCNC: 8.4 G/DL (ref 12–16)
HGB BLD-MCNC: 9.1 G/DL (ref 12–16)
HGB BLD-MCNC: 9.1 G/DL (ref 12–16)
HGB BLD-MCNC: 9.2 G/DL (ref 12–16)
HGB BLD-MCNC: 9.2 G/DL (ref 12–16)
HGB BLD-MCNC: ABNORMAL G/DL (ref 12–16)
HGB C CRY RBC QL MICRO: ABNORMAL
HGB UR QL STRIP: ABNORMAL
HOWELL-JOLLY BOD BLD QL SMEAR: ABNORMAL
HYALINE CASTS #/AREA URNS LPF: 0 /LPF
HYPOCHROMIA BLD QL SMEAR: ABNORMAL
IMM GRANULOCYTES # BLD AUTO: 0.12 K/UL (ref 0–0.04)
IMM GRANULOCYTES # BLD AUTO: 0.13 K/UL (ref 0–0.04)
IMM GRANULOCYTES # BLD AUTO: 0.51 K/UL (ref 0–0.04)
IMM GRANULOCYTES # BLD AUTO: ABNORMAL K/UL (ref 0–0.04)
IMM GRANULOCYTES NFR BLD AUTO: 1.1 % (ref 0–0.5)
IMM GRANULOCYTES NFR BLD AUTO: 1.2 % (ref 0–0.5)
IMM GRANULOCYTES NFR BLD AUTO: 3.1 % (ref 0–0.5)
IMM GRANULOCYTES NFR BLD AUTO: ABNORMAL % (ref 0–0.5)
INFLUENZA A, MOLECULAR: NOT DETECTED
INFLUENZA B, MOLECULAR: NOT DETECTED
KETONES UR QL STRIP: NEGATIVE
LACTATE SERPL-SCNC: 2.5 MMOL/L (ref 0.5–2.2)
LACTATE SERPL-SCNC: 3.2 MMOL/L (ref 0.5–2.2)
LACTATE SERPL-SCNC: 4 MMOL/L (ref 0.5–2.2)
LDLC SERPL CALC-MCNC: ABNORMAL MG/DL (ref 63–159)
LEUKOCYTE ESTERASE UR QL STRIP: ABNORMAL
LYMPHOCYTES # BLD AUTO: 1.2 K/UL (ref 1–4.8)
LYMPHOCYTES # BLD AUTO: 1.4 K/UL (ref 1–4.8)
LYMPHOCYTES # BLD AUTO: 1.6 K/UL (ref 1–4.8)
LYMPHOCYTES # BLD AUTO: ABNORMAL K/UL (ref 1–4.8)
LYMPHOCYTES # BLD AUTO: ABNORMAL K/UL (ref 1–4.8)
LYMPHOCYTES NFR BLD: 10 % (ref 18–48)
LYMPHOCYTES NFR BLD: 15.7 % (ref 18–48)
LYMPHOCYTES NFR BLD: 6 % (ref 18–48)
LYMPHOCYTES NFR BLD: 8.6 % (ref 18–48)
LYMPHOCYTES NFR BLD: 9.7 % (ref 18–48)
LYMPHOCYTES NFR BLD: ABNORMAL % (ref 18–48)
MAGNESIUM SERPL-MCNC: 1.5 MG/DL (ref 1.6–2.6)
MAGNESIUM SERPL-MCNC: 1.6 MG/DL (ref 1.6–2.6)
MAGNESIUM SERPL-MCNC: 1.9 MG/DL (ref 1.6–2.6)
MAGNESIUM SERPL-MCNC: 2.4 MG/DL (ref 1.6–2.6)
MAGNESIUM SERPL-MCNC: 2.7 MG/DL (ref 1.6–2.6)
MAGNESIUM SERPL-MCNC: 4 MG/DL (ref 1.6–2.6)
MCH RBC QN AUTO: 23.5 PG (ref 27–31)
MCH RBC QN AUTO: 24 PG (ref 27–31)
MCH RBC QN AUTO: 24 PG (ref 27–31)
MCH RBC QN AUTO: 24.1 PG (ref 27–31)
MCH RBC QN AUTO: 24.3 PG (ref 27–31)
MCH RBC QN AUTO: 24.8 PG (ref 27–31)
MCH RBC QN AUTO: ABNORMAL PG (ref 27–31)
MCHC RBC AUTO-ENTMCNC: 31.3 G/DL (ref 32–36)
MCHC RBC AUTO-ENTMCNC: 32.1 G/DL (ref 32–36)
MCHC RBC AUTO-ENTMCNC: 32.2 G/DL (ref 32–36)
MCHC RBC AUTO-ENTMCNC: 32.3 G/DL (ref 32–36)
MCHC RBC AUTO-ENTMCNC: 32.4 G/DL (ref 32–36)
MCHC RBC AUTO-ENTMCNC: 32.4 G/DL (ref 32–36)
MCHC RBC AUTO-ENTMCNC: ABNORMAL G/DL (ref 32–36)
MCV RBC AUTO: 75 FL (ref 82–98)
MCV RBC AUTO: 77 FL (ref 82–98)
MCV RBC AUTO: ABNORMAL FL (ref 82–98)
MEGAKARYOCYTIC FRAGMENTS: ABNORMAL
METAMYELOCYTES NFR BLD MANUAL: ABNORMAL %
MICROSCOPIC COMMENT: ABNORMAL
MONOCYTES # BLD AUTO: 0.9 K/UL (ref 0.3–1)
MONOCYTES # BLD AUTO: 1 K/UL (ref 0.3–1)
MONOCYTES # BLD AUTO: 1.1 K/UL (ref 0.3–1)
MONOCYTES # BLD AUTO: ABNORMAL K/UL (ref 0.3–1)
MONOCYTES # BLD AUTO: ABNORMAL K/UL (ref 0.3–1)
MONOCYTES NFR BLD: 10.6 % (ref 4–15)
MONOCYTES NFR BLD: 11 % (ref 4–15)
MONOCYTES NFR BLD: 5.3 % (ref 4–15)
MONOCYTES NFR BLD: 8 % (ref 4–15)
MONOCYTES NFR BLD: 8.1 % (ref 4–15)
MONOCYTES NFR BLD: ABNORMAL % (ref 4–15)
MYELOCYTES NFR BLD MANUAL: ABNORMAL %
NEUTROPHILS # BLD AUTO: 13.5 K/UL (ref 1.8–7.7)
NEUTROPHILS # BLD AUTO: 7.4 K/UL (ref 1.8–7.7)
NEUTROPHILS # BLD AUTO: 9.7 K/UL (ref 1.8–7.7)
NEUTROPHILS # BLD AUTO: ABNORMAL K/UL (ref 1.8–7.7)
NEUTROPHILS NFR BLD: 71.9 % (ref 38–73)
NEUTROPHILS NFR BLD: 77 % (ref 38–73)
NEUTROPHILS NFR BLD: 80.2 % (ref 38–73)
NEUTROPHILS NFR BLD: 82.4 % (ref 38–73)
NEUTROPHILS NFR BLD: 86 % (ref 38–73)
NEUTROPHILS NFR BLD: ABNORMAL % (ref 38–73)
NEUTS BAND NFR BLD MANUAL: 1 %
NEUTS BAND NFR BLD MANUAL: ABNORMAL %
NITRITE UR QL STRIP: POSITIVE
NONHDLC SERPL-MCNC: ABNORMAL MG/DL
NRBC BLD-RTO: 0 /100 WBC
NRBC BLD-RTO: ABNORMAL /100 WBC
NUM UNITS TRANS PACKED RBC: NORMAL
OSMOLALITY SERPL: 296 MOSM/KG (ref 275–295)
OSMOLALITY UR: 286 MOSM/KG (ref 50–1200)
OVALOCYTES BLD QL SMEAR: ABNORMAL
PAPPENHEIMER BOD BLD QL SMEAR: ABNORMAL
PCO2 BLDA: 35.7 MMHG (ref 35–45)
PH SMN: 7.15 [PH] (ref 7.35–7.45)
PH UR STRIP: 6 [PH] (ref 5–8)
PHOSPHATE SERPL-MCNC: 8.1 MG/DL (ref 2.7–4.5)
PLASMODIUM BLD QL SMEAR: ABNORMAL
PLATELET # BLD AUTO: 166 K/UL (ref 150–450)
PLATELET # BLD AUTO: 207 K/UL (ref 150–450)
PLATELET # BLD AUTO: 284 K/UL (ref 150–450)
PLATELET # BLD AUTO: 324 K/UL (ref 150–450)
PLATELET # BLD AUTO: 343 K/UL (ref 150–450)
PLATELET # BLD AUTO: 363 K/UL (ref 150–450)
PLATELET # BLD AUTO: ABNORMAL K/UL (ref 150–450)
PLATELET BLD QL SMEAR: ABNORMAL
PMV BLD AUTO: 10.3 FL (ref 9.2–12.9)
PMV BLD AUTO: 10.6 FL (ref 9.2–12.9)
PMV BLD AUTO: 10.9 FL (ref 9.2–12.9)
PMV BLD AUTO: 11 FL (ref 9.2–12.9)
PMV BLD AUTO: 9.7 FL (ref 9.2–12.9)
PMV BLD AUTO: 9.7 FL (ref 9.2–12.9)
PMV BLD AUTO: ABNORMAL FL (ref 9.2–12.9)
PO2 BLDA: 38.8 MMHG (ref 40–60)
POC BASE DEFICIT: -15.2 MMOL/L (ref -2–2)
POC HCO3: 12.5 MMOL/L (ref 24–28)
POC PERFORMED BY: ABNORMAL
POC SATURATED O2: 58.1 % (ref 95–100)
POCT GLUCOSE: 119 MG/DL (ref 70–110)
POCT GLUCOSE: 119 MG/DL (ref 70–110)
POCT GLUCOSE: 124 MG/DL (ref 70–110)
POCT GLUCOSE: 126 MG/DL (ref 70–110)
POCT GLUCOSE: 127 MG/DL (ref 70–110)
POCT GLUCOSE: 185 MG/DL (ref 70–110)
POIKILOCYTOSIS BLD QL SMEAR: ABNORMAL
POIKILOCYTOSIS BLD QL SMEAR: SLIGHT
POLYCHROMASIA BLD QL SMEAR: ABNORMAL
POTASSIUM SERPL-SCNC: 3.3 MMOL/L (ref 3.5–5.1)
POTASSIUM SERPL-SCNC: 3.6 MMOL/L (ref 3.5–5.1)
POTASSIUM SERPL-SCNC: 3.9 MMOL/L (ref 3.5–5.1)
POTASSIUM SERPL-SCNC: 4 MMOL/L (ref 3.5–5.1)
POTASSIUM SERPL-SCNC: 4.3 MMOL/L (ref 3.5–5.1)
POTASSIUM SERPL-SCNC: 4.5 MMOL/L (ref 3.5–5.1)
POTASSIUM SERPL-SCNC: 4.8 MMOL/L (ref 3.5–5.1)
POTASSIUM SERPL-SCNC: 5.4 MMOL/L (ref 3.5–5.1)
POTASSIUM SERPL-SCNC: 5.4 MMOL/L (ref 3.5–5.1)
POTASSIUM SERPL-SCNC: 5.6 MMOL/L (ref 3.5–5.1)
PROMYELOCYTES NFR BLD MANUAL: ABNORMAL %
PROT SERPL-MCNC: 5.1 G/DL (ref 6–8.4)
PROT SERPL-MCNC: 5.2 G/DL (ref 6–8.4)
PROT SERPL-MCNC: 5.3 G/DL (ref 6–8.4)
PROT SERPL-MCNC: 5.5 G/DL (ref 6–8.4)
PROT SERPL-MCNC: 5.6 G/DL (ref 6–8.4)
PROT SERPL-MCNC: 5.7 G/DL (ref 6–8.4)
PROT SERPL-MCNC: 7.4 G/DL (ref 6–8.4)
PROT UR QL STRIP: ABNORMAL
RBC # BLD AUTO: 3.02 M/UL (ref 4–5.4)
RBC # BLD AUTO: 3.39 M/UL (ref 4–5.4)
RBC # BLD AUTO: 3.42 M/UL (ref 4–5.4)
RBC # BLD AUTO: 3.77 M/UL (ref 4–5.4)
RBC # BLD AUTO: 3.79 M/UL (ref 4–5.4)
RBC # BLD AUTO: 3.84 M/UL (ref 4–5.4)
RBC # BLD AUTO: ABNORMAL M/UL (ref 4–5.4)
RBC #/AREA URNS HPF: 20 /HPF (ref 0–4)
RBC AGGLUT BLD QL: ABNORMAL
ROULEAUX BLD QL SMEAR: ABNORMAL
RSV AG BY MOLECULAR METHOD: NOT DETECTED
RV AG STL QL IA.RAPID: NEGATIVE
SARS-COV-2 RNA RESP QL NAA+PROBE: NOT DETECTED
SCHISTOCYTES BLD QL SMEAR: ABNORMAL
SCHISTOCYTES BLD QL SMEAR: ABNORMAL
SICKLE CELLS BLD QL SMEAR: ABNORMAL
SMUDGE CELLS BLD QL SMEAR: ABNORMAL
SODIUM SERPL-SCNC: 111 MMOL/L (ref 136–145)
SODIUM SERPL-SCNC: 115 MMOL/L (ref 136–145)
SODIUM SERPL-SCNC: 116 MMOL/L (ref 136–145)
SODIUM SERPL-SCNC: 120 MMOL/L (ref 136–145)
SODIUM SERPL-SCNC: 121 MMOL/L (ref 136–145)
SODIUM SERPL-SCNC: 124 MMOL/L (ref 136–145)
SODIUM SERPL-SCNC: 125 MMOL/L (ref 136–145)
SODIUM SERPL-SCNC: 125 MMOL/L (ref 136–145)
SODIUM SERPL-SCNC: 132 MMOL/L (ref 136–145)
SODIUM UR-SCNC: <20 MMOL/L (ref 20–250)
SP GR UR STRIP: 1.01 (ref 1–1.03)
SPECIMEN OUTDATE: NORMAL
SPECIMEN OUTDATE: NORMAL
SPECIMEN SOURCE: ABNORMAL
SPHEROCYTES BLD QL SMEAR: ABNORMAL
SQUAMOUS #/AREA URNS HPF: 3 /HPF
STOMATOCYTES BLD QL SMEAR: ABNORMAL
T4 FREE SERPL-MCNC: 0.56 NG/DL (ref 0.71–1.51)
TARGETS BLD QL SMEAR: ABNORMAL
TOXIC GRANULES BLD QL SMEAR: ABNORMAL
TRIGL SERPL-MCNC: 50 MG/DL (ref 30–150)
TROPONIN I SERPL DL<=0.01 NG/ML-MCNC: 0.17 NG/ML (ref 0–0.03)
TROPONIN I SERPL DL<=0.01 NG/ML-MCNC: 0.18 NG/ML (ref 0–0.03)
TROPONIN I SERPL DL<=0.01 NG/ML-MCNC: 0.2 NG/ML (ref 0–0.03)
TROPONIN I SERPL DL<=0.01 NG/ML-MCNC: 0.2 NG/ML (ref 0–0.03)
TROPONIN I SERPL DL<=0.01 NG/ML-MCNC: 0.21 NG/ML (ref 0–0.03)
TSH SERPL DL<=0.005 MIU/L-ACNC: 5.71 UIU/ML (ref 0.4–4)
URN SPEC COLLECT METH UR: ABNORMAL
UROBILINOGEN UR STRIP-ACNC: NEGATIVE EU/DL
WBC # BLD AUTO: 10.35 K/UL (ref 3.9–12.7)
WBC # BLD AUTO: 12.06 K/UL (ref 3.9–12.7)
WBC # BLD AUTO: 16.46 K/UL (ref 3.9–12.7)
WBC # BLD AUTO: 17.18 K/UL (ref 3.9–12.7)
WBC # BLD AUTO: 17.28 K/UL (ref 3.9–12.7)
WBC # BLD AUTO: 26.21 K/UL (ref 3.9–12.7)
WBC # BLD AUTO: ABNORMAL K/UL (ref 3.9–12.7)
WBC #/AREA STL HPF: NORMAL /[HPF]
WBC #/AREA URNS HPF: 91 /HPF (ref 0–5)
WBC NRBC COR # BLD: ABNORMAL K/UL (ref 3.9–12.7)
WBC OTHER NFR BLD MANUAL: ABNORMAL %
WBC TOXIC VACUOLES BLD QL SMEAR: ABNORMAL

## 2025-01-01 PROCEDURE — 0241U SARS-COV2 (COVID) WITH FLU/RSV BY PCR: CPT | Performed by: STUDENT IN AN ORGANIZED HEALTH CARE EDUCATION/TRAINING PROGRAM

## 2025-01-01 PROCEDURE — 84484 ASSAY OF TROPONIN QUANT: CPT | Mod: 91

## 2025-01-01 PROCEDURE — 85025 COMPLETE CBC W/AUTO DIFF WBC: CPT | Performed by: INTERNAL MEDICINE

## 2025-01-01 PROCEDURE — 63600175 PHARM REV CODE 636 W HCPCS: Performed by: FAMILY MEDICINE

## 2025-01-01 PROCEDURE — 25000003 PHARM REV CODE 250: Performed by: EMERGENCY MEDICINE

## 2025-01-01 PROCEDURE — 25000003 PHARM REV CODE 250

## 2025-01-01 PROCEDURE — 20000000 HC ICU ROOM

## 2025-01-01 PROCEDURE — 63600175 PHARM REV CODE 636 W HCPCS: Performed by: SURGERY

## 2025-01-01 PROCEDURE — 87086 URINE CULTURE/COLONY COUNT: CPT | Performed by: EMERGENCY MEDICINE

## 2025-01-01 PROCEDURE — 99223 1ST HOSP IP/OBS HIGH 75: CPT | Mod: ,,, | Performed by: INTERNAL MEDICINE

## 2025-01-01 PROCEDURE — C1751 CATH, INF, PER/CENT/MIDLINE: HCPCS

## 2025-01-01 PROCEDURE — 87324 CLOSTRIDIUM AG IA: CPT | Performed by: EMERGENCY MEDICINE

## 2025-01-01 PROCEDURE — 96375 TX/PRO/DX INJ NEW DRUG ADDON: CPT

## 2025-01-01 PROCEDURE — 81003 URINALYSIS AUTO W/O SCOPE: CPT | Performed by: STUDENT IN AN ORGANIZED HEALTH CARE EDUCATION/TRAINING PROGRAM

## 2025-01-01 PROCEDURE — 94761 N-INVAS EAR/PLS OXIMETRY MLT: CPT

## 2025-01-01 PROCEDURE — 96361 HYDRATE IV INFUSION ADD-ON: CPT

## 2025-01-01 PROCEDURE — 84484 ASSAY OF TROPONIN QUANT: CPT | Performed by: EMERGENCY MEDICINE

## 2025-01-01 PROCEDURE — 25000003 PHARM REV CODE 250: Performed by: INTERNAL MEDICINE

## 2025-01-01 PROCEDURE — 63600175 PHARM REV CODE 636 W HCPCS: Mod: JZ,TB | Performed by: STUDENT IN AN ORGANIZED HEALTH CARE EDUCATION/TRAINING PROGRAM

## 2025-01-01 PROCEDURE — 84100 ASSAY OF PHOSPHORUS: CPT | Performed by: SURGERY

## 2025-01-01 PROCEDURE — 99232 SBSQ HOSP IP/OBS MODERATE 35: CPT | Mod: GC,,, | Performed by: INTERNAL MEDICINE

## 2025-01-01 PROCEDURE — 25000003 PHARM REV CODE 250: Performed by: FAMILY MEDICINE

## 2025-01-01 PROCEDURE — 63600175 PHARM REV CODE 636 W HCPCS

## 2025-01-01 PROCEDURE — 85025 COMPLETE CBC W/AUTO DIFF WBC: CPT | Performed by: EMERGENCY MEDICINE

## 2025-01-01 PROCEDURE — 80053 COMPREHEN METABOLIC PANEL: CPT | Mod: 91 | Performed by: REGISTERED NURSE

## 2025-01-01 PROCEDURE — P9016 RBC LEUKOCYTES REDUCED: HCPCS | Performed by: EMERGENCY MEDICINE

## 2025-01-01 PROCEDURE — 83605 ASSAY OF LACTIC ACID: CPT | Performed by: STUDENT IN AN ORGANIZED HEALTH CARE EDUCATION/TRAINING PROGRAM

## 2025-01-01 PROCEDURE — 25000003 PHARM REV CODE 250: Performed by: REGISTERED NURSE

## 2025-01-01 PROCEDURE — 36430 TRANSFUSION BLD/BLD COMPNT: CPT

## 2025-01-01 PROCEDURE — 85027 COMPLETE CBC AUTOMATED: CPT | Performed by: SURGERY

## 2025-01-01 PROCEDURE — 99497 ADVNCD CARE PLAN 30 MIN: CPT | Mod: 25,,, | Performed by: STUDENT IN AN ORGANIZED HEALTH CARE EDUCATION/TRAINING PROGRAM

## 2025-01-01 PROCEDURE — 87425 ROTAVIRUS AG IA: CPT | Performed by: STUDENT IN AN ORGANIZED HEALTH CARE EDUCATION/TRAINING PROGRAM

## 2025-01-01 PROCEDURE — 99498 ADVNCD CARE PLAN ADDL 30 MIN: CPT | Mod: ,,, | Performed by: STUDENT IN AN ORGANIZED HEALTH CARE EDUCATION/TRAINING PROGRAM

## 2025-01-01 PROCEDURE — 80053 COMPREHEN METABOLIC PANEL: CPT

## 2025-01-01 PROCEDURE — 87045 FECES CULTURE AEROBIC BACT: CPT | Performed by: STUDENT IN AN ORGANIZED HEALTH CARE EDUCATION/TRAINING PROGRAM

## 2025-01-01 PROCEDURE — 84300 ASSAY OF URINE SODIUM: CPT

## 2025-01-01 PROCEDURE — 85027 COMPLETE CBC AUTOMATED: CPT

## 2025-01-01 PROCEDURE — 96365 THER/PROPH/DIAG IV INF INIT: CPT

## 2025-01-01 PROCEDURE — 80053 COMPREHEN METABOLIC PANEL: CPT | Performed by: EMERGENCY MEDICINE

## 2025-01-01 PROCEDURE — 83993 ASSAY FOR CALPROTECTIN FECAL: CPT | Performed by: STUDENT IN AN ORGANIZED HEALTH CARE EDUCATION/TRAINING PROGRAM

## 2025-01-01 PROCEDURE — 85018 HEMOGLOBIN: CPT | Performed by: EMERGENCY MEDICINE

## 2025-01-01 PROCEDURE — 11000001 HC ACUTE MED/SURG PRIVATE ROOM

## 2025-01-01 PROCEDURE — 87329 GIARDIA AG IA: CPT | Performed by: STUDENT IN AN ORGANIZED HEALTH CARE EDUCATION/TRAINING PROGRAM

## 2025-01-01 PROCEDURE — 80053 COMPREHEN METABOLIC PANEL: CPT | Performed by: REGISTERED NURSE

## 2025-01-01 PROCEDURE — 12000002 HC ACUTE/MED SURGE SEMI-PRIVATE ROOM

## 2025-01-01 PROCEDURE — 87449 NOS EACH ORGANISM AG IA: CPT | Mod: 91 | Performed by: STUDENT IN AN ORGANIZED HEALTH CARE EDUCATION/TRAINING PROGRAM

## 2025-01-01 PROCEDURE — 82803 BLOOD GASES ANY COMBINATION: CPT

## 2025-01-01 PROCEDURE — 63600175 PHARM REV CODE 636 W HCPCS: Mod: JZ,TB

## 2025-01-01 PROCEDURE — 84439 ASSAY OF FREE THYROXINE: CPT | Performed by: INTERNAL MEDICINE

## 2025-01-01 PROCEDURE — 99223 1ST HOSP IP/OBS HIGH 75: CPT | Mod: ,,, | Performed by: STUDENT IN AN ORGANIZED HEALTH CARE EDUCATION/TRAINING PROGRAM

## 2025-01-01 PROCEDURE — 02HV33Z INSERTION OF INFUSION DEVICE INTO SUPERIOR VENA CAVA, PERCUTANEOUS APPROACH: ICD-10-PCS | Performed by: FAMILY MEDICINE

## 2025-01-01 PROCEDURE — 94640 AIRWAY INHALATION TREATMENT: CPT

## 2025-01-01 PROCEDURE — 36415 COLL VENOUS BLD VENIPUNCTURE: CPT

## 2025-01-01 PROCEDURE — 83735 ASSAY OF MAGNESIUM: CPT | Performed by: EMERGENCY MEDICINE

## 2025-01-01 PROCEDURE — 30233N1 TRANSFUSION OF NONAUTOLOGOUS RED BLOOD CELLS INTO PERIPHERAL VEIN, PERCUTANEOUS APPROACH: ICD-10-PCS | Performed by: FAMILY MEDICINE

## 2025-01-01 PROCEDURE — 25000003 PHARM REV CODE 250: Performed by: NURSE PRACTITIONER

## 2025-01-01 PROCEDURE — 87046 STOOL CULTR AEROBIC BACT EA: CPT | Performed by: STUDENT IN AN ORGANIZED HEALTH CARE EDUCATION/TRAINING PROGRAM

## 2025-01-01 PROCEDURE — 82533 TOTAL CORTISOL: CPT | Performed by: INTERNAL MEDICINE

## 2025-01-01 PROCEDURE — 83735 ASSAY OF MAGNESIUM: CPT | Performed by: REGISTERED NURSE

## 2025-01-01 PROCEDURE — 63600175 PHARM REV CODE 636 W HCPCS: Performed by: EMERGENCY MEDICINE

## 2025-01-01 PROCEDURE — 85014 HEMATOCRIT: CPT | Performed by: EMERGENCY MEDICINE

## 2025-01-01 PROCEDURE — 86850 RBC ANTIBODY SCREEN: CPT | Performed by: STUDENT IN AN ORGANIZED HEALTH CARE EDUCATION/TRAINING PROGRAM

## 2025-01-01 PROCEDURE — 1152F DOC ADVNCD DIS COMFORT 1ST: CPT | Mod: CPTII,,, | Performed by: STUDENT IN AN ORGANIZED HEALTH CARE EDUCATION/TRAINING PROGRAM

## 2025-01-01 PROCEDURE — 93005 ELECTROCARDIOGRAM TRACING: CPT

## 2025-01-01 PROCEDURE — 36415 COLL VENOUS BLD VENIPUNCTURE: CPT | Performed by: INTERNAL MEDICINE

## 2025-01-01 PROCEDURE — 82595 ASSAY OF CRYOGLOBULIN: CPT | Performed by: STUDENT IN AN ORGANIZED HEALTH CARE EDUCATION/TRAINING PROGRAM

## 2025-01-01 PROCEDURE — 93010 ELECTROCARDIOGRAM REPORT: CPT | Mod: ,,, | Performed by: STUDENT IN AN ORGANIZED HEALTH CARE EDUCATION/TRAINING PROGRAM

## 2025-01-01 PROCEDURE — 99900035 HC TECH TIME PER 15 MIN (STAT)

## 2025-01-01 PROCEDURE — 85007 BL SMEAR W/DIFF WBC COUNT: CPT | Performed by: SURGERY

## 2025-01-01 PROCEDURE — 1158F ADVNC CARE PLAN TLK DOCD: CPT | Mod: CPTII,,, | Performed by: STUDENT IN AN ORGANIZED HEALTH CARE EDUCATION/TRAINING PROGRAM

## 2025-01-01 PROCEDURE — 85018 HEMOGLOBIN: CPT | Mod: 91

## 2025-01-01 PROCEDURE — 80048 BASIC METABOLIC PNL TOTAL CA: CPT | Mod: XB | Performed by: SURGERY

## 2025-01-01 PROCEDURE — 86850 RBC ANTIBODY SCREEN: CPT | Performed by: EMERGENCY MEDICINE

## 2025-01-01 PROCEDURE — 83735 ASSAY OF MAGNESIUM: CPT

## 2025-01-01 PROCEDURE — S4991 NICOTINE PATCH NONLEGEND: HCPCS | Performed by: FAMILY MEDICINE

## 2025-01-01 PROCEDURE — 83935 ASSAY OF URINE OSMOLALITY: CPT

## 2025-01-01 PROCEDURE — 80053 COMPREHEN METABOLIC PANEL: CPT | Performed by: STUDENT IN AN ORGANIZED HEALTH CARE EDUCATION/TRAINING PROGRAM

## 2025-01-01 PROCEDURE — 63600175 PHARM REV CODE 636 W HCPCS: Performed by: REGISTERED NURSE

## 2025-01-01 PROCEDURE — 86920 COMPATIBILITY TEST SPIN: CPT | Performed by: EMERGENCY MEDICINE

## 2025-01-01 PROCEDURE — 80048 BASIC METABOLIC PNL TOTAL CA: CPT | Performed by: INTERNAL MEDICINE

## 2025-01-01 PROCEDURE — 81000 URINALYSIS NONAUTO W/SCOPE: CPT | Performed by: EMERGENCY MEDICINE

## 2025-01-01 PROCEDURE — 25000003 PHARM REV CODE 250: Performed by: SURGERY

## 2025-01-01 PROCEDURE — 94799 UNLISTED PULMONARY SVC/PX: CPT

## 2025-01-01 PROCEDURE — 99291 CRITICAL CARE FIRST HOUR: CPT

## 2025-01-01 PROCEDURE — 83605 ASSAY OF LACTIC ACID: CPT | Performed by: EMERGENCY MEDICINE

## 2025-01-01 PROCEDURE — 83930 ASSAY OF BLOOD OSMOLALITY: CPT

## 2025-01-01 PROCEDURE — 85025 COMPLETE CBC W/AUTO DIFF WBC: CPT | Performed by: STUDENT IN AN ORGANIZED HEALTH CARE EDUCATION/TRAINING PROGRAM

## 2025-01-01 PROCEDURE — 36415 COLL VENOUS BLD VENIPUNCTURE: CPT | Performed by: STUDENT IN AN ORGANIZED HEALTH CARE EDUCATION/TRAINING PROGRAM

## 2025-01-01 PROCEDURE — 85025 COMPLETE CBC W/AUTO DIFF WBC: CPT | Performed by: REGISTERED NURSE

## 2025-01-01 PROCEDURE — 80061 LIPID PANEL: CPT | Performed by: SURGERY

## 2025-01-01 PROCEDURE — 87040 BLOOD CULTURE FOR BACTERIA: CPT | Mod: 59 | Performed by: EMERGENCY MEDICINE

## 2025-01-01 PROCEDURE — 86160 COMPLEMENT ANTIGEN: CPT | Mod: 59 | Performed by: STUDENT IN AN ORGANIZED HEALTH CARE EDUCATION/TRAINING PROGRAM

## 2025-01-01 PROCEDURE — 87427 SHIGA-LIKE TOXIN AG IA: CPT | Performed by: STUDENT IN AN ORGANIZED HEALTH CARE EDUCATION/TRAINING PROGRAM

## 2025-01-01 PROCEDURE — 82705 FATS/LIPIDS FECES QUAL: CPT | Performed by: STUDENT IN AN ORGANIZED HEALTH CARE EDUCATION/TRAINING PROGRAM

## 2025-01-01 PROCEDURE — 36569 INSJ PICC 5 YR+ W/O IMAGING: CPT

## 2025-01-01 PROCEDURE — 63600175 PHARM REV CODE 636 W HCPCS: Performed by: NURSE PRACTITIONER

## 2025-01-01 PROCEDURE — 25000003 PHARM REV CODE 250: Performed by: STUDENT IN AN ORGANIZED HEALTH CARE EDUCATION/TRAINING PROGRAM

## 2025-01-01 PROCEDURE — 86160 COMPLEMENT ANTIGEN: CPT | Performed by: STUDENT IN AN ORGANIZED HEALTH CARE EDUCATION/TRAINING PROGRAM

## 2025-01-01 PROCEDURE — 84443 ASSAY THYROID STIM HORMONE: CPT | Performed by: INTERNAL MEDICINE

## 2025-01-01 PROCEDURE — 87205 SMEAR GRAM STAIN: CPT | Performed by: STUDENT IN AN ORGANIZED HEALTH CARE EDUCATION/TRAINING PROGRAM

## 2025-01-01 PROCEDURE — 89055 LEUKOCYTE ASSESSMENT FECAL: CPT | Performed by: STUDENT IN AN ORGANIZED HEALTH CARE EDUCATION/TRAINING PROGRAM

## 2025-01-01 PROCEDURE — 27000221 HC OXYGEN, UP TO 24 HOURS

## 2025-01-01 PROCEDURE — 85014 HEMATOCRIT: CPT | Mod: 91

## 2025-01-01 RX ORDER — MAGNESIUM SULFATE HEPTAHYDRATE 40 MG/ML
2 INJECTION, SOLUTION INTRAVENOUS ONCE
Status: COMPLETED | OUTPATIENT
Start: 2025-01-01 | End: 2025-01-01

## 2025-01-01 RX ORDER — SODIUM CHLORIDE 0.9 % (FLUSH) 0.9 %
10 SYRINGE (ML) INJECTION
Status: DISCONTINUED | OUTPATIENT
Start: 2025-01-01 | End: 2025-01-01 | Stop reason: HOSPADM

## 2025-01-01 RX ORDER — MORPHINE SULFATE 4 MG/ML
4 INJECTION, SOLUTION INTRAMUSCULAR; INTRAVENOUS ONCE
Refills: 0 | Status: COMPLETED | OUTPATIENT
Start: 2025-01-01 | End: 2025-01-01

## 2025-01-01 RX ORDER — SODIUM CHLORIDE 0.9 % (FLUSH) 0.9 %
10 SYRINGE (ML) INJECTION EVERY 12 HOURS PRN
Status: DISCONTINUED | OUTPATIENT
Start: 2025-01-01 | End: 2025-01-01 | Stop reason: HOSPADM

## 2025-01-01 RX ORDER — CALCIUM GLUCONATE 20 MG/ML
1 INJECTION, SOLUTION INTRAVENOUS EVERY 10 MIN PRN
Status: DISCONTINUED | OUTPATIENT
Start: 2025-01-01 | End: 2025-01-01

## 2025-01-01 RX ORDER — ALUMINUM HYDROXIDE, MAGNESIUM HYDROXIDE, AND SIMETHICONE 1200; 120; 1200 MG/30ML; MG/30ML; MG/30ML
30 SUSPENSION ORAL EVERY 6 HOURS PRN
Status: DISCONTINUED | OUTPATIENT
Start: 2025-01-01 | End: 2025-01-01

## 2025-01-01 RX ORDER — ONDANSETRON HYDROCHLORIDE 2 MG/ML
4 INJECTION, SOLUTION INTRAVENOUS EVERY 8 HOURS PRN
Status: DISCONTINUED | OUTPATIENT
Start: 2025-01-01 | End: 2025-01-01

## 2025-01-01 RX ORDER — HYDROMORPHONE HYDROCHLORIDE 1 MG/ML
1 INJECTION, SOLUTION INTRAMUSCULAR; INTRAVENOUS; SUBCUTANEOUS
Status: DISCONTINUED | OUTPATIENT
Start: 2025-01-01 | End: 2025-01-01

## 2025-01-01 RX ORDER — HYDROCODONE BITARTRATE AND ACETAMINOPHEN 500; 5 MG/1; MG/1
TABLET ORAL
Status: DISCONTINUED | OUTPATIENT
Start: 2025-01-01 | End: 2025-01-01 | Stop reason: HOSPADM

## 2025-01-01 RX ORDER — FUROSEMIDE 10 MG/ML
80 INJECTION INTRAMUSCULAR; INTRAVENOUS ONCE
Status: COMPLETED | OUTPATIENT
Start: 2025-01-01 | End: 2025-01-01

## 2025-01-01 RX ORDER — LORAZEPAM 2 MG/ML
2 INJECTION INTRAMUSCULAR
Status: DISCONTINUED | OUTPATIENT
Start: 2025-01-01 | End: 2025-01-01 | Stop reason: HOSPADM

## 2025-01-01 RX ORDER — ALUMINUM HYDROXIDE, MAGNESIUM HYDROXIDE, AND SIMETHICONE 1200; 120; 1200 MG/30ML; MG/30ML; MG/30ML
30 SUSPENSION ORAL
Status: DISCONTINUED | OUTPATIENT
Start: 2025-01-01 | End: 2025-01-01

## 2025-01-01 RX ORDER — MUPIROCIN 20 MG/G
OINTMENT TOPICAL 2 TIMES DAILY
Status: DISCONTINUED | OUTPATIENT
Start: 2025-01-01 | End: 2025-01-01

## 2025-01-01 RX ORDER — HYDROMORPHONE HYDROCHLORIDE 1 MG/ML
1 INJECTION, SOLUTION INTRAMUSCULAR; INTRAVENOUS; SUBCUTANEOUS ONCE
Status: COMPLETED | OUTPATIENT
Start: 2025-01-01 | End: 2025-01-01

## 2025-01-01 RX ORDER — FUROSEMIDE 10 MG/ML
120 INJECTION INTRAMUSCULAR; INTRAVENOUS ONCE
Status: COMPLETED | OUTPATIENT
Start: 2025-01-01 | End: 2025-01-01

## 2025-01-01 RX ORDER — MORPHINE SULFATE 4 MG/ML
8 INJECTION, SOLUTION INTRAMUSCULAR; INTRAVENOUS
Refills: 0 | Status: COMPLETED | OUTPATIENT
Start: 2025-01-01 | End: 2025-01-01

## 2025-01-01 RX ORDER — HYDROMORPHONE HYDROCHLORIDE 1 MG/ML
1 INJECTION, SOLUTION INTRAMUSCULAR; INTRAVENOUS; SUBCUTANEOUS
Status: DISCONTINUED | OUTPATIENT
Start: 2025-01-01 | End: 2025-01-01 | Stop reason: HOSPADM

## 2025-01-01 RX ORDER — HYDROMORPHONE HYDROCHLORIDE 1 MG/ML
1 INJECTION, SOLUTION INTRAMUSCULAR; INTRAVENOUS; SUBCUTANEOUS EVERY 6 HOURS PRN
Status: DISCONTINUED | OUTPATIENT
Start: 2025-01-01 | End: 2025-01-01

## 2025-01-01 RX ORDER — POLYETHYLENE GLYCOL 3350 17 G/17G
17 POWDER, FOR SOLUTION ORAL NIGHTLY PRN
Status: DISCONTINUED | OUTPATIENT
Start: 2025-01-01 | End: 2025-01-03 | Stop reason: HOSPADM

## 2025-01-01 RX ORDER — METOLAZONE 10 MG/1
10 TABLET ORAL ONCE
Status: DISCONTINUED | OUTPATIENT
Start: 2025-01-01 | End: 2025-01-01

## 2025-01-01 RX ORDER — ONDANSETRON HYDROCHLORIDE 2 MG/ML
4 INJECTION, SOLUTION INTRAVENOUS
Status: COMPLETED | OUTPATIENT
Start: 2025-01-01 | End: 2025-01-01

## 2025-01-01 RX ORDER — POTASSIUM CHLORIDE 20 MEQ/1
40 TABLET, EXTENDED RELEASE ORAL ONCE
Status: COMPLETED | OUTPATIENT
Start: 2025-01-01 | End: 2025-01-01

## 2025-01-01 RX ORDER — LORAZEPAM 2 MG/ML
2 INJECTION INTRAMUSCULAR
Status: DISCONTINUED | OUTPATIENT
Start: 2025-01-01 | End: 2025-01-01

## 2025-01-01 RX ORDER — NOREPINEPHRINE BITARTRATE/D5W 4MG/250ML
0-3 PLASTIC BAG, INJECTION (ML) INTRAVENOUS CONTINUOUS
Status: DISCONTINUED | OUTPATIENT
Start: 2025-01-01 | End: 2025-01-01

## 2025-01-01 RX ORDER — CALCIUM GLUCONATE 20 MG/ML
1 INJECTION, SOLUTION INTRAVENOUS ONCE
Status: COMPLETED | OUTPATIENT
Start: 2025-01-01 | End: 2025-01-01

## 2025-01-01 RX ORDER — PANTOPRAZOLE SODIUM 40 MG/10ML
40 INJECTION, POWDER, LYOPHILIZED, FOR SOLUTION INTRAVENOUS DAILY
Status: DISCONTINUED | OUTPATIENT
Start: 2025-01-01 | End: 2025-01-01

## 2025-01-01 RX ORDER — PHENTOLAMINE MESYLATE 5 MG/1
5 INJECTION INTRAMUSCULAR; INTRAVENOUS ONCE
Status: COMPLETED | OUTPATIENT
Start: 2025-01-01 | End: 2025-01-01

## 2025-01-01 RX ORDER — NYSTATIN 100000 [USP'U]/ML
500000 SUSPENSION ORAL
Status: DISCONTINUED | OUTPATIENT
Start: 2025-01-01 | End: 2025-01-01

## 2025-01-01 RX ORDER — SODIUM CHLORIDE 9 MG/ML
INJECTION, SOLUTION INTRAVENOUS CONTINUOUS
Status: DISCONTINUED | OUTPATIENT
Start: 2025-01-01 | End: 2025-01-01

## 2025-01-01 RX ADMIN — Medication 0.28 MCG/KG/MIN: at 11:02

## 2025-01-01 RX ADMIN — MAGNESIUM SULFATE HEPTAHYDRATE 2 G: 40 INJECTION, SOLUTION INTRAVENOUS at 09:02

## 2025-01-01 RX ADMIN — HYDROMORPHONE HYDROCHLORIDE 1 MG: 1 INJECTION, SOLUTION INTRAMUSCULAR; INTRAVENOUS; SUBCUTANEOUS at 01:02

## 2025-01-01 RX ADMIN — Medication 0.16 MCG/KG/MIN: at 07:02

## 2025-01-01 RX ADMIN — LIDOCAINE HYDROCHLORIDE 15 ML: 20 SOLUTION ORAL; TOPICAL at 02:02

## 2025-01-01 RX ADMIN — SODIUM CHLORIDE 4.5 G: 9 INJECTION, SOLUTION INTRAVENOUS at 05:02

## 2025-01-01 RX ADMIN — LACTULOSE 20 G: 20 SOLUTION ORAL at 08:01

## 2025-01-01 RX ADMIN — Medication 0.22 MCG/KG/MIN: at 05:02

## 2025-01-01 RX ADMIN — SODIUM CHLORIDE 500 ML: 9 INJECTION, SOLUTION INTRAVENOUS at 05:02

## 2025-01-01 RX ADMIN — MUPIROCIN: 20 OINTMENT TOPICAL at 09:02

## 2025-01-01 RX ADMIN — LACTULOSE 20 G: 20 SOLUTION ORAL at 02:01

## 2025-01-01 RX ADMIN — FUROSEMIDE 80 MG: 10 INJECTION, SOLUTION INTRAVENOUS at 07:02

## 2025-01-01 RX ADMIN — PIPERACILLIN SODIUM AND TAZOBACTAM SODIUM 4.5 G: 4; .5 INJECTION, POWDER, LYOPHILIZED, FOR SOLUTION INTRAVENOUS at 05:02

## 2025-01-01 RX ADMIN — PANTOPRAZOLE SODIUM 40 MG: 40 TABLET, DELAYED RELEASE ORAL at 08:01

## 2025-01-01 RX ADMIN — Medication 0.28 MCG/KG/MIN: at 02:02

## 2025-01-01 RX ADMIN — PANTOPRAZOLE SODIUM 40 MG: 40 INJECTION, POWDER, FOR SOLUTION INTRAVENOUS at 08:02

## 2025-01-01 RX ADMIN — MUPIROCIN: 20 OINTMENT TOPICAL at 08:02

## 2025-01-01 RX ADMIN — DIPHENHYDRAMINE HCL 10 ML: 12.5 LIQUID ORAL at 01:01

## 2025-01-01 RX ADMIN — HUMAN INSULIN 7 UNITS: 100 INJECTION, SOLUTION SUBCUTANEOUS at 07:02

## 2025-01-01 RX ADMIN — DIPHENHYDRAMINE HCL 10 ML: 12.5 LIQUID ORAL at 08:01

## 2025-01-01 RX ADMIN — Medication 0.26 MCG/KG/MIN: at 05:02

## 2025-01-01 RX ADMIN — OXYBUTYNIN CHLORIDE 5 MG: 5 TABLET ORAL at 08:01

## 2025-01-01 RX ADMIN — Medication 0.3 MCG/KG/MIN: at 12:02

## 2025-01-01 RX ADMIN — Medication 0.24 MCG/KG/MIN: at 10:02

## 2025-01-01 RX ADMIN — HYDROMORPHONE HYDROCHLORIDE 1 MG: 1 INJECTION, SOLUTION INTRAMUSCULAR; INTRAVENOUS; SUBCUTANEOUS at 11:02

## 2025-01-01 RX ADMIN — TIOTROPIUM BROMIDE INHALATION SPRAY 2 PUFF: 3.12 SPRAY, METERED RESPIRATORY (INHALATION) at 08:01

## 2025-01-01 RX ADMIN — HYDROMORPHONE HYDROCHLORIDE 1 MG: 1 INJECTION, SOLUTION INTRAMUSCULAR; INTRAVENOUS; SUBCUTANEOUS at 03:02

## 2025-01-01 RX ADMIN — PIPERACILLIN SODIUM AND TAZOBACTAM SODIUM 4.5 G: 4; .5 INJECTION, POWDER, LYOPHILIZED, FOR SOLUTION INTRAVENOUS at 09:02

## 2025-01-01 RX ADMIN — HYDROMORPHONE HYDROCHLORIDE 1 MG: 1 INJECTION, SOLUTION INTRAMUSCULAR; INTRAVENOUS; SUBCUTANEOUS at 07:02

## 2025-01-01 RX ADMIN — Medication 0.26 MCG/KG/MIN: at 06:02

## 2025-01-01 RX ADMIN — Medication 0.26 MCG/KG/MIN: at 08:02

## 2025-01-01 RX ADMIN — MORPHINE SULFATE 8 MG: 4 INJECTION INTRAVENOUS at 08:02

## 2025-01-01 RX ADMIN — SODIUM CHLORIDE 4.5 G: 9 INJECTION, SOLUTION INTRAVENOUS at 01:02

## 2025-01-01 RX ADMIN — PANTOPRAZOLE SODIUM 40 MG: 40 INJECTION, POWDER, FOR SOLUTION INTRAVENOUS at 09:02

## 2025-01-01 RX ADMIN — SODIUM BICARBONATE: 84 INJECTION, SOLUTION INTRAVENOUS at 07:02

## 2025-01-01 RX ADMIN — FERROUS SULFATE TAB 325 MG (65 MG ELEMENTAL FE) 1 EACH: 325 (65 FE) TAB at 08:01

## 2025-01-01 RX ADMIN — CALCIUM GLUCONATE 1 G: 20 INJECTION, SOLUTION INTRAVENOUS at 07:02

## 2025-01-01 RX ADMIN — MORPHINE SULFATE 2 MG: 2 INJECTION, SOLUTION INTRAMUSCULAR; INTRAVENOUS at 12:01

## 2025-01-01 RX ADMIN — FUROSEMIDE 120 MG: 10 INJECTION, SOLUTION INTRAVENOUS at 04:02

## 2025-01-01 RX ADMIN — HYDROMORPHONE HYDROCHLORIDE 1 MG: 1 INJECTION, SOLUTION INTRAMUSCULAR; INTRAVENOUS; SUBCUTANEOUS at 10:02

## 2025-01-01 RX ADMIN — ONDANSETRON 4 MG: 2 INJECTION INTRAMUSCULAR; INTRAVENOUS at 08:02

## 2025-01-01 RX ADMIN — SODIUM CHLORIDE 4.5 G: 9 INJECTION, SOLUTION INTRAVENOUS at 09:02

## 2025-01-01 RX ADMIN — SODIUM CHLORIDE 1000 ML: 9 INJECTION, SOLUTION INTRAVENOUS at 04:02

## 2025-01-01 RX ADMIN — FUROSEMIDE 40 MG: 10 INJECTION, SOLUTION INTRAMUSCULAR; INTRAVENOUS at 08:01

## 2025-01-01 RX ADMIN — HYDROMORPHONE HYDROCHLORIDE 1 MG: 1 INJECTION, SOLUTION INTRAMUSCULAR; INTRAVENOUS; SUBCUTANEOUS at 09:02

## 2025-01-01 RX ADMIN — CALCIUM GLUCONATE 1 G: 20 INJECTION, SOLUTION INTRAVENOUS at 05:02

## 2025-01-01 RX ADMIN — NICOTINE 1 PATCH: 21 PATCH, EXTENDED RELEASE TRANSDERMAL at 08:01

## 2025-01-01 RX ADMIN — Medication 0.08 MCG/KG/MIN: at 02:02

## 2025-01-01 RX ADMIN — Medication 0.14 MCG/KG/MIN: at 07:02

## 2025-01-01 RX ADMIN — Medication 0.16 MCG/KG/MIN: at 01:02

## 2025-01-01 RX ADMIN — Medication 0.2 MCG/KG/MIN: at 01:02

## 2025-01-01 RX ADMIN — PHENTOLAMINE MESYLATE 5 MG: 5 INJECTION, POWDER, FOR SOLUTION INTRAMUSCULAR; INTRAVENOUS at 11:02

## 2025-01-01 RX ADMIN — Medication 0.26 MCG/KG/MIN: at 10:02

## 2025-01-01 RX ADMIN — HYDROMORPHONE HYDROCHLORIDE 1 MG: 1 INJECTION, SOLUTION INTRAMUSCULAR; INTRAVENOUS; SUBCUTANEOUS at 02:02

## 2025-01-01 RX ADMIN — MORPHINE SULFATE 4 MG: 4 INJECTION INTRAVENOUS at 04:02

## 2025-01-01 RX ADMIN — ALUMINUM HYDROXIDE, MAGNESIUM HYDROXIDE, AND SIMETHICONE 30 ML: 200; 200; 20 SUSPENSION ORAL at 06:02

## 2025-01-01 RX ADMIN — PIPERACILLIN SODIUM AND TAZOBACTAM SODIUM 4.5 G: 4; .5 INJECTION, POWDER, LYOPHILIZED, FOR SOLUTION INTRAVENOUS at 01:02

## 2025-01-01 RX ADMIN — MORPHINE SULFATE 2 MG: 2 INJECTION, SOLUTION INTRAMUSCULAR; INTRAVENOUS at 08:01

## 2025-01-01 RX ADMIN — LORAZEPAM 2 MG: 2 INJECTION INTRAMUSCULAR; INTRAVENOUS at 01:02

## 2025-01-01 RX ADMIN — POTASSIUM CHLORIDE 40 MEQ: 1500 TABLET, EXTENDED RELEASE ORAL at 08:01

## 2025-01-01 RX ADMIN — Medication 0.22 MCG/KG/MIN: at 02:02

## 2025-01-01 RX ADMIN — PIPERACILLIN SODIUM AND TAZOBACTAM SODIUM 4.5 G: 4; .5 INJECTION, POWDER, LYOPHILIZED, FOR SOLUTION INTRAVENOUS at 04:02

## 2025-01-01 RX ADMIN — HYDROMORPHONE HYDROCHLORIDE 1 MG: 1 INJECTION, SOLUTION INTRAMUSCULAR; INTRAVENOUS; SUBCUTANEOUS at 06:02

## 2025-01-01 RX ADMIN — Medication 0.26 MCG/KG/MIN: at 03:02

## 2025-01-01 RX ADMIN — OXYBUTYNIN CHLORIDE 5 MG: 5 TABLET ORAL at 02:01

## 2025-01-01 RX ADMIN — DIPHENHYDRAMINE HCL 10 ML: 12.5 LIQUID ORAL at 04:01

## 2025-01-01 RX ADMIN — Medication 0.3 MCG/KG/MIN: at 03:02

## 2025-01-01 RX ADMIN — Medication 0.26 MCG/KG/MIN: at 09:02

## 2025-01-01 RX ADMIN — SODIUM CHLORIDE, POTASSIUM CHLORIDE, SODIUM LACTATE AND CALCIUM CHLORIDE 1000 ML: 600; 310; 30; 20 INJECTION, SOLUTION INTRAVENOUS at 08:02

## 2025-01-01 RX ADMIN — ALUMINUM HYDROXIDE, MAGNESIUM HYDROXIDE, AND SIMETHICONE 30 ML: 200; 200; 20 SUSPENSION ORAL at 08:02

## 2025-01-01 RX ADMIN — METOPROLOL SUCCINATE 25 MG: 25 TABLET, EXTENDED RELEASE ORAL at 08:01

## 2025-01-01 RX ADMIN — MORPHINE SULFATE 2 MG: 2 INJECTION, SOLUTION INTRAMUSCULAR; INTRAVENOUS at 05:01

## 2025-01-01 RX ADMIN — PIPERACILLIN SODIUM AND TAZOBACTAM SODIUM 4.5 G: 4; .5 INJECTION, POWDER, LYOPHILIZED, FOR SOLUTION INTRAVENOUS at 07:02

## 2025-01-01 RX ADMIN — ALUMINUM HYDROXIDE, MAGNESIUM HYDROXIDE, AND SIMETHICONE 30 ML: 200; 200; 20 SUSPENSION ORAL at 10:02

## 2025-01-01 RX ADMIN — Medication 0.28 MCG/KG/MIN: at 06:02

## 2025-01-01 RX ADMIN — Medication 0.26 MCG/KG/MIN: at 12:02

## 2025-01-01 RX ADMIN — DEXTROSE MONOHYDRATE 50 G: 25 INJECTION, SOLUTION INTRAVENOUS at 07:02

## 2025-01-01 RX ADMIN — ALUMINUM HYDROXIDE, MAGNESIUM HYDROXIDE, AND SIMETHICONE 30 ML: 200; 200; 20 SUSPENSION ORAL at 03:02

## 2025-01-01 RX ADMIN — Medication 0.5 MCG/KG/MIN: at 09:02

## 2025-01-01 NOTE — ASSESSMENT & PLAN NOTE
Patient's hemorrhage is due to gastrointestinal bleed, patient does have a propensity for bleeding due to duodenal ulcers.. Patients most recent Hgb, Hct, platelets, and INR are listed below.  Recent Labs     12/30/24  0336 12/31/24  0847 12/31/24  1629 01/01/25  0301   HGB 7.5* 8.4*  --  8.4*   HCT 23.2* 26.4*  --  26.0*    337  --  363   INR  --   --  1.5*  --        Plan  - Will trend hemoglobin/hematocrit Every 6 hours  - Will monitor and correct any coagulation defects  - Will transfuse if Hgb is <7g/dl (<8g/dl in cases of active ACS) or if patient has rapid bleeding leading to hemodynamic instability  - upper GI endoscopy (11/11/24) revealed gastritis, mucosal changes to duodenum, and duodenal ulcer, biopsy concerning for amyloidosis   -patient refused rectal exam and occult stool   -consult GI: Appreciate recs  Recommend oncology evaluation for amyloidosis   No endoscopic intervention at this time   PPI 40 mg BID as recent duodenal ulcer appreciated on EGD (needs BID therapy for total of 8 weeks)  Transfuse for hemoglobin less than 7   CBC daily   -continue PPI   --okay to feed per GI,no endoscopic intervention at this time, PPI b.i.d. for recent duodenal ulcer

## 2025-01-01 NOTE — ASSESSMENT & PLAN NOTE
"Patient has Combined Systolic and Diastolic heart failure that is Acute on chronic. On presentation their CHF was decompensated. Evidence of decompensated CHF on presentation includes: edema, dyspnea on exertion (MONSON), and shortness of breath. The etiology of their decompensation is likely cardiac arrhythmia (SVT on EKG) . Most recent BNP and echo results are listed below.  No results for input(s): "BNP" in the last 72 hours.    Latest ECHO  Results for orders placed during the hospital encounter of 08/12/24    Echo    Interpretation Summary    Left Ventricle: The left ventricle is moderately dilated. There is eccentric hypertrophy. Moderate global hypokinesis present. There is moderately reduced systolic function with a visually estimated ejection fraction of 30 - 35%. Biplane (2D) method of discs ejection fraction is 34%. There is diastolic dysfunction but grade cannot be determined.    Right Ventricle: Normal right ventricular cavity size. Wall thickness is normal. Systolic function is normal.    Left Atrium: Left atrium is severely dilated.    Right Atrium: Right atrium is moderately dilated.    Aortic Valve: There is mild stenosis. Aortic valve area by VTI is 1.95 cm². Aortic valve peak velocity is 1.40 m/s. Mean gradient is 4 mmHg. The dimensionless index is 0.62.    Mitral Valve: There is mild regurgitation.    Tricuspid Valve: There is mild regurgitation.    Pulmonic Valve: There is mild regurgitation.    Pulmonary Artery: The estimated pulmonary artery systolic pressure is 36 mmHg.    IVC/SVC: Normal venous pressure at 3 mmHg.    Current Heart Failure Medications  , Daily, Oral  metoprolol succinate (TOPROL-XL) 24 hr tablet 25 mg, Daily, Oral  furosemide injection 40 mg, Every 12 hours, Intravenous    Plan  - Monitor strict I&Os and daily weights.    - Place on telemetry  -patient NPO 2/2 concern for active GI bleeding   -she received 60 mg furosemide in ED   TTE in August with EF of " 30-35%  Holding ACEi / ARB given renal function    Inpatient Upgrade Note    Mary Ellen Saini has warranted treatment spanning two or more midnights of hospital level care for the management of KENY. She continues to require daily labs, monitoring of vital signs, IV pain medication, medication adjustments, and further evaluation by consultants. Her condition is also complicated by the following comorbidities: Hypertension, Chronic respiratory disease, and polypharmacy.     -12/31 12/31 patient had repeat echocardiogram, cardiology is on board, we will continue to hold losartan due to worsening KENY, Nephrology was consulted, patient is responding well to diuresis.

## 2025-01-01 NOTE — ASSESSMENT & PLAN NOTE
Patient is likely to have ischemic hepatitis as evidenced by abnormalities in AST and ALT. Etiology includes cardiogenic shock. Continue to monitor liver function while treating underlying condition. Daily labs to include CMP, Liver Function Panel, and PT/INR.    AST   Date Value Ref Range Status   01/01/2025 566 (H) 10 - 40 U/L Final     ALT   Date Value Ref Range Status   01/01/2025 603 (H) 10 - 44 U/L Final     -ultrasound of the liver ordered lactic acid was within the normal GI was consulted about starting patient on NAC given the actual volume from the management  -ammonia was elevated patient was started on lactulose  - significantly improvement of her liver function today with the diuresis

## 2025-01-01 NOTE — PROGRESS NOTES
[facility]  Progress Note    Patient Name: Mary Ellen Saini  MRN: [id number]  Patient Class: IP- Inpatient   Admission Date: 12/29/2024  Length of Stay: 2 days  Attending Physician: Toshia Rees MD  Primary Care Provider: Marlen, Primary Doctor        Subjective     Principal Problem:NSTEMI (non-ST elevated myocardial infarction)        HPI:  58 yo female with PMH of HCV, HFrEF (30-35%) supposed to have a LifeVest, CAD, AFib, COPD, colorectal cancer and medication nonadherence presented to ED with complaint of chest pain. Pt reports substernal chest pain described as sharp, pressure associated with SOB. She has had similar symptoms in the past. Symptoms reportedly started after patient informed her son had been arrested. She denies associated n/v, cough/congestion, dizziness, diaphoresis and syncope. Pt denies active chest pain on my exam however she notes lower abdominal pain rated 10/10. Also reports bright red blood per rectum over the last 2 days. This is a recurring issue. She is followed by GI, last EGD/colonoscopy 11/2024, biopsy concerning for amyloidosis.       ED evaluation: Patient tachycardic on arrival. Initial EKG shows sinus tach rate 112, subsequent EKG remarkable for SVT rate 180s resolved with 20 mg diltiazem. Hbg/Hct 22/7 (baseline Hbg 8-9), K 3.4, BUN/Cr 23/1.8, BNP 2556, troponin 0.208, d-dimer 1.38. CXR negative for acute findings. Venous doppler LE negative for DVT. Patient received 60 mg furosemide, KCL and protonix. GI consulted. Patient admitted to hospital medicine for further evaluation and care.       Overview/Hospital Course:  No notes on file    Interval History:  Patient was seen in the morning complaining of fatigue , liver function improved with good diuresis .  Appreciate nephro recs.    Review of Systems   Constitutional:  Negative for chills, diaphoresis and fever.   Respiratory:  Negative for cough, chest tightness, shortness of breath and wheezing.     Cardiovascular:  Negative for chest pain, palpitations and leg swelling.   Gastrointestinal:  Negative for abdominal pain, blood in stool, diarrhea, nausea and vomiting.   Genitourinary:  Negative for dysuria, flank pain, frequency and hematuria.   Musculoskeletal:  Negative for back pain and myalgias.   Neurological:  Negative for dizziness, syncope, light-headedness and headaches.   Psychiatric/Behavioral:  Negative for confusion.      Objective:     Vital Signs (Most Recent):  Temp: 98.2 °F (36.8 °C) (01/01/25 1149)  Pulse: 89 (01/01/25 1611)  Resp: 18 (01/01/25 1227)  BP: 110/69 (01/01/25 1149)  SpO2: (!) 94 % (01/01/25 1149) Vital Signs (24h Range):  Temp:  [97.6 °F (36.4 °C)-98.9 °F (37.2 °C)] 98.2 °F (36.8 °C)  Pulse:  [84-97] 89  Resp:  [16-18] 18  SpO2:  [94 %-100 %] 94 %  BP: (110-118)/(63-78) 110/69     Weight: 72.6 kg (160 lb)  Body mass index is 22.96 kg/m².  No intake or output data in the 24 hours ending 01/01/25 1641      Physical Exam  Vitals and nursing note reviewed.   Constitutional:       Appearance: She is well-developed.   HENT:      Head: Normocephalic and atraumatic.   Neck:      Vascular: No JVD.   Cardiovascular:      Rate and Rhythm: Normal rate and regular rhythm.      Heart sounds: Normal heart sounds.      Comments: Bilateral LE edema R>L   Pulmonary:      Effort: Pulmonary effort is normal. No respiratory distress.      Breath sounds: No wheezing.   Abdominal:      General: Bowel sounds are normal. There is no distension.      Palpations: Abdomen is soft.      Tenderness: There is no abdominal tenderness. There is no guarding.   Musculoskeletal:         General: No tenderness. Normal range of motion.      Cervical back: Normal range of motion.      Right lower leg: Edema present.      Left lower leg: Edema present.   Skin:     General: Skin is warm and dry.      Capillary Refill: Capillary refill takes less than 2 seconds.   Neurological:      Mental Status: She is alert and  oriented to person, place, and time.   Psychiatric:         Behavior: Behavior normal.             Significant Labs: All pertinent labs within the past 24 hours have been reviewed.  Recent Lab Results  (Last 5 results in the past 24 hours)        01/01/25  1249   01/01/25  0547   01/01/25  0301   01/01/25  0028   12/31/24  2130        Albumin     2.5     2.7       ALP     128     139       ALT     603     707       Anion Gap     14     14       AST     566     755       BILIRUBIN TOTAL     0.7  Comment: For infants and newborns, interpretation of results should be based  on gestational age, weight and in agreement with clinical  observations.    Premature Infant recommended reference ranges:  Up to 24 hours.............<8.0 mg/dL  Up to 48 hours............<12.0 mg/dL  3-5 days..................<15.0 mg/dL  6-29 days.................<15.0 mg/dL       0.7  Comment: For infants and newborns, interpretation of results should be based  on gestational age, weight and in agreement with clinical  observations.    Premature Infant recommended reference ranges:  Up to 24 hours.............<8.0 mg/dL  Up to 48 hours............<12.0 mg/dL  3-5 days..................<15.0 mg/dL  6-29 days.................<15.0 mg/dL         BUN     29     26       Calcium     8.2     8.5       Chloride     95     95       CO2     23     24       Complement (C-3) 72               Complement (C-4) 8               Creatinine     2.0     2.0       eGFR     28     28       Glucose     118     131       Hematocrit     26.0           Hemoglobin     8.4           Magnesium      1.6     1.7       MCH     24.8           MCHC     32.3           MCV     77           MPV     10.9           Platelet Count     363           POCT Glucose   126     119         Potassium     3.3     3.6       PROTEIN TOTAL     7.4     8.0       RBC     3.39           RDW     14.6           Sodium     132     133       WBC     17.18                                  Significant  Imaging: I have reviewed all pertinent imaging results/findings within the past 24 hours.    Imaging Results              US Lower Extremity Veins Bilateral (Final result)  Result time 12/29/24 20:21:11      Final result by Nick Clifton DO (12/29/24 20:21:11)                   Impression:      No evidence of deep venous thrombosis in either lower extremity.      Electronically signed by: Nick Clifton  Date:    12/29/2024  Time:    20:21               Narrative:    EXAMINATION:  US LOWER EXTREMITY VEINS BILATERAL    CLINICAL HISTORY:  lower extremity edema  R> L r/o DVT;    TECHNIQUE:  Duplex and color flow Doppler and dynamic compression was performed of the bilateral lower extremity veins was performed.    COMPARISON:  None    FINDINGS:  Right thigh veins: The common femoral, femoral, popliteal, upper greater saphenous, and deep femoral veins are patent and free of thrombus. The veins are normally compressible and have normal phasic flow and augmentation response.    Right calf veins: The visualized calf veins are patent.    Left thigh veins: The common femoral, femoral, popliteal, upper greater saphenous, and deep femoral veins are patent and free of thrombus. The veins are normally compressible and have normal phasic flow and augmentation response.    Left calf veins: The visualized calf veins are patent.    Miscellaneous: None                                       X-Ray Chest AP Portable (Final result)  Result time 12/29/24 17:33:24      Final result by Nick Clifton DO (12/29/24 17:33:24)                   Impression:      No acute abnormality.      Electronically signed by: Nick Clifton  Date:    12/29/2024  Time:    17:33               Narrative:    EXAMINATION:  XR CHEST AP PORTABLE    CLINICAL HISTORY:  Chest Pain;    TECHNIQUE:  Single frontal view of the chest was performed.    COMPARISON:  11/07/2024.    FINDINGS:  The lungs are well expanded and clear. No focal opacities are seen. The  pleural spaces are clear. The cardiac silhouette is enlarged.  The visualized osseous structures demonstrate degenerative changes.                                      Echo    Result Date: 12/31/2024    Left Ventricle: The left ventricle is mildly dilated. Mildly increased   wall thickness. Severe global hypokinesis present. There is severely   reduced systolic function. Quantitated ejection fraction is 28%. Grade II   diastolic dysfunction.    Right Ventricle: Normal right ventricular cavity size. Right ventricle   wall motion has global hypokinesis. Systolic function is mildly reduced.    Left Atrium: Left atrium is severely dilated.    Right Atrium: Right atrium is dilated.    Tricuspid Valve: There is moderate regurgitation.    Pulmonary Artery: There is mild pulmonary hypertension. The estimated   pulmonary artery systolic pressure is 47 mmHg.         Assessment and Plan     * NSTEMI (non-ST elevated myocardial infarction)  -EKG shows no ST elevation  -Patient states chest pain resolved on my exam   -Troponin 0.208> 0.2160 > 0.281  -Trend troponin, monitor telemetry  -Hold ASA, plavix and anticoagulation 2/2 concern for active GI bleeding         Shock liver  Patient is likely to have ischemic hepatitis as evidenced by abnormalities in AST and ALT. Etiology includes cardiogenic shock. Continue to monitor liver function while treating underlying condition. Daily labs to include CMP, Liver Function Panel, and PT/INR.    AST   Date Value Ref Range Status   01/01/2025 566 (H) 10 - 40 U/L Final     ALT   Date Value Ref Range Status   01/01/2025 603 (H) 10 - 44 U/L Final     -ultrasound of the liver ordered lactic acid was within the normal GI was consulted about starting patient on NAC given the actual volume from the management  -ammonia was elevated patient was started on lactulose  - significantly improvement of her liver function today with the diuresis     Hematochezia  GI was consulted no need for intervention  at the moment per their note continue to hold anticoagulants, hemoglobin is stable      SVT (supraventricular tachycardia)    Secondary to acute decompensated heart failure cardiology is on board patient is currently on tele monitor    Other amyloidosis    Oncology was consulted nephrology was consulted appreciate recommendations    ABLA (acute blood loss anemia)  Anemia is likely due to acute blood loss which was from rectum . Most recent hemoglobin and hematocrit are listed below.  Recent Labs     12/30/24  0336 12/31/24  0847 01/01/25  0301   HGB 7.5* 8.4* 8.4*   HCT 23.2* 26.4* 26.0*       Plan  - Monitor serial CBC: Every 6 hours  - Transfuse PRBC if patient becomes hemodynamically unstable, symptomatic or H/H drops below 7/21.  - Patient has not received any PRBC transfusions to date  - Patient's anemia is currently stable      GIB (gastrointestinal bleeding)  Patient's hemorrhage is due to gastrointestinal bleed, patient does have a propensity for bleeding due to duodenal ulcers.. Patients most recent Hgb, Hct, platelets, and INR are listed below.  Recent Labs     12/30/24  0336 12/31/24  0847 12/31/24  1629 01/01/25  0301   HGB 7.5* 8.4*  --  8.4*   HCT 23.2* 26.4*  --  26.0*    337  --  363   INR  --   --  1.5*  --        Plan  - Will trend hemoglobin/hematocrit Every 6 hours  - Will monitor and correct any coagulation defects  - Will transfuse if Hgb is <7g/dl (<8g/dl in cases of active ACS) or if patient has rapid bleeding leading to hemodynamic instability  - upper GI endoscopy (11/11/24) revealed gastritis, mucosal changes to duodenum, and duodenal ulcer, biopsy concerning for amyloidosis   -patient refused rectal exam and occult stool   -consult GI: Appreciate recs  Recommend oncology evaluation for amyloidosis   No endoscopic intervention at this time   PPI 40 mg BID as recent duodenal ulcer appreciated on EGD (needs BID therapy for total of 8 weeks)  Transfuse for hemoglobin less than 7   CBC  "daily   -continue PPI   --okay to feed per GI,no endoscopic intervention at this time, PPI b.i.d. for recent duodenal ulcer      Tobacco dependency  Dangers of cigarette smoking were reviewed with patient in detail. Patient was Counseled for 3-10 minutes. Nicotine replacement options were discussed. Nicotine replacement was discussed- prescribed    Colorectal carcinoma  -patient followed by heme/onc       Hypokalemia  Patient's most recent potassium results are listed below.   Recent Labs     12/31/24  0847 12/31/24  2130 01/01/25  0301   K 2.8* 3.6 3.3*       Plan  - Replete potassium per protocol  - Monitor potassium Daily  - Patient's hypokalemia is stable      Acute on chronic combined systolic and diastolic congestive heart failure  Patient has Combined Systolic and Diastolic heart failure that is Acute on chronic. On presentation their CHF was decompensated. Evidence of decompensated CHF on presentation includes: edema, dyspnea on exertion (MONSON), and shortness of breath. The etiology of their decompensation is likely cardiac arrhythmia (SVT on EKG) . Most recent BNP and echo results are listed below.  No results for input(s): "BNP" in the last 72 hours.    Latest ECHO  Results for orders placed during the hospital encounter of 08/12/24    Echo    Interpretation Summary    Left Ventricle: The left ventricle is moderately dilated. There is eccentric hypertrophy. Moderate global hypokinesis present. There is moderately reduced systolic function with a visually estimated ejection fraction of 30 - 35%. Biplane (2D) method of discs ejection fraction is 34%. There is diastolic dysfunction but grade cannot be determined.    Right Ventricle: Normal right ventricular cavity size. Wall thickness is normal. Systolic function is normal.    Left Atrium: Left atrium is severely dilated.    Right Atrium: Right atrium is moderately dilated.    Aortic Valve: There is mild stenosis. Aortic valve area by VTI is 1.95 cm². Aortic " valve peak velocity is 1.40 m/s. Mean gradient is 4 mmHg. The dimensionless index is 0.62.    Mitral Valve: There is mild regurgitation.    Tricuspid Valve: There is mild regurgitation.    Pulmonic Valve: There is mild regurgitation.    Pulmonary Artery: The estimated pulmonary artery systolic pressure is 36 mmHg.    IVC/SVC: Normal venous pressure at 3 mmHg.    Current Heart Failure Medications  , Daily, Oral  metoprolol succinate (TOPROL-XL) 24 hr tablet 25 mg, Daily, Oral  furosemide injection 40 mg, Every 12 hours, Intravenous    Plan  - Monitor strict I&Os and daily weights.    - Place on telemetry  -patient NPO 2/2 concern for active GI bleeding   -she received 60 mg furosemide in ED   TTE in August with EF of 30-35%  Holding ACEi / ARB given renal function    Inpatient Upgrade Note    Mary Ellen Saini has warranted treatment spanning two or more midnights of hospital level care for the management of KENY. She continues to require daily labs, monitoring of vital signs, IV pain medication, medication adjustments, and further evaluation by consultants. Her condition is also complicated by the following comorbidities: Hypertension, Chronic respiratory disease, and polypharmacy.     -12/31 12/31 patient had repeat echocardiogram, cardiology is on board, we will continue to hold losartan due to worsening KENY, Nephrology was consulted, patient is responding well to diuresis.      Elevated troponin    Secondary to acute decompensated heart failure and NSTEMI cardiology is on board holding anticoagulants due to GI bleed      VTE Risk Mitigation (From admission, onward)           Ordered     IP VTE HIGH RISK PATIENT  Once         12/29/24 1908     Place sequential compression device  Until discontinued         12/29/24 1908                    Discharge Planning   JENAE: 12/31/2024     Code Status: Full Code   Medical Readiness for Discharge Date:   Discharge Plan A: Other                        Toshia Fofana MD  Department  [facility]   [facility]

## 2025-01-01 NOTE — CONSULTS
Nephrology Consult  H&P      Consult Requested By: Toshia Rees MD  Reason for Consult: KENY      SUBJECTIVE:     History of Present Illness:  Mary Ellen Saini is a 59 y.o.   female who  has a past medical history of Asthma, Bipolar disorder, and Hypertension..intermitent methamphetamine use Hep C not treated. GI amyloidosis. HFrEF30% The patient presented to the [facility] on 12/29/2024 with a primary complaint of chest pain.   ?    Review of Systems   Constitutional:  Negative for chills and fever.   HENT:  Negative for congestion and sore throat.    Eyes:  Negative for blurred vision, double vision and photophobia.   Respiratory:  Negative for cough and shortness of breath.    Cardiovascular:  Negative for chest pain, palpitations and leg swelling.   Gastrointestinal:  Positive for abdominal pain. Negative for diarrhea, nausea and vomiting.   Genitourinary:  Negative for dysuria and urgency.   Musculoskeletal:  Negative for joint pain and myalgias.   Skin:  Negative for itching and rash.   Neurological:  Negative for dizziness, sensory change, weakness and headaches.   Endo/Heme/Allergies:  Negative for polydipsia. Does not bruise/bleed easily.   Psychiatric/Behavioral:  Negative for depression.        Past Medical History:   Diagnosis Date    Asthma     Bipolar disorder     Hypertension      Past Surgical History:   Procedure Laterality Date    CHOLECYSTECTOMY      COLONOSCOPY N/A 10/22/2024    Procedure: COLONOSCOPY;  Surgeon: Dayday Freed MD;  Location: [facility];  Service: Endoscopy;  Laterality: N/A;    COLONOSCOPY N/A 11/11/2024    Procedure: COLONOSCOPY;  Surgeon: Reese Chen MD;  Location: [facility];  Service: Endoscopy;  Laterality: N/A;    ESOPHAGOGASTRODUODENOSCOPY N/A 10/22/2024    Procedure: EGD (ESOPHAGOGASTRODUODENOSCOPY);  Surgeon: Dayday Freed MD;  Location: [facility];  Service: Endoscopy;  Laterality: N/A;    ESOPHAGOGASTRODUODENOSCOPY N/A 11/11/2024     Procedure: EGD (ESOPHAGOGASTRODUODENOSCOPY);  Surgeon: Reese Chen MD;  Location: [facility];  Service: Endoscopy;  Laterality: N/A;    SHOULDER SURGERY      TUBAL LIGATION       No family history on file.  Social History     Tobacco Use    Smoking status: Every Day     Current packs/day: 1.00     Average packs/day: 2.0 packs/day for 58.0 years (115.1 ttl pk-yrs)     Types: Cigarettes     Start date: 1967    Smokeless tobacco: Never    Tobacco comments:     Pt is a 2 pk/day cigarette smoker x 57 yrs. She states that she cut down to about 1 pk/day, and is trying to quit.  Ambulatory referral to Smoking Cessation clinic following hospital discharge.     Substance Use Topics    Alcohol use: Yes     Comment: socailly    Drug use: Yes     Types: Methamphetamines, Marijuana     Comment: denies cocaine or meth. Reports maijurana use       Review of patient's allergies indicates:  No Known Allergies         OBJECTIVE:     Vital Signs (Most Recent)  Vitals:    01/01/25 0735 01/01/25 0758 01/01/25 0819 01/01/25 1149   BP:  114/63  110/69   BP Location:  Left arm  Left arm   Patient Position:  Lying  Lying   Pulse: 97 94 88 90   Resp:  18 18 18   Temp:  97.6 °F (36.4 °C)  98.2 °F (36.8 °C)   TempSrc:  Oral  Oral   SpO2:  98% 100% (!) 94%   Weight:       Height:                     Medications:   duke's soln (benadryl 30 mL, mylanta 30 mL, LIDOcaine 30 mL, nystatin 30 mL) 120 mL  10 mL Oral QID    ferrous sulfate  1 tablet Oral Daily    furosemide (LASIX) injection  40 mg Intravenous Q12H    lactulose  20 g Oral TID    metoprolol succinate  25 mg Oral Daily    nicotine  1 patch Transdermal Daily    oxybutynin  5 mg Oral TID    pantoprazole  40 mg Oral BID    tiotropium bromide  2 puff Inhalation Daily           Physical Exam  Vitals and nursing note reviewed.   Constitutional:       General: She is not in acute distress.     Appearance: She is ill-appearing. She is not diaphoretic.   HENT:      Head: Normocephalic and  atraumatic.      Mouth/Throat:      Pharynx: No oropharyngeal exudate.   Eyes:      General: No scleral icterus.     Conjunctiva/sclera: Conjunctivae normal.      Pupils: Pupils are equal, round, and reactive to light.   Cardiovascular:      Rate and Rhythm: Normal rate and regular rhythm.      Heart sounds: Normal heart sounds. No murmur heard.  Pulmonary:      Effort: Pulmonary effort is normal. No respiratory distress.      Breath sounds: Normal breath sounds.   Abdominal:      General: Bowel sounds are normal. There is no distension.      Palpations: Abdomen is soft.      Tenderness: There is abdominal tenderness.   Musculoskeletal:         General: No swelling. Normal range of motion.      Cervical back: Normal range of motion and neck supple.      Right lower leg: No edema.      Left lower leg: Edema present.   Skin:     General: Skin is warm and dry.      Findings: No erythema.   Neurological:      Mental Status: She is alert and oriented to person, place, and time.      Cranial Nerves: No cranial nerve deficit.   Psychiatric:         Mood and Affect: Affect normal.         Cognition and Memory: Memory normal.         Judgment: Judgment normal.         Laboratory:  Recent Labs   Lab 12/29/24  1620 12/29/24  1912 12/30/24  0336 12/31/24  0847 01/01/25  0301   WBC 10.35 10.36 11.54 11.89 17.18*   HGB 7.2* 7.4* 7.5* 8.4* 8.4*   HCT 22.5* 23.3* 23.2* 26.4* 26.0*    294 319 337 363   MONO 5.6  0.6 4.4  0.5 5.8  0.7  --   --    EOSINOPHIL 0.5 0.5 0.2  --   --      Recent Labs   Lab 12/31/24  0847 12/31/24  2130 01/01/25  0301   * 133* 132*   K 2.8* 3.6 3.3*   CL 95 95 95   CO2 23 24 23   BUN 27* 26* 29*   CREATININE 1.9* 2.0* 2.0*   CALCIUM 8.1* 8.5* 8.2*       Diagnostic Results:  X-Ray: Reviewed  US: Reviewed  Echo: Reviewed  ASSESSMENT/PLAN:       KENY - Not clear etiology at this point  UA no blood no protein no bacteria baseline Cr 1.1-1.4 multiple KENY in the past   Possibly due to Hemodynamics  "  -- Check US kidney C3/C4 Eos check Cryo RF + know to have Hep C   -- Check Bladder scan to rule out obstruction post void >250ml place potter.  -- Daily Renal Function Panel  -- Avoid Hypotension.  -- Renally dose all meds  -- Please avoid nephrotoxins, including NSAIDs, aminoglycosides, IV contrast (unless absolutely necessary), gadolinium, fleets and other phosphorous-based laxatives. Caution with antibiotics.    HFrEF   -- agree with lasix for volume optimization       Hypokalemia  - replace     Anemia       Recent Labs   Lab 12/30/24  0336 12/31/24  0847 01/01/25  0301   HGB 7.5* 8.4* 8.4*   HCT 23.2* 26.4* 26.0*    337 363       Iron - start PO iron   Lab Results   Component Value Date    IRON 18 (L) 11/14/2024    TIBC 337 11/14/2024    FERRITIN 78 11/14/2024        Hypomagnesemia   -- replace   Recent Labs   Lab 01/01/25  0301   CALCIUM 8.2*     Recent Labs   Lab 12/31/24  0847 12/31/24  2130 01/01/25  0301   MG 1.4* 1.7 1.6       Lab Results   Component Value Date    CALCIUM 8.2 (L) 01/01/2025    PHOS 3.1 11/13/2024     No results found for: "XWOFSLMY63PT"    Lab Results   Component Value Date    CO2 23 01/01/2025        Nutrition/Hypoalbuminemia    Recent Labs   Lab 12/29/24  1624 12/30/24  0336 12/31/24  1629 12/31/24  2130 01/01/25  0301   LABPROT 14.1*  --  15.6*  --   --    ALBUMIN  --    < >  --  2.7* 2.5*    < > = values in this interval not displayed.     Nepro with meals TID.       Thank you for allowing me to participate in care of your patient  With any question please call   Carlie Patterson MD     [facility]  VIOLETTA Montes MD,   MD DORIAN Pan MD E. V. Harmon, NP  [address]   [place], [address] [phone]  After hours answering service: [phone]   "

## 2025-01-01 NOTE — SUBJECTIVE & OBJECTIVE
Interval History:  Patient was seen in the morning complaining of fatigue , liver function improved with good diuresis .  Appreciate nephro recs.    Review of Systems   Constitutional:  Negative for chills, diaphoresis and fever.   Respiratory:  Negative for cough, chest tightness, shortness of breath and wheezing.    Cardiovascular:  Negative for chest pain, palpitations and leg swelling.   Gastrointestinal:  Negative for abdominal pain, blood in stool, diarrhea, nausea and vomiting.   Genitourinary:  Negative for dysuria, flank pain, frequency and hematuria.   Musculoskeletal:  Negative for back pain and myalgias.   Neurological:  Negative for dizziness, syncope, light-headedness and headaches.   Psychiatric/Behavioral:  Negative for confusion.      Objective:     Vital Signs (Most Recent):  Temp: 98.2 °F (36.8 °C) (01/01/25 1149)  Pulse: 89 (01/01/25 1611)  Resp: 18 (01/01/25 1227)  BP: 110/69 (01/01/25 1149)  SpO2: (!) 94 % (01/01/25 1149) Vital Signs (24h Range):  Temp:  [97.6 °F (36.4 °C)-98.9 °F (37.2 °C)] 98.2 °F (36.8 °C)  Pulse:  [84-97] 89  Resp:  [16-18] 18  SpO2:  [94 %-100 %] 94 %  BP: (110-118)/(63-78) 110/69     Weight: 72.6 kg (160 lb)  Body mass index is 22.96 kg/m².  No intake or output data in the 24 hours ending 01/01/25 1641      Physical Exam  Vitals and nursing note reviewed.   Constitutional:       Appearance: She is well-developed.   HENT:      Head: Normocephalic and atraumatic.   Neck:      Vascular: No JVD.   Cardiovascular:      Rate and Rhythm: Normal rate and regular rhythm.      Heart sounds: Normal heart sounds.      Comments: Bilateral LE edema R>L   Pulmonary:      Effort: Pulmonary effort is normal. No respiratory distress.      Breath sounds: No wheezing.   Abdominal:      General: Bowel sounds are normal. There is no distension.      Palpations: Abdomen is soft.      Tenderness: There is no abdominal tenderness. There is no guarding.   Musculoskeletal:         General: No  tenderness. Normal range of motion.      Cervical back: Normal range of motion.      Right lower leg: Edema present.      Left lower leg: Edema present.   Skin:     General: Skin is warm and dry.      Capillary Refill: Capillary refill takes less than 2 seconds.   Neurological:      Mental Status: She is alert and oriented to person, place, and time.   Psychiatric:         Behavior: Behavior normal.             Significant Labs: All pertinent labs within the past 24 hours have been reviewed.  Recent Lab Results  (Last 5 results in the past 24 hours)        01/01/25  1249   01/01/25  0547   01/01/25  0301   01/01/25  0028   12/31/24  2130        Albumin     2.5     2.7       ALP     128     139       ALT     603     707       Anion Gap     14     14       AST     566     755       BILIRUBIN TOTAL     0.7  Comment: For infants and newborns, interpretation of results should be based  on gestational age, weight and in agreement with clinical  observations.    Premature Infant recommended reference ranges:  Up to 24 hours.............<8.0 mg/dL  Up to 48 hours............<12.0 mg/dL  3-5 days..................<15.0 mg/dL  6-29 days.................<15.0 mg/dL       0.7  Comment: For infants and newborns, interpretation of results should be based  on gestational age, weight and in agreement with clinical  observations.    Premature Infant recommended reference ranges:  Up to 24 hours.............<8.0 mg/dL  Up to 48 hours............<12.0 mg/dL  3-5 days..................<15.0 mg/dL  6-29 days.................<15.0 mg/dL         BUN     29     26       Calcium     8.2     8.5       Chloride     95     95       CO2     23     24       Complement (C-3) 72               Complement (C-4) 8               Creatinine     2.0     2.0       eGFR     28     28       Glucose     118     131       Hematocrit     26.0           Hemoglobin     8.4           Magnesium      1.6     1.7       MCH     24.8           MCHC     32.3            MCV     77           MPV     10.9           Platelet Count     363           POCT Glucose   126     119         Potassium     3.3     3.6       PROTEIN TOTAL     7.4     8.0       RBC     3.39           RDW     14.6           Sodium     132     133       WBC     17.18                                  Significant Imaging: I have reviewed all pertinent imaging results/findings within the past 24 hours.    Imaging Results              US Lower Extremity Veins Bilateral (Final result)  Result time 12/29/24 20:21:11      Final result by Nick Clifton DO (12/29/24 20:21:11)                   Impression:      No evidence of deep venous thrombosis in either lower extremity.      Electronically signed by: Nick Clifton  Date:    12/29/2024  Time:    20:21               Narrative:    EXAMINATION:  US LOWER EXTREMITY VEINS BILATERAL    CLINICAL HISTORY:  lower extremity edema  R> L r/o DVT;    TECHNIQUE:  Duplex and color flow Doppler and dynamic compression was performed of the bilateral lower extremity veins was performed.    COMPARISON:  None    FINDINGS:  Right thigh veins: The common femoral, femoral, popliteal, upper greater saphenous, and deep femoral veins are patent and free of thrombus. The veins are normally compressible and have normal phasic flow and augmentation response.    Right calf veins: The visualized calf veins are patent.    Left thigh veins: The common femoral, femoral, popliteal, upper greater saphenous, and deep femoral veins are patent and free of thrombus. The veins are normally compressible and have normal phasic flow and augmentation response.    Left calf veins: The visualized calf veins are patent.    Miscellaneous: None                                       X-Ray Chest AP Portable (Final result)  Result time 12/29/24 17:33:24      Final result by Nick Clifton DO (12/29/24 17:33:24)                   Impression:      No acute abnormality.      Electronically signed by: Nick  Elodia  Date:    12/29/2024  Time:    17:33               Narrative:    EXAMINATION:  XR CHEST AP PORTABLE    CLINICAL HISTORY:  Chest Pain;    TECHNIQUE:  Single frontal view of the chest was performed.    COMPARISON:  11/07/2024.    FINDINGS:  The lungs are well expanded and clear. No focal opacities are seen. The pleural spaces are clear. The cardiac silhouette is enlarged.  The visualized osseous structures demonstrate degenerative changes.                                      Echo    Result Date: 12/31/2024    Left Ventricle: The left ventricle is mildly dilated. Mildly increased   wall thickness. Severe global hypokinesis present. There is severely   reduced systolic function. Quantitated ejection fraction is 28%. Grade II   diastolic dysfunction.    Right Ventricle: Normal right ventricular cavity size. Right ventricle   wall motion has global hypokinesis. Systolic function is mildly reduced.    Left Atrium: Left atrium is severely dilated.    Right Atrium: Right atrium is dilated.    Tricuspid Valve: There is moderate regurgitation.    Pulmonary Artery: There is mild pulmonary hypertension. The estimated   pulmonary artery systolic pressure is 47 mmHg.

## 2025-01-01 NOTE — PLAN OF CARE
Problem: Adult Inpatient Plan of Care  Goal: Plan of Care Review  Outcome: Progressing  Goal: Optimal Comfort and Wellbeing  Outcome: Progressing     Problem: Acute Kidney Injury/Impairment  Goal: Fluid and Electrolyte Balance  Outcome: Progressing     Problem: Anemia  Goal: Anemia Symptom Improvement  Outcome: Progressing     Problem: Comorbidity Management  Goal: Maintenance of Heart Failure Symptom Control  Outcome: Progressing     Problem: Pain Acute  Goal: Optimal Pain Control and Function  Outcome: Progressing   Plan of care reviewed with patient. Questions answered. All safety measure implemented during shift.

## 2025-01-01 NOTE — NURSING
Proactive Rounding Nurse Follow-up Note     Chart review completed including VS, telemetry, notes, orders, and labs for proactive rounding.     Vital signs past 24 hours:  Temp:  [97.6 °F (36.4 °C)-98.9 °F (37.2 °C)] 98.2 °F (36.8 °C)  Pulse:  [84-97] 90  Resp:  [16-18] 18  SpO2:  [94 %-100 %] 94 %  BP: (110-118)/(63-78) 110/69      Latest Reference Range & Units 12/31/24 08:47 12/31/24 21:30 01/01/25 03:01   AST 10 - 40 U/L 1,129 (H) 755 (H) 566 (H)   ALT 10 - 44 U/L 754 (H) 707 (H) 603 (H)   - AST/ALT improved   Latest Reference Range & Units 01/01/25 03:01   Potassium 3.5 - 5.1 mmol/L 3.3 (L)   - Received 40 mEq K-Dur PO this AM  - Lasix 40 mg PO BID  - Retroperitoneal US ordered for KENY    Recommend:  - Continue to monitor lytes and replete Mg/K+ as needed     No acute issues at this time.  Team will sign off.  Please call Rapid Response RN, Ashley Menendez RN with any questions or concerns at [phone].

## 2025-01-01 NOTE — NURSING
RAPID RESPONSE NURSE PROACTIVE ROUNDING NOTE       Time of Visit:     Admit Date: 2024  LOS: 1  Code Status: Full Code   Date of Visit: 2024  : 1965  Age: 59 y.o.  Sex: female  Race: White  Bed: K464/K464 A:   MRN: [id number]  Was the patient discharged from an ICU this admission? No   Was the patient discharged from a PACU within last 24 hours? No   Did the patient receive conscious sedation/general anesthesia in last 24 hours? No   Was the patient in the ED within the past 24 hours? No   Was the patient on NIPPV within the past 24 hours? No   Attending Physician: Toshia Rees MD  Primary Service: Hospitalist,Internal Medicine   Time spent at the bedside: < 15 min    SITUATION    Notified by previous RRN during handoff  Reason for alert:     Diagnosis: NSTEMI (non-ST elevated myocardial infarction)   has a past medical history of Asthma, Bipolar disorder, and Hypertension.    Last Vitals:  Temp: 97.8 °F (36.6 °C) (1927)  Pulse: 86 (1959)  Resp: 18 (1959)  BP: 111/66 (1927)  SpO2: 95 % (1959)    24 Hour Vitals Range:  Temp:  [97.4 °F (36.3 °C)-98.9 °F (37.2 °C)]   Pulse:  [79-99]   Resp:  [16-20]   BP: (110-132)/(60-82)   SpO2:  [94 %-98 %]       ASSESSMENT/INTERVENTIONS    Pt sitting up in bed eating dinner. Denies Chest pain/SOB; on 2L NC. Liver US completed earlier in evening. VSS.    Chart and labs reviewed.    RECOMMENDATIONS  Monitor VS  Monitor UOP  Notify primary and [place] of acute chamges    Discussed plan of care with charge RNDaisy    PROVIDER ESCALATION    Physician escalation: No    Disposition:Remain in room 464    FOLLOW UP    Call back the Rapid Response NurseMichael at [phone] for additional questions or concerns.

## 2025-01-01 NOTE — ASSESSMENT & PLAN NOTE
Patient's most recent potassium results are listed below.   Recent Labs     12/31/24  0847 12/31/24  2130 01/01/25  0301   K 2.8* 3.6 3.3*       Plan  - Replete potassium per protocol  - Monitor potassium Daily  - Patient's hypokalemia is stable

## 2025-01-01 NOTE — NURSING
"PROACTIVE ROUNDING NURSING NOTE       Time of Visit: 1600    Admit Date: 2024  LOS: 1  Code Status: Full Code   Date of Visit: 2024  : 1965  Age: 59 y.o.  Sex: female  Race: White  Bed: K464/K464 A  MRN: [id number]  Was the patient discharged from an ICU this admission? No   Was the patient discharged from a PACU within last 24 hours? No   Did the patient receive conscious sedation/general anesthesia in last 24 hours? No   Was the patient in the ED within the past 24 hours? No   Was the patient on NIPPV within the past 24 hours? No   Attending Physician: Toshia Rees MD  Primary Service: Hospitalist,Internal Medicine   Time spent at the bedside: < 15 min    SITUATION    Reason for alert: Potential ICU admission    Diagnosis: NSTEMI (non-ST elevated myocardial infarction)   has a past medical history of Asthma, Bipolar disorder, and Hypertension.  "PMH of HCV, HFrEF (30-35%) supposed to have a LifeVest, CAD, AFib, COPD, colorectal cancer and medication nonadherence" per MD note    Last Vitals:  Temp: 98.9 °F (37.2 °C) (1709)  Pulse: 86 (1709)  Resp: 18 (1709)  BP: 116/78 (1709)  SpO2: 96 % (1709)    24 Hour Vitals Range:  Temp:  [97.4 °F (36.3 °C)-98.9 °F (37.2 °C)]   Pulse:  [79-99]   Resp:  [16-20]   BP: (110-132)/(60-82)   SpO2:  [94 %-99 %]     Clinical Issues:  Hepatic    ASSESSMENT/INTERVENTIONS  - Chart review completed including VS, telemetry, notes, orders, and labs.   Latest Reference Range & Units 24 08:47   Potassium 3.5 - 5.1 mmol/L 2.8 (L)   - 60 mEq K-Dur PO given   Latest Reference Range & Units 24 08:47   Magnesium  1.6 - 2.6 mg/dL 1.4 (L)   - 2 gm Mg IV infusing at time of visit    Latest Reference Range & Units 24 16:20 24 03:36 24 08:47   AST 10 - 40 U/L 171 (H) 206 (H) 1,129 (H)   ALT 10 - 44 U/L 114 (H) 162 (H) 754 (H)   - Liver US pending, patient currently NPO --> plan for US after  tonight  - Patient seen " with Dr. Blue (Pulm/CC).  At time of visit, patient endorses feeling better overall since admission.  Denies chest pain.  Reports improvement in BLE swelling and has been voiding.   - Lasix increased to 120 mg BID to start tonight  - D10W infusion at 25 mL/hr noted.  Clarified with Dr. Rees and order discontinued.  PRN hypoglycemia orders in place if needed and Q6H POCT glucose checks initiated.   Latest Reference Range & Units 12/31/24 08:47   Sodium 136 - 145 mmol/L 131 (L)      Latest Reference Range & Units 12/31/24 14:55   CPK 20 - 180 U/L 194 (H)      Latest Reference Range & Units 12/31/24 15:22   Lactic Acid Level 0.5 - 2.2 mmol/L 1.3      Latest Reference Range & Units 12/31/24 16:29   INR 0.8 - 1.2  1.5 (H)      Latest Reference Range & Units 12/31/24 14:55   Ammonia 10 - 50 umol/L 67 (H)   - Lactulose TID, initiated this afternoon  - GI consulted     RECOMMENDATIONS  - Consider additional Mg replacement given patient's cardiac history  - Recheck lytes tonight  - Consider changing blood glucose checks to AC/HS when patient has diet reordered  - Monitor labs (particularly K+ & Mg) & vital signs  - No additional labs ordered at this time.  Dr. Rees notified and new orders received.    PROVIDER ESCALATION    Physician escalation: Yes    Orders received and case discussed with Dr. Blue and Dr. Rees    Disposition:Remain in room 464 .     FOLLOW UP    Call back the Rapid Response NurseAshley at [phone] for additional questions or concerns.

## 2025-01-01 NOTE — ASSESSMENT & PLAN NOTE
Anemia is likely due to acute blood loss which was from rectum . Most recent hemoglobin and hematocrit are listed below.  Recent Labs     12/30/24  0336 12/31/24  0847 01/01/25  0301   HGB 7.5* 8.4* 8.4*   HCT 23.2* 26.4* 26.0*       Plan  - Monitor serial CBC: Every 6 hours  - Transfuse PRBC if patient becomes hemodynamically unstable, symptomatic or H/H drops below 7/21.  - Patient has not received any PRBC transfusions to date  - Patient's anemia is currently stable

## 2025-01-02 PROBLEM — R74.8 ELEVATED LIVER ENZYMES: Status: ACTIVE | Noted: 2025-01-02

## 2025-01-02 LAB
ALBUMIN SERPL BCP-MCNC: 2.6 G/DL (ref 3.5–5.2)
ALBUMIN SERPL BCP-MCNC: 2.6 G/DL (ref 3.5–5.2)
ALP SERPL-CCNC: 128 U/L (ref 40–150)
ALT SERPL W/O P-5'-P-CCNC: 429 U/L (ref 10–44)
ANION GAP SERPL CALC-SCNC: 14 MMOL/L (ref 8–16)
ANION GAP SERPL CALC-SCNC: 14 MMOL/L (ref 8–16)
AST SERPL-CCNC: 214 U/L (ref 10–40)
BILIRUB SERPL-MCNC: 0.6 MG/DL (ref 0.1–1)
BILIRUB UR QL STRIP: NEGATIVE
BUN SERPL-MCNC: 34 MG/DL (ref 6–20)
BUN SERPL-MCNC: 34 MG/DL (ref 6–20)
CALCIUM SERPL-MCNC: 8.3 MG/DL (ref 8.7–10.5)
CALCIUM SERPL-MCNC: 8.3 MG/DL (ref 8.7–10.5)
CHLORIDE SERPL-SCNC: 93 MMOL/L (ref 95–110)
CHLORIDE SERPL-SCNC: 93 MMOL/L (ref 95–110)
CLARITY UR: CLEAR
CO2 SERPL-SCNC: 24 MMOL/L (ref 23–29)
CO2 SERPL-SCNC: 24 MMOL/L (ref 23–29)
COLOR UR: COLORLESS
CREAT SERPL-MCNC: 2 MG/DL (ref 0.5–1.4)
CREAT SERPL-MCNC: 2 MG/DL (ref 0.5–1.4)
EOSINOPHIL URNS QL WRIGHT STN: NORMAL
ERYTHROCYTE [DISTWIDTH] IN BLOOD BY AUTOMATED COUNT: 14.7 % (ref 11.5–14.5)
EST. GFR  (NO RACE VARIABLE): 28 ML/MIN/1.73 M^2
EST. GFR  (NO RACE VARIABLE): 28 ML/MIN/1.73 M^2
GLUCOSE SERPL-MCNC: 115 MG/DL (ref 70–110)
GLUCOSE SERPL-MCNC: 115 MG/DL (ref 70–110)
GLUCOSE UR QL STRIP: NEGATIVE
HCT VFR BLD AUTO: 29.8 % (ref 37–48.5)
HCV RNA SERPL NAA+PROBE-LOG IU: 5.7 LOGIU/ML
HCV RNA SERPL QL NAA+PROBE: DETECTED
HCV RNA SPEC NAA+PROBE-ACNC: ABNORMAL IU/ML
HGB BLD-MCNC: 9.4 G/DL (ref 12–16)
HGB UR QL STRIP: NEGATIVE
INTERPRETATION UR IFE-IMP: NORMAL
KETONES UR QL STRIP: NEGATIVE
LEUKOCYTE ESTERASE UR QL STRIP: NEGATIVE
MAGNESIUM SERPL-MCNC: 1.5 MG/DL (ref 1.6–2.6)
MCH RBC QN AUTO: 24.4 PG (ref 27–31)
MCHC RBC AUTO-ENTMCNC: 31.5 G/DL (ref 32–36)
MCV RBC AUTO: 77 FL (ref 82–98)
NITRITE UR QL STRIP: NEGATIVE
PATHOLOGIST INTERPRETATION IFE: NORMAL
PATHOLOGIST INTERPRETATION SPE: NORMAL
PH UR STRIP: 7 [PH] (ref 5–8)
PHOSPHATE SERPL-MCNC: 2.6 MG/DL (ref 2.7–4.5)
PLATELET # BLD AUTO: 395 K/UL (ref 150–450)
PMV BLD AUTO: 10.3 FL (ref 9.2–12.9)
POCT GLUCOSE: 114 MG/DL (ref 70–110)
POCT GLUCOSE: 135 MG/DL (ref 70–110)
POCT GLUCOSE: 139 MG/DL (ref 70–110)
POCT GLUCOSE: 158 MG/DL (ref 70–110)
POTASSIUM SERPL-SCNC: 3.1 MMOL/L (ref 3.5–5.1)
POTASSIUM SERPL-SCNC: 3.1 MMOL/L (ref 3.5–5.1)
PROT SERPL-MCNC: 7.8 G/DL (ref 6–8.4)
PROT UR QL STRIP: NEGATIVE
RBC # BLD AUTO: 3.86 M/UL (ref 4–5.4)
SODIUM SERPL-SCNC: 131 MMOL/L (ref 136–145)
SODIUM SERPL-SCNC: 131 MMOL/L (ref 136–145)
SP GR UR STRIP: 1.01 (ref 1–1.03)
TROPONIN I SERPL DL<=0.01 NG/ML-MCNC: 0.12 NG/ML (ref 0–0.03)
URN SPEC COLLECT METH UR: ABNORMAL
UROBILINOGEN UR STRIP-ACNC: NEGATIVE EU/DL
WBC # BLD AUTO: 13.57 K/UL (ref 3.9–12.7)

## 2025-01-02 PROCEDURE — 83735 ASSAY OF MAGNESIUM: CPT

## 2025-01-02 PROCEDURE — 80069 RENAL FUNCTION PANEL: CPT | Performed by: STUDENT IN AN ORGANIZED HEALTH CARE EDUCATION/TRAINING PROGRAM

## 2025-01-02 PROCEDURE — 25000003 PHARM REV CODE 250: Performed by: FAMILY MEDICINE

## 2025-01-02 PROCEDURE — 94761 N-INVAS EAR/PLS OXIMETRY MLT: CPT

## 2025-01-02 PROCEDURE — 94640 AIRWAY INHALATION TREATMENT: CPT

## 2025-01-02 PROCEDURE — 36415 COLL VENOUS BLD VENIPUNCTURE: CPT

## 2025-01-02 PROCEDURE — 63600175 PHARM REV CODE 636 W HCPCS

## 2025-01-02 PROCEDURE — 11000001 HC ACUTE MED/SURG PRIVATE ROOM

## 2025-01-02 PROCEDURE — 80053 COMPREHEN METABOLIC PANEL: CPT

## 2025-01-02 PROCEDURE — 25000003 PHARM REV CODE 250

## 2025-01-02 PROCEDURE — 25000003 PHARM REV CODE 250: Performed by: NURSE PRACTITIONER

## 2025-01-02 PROCEDURE — 93010 ELECTROCARDIOGRAM REPORT: CPT | Mod: ,,, | Performed by: INTERNAL MEDICINE

## 2025-01-02 PROCEDURE — 99900035 HC TECH TIME PER 15 MIN (STAT)

## 2025-01-02 PROCEDURE — 93005 ELECTROCARDIOGRAM TRACING: CPT

## 2025-01-02 PROCEDURE — 84484 ASSAY OF TROPONIN QUANT: CPT

## 2025-01-02 PROCEDURE — 25000003 PHARM REV CODE 250: Performed by: REGISTERED NURSE

## 2025-01-02 PROCEDURE — 99232 SBSQ HOSP IP/OBS MODERATE 35: CPT | Mod: GC,,, | Performed by: INTERNAL MEDICINE

## 2025-01-02 PROCEDURE — 63600175 PHARM REV CODE 636 W HCPCS: Performed by: NURSE PRACTITIONER

## 2025-01-02 PROCEDURE — 85027 COMPLETE CBC AUTOMATED: CPT

## 2025-01-02 PROCEDURE — S4991 NICOTINE PATCH NONLEGEND: HCPCS | Performed by: FAMILY MEDICINE

## 2025-01-02 RX ORDER — LANOLIN ALCOHOL/MO/W.PET/CERES
400 CREAM (GRAM) TOPICAL DAILY
Status: DISCONTINUED | OUTPATIENT
Start: 2025-01-03 | End: 2025-01-03 | Stop reason: HOSPADM

## 2025-01-02 RX ORDER — FUROSEMIDE 40 MG/1
40 TABLET ORAL 2 TIMES DAILY
Status: DISCONTINUED | OUTPATIENT
Start: 2025-01-02 | End: 2025-01-03 | Stop reason: HOSPADM

## 2025-01-02 RX ORDER — MAGNESIUM SULFATE HEPTAHYDRATE 40 MG/ML
2 INJECTION, SOLUTION INTRAVENOUS ONCE
Status: COMPLETED | OUTPATIENT
Start: 2025-01-02 | End: 2025-01-02

## 2025-01-02 RX ORDER — LANOLIN ALCOHOL/MO/W.PET/CERES
1 CREAM (GRAM) TOPICAL EVERY OTHER DAY
Status: DISCONTINUED | OUTPATIENT
Start: 2025-01-04 | End: 2025-01-03 | Stop reason: HOSPADM

## 2025-01-02 RX ORDER — POTASSIUM CHLORIDE 20 MEQ/1
40 TABLET, EXTENDED RELEASE ORAL DAILY
Status: DISCONTINUED | OUTPATIENT
Start: 2025-01-02 | End: 2025-01-03 | Stop reason: HOSPADM

## 2025-01-02 RX ORDER — SODIUM,POTASSIUM PHOSPHATES 280-250MG
1 POWDER IN PACKET (EA) ORAL ONCE
Status: COMPLETED | OUTPATIENT
Start: 2025-01-02 | End: 2025-01-02

## 2025-01-02 RX ADMIN — NICOTINE 1 PATCH: 21 PATCH, EXTENDED RELEASE TRANSDERMAL at 08:01

## 2025-01-02 RX ADMIN — FUROSEMIDE 40 MG: 10 INJECTION, SOLUTION INTRAMUSCULAR; INTRAVENOUS at 09:01

## 2025-01-02 RX ADMIN — OXYBUTYNIN CHLORIDE 5 MG: 5 TABLET ORAL at 08:01

## 2025-01-02 RX ADMIN — LACTULOSE 20 G: 20 SOLUTION ORAL at 02:01

## 2025-01-02 RX ADMIN — MAGNESIUM SULFATE HEPTAHYDRATE 2 G: 40 INJECTION, SOLUTION INTRAVENOUS at 09:01

## 2025-01-02 RX ADMIN — MORPHINE SULFATE 2 MG: 2 INJECTION, SOLUTION INTRAMUSCULAR; INTRAVENOUS at 12:01

## 2025-01-02 RX ADMIN — DIPHENHYDRAMINE HCL 10 ML: 12.5 LIQUID ORAL at 08:01

## 2025-01-02 RX ADMIN — METOPROLOL SUCCINATE 25 MG: 25 TABLET, EXTENDED RELEASE ORAL at 08:01

## 2025-01-02 RX ADMIN — TIOTROPIUM BROMIDE INHALATION SPRAY 2 PUFF: 3.12 SPRAY, METERED RESPIRATORY (INHALATION) at 08:01

## 2025-01-02 RX ADMIN — POTASSIUM & SODIUM PHOSPHATES POWDER PACK 280-160-250 MG 1 PACKET: 280-160-250 PACK at 08:01

## 2025-01-02 RX ADMIN — FUROSEMIDE 40 MG: 40 TABLET ORAL at 05:01

## 2025-01-02 RX ADMIN — FERROUS SULFATE TAB 325 MG (65 MG ELEMENTAL FE) 1 EACH: 325 (65 FE) TAB at 08:01

## 2025-01-02 RX ADMIN — OXYBUTYNIN CHLORIDE 5 MG: 5 TABLET ORAL at 09:01

## 2025-01-02 RX ADMIN — MORPHINE SULFATE 2 MG: 2 INJECTION, SOLUTION INTRAMUSCULAR; INTRAVENOUS at 02:01

## 2025-01-02 RX ADMIN — MORPHINE SULFATE 2 MG: 2 INJECTION, SOLUTION INTRAMUSCULAR; INTRAVENOUS at 06:01

## 2025-01-02 RX ADMIN — POTASSIUM CHLORIDE 40 MEQ: 1500 TABLET, EXTENDED RELEASE ORAL at 08:01

## 2025-01-02 RX ADMIN — OXYBUTYNIN CHLORIDE 5 MG: 5 TABLET ORAL at 02:01

## 2025-01-02 RX ADMIN — LACTULOSE 20 G: 20 SOLUTION ORAL at 09:01

## 2025-01-02 RX ADMIN — PANTOPRAZOLE SODIUM 40 MG: 40 TABLET, DELAYED RELEASE ORAL at 09:01

## 2025-01-02 RX ADMIN — PANTOPRAZOLE SODIUM 40 MG: 40 TABLET, DELAYED RELEASE ORAL at 08:01

## 2025-01-02 NOTE — CONSULTS
[facility]  [facility]  Consult Note    Patient Name: Mary Ellen Saini  MRN: [id number]  Admission Date: 12/29/2024  Hospital Length of Stay: 3 days  Code Status: Full Code   Attending Provider: Toshia Rees MD   Consulting Provider: Danita Ugarte MD  Primary Care Physician: Marlen, Primary Doctor  Principal Problem:NSTEMI (non-ST elevated myocardial infarction)    Inpatient consult to Gastroenterology-[facility]  Consult performed by: Danita Ugarte MD  Consult ordered by: Toshia Rees MD        Subjective:     HPI:  59 yr F with history of meth use, HFrEF (EF 30%), HCV, CAD, Afib, COPD,currently homeless, who presented to the ED with chest pain after her son was arrested in which diagnosed with NSTEMI. GI consulted for hematochezia. Patient reports for the last couple of months she has been homeless and this is why she has missed her outpatient appointments with GI for her amyloidosis. She recently was hospitalized for hematochezia in which colonoscopy performed with biopsies confirming amyloidosis. She has BL LQ abdominal pain that has been persistent since last hospitalization. She is having normal consistency stools with blood streaks. She has not required pRBC since admission.     GI re-consulted for elevated LFTs. Known HCV + however has not gone to outpatient hepatology apt. Still actively drinking ETOH and amphetamine + on drug screen. Patient denies RUQ pain. She denies known fevers or chills. Does have history of IVDU.     Past Medical History:   Diagnosis Date    Asthma     Bipolar disorder     Hypertension        Past Surgical History:   Procedure Laterality Date    CHOLECYSTECTOMY      COLONOSCOPY N/A 10/22/2024    Procedure: COLONOSCOPY;  Surgeon: Dayday Freed MD;  Location: [facility];  Service: Endoscopy;  Laterality: N/A;    COLONOSCOPY N/A 11/11/2024    Procedure: COLONOSCOPY;  Surgeon: Reese Chen MD;  Location: [facility] ENDO;  Service: Endoscopy;   Laterality: N/A;    ESOPHAGOGASTRODUODENOSCOPY N/A 10/22/2024    Procedure: EGD (ESOPHAGOGASTRODUODENOSCOPY);  Surgeon: Dayday Freed MD;  Location[facility];  Service: Endoscopy;  Laterality: N/A;    ESOPHAGOGASTRODUODENOSCOPY N/A 11/11/2024    Procedure: EGD (ESOPHAGOGASTRODUODENOSCOPY);  Surgeon: Reese Chen MD;  Location: [facility] ENDO;  Service: Endoscopy;  Laterality: N/A;    SHOULDER SURGERY      TUBAL LIGATION         Review of patient's allergies indicates:  No Known Allergies  Family History    None       Tobacco Use    Smoking status: Every Day     Current packs/day: 1.00     Average packs/day: 2.0 packs/day for 58.0 years (115.1 ttl pk-yrs)     Types: Cigarettes     Start date: 1967    Smokeless tobacco: Never    Tobacco comments:     Pt is a 2 pk/day cigarette smoker x 57 yrs. She states that she cut down to about 1 pk/day, and is trying to quit.  Ambulatory referral to Smoking Cessation clinic following hospital discharge.     Substance and Sexual Activity    Alcohol use: Yes     Comment: socailly    Drug use: Yes     Types: Methamphetamines, Marijuana     Comment: denies cocaine or meth. Reports maijurana use    Sexual activity: Yes     Review of Systems   Constitutional:  Negative for activity change and appetite change.   HENT:  Negative for congestion and dental problem.    Eyes:  Negative for discharge and itching.   Respiratory:  Negative for apnea and chest tightness.    Cardiovascular:  Negative for chest pain and leg swelling.   Gastrointestinal:  Negative for abdominal distention, abdominal pain, diarrhea and nausea.   Endocrine: Negative for cold intolerance and heat intolerance.   Genitourinary:  Negative for difficulty urinating and dyspareunia.   Musculoskeletal:  Negative for arthralgias and back pain.   Skin:  Negative for color change.   Allergic/Immunologic: Negative for environmental allergies.   Neurological:  Negative for dizziness.   Psychiatric/Behavioral:   Negative for agitation.      Objective:     Vital Signs (Most Recent):  Temp: 98.4 °F (36.9 °C) (01/02/25 0714)  Pulse: 109 (01/02/25 0903)  Resp: 18 (01/02/25 0853)  BP: (!) 138/90 (01/02/25 0903)  SpO2: 100 % (01/02/25 0853) Vital Signs (24h Range):  Temp:  [97.4 °F (36.3 °C)-98.4 °F (36.9 °C)] 98.4 °F (36.9 °C)  Pulse:  [] 109  Resp:  [15-18] 18  SpO2:  [94 %-100 %] 100 %  BP: (110-138)/(68-90) 138/90     Weight: 72.6 kg (160 lb) (12/31/24 0930)  Body mass index is 22.96 kg/m².    No intake or output data in the 24 hours ending 01/02/25 0916    Lines/Drains/Airways       Peripheral Intravenous Line  Duration                  Peripheral IV - Single Lumen 12/29/24 18 G Anterior;Left Forearm 4 days         Peripheral IV - Single Lumen 12/29/24 1646 18 G Right Antecubital 3 days                     Physical Exam  Vitals reviewed.   Constitutional:       General: She is not in acute distress.  HENT:      Head: Normocephalic.      Nose: Nose normal.      Mouth/Throat:      Mouth: Mucous membranes are moist.   Eyes:      Pupils: Pupils are equal, round, and reactive to light.   Cardiovascular:      Rate and Rhythm: Normal rate and regular rhythm.      Pulses: Normal pulses.   Pulmonary:      Effort: Pulmonary effort is normal.   Abdominal:      General: Abdomen is flat. Bowel sounds are normal.      Palpations: Abdomen is soft.   Musculoskeletal:         General: Normal range of motion.      Cervical back: Normal range of motion and neck supple.   Skin:     General: Skin is warm.      Capillary Refill: Capillary refill takes less than 2 seconds.   Neurological:      General: No focal deficit present.      Mental Status: She is alert. Mental status is at baseline.   Psychiatric:         Mood and Affect: Mood normal.          Significant Labs:  CBC:   Recent Labs   Lab 01/01/25  0301 01/02/25  0229   WBC 17.18* 13.57*   HGB 8.4* 9.4*   HCT 26.0* 29.8*    395     CMP:   Recent Labs   Lab 01/02/25  0229   GLU  115*  115*   CALCIUM 8.3*  8.3*   ALBUMIN 2.6*  2.6*   PROT 7.8   *  131*   K 3.1*  3.1*   CO2 24  24   CL 93*  93*   BUN 34*  34*   CREATININE 2.0*  2.0*   ALKPHOS 128   *   *   BILITOT 0.6       Significant Imaging:  Imaging results within the past 24 hours have been reviewed.  Assessment/Plan:     GI  Elevated liver enzymes  GI re-consulted for elevated LFTs. Patient has known HCV and previously referred to hepatology. She missed her appointment. No prior HCV treatment and unknown duration of infection. History of IVDU and tattoos. No concern for asterixis, HE, or ascites. INR elevated 1.5. LFTs persistently elevated since admission. MDF is 14.9 no concern for alc hep at this time. Liver US appreciates normal patent flow without vascular obstruction. Elevated LFTs likely attributed to IVDU as patient presented with NSTEMI 2/2 to this. Also attributed is ETOH use and active HCV infection.     Plan:  HCV PCR ordered and pending  Needs hepatology outpatient treatment - appreciate social work input/ coordination with this as patient is homeless  Daily CMP, INR  Recommend CMV, EBV, VZV serologies   Monitor for alcohol withdrawal - last use day of admission       Thank you for your consult. I will follow-up with patient. Please contact us if you have any additional questions.    Danita Ugarte MD  [employer] Gastroenterology Fellow

## 2025-01-02 NOTE — SUBJECTIVE & OBJECTIVE
Past Medical History:   Diagnosis Date    Asthma     Bipolar disorder     Hypertension        Past Surgical History:   Procedure Laterality Date    CHOLECYSTECTOMY      COLONOSCOPY N/A 10/22/2024    Procedure: COLONOSCOPY;  Surgeon: Dayday Freed MD;  Location: [facility];  Service: Endoscopy;  Laterality: N/A;    COLONOSCOPY N/A 11/11/2024    Procedure: COLONOSCOPY;  Surgeon: Reese Chen MD;  Location: [facility];  Service: Endoscopy;  Laterality: N/A;    ESOPHAGOGASTRODUODENOSCOPY N/A 10/22/2024    Procedure: EGD (ESOPHAGOGASTRODUODENOSCOPY);  Surgeon: Dayday Freed MD;  Location: [facility];  Service: Endoscopy;  Laterality: N/A;    ESOPHAGOGASTRODUODENOSCOPY N/A 11/11/2024    Procedure: EGD (ESOPHAGOGASTRODUODENOSCOPY);  Surgeon: Reese Chen MD;  Location: [facility];  Service: Endoscopy;  Laterality: N/A;    SHOULDER SURGERY      TUBAL LIGATION         Review of patient's allergies indicates:  No Known Allergies  Family History    None       Tobacco Use    Smoking status: Every Day     Current packs/day: 1.00     Average packs/day: 2.0 packs/day for 58.0 years (115.1 ttl pk-yrs)     Types: Cigarettes     Start date: 1967    Smokeless tobacco: Never    Tobacco comments:     Pt is a 2 pk/day cigarette smoker x 57 yrs. She states that she cut down to about 1 pk/day, and is trying to quit.  Ambulatory referral to Smoking Cessation clinic following hospital discharge.     Substance and Sexual Activity    Alcohol use: Yes     Comment: socailly    Drug use: Yes     Types: Methamphetamines, Marijuana     Comment: denies cocaine or meth. Reports maijurana use    Sexual activity: Yes     Review of Systems   Constitutional:  Negative for activity change and appetite change.   HENT:  Negative for congestion and dental problem.    Eyes:  Negative for discharge and itching.   Respiratory:  Negative for apnea and chest tightness.    Cardiovascular:  Negative for chest pain and leg swelling.    Gastrointestinal:  Negative for abdominal distention, abdominal pain, diarrhea and nausea.   Endocrine: Negative for cold intolerance and heat intolerance.   Genitourinary:  Negative for difficulty urinating and dyspareunia.   Musculoskeletal:  Negative for arthralgias and back pain.   Skin:  Negative for color change.   Allergic/Immunologic: Negative for environmental allergies.   Neurological:  Negative for dizziness.   Psychiatric/Behavioral:  Negative for agitation.      Objective:     Vital Signs (Most Recent):  Temp: 98.4 °F (36.9 °C) (01/02/25 0714)  Pulse: 109 (01/02/25 0903)  Resp: 18 (01/02/25 0853)  BP: (!) 138/90 (01/02/25 0903)  SpO2: 100 % (01/02/25 0853) Vital Signs (24h Range):  Temp:  [97.4 °F (36.3 °C)-98.4 °F (36.9 °C)] 98.4 °F (36.9 °C)  Pulse:  [] 109  Resp:  [15-18] 18  SpO2:  [94 %-100 %] 100 %  BP: (110-138)/(68-90) 138/90     Weight: 72.6 kg (160 lb) (12/31/24 0930)  Body mass index is 22.96 kg/m².    No intake or output data in the 24 hours ending 01/02/25 0916    Lines/Drains/Airways       Peripheral Intravenous Line  Duration                  Peripheral IV - Single Lumen 12/29/24 18 G Anterior;Left Forearm 4 days         Peripheral IV - Single Lumen 12/29/24 1646 18 G Right Antecubital 3 days                     Physical Exam  Vitals reviewed.   Constitutional:       General: She is not in acute distress.  HENT:      Head: Normocephalic.      Nose: Nose normal.      Mouth/Throat:      Mouth: Mucous membranes are moist.   Eyes:      Pupils: Pupils are equal, round, and reactive to light.   Cardiovascular:      Rate and Rhythm: Normal rate and regular rhythm.      Pulses: Normal pulses.   Pulmonary:      Effort: Pulmonary effort is normal.   Abdominal:      General: Abdomen is flat. Bowel sounds are normal.      Palpations: Abdomen is soft.   Musculoskeletal:         General: Normal range of motion.      Cervical back: Normal range of motion and neck supple.   Skin:     General:  Skin is warm.      Capillary Refill: Capillary refill takes less than 2 seconds.   Neurological:      General: No focal deficit present.      Mental Status: She is alert. Mental status is at baseline.   Psychiatric:         Mood and Affect: Mood normal.          Significant Labs:  CBC:   Recent Labs   Lab 01/01/25 0301 01/02/25 0229   WBC 17.18* 13.57*   HGB 8.4* 9.4*   HCT 26.0* 29.8*    395     CMP:   Recent Labs   Lab 01/02/25 0229   *  115*   CALCIUM 8.3*  8.3*   ALBUMIN 2.6*  2.6*   PROT 7.8   *  131*   K 3.1*  3.1*   CO2 24  24   CL 93*  93*   BUN 34*  34*   CREATININE 2.0*  2.0*   ALKPHOS 128   *   *   BILITOT 0.6       Significant Imaging:  Imaging results within the past 24 hours have been reviewed.

## 2025-01-02 NOTE — PLAN OF CARE
The proper method of use, as well as anticipated side effects, of this metered-dose inhaler are discussed and demonstrated to the patient.  Will continue to monitor.  Pt on RA with documented sats.

## 2025-01-02 NOTE — PROGRESS NOTES
[facility] Medicine  Progress Note    Patient Name: Mary Ellen Saini  MRN: [id number]  Patient Class: IP- Inpatient   Admission Date: 12/29/2024  Length of Stay: 3 days  Attending Physician: Toshia Rees MD  Primary Care Provider: Marlen, Primary Doctor        Subjective     Principal Problem:NSTEMI (non-ST elevated myocardial infarction)        HPI:  58 yo female with PMH of HCV, HFrEF (30-35%) supposed to have a LifeVest, CAD, AFib, COPD, colorectal cancer and medication nonadherence presented to ED with complaint of chest pain. Pt reports substernal chest pain described as sharp, pressure associated with SOB. She has had similar symptoms in the past. Symptoms reportedly started after patient informed her son had been arrested. She denies associated n/v, cough/congestion, dizziness, diaphoresis and syncope. Pt denies active chest pain on my exam however she notes lower abdominal pain rated 10/10. Also reports bright red blood per rectum over the last 2 days. This is a recurring issue. She is followed by GI, last EGD/colonoscopy 11/2024, biopsy concerning for amyloidosis.       ED evaluation: Patient tachycardic on arrival. Initial EKG shows sinus tach rate 112, subsequent EKG remarkable for SVT rate 180s resolved with 20 mg diltiazem. Hbg/Hct 22/7 (baseline Hbg 8-9), K 3.4, BUN/Cr 23/1.8, BNP 2556, troponin 0.208, d-dimer 1.38. CXR negative for acute findings. Venous doppler LE negative for DVT. Patient received 60 mg furosemide, KCL and protonix. GI consulted. Patient admitted to hospital medicine for further evaluation and care.       Overview/Hospital Course:  No notes on file    Interval History:  Patient was seen in the morning was complaining of palpitations denies any shortness on whether abdominal pain still diuresing very well, cardiology, with medication refill, case management is on board since the patient complaining of polysubstance abuse, has tendency noncompliance with her  med    Review of Systems   Constitutional:  Negative for chills, diaphoresis and fever.   Respiratory:  Negative for cough, chest tightness, shortness of breath and wheezing.    Cardiovascular:  Negative for chest pain, palpitations and leg swelling.   Gastrointestinal:  Negative for abdominal pain, blood in stool, diarrhea, nausea and vomiting.   Genitourinary:  Negative for dysuria, flank pain, frequency and hematuria.   Musculoskeletal:  Negative for back pain and myalgias.   Neurological:  Negative for dizziness, syncope, light-headedness and headaches.   Psychiatric/Behavioral:  Negative for confusion.      Objective:     Vital Signs (Most Recent):  Temp: 98.2 °F (36.8 °C) (01/02/25 1115)  Pulse: 93 (01/02/25 1222)  Resp: 18 (01/02/25 1220)  BP: 134/86 (01/02/25 1115)  SpO2: 96 % (01/02/25 1115) Vital Signs (24h Range):  Temp:  [97.4 °F (36.3 °C)-98.4 °F (36.9 °C)] 98.2 °F (36.8 °C)  Pulse:  [] 93  Resp:  [15-18] 18  SpO2:  [95 %-100 %] 96 %  BP: (112-138)/(68-90) 134/86     Weight: 72.6 kg (160 lb)  Body mass index is 22.96 kg/m².    Intake/Output Summary (Last 24 hours) at 1/2/2025 1445  Last data filed at 1/2/2025 1346  Gross per 24 hour   Intake --   Output 700 ml   Net -700 ml         Physical Exam  Vitals and nursing note reviewed.   Constitutional:       Appearance: She is well-developed.   HENT:      Head: Normocephalic and atraumatic.   Neck:      Vascular: No JVD.   Cardiovascular:      Rate and Rhythm: Normal rate and regular rhythm.      Heart sounds: Normal heart sounds.      Comments: Bilateral LE edema R>L   Pulmonary:      Effort: Pulmonary effort is normal. No respiratory distress.      Breath sounds: No wheezing.   Abdominal:      General: Bowel sounds are normal. There is no distension.      Palpations: Abdomen is soft.      Tenderness: There is no abdominal tenderness. There is no guarding.   Musculoskeletal:         General: No tenderness. Normal range of motion.      Cervical back:  Normal range of motion.      Right lower leg: No edema.      Left lower leg: No edema.   Skin:     General: Skin is warm and dry.      Capillary Refill: Capillary refill takes less than 2 seconds.   Neurological:      Mental Status: She is alert and oriented to person, place, and time.   Psychiatric:         Behavior: Behavior normal.             Significant Labs: All pertinent labs within the past 24 hours have been reviewed.  Recent Lab Results  (Last 5 results in the past 24 hours)        01/02/25  1114   01/02/25  1000   01/02/25  0538   01/02/25  0229   01/01/25  2240        Albumin       2.6                2.6         ALP       128         ALT       429         Anion Gap       14                14         Appearance, UA         Clear       AST       214         Bilirubin (UA)         Negative       BILIRUBIN TOTAL       0.6  Comment: For infants and newborns, interpretation of results should be based  on gestational age, weight and in agreement with clinical  observations.    Premature Infant recommended reference ranges:  Up to 24 hours.............<8.0 mg/dL  Up to 48 hours............<12.0 mg/dL  3-5 days..................<15.0 mg/dL  6-29 days.................<15.0 mg/dL           BUN       34                34         Calcium       8.3                8.3         Chloride       93                93         CO2       24                24         Color, UA         Colorless       Creatinine       2.0                2.0         eGFR       28                28         Glucose       115                115         Glucose, UA         Negative       Hematocrit       29.8         Hemoglobin       9.4         Ketones, UA         Negative       Leukocyte Esterase, UA         Negative       Magnesium        1.5         MCH       24.4         MCHC       31.5         MCV       77         MPV       10.3         NITRITE UA         Negative       Blood, UA         Negative       pH, UA         7.0       Phosphorus Level        2.6         Platelet Count       395         POCT Glucose 158     114           Potassium       3.1                3.1         PROTEIN TOTAL       7.8         Protein, UA         Negative  Comment: Recommend a 24 hour urine protein or a urine   protein/creatinine ratio if globulin induced proteinuria is  clinically suspected.         RBC       3.86         RDW       14.7         Sodium       131                131         Spec Grav UA         1.010       Specimen UA         Urine, Clean Catch       Troponin I   0.122  Comment: The reference interval for Troponin I represents the 99th percentile   cutoff   for our facility and is consistent with 3rd generation assay   performance.               UROBILINOGEN UA         Negative       WBC       13.57         York'S Stain, Urine         No eosinophils seen                              Significant Imaging: I have reviewed all pertinent imaging results/findings within the past 24 hours.    Assessment and Plan     * NSTEMI (non-ST elevated myocardial infarction)  -EKG shows no ST elevation  -Patient states chest pain resolved on my exam   -Troponin 0.208> 0.2160 > 0.281  -Trend troponin, monitor telemetry  -Hold ASA, plavix and anticoagulation 2/2 concern for active GI bleeding         Shock liver  Patient is likely to have ischemic hepatitis as evidenced by abnormalities in AST and ALT. Etiology includes cardiogenic shock. Continue to monitor liver function while treating underlying condition. Daily labs to include CMP, Liver Function Panel, and PT/INR.    AST   Date Value Ref Range Status   01/01/2025 566 (H) 10 - 40 U/L Final     ALT   Date Value Ref Range Status   01/01/2025 603 (H) 10 - 44 U/L Final     -ultrasound of the liver ordered lactic acid was within the normal GI was consulted about starting patient on NAC given the actual volume from the management  -ammonia was elevated patient was started on lactulose  - significantly improvement of her liver  function today with the diuresis     Hematochezia  GI was consulted no need for intervention at the moment per their note continue to hold anticoagulants, hemoglobin is stable      SVT (supraventricular tachycardia)    Secondary to acute decompensated heart failure cardiology is on board patient is currently on tele monitor    Other amyloidosis    Oncology was consulted nephrology was consulted appreciate recommendations    ABLA (acute blood loss anemia)  Anemia is likely due to acute blood loss which was from rectum . Most recent hemoglobin and hematocrit are listed below.  Recent Labs     12/31/24  0847 01/01/25  0301 01/02/25  0229   HGB 8.4* 8.4* 9.4*   HCT 26.4* 26.0* 29.8*       Plan  - Monitor serial CBC: Every 6 hours  - Transfuse PRBC if patient becomes hemodynamically unstable, symptomatic or H/H drops below 7/21.  - Patient has not received any PRBC transfusions to date  - Patient's anemia is currently stable      GIB (gastrointestinal bleeding)  Patient's hemorrhage is due to gastrointestinal bleed, patient does have a propensity for bleeding due to duodenal ulcers.. Patients most recent Hgb, Hct, platelets, and INR are listed below.  Recent Labs     12/31/24  0847 12/31/24  1629 01/01/25  0301 01/02/25  0229   HGB 8.4*  --  8.4* 9.4*   HCT 26.4*  --  26.0* 29.8*     --  363 395   INR  --  1.5*  --   --        Plan  - Will trend hemoglobin/hematocrit Every 6 hours  - Will monitor and correct any coagulation defects  - Will transfuse if Hgb is <7g/dl (<8g/dl in cases of active ACS) or if patient has rapid bleeding leading to hemodynamic instability  - upper GI endoscopy (11/11/24) revealed gastritis, mucosal changes to duodenum, and duodenal ulcer, biopsy concerning for amyloidosis   -patient refused rectal exam and occult stool   -consult GI: Appreciate recs  Recommend oncology evaluation for amyloidosis   No endoscopic intervention at this time   PPI 40 mg BID as recent duodenal ulcer appreciated  "on EGD (needs BID therapy for total of 8 weeks)  Transfuse for hemoglobin less than 7   CBC daily   -continue PPI   --okay to feed per GI,no endoscopic intervention at this time, PPI b.i.d. for recent duodenal ulcer  -appreciate GI recommendations  We will order HCV PCR , patient will Need hepatology outpatient treatment patient can have CMV, EBV, VZV serologies as outpatient          Tobacco dependency  Dangers of cigarette smoking were reviewed with patient in detail. Patient was Counseled for 3-10 minutes. Nicotine replacement options were discussed. Nicotine replacement was discussed- prescribed    Colorectal carcinoma  -patient followed by heme/onc       Hypokalemia  Patient's most recent potassium results are listed below.   Recent Labs     12/31/24  2130 01/01/25  0301 01/02/25  0229   K 3.6 3.3* 3.1*  3.1*       Plan  - Replete potassium per protocol  - Monitor potassium Daily  - Patient's hypokalemia is stable  -patient was placed on daily potassium replacement      Acute on chronic combined systolic and diastolic congestive heart failure  Patient has Combined Systolic and Diastolic heart failure that is Acute on chronic. On presentation their CHF was decompensated. Evidence of decompensated CHF on presentation includes: edema, dyspnea on exertion (MONSON), and shortness of breath. The etiology of their decompensation is likely cardiac arrhythmia (SVT on EKG) . Most recent BNP and echo results are listed below.  No results for input(s): "BNP" in the last 72 hours.    Latest ECHO  Results for orders placed during the hospital encounter of 08/12/24    Echo    Interpretation Summary    Left Ventricle: The left ventricle is moderately dilated. There is eccentric hypertrophy. Moderate global hypokinesis present. There is moderately reduced systolic function with a visually estimated ejection fraction of 30 - 35%. Biplane (2D) method of discs ejection fraction is 34%. There is diastolic dysfunction but grade cannot " be determined.    Right Ventricle: Normal right ventricular cavity size. Wall thickness is normal. Systolic function is normal.    Left Atrium: Left atrium is severely dilated.    Right Atrium: Right atrium is moderately dilated.    Aortic Valve: There is mild stenosis. Aortic valve area by VTI is 1.95 cm². Aortic valve peak velocity is 1.40 m/s. Mean gradient is 4 mmHg. The dimensionless index is 0.62.    Mitral Valve: There is mild regurgitation.    Tricuspid Valve: There is mild regurgitation.    Pulmonic Valve: There is mild regurgitation.    Pulmonary Artery: The estimated pulmonary artery systolic pressure is 36 mmHg.    IVC/SVC: Normal venous pressure at 3 mmHg.    Current Heart Failure Medications  , Daily, Oral  metoprolol succinate (TOPROL-XL) 24 hr tablet 25 mg, Daily, Oral  furosemide tablet 40 mg, 2 times daily, Oral    Plan  - Monitor strict I&Os and daily weights.    - Place on telemetry  -patient NPO 2/2 concern for active GI bleeding   -she received 60 mg furosemide in ED   TTE in August with EF of 30-35%  Holding ACEi / ARB given renal function    Inpatient Upgrade Note    Mary Ellen Saini has warranted treatment spanning two or more midnights of hospital level care for the management of KENY. She continues to require daily labs, monitoring of vital signs, IV pain medication, medication adjustments, and further evaluation by consultants. Her condition is also complicated by the following comorbidities: Hypertension, Chronic respiratory disease, and polypharmacy.     -12/31 12/31 patient had repeat echocardiogram, cardiology is on board, we will continue to hold losartan due to worsening KENY, Nephrology was consulted, patient is responding well to diuresis.  -1/2 responding very well to diuresis no intervention by Cardiology at this point patient to continue on her current medical management and to be discharged home on her LifeVest that she owns      Elevated troponin    Secondary to acute  decompensated heart failure and NSTEMI cardiology is on board holding anticoagulants due to GI bleed  -1/2 EKG showed sinus rhythm with PVCs troponin trending down cardiology recommended medical management at this moment since the patient her own life vest at home      VTE Risk Mitigation (From admission, onward)           Ordered     IP VTE HIGH RISK PATIENT  Once         12/29/24 1908     Place sequential compression device  Until discontinued         12/29/24 1908                    Discharge Planning   JENAE: 1/3/2025     Code Status: Full Code   Medical Readiness for Discharge Date:   Discharge Plan A:  (Homeless)   Discharge Delays: None known at this time                    Toshia Fofana MD  Department of Hospital Medicine   [facility]

## 2025-01-02 NOTE — PLAN OF CARE
went to meet with patient on MD rounds. Patient is homeless, refusing shelter placement. New PCP follow-up scheduled. Cardiology and Heme/Onc appointments also scheduled. No anticipated discharge needs at this time. Patient encouraged to call with any questions or concerns.  will continue to follow patient through transitions of care and assist with any discharge needs.     Other Contacts    Name Relation Home Work Mobile   Cass Strong   [phone]   Iain Strong   [phone]   Raymond Shi        Future Appointments   Date Time Provider Department Center   1/16/2025  9:00 AM Yandel Bonds RRT [facility]   1/16/2025  2:20 PM Oneal Tee DNP [facility] CARDIO [facility]   1/22/2025  8:00 AM Micah Luna MD [facility] HEM ONC [facility]        Dr. Negra Nair ([facility])     [address] [place] [address] Phone Number: [phone]      Next Steps: Follow up on 1/6/2025  Instructions: at 10:00 am--The [facility] did not have any available appointments. You can schedule future appointments there. [facility] had availability. Please bring your insurance card, ID, medications, and discharge papers. Please cancel if you are unable to make appointment.       [facility] Hepatology     Please contact office to schedule follow-up appointment. Per Chart review, they left a voicemail for you to call back.      Next Steps: Follow up  Instructions: Phone Number: [phone]               01/02/25 0944   Discharge Reassessment   Assessment Type Discharge Planning Reassessment   Did the patient's condition or plan change since previous assessment? No   Discharge Plan discussed with: Patient   Discharge Plan A   (Homeless)   Discharge Plan B Shelter   DME Needed Upon Discharge  none   Transition of Care Barriers Homeless   Why the patient remains in the hospital Requires continued medical care   Post-Acute Status    Hospital Resources/Appts/Education Provided Appointments scheduled and added to AVS   Discharge Delays None known at this time     Debora Ennis RN    [phone]

## 2025-01-02 NOTE — PLAN OF CARE
Virtual Nurse note: Patient chart, labs, and vitals reviewed. Care plan and goals updated as needed.   VN to continue to be available as needed.     8

## 2025-01-02 NOTE — PROGRESS NOTES
Nephrology Progress Note      Consult Requested By: Toshia Rees MD  Reason for Consult: KENY      SUBJECTIVE:     Review of Systems   Constitutional:  Negative for chills and fever.   HENT:  Negative for congestion and sore throat.    Eyes:  Negative for blurred vision, double vision and photophobia.   Respiratory:  Negative for cough and shortness of breath.    Cardiovascular:  Negative for chest pain, palpitations and leg swelling.   Gastrointestinal:  Positive for abdominal pain. Negative for diarrhea, nausea and vomiting.   Genitourinary:  Negative for dysuria and urgency.   Musculoskeletal:  Negative for joint pain and myalgias.   Skin:  Negative for itching and rash.   Neurological:  Negative for dizziness, sensory change, weakness and headaches.   Endo/Heme/Allergies:  Negative for polydipsia. Does not bruise/bleed easily.   Psychiatric/Behavioral:  Negative for depression.        Past Medical History:   Diagnosis Date    Asthma     Bipolar disorder     Hypertension      Past Surgical History:   Procedure Laterality Date    CHOLECYSTECTOMY      COLONOSCOPY N/A 10/22/2024    Procedure: COLONOSCOPY;  Surgeon: Dayday Freed MD;  Location: [facility];  Service: Endoscopy;  Laterality: N/A;    COLONOSCOPY N/A 11/11/2024    Procedure: COLONOSCOPY;  Surgeon: Reese Chen MD;  Location: [facility];  Service: Endoscopy;  Laterality: N/A;    ESOPHAGOGASTRODUODENOSCOPY N/A 10/22/2024    Procedure: EGD (ESOPHAGOGASTRODUODENOSCOPY);  Surgeon: Dayday Freed MD;  Location: [facility];  Service: Endoscopy;  Laterality: N/A;    ESOPHAGOGASTRODUODENOSCOPY N/A 11/11/2024    Procedure: EGD (ESOPHAGOGASTRODUODENOSCOPY);  Surgeon: Reese Chen MD;  Location: [facility];  Service: Endoscopy;  Laterality: N/A;    SHOULDER SURGERY      TUBAL LIGATION       No family history on file.  Social History     Tobacco Use    Smoking status: Every Day     Current packs/day: 1.00     Average packs/day: 2.0  packs/day for 58.0 years (115.1 ttl pk-yrs)     Types: Cigarettes     Start date: 1967    Smokeless tobacco: Never    Tobacco comments:     Pt is a 2 pk/day cigarette smoker x 57 yrs. She states that she cut down to about 1 pk/day, and is trying to quit.  Ambulatory referral to Smoking Cessation clinic following hospital discharge.     Substance Use Topics    Alcohol use: Yes     Comment: socailly    Drug use: Yes     Types: Methamphetamines, Marijuana     Comment: denies cocaine or meth. Reports maijurana use       Review of patient's allergies indicates:  No Known Allergies         OBJECTIVE:     Vital Signs (Most Recent)  Vitals:    01/02/25 0715 01/02/25 0853 01/02/25 0903 01/02/25 1115   BP:   (!) 138/90 134/86   BP Location:    Left arm   Patient Position:    Lying   Pulse:  106 109 96   Resp:  18  18   Temp:    98.2 °F (36.8 °C)   TempSrc:    Oral   SpO2: 95% 100%  96%   Weight:       Height:                     Medications:   duke's soln (benadryl 30 mL, mylanta 30 mL, LIDOcaine 30 mL, nystatin 30 mL) 120 mL  10 mL Oral QID    ferrous sulfate  1 tablet Oral Daily    furosemide (LASIX) injection  40 mg Intravenous Q12H    lactulose  20 g Oral TID    [START ON 1/3/2025] magnesium oxide  400 mg Oral Daily    metoprolol succinate  25 mg Oral Daily    nicotine  1 patch Transdermal Daily    oxybutynin  5 mg Oral TID    pantoprazole  40 mg Oral BID    potassium chloride  40 mEq Oral Daily    tiotropium bromide  2 puff Inhalation Daily           Physical Exam  Vitals and nursing note reviewed.   Constitutional:       General: She is not in acute distress.     Appearance: She is ill-appearing. She is not diaphoretic.   HENT:      Head: Normocephalic and atraumatic.      Mouth/Throat:      Pharynx: No oropharyngeal exudate.   Eyes:      General: No scleral icterus.     Conjunctiva/sclera: Conjunctivae normal.      Pupils: Pupils are equal, round, and reactive to light.   Cardiovascular:      Rate and Rhythm: Normal  rate and regular rhythm.      Heart sounds: Normal heart sounds. No murmur heard.  Pulmonary:      Effort: Pulmonary effort is normal. No respiratory distress.      Breath sounds: Normal breath sounds.   Abdominal:      General: Bowel sounds are normal. There is no distension.      Palpations: Abdomen is soft.      Tenderness: There is abdominal tenderness.   Musculoskeletal:         General: No swelling. Normal range of motion.      Cervical back: Normal range of motion and neck supple.      Right lower leg: Edema present.      Left lower leg: Edema present.   Skin:     General: Skin is warm and dry.      Findings: No erythema.   Neurological:      Mental Status: She is alert and oriented to person, place, and time.      Cranial Nerves: No cranial nerve deficit.   Psychiatric:         Mood and Affect: Affect normal.         Cognition and Memory: Memory normal.         Judgment: Judgment normal.         Laboratory:  Recent Labs   Lab 12/29/24  1620 12/29/24  1912 12/30/24  0336 12/31/24  0847 01/01/25  0301 01/02/25  0229   WBC 10.35 10.36 11.54 11.89 17.18* 13.57*   HGB 7.2* 7.4* 7.5* 8.4* 8.4* 9.4*   HCT 22.5* 23.3* 23.2* 26.4* 26.0* 29.8*    294 319 337 363 395   MONO 5.6  0.6 4.4  0.5 5.8  0.7  --   --   --    EOSINOPHIL 0.5 0.5 0.2  --   --   --      Recent Labs   Lab 12/31/24  2130 01/01/25  0301 01/02/25  0229   * 132* 131*  131*   K 3.6 3.3* 3.1*  3.1*   CL 95 95 93*  93*   CO2 24 23 24  24   BUN 26* 29* 34*  34*   CREATININE 2.0* 2.0* 2.0*  2.0*   CALCIUM 8.5* 8.2* 8.3*  8.3*   PHOS  --   --  2.6*       Diagnostic Results:  X-Ray: Reviewed  US: Reviewed  Echo: Reviewed  ASSESSMENT/PLAN:     KENY - Not clear etiology at this point. UA no blood no protein no bacteria baseline Cr 1.1-1.4 multiple KENY in the past.   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  -- C3 within range, C4 low. Cryo RF pending, know to have Hep C  -- Renal US with no significant  "abnormalities  -- Check Bladder scan to rule out obstruction post void >300ml place potter.  -- Daily Renal Function Panel  -- Avoid Hypotension.  -- Renally dose all meds  -- Please avoid nephrotoxins, including NSAIDs, aminoglycosides, IV contrast (unless absolutely necessary), gadolinium, fleets and other phosphorous-based laxatives. Caution with antibiotics.    HFrEF   -- On lasix for volume optimization. Transition IV to PO     Hypokalemia  -- replace    Anemia     Recent Labs   Lab 12/31/24  0847 01/01/25  0301 01/02/25 0229   HGB 8.4* 8.4* 9.4*   HCT 26.4* 26.0* 29.8*    363 395       Iron - PO iron every other day   Lab Results   Component Value Date    IRON 18 (L) 11/14/2024    TIBC 337 11/14/2024    FERRITIN 78 11/14/2024       Hypomagnesemia   -- replace  Recent Labs   Lab 01/02/25 0229   CALCIUM 8.3*  8.3*   PHOS 2.6*     Recent Labs   Lab 12/31/24  2130 01/01/25  0301 01/02/25 0229   MG 1.7 1.6 1.5*       Lab Results   Component Value Date    CALCIUM 8.3 (L) 01/02/2025    CALCIUM 8.3 (L) 01/02/2025    PHOS 2.6 (L) 01/02/2025     No results found for: "[id number]"    Lab Results   Component Value Date    CO2 24 01/02/2025    CO2 24 01/02/2025        Nutrition/Hypoalbuminemia    Recent Labs   Lab 12/29/24  1624 12/30/24  0336 12/31/24  1629 12/31/24  2130 01/01/25  0301 01/02/25 0229   LABPROT 14.1*  --  15.6*  --   --   --    ALBUMIN  --    < >  --    < > 2.5* 2.6*  2.6*    < > = values in this interval not displayed.     Ok for now for Boost Plus with meals TID.       Thank you for the consult, will follow  With any questions please call  Odalys Pepper NP    [facility]     MD RUTHY Gonzales MD V. V. Rusnak, MD E. V. Harmon, MARÍA Pepper NP    [address]  [place], [address]  [phone]   After hours [phone]   "

## 2025-01-02 NOTE — PLAN OF CARE
POC Reviewed. Will continue to monitor.     Problem: Adult Inpatient Plan of Care  Goal: Plan of Care Review  Outcome: Progressing  Goal: Patient-Specific Goal (Individualized)  Outcome: Progressing  Goal: Absence of Hospital-Acquired Illness or Injury  Outcome: Progressing  Goal: Optimal Comfort and Wellbeing  Outcome: Progressing  Goal: Readiness for Transition of Care  Outcome: Progressing     Problem: Gastrointestinal Bleeding  Goal: Optimal Coping with Acute Illness  Outcome: Progressing  Goal: Hemostasis  Outcome: Progressing     Problem: Acute Kidney Injury/Impairment  Goal: Fluid and Electrolyte Balance  Outcome: Progressing  Goal: Improved Oral Intake  Outcome: Progressing  Goal: Effective Renal Function  Outcome: Progressing     Problem: Fall Injury Risk  Goal: Absence of Fall and Fall-Related Injury  Outcome: Progressing     Problem: Anemia  Goal: Anemia Symptom Improvement  Outcome: Progressing     Problem: Acute Coronary Syndrome  Goal: Optimal Adaptation to Illness  Outcome: Progressing  Goal: Absence of Cardiac-Related Pain  Outcome: Progressing  Goal: Normalized Cardiac Rhythm  Outcome: Progressing  Goal: Effective Cardiac Pump Function  Outcome: Progressing  Goal: Adequate Tissue Perfusion  Outcome: Progressing     Problem: Comorbidity Management  Goal: Maintenance of Asthma Control  Outcome: Progressing  Goal: Maintenance of Behavioral Health Symptom Control  Outcome: Progressing  Goal: Maintenance of COPD Symptom Control  Outcome: Progressing  Goal: Maintenance of Heart Failure Symptom Control  Outcome: Progressing  Goal: Blood Pressure in Desired Range  Outcome: Progressing     Problem: Excessive Substance Use  Goal: Optimized Energy Level (Excessive Substance Use)  Outcome: Progressing  Goal: Improved Behavioral Control (Excessive Substance Use)  Outcome: Progressing  Goal: Increased Participation and Engagement (Excessive Substance Use)  Outcome: Progressing  Goal: Improved Physiologic Symptoms  (Excessive Substance Use)  Outcome: Progressing  Goal: Enhanced Social, Occupational or Functional Skills (Excessive Substance Use)  Outcome: Progressing     Problem: Pain Acute  Goal: Optimal Pain Control and Function  Outcome: Progressing

## 2025-01-02 NOTE — PLAN OF CARE
Problem: Adult Inpatient Plan of Care  Goal: Plan of Care Review  Outcome: Progressing  Goal: Optimal Comfort and Wellbeing  Outcome: Progressing     Problem: Acute Kidney Injury/Impairment  Goal: Fluid and Electrolyte Balance  Outcome: Progressing     Problem: Anemia  Goal: Anemia Symptom Improvement  Outcome: Progressing     Problem: Acute Coronary Syndrome  Goal: Optimal Adaptation to Illness  Outcome: Progressing     Problem: Pain Chronic (Persistent)  Goal: Optimal Pain Control and Function  Outcome: Progressing   Plan of care reviewed with patient. Questions answered. All safety measure implemented during shift.

## 2025-01-02 NOTE — ASSESSMENT & PLAN NOTE
"Patient has Combined Systolic and Diastolic heart failure that is Acute on chronic. On presentation their CHF was decompensated. Evidence of decompensated CHF on presentation includes: edema, dyspnea on exertion (MONSON), and shortness of breath. The etiology of their decompensation is likely cardiac arrhythmia (SVT on EKG) . Most recent BNP and echo results are listed below.  No results for input(s): "BNP" in the last 72 hours.    Latest ECHO  Results for orders placed during the hospital encounter of 08/12/24    Echo    Interpretation Summary    Left Ventricle: The left ventricle is moderately dilated. There is eccentric hypertrophy. Moderate global hypokinesis present. There is moderately reduced systolic function with a visually estimated ejection fraction of 30 - 35%. Biplane (2D) method of discs ejection fraction is 34%. There is diastolic dysfunction but grade cannot be determined.    Right Ventricle: Normal right ventricular cavity size. Wall thickness is normal. Systolic function is normal.    Left Atrium: Left atrium is severely dilated.    Right Atrium: Right atrium is moderately dilated.    Aortic Valve: There is mild stenosis. Aortic valve area by VTI is 1.95 cm². Aortic valve peak velocity is 1.40 m/s. Mean gradient is 4 mmHg. The dimensionless index is 0.62.    Mitral Valve: There is mild regurgitation.    Tricuspid Valve: There is mild regurgitation.    Pulmonic Valve: There is mild regurgitation.    Pulmonary Artery: The estimated pulmonary artery systolic pressure is 36 mmHg.    IVC/SVC: Normal venous pressure at 3 mmHg.    Current Heart Failure Medications  , Daily, Oral  metoprolol succinate (TOPROL-XL) 24 hr tablet 25 mg, Daily, Oral  furosemide tablet 40 mg, 2 times daily, Oral    Plan  - Monitor strict I&Os and daily weights.    - Place on telemetry  -patient NPO 2/2 concern for active GI bleeding   -she received 60 mg furosemide in ED   TTE in August with EF of 30-35%  Holding ACEi / ARB given " renal function    Inpatient Upgrade Note    Mary Ellen Saini has warranted treatment spanning two or more midnights of hospital level care for the management of KENY. She continues to require daily labs, monitoring of vital signs, IV pain medication, medication adjustments, and further evaluation by consultants. Her condition is also complicated by the following comorbidities: Hypertension, Chronic respiratory disease, and polypharmacy.     -12/31 12/31 patient had repeat echocardiogram, cardiology is on board, we will continue to hold losartan due to worsening KENY, Nephrology was consulted, patient is responding well to diuresis.  -1/2 responding very well to diuresis no intervention by Cardiology at this point patient to continue on her current medical management and to be discharged home on her LifeVest that she owns

## 2025-01-02 NOTE — ASSESSMENT & PLAN NOTE
Patient's most recent potassium results are listed below.   Recent Labs     12/31/24  2130 01/01/25  0301 01/02/25  0229   K 3.6 3.3* 3.1*  3.1*       Plan  - Replete potassium per protocol  - Monitor potassium Daily  - Patient's hypokalemia is stable  -patient was placed on daily potassium replacement

## 2025-01-02 NOTE — ASSESSMENT & PLAN NOTE
GI re-consulted for elevated LFTs. Patient has known HCV and previously referred to hepatology. She missed her appointment. No prior HCV treatment and unknown duration of infection. History of IVDU and tattoos. No concern for asterixis, HE, or ascites. INR elevated 1.5. LFTs persistently elevated since admission. Elevated LFTs likely also attributed to IVDU and ETOH use. MDF is 14.9 no concern for alc hep at this time.      Plan:  HCV PCR ordered and pending  Needs hepatology outpatient treatment - appreciate social work input/ coordination with this as patient is homeless  Daily CMP, INR  Recommend CMV, EBV, VZV serologies   Monitor for alcohol withdrawal - last use day of admission

## 2025-01-02 NOTE — ASSESSMENT & PLAN NOTE
Secondary to acute decompensated heart failure and NSTEMI cardiology is on board holding anticoagulants due to GI bleed  -1/2 EKG showed sinus rhythm with PVCs troponin trending down cardiology recommended medical management at this moment since the patient her own life vest at home

## 2025-01-02 NOTE — PHYSICIAN QUERY
Provider, due to conflicting documentation please clarify the acuity and type of heart failure.   Acute on chronic combined systolic and diastolic heart failure

## 2025-01-02 NOTE — SUBJECTIVE & OBJECTIVE
Interval History:  Patient was seen in the morning was complaining of palpitations denies any shortness on whether abdominal pain still diuresing very well, cardiology, with medication refill, case management is on board since the patient complaining of polysubstance abuse, has tendency noncompliance with her med    Review of Systems   Constitutional:  Negative for chills, diaphoresis and fever.   Respiratory:  Negative for cough, chest tightness, shortness of breath and wheezing.    Cardiovascular:  Negative for chest pain, palpitations and leg swelling.   Gastrointestinal:  Negative for abdominal pain, blood in stool, diarrhea, nausea and vomiting.   Genitourinary:  Negative for dysuria, flank pain, frequency and hematuria.   Musculoskeletal:  Negative for back pain and myalgias.   Neurological:  Negative for dizziness, syncope, light-headedness and headaches.   Psychiatric/Behavioral:  Negative for confusion.      Objective:     Vital Signs (Most Recent):  Temp: 98.2 °F (36.8 °C) (01/02/25 1115)  Pulse: 93 (01/02/25 1222)  Resp: 18 (01/02/25 1220)  BP: 134/86 (01/02/25 1115)  SpO2: 96 % (01/02/25 1115) Vital Signs (24h Range):  Temp:  [97.4 °F (36.3 °C)-98.4 °F (36.9 °C)] 98.2 °F (36.8 °C)  Pulse:  [] 93  Resp:  [15-18] 18  SpO2:  [95 %-100 %] 96 %  BP: (112-138)/(68-90) 134/86     Weight: 72.6 kg (160 lb)  Body mass index is 22.96 kg/m².    Intake/Output Summary (Last 24 hours) at 1/2/2025 1445  Last data filed at 1/2/2025 1346  Gross per 24 hour   Intake --   Output 700 ml   Net -700 ml         Physical Exam  Vitals and nursing note reviewed.   Constitutional:       Appearance: She is well-developed.   HENT:      Head: Normocephalic and atraumatic.   Neck:      Vascular: No JVD.   Cardiovascular:      Rate and Rhythm: Normal rate and regular rhythm.      Heart sounds: Normal heart sounds.      Comments: Bilateral LE edema R>L   Pulmonary:      Effort: Pulmonary effort is normal. No respiratory distress.       Breath sounds: No wheezing.   Abdominal:      General: Bowel sounds are normal. There is no distension.      Palpations: Abdomen is soft.      Tenderness: There is no abdominal tenderness. There is no guarding.   Musculoskeletal:         General: No tenderness. Normal range of motion.      Cervical back: Normal range of motion.      Right lower leg: No edema.      Left lower leg: No edema.   Skin:     General: Skin is warm and dry.      Capillary Refill: Capillary refill takes less than 2 seconds.   Neurological:      Mental Status: She is alert and oriented to person, place, and time.   Psychiatric:         Behavior: Behavior normal.             Significant Labs: All pertinent labs within the past 24 hours have been reviewed.  Recent Lab Results  (Last 5 results in the past 24 hours)        01/02/25  1114   01/02/25  1000   01/02/25  0538   01/02/25  0229   01/01/25  2240        Albumin       2.6                2.6         ALP       128         ALT       429         Anion Gap       14                14         Appearance, UA         Clear       AST       214         Bilirubin (UA)         Negative       BILIRUBIN TOTAL       0.6  Comment: For infants and newborns, interpretation of results should be based  on gestational age, weight and in agreement with clinical  observations.    Premature Infant recommended reference ranges:  Up to 24 hours.............<8.0 mg/dL  Up to 48 hours............<12.0 mg/dL  3-5 days..................<15.0 mg/dL  6-29 days.................<15.0 mg/dL           BUN       34                34         Calcium       8.3                8.3         Chloride       93                93         CO2       24                24         Color, UA         Colorless       Creatinine       2.0                2.0         eGFR       28                28         Glucose       115                115         Glucose, UA         Negative       Hematocrit       29.8         Hemoglobin       9.4          Ketones, UA         Negative       Leukocyte Esterase, UA         Negative       Magnesium        1.5         MCH       24.4         MCHC       31.5         MCV       77         MPV       10.3         NITRITE UA         Negative       Blood, UA         Negative       pH, UA         7.0       Phosphorus Level       2.6         Platelet Count       395         POCT Glucose 158     114           Potassium       3.1                3.1         PROTEIN TOTAL       7.8         Protein, UA         Negative  Comment: Recommend a 24 hour urine protein or a urine   protein/creatinine ratio if globulin induced proteinuria is  clinically suspected.         RBC       3.86         RDW       14.7         Sodium       131                131         Spec Grav UA         1.010       Specimen UA         Urine, Clean Catch       Troponin I   0.122  Comment: The reference interval for Troponin I represents the 99th percentile   cutoff   for our facility and is consistent with 3rd generation assay   performance.               UROBILINOGEN UA         Negative       WBC       13.57         York'S Stain, Urine         No eosinophils seen                              Significant Imaging: I have reviewed all pertinent imaging results/findings within the past 24 hours.

## 2025-01-02 NOTE — ASSESSMENT & PLAN NOTE
Patient's hemorrhage is due to gastrointestinal bleed, patient does have a propensity for bleeding due to duodenal ulcers.. Patients most recent Hgb, Hct, platelets, and INR are listed below.  Recent Labs     12/31/24  0847 12/31/24  1629 01/01/25  0301 01/02/25  0229   HGB 8.4*  --  8.4* 9.4*   HCT 26.4*  --  26.0* 29.8*     --  363 395   INR  --  1.5*  --   --        Plan  - Will trend hemoglobin/hematocrit Every 6 hours  - Will monitor and correct any coagulation defects  - Will transfuse if Hgb is <7g/dl (<8g/dl in cases of active ACS) or if patient has rapid bleeding leading to hemodynamic instability  - upper GI endoscopy (11/11/24) revealed gastritis, mucosal changes to duodenum, and duodenal ulcer, biopsy concerning for amyloidosis   -patient refused rectal exam and occult stool   -consult GI: Appreciate recs  Recommend oncology evaluation for amyloidosis   No endoscopic intervention at this time   PPI 40 mg BID as recent duodenal ulcer appreciated on EGD (needs BID therapy for total of 8 weeks)  Transfuse for hemoglobin less than 7   CBC daily   -continue PPI   --okay to feed per GI,no endoscopic intervention at this time, PPI b.i.d. for recent duodenal ulcer  -appreciate GI recommendations  We will order HCV PCR , patient will Need hepatology outpatient treatment patient can have CMV, EBV, VZV serologies as outpatient

## 2025-01-02 NOTE — ASSESSMENT & PLAN NOTE
Anemia is likely due to acute blood loss which was from rectum . Most recent hemoglobin and hematocrit are listed below.  Recent Labs     12/31/24  0847 01/01/25  0301 01/02/25  0229   HGB 8.4* 8.4* 9.4*   HCT 26.4* 26.0* 29.8*       Plan  - Monitor serial CBC: Every 6 hours  - Transfuse PRBC if patient becomes hemodynamically unstable, symptomatic or H/H drops below 7/21.  - Patient has not received any PRBC transfusions to date  - Patient's anemia is currently stable

## 2025-01-03 VITALS
TEMPERATURE: 98 F | DIASTOLIC BLOOD PRESSURE: 75 MMHG | BODY MASS INDEX: 22.9 KG/M2 | OXYGEN SATURATION: 95 % | HEART RATE: 93 BPM | SYSTOLIC BLOOD PRESSURE: 117 MMHG | WEIGHT: 160 LBS | HEIGHT: 70 IN | RESPIRATION RATE: 16 BRPM

## 2025-01-03 LAB
ALBUMIN SERPL BCP-MCNC: 2.2 G/DL (ref 3.5–5.2)
ANION GAP SERPL CALC-SCNC: 14 MMOL/L (ref 8–16)
BUN SERPL-MCNC: 37 MG/DL (ref 6–20)
CALCIUM SERPL-MCNC: 7.8 MG/DL (ref 8.7–10.5)
CHLORIDE SERPL-SCNC: 92 MMOL/L (ref 95–110)
CO2 SERPL-SCNC: 25 MMOL/L (ref 23–29)
CREAT SERPL-MCNC: 1.9 MG/DL (ref 0.5–1.4)
EST. GFR  (NO RACE VARIABLE): 30 ML/MIN/1.73 M^2
GLUCOSE SERPL-MCNC: 155 MG/DL (ref 70–110)
MAGNESIUM SERPL-MCNC: 1.7 MG/DL (ref 1.6–2.6)
OHS QRS DURATION: 104 MS
OHS QRS DURATION: 90 MS
OHS QTC CALCULATION: 480 MS
OHS QTC CALCULATION: 510 MS
PHOSPHATE SERPL-MCNC: 3.3 MG/DL (ref 2.7–4.5)
POTASSIUM SERPL-SCNC: 3.3 MMOL/L (ref 3.5–5.1)
SODIUM SERPL-SCNC: 131 MMOL/L (ref 136–145)

## 2025-01-03 PROCEDURE — 25000003 PHARM REV CODE 250: Performed by: NURSE PRACTITIONER

## 2025-01-03 PROCEDURE — 80069 RENAL FUNCTION PANEL: CPT | Performed by: STUDENT IN AN ORGANIZED HEALTH CARE EDUCATION/TRAINING PROGRAM

## 2025-01-03 PROCEDURE — 94761 N-INVAS EAR/PLS OXIMETRY MLT: CPT

## 2025-01-03 PROCEDURE — S4991 NICOTINE PATCH NONLEGEND: HCPCS | Performed by: FAMILY MEDICINE

## 2025-01-03 PROCEDURE — 25000003 PHARM REV CODE 250

## 2025-01-03 PROCEDURE — 99232 SBSQ HOSP IP/OBS MODERATE 35: CPT | Mod: ,,, | Performed by: INTERNAL MEDICINE

## 2025-01-03 PROCEDURE — 94640 AIRWAY INHALATION TREATMENT: CPT

## 2025-01-03 PROCEDURE — 63600175 PHARM REV CODE 636 W HCPCS: Performed by: NURSE PRACTITIONER

## 2025-01-03 PROCEDURE — 36415 COLL VENOUS BLD VENIPUNCTURE: CPT | Performed by: STUDENT IN AN ORGANIZED HEALTH CARE EDUCATION/TRAINING PROGRAM

## 2025-01-03 PROCEDURE — 83735 ASSAY OF MAGNESIUM: CPT | Performed by: STUDENT IN AN ORGANIZED HEALTH CARE EDUCATION/TRAINING PROGRAM

## 2025-01-03 PROCEDURE — 25000003 PHARM REV CODE 250: Performed by: FAMILY MEDICINE

## 2025-01-03 PROCEDURE — 25000003 PHARM REV CODE 250: Performed by: REGISTERED NURSE

## 2025-01-03 RX ORDER — ALBUTEROL SULFATE 90 UG/1
1-2 INHALANT RESPIRATORY (INHALATION) EVERY 6 HOURS PRN
Qty: 18 G | Refills: 1 | Status: SHIPPED | OUTPATIENT
Start: 2025-01-03

## 2025-01-03 RX ORDER — OXYCODONE HYDROCHLORIDE 5 MG/1
5 TABLET ORAL EVERY 6 HOURS PRN
Qty: 12 TABLET | Refills: 0 | Status: SHIPPED | OUTPATIENT
Start: 2025-01-03 | End: 2025-01-03

## 2025-01-03 RX ORDER — LACTULOSE 10 G/15ML
20 SOLUTION ORAL; RECTAL 3 TIMES DAILY
Qty: 2700 ML | Refills: 1 | Status: SHIPPED | OUTPATIENT
Start: 2025-01-03 | End: 2025-03-04

## 2025-01-03 RX ORDER — OXYCODONE HYDROCHLORIDE 5 MG/1
5 TABLET ORAL EVERY 6 HOURS PRN
Qty: 31 TABLET | Refills: 0 | Status: SHIPPED | OUTPATIENT
Start: 2025-01-03 | End: 2025-01-03

## 2025-01-03 RX ORDER — IBUPROFEN 200 MG
1 TABLET ORAL DAILY
Qty: 30 PATCH | Refills: 0 | Status: SHIPPED | OUTPATIENT
Start: 2025-01-03 | End: 2025-01-03

## 2025-01-03 RX ORDER — LIDOCAINE 50 MG/G
2 PATCH TOPICAL NIGHTLY
Qty: 30 PATCH | Refills: 1 | Status: SHIPPED | OUTPATIENT
Start: 2025-01-03

## 2025-01-03 RX ORDER — OXYBUTYNIN CHLORIDE 5 MG/1
5 TABLET ORAL 3 TIMES DAILY
Qty: 90 TABLET | Refills: 1 | Status: SHIPPED | OUTPATIENT
Start: 2025-01-03 | End: 2025-03-04

## 2025-01-03 RX ORDER — LACTULOSE 10 G/15ML
20 SOLUTION ORAL; RECTAL 3 TIMES DAILY
Qty: 2700 ML | Refills: 1 | Status: SHIPPED | OUTPATIENT
Start: 2025-01-03 | End: 2025-01-03

## 2025-01-03 RX ORDER — SUCRALFATE 1 G/1
1 TABLET ORAL
Qty: 90 TABLET | Refills: 1 | Status: SHIPPED | OUTPATIENT
Start: 2025-01-03

## 2025-01-03 RX ORDER — LANOLIN ALCOHOL/MO/W.PET/CERES
400 CREAM (GRAM) TOPICAL DAILY
Qty: 30 TABLET | Refills: 0 | Status: SHIPPED | OUTPATIENT
Start: 2025-01-03 | End: 2025-01-03

## 2025-01-03 RX ORDER — FERROUS SULFATE 324(65)MG
325 TABLET, DELAYED RELEASE (ENTERIC COATED) ORAL DAILY
Qty: 30 TABLET | Refills: 1 | Status: SHIPPED | OUTPATIENT
Start: 2025-01-03 | End: 2025-01-03

## 2025-01-03 RX ORDER — FUROSEMIDE 40 MG/1
40 TABLET ORAL 2 TIMES DAILY
Qty: 60 TABLET | Refills: 11 | Status: SHIPPED | OUTPATIENT
Start: 2025-01-03 | End: 2026-01-03

## 2025-01-03 RX ORDER — POTASSIUM CHLORIDE 20 MEQ/1
40 TABLET, EXTENDED RELEASE ORAL DAILY
Qty: 60 TABLET | Refills: 0 | Status: SHIPPED | OUTPATIENT
Start: 2025-01-03 | End: 2025-01-03

## 2025-01-03 RX ORDER — NYSTATIN 100000 U/G
CREAM TOPICAL 2 TIMES DAILY
Qty: 30 G | Refills: 0 | Status: SHIPPED | OUTPATIENT
Start: 2025-01-03 | End: 2025-02-02

## 2025-01-03 RX ORDER — TRAMADOL HYDROCHLORIDE 50 MG/1
50 TABLET ORAL ONCE
Status: COMPLETED | OUTPATIENT
Start: 2025-01-03 | End: 2025-01-03

## 2025-01-03 RX ORDER — METOPROLOL SUCCINATE 25 MG/1
25 TABLET, EXTENDED RELEASE ORAL DAILY
Qty: 30 TABLET | Refills: 0 | Status: SHIPPED | OUTPATIENT
Start: 2025-01-03 | End: 2025-02-02

## 2025-01-03 RX ORDER — FUROSEMIDE 40 MG/1
40 TABLET ORAL 2 TIMES DAILY
Qty: 60 TABLET | Refills: 11 | Status: SHIPPED | OUTPATIENT
Start: 2025-01-03 | End: 2025-01-03

## 2025-01-03 RX ORDER — POLYETHYLENE GLYCOL 3350 17 G/17G
17 POWDER, FOR SOLUTION ORAL 2 TIMES DAILY PRN
Qty: 30 PACKET | Refills: 0 | Status: SHIPPED | OUTPATIENT
Start: 2025-01-03 | End: 2025-02-02

## 2025-01-03 RX ORDER — LANOLIN ALCOHOL/MO/W.PET/CERES
400 CREAM (GRAM) TOPICAL DAILY
Qty: 30 TABLET | Refills: 0 | Status: SHIPPED | OUTPATIENT
Start: 2025-01-03 | End: 2025-02-02

## 2025-01-03 RX ORDER — OXYCODONE HYDROCHLORIDE 5 MG/1
5 TABLET ORAL EVERY 6 HOURS PRN
Qty: 12 TABLET | Refills: 0 | Status: SHIPPED | OUTPATIENT
Start: 2025-01-03 | End: 2025-01-06

## 2025-01-03 RX ORDER — NALOXONE HYDROCHLORIDE 4 MG/.1ML
SPRAY NASAL
Qty: 2 EACH | Refills: 11 | Status: SHIPPED | OUTPATIENT
Start: 2025-01-03

## 2025-01-03 RX ORDER — POTASSIUM CHLORIDE 20 MEQ/1
40 TABLET, EXTENDED RELEASE ORAL DAILY
Qty: 60 TABLET | Refills: 0 | Status: SHIPPED | OUTPATIENT
Start: 2025-01-03 | End: 2025-02-02

## 2025-01-03 RX ORDER — PANTOPRAZOLE SODIUM 40 MG/1
40 TABLET, DELAYED RELEASE ORAL 2 TIMES DAILY
Qty: 60 TABLET | Refills: 1 | Status: SHIPPED | OUTPATIENT
Start: 2025-01-03 | End: 2025-03-04

## 2025-01-03 RX ORDER — IBUPROFEN 200 MG
1 TABLET ORAL DAILY
Qty: 30 PATCH | Refills: 0 | Status: SHIPPED | OUTPATIENT
Start: 2025-01-03

## 2025-01-03 RX ORDER — FERROUS SULFATE 324(65)MG
325 TABLET, DELAYED RELEASE (ENTERIC COATED) ORAL DAILY
Qty: 30 TABLET | Refills: 1 | Status: SHIPPED | OUTPATIENT
Start: 2025-01-03 | End: 2025-03-04

## 2025-01-03 RX ORDER — TIOTROPIUM BROMIDE 18 UG/1
18 CAPSULE ORAL; RESPIRATORY (INHALATION) DAILY
Qty: 90 CAPSULE | Refills: 3 | Status: SHIPPED | OUTPATIENT
Start: 2025-01-03 | End: 2026-01-03

## 2025-01-03 RX ADMIN — METOPROLOL SUCCINATE 25 MG: 25 TABLET, EXTENDED RELEASE ORAL at 09:01

## 2025-01-03 RX ADMIN — NICOTINE 1 PATCH: 21 PATCH, EXTENDED RELEASE TRANSDERMAL at 09:01

## 2025-01-03 RX ADMIN — LACTULOSE 20 G: 20 SOLUTION ORAL at 09:01

## 2025-01-03 RX ADMIN — TIOTROPIUM BROMIDE INHALATION SPRAY 2 PUFF: 3.12 SPRAY, METERED RESPIRATORY (INHALATION) at 07:01

## 2025-01-03 RX ADMIN — MORPHINE SULFATE 2 MG: 2 INJECTION, SOLUTION INTRAMUSCULAR; INTRAVENOUS at 02:01

## 2025-01-03 RX ADMIN — PANTOPRAZOLE SODIUM 40 MG: 40 TABLET, DELAYED RELEASE ORAL at 09:01

## 2025-01-03 RX ADMIN — MAGNESIUM OXIDE TAB 400 MG (241.3 MG ELEMENTAL MG) 400 MG: 400 (241.3 MG) TAB at 09:01

## 2025-01-03 RX ADMIN — FUROSEMIDE 40 MG: 40 TABLET ORAL at 09:01

## 2025-01-03 RX ADMIN — TRAMADOL HYDROCHLORIDE 50 MG: 50 TABLET, COATED ORAL at 12:01

## 2025-01-03 RX ADMIN — OXYBUTYNIN CHLORIDE 5 MG: 5 TABLET ORAL at 09:01

## 2025-01-03 RX ADMIN — DIPHENHYDRAMINE HCL 10 ML: 12.5 LIQUID ORAL at 09:01

## 2025-01-03 RX ADMIN — POTASSIUM CHLORIDE 40 MEQ: 1500 TABLET, EXTENDED RELEASE ORAL at 09:01

## 2025-01-03 NOTE — PLAN OF CARE
Problem: Adult Inpatient Plan of Care  Goal: Plan of Care Review  Outcome: Progressing        See Scanned Documents.

## 2025-01-03 NOTE — PLAN OF CARE
POC Reviewed. Will continue to monitor.     Problem: Adult Inpatient Plan of Care  Goal: Plan of Care Review  Outcome: Progressing  Goal: Patient-Specific Goal (Individualized)  Outcome: Progressing  Goal: Absence of Hospital-Acquired Illness or Injury  Outcome: Progressing  Goal: Optimal Comfort and Wellbeing  Outcome: Progressing  Goal: Readiness for Transition of Care  Outcome: Progressing     Problem: Gastrointestinal Bleeding  Goal: Optimal Coping with Acute Illness  Outcome: Progressing  Goal: Hemostasis  Outcome: Progressing     Problem: Acute Kidney Injury/Impairment  Goal: Fluid and Electrolyte Balance  Outcome: Progressing  Goal: Improved Oral Intake  Outcome: Progressing  Goal: Effective Renal Function  Outcome: Progressing     Problem: Fall Injury Risk  Goal: Absence of Fall and Fall-Related Injury  Outcome: Progressing     Problem: Anemia  Goal: Anemia Symptom Improvement  Outcome: Progressing     Problem: Acute Coronary Syndrome  Goal: Optimal Adaptation to Illness  Outcome: Progressing  Goal: Absence of Cardiac-Related Pain  Outcome: Progressing  Goal: Normalized Cardiac Rhythm  Outcome: Progressing  Goal: Effective Cardiac Pump Function  Outcome: Progressing  Goal: Adequate Tissue Perfusion  Outcome: Progressing     Problem: Comorbidity Management  Goal: Maintenance of Asthma Control  Outcome: Progressing  Goal: Maintenance of Behavioral Health Symptom Control  Outcome: Progressing  Goal: Maintenance of COPD Symptom Control  Outcome: Progressing  Goal: Maintenance of Heart Failure Symptom Control  Outcome: Progressing  Goal: Blood Pressure in Desired Range  Outcome: Progressing     Problem: Excessive Substance Use  Goal: Optimized Energy Level (Excessive Substance Use)  Outcome: Progressing  Goal: Improved Behavioral Control (Excessive Substance Use)  Outcome: Progressing  Goal: Increased Participation and Engagement (Excessive Substance Use)  Outcome: Progressing  Goal: Improved Physiologic Symptoms  (Excessive Substance Use)  Outcome: Progressing  Goal: Enhanced Social, Occupational or Functional Skills (Excessive Substance Use)  Outcome: Progressing     Problem: Pain Acute  Goal: Optimal Pain Control and Function  Outcome: Progressing     Problem: Pain Chronic (Persistent)  Goal: Optimal Pain Control and Function  Outcome: Progressing

## 2025-01-03 NOTE — PROGRESS NOTES
Nephrology Progress Note      Consult Requested By: Toshia Rees MD  Reason for Consult: KENY      SUBJECTIVE:     Review of Systems   Constitutional:  Negative for chills and fever.   HENT:  Negative for congestion and sore throat.    Eyes:  Negative for blurred vision, double vision and photophobia.   Respiratory:  Negative for cough and shortness of breath.    Cardiovascular:  Negative for chest pain, palpitations and leg swelling.   Gastrointestinal:  Positive for abdominal pain. Negative for diarrhea, nausea and vomiting.   Genitourinary:  Negative for dysuria and urgency.   Musculoskeletal:  Negative for joint pain and myalgias.   Skin:  Negative for itching and rash.   Neurological:  Negative for dizziness, sensory change, weakness and headaches.   Endo/Heme/Allergies:  Negative for polydipsia. Does not bruise/bleed easily.   Psychiatric/Behavioral:  Negative for depression.        Past Medical History:   Diagnosis Date    Asthma     Bipolar disorder     Hypertension      OBJECTIVE:     Vital Signs (Most Recent)  Vitals:    01/03/25 0343 01/03/25 0432 01/03/25 0749 01/03/25 0808   BP:  115/67  103/69   BP Location:  Left arm  Left arm   Patient Position:  Lying  Lying   Pulse: 90 93 70 90   Resp:  18 18 18   Temp:  97.7 °F (36.5 °C)  97.8 °F (36.6 °C)   TempSrc:  Oral  Oral   SpO2:  96% 97% 96%   Weight:       Height:                     Medications:   duke's soln (benadryl 30 mL, mylanta 30 mL, LIDOcaine 30 mL, nystatin 30 mL) 120 mL  10 mL Oral QID    [START ON 1/4/2025] ferrous sulfate  1 tablet Oral Every other day    furosemide  40 mg Oral BID    lactulose  20 g Oral TID    magnesium oxide  400 mg Oral Daily    metoprolol succinate  25 mg Oral Daily    nicotine  1 patch Transdermal Daily    oxybutynin  5 mg Oral TID    pantoprazole  40 mg Oral BID    potassium chloride  40 mEq Oral Daily    tiotropium bromide  2 puff Inhalation Daily           Physical Exam  Vitals and nursing note reviewed.    Constitutional:       General: She is not in acute distress.     Appearance: She is ill-appearing. She is not diaphoretic.   HENT:      Head: Normocephalic and atraumatic.      Mouth/Throat:      Pharynx: No oropharyngeal exudate.   Eyes:      General: No scleral icterus.     Conjunctiva/sclera: Conjunctivae normal.      Pupils: Pupils are equal, round, and reactive to light.   Cardiovascular:      Rate and Rhythm: Normal rate and regular rhythm.      Heart sounds: Normal heart sounds. No murmur heard.  Pulmonary:      Effort: Pulmonary effort is normal. No respiratory distress.      Breath sounds: Normal breath sounds.   Abdominal:      General: Bowel sounds are normal. There is no distension.      Palpations: Abdomen is soft.      Tenderness: There is abdominal tenderness.   Musculoskeletal:         General: No swelling. Normal range of motion.      Cervical back: Normal range of motion and neck supple.      Right lower leg: Edema present.      Left lower leg: Edema present.   Skin:     General: Skin is warm and dry.      Findings: No erythema.   Neurological:      Mental Status: She is alert and oriented to person, place, and time.      Cranial Nerves: No cranial nerve deficit.   Psychiatric:         Mood and Affect: Affect normal.         Cognition and Memory: Memory normal.         Judgment: Judgment normal.         Laboratory:  Recent Labs   Lab 12/29/24  1620 12/29/24  1912 12/30/24  0336 12/31/24  0847 01/01/25  0301 01/02/25  0229   WBC 10.35 10.36 11.54 11.89 17.18* 13.57*   HGB 7.2* 7.4* 7.5* 8.4* 8.4* 9.4*   HCT 22.5* 23.3* 23.2* 26.4* 26.0* 29.8*    294 319 337 363 395   MONO 5.6  0.6 4.4  0.5 5.8  0.7  --   --   --    EOSINOPHIL 0.5 0.5 0.2  --   --   --      Recent Labs   Lab 01/01/25  0301 01/02/25  0229 01/03/25  0206   * 131*  131* 131*   K 3.3* 3.1*  3.1* 3.3*   CL 95 93*  93* 92*   CO2 23 24 24 25   BUN 29* 34*  34* 37*   CREATININE 2.0* 2.0*  2.0* 1.9*   CALCIUM 8.2*  "8.3*  8.3* 7.8*   PHOS  --  2.6* 3.3       Diagnostic Results:  X-Ray: Reviewed  US: Reviewed  Echo: Reviewed  ASSESSMENT/PLAN:     KENY - Not clear etiology at this point. UA no blood no protein no bacteria baseline Cr 1.1-1.4 multiple KENY in the past.   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 Check UPCR   -- C3 within range, C4 low. Cryo RF pending, know to have Hep C  -- Renal US with no significant abnormalities  -- Bladder scan  no obstruction post void 98 ml    Cr today 1.9   -- Daily Renal Function Panel  -- Avoid Hypotension.  -- Renally dose all meds  -- Please avoid nephrotoxins, including NSAIDs, aminoglycosides, IV contrast (unless absolutely necessary), gadolinium, fleets and other phosphorous-based laxatives. Caution with antibiotics.    Hyponatremia   -- Liver disease Diuretics was volume up   -- check S OSm UA osm     HFrEF   -- On lasix for volume optimization. Transition IV to PO 40 BID LE resolved not SOB     Hypokalemia  -- replace  -- check Mg replace as needed keep ~2     Anemia     Recent Labs   Lab 12/31/24  0847 01/01/25  0301 01/02/25 0229   HGB 8.4* 8.4* 9.4*   HCT 26.4* 26.0* 29.8*    363 395       Iron - PO iron every other day   Lab Results   Component Value Date    IRON 18 (L) 11/14/2024    TIBC 337 11/14/2024    FERRITIN 78 11/14/2024       Hypomagnesemia   -- replace  Recent Labs   Lab 01/03/25  0206   CALCIUM 7.8*   PHOS 3.3     Recent Labs   Lab 12/31/24  2130 01/01/25  0301 01/02/25  0229   MG 1.7 1.6 1.5*       Lab Results   Component Value Date    CALCIUM 7.8 (L) 01/03/2025    PHOS 3.3 01/03/2025     No results found for: "[id number]"    Lab Results   Component Value Date    CO2 25 01/03/2025        Nutrition/Hypoalbuminemia    Recent Labs   Lab 12/29/24  1624 12/30/24  0336 12/31/24  1629 12/31/24  2130 01/02/25  0229 01/03/25  0206   LABPROT 14.1*  --  15.6*  --   --   --    ALBUMIN  --    < >  --    < > 2.6*  2.6* 2.2*    < > = " values in this interval not displayed.     Ok for now for Boost Plus with meals TID.       Thank you for the consult, will follow  With any questions please call  Carlie Patterson MD    [facility]     MD RUTHY Gonzales MD V. V. Rusnak, MD E. V. Harmon, MARÍA Pepper NP    [address]  [place], [address] [phone]   After hours [phone]

## 2025-01-03 NOTE — HOSPITAL COURSE
Patient was admitted for acute exacerbation of CHF she mentioned a history of lower GI bleed, troponin was elevated at can be secondary to high demand ischemia, DA PT were held due to anemia and her history of GI bleed, GI was consulted who recommended Oncology consult for amyloidosis, nephrology was also consulted for worsening kidney function, her losartan was held, patient also developed acute liver failure while in the hospital secondary to her acute exacerbation of heart failure responded very well to IV Lasix and diuresed very well, medication were refilled resources was giving per the patient for shelter upon discharge patient was cleared to be discharged, patient to follow up hepatology as outpatient

## 2025-01-03 NOTE — PLAN OF CARE
Introduced as VN and will be reviewing discharge instructions.  Educated patient on reason for admission, home medication list, and discharge instructions including when to return to ED and the following doctor appointments.  Education per flowsheet.  Opportunity given for questions and questions answered.   Nurse notified of   completion of discharge education. Patient is waiting for wheelchair

## 2025-01-03 NOTE — CONSULTS
[place] - Telemetry  Gastroenterology  Consult Note    Patient Name: Mary Ellen Saini  MRN: [id number]  Admission Date: 12/29/2024  Hospital Length of Stay: 4 days  Code Status: Full Code   Attending Provider: Toshia Rees MD   Consulting Provider: Dayday Freed MD  Primary Care Physician: No, Primary Doctor  Principal Problem:NSTEMI (non-ST elevated myocardial infarction)    Consults  Subjective:     HPI:  59 yr F with history of meth use, HFrEF (EF 30%), HCV, CAD, Afib, COPD,currently homeless, who presented to the ED with chest pain after her son was arrested in which diagnosed with NSTEMI. GI consulted for hematochezia. Patient reports for the last couple of months she has been homeless and this is why she has missed her outpatient appointments with GI for her amyloidosis. She recently was hospitalized for hematochezia in which colonoscopy performed with biopsies confirming amyloidosis. She has BL LQ abdominal pain that has been persistent since last hospitalization. She is having normal consistency stools with blood streaks. She has not required pRBC since admission.     GI re-consulted for elevated LFTs. Known HCV + however has not gone to outpatient hepatology apt. Still actively drinking ETOH and amphetamine + on drug screen. Patient denies RUQ pain. She denies known fevers or chills. Does have history of IVDU.         Subjective:     Interval History: NAEON. LTs improving no new c/o    Review of Systems   Constitutional:  Negative for activity change and appetite change.   HENT:  Negative for congestion and dental problem.    Eyes:  Negative for discharge and itching.   Respiratory:  Negative for apnea and chest tightness.    Cardiovascular:  Negative for chest pain and leg swelling.   Gastrointestinal:  Negative for abdominal distention, abdominal pain, diarrhea and nausea.   Endocrine: Negative for cold intolerance and heat intolerance.   Genitourinary:  Negative for difficulty urinating and  dyspareunia.   Musculoskeletal:  Negative for arthralgias and back pain.   Skin:  Negative for color change.   Allergic/Immunologic: Negative for environmental allergies.   Neurological:  Negative for dizziness.   Psychiatric/Behavioral:  Negative for agitation.      Objective:     Vital Signs (Most Recent):  Temp: 98.1 °F (36.7 °C) (01/03/25 1136)  Pulse: 93 (01/03/25 1136)  Resp: 16 (01/03/25 1249)  BP: 117/75 (01/03/25 1136)  SpO2: 95 % (01/03/25 1136) Vital Signs (24h Range):  Temp:  [97.7 °F (36.5 °C)-98.4 °F (36.9 °C)] 98.1 °F (36.7 °C)  Pulse:  [70-96] 93  Resp:  [16-18] 16  SpO2:  [95 %-98 %] 95 %  BP: (103-128)/(67-75) 117/75     Weight: 72.6 kg (160 lb) (12/31/24 0930)  Body mass index is 22.96 kg/m².      Intake/Output Summary (Last 24 hours) at 1/3/2025 1406  Last data filed at 1/2/2025 1653  Gross per 24 hour   Intake --   Output 600 ml   Net -600 ml       Lines/Drains/Airways       None                    Physical Exam  Vitals reviewed.   Constitutional:       General: She is not in acute distress.  HENT:      Head: Normocephalic.      Nose: Nose normal.      Mouth/Throat:      Mouth: Mucous membranes are moist.   Eyes:      Pupils: Pupils are equal, round, and reactive to light.   Cardiovascular:      Rate and Rhythm: Normal rate and regular rhythm.      Pulses: Normal pulses.   Pulmonary:      Effort: Pulmonary effort is normal.   Abdominal:      General: Abdomen is flat. Bowel sounds are normal.      Palpations: Abdomen is soft.   Musculoskeletal:         General: Normal range of motion.      Cervical back: Normal range of motion and neck supple.   Skin:     General: Skin is warm.      Capillary Refill: Capillary refill takes less than 2 seconds.   Neurological:      General: No focal deficit present.      Mental Status: She is alert. Mental status is at baseline.   Psychiatric:         Mood and Affect: Mood normal.          Significant Labs:  All pertinent lab results from the last 24 hours have  been reviewed.      Significant Imaging:  Imaging results within the past 24 hours have been reviewed.  Assessment/Plan:     GI  Abnormal liver enzymes  GI re-consulted for elevated LFTs. Patient has known HCV and previously referred to hepatology. She missed her appointment. No prior HCV treatment and unknown duration of infection. History of IVDU and tattoos. No concern for asterixis, HE, or ascites. INR elevated 1.5. LFTs persistently elevated since admission. Elevated LFTs likely also attributed to IVDU and ETOH use. MDF is 14.9 no concern for alc hep at this time.      Plan:  HCV PCR ordered and pending  Needs hepatology outpatient treatment - appreciate social work input/ coordination with this as patient is homeless  Daily CMP, INR  Recommend CMV, EBV, VZV serologies   Monitor for alcohol withdrawal - last use day of admission             Thank you for your consult. I will sign off. Please contact us if you have any additional questions.    Dayday Freed MD  Gastroenterology  [place] - Telemetry

## 2025-01-03 NOTE — NURSING
Patient had two runs of Vtach. The first was 10 beats right before 0435. The second was 12 beats right before 0443. Patient complains of stomach and middle back pain that was unrelieved with her morphine like it normally would. Doctor notified.

## 2025-01-03 NOTE — PLAN OF CARE
POC reviewed with patient/family, treatment ongoing, verbal understanding received. Reinforcements needed.

## 2025-01-03 NOTE — DISCHARGE SUMMARY
[facility] Medicine  Discharge Summary      Patient Name: Mary Ellen Saini  MRN: [id number]  ANGELIQUE: [id number]  Patient Class: IP- Inpatient  Admission Date: 12/29/2024  Hospital Length of Stay: 4 days  Discharge Date and Time:  01/03/2025 2:52 PM  Attending Physician: Toshia Rees MD   Discharging Provider: Toshia Fofana MD  Primary Care Provider: Marlen, Primary Doctor    Primary Care Team: Networked reference to record PCT     HPI:   58 yo female with PMH of HCV, HFrEF (30-35%) supposed to have a LifeVest, CAD, AFib, COPD, colorectal cancer and medication nonadherence presented to ED with complaint of chest pain. Pt reports substernal chest pain described as sharp, pressure associated with SOB. She has had similar symptoms in the past. Symptoms reportedly started after patient informed her son had been arrested. She denies associated n/v, cough/congestion, dizziness, diaphoresis and syncope. Pt denies active chest pain on my exam however she notes lower abdominal pain rated 10/10. Also reports bright red blood per rectum over the last 2 days. This is a recurring issue. She is followed by GI, last EGD/colonoscopy 11/2024, biopsy concerning for amyloidosis.       ED evaluation: Patient tachycardic on arrival. Initial EKG shows sinus tach rate 112, subsequent EKG remarkable for SVT rate 180s resolved with 20 mg diltiazem. Hbg/Hct 22/7 (baseline Hbg 8-9), K 3.4, BUN/Cr 23/1.8, BNP 2556, troponin 0.208, d-dimer 1.38. CXR negative for acute findings. Venous doppler LE negative for DVT. Patient received 60 mg furosemide, KCL and protonix. GI consulted. Patient admitted to hospital medicine for further evaluation and care.       * No surgery found *      Hospital Course:   Patient was admitted for acute exacerbation of CHF she mentioned a history of lower GI bleed, troponin was elevated at can be secondary to high demand ischemia, DA PT were held due to anemia and her history of GI bleed, GI was consulted  who recommended Oncology consult for amyloidosis, nephrology was also consulted for worsening kidney function, her losartan was held, patient also developed acute liver failure while in the hospital secondary to her acute exacerbation of heart failure responded very well to IV Lasix and diuresed very well, medication were refilled resources was giving per the patient for shelter upon discharge patient was cleared to be discharged, patient to follow up hepatology as outpatient     Goals of Care Treatment Preferences:  Code Status: Full Code          What is most important right now is to focus on remaining as independent as possible, symptom/pain control, extending life as long as possible, even it it means sacrificing quality.  Accordingly, we have decided that the best plan to meet the patient's goals includes continuing with treatment.      SDOH Screening:  The patient was screened for food insecurity, housing instability, transportation needs, utility difficulties, and interpersonal safety. The social determinant(s) of health identified as a concern this admission are:  Housing instability  Food insecurity  Utility difficulties  Transportation difficulties    The plan to address these concerns is: all adressed    Social Drivers of Health with Concerns     Food Insecurity: Food Insecurity Present (12/30/2024)   Housing Stability: High Risk (12/30/2024)   Transportation Needs: Unmet Transportation Needs (12/30/2024)   Utilities: At Risk (12/30/2024)        Consults:   Consults (From admission, onward)          Status Ordering Provider     Inpatient consult to Gastroenterology-[facility]  Once        Provider:  (Not yet assigned)    Completed SANTIAGO HECK     Inpatient consult to Nephrology[facility] (Jyoti Jerry Nimkevych)  Once        Provider:  (Not yet assigned)    Completed SANTIAGO HECK     Inpatient consult to Hematology/Oncology  Once        Provider:  (Not yet assigned)    Completed  INNOCENT-SANDI MORALES     Inpatient consult to Cardiology-[facility]  Once        Provider:  (Not yet assigned)    Completed TIMOTHY YANES     IP consult to case management  Once        Provider:  (Not yet assigned)    Completed BIANCA ANNE     Inpatient consult to Gastroenterology  Once        Provider:  (Not yet assigned)    Completed TIMOTHY YANES            * NSTEMI (non-ST elevated myocardial infarction)  -EKG shows no ST elevation  -Patient states chest pain resolved on my exam   -Troponin 0.208> 0.2160 > 0.281  -Trend troponin, monitor telemetry  -Hold ASA, plavix and anticoagulation 2/2 concern for active GI bleeding         Shock liver  Patient is likely to have ischemic hepatitis as evidenced by abnormalities in AST and ALT. Etiology includes cardiogenic shock. Continue to monitor liver function while treating underlying condition. Daily labs to include CMP, Liver Function Panel, and PT/INR.    AST   Date Value Ref Range Status   01/01/2025 566 (H) 10 - 40 U/L Final     ALT   Date Value Ref Range Status   01/01/2025 603 (H) 10 - 44 U/L Final     -ultrasound of the liver ordered lactic acid was within the normal GI was consulted about starting patient on NAC given the actual volume from the management  -ammonia was elevated patient was started on lactulose  - significantly improvement of her liver function today with the diuresis     Hematochezia  GI was consulted no need for intervention at the moment per their note continue to hold anticoagulants, hemoglobin is stable      SVT (supraventricular tachycardia)    Secondary to acute decompensated heart failure cardiology is on board patient is currently on tele monitor    Other amyloidosis    Oncology was consulted nephrology was consulted appreciate recommendations    ABLA (acute blood loss anemia)  Anemia is likely due to acute blood loss which was from rectum . Most recent hemoglobin and hematocrit are listed below.  Recent Labs      12/31/24  0847 01/01/25  0301 01/02/25  0229   HGB 8.4* 8.4* 9.4*   HCT 26.4* 26.0* 29.8*       Plan  - Monitor serial CBC: Every 6 hours  - Transfuse PRBC if patient becomes hemodynamically unstable, symptomatic or H/H drops below 7/21.  - Patient has not received any PRBC transfusions to date  - Patient's anemia is currently stable      GIB (gastrointestinal bleeding)  Patient's hemorrhage is due to gastrointestinal bleed, patient does have a propensity for bleeding due to duodenal ulcers.. Patients most recent Hgb, Hct, platelets, and INR are listed below.  Recent Labs     12/31/24  0847 12/31/24  1629 01/01/25  0301 01/02/25 0229   HGB 8.4*  --  8.4* 9.4*   HCT 26.4*  --  26.0* 29.8*     --  363 395   INR  --  1.5*  --   --        Plan  - Will trend hemoglobin/hematocrit Every 6 hours  - Will monitor and correct any coagulation defects  - Will transfuse if Hgb is <7g/dl (<8g/dl in cases of active ACS) or if patient has rapid bleeding leading to hemodynamic instability  - upper GI endoscopy (11/11/24) revealed gastritis, mucosal changes to duodenum, and duodenal ulcer, biopsy concerning for amyloidosis   -patient refused rectal exam and occult stool   -consult GI: Appreciate recs  Recommend oncology evaluation for amyloidosis   No endoscopic intervention at this time   PPI 40 mg BID as recent duodenal ulcer appreciated on EGD (needs BID therapy for total of 8 weeks)  Transfuse for hemoglobin less than 7   CBC daily   -continue PPI   --okay to feed per GI,no endoscopic intervention at this time, PPI b.i.d. for recent duodenal ulcer  -appreciate GI recommendations  We will order HCV PCR , patient will Need hepatology outpatient treatment patient can have CMV, EBV, VZV serologies as outpatient          Tobacco dependency  Dangers of cigarette smoking were reviewed with patient in detail. Patient was Counseled for 3-10 minutes. Nicotine replacement options were discussed. Nicotine replacement was discussed-  "prescribed    Colorectal carcinoma  -patient followed by heme/onc       Hypokalemia  Patient's most recent potassium results are listed below.   Recent Labs     12/31/24  2130 01/01/25  0301 01/02/25  0229   K 3.6 3.3* 3.1*  3.1*       Plan  - Replete potassium per protocol  - Monitor potassium Daily  - Patient's hypokalemia is stable  -patient was placed on daily potassium replacement      Acute on chronic combined systolic and diastolic congestive heart failure  Patient has Combined Systolic and Diastolic heart failure that is Acute on chronic. On presentation their CHF was decompensated. Evidence of decompensated CHF on presentation includes: edema, dyspnea on exertion (MONSON), and shortness of breath. The etiology of their decompensation is likely cardiac arrhythmia (SVT on EKG) . Most recent BNP and echo results are listed below.  No results for input(s): "BNP" in the last 72 hours.    Latest ECHO  Results for orders placed during the hospital encounter of 08/12/24    Echo    Interpretation Summary    Left Ventricle: The left ventricle is moderately dilated. There is eccentric hypertrophy. Moderate global hypokinesis present. There is moderately reduced systolic function with a visually estimated ejection fraction of 30 - 35%. Biplane (2D) method of discs ejection fraction is 34%. There is diastolic dysfunction but grade cannot be determined.    Right Ventricle: Normal right ventricular cavity size. Wall thickness is normal. Systolic function is normal.    Left Atrium: Left atrium is severely dilated.    Right Atrium: Right atrium is moderately dilated.    Aortic Valve: There is mild stenosis. Aortic valve area by VTI is 1.95 cm². Aortic valve peak velocity is 1.40 m/s. Mean gradient is 4 mmHg. The dimensionless index is 0.62.    Mitral Valve: There is mild regurgitation.    Tricuspid Valve: There is mild regurgitation.    Pulmonic Valve: There is mild regurgitation.    Pulmonary Artery: The estimated pulmonary " artery systolic pressure is 36 mmHg.    IVC/SVC: Normal venous pressure at 3 mmHg.    Current Heart Failure Medications  , Daily, Oral  metoprolol succinate (TOPROL-XL) 24 hr tablet 25 mg, Daily, Oral  furosemide tablet 40 mg, 2 times daily, Oral    Plan  - Monitor strict I&Os and daily weights.    - Place on telemetry  -patient NPO 2/2 concern for active GI bleeding   -she received 60 mg furosemide in ED   TTE in August with EF of 30-35%  Holding ACEi / ARB given renal function    Inpatient Upgrade Note    Mary Ellen Saini has warranted treatment spanning two or more midnights of hospital level care for the management of KENY. She continues to require daily labs, monitoring of vital signs, IV pain medication, medication adjustments, and further evaluation by consultants. Her condition is also complicated by the following comorbidities: Hypertension, Chronic respiratory disease, and polypharmacy.     -12/31 12/31 patient had repeat echocardiogram, cardiology is on board, we will continue to hold losartan due to worsening KENY, Nephrology was consulted, patient is responding well to diuresis.  -1/2 responding very well to diuresis no intervention by Cardiology at this point patient to continue on her current medical management and to be discharged home on her LifeVest that she owns      Elevated troponin    Secondary to acute decompensated heart failure and NSTEMI cardiology is on board holding anticoagulants due to GI bleed  -1/2 EKG showed sinus rhythm with PVCs troponin trending down cardiology recommended medical management at this moment since the patient her own life vest at home      Final Active Diagnoses:    Diagnosis Date Noted POA    PRINCIPAL PROBLEM:  NSTEMI (non-ST elevated myocardial infarction) [I21.4] 10/19/2024 Yes    Abnormal liver enzymes [R74.8] 01/02/2025 Unknown    Shock liver [K72.00] 12/31/2024 Yes    SVT (supraventricular tachycardia) [I47.10] 12/30/2024 Yes    Hematochezia [K92.1] 12/30/2024  Yes    Other amyloidosis [E85.89] 11/12/2024 Yes    ABLA (acute blood loss anemia) [D62] 11/08/2024 Yes    GIB (gastrointestinal bleeding) [K92.2] 11/08/2024 Yes    Tobacco dependency [F17.200] 08/14/2024 Yes    Colorectal carcinoma [C19] 08/13/2024 Yes    Acute on chronic combined systolic and diastolic congestive heart failure [I50.43] 08/12/2024 Yes    Hypokalemia [E87.6] 08/12/2024 Yes    Elevated troponin [R79.89] 08/12/2024 Yes      Problems Resolved During this Admission:       Discharged Condition: fair    Disposition: home ( at a friend's home)    Follow Up:   Follow-up Information       Dr. Negra Nair ([facility]) Follow up on 1/6/2025.    Why: at 10:00 am--[facility] did not have any available appointments. You can schedule future appointments there. [facility] had availability.  Please bring your insurance card, ID, medications, and discharge papers.  Please cancel if you are unable to make appointment.  Contact information:  [place] [address]   Phone Number: [phone]             [place] [facility] [facility] Follow up.    Why: Phone Number: [phone]  Contact information:  Please contact office to schedule follow-up appointment. Per Chart review, they left a voicemail for you to call back.             Prescription Coverage Follow up.    Why: You can reach a licensed [facility] Medicare agent at [phone] (TTY: [phone]), Monday to Friday, 9 AM to 7 PM ET.  Contact information:  Please contact [facility] in regards to prescription coverage.                         Patient Instructions:   No discharge procedures on file.    Significant Diagnostic Studies: Labs: All labs within the past 24 hours have been reviewed    Pending Diagnostic Studies:       Procedure Component Value Units Date/Time    Cryoglobulin [[id number]] Collected: 01/01/25 1249    Order Status: Sent Lab Status: In process Updated: 01/01/25 4075    Specimen: Blood     Osmolality, Serum [[id number]]      Order Status: Sent Lab Status: No result     Specimen: Blood            Medications:  Reconciled Home Medications:      Medication List        START taking these medications      ferrous sulfate 324 mg (65 mg iron) Tbec  Take 1 tablet (324 mg total) by mouth once daily.  Replaces: ferrous sulfate 325 mg (65 mg iron) Tab tablet     lactulose 10 gram/15 mL solution  Commonly known as: CHRONULAC  Take 30 mLs (20 g total) by mouth 3 (three) times daily.     magic mouthwash diphen/antac/lidoc/nysta  take 10 mLs 4 (four) times daily.     magnesium oxide 400 mg (241.3 mg magnesium) tablet  Commonly known as: MAG-OX  Take 1 tablet (400 mg total) by mouth once daily.     potassium chloride SA 20 MEQ tablet  Commonly known as: K-DUR,KLOR-CON  Take 2 tablets (40 mEq total) by mouth once daily.            CHANGE how you take these medications      furosemide 40 MG tablet  Commonly known as: LASIX  Take 1 tablet (40 mg total) by mouth 2 (two) times daily.  What changed:   medication strength  when to take this     metoprolol succinate 25 MG 24 hr tablet  Commonly known as: TOPROL-XL  Take 1 tablet (25 mg total) by mouth once daily.  What changed: when to take this     nystatin cream  Commonly known as: MYCOSTATIN  Apply topically 2 (two) times daily.  What changed: how to take this            CONTINUE taking these medications      albuterol 90 mcg/actuation inhaler  Commonly known as: PROVENTIL/VENTOLIN HFA  Inhale 1-2 puffs into the lungs every 6 (six) hours as needed for Wheezing. Rescue     LIDOcaine 5 %  Commonly known as: LIDODERM  Place 2 patches onto the skin every evening. Remove & Discard patch within 12 hours or as directed by MD     naloxone 4 mg/actuation Spry  Commonly known as: NARCAN  4mg by nasal route as needed for opioid overdose; may repeat every 2-3 minutes in alternating nostrils until medical help arrives. Call 911     nicotine 21 mg/24 hr  Commonly known as: NICODERM CQ  Place 1 patch onto the skin once  daily.     oxybutynin 5 MG Tab  Commonly known as: DITROPAN  Take 1 tablet (5 mg total) by mouth 3 (three) times daily.     oxyCODONE 5 MG immediate release tablet  Commonly known as: ROXICODONE  Take 1 tablet (5 mg total) by mouth every 6 (six) hours as needed for Pain.     pantoprazole 40 MG tablet  Commonly known as: PROTONIX  Take 1 tablet (40 mg total) by mouth 2 (two) times daily.     polyethylene glycol 17 gram Pwpk  Commonly known as: GLYCOLAX  Take 17 g by mouth 2 (two) times daily as needed for Constipation.     sucralfate 1 gram tablet  Commonly known as: CARAFATE  Take 1 tablet (1 g total) by mouth 3 (three) times daily before meals.     tiotropium 18 mcg inhalation capsule  Commonly known as: SPIRIVA  Inhale 1 capsule (18 mcg total) into the lungs once daily. Controller            STOP taking these medications      ferrous sulfate 325 mg (65 mg iron) Tab tablet  Commonly known as: FEOSOL  Replaced by: ferrous sulfate 324 mg (65 mg iron) Tbec     losartan 25 MG tablet  Commonly known as: COZAAR     spironolactone 25 MG tablet  Commonly known as: ALDACTONE              Indwelling Lines/Drains at time of discharge:   Lines/Drains/Airways       None                   Time spent on the discharge of patient: 45 minutes         Toshia Fofana MD  Department of Hospital Medicine  [facility]

## 2025-01-03 NOTE — PLAN OF CARE
Problem: Adult Inpatient Plan of Care  Goal: Plan of Care Review  Outcome: Progressing     Problem: Adult Inpatient Plan of Care  Goal: Patient-Specific Goal (Individualized)  Outcome: Progressing     Problem: Adult Inpatient Plan of Care  Goal: Absence of Hospital-Acquired Illness or Injury  Outcome: Progressing     Problem: Adult Inpatient Plan of Care  Goal: Readiness for Transition of Care  Outcome: Progressing     Problem: Gastrointestinal Bleeding  Goal: Optimal Coping with Acute Illness  Outcome: Progressing     Problem: Gastrointestinal Bleeding  Goal: Hemostasis  Outcome: Progressing     Problem: Acute Kidney Injury/Impairment  Goal: Fluid and Electrolyte Balance  Outcome: Progressing     Problem: Acute Kidney Injury/Impairment  Goal: Improved Oral Intake  Outcome: Progressing     Problem: Acute Kidney Injury/Impairment  Goal: Effective Renal Function  Outcome: Progressing

## 2025-01-03 NOTE — SUBJECTIVE & OBJECTIVE
Subjective:     Interval History: NAEON. LTs improving no new c/o    Review of Systems   Constitutional:  Negative for activity change and appetite change.   HENT:  Negative for congestion and dental problem.    Eyes:  Negative for discharge and itching.   Respiratory:  Negative for apnea and chest tightness.    Cardiovascular:  Negative for chest pain and leg swelling.   Gastrointestinal:  Negative for abdominal distention, abdominal pain, diarrhea and nausea.   Endocrine: Negative for cold intolerance and heat intolerance.   Genitourinary:  Negative for difficulty urinating and dyspareunia.   Musculoskeletal:  Negative for arthralgias and back pain.   Skin:  Negative for color change.   Allergic/Immunologic: Negative for environmental allergies.   Neurological:  Negative for dizziness.   Psychiatric/Behavioral:  Negative for agitation.      Objective:     Vital Signs (Most Recent):  Temp: 98.1 °F (36.7 °C) (01/03/25 1136)  Pulse: 93 (01/03/25 1136)  Resp: 16 (01/03/25 1249)  BP: 117/75 (01/03/25 1136)  SpO2: 95 % (01/03/25 1136) Vital Signs (24h Range):  Temp:  [97.7 °F (36.5 °C)-98.4 °F (36.9 °C)] 98.1 °F (36.7 °C)  Pulse:  [70-96] 93  Resp:  [16-18] 16  SpO2:  [95 %-98 %] 95 %  BP: (103-128)/(67-75) 117/75     Weight: 72.6 kg (160 lb) (12/31/24 0930)  Body mass index is 22.96 kg/m².      Intake/Output Summary (Last 24 hours) at 1/3/2025 1406  Last data filed at 1/2/2025 1653  Gross per 24 hour   Intake --   Output 600 ml   Net -600 ml       Lines/Drains/Airways       None                    Physical Exam  Vitals reviewed.   Constitutional:       General: She is not in acute distress.  HENT:      Head: Normocephalic.      Nose: Nose normal.      Mouth/Throat:      Mouth: Mucous membranes are moist.   Eyes:      Pupils: Pupils are equal, round, and reactive to light.   Cardiovascular:      Rate and Rhythm: Normal rate and regular rhythm.      Pulses: Normal pulses.   Pulmonary:      Effort: Pulmonary effort is  normal.   Abdominal:      General: Abdomen is flat. Bowel sounds are normal.      Palpations: Abdomen is soft.   Musculoskeletal:         General: Normal range of motion.      Cervical back: Normal range of motion and neck supple.   Skin:     General: Skin is warm.      Capillary Refill: Capillary refill takes less than 2 seconds.   Neurological:      General: No focal deficit present.      Mental Status: She is alert. Mental status is at baseline.   Psychiatric:         Mood and Affect: Mood normal.          Significant Labs:  All pertinent lab results from the last 24 hours have been reviewed.      Significant Imaging:  Imaging results within the past 24 hours have been reviewed.

## 2025-01-03 NOTE — PROGRESS NOTES
[facility]                    Pharmacy       Discharge Medication Education    Patient ACCEPTED medication education. Pharmacy has provided education on the name, indication, and possible side effects of the medication(s) prescribed, using teach-back method.     The following medications have also been discussed, during this admission.        Medication List        START taking these medications      ferrous sulfate 324 mg (65 mg iron) Tbec  Take 1 tablet (324 mg total) by mouth once daily.  Replaces: ferrous sulfate 325 mg (65 mg iron) Tab tablet     lactulose 10 gram/15 mL solution  Commonly known as: CHRONULAC  Take 30 mLs (20 g total) by mouth 3 (three) times daily.     magnesium oxide 400 mg (241.3 mg magnesium) tablet  Commonly known as: MAG-OX  Take 1 tablet (400 mg total) by mouth once daily.     potassium chloride SA 20 MEQ tablet  Commonly known as: K-DUR,KLOR-CON  Take 2 tablets (40 mEq total) by mouth once daily.            CHANGE how you take these medications      furosemide 40 MG tablet  Commonly known as: LASIX  Take 1 tablet (40 mg total) by mouth 2 (two) times daily.  What changed:   medication strength  when to take this     * nystatin cream  Commonly known as: MYCOSTATIN  What changed: Another medication with the same name was added. Make sure you understand how and when to take each.     * DUKE'S SOLUTION (BENADRYL 30 ML, MYLANTA 30 ML, LIDOCAINE 30 ML, NYSTATIN 30 ML)  Take 10 mLs by mouth 4 (four) times daily.  What changed: You were already taking a medication with the same name, and this prescription was added. Make sure you understand how and when to take each.           * This list has 2 medication(s) that are the same as other medications prescribed for you. Read the directions carefully, and ask your doctor or other care provider to review them with you.                CONTINUE taking these medications      albuterol 90 mcg/actuation inhaler  Commonly known as:  PROVENTIL/VENTOLIN HFA  Inhale 1-2 puffs into the lungs every 6 (six) hours as needed for Wheezing. Rescue     LIDOcaine 5 %  Commonly known as: LIDODERM  Place 2 patches onto the skin every evening. Remove & Discard patch within 12 hours or as directed by MD     metoprolol succinate 25 MG 24 hr tablet  Commonly known as: TOPROL-XL     naloxone 4 mg/actuation Spry  Commonly known as: NARCAN  4mg by nasal route as needed for opioid overdose; may repeat every 2-3 minutes in alternating nostrils until medical help arrives. Call 911     nicotine 21 mg/24 hr  Commonly known as: NICODERM CQ  Place 1 patch onto the skin once daily.     oxybutynin 5 MG Tab  Commonly known as: DITROPAN  Take 1 tablet (5 mg total) by mouth 3 (three) times daily.     oxyCODONE 5 MG immediate release tablet  Commonly known as: ROXICODONE  Take 1 tablet (5 mg total) by mouth every 6 (six) hours as needed for Pain.     pantoprazole 40 MG tablet  Commonly known as: PROTONIX  Take 1 tablet (40 mg total) by mouth 2 (two) times daily.     polyethylene glycol 17 gram Pwpk  Commonly known as: GLYCOLAX     sucralfate 1 gram tablet  Commonly known as: CARAFATE  Take 1 tablet (1 g total) by mouth 3 (three) times daily before meals.     tiotropium 18 mcg inhalation capsule  Commonly known as: SPIRIVA  Inhale 1 capsule (18 mcg total) into the lungs once daily. Controller            STOP taking these medications      ferrous sulfate 325 mg (65 mg iron) Tab tablet  Commonly known as: FEOSOL  Replaced by: ferrous sulfate 324 mg (65 mg iron) Tbec     losartan 25 MG tablet  Commonly known as: COZAAR     spironolactone 25 MG tablet  Commonly known as: ALDACTONE               Where to Get Your Medications        These medications were sent to [facility]  [address], [place] [address]      Hours: Mon-Fri, 8a-5:30p Phone: [phone]   PONCE'S SOLUTION (BENADRYL 30 ML, MYLANTA 30 ML, LIDOCAINE 30 ML, NYSTATIN 30 ML)  ferrous sulfate 324  mg (65 mg iron) Tbec  furosemide 40 MG tablet  lactulose 10 gram/15 mL solution  magnesium oxide 400 mg (241.3 mg magnesium) tablet  nicotine 21 mg/24 hr  potassium chloride SA 20 MEQ tablet          Thank you  Stephanie Brennan, PharmD  [phone]

## 2025-01-03 NOTE — PLAN OF CARE
" rounded on patient with MD team. PCP/HemeOnc/Cardiology appointments scheduled. Patient is aware to schedule Hepatology appointment with [facility] (they tried contacting her previously). No DME or Home Health needs. Patient will be staying with a friend on discharge. Her friend will transport home at discharge. All questions answered. No further Case Management needs.     Pharmacists reported patient's coverage . Per Pharmacists, patient stated "not to worry about it."  will speak to patient after rounds.    1132--Patient's insurance is not showing Medicare and [employer]. I will update patient and pharmacy.    Spoke with Pharmacists again and they stated "it says coverage . We are using the billing info for [employer]. She could still have medical coverage but not prescription coverage."     spoke with patient. [facility]. MD team updated. I will provide Medicare number on AVS for her to call as well.    Other Contacts    Name Relation Home Work Mobile   Cass Strong   [phone]   Iain Strong   [phone]   Raymond Shi        Future Appointments   Date Time Provider Department Center   2025  9:00 AM Yandel Bonds RRT [facility] SMOKE [facility]   2025  2:20 PM Oneal Tee DNP [facility] CARDIO [facility]   2025  8:00 AM Micah Luna MD [facility] HEM ONC [facility]        Dr. Negra Nair ([facility])     [address] [place] [address] Phone Number: [phone]      Next Steps: Follow up on 2025  Instructions: at 10:00 am--The [place] Location did not have any available appointments. You can schedule future appointments there. [facility] had availability. Please bring your insurance card, ID, medications, and discharge papers. Please cancel if you are unable to make appointment.    [facility]     Please contact office to schedule " follow-up appointment. Per Chart review, they left a voicemail for you to call back.     Next Steps: Follow up  Instructions: Phone Number: [phone]        01/03/25 0918   Final Note   Assessment Type Final Discharge Note   Anticipated Discharge Disposition Home   Hospital Resources/Appts/Education Provided Appointments scheduled and added to AVS   Post-Acute Status   Discharge Delays None known at this time     Debora Ennis RN    [phone]

## 2025-01-05 LAB — PATHOLOGIST INTERPRETATION UIFE: NORMAL

## 2025-01-06 NOTE — PLAN OF CARE
Medicare Message     Important Message from Medicare regarding Discharge Appeal Rights -- Explained to patient/caregiver; Signed/date by patient/caregiver Signed/date by patient/caregiver; Explained to patient/caregiver   Date IMM was signed -- 12/31/2024 1/3/2025   Time IMM was signed -- [phone]

## 2025-01-24 ENCOUNTER — HOSPITAL ENCOUNTER (INPATIENT)
Facility: HOSPITAL | Age: 60
LOS: 4 days | Discharge: HOME | DRG: 545 | End: 2025-01-28
Attending: EMERGENCY MEDICINE | Admitting: FAMILY MEDICINE
Payer: MEDICARE

## 2025-01-24 DIAGNOSIS — K62.5 RECTAL BLEEDING: ICD-10-CM

## 2025-01-24 DIAGNOSIS — E87.70 HYPERVOLEMIA, UNSPECIFIED HYPERVOLEMIA TYPE: ICD-10-CM

## 2025-01-24 DIAGNOSIS — R07.9 CHEST PAIN: ICD-10-CM

## 2025-01-24 DIAGNOSIS — D64.9 SYMPTOMATIC ANEMIA: Primary | ICD-10-CM

## 2025-01-24 DIAGNOSIS — K26.4 GASTROINTESTINAL HEMORRHAGE ASSOCIATED WITH DUODENAL ULCER: ICD-10-CM

## 2025-01-24 DIAGNOSIS — B18.2 CHRONIC HEPATITIS C WITHOUT HEPATIC COMA: ICD-10-CM

## 2025-01-24 DIAGNOSIS — R06.02 SHORTNESS OF BREATH: ICD-10-CM

## 2025-01-24 DIAGNOSIS — R79.89 ELEVATED TROPONIN: ICD-10-CM

## 2025-01-24 DIAGNOSIS — I47.10 SVT (SUPRAVENTRICULAR TACHYCARDIA): ICD-10-CM

## 2025-01-24 DIAGNOSIS — I21.4 NSTEMI (NON-ST ELEVATED MYOCARDIAL INFARCTION): ICD-10-CM

## 2025-01-24 PROBLEM — N18.9 CHRONIC KIDNEY DISEASE (CKD): Status: ACTIVE | Noted: 2025-01-24

## 2025-01-24 LAB
ABO + RH BLD: NORMAL
ALBUMIN SERPL BCP-MCNC: 1.9 G/DL (ref 3.5–5.2)
ALP SERPL-CCNC: 80 U/L (ref 40–150)
ALT SERPL W/O P-5'-P-CCNC: 20 U/L (ref 10–44)
ANION GAP SERPL CALC-SCNC: 9 MMOL/L (ref 8–16)
AST SERPL-CCNC: 24 U/L (ref 10–40)
BASOPHILS # BLD AUTO: 0.06 K/UL (ref 0–0.2)
BASOPHILS NFR BLD: 0.5 % (ref 0–1.9)
BILIRUB SERPL-MCNC: 0.4 MG/DL (ref 0.1–1)
BLD GP AB SCN CELLS X3 SERPL QL: NORMAL
BLD PROD TYP BPU: NORMAL
BLOOD UNIT EXPIRATION DATE: NORMAL
BLOOD UNIT TYPE CODE: 5100
BLOOD UNIT TYPE: NORMAL
BNP SERPL-MCNC: 2314 PG/ML (ref 0–99)
BUN SERPL-MCNC: 23 MG/DL (ref 6–20)
CALCIUM SERPL-MCNC: 7.5 MG/DL (ref 8.7–10.5)
CHLORIDE SERPL-SCNC: 99 MMOL/L (ref 95–110)
CO2 SERPL-SCNC: 23 MMOL/L (ref 23–29)
CODING SYSTEM: NORMAL
CREAT SERPL-MCNC: 1.4 MG/DL (ref 0.5–1.4)
CROSSMATCH INTERPRETATION: NORMAL
DIFFERENTIAL METHOD BLD: ABNORMAL
DISPENSE STATUS: NORMAL
EOSINOPHIL # BLD AUTO: 0.2 K/UL (ref 0–0.5)
EOSINOPHIL NFR BLD: 1.9 % (ref 0–8)
ERYTHROCYTE [DISTWIDTH] IN BLOOD BY AUTOMATED COUNT: 16.1 % (ref 11.5–14.5)
EST. GFR  (NO RACE VARIABLE): 44 ML/MIN/1.73 M^2
GLUCOSE SERPL-MCNC: 100 MG/DL (ref 70–110)
HCT VFR BLD AUTO: 20.9 % (ref 37–48.5)
HCT VFR BLD AUTO: ABNORMAL % (ref 37–48.5)
HGB BLD-MCNC: 6.5 G/DL (ref 12–16)
HGB BLD-MCNC: ABNORMAL G/DL (ref 12–16)
IMM GRANULOCYTES # BLD AUTO: 0.09 K/UL (ref 0–0.04)
IMM GRANULOCYTES NFR BLD AUTO: 0.8 % (ref 0–0.5)
LYMPHOCYTES # BLD AUTO: 1.4 K/UL (ref 1–4.8)
LYMPHOCYTES NFR BLD: 12.1 % (ref 18–48)
MCH RBC QN AUTO: 23.4 PG (ref 27–31)
MCHC RBC AUTO-ENTMCNC: 31.1 G/DL (ref 32–36)
MCV RBC AUTO: 75 FL (ref 82–98)
MONOCYTES # BLD AUTO: 0.7 K/UL (ref 0.3–1)
MONOCYTES NFR BLD: 5.6 % (ref 4–15)
NEUTROPHILS # BLD AUTO: 9.1 K/UL (ref 1.8–7.7)
NEUTROPHILS NFR BLD: 79.1 % (ref 38–73)
NRBC BLD-RTO: 0 /100 WBC
OHS QRS DURATION: 100 MS
OHS QTC CALCULATION: 490 MS
PLATELET # BLD AUTO: 385 K/UL (ref 150–450)
PMV BLD AUTO: 9.4 FL (ref 9.2–12.9)
POTASSIUM SERPL-SCNC: 3.2 MMOL/L (ref 3.5–5.1)
PROT SERPL-MCNC: 5.9 G/DL (ref 6–8.4)
RBC # BLD AUTO: 2.78 M/UL (ref 4–5.4)
SODIUM SERPL-SCNC: 131 MMOL/L (ref 136–145)
SPECIMEN OUTDATE: NORMAL
TRANS ERYTHROCYTES VOL PATIENT: NORMAL ML
TROPONIN I SERPL DL<=0.01 NG/ML-MCNC: 0.27 NG/ML (ref 0–0.03)
TROPONIN I SERPL DL<=0.01 NG/ML-MCNC: 0.28 NG/ML (ref 0–0.03)
WBC # BLD AUTO: 11.54 K/UL (ref 3.9–12.7)

## 2025-01-24 PROCEDURE — 12000002 HC ACUTE/MED SURGE SEMI-PRIVATE ROOM

## 2025-01-24 PROCEDURE — P9021 RED BLOOD CELLS UNIT: HCPCS | Performed by: EMERGENCY MEDICINE

## 2025-01-24 PROCEDURE — 83880 ASSAY OF NATRIURETIC PEPTIDE: CPT | Performed by: EMERGENCY MEDICINE

## 2025-01-24 PROCEDURE — 30233N1 TRANSFUSION OF NONAUTOLOGOUS RED BLOOD CELLS INTO PERIPHERAL VEIN, PERCUTANEOUS APPROACH: ICD-10-PCS | Performed by: EMERGENCY MEDICINE

## 2025-01-24 PROCEDURE — 93005 ELECTROCARDIOGRAM TRACING: CPT

## 2025-01-24 PROCEDURE — 25000003 PHARM REV CODE 250

## 2025-01-24 PROCEDURE — 80307 DRUG TEST PRSMV CHEM ANLYZR: CPT

## 2025-01-24 PROCEDURE — 25000242 PHARM REV CODE 250 ALT 637 W/ HCPCS

## 2025-01-24 PROCEDURE — 85025 COMPLETE CBC W/AUTO DIFF WBC: CPT | Performed by: EMERGENCY MEDICINE

## 2025-01-24 PROCEDURE — 94640 AIRWAY INHALATION TREATMENT: CPT

## 2025-01-24 PROCEDURE — 86901 BLOOD TYPING SEROLOGIC RH(D): CPT | Performed by: EMERGENCY MEDICINE

## 2025-01-24 PROCEDURE — 85014 HEMATOCRIT: CPT

## 2025-01-24 PROCEDURE — 94761 N-INVAS EAR/PLS OXIMETRY MLT: CPT

## 2025-01-24 PROCEDURE — 85018 HEMOGLOBIN: CPT

## 2025-01-24 PROCEDURE — 36430 TRANSFUSION BLD/BLD COMPNT: CPT

## 2025-01-24 PROCEDURE — 93010 ELECTROCARDIOGRAM REPORT: CPT | Mod: 76,,, | Performed by: STUDENT IN AN ORGANIZED HEALTH CARE EDUCATION/TRAINING PROGRAM

## 2025-01-24 PROCEDURE — 25000003 PHARM REV CODE 250: Performed by: EMERGENCY MEDICINE

## 2025-01-24 PROCEDURE — 63600175 PHARM REV CODE 636 W HCPCS: Performed by: EMERGENCY MEDICINE

## 2025-01-24 PROCEDURE — 93010 ELECTROCARDIOGRAM REPORT: CPT | Mod: ,,, | Performed by: INTERNAL MEDICINE

## 2025-01-24 PROCEDURE — 84484 ASSAY OF TROPONIN QUANT: CPT | Performed by: EMERGENCY MEDICINE

## 2025-01-24 PROCEDURE — 80053 COMPREHEN METABOLIC PANEL: CPT | Performed by: EMERGENCY MEDICINE

## 2025-01-24 PROCEDURE — 99285 EMERGENCY DEPT VISIT HI MDM: CPT | Mod: 25

## 2025-01-24 PROCEDURE — 63600175 PHARM REV CODE 636 W HCPCS

## 2025-01-24 PROCEDURE — 84484 ASSAY OF TROPONIN QUANT: CPT | Mod: 91

## 2025-01-24 PROCEDURE — 99900035 HC TECH TIME PER 15 MIN (STAT)

## 2025-01-24 PROCEDURE — 86920 COMPATIBILITY TEST SPIN: CPT | Performed by: EMERGENCY MEDICINE

## 2025-01-24 RX ORDER — NALOXONE HCL 0.4 MG/ML
0.02 VIAL (ML) INJECTION
Status: DISCONTINUED | OUTPATIENT
Start: 2025-01-24 | End: 2025-01-28 | Stop reason: HOSPADM

## 2025-01-24 RX ORDER — NAPROXEN SODIUM 220 MG/1
81 TABLET, FILM COATED ORAL DAILY
COMMUNITY
Start: 2025-01-18 | End: 2025-02-17

## 2025-01-24 RX ORDER — MAGNESIUM 250 MG
1 TABLET ORAL DAILY
COMMUNITY

## 2025-01-24 RX ORDER — ACETAMINOPHEN 325 MG/1
650 TABLET ORAL EVERY 8 HOURS PRN
Status: DISCONTINUED | OUTPATIENT
Start: 2025-01-24 | End: 2025-01-28 | Stop reason: HOSPADM

## 2025-01-24 RX ORDER — PANTOPRAZOLE SODIUM 40 MG/10ML
40 INJECTION, POWDER, LYOPHILIZED, FOR SOLUTION INTRAVENOUS 2 TIMES DAILY
Status: DISCONTINUED | OUTPATIENT
Start: 2025-01-25 | End: 2025-01-28 | Stop reason: HOSPADM

## 2025-01-24 RX ORDER — PANTOPRAZOLE SODIUM 40 MG/10ML
80 INJECTION, POWDER, LYOPHILIZED, FOR SOLUTION INTRAVENOUS DAILY
Status: ACTIVE | OUTPATIENT
Start: 2025-01-24 | End: 2025-01-25

## 2025-01-24 RX ORDER — OXYBUTYNIN CHLORIDE 5 MG/1
5 TABLET ORAL 3 TIMES DAILY
COMMUNITY
Start: 2024-08-25 | End: 2025-01-24 | Stop reason: CLARIF

## 2025-01-24 RX ORDER — SODIUM CHLORIDE 0.9 % (FLUSH) 0.9 %
10 SYRINGE (ML) INJECTION
Status: DISCONTINUED | OUTPATIENT
Start: 2025-01-24 | End: 2025-01-28 | Stop reason: HOSPADM

## 2025-01-24 RX ORDER — SODIUM CHLORIDE 0.9 % (FLUSH) 0.9 %
5 SYRINGE (ML) INJECTION
Status: DISCONTINUED | OUTPATIENT
Start: 2025-01-24 | End: 2025-01-28 | Stop reason: HOSPADM

## 2025-01-24 RX ORDER — TIOTROPIUM BROMIDE INHALATION SPRAY 3.12 UG/1
2 SPRAY, METERED RESPIRATORY (INHALATION) DAILY
COMMUNITY

## 2025-01-24 RX ORDER — IBUPROFEN 200 MG
24 TABLET ORAL
Status: DISCONTINUED | OUTPATIENT
Start: 2025-01-24 | End: 2025-01-28 | Stop reason: HOSPADM

## 2025-01-24 RX ORDER — AMOXICILLIN 250 MG
1 CAPSULE ORAL 2 TIMES DAILY
Status: DISCONTINUED | OUTPATIENT
Start: 2025-01-24 | End: 2025-01-28 | Stop reason: HOSPADM

## 2025-01-24 RX ORDER — FUROSEMIDE 10 MG/ML
80 INJECTION INTRAMUSCULAR; INTRAVENOUS
Status: COMPLETED | OUTPATIENT
Start: 2025-01-24 | End: 2025-01-24

## 2025-01-24 RX ORDER — HYDROCODONE BITARTRATE AND ACETAMINOPHEN 5; 325 MG/1; MG/1
1 TABLET ORAL
Status: COMPLETED | OUTPATIENT
Start: 2025-01-24 | End: 2025-01-24

## 2025-01-24 RX ORDER — MAGNESIUM HYDROXIDE 400 MG/5ML
1 SUSPENSION, ORAL (FINAL DOSE FORM) ORAL DAILY
COMMUNITY

## 2025-01-24 RX ORDER — HYDROCODONE BITARTRATE AND ACETAMINOPHEN 5; 325 MG/1; MG/1
1 TABLET ORAL 2 TIMES DAILY PRN
Status: ON HOLD | COMMUNITY
Start: 2025-01-17 | End: 2025-01-27

## 2025-01-24 RX ORDER — MORPHINE SULFATE 4 MG/ML
4 INJECTION, SOLUTION INTRAMUSCULAR; INTRAVENOUS EVERY 4 HOURS PRN
Status: DISCONTINUED | OUTPATIENT
Start: 2025-01-24 | End: 2025-01-28 | Stop reason: HOSPADM

## 2025-01-24 RX ORDER — IPRATROPIUM BROMIDE AND ALBUTEROL SULFATE 2.5; .5 MG/3ML; MG/3ML
3 SOLUTION RESPIRATORY (INHALATION)
Status: DISCONTINUED | OUTPATIENT
Start: 2025-01-24 | End: 2025-01-28 | Stop reason: HOSPADM

## 2025-01-24 RX ORDER — CARVEDILOL 3.12 MG/1
3.12 TABLET ORAL 2 TIMES DAILY WITH MEALS
Status: DISCONTINUED | OUTPATIENT
Start: 2025-01-24 | End: 2025-01-26

## 2025-01-24 RX ORDER — TALC
6 POWDER (GRAM) TOPICAL NIGHTLY PRN
Status: DISCONTINUED | OUTPATIENT
Start: 2025-01-24 | End: 2025-01-28 | Stop reason: HOSPADM

## 2025-01-24 RX ORDER — FUROSEMIDE 10 MG/ML
80 INJECTION INTRAMUSCULAR; INTRAVENOUS ONCE
Status: DISCONTINUED | OUTPATIENT
Start: 2025-01-25 | End: 2025-01-25

## 2025-01-24 RX ORDER — INSULIN ASPART 100 [IU]/ML
1-10 INJECTION, SOLUTION INTRAVENOUS; SUBCUTANEOUS
Status: DISCONTINUED | OUTPATIENT
Start: 2025-01-24 | End: 2025-01-25

## 2025-01-24 RX ORDER — IBUPROFEN 200 MG
16 TABLET ORAL
Status: DISCONTINUED | OUTPATIENT
Start: 2025-01-24 | End: 2025-01-28 | Stop reason: HOSPADM

## 2025-01-24 RX ORDER — HYDROCODONE BITARTRATE AND ACETAMINOPHEN 500; 5 MG/1; MG/1
TABLET ORAL
Status: DISCONTINUED | OUTPATIENT
Start: 2025-01-24 | End: 2025-01-28 | Stop reason: HOSPADM

## 2025-01-24 RX ORDER — GLUCAGON 1 MG
1 KIT INJECTION
Status: DISCONTINUED | OUTPATIENT
Start: 2025-01-24 | End: 2025-01-28 | Stop reason: HOSPADM

## 2025-01-24 RX ORDER — CARVEDILOL 3.12 MG/1
3.12 TABLET ORAL 2 TIMES DAILY WITH MEALS
COMMUNITY
Start: 2025-01-17 | End: 2025-02-16

## 2025-01-24 RX ORDER — ATORVASTATIN CALCIUM 40 MG/1
80 TABLET, FILM COATED ORAL NIGHTLY
Status: DISCONTINUED | OUTPATIENT
Start: 2025-01-24 | End: 2025-01-28 | Stop reason: HOSPADM

## 2025-01-24 RX ORDER — ATORVASTATIN CALCIUM 80 MG/1
80 TABLET, FILM COATED ORAL NIGHTLY
COMMUNITY
Start: 2025-01-17 | End: 2025-02-16

## 2025-01-24 RX ADMIN — FUROSEMIDE 80 MG: 10 INJECTION, SOLUTION INTRAMUSCULAR; INTRAVENOUS at 02:01

## 2025-01-24 RX ADMIN — HYDROCODONE BITARTRATE AND ACETAMINOPHEN 1 TABLET: 5; 325 TABLET ORAL at 12:01

## 2025-01-24 RX ADMIN — MORPHINE SULFATE 4 MG: 4 INJECTION INTRAVENOUS at 09:01

## 2025-01-24 RX ADMIN — IPRATROPIUM BROMIDE AND ALBUTEROL SULFATE 3 ML: 2.5; .5 SOLUTION RESPIRATORY (INHALATION) at 04:01

## 2025-01-24 RX ADMIN — ATORVASTATIN CALCIUM 80 MG: 40 TABLET, FILM COATED ORAL at 09:01

## 2025-01-24 RX ADMIN — SENNOSIDES AND DOCUSATE SODIUM 1 TABLET: 8.6; 5 TABLET ORAL at 09:01

## 2025-01-24 RX ADMIN — CARVEDILOL 3.12 MG: 3.12 TABLET, FILM COATED ORAL at 05:01

## 2025-01-24 RX ADMIN — POTASSIUM BICARBONATE 40 MEQ: 391 TABLET, EFFERVESCENT ORAL at 05:01

## 2025-01-24 RX ADMIN — POTASSIUM BICARBONATE 40 MEQ: 391 TABLET, EFFERVESCENT ORAL at 09:01

## 2025-01-24 NOTE — ASSESSMENT & PLAN NOTE
"Anemia is likely due to chronic blood loss. Most recent hemoglobin and hematocrit are listed below.    Plan as under "GIB"  "

## 2025-01-24 NOTE — HPI
Patient is a 60 yo F w/ PMHx of CKD, Colonic amyloidosis, Chronic anemia, HfrEF EF 25-30%, . Presenting for shortness of breath, bright red blood in stool. Recently discharged on 1/17 from [facility] in [place] for NSTEMI, was told to stop Lasix due to diarrhea at that time and was not able to follow-up with a PCP. Patient tried taking Lasix again yesterday due to worsening lower extremity edema. Of note, pt has undergone GI workup during previous hospitalizations for GIB. Endoscopies with biopsy positive for amyloidosis.     In the ED, initial vital signs /95, HR 97, Temp 97.7, SpO2 100% on room air. Labs include CBC with  H/H 6.5/20.5 and WBC within normal limits 11.54. CMP with K 3.2, Na 131 and BUN/Cr 23/1.4 (baseline Cr 1.4). CXR pulmonary congestion. EKG appears unchanged. BNP 2,314 and troponin 0.283.  [facility] consulted for evaluation for admission for GIB and heart failure exacerbation.

## 2025-01-24 NOTE — ASSESSMENT & PLAN NOTE
Patient with significant smoking history, presumed COPD. No PFTs seen on file. Patient endorsing dyspnea, wheezing, increased sputum production. Patient uses Spiriva and Albuterol nebulizer daily at home. Has not required oxygen supplementation.  Dyspnea could be multifactorial due to anemia, pulmonary congestion, COPD. Lower suspicion for acute COPD exacerbation at this time so will hold off on steroid and abx. CXR with signs of pulmonary congestion.    - Will hold off on steroid for now due to JAQUELINE Briscoe nebulizer  - Daily inhaler

## 2025-01-24 NOTE — ASSESSMENT & PLAN NOTE
In ED Troponin 0.283, endorsed chest tightness initially however has resolved. EKG appears unchanged from previous. Suspect Type II NSTEMI due to demand ischemia from anemia.    - Repeat troponin and EKG  - Monitor chest pain

## 2025-01-24 NOTE — ASSESSMENT & PLAN NOTE
Patient experiencing blood in her bowel movements, sometimes red or brown. No bleeding otherwise. Patient has history of colonic amyloidosis and recurrent GIB Patients most recent Hgb, Hct, platelets, and INR are listed below.  Recent Labs     01/24/25  1153   HGB 6.5*   HCT 20.9*      Pending 1unit pRBCs in ED.    Plan  - Will trend hemoglobin/hematocrit Every 12 hours  - Will monitor and correct any coagulation defects  - Will transfuse if Hgb is <7g/dl (<8g/dl in cases of active ACS) or if patient has rapid bleeding leading to hemodynamic instability  - 2 large bore IVs  - Protonix 40 BID  - Consult [facility], appreciate recs

## 2025-01-24 NOTE — SUBJECTIVE & OBJECTIVE
Past Medical History:   Diagnosis Date    Asthma     Bipolar disorder     Hypertension        Past Surgical History:   Procedure Laterality Date    CHOLECYSTECTOMY      COLONOSCOPY N/A 10/22/2024    Procedure: COLONOSCOPY;  Surgeon: Dayday Freed MD;  Location: [facility] ENDO;  Service: Endoscopy;  Laterality: N/A;    COLONOSCOPY N/A 11/11/2024    Procedure: COLONOSCOPY;  Surgeon: Reese Chen MD;  Location: [facility];  Service: Endoscopy;  Laterality: N/A;    ESOPHAGOGASTRODUODENOSCOPY N/A 10/22/2024    Procedure: EGD (ESOPHAGOGASTRODUODENOSCOPY);  Surgeon: Dayday Freed MD;  Location: [facility] ENDO;  Service: Endoscopy;  Laterality: N/A;    ESOPHAGOGASTRODUODENOSCOPY N/A 11/11/2024    Procedure: EGD (ESOPHAGOGASTRODUODENOSCOPY);  Surgeon: Reese Chen MD;  Location: [facility];  Service: Endoscopy;  Laterality: N/A;    SHOULDER SURGERY      TUBAL LIGATION         Review of patient's allergies indicates:  No Known Allergies    No current facility-administered medications on file prior to encounter.     Current Outpatient Medications on File Prior to Encounter   Medication Sig    albuterol (PROVENTIL/VENTOLIN HFA) 90 mcg/actuation inhaler Inhale 1-2 puffs into the lungs every 6 (six) hours as needed for Wheezing. Rescue    ferrous sulfate 324 mg (65 mg iron) TbEC Take 1 tablet (324 mg total) by mouth once daily.    furosemide (LASIX) 40 MG tablet Take 1 tablet (40 mg total) by mouth 2 (two) times daily.    lactulose (CHRONULAC) 10 gram/15 mL solution Take 30 mLs (20 g total) by mouth 3 (three) times daily.    LIDOcaine (LIDODERM) 5 % Place 2 patches onto the skin every evening. Remove & Discard patch within 12 hours or as directed by MD    magic mouthwash diphen/antac/lidoc/nysta take 10 mLs 4 (four) times daily.    magnesium oxide (MAG-OX) 400 mg (241.3 mg magnesium) tablet Take 1 tablet (400 mg total) by mouth once daily.    metoprolol succinate (TOPROL-XL) 25 MG 24 hr tablet Take 1 tablet  (25 mg total) by mouth once daily.    naloxone (NARCAN) 4 mg/actuation Spry 4mg by nasal route as needed for opioid overdose; may repeat every 2-3 minutes in alternating nostrils until medical help arrives. Call 911    nicotine (NICODERM CQ) 21 mg/24 hr Place 1 patch onto the skin once daily.    nystatin (MYCOSTATIN) cream Apply topically 2 (two) times daily.    oxybutynin (DITROPAN) 5 MG Tab Take 1 tablet (5 mg total) by mouth 3 (three) times daily.    pantoprazole (PROTONIX) 40 MG tablet Take 1 tablet (40 mg total) by mouth 2 (two) times daily.    polyethylene glycol (GLYCOLAX) 17 gram PwPk Take 17 g by mouth 2 (two) times daily as needed for Constipation.    potassium chloride SA (K-DUR,KLOR-CON) 20 MEQ tablet Take 2 tablets (40 mEq total) by mouth once daily.    sucralfate (CARAFATE) 1 gram tablet Take 1 tablet (1 g total) by mouth 3 (three) times daily before meals.    tiotropium (SPIRIVA) 18 mcg inhalation capsule Inhale 1 capsule (18 mcg total) into the lungs once daily. Controller     Family History    None       Tobacco Use    Smoking status: Every Day     Current packs/day: 1.00     Average packs/day: 2.0 packs/day for 58.1 years (115.1 ttl pk-yrs)     Types: Cigarettes     Start date: 1967    Smokeless tobacco: Never    Tobacco comments:     Pt is a 2 pk/day cigarette smoker x 57 yrs. She states that she cut down to about 1 pk/day, and is trying to quit.  Ambulatory referral to Smoking Cessation clinic following hospital discharge.     Substance and Sexual Activity    Alcohol use: Yes     Comment: socailly    Drug use: Yes     Types: Methamphetamines, Marijuana     Comment: denies cocaine or meth. Reports maijurana use    Sexual activity: Yes     Review of Systems   Constitutional:  Negative for chills and fever.   HENT:  Positive for sore throat.    Respiratory:  Positive for shortness of breath and wheezing.    Cardiovascular:  Positive for leg swelling. Negative for chest pain and palpitations.    Gastrointestinal:  Positive for blood in stool. Negative for abdominal pain, constipation, diarrhea, nausea and vomiting.   Genitourinary:  Negative for difficulty urinating.   Skin:  Negative for rash.     Objective:     Vital Signs (Most Recent):  Temp: 98.1 °F (36.7 °C) (01/24/25 1531)  Pulse: 98 (01/24/25 1531)  Resp: 17 (01/24/25 1531)  BP: (!) 144/82 (01/24/25 1531)  SpO2: 100 % (01/24/25 1531) Vital Signs (24h Range):  Temp:  [97.7 °F (36.5 °C)-98.2 °F (36.8 °C)] 98.1 °F (36.7 °C)  Pulse:  [] 98  Resp:  [17-19] 17  SpO2:  [100 %] 100 %  BP: (117-147)/(74-95) 144/82     Weight: 74.4 kg (164 lb)  Body mass index is 23.53 kg/m².     Physical Exam  Constitutional:       General: She is not in acute distress.     Appearance: She is normal weight. She is not ill-appearing.   HENT:      Mouth/Throat:      Pharynx: Oropharynx is clear. Posterior oropharyngeal erythema present. No oropharyngeal exudate.   Cardiovascular:      Rate and Rhythm: Normal rate and regular rhythm.      Heart sounds: Normal heart sounds. No murmur heard.     No friction rub.   Pulmonary:      Effort: Pulmonary effort is normal. No respiratory distress.      Breath sounds: No stridor. Wheezing present. No rhonchi.   Abdominal:      General: Abdomen is flat.      Palpations: Abdomen is soft.      Tenderness: There is abdominal tenderness (deep palpation in lower quadrants).   Musculoskeletal:      Right lower leg: Edema present.      Left lower leg: Edema present.   Neurological:      Mental Status: She is alert.                Significant Labs: All pertinent labs within the past 24 hours have been reviewed.  CBC:   Recent Labs   Lab 01/24/25  1153   WBC 11.54   HGB 6.5*   HCT 20.9*        CMP:   Recent Labs   Lab 01/24/25  1153   *   K 3.2*   CL 99   CO2 23      BUN 23*   CREATININE 1.4   CALCIUM 7.5*   PROT 5.9*   ALBUMIN 1.9*   BILITOT 0.4   ALKPHOS 80   AST 24   ALT 20   ANIONGAP 9       Significant Imaging: I  have reviewed all pertinent imaging results/findings within the past 24 hours.

## 2025-01-24 NOTE — H&P
[facility]  History & Physical    Patient Name: Mary Ellen Saini  MRN: [id number]  Patient Class: IP- Inpatient  Admission Date: 1/24/2025  Attending Physician: Mary Malave MD   Primary Care Provider: Marlen, Primary Doctor         Patient information was obtained from patient, past medical records, and ER records.     Subjective:     Principal Problem:GIB (gastrointestinal bleeding)    Chief Complaint:   Chief Complaint   Patient presents with    Shortness of Breath     SOB and bright red blood in stool. Hx of anemia, hemorrhoids, CHF. %        HPI: Patient is a 58 yo F w/ PMHx of CKD, Colonic amyloidosis, Chronic anemia, HfrEF EF 25-30%, . Presenting for shortness of breath, bright red blood in stool. Recently discharged on 1/17 from [facility] in [place] for NSTEMI, was told to stop Lasix due to diarrhea at that time and was not able to follow-up with a PCP. Patient tried taking Lasix again yesterday due to worsening lower extremity edema. Of note, pt has undergone GI workup during previous hospitalizations for GIB. Endoscopies with biopsy positive for amyloidosis.     In the ED, initial vital signs /95, HR 97, Temp 97.7, SpO2 100% on room air. Labs include CBC with  H/H 6.5/20.5 and WBC within normal limits 11.54. CMP with K 3.2, Na 131 and BUN/Cr 23/1.4 (baseline Cr 1.4). CXR pulmonary congestion. EKG appears unchanged. BNP 2,314 and troponin 0.283.  [facility] consulted for evaluation for admission for GIB and heart failure exacerbation.       Past Medical History:   Diagnosis Date    Asthma     Bipolar disorder     Hypertension        Past Surgical History:   Procedure Laterality Date    CHOLECYSTECTOMY      COLONOSCOPY N/A 10/22/2024    Procedure: COLONOSCOPY;  Surgeon: Dayday Freed MD;  Location: [facility];  Service: Endoscopy;  Laterality: N/A;    COLONOSCOPY N/A 11/11/2024    Procedure: COLONOSCOPY;  Surgeon: Reese Chen,  MD;  Location: [facility];  Service: Endoscopy;  Laterality: N/A;    ESOPHAGOGASTRODUODENOSCOPY N/A 10/22/2024    Procedure: EGD (ESOPHAGOGASTRODUODENOSCOPY);  Surgeon: Dayday Freed MD;  Location: [facility];  Service: Endoscopy;  Laterality: N/A;    ESOPHAGOGASTRODUODENOSCOPY N/A 11/11/2024    Procedure: EGD (ESOPHAGOGASTRODUODENOSCOPY);  Surgeon: Reese Chen MD;  Location: [facility];  Service: Endoscopy;  Laterality: N/A;    SHOULDER SURGERY      TUBAL LIGATION         Review of patient's allergies indicates:  No Known Allergies    No current facility-administered medications on file prior to encounter.     Current Outpatient Medications on File Prior to Encounter   Medication Sig    albuterol (PROVENTIL/VENTOLIN HFA) 90 mcg/actuation inhaler Inhale 1-2 puffs into the lungs every 6 (six) hours as needed for Wheezing. Rescue    ferrous sulfate 324 mg (65 mg iron) TbEC Take 1 tablet (324 mg total) by mouth once daily.    furosemide (LASIX) 40 MG tablet Take 1 tablet (40 mg total) by mouth 2 (two) times daily.    lactulose (CHRONULAC) 10 gram/15 mL solution Take 30 mLs (20 g total) by mouth 3 (three) times daily.    LIDOcaine (LIDODERM) 5 % Place 2 patches onto the skin every evening. Remove & Discard patch within 12 hours or as directed by MD    magic mouthwash diphen/antac/lidoc/nysta take 10 mLs 4 (four) times daily.    magnesium oxide (MAG-OX) 400 mg (241.3 mg magnesium) tablet Take 1 tablet (400 mg total) by mouth once daily.    metoprolol succinate (TOPROL-XL) 25 MG 24 hr tablet Take 1 tablet (25 mg total) by mouth once daily.    naloxone (NARCAN) 4 mg/actuation Spry 4mg by nasal route as needed for opioid overdose; may repeat every 2-3 minutes in alternating nostrils until medical help arrives. Call 911    nicotine (NICODERM CQ) 21 mg/24 hr Place 1 patch onto the skin once daily.    nystatin (MYCOSTATIN) cream Apply topically 2 (two) times daily.    oxybutynin (DITROPAN) 5 MG Tab Take 1 tablet  (5 mg total) by mouth 3 (three) times daily.    pantoprazole (PROTONIX) 40 MG tablet Take 1 tablet (40 mg total) by mouth 2 (two) times daily.    polyethylene glycol (GLYCOLAX) 17 gram PwPk Take 17 g by mouth 2 (two) times daily as needed for Constipation.    potassium chloride SA (K-DUR,KLOR-CON) 20 MEQ tablet Take 2 tablets (40 mEq total) by mouth once daily.    sucralfate (CARAFATE) 1 gram tablet Take 1 tablet (1 g total) by mouth 3 (three) times daily before meals.    tiotropium (SPIRIVA) 18 mcg inhalation capsule Inhale 1 capsule (18 mcg total) into the lungs once daily. Controller     Family History    None       Tobacco Use    Smoking status: Every Day     Current packs/day: 1.00     Average packs/day: 2.0 packs/day for 58.1 years (115.1 ttl pk-yrs)     Types: Cigarettes     Start date: 1967    Smokeless tobacco: Never    Tobacco comments:     Pt is a 2 pk/day cigarette smoker x 57 yrs. She states that she cut down to about 1 pk/day, and is trying to quit.  Ambulatory referral to Smoking Cessation clinic following hospital discharge.     Substance and Sexual Activity    Alcohol use: Yes     Comment: socailly    Drug use: Yes     Types: Methamphetamines, Marijuana     Comment: denies cocaine or meth. Reports maijurana use    Sexual activity: Yes     Review of Systems   Constitutional:  Negative for chills and fever.   HENT:  Positive for sore throat.    Respiratory:  Positive for shortness of breath and wheezing.    Cardiovascular:  Positive for leg swelling. Negative for chest pain and palpitations.   Gastrointestinal:  Positive for blood in stool. Negative for abdominal pain, constipation, diarrhea, nausea and vomiting.   Genitourinary:  Negative for difficulty urinating.   Skin:  Negative for rash.     Objective:     Vital Signs (Most Recent):  Temp: 98.1 °F (36.7 °C) (01/24/25 1531)  Pulse: 98 (01/24/25 1531)  Resp: 17 (01/24/25 1531)  BP: (!) 144/82 (01/24/25 1531)  SpO2: 100 % (01/24/25 1531) Vital Signs  (24h Range):  Temp:  [97.7 °F (36.5 °C)-98.2 °F (36.8 °C)] 98.1 °F (36.7 °C)  Pulse:  [] 98  Resp:  [17-19] 17  SpO2:  [100 %] 100 %  BP: (117-147)/(74-95) 144/82     Weight: 74.4 kg (164 lb)  Body mass index is 23.53 kg/m².     Physical Exam  Constitutional:       General: She is not in acute distress.     Appearance: She is normal weight. She is not ill-appearing.   HENT:      Mouth/Throat:      Pharynx: Oropharynx is clear. Posterior oropharyngeal erythema present. No oropharyngeal exudate.   Cardiovascular:      Rate and Rhythm: Normal rate and regular rhythm.      Heart sounds: Normal heart sounds. No murmur heard.     No friction rub.   Pulmonary:      Effort: Pulmonary effort is normal. No respiratory distress.      Breath sounds: No stridor. Wheezing present. No rhonchi.   Abdominal:      General: Abdomen is flat.      Palpations: Abdomen is soft.      Tenderness: There is abdominal tenderness (deep palpation in lower quadrants).   Musculoskeletal:      Right lower leg: Edema present.      Left lower leg: Edema present.   Neurological:      Mental Status: She is alert.                Significant Labs: All pertinent labs within the past 24 hours have been reviewed.  CBC:   Recent Labs   Lab 01/24/25  1153   WBC 11.54   HGB 6.5*   HCT 20.9*        CMP:   Recent Labs   Lab 01/24/25  1153   *   K 3.2*   CL 99   CO2 23      BUN 23*   CREATININE 1.4   CALCIUM 7.5*   PROT 5.9*   ALBUMIN 1.9*   BILITOT 0.4   ALKPHOS 80   AST 24   ALT 20   ANIONGAP 9       Significant Imaging: I have reviewed all pertinent imaging results/findings within the past 24 hours.  Assessment/Plan:     * GIB (gastrointestinal bleeding)  Patient experiencing blood in her bowel movements, sometimes red or brown. No bleeding otherwise. Patient has history of colonic amyloidosis and recurrent GIB Patients most recent Hgb, Hct, platelets, and INR are listed below.  Recent Labs     01/24/25  1153   HGB 6.5*   HCT 20.9*  "     Pending 1unit pRBCs in ED.    Plan  - Will trend hemoglobin/hematocrit Every 12 hours  - Will monitor and correct any coagulation defects  - Will transfuse if Hgb is <7g/dl (<8g/dl in cases of active ACS) or if patient has rapid bleeding leading to hemodynamic instability  - 2 large bore IVs  - Protonix 40 BID  - Consult [facility], appreciate recs    COPD (chronic obstructive pulmonary disease)  Patient with significant smoking history, presumed COPD. No PFTs seen on file. Patient endorsing dyspnea, wheezing, increased sputum production. Patient uses Spiriva and Albuterol nebulizer daily at home. Has not required oxygen supplementation.  Dyspnea could be multifactorial due to anemia, pulmonary congestion, COPD. Lower suspicion for acute COPD exacerbation at this time so will hold off on steroid and abx. CXR with signs of pulmonary congestion.    - Will hold off on steroid for now due to GIB  - Duonebs nebulizer  - Daily inhaler    Anemia  Anemia is likely due to chronic blood loss. Most recent hemoglobin and hematocrit are listed below.    Plan as under "GIB"    Acute on chronic combined systolic and diastolic congestive heart failure  Patient has Combined Systolic and Diastolic heart failure that is Acute on chronic. On presentation their CHF was decompensated. Evidence of decompensated CHF on presentation includes: edema, orthopnea, dyspnea on exertion (MONSON), and shortness of breath. The etiology of their decompensation is likely due to discontinuation of lasix upon previous admission . Most recent BNP and echo results are listed below.  Recent Labs     01/24/25  1153   BNP 2,314*     Latest ECHO  Results for orders placed during the hospital encounter of 12/29/24    Echo    Interpretation Summary    Left Ventricle: The left ventricle is mildly dilated. Mildly increased wall thickness. Severe global hypokinesis present. There is severely reduced systolic function. Quantitated ejection fraction is 28%. " Grade II diastolic dysfunction.    Right Ventricle: Normal right ventricular cavity size. Right ventricle wall motion has global hypokinesis. Systolic function is mildly reduced.    Left Atrium: Left atrium is severely dilated.    Right Atrium: Right atrium is dilated.    Tricuspid Valve: There is moderate regurgitation.    Pulmonary Artery: There is mild pulmonary hypertension. The estimated pulmonary artery systolic pressure is 47 mmHg.    Current Heart Failure Medications  carvediloL tablet 3.125 mg, 2 times daily with meals, Oral    Plan  - Monitor strict I&Os and daily weights.    - Place on telemetry  - Low sodium diet  - Place on fluid restriction of 2 L.   - Lasix 80mg IV        Elevated troponin  In ED Troponin 0.283, endorsed chest tightness initially however has resolved. EKG appears unchanged from previous. Suspect Type II NSTEMI due to demand ischemia from anemia.    - Repeat troponin and EKG  - Monitor chest pain        VTE Risk Mitigation (From admission, onward)           Ordered     IP VTE HIGH RISK PATIENT  Once         01/24/25 1406     Place sequential compression device  Until discontinued         01/24/25 1406                                    Eugenia Vasquez MD  Department of Hospital Medicine  [place] - Emergency Dept

## 2025-01-24 NOTE — ED PROVIDER NOTES
Emergency Department Encounter  Provider Note  Encounter Date: 1/24/2025    Patient Name: Mary Ellen Saini  MRN: [id number]    History of Present Illness   HPI  History of Present Illness:    Chief Complaint:   Chief Complaint   Patient presents with    Shortness of Breath     SOB and bright red blood in stool. Hx of anemia, hemorrhoids, CHF. %     59f chest pain, shortness of breath, bright red blood per rectum.  Says that her amyloidosis is flaring up.  Says she was taken off of her Lasix during a recent hospital admission, but knows that she is fluid overloaded and took 2 pills yesterday but is still short of breath.    The following PMH/PSH/SocHx/FamHx has been reviewed by myself:  Past Medical History:   Diagnosis Date    Asthma     Bipolar disorder     Hypertension      Past Surgical History:   Procedure Laterality Date    CHOLECYSTECTOMY      COLONOSCOPY N/A 10/22/2024    Procedure: COLONOSCOPY;  Surgeon: Dayday Freed MD;  Location: [facility];  Service: Endoscopy;  Laterality: N/A;    COLONOSCOPY N/A 11/11/2024    Procedure: COLONOSCOPY;  Surgeon: Reese Chen MD;  Location: [facility];  Service: Endoscopy;  Laterality: N/A;    ESOPHAGOGASTRODUODENOSCOPY N/A 10/22/2024    Procedure: EGD (ESOPHAGOGASTRODUODENOSCOPY);  Surgeon: Dayday Freed MD;  Location: [facility];  Service: Endoscopy;  Laterality: N/A;    ESOPHAGOGASTRODUODENOSCOPY N/A 11/11/2024    Procedure: EGD (ESOPHAGOGASTRODUODENOSCOPY);  Surgeon: Reese Chen MD;  Location: [facility];  Service: Endoscopy;  Laterality: N/A;    SHOULDER SURGERY      TUBAL LIGATION       Social History     Tobacco Use    Smoking status: Every Day     Current packs/day: 1.00     Average packs/day: 2.0 packs/day for 58.1 years (115.1 ttl pk-yrs)     Types: Cigarettes     Start date: 1967    Smokeless tobacco: Never    Tobacco comments:     Pt is a 2 pk/day cigarette smoker x 57 yrs. She states that she cut down to about 1 pk/day, and is  trying to quit.  Ambulatory referral to Smoking Cessation clinic following hospital discharge.     Substance Use Topics    Alcohol use: Yes     Comment: socailly    Drug use: Yes     Types: Methamphetamines, Marijuana     Comment: denies cocaine or meth. Reports maijurana use     No family history on file.  Allergies reviewed:  Review of patient's allergies indicates:  No Known Allergies  Medications reviewed:  Medication List with Changes/Refills   Current Medications    ALBUTEROL (PROVENTIL/VENTOLIN HFA) 90 MCG/ACTUATION INHALER    Inhale 1-2 puffs into the lungs every 6 (six) hours as needed for Wheezing. Rescue    FERROUS SULFATE 324 MG (65 MG IRON) TBEC    Take 1 tablet (324 mg total) by mouth once daily.    FUROSEMIDE (LASIX) 40 MG TABLET    Take 1 tablet (40 mg total) by mouth 2 (two) times daily.    LACTULOSE (CHRONULAC) 10 GRAM/15 ML SOLUTION    Take 30 mLs (20 g total) by mouth 3 (three) times daily.    LIDOCAINE (LIDODERM) 5 %    Place 2 patches onto the skin every evening. Remove & Discard patch within 12 hours or as directed by MD    MAGIC MOUTHWASH DIPHEN/ANTAC/LIDOC/NYSTA    take 10 mLs 4 (four) times daily.    MAGNESIUM OXIDE (MAG-OX) 400 MG (241.3 MG MAGNESIUM) TABLET    Take 1 tablet (400 mg total) by mouth once daily.    METOPROLOL SUCCINATE (TOPROL-XL) 25 MG 24 HR TABLET    Take 1 tablet (25 mg total) by mouth once daily.    NALOXONE (NARCAN) 4 MG/ACTUATION SPRY    4mg by nasal route as needed for opioid overdose; may repeat every 2-3 minutes in alternating nostrils until medical help arrives. Call 911    NICOTINE (NICODERM CQ) 21 MG/24 HR    Place 1 patch onto the skin once daily.    NYSTATIN (MYCOSTATIN) CREAM    Apply topically 2 (two) times daily.    OXYBUTYNIN (DITROPAN) 5 MG TAB    Take 1 tablet (5 mg total) by mouth 3 (three) times daily.    PANTOPRAZOLE (PROTONIX) 40 MG TABLET    Take 1 tablet (40 mg total) by mouth 2 (two) times daily.    POLYETHYLENE GLYCOL (GLYCOLAX) 17 GRAM PWPK     Take 17 g by mouth 2 (two) times daily as needed for Constipation.    POTASSIUM CHLORIDE SA (K-DUR,KLOR-CON) 20 MEQ TABLET    Take 2 tablets (40 mEq total) by mouth once daily.    SUCRALFATE (CARAFATE) 1 GRAM TABLET    Take 1 tablet (1 g total) by mouth 3 (three) times daily before meals.    TIOTROPIUM (SPIRIVA) 18 MCG INHALATION CAPSULE    Inhale 1 capsule (18 mcg total) into the lungs once daily. Controller       Physical Exam     Initial Vitals   BP Pulse Resp Temp SpO2   01/24/25 1125 01/24/25 1125 01/24/25 1249 01/24/25 1125 01/24/25 1125   (!) 147/95 97 18 97.7 °F (36.5 °C) 100 %      MAP       --                  Triage vital signs reviewed.    Constitutional:  Thin, well-developed.  Chronically ill-appearing.  HENT: Normocephalic, atraumatic. Moist mucous membranes.  Eyes: No conjunctival injection.  Pale conjunctiva.  Resp: Normal respiratory effort, breathing unlabored.  Cardio: Regular rate and rhythm.  GI: Abdomen non-distended.  No pain with palpation.  MSK: Extremities without any deformities noted.  Bilateral pitting symmetric lower extremity edema to the knees.  Skin: Warm and dry. No rashes or lesions noted.  Neuro: Awake and alert. Moves all extremities.    ED Course   Procedures    Medical Decision Making    History Acquisition     The history is provided by the patient.     Review of prior external/non ED notes:  Discharge summary 01/17/2025 from outside hospital, presented for NSTEMI likely due to GI bleeding, amphetamine use; EF 25-30%, Lasix stopped, colonic amyloidosis causing lower GI bleed    Differential Diagnoses   Based on available information and initial assessment, the working Differential Diagnosis includes, but is not limited to:  ACS/MI, PE, aortic dissection, pneumothorax, cardiac tamponade, pericarditis/myocarditis, pneumonia, infection/abscess, lung mass, trauma/fracture, costochondritis/pleurisy, MSK pain/contusion, GERD, biliary disease, pancreatitis, anemia.    EKG   EKG  ordered and independently reviewed by me:   Normal sinus rhythm, rate 97, normal intervals, normal axis, no STEMI    Labs   Lab tests ordered and independently reviewed by me:    Labs Reviewed   CBC W/ AUTO DIFFERENTIAL - Abnormal       Result Value    WBC 11.54      RBC 2.78 (*)     Hemoglobin 6.5 (*)     Hematocrit 20.9 (*)     MCV 75 (*)     MCH 23.4 (*)     MCHC 31.1 (*)     RDW 16.1 (*)     Platelets 385      MPV 9.4      Immature Granulocytes 0.8 (*)     Gran # (ANC) 9.1 (*)     Immature Grans (Abs) 0.09 (*)     Lymph # 1.4      Mono # 0.7      Eos # 0.2      Baso # 0.06      nRBC 0      Gran % 79.1 (*)     Lymph % 12.1 (*)     Mono % 5.6      Eosinophil % 1.9      Basophil % 0.5      Differential Method Automated     COMPREHENSIVE METABOLIC PANEL - Abnormal    Sodium 131 (*)     Potassium 3.2 (*)     Chloride 99      CO2 23      Glucose 100      BUN 23 (*)     Creatinine 1.4      Calcium 7.5 (*)     Total Protein 5.9 (*)     Albumin 1.9 (*)     Total Bilirubin 0.4      Alkaline Phosphatase 80      AST 24      ALT 20      eGFR 44 (*)     Anion Gap 9     TROPONIN I - Abnormal    Troponin I 0.283 (*)    B-TYPE NATRIURETIC PEPTIDE - Abnormal    BNP 2,314 (*)    TOXICOLOGY SCREEN, URINE, RANDOM (COMPLIANCE)   TYPE & SCREEN    Group & Rh O POS      Indirect Juanis NEG      Specimen Outdate 01/27/2025 23:59     PREPARE RBC SOFT    UNIT NUMBER [id number]      Product Code A5536L48      DISPENSE STATUS CROSSMATCHED      CODING SYSTEM [id number]      Unit Blood Type Code 5100      Unit Blood Type O POS      Unit Expiration [id number]      CROSSMATCH INTERPRETATION Compatible         Imaging   Imaging ordered and independently reviewed by me:   Imaging Results              X-Ray Chest AP Portable (In process)                        Additional Consideration   Mary Ellen Saini  presents to the Emergency Department today with chest pain, GI bleed.  Labs, patient does not have abdominal pain with palpation currently.  The  bleeding is not constant, only when she has a bowel movement.  Labs, disposition pending results.    Additional testing considered but not indicated during the course of this workup: further imaging and labwork, not indicated  Co-morbidities/chronic illness/exacerbation of chronic illness considered which impacted clinical decision making:  Amyloidosis, polysubstance use  Procedures done in the ED or plan for the OR: No  Social determinants of care considered during development of treatment plan include: Decreased medical literacy  Discussion of management or test interpretation with external provider: Yes, describe:  See ED course  DNR discussion: No    The patient's list of active medications and allergies as documented per RN staff has been reviewed.  Medications given in the ED and/or prescribed:   Medications   0.9%  NaCl infusion (for blood administration) (has no administration in time range)   furosemide injection 80 mg (has no administration in time range)   HYDROcodone-acetaminophen 5-325 mg per tablet 1 tablet (1 tablet Oral Given 1/24/25 1249)             ED Course as of 01/24/25 1336   Fri Jan 24, 2025   1319 CBC auto differential(!)  Anemia, consented for blood, will receive 1 unit [CS]   1319 Comprehensive metabolic panel(!)  Baseline [CS]   1319 Troponin I(!): 0.283  Elevated back actually down trending from 01/15 [CS]   1319 BNP(!): 2,314  Elevated, receiving Lasix [CS]   1319 Sign-out given to [facility] [CS]      ED Course User Index  [CS] Mary Malave MD       Explanation of disposition: Admit    Critical Care:    I have personally provided 30 minutes of critical care time exclusive of time spent on separately billable procedures. Time includes review of laboratory data, radiology results, discussion with consultants, and monitoring for potential decompensation. Interventions were performed as documented above.      Clinical Impression:     1. Symptomatic anemia    2. Shortness of  breath    3. NSTEMI (non-ST elevated myocardial infarction)    4. Hypervolemia, unspecified hypervolemia type       ED Disposition Condition    Admit Stable               Mary Malave MD  01/24/25 0558

## 2025-01-24 NOTE — ASSESSMENT & PLAN NOTE
Patient has Combined Systolic and Diastolic heart failure that is Acute on chronic. On presentation their CHF was decompensated. Evidence of decompensated CHF on presentation includes: edema, orthopnea, dyspnea on exertion (MONSON), and shortness of breath. The etiology of their decompensation is likely due to discontinuation of lasix upon previous admission . Most recent BNP and echo results are listed below.  Recent Labs     01/24/25  1153   BNP 2,314*     Latest ECHO  Results for orders placed during the hospital encounter of 12/29/24    Echo    Interpretation Summary    Left Ventricle: The left ventricle is mildly dilated. Mildly increased wall thickness. Severe global hypokinesis present. There is severely reduced systolic function. Quantitated ejection fraction is 28%. Grade II diastolic dysfunction.    Right Ventricle: Normal right ventricular cavity size. Right ventricle wall motion has global hypokinesis. Systolic function is mildly reduced.    Left Atrium: Left atrium is severely dilated.    Right Atrium: Right atrium is dilated.    Tricuspid Valve: There is moderate regurgitation.    Pulmonary Artery: There is mild pulmonary hypertension. The estimated pulmonary artery systolic pressure is 47 mmHg.    Current Heart Failure Medications  carvediloL tablet 3.125 mg, 2 times daily with meals, Oral    Plan  - Monitor strict I&Os and daily weights.    - Place on telemetry  - Low sodium diet  - Place on fluid restriction of 2 L.   - Lasix 80mg IV

## 2025-01-25 PROBLEM — K62.5 RECTAL BLEEDING: Status: ACTIVE | Noted: 2025-01-25

## 2025-01-25 LAB
ALBUMIN SERPL BCP-MCNC: 1.8 G/DL (ref 3.5–5.2)
ALP SERPL-CCNC: 87 U/L (ref 40–150)
ALT SERPL W/O P-5'-P-CCNC: 20 U/L (ref 10–44)
AMPHET+METHAMPHET UR QL: ABNORMAL
ANION GAP SERPL CALC-SCNC: 8 MMOL/L (ref 8–16)
AST SERPL-CCNC: 29 U/L (ref 10–40)
BARBITURATES UR QL SCN>200 NG/ML: NEGATIVE
BASOPHILS # BLD AUTO: 0.09 K/UL (ref 0–0.2)
BASOPHILS NFR BLD: 0.7 % (ref 0–1.9)
BENZODIAZ UR QL SCN>200 NG/ML: NEGATIVE
BILIRUB SERPL-MCNC: 0.4 MG/DL (ref 0.1–1)
BLD PROD TYP BPU: NORMAL
BLOOD UNIT EXPIRATION DATE: NORMAL
BLOOD UNIT TYPE CODE: 5100
BLOOD UNIT TYPE: NORMAL
BUN SERPL-MCNC: 24 MG/DL (ref 6–20)
BZE UR QL SCN: NEGATIVE
CALCIUM SERPL-MCNC: 7.4 MG/DL (ref 8.7–10.5)
CANNABINOIDS UR QL SCN: NEGATIVE
CHLORIDE SERPL-SCNC: 100 MMOL/L (ref 95–110)
CO2 SERPL-SCNC: 25 MMOL/L (ref 23–29)
CODING SYSTEM: NORMAL
CREAT SERPL-MCNC: 1.4 MG/DL (ref 0.5–1.4)
CREAT UR-MCNC: 12 MG/DL (ref 15–325)
CROSSMATCH INTERPRETATION: NORMAL
DIFFERENTIAL METHOD BLD: ABNORMAL
DISPENSE STATUS: NORMAL
EOSINOPHIL # BLD AUTO: 0.2 K/UL (ref 0–0.5)
EOSINOPHIL NFR BLD: 1.3 % (ref 0–8)
ERYTHROCYTE [DISTWIDTH] IN BLOOD BY AUTOMATED COUNT: 15.7 % (ref 11.5–14.5)
EST. GFR  (NO RACE VARIABLE): 44 ML/MIN/1.73 M^2
ETHANOL UR-MCNC: <10 MG/DL
GLUCOSE SERPL-MCNC: 103 MG/DL (ref 70–110)
HCT VFR BLD AUTO: 21.9 % (ref 37–48.5)
HCT VFR BLD AUTO: 22.7 % (ref 37–48.5)
HCT VFR BLD AUTO: 28.5 % (ref 37–48.5)
HGB BLD-MCNC: 7.1 G/DL (ref 12–16)
HGB BLD-MCNC: 7.2 G/DL (ref 12–16)
HGB BLD-MCNC: 9.2 G/DL (ref 12–16)
IMM GRANULOCYTES # BLD AUTO: 0.08 K/UL (ref 0–0.04)
IMM GRANULOCYTES NFR BLD AUTO: 0.6 % (ref 0–0.5)
LYMPHOCYTES # BLD AUTO: 1.8 K/UL (ref 1–4.8)
LYMPHOCYTES NFR BLD: 13.9 % (ref 18–48)
MAGNESIUM SERPL-MCNC: 1.4 MG/DL (ref 1.6–2.6)
MCH RBC QN AUTO: 24.7 PG (ref 27–31)
MCHC RBC AUTO-ENTMCNC: 32.4 G/DL (ref 32–36)
MCV RBC AUTO: 76 FL (ref 82–98)
METHADONE UR QL SCN>300 NG/ML: NEGATIVE
MONOCYTES # BLD AUTO: 0.8 K/UL (ref 0.3–1)
MONOCYTES NFR BLD: 6.1 % (ref 4–15)
NEUTROPHILS # BLD AUTO: 9.7 K/UL (ref 1.8–7.7)
NEUTROPHILS NFR BLD: 77.4 % (ref 38–73)
NRBC BLD-RTO: 0 /100 WBC
OPIATES UR QL SCN: NEGATIVE
PCP UR QL SCN>25 NG/ML: NEGATIVE
PHOSPHATE SERPL-MCNC: 4.5 MG/DL (ref 2.7–4.5)
PLATELET # BLD AUTO: 327 K/UL (ref 150–450)
PMV BLD AUTO: 9.3 FL (ref 9.2–12.9)
POTASSIUM SERPL-SCNC: 3.5 MMOL/L (ref 3.5–5.1)
PROT SERPL-MCNC: 5.8 G/DL (ref 6–8.4)
RBC # BLD AUTO: 2.87 M/UL (ref 4–5.4)
SODIUM SERPL-SCNC: 133 MMOL/L (ref 136–145)
TOXICOLOGY INFORMATION: ABNORMAL
TRANS ERYTHROCYTES VOL PATIENT: NORMAL ML
WBC # BLD AUTO: 12.59 K/UL (ref 3.9–12.7)

## 2025-01-25 PROCEDURE — P9021 RED BLOOD CELLS UNIT: HCPCS

## 2025-01-25 PROCEDURE — 83735 ASSAY OF MAGNESIUM: CPT

## 2025-01-25 PROCEDURE — 94640 AIRWAY INHALATION TREATMENT: CPT

## 2025-01-25 PROCEDURE — 25000003 PHARM REV CODE 250

## 2025-01-25 PROCEDURE — 25000242 PHARM REV CODE 250 ALT 637 W/ HCPCS

## 2025-01-25 PROCEDURE — 36415 COLL VENOUS BLD VENIPUNCTURE: CPT

## 2025-01-25 PROCEDURE — 85014 HEMATOCRIT: CPT

## 2025-01-25 PROCEDURE — 85014 HEMATOCRIT: CPT | Mod: 91

## 2025-01-25 PROCEDURE — 85018 HEMOGLOBIN: CPT

## 2025-01-25 PROCEDURE — 63600175 PHARM REV CODE 636 W HCPCS

## 2025-01-25 PROCEDURE — 84100 ASSAY OF PHOSPHORUS: CPT

## 2025-01-25 PROCEDURE — 80053 COMPREHEN METABOLIC PANEL: CPT

## 2025-01-25 PROCEDURE — 85025 COMPLETE CBC W/AUTO DIFF WBC: CPT

## 2025-01-25 PROCEDURE — 94761 N-INVAS EAR/PLS OXIMETRY MLT: CPT

## 2025-01-25 PROCEDURE — 85018 HEMOGLOBIN: CPT | Mod: 91

## 2025-01-25 PROCEDURE — 86920 COMPATIBILITY TEST SPIN: CPT

## 2025-01-25 PROCEDURE — 11000001 HC ACUTE MED/SURG PRIVATE ROOM

## 2025-01-25 RX ORDER — FUROSEMIDE 10 MG/ML
80 INJECTION INTRAMUSCULAR; INTRAVENOUS DAILY
Status: DISCONTINUED | OUTPATIENT
Start: 2025-01-25 | End: 2025-01-28 | Stop reason: HOSPADM

## 2025-01-25 RX ORDER — MAGNESIUM SULFATE HEPTAHYDRATE 40 MG/ML
2 INJECTION, SOLUTION INTRAVENOUS ONCE
Status: COMPLETED | OUTPATIENT
Start: 2025-01-25 | End: 2025-01-25

## 2025-01-25 RX ORDER — NYSTATIN 100000 [USP'U]/ML
500000 SUSPENSION ORAL
Status: DISCONTINUED | OUTPATIENT
Start: 2025-01-25 | End: 2025-01-28 | Stop reason: HOSPADM

## 2025-01-25 RX ORDER — HYDROCODONE BITARTRATE AND ACETAMINOPHEN 500; 5 MG/1; MG/1
TABLET ORAL
Status: DISCONTINUED | OUTPATIENT
Start: 2025-01-25 | End: 2025-01-28 | Stop reason: HOSPADM

## 2025-01-25 RX ADMIN — CARVEDILOL 3.12 MG: 3.12 TABLET, FILM COATED ORAL at 08:01

## 2025-01-25 RX ADMIN — MORPHINE SULFATE 4 MG: 4 INJECTION INTRAVENOUS at 09:01

## 2025-01-25 RX ADMIN — MAGNESIUM SULFATE HEPTAHYDRATE 2 G: 40 INJECTION, SOLUTION INTRAVENOUS at 06:01

## 2025-01-25 RX ADMIN — FUROSEMIDE 80 MG: 10 INJECTION, SOLUTION INTRAMUSCULAR; INTRAVENOUS at 08:01

## 2025-01-25 RX ADMIN — MORPHINE SULFATE 4 MG: 4 INJECTION INTRAVENOUS at 04:01

## 2025-01-25 RX ADMIN — NYSTATIN 500000 UNITS: 100000 SUSPENSION ORAL at 06:01

## 2025-01-25 RX ADMIN — IPRATROPIUM BROMIDE AND ALBUTEROL SULFATE 3 ML: 2.5; .5 SOLUTION RESPIRATORY (INHALATION) at 07:01

## 2025-01-25 RX ADMIN — MORPHINE SULFATE 4 MG: 4 INJECTION INTRAVENOUS at 06:01

## 2025-01-25 RX ADMIN — TIOTROPIUM BROMIDE INHALATION SPRAY 2 PUFF: 3.12 SPRAY, METERED RESPIRATORY (INHALATION) at 11:01

## 2025-01-25 RX ADMIN — IPRATROPIUM BROMIDE AND ALBUTEROL SULFATE 3 ML: 2.5; .5 SOLUTION RESPIRATORY (INHALATION) at 08:01

## 2025-01-25 RX ADMIN — SENNOSIDES AND DOCUSATE SODIUM 1 TABLET: 8.6; 5 TABLET ORAL at 08:01

## 2025-01-25 RX ADMIN — Medication 6 MG: at 09:01

## 2025-01-25 RX ADMIN — NYSTATIN 500000 UNITS: 100000 SUSPENSION ORAL at 01:01

## 2025-01-25 RX ADMIN — CARVEDILOL 3.12 MG: 3.12 TABLET, FILM COATED ORAL at 04:01

## 2025-01-25 RX ADMIN — PANTOPRAZOLE SODIUM 40 MG: 40 INJECTION, POWDER, LYOPHILIZED, FOR SOLUTION INTRAVENOUS at 08:01

## 2025-01-25 RX ADMIN — ATORVASTATIN CALCIUM 80 MG: 40 TABLET, FILM COATED ORAL at 09:01

## 2025-01-25 RX ADMIN — PANTOPRAZOLE SODIUM 40 MG: 40 INJECTION, POWDER, LYOPHILIZED, FOR SOLUTION INTRAVENOUS at 09:01

## 2025-01-25 RX ADMIN — IPRATROPIUM BROMIDE AND ALBUTEROL SULFATE 3 ML: 2.5; .5 SOLUTION RESPIRATORY (INHALATION) at 03:01

## 2025-01-25 RX ADMIN — MORPHINE SULFATE 4 MG: 4 INJECTION INTRAVENOUS at 01:01

## 2025-01-25 RX ADMIN — MORPHINE SULFATE 4 MG: 4 INJECTION INTRAVENOUS at 11:01

## 2025-01-25 RX ADMIN — IPRATROPIUM BROMIDE AND ALBUTEROL SULFATE 3 ML: 2.5; .5 SOLUTION RESPIRATORY (INHALATION) at 11:01

## 2025-01-25 RX ADMIN — NYSTATIN 500000 UNITS: 100000 SUSPENSION ORAL at 09:01

## 2025-01-25 NOTE — PROGRESS NOTES
[facility] Emergency Dept  [facility] [place]  Progress Note    Patient Name: Mary Ellen Saini  MRN: [id number]  Patient Class: IP- Inpatient   Admission Date: 1/24/2025  Length of Stay: 1 days  Attending Physician: Juliette Taylor MD  Primary Care Provider: Marlen, Primary Doctor        Subjective     Principal Problem:GIB (gastrointestinal bleeding)        HPI:  Patient is a 58 yo F w/ PMHx of CKD, Colonic amyloidosis, Chronic anemia, HfrEF EF 25-30%, . Presenting for shortness of breath, bright red blood in stool. Recently discharged on 1/17 from [facility] in [place] for NSTEMI, was told to stop Lasix due to diarrhea at that time and was not able to follow-up with a PCP. Patient tried taking Lasix again yesterday due to worsening lower extremity edema. Of note, pt has undergone GI workup during previous hospitalizations for GIB. Endoscopies with biopsy positive for amyloidosis.     In the ED, initial vital signs /95, HR 97, Temp 97.7, SpO2 100% on room air. Labs include CBC with  H/H 6.5/20.5 and WBC within normal limits 11.54. CMP with K 3.2, Na 131 and BUN/Cr 23/1.4 (baseline Cr 1.4). CXR pulmonary congestion. EKG appears unchanged. BNP 2,314 and troponin 0.283.  [facility] consulted for evaluation for admission for GIB and heart failure exacerbation.       Overview/Hospital Course:  No notes on file    Interval History: Trops downtrended. GI consulted, holding off on scope pending improvement to heart failure. 2100cc total output since admissions. Denies any new symptoms since admissions.     Review of Systems   Constitutional:  Negative for chills and fever.   HENT:  Positive for sore throat.    Respiratory:  Positive for wheezing.    Cardiovascular:  Positive for leg swelling. Negative for chest pain and palpitations.   Gastrointestinal:  Negative for abdominal pain, constipation, diarrhea, nausea and vomiting.   Genitourinary:  Negative for difficulty urinating.   Skin:  Negative for  rash.     Objective:     Vital Signs (Most Recent):  Temp: 98.5 °F (36.9 °C) (01/25/25 0330)  Pulse: 100 (01/25/25 0821)  Resp: 20 (01/25/25 0821)  BP: 121/75 (01/25/25 0330)  SpO2: 96 % (01/25/25 0821) Vital Signs (24h Range):  Temp:  [97.7 °F (36.5 °C)-98.6 °F (37 °C)] 98.5 °F (36.9 °C)  Pulse:  [] 100  Resp:  [16-20] 20  SpO2:  [95 %-100 %] 96 %  BP: (117-147)/(74-95) 121/75     Weight: 74.4 kg (164 lb)  Body mass index is 23.53 kg/m².    Intake/Output Summary (Last 24 hours) at 1/25/2025 0825  Last data filed at 1/25/2025 0208  Gross per 24 hour   Intake 594.17 ml   Output 2100 ml   Net -1505.83 ml         Physical Exam  Constitutional:       General: She is not in acute distress.     Appearance: She is normal weight. She is not ill-appearing.   HENT:      Mouth/Throat:      Pharynx: Oropharynx is clear. Posterior oropharyngeal erythema present. No oropharyngeal exudate.   Cardiovascular:      Rate and Rhythm: Normal rate and regular rhythm.      Heart sounds: Normal heart sounds. No murmur heard.     No friction rub.   Pulmonary:      Effort: Pulmonary effort is normal. No respiratory distress.      Breath sounds: No stridor. Wheezing present. No rhonchi.   Abdominal:      General: Abdomen is flat.      Palpations: Abdomen is soft.      Tenderness: There is abdominal tenderness (deep palpation in lower quadrants).   Musculoskeletal:      Right lower leg: Edema present.      Left lower leg: Edema present.   Neurological:      Mental Status: She is alert.             Significant Labs: All pertinent labs within the past 24 hours have been reviewed.  CBC:   Recent Labs   Lab 01/24/25  1153 01/24/25  1913 01/25/25  0415   WBC 11.54  --  12.59   HGB 6.5* 8.6@RCBLD* 7.1*   HCT 20.9* 26.9@RCBLD* 21.9*     --  327     CMP:   Recent Labs   Lab 01/24/25  1153 01/25/25  0415   * 133*   K 3.2* 3.5   CL 99 100   CO2 23 25    103   BUN 23* 24*   CREATININE 1.4 1.4   CALCIUM 7.5* 7.4*   PROT 5.9* 5.8*  "  ALBUMIN 1.9* 1.8*   BILITOT 0.4 0.4   ALKPHOS 80 87   AST 24 29   ALT 20 20   ANIONGAP 9 8       Significant Imaging: I have reviewed all pertinent imaging results/findings within the past 24 hours.    Assessment and Plan     * GIB (gastrointestinal bleeding)  Patient experiencing blood in her bowel movements, sometimes red or brown. No bleeding otherwise. Patient has history of colonic amyloidosis and recurrent GIB Patients most recent Hgb, Hct, platelets, and INR are listed below.  Recent Labs     01/24/25  1153 01/24/25  1913 01/25/25  0415   HGB 6.5* 8.6@RCBLD* 7.1*   HCT 20.9* 26.9@RCBLD* 21.9*     --  327     Pending 1unit pRBCs in ED.    Plan  - Will trend hemoglobin/hematocrit Every 12 hours  - Will monitor and correct any coagulation defects  - Will transfuse if Hgb is <7g/dl (<8g/dl in cases of active ACS) or if patient has rapid bleeding leading to hemodynamic instability  - 2 large bore IVs  - Protonix 40 BID  - Consult [facility], appreciate recs    COPD (chronic obstructive pulmonary disease)  Patient with significant smoking history, presumed COPD. No PFTs seen on file. Patient endorsing dyspnea, wheezing, increased sputum production. Patient uses Spiriva and Albuterol nebulizer daily at home. Has not required oxygen supplementation.  Dyspnea could be multifactorial due to anemia, pulmonary congestion, COPD. Lower suspicion for acute COPD exacerbation at this time so will hold off on steroid and abx. CXR with signs of pulmonary congestion.    - Will hold off on steroid for now due to GIB  - Duonebs nebulizer  - Daily inhaler    Anemia  Anemia is likely due to chronic blood loss. Most recent hemoglobin and hematocrit are listed below.    Plan as under "GIB"    Acute on chronic combined systolic and diastolic congestive heart failure  Patient has Combined Systolic and Diastolic heart failure that is Acute on chronic. On presentation their CHF was decompensated. Evidence of decompensated CHF on " presentation includes: edema, orthopnea, dyspnea on exertion (MONSON), and shortness of breath. The etiology of their decompensation is likely due to discontinuation of lasix upon previous admission . Most recent BNP and echo results are listed below.  Recent Labs     01/24/25  1153   BNP 2,314*       Latest ECHO  Results for orders placed during the hospital encounter of 12/29/24    Echo    Interpretation Summary    Left Ventricle: The left ventricle is mildly dilated. Mildly increased wall thickness. Severe global hypokinesis present. There is severely reduced systolic function. Quantitated ejection fraction is 28%. Grade II diastolic dysfunction.    Right Ventricle: Normal right ventricular cavity size. Right ventricle wall motion has global hypokinesis. Systolic function is mildly reduced.    Left Atrium: Left atrium is severely dilated.    Right Atrium: Right atrium is dilated.    Tricuspid Valve: There is moderate regurgitation.    Pulmonary Artery: There is mild pulmonary hypertension. The estimated pulmonary artery systolic pressure is 47 mmHg.    Current Heart Failure Medications  carvediloL tablet 3.125 mg, 2 times daily with meals, Oral  , 2 times daily with meals, Oral  furosemide injection 80 mg, Daily, Intravenous    Plan  - Monitor strict I&Os and daily weights.    - Place on telemetry  - Low sodium diet  - Place on fluid restriction of 2 L.   - Lasix 80mg IV    Elevated troponin  In ED Troponin 0.283, endorsed chest tightness initially however has resolved. EKG appears unchanged from previous. Suspect Type II NSTEMI due to demand ischemia from anemia.  Repeat troponin 0.273  - Monitor chest pain        VTE Risk Mitigation (From admission, onward)           Ordered     IP VTE HIGH RISK PATIENT  Once         01/24/25 1406     Place sequential compression device  Until discontinued         01/24/25 1406                  Discharge Planning   JENAE:      Code Status: Full Code   Medical Readiness for Discharge  Date:            Ej Peralta MD  Department of Hospital Medicine   [place] - Emergency Dept

## 2025-01-25 NOTE — ASSESSMENT & PLAN NOTE
Patient has Combined Systolic and Diastolic heart failure that is Acute on chronic. On presentation their CHF was decompensated. Evidence of decompensated CHF on presentation includes: edema, orthopnea, dyspnea on exertion (MONSON), and shortness of breath. The etiology of their decompensation is likely due to discontinuation of lasix upon previous admission . Most recent BNP and echo results are listed below.  Recent Labs     01/24/25  1153   BNP 2,314*       Latest ECHO  Results for orders placed during the hospital encounter of 12/29/24    Echo    Interpretation Summary    Left Ventricle: The left ventricle is mildly dilated. Mildly increased wall thickness. Severe global hypokinesis present. There is severely reduced systolic function. Quantitated ejection fraction is 28%. Grade II diastolic dysfunction.    Right Ventricle: Normal right ventricular cavity size. Right ventricle wall motion has global hypokinesis. Systolic function is mildly reduced.    Left Atrium: Left atrium is severely dilated.    Right Atrium: Right atrium is dilated.    Tricuspid Valve: There is moderate regurgitation.    Pulmonary Artery: There is mild pulmonary hypertension. The estimated pulmonary artery systolic pressure is 47 mmHg.    Current Heart Failure Medications  carvediloL tablet 3.125 mg, 2 times daily with meals, Oral  , 2 times daily with meals, Oral  furosemide injection 80 mg, Daily, Intravenous    Plan  - Monitor strict I&Os and daily weights.    - Place on telemetry  - Low sodium diet  - Place on fluid restriction of 2 L.   - Lasix 80mg IV

## 2025-01-25 NOTE — PHARMACY MED REC
"  [facility]           Pharmacy  Admission Medication History     The home medication history was taken by Caitlin Stinson.      Medication history obtained from Medications listed below were obtained from: Patient/family and Medications brought from home    Based on information gathered for medication list, you may go to "Admission" then "Reconcile Home Medications" tabs to review and/or act upon those items.     The home medication list has been updated by the Pharmacy department.   Please read ALL comments highlighted in yellow.   Please address this information as you see fit.    Feel free to contact us if you have any questions or require assistance.      No current facility-administered medications on file prior to encounter.     Current Outpatient Medications on File Prior to Encounter   Medication Sig Dispense Refill    aspirin 81 MG Chew Take 81 mg by mouth once daily.      atorvastatin (LIPITOR) 80 MG tablet Take 80 mg by mouth every evening.      carvediloL (COREG) 3.125 MG tablet Take 3.125 mg by mouth 2 (two) times daily with meals.      lactulose (CHRONULAC) 10 gram/15 mL solution Take 30 mLs (20 g total) by mouth 3 (three) times daily. 2700 mL 1    magnesium 250 mg Tab Take 1 tablet by mouth once daily.      oxybutynin (DITROPAN) 5 MG Tab Take 1 tablet (5 mg total) by mouth 3 (three) times daily. 90 tablet 1    potassium gluconate 595 mg (99 mg) Tab Take 1 tablet by mouth once daily.      tiotropium bromide (SPIRIVA RESPIMAT) 2.5 mcg/actuation inhaler Inhale 2 puffs into the lungs Daily. Controller      albuterol (PROVENTIL/VENTOLIN HFA) 90 mcg/actuation inhaler Inhale 1-2 puffs into the lungs every 6 (six) hours as needed for Wheezing. Rescue 18 g 1    ferrous sulfate 324 mg (65 mg iron) TbEC Take 1 tablet (324 mg total) by mouth once daily. 30 tablet 1    furosemide (LASIX) 40 MG tablet Take 1 tablet (40 mg total) by mouth 2 (two) times daily. 60 tablet 11    " HYDROcodone-acetaminophen (NORCO) 5-325 mg per tablet Take 1 tablet by mouth 2 (two) times daily as needed.      LIDOcaine (LIDODERM) 5 % Place 2 patches onto the skin every evening. Remove & Discard patch within 12 hours or as directed by MD 30 patch 1    magic mouthwash diphen/antac/lidoc/nysta take 10 mLs 4 (four) times daily. 120 mL 0    magnesium oxide (MAG-OX) 400 mg (241.3 mg magnesium) tablet Take 1 tablet (400 mg total) by mouth once daily. 30 tablet 0    metoprolol succinate (TOPROL-XL) 25 MG 24 hr tablet Take 1 tablet (25 mg total) by mouth once daily. 30 tablet 0    naloxone (NARCAN) 4 mg/actuation Spry 4mg by nasal route as needed for opioid overdose; may repeat every 2-3 minutes in alternating nostrils until medical help arrives. Call 911 2 each 11    nicotine (NICODERM CQ) 21 mg/24 hr Place 1 patch onto the skin once daily. 30 patch 0    nystatin (MYCOSTATIN) cream Apply topically 2 (two) times daily. 30 g 0    pantoprazole (PROTONIX) 40 MG tablet Take 1 tablet (40 mg total) by mouth 2 (two) times daily. 60 tablet 1    polyethylene glycol (GLYCOLAX) 17 gram PwPk Take 17 g by mouth 2 (two) times daily as needed for Constipation. 30 packet 0    potassium chloride SA (K-DUR,KLOR-CON) 20 MEQ tablet Take 2 tablets (40 mEq total) by mouth once daily. 60 tablet 0    sucralfate (CARAFATE) 1 gram tablet Take 1 tablet (1 g total) by mouth 3 (three) times daily before meals. 90 tablet 1    tiotropium (SPIRIVA) 18 mcg inhalation capsule Inhale 1 capsule (18 mcg total) into the lungs once daily. Controller 90 capsule 3       Please address this information as you see fit.  Feel free to contact us if you have any questions or require assistance.    Caitlin Stinson  [phone]                .

## 2025-01-25 NOTE — ED NOTES
Pt sitting up in bed, reports 4/10 pain to abdomen. Denies needs at this time. Call light within reach. Pharmacy contacted for nystatin.

## 2025-01-25 NOTE — ASSESSMENT & PLAN NOTE
In ED Troponin 0.283, endorsed chest tightness initially however has resolved. EKG appears unchanged from previous. Suspect Type II NSTEMI due to demand ischemia from anemia.  Repeat troponin 0.273  - Monitor chest pain

## 2025-01-25 NOTE — PLAN OF CARE
Problem: Adult Inpatient Plan of Care  Goal: Plan of Care Review  Outcome: Progressing  Goal: Absence of Hospital-Acquired Illness or Injury  Outcome: Progressing  Goal: Optimal Comfort and Wellbeing  Outcome: Progressing     Problem: Fall Injury Risk  Goal: Absence of Fall and Fall-Related Injury  Outcome: Progressing     Problem: Pain Acute  Goal: Optimal Pain Control and Function  Outcome: Progressing     Problem: Chest Pain  Goal: Resolution of Chest Pain Symptoms  Outcome: Progressing   POC reviewed with pt who verbalized understanding. AAOx4. VSS on RA. Tele monitored. Abd pain treated with PRN meds per MAR. Up with stand by assist to bathroom. Resting between care. Remains free of falls and injury. Call light in reach and rounds made for safety.

## 2025-01-25 NOTE — ED NOTES
"Assumed care of pt. Pt AAOx4, VSS. Pt reports 6/10 abdominal pain that radiates to low back, states "I'm in pain but it's tolerable. I can wait for my next PRN med." Denies other needs and at this time. Call light within reach.   "

## 2025-01-25 NOTE — PLAN OF CARE
SW rounded on pt at bedside. Pt well known to this SW. Pt has been experiencing multiple stressors for the past two years, including housing insecurity and her son getting out of intermediate and not having a place to stay, as well as significant health needs with multiple chronic dx for COPD, GIB and heart issues. SW inquired why pt was transferred out to [place] last admission. She tells me her brother and sister hosted a barbeque at his place when she was had her health issue. During that family event, her sister decided to move back to [place] and help pt and son until they get back on their feet. [facility] in the past. They assisted with a gov phone, calling these family members, etc.     Pt's new address is with her sister and son:  [address] [place] [address], [place]     SW has a RW she uses prn, but given the cramped space for the most part she doesn't need it she tells me. Pt and SW reviewed the importance of going to scheduled appointments. SW reviewed appointments CM Debora had set up for pt and concern for not answering or no showing. We talked about transportation resources in [place] like the [employer] on aging bus she can utlize. She did not know her voice mail was full and we looked at it together.     CM will continue to follow. HH not recommended due to housing set up, no HD noted.        01/25/25 1342   Discharge Assessment   Assessment Type Discharge Planning Assessment   Confirmed/corrected address, phone number and insurance No  (address wrong, DEBORHA updated)   Reason Other (see comment)   Source of Information patient   When was your last doctors appointment?   (pt has had transportation issues)   Reason For Admission gastrointestinal bleeding)   People in Home sibling(s);child(kaley), adult   Do you expect to return to your current living situation? Yes   Do you have help at home or someone to help you manage your care at home? No   Prior  to hospitilization cognitive status: Alert/Oriented   Current cognitive status: Alert/Oriented   Walking or Climbing Stairs Difficulty yes   Walking or Climbing Stairs ambulation difficulty, requires equipment   Dressing/Bathing Difficulty no   Home Layout Able to live on 1st floor   Equipment Currently Used at Home walker, rolling   Readmission within 30 days? Yes   Patient currently being followed by outpatient case management? Unable to determine (comments)   Do you currently have service(s) that help you manage your care at home? No   Do you take prescription medications? Yes   Do you have prescription coverage? Yes   Coverage Medicare A & B   Do you have any problems affording any of your prescribed medications? No   Is the patient taking medications as prescribed? yes   Who is going to help you get home at discharge? pt will need ambulatory van back to her [employer] trailer [facility]   How do you get to doctors appointments? public transportation;family or friend will provide   Are you on dialysis? No   Discharge Plan A Home with family   Discharge Plan B Home   DME Needed Upon Discharge  none   Discharge Plan discussed with: Patient   Transition of Care Barriers None   Physical Activity   On average, how many days per week do you engage in moderate to strenuous exercise (like a brisk walk)? 1 day   On average, how many minutes do you engage in exercise at this level? 10 min   Financial Resource Strain   How hard is it for you to pay for the very basics like food, housing, medical care, and heating? Somewhat   Housing Stability   In the last 12 months, was there a time when you were not able to pay the mortgage or rent on time? Y   At any time in the past 12 months, were you homeless or living in a shelter (including now)? Y   Transportation Needs   Has the lack of transportation kept you from medical appointments, meetings, work or from getting things needed for daily living? Yes, it has kept me from  medical appointments or from getting my medications.   Food Insecurity   Within the past 12 months, you worried that your food would run out before you got the money to buy more. Sometimes   Within the past 12 months, the food you bought just didn't last and you didn't have money to get more. Sometimes   Stress   Do you feel stress - tense, restless, nervous, or anxious, or unable to sleep at night because your mind is troubled all the time - these days? Rather much   Social Isolation   How often do you feel lonely or isolated from those around you?  Rarely   Alcohol Use   Q1: How often do you have a drink containing alcohol? Never   Q2: How many drinks containing alcohol do you have on a typical day when you are drinking? None   Q3: How often do you have six or more drinks on one occasion? Never   Utilities   In the past 12 months has the electric, gas, oil, or water company threatened to shut off services in your home?   (pt contributes to her sister Kirstin's bills now)   Health Literacy   How often do you need to have someone help you when you read instructions, pamphlets, or other written material from your doctor or pharmacy? Rarely   OTHER   Name(s) of People in Home Kirstin sister and son Raymond Harper [facility]  Weekend Social Work  [phone]

## 2025-01-25 NOTE — CONSULTS
"[facility] Consult    CC: Rectal bleeding     HPI 59 y.o. female with a PMH of HFrEF, CKD, and colonic amyloidosis. Currently admitted with heart failure exacerbation. GI consulted for acute on chronic anemia, now requiring transfusion and associated rectal bleeding, which is now improving. She has a history of clean based ulcer on EGD in 11/20204 bleeding colonic amyloid lesions on colonoscopy     Past Medical History  Past Medical History:   Diagnosis Date    Asthma     Bipolar disorder     Hypertension      Physical Examination  /75 (BP Location: Left arm, Patient Position: Lying)   Pulse 97   Temp 98.5 °F (36.9 °C) (Oral)   Resp 18   Ht 5' 10" (1.778 m)   Wt 74.4 kg (164 lb)   SpO2 95%   BMI 23.53 kg/m²   General appearance: alert, cooperative, no distress  Lungs: clear to auscultation bilaterally, no dullness to percussion bilaterally  Heart: regular rate and rhythm without rub; no displacement of the PMI   Abdomen: soft, non-tender; bowel sounds normoactive; no organomegaly      Assessment:   59 y.o. female with a PMH of HFrEF, CKD, and colonic amyloidosis. Currently admitted with heart failure exacerbation. GI consulted for acute on chronic anemia, now requiring transfusion and associated rectal bleeding. Suspect this is due to the amyloid lesions seen on prior colonoscopy.     Plan:  - Discussed repeat endoscopic evaluation with patient and she declined due to concerns of her heart and inability to complete bowel prep. She is open endoscopy if she continues to require blood transfusions or as outpatient.   - Continue BID PPI given history of clean based duodenal ulcer   - Trend Hgb, transfuse as indicated   - GI to follow     Javad Vance MD  [facility]     "

## 2025-01-25 NOTE — PLAN OF CARE
Problem: Adult Inpatient Plan of Care  Goal: Plan of Care Review  Outcome: Progressing  Goal: Patient-Specific Goal (Individualized)  Outcome: Progressing  Goal: Optimal Comfort and Wellbeing  Outcome: Progressing      Faxed with confirmation received.

## 2025-01-25 NOTE — ASSESSMENT & PLAN NOTE
Patient experiencing blood in her bowel movements, sometimes red or brown. No bleeding otherwise. Patient has history of colonic amyloidosis and recurrent GIB Patients most recent Hgb, Hct, platelets, and INR are listed below.  Recent Labs     01/24/25  1153 01/24/25  1913 01/25/25  0415   HGB 6.5* 8.6@RCBLD* 7.1*   HCT 20.9* 26.9@RCBLD* 21.9*     --  327     Pending 1unit pRBCs in ED.    Plan  - Will trend hemoglobin/hematocrit Every 12 hours  - Will monitor and correct any coagulation defects  - Will transfuse if Hgb is <7g/dl (<8g/dl in cases of active ACS) or if patient has rapid bleeding leading to hemodynamic instability  - 2 large bore IVs  - Protonix 40 BID  - Consult [facility], appreciate recs

## 2025-01-26 LAB
ALBUMIN SERPL BCP-MCNC: 1.8 G/DL (ref 3.5–5.2)
ALP SERPL-CCNC: 91 U/L (ref 40–150)
ALT SERPL W/O P-5'-P-CCNC: 14 U/L (ref 10–44)
ANION GAP SERPL CALC-SCNC: 11 MMOL/L (ref 8–16)
AST SERPL-CCNC: 20 U/L (ref 10–40)
BASOPHILS # BLD AUTO: 0.08 K/UL (ref 0–0.2)
BASOPHILS NFR BLD: 0.5 % (ref 0–1.9)
BILIRUB SERPL-MCNC: 0.5 MG/DL (ref 0.1–1)
BUN SERPL-MCNC: 25 MG/DL (ref 6–20)
CALCIUM SERPL-MCNC: 7.6 MG/DL (ref 8.7–10.5)
CHLORIDE SERPL-SCNC: 96 MMOL/L (ref 95–110)
CO2 SERPL-SCNC: 25 MMOL/L (ref 23–29)
CREAT SERPL-MCNC: 1.5 MG/DL (ref 0.5–1.4)
DIFFERENTIAL METHOD BLD: ABNORMAL
EOSINOPHIL # BLD AUTO: 0.3 K/UL (ref 0–0.5)
EOSINOPHIL NFR BLD: 2 % (ref 0–8)
ERYTHROCYTE [DISTWIDTH] IN BLOOD BY AUTOMATED COUNT: 16.8 % (ref 11.5–14.5)
EST. GFR  (NO RACE VARIABLE): 40 ML/MIN/1.73 M^2
GLUCOSE SERPL-MCNC: 94 MG/DL (ref 70–110)
HCT VFR BLD AUTO: 26.7 % (ref 37–48.5)
HCT VFR BLD AUTO: 27 % (ref 37–48.5)
HGB BLD-MCNC: 8.6 G/DL (ref 12–16)
HGB BLD-MCNC: 8.7 G/DL (ref 12–16)
IMM GRANULOCYTES # BLD AUTO: 0.1 K/UL (ref 0–0.04)
IMM GRANULOCYTES NFR BLD AUTO: 0.7 % (ref 0–0.5)
LYMPHOCYTES # BLD AUTO: 2.1 K/UL (ref 1–4.8)
LYMPHOCYTES NFR BLD: 13.3 % (ref 18–48)
MAGNESIUM SERPL-MCNC: 1.5 MG/DL (ref 1.6–2.6)
MCH RBC QN AUTO: 25 PG (ref 27–31)
MCHC RBC AUTO-ENTMCNC: 32.2 G/DL (ref 32–36)
MCV RBC AUTO: 78 FL (ref 82–98)
MONOCYTES # BLD AUTO: 0.9 K/UL (ref 0.3–1)
MONOCYTES NFR BLD: 5.8 % (ref 4–15)
NEUTROPHILS # BLD AUTO: 12 K/UL (ref 1.8–7.7)
NEUTROPHILS NFR BLD: 77.7 % (ref 38–73)
NRBC BLD-RTO: 0 /100 WBC
PHOSPHATE SERPL-MCNC: 4 MG/DL (ref 2.7–4.5)
PLATELET # BLD AUTO: 368 K/UL (ref 150–450)
PMV BLD AUTO: 9.3 FL (ref 9.2–12.9)
POTASSIUM SERPL-SCNC: 3.9 MMOL/L (ref 3.5–5.1)
PROT SERPL-MCNC: 5.7 G/DL (ref 6–8.4)
RBC # BLD AUTO: 3.48 M/UL (ref 4–5.4)
SODIUM SERPL-SCNC: 132 MMOL/L (ref 136–145)
WBC # BLD AUTO: 15.38 K/UL (ref 3.9–12.7)

## 2025-01-26 PROCEDURE — 36415 COLL VENOUS BLD VENIPUNCTURE: CPT

## 2025-01-26 PROCEDURE — 63600175 PHARM REV CODE 636 W HCPCS

## 2025-01-26 PROCEDURE — 94640 AIRWAY INHALATION TREATMENT: CPT

## 2025-01-26 PROCEDURE — 85018 HEMOGLOBIN: CPT

## 2025-01-26 PROCEDURE — 85025 COMPLETE CBC W/AUTO DIFF WBC: CPT

## 2025-01-26 PROCEDURE — 80053 COMPREHEN METABOLIC PANEL: CPT

## 2025-01-26 PROCEDURE — 25000003 PHARM REV CODE 250

## 2025-01-26 PROCEDURE — 99900035 HC TECH TIME PER 15 MIN (STAT)

## 2025-01-26 PROCEDURE — 21400001 HC TELEMETRY ROOM

## 2025-01-26 PROCEDURE — 83735 ASSAY OF MAGNESIUM: CPT

## 2025-01-26 PROCEDURE — 85014 HEMATOCRIT: CPT

## 2025-01-26 PROCEDURE — 25000242 PHARM REV CODE 250 ALT 637 W/ HCPCS

## 2025-01-26 PROCEDURE — 94761 N-INVAS EAR/PLS OXIMETRY MLT: CPT

## 2025-01-26 PROCEDURE — 84100 ASSAY OF PHOSPHORUS: CPT

## 2025-01-26 RX ORDER — METOPROLOL SUCCINATE 25 MG/1
25 TABLET, EXTENDED RELEASE ORAL DAILY
Status: DISCONTINUED | OUTPATIENT
Start: 2025-01-26 | End: 2025-01-27

## 2025-01-26 RX ADMIN — MORPHINE SULFATE 4 MG: 4 INJECTION INTRAVENOUS at 10:01

## 2025-01-26 RX ADMIN — IPRATROPIUM BROMIDE AND ALBUTEROL SULFATE 3 ML: 2.5; .5 SOLUTION RESPIRATORY (INHALATION) at 08:01

## 2025-01-26 RX ADMIN — NYSTATIN 500000 UNITS: 100000 SUSPENSION ORAL at 09:01

## 2025-01-26 RX ADMIN — CARVEDILOL 3.12 MG: 3.12 TABLET, FILM COATED ORAL at 09:01

## 2025-01-26 RX ADMIN — NYSTATIN 500000 UNITS: 100000 SUSPENSION ORAL at 12:01

## 2025-01-26 RX ADMIN — MORPHINE SULFATE 4 MG: 4 INJECTION INTRAVENOUS at 04:01

## 2025-01-26 RX ADMIN — IPRATROPIUM BROMIDE AND ALBUTEROL SULFATE 3 ML: 2.5; .5 SOLUTION RESPIRATORY (INHALATION) at 12:01

## 2025-01-26 RX ADMIN — IPRATROPIUM BROMIDE AND ALBUTEROL SULFATE 3 ML: 2.5; .5 SOLUTION RESPIRATORY (INHALATION) at 07:01

## 2025-01-26 RX ADMIN — NYSTATIN 500000 UNITS: 100000 SUSPENSION ORAL at 04:01

## 2025-01-26 RX ADMIN — MORPHINE SULFATE 4 MG: 4 INJECTION INTRAVENOUS at 08:01

## 2025-01-26 RX ADMIN — FUROSEMIDE 80 MG: 10 INJECTION, SOLUTION INTRAMUSCULAR; INTRAVENOUS at 09:01

## 2025-01-26 RX ADMIN — METOPROLOL SUCCINATE 25 MG: 25 TABLET, EXTENDED RELEASE ORAL at 02:01

## 2025-01-26 RX ADMIN — PANTOPRAZOLE SODIUM 40 MG: 40 INJECTION, POWDER, LYOPHILIZED, FOR SOLUTION INTRAVENOUS at 08:01

## 2025-01-26 RX ADMIN — ATORVASTATIN CALCIUM 80 MG: 40 TABLET, FILM COATED ORAL at 08:01

## 2025-01-26 RX ADMIN — MORPHINE SULFATE 4 MG: 4 INJECTION INTRAVENOUS at 05:01

## 2025-01-26 RX ADMIN — PANTOPRAZOLE SODIUM 40 MG: 40 INJECTION, POWDER, LYOPHILIZED, FOR SOLUTION INTRAVENOUS at 09:01

## 2025-01-26 RX ADMIN — NYSTATIN 500000 UNITS: 100000 SUSPENSION ORAL at 08:01

## 2025-01-26 RX ADMIN — TIOTROPIUM BROMIDE INHALATION SPRAY 2 PUFF: 3.12 SPRAY, METERED RESPIRATORY (INHALATION) at 08:01

## 2025-01-26 RX ADMIN — IPRATROPIUM BROMIDE AND ALBUTEROL SULFATE 3 ML: 2.5; .5 SOLUTION RESPIRATORY (INHALATION) at 03:01

## 2025-01-26 NOTE — PLAN OF CARE
1900 Pt appeared to be in no distress. Reported no pain.     2100 accu ck: none    Tele: SR,  HR  93,  No alarms.     Bed in lowest position, wheels locked, non skid socks, ID band worn, personal items and call bell with in reach, bed alarm set.

## 2025-01-26 NOTE — PLAN OF CARE
Recommendation:  1. Encourage intake at meals as tolerated. 2. Monitor need for supplements.   3. Monitor weight/labs.   4. RD to follow to monitor po intake    Goals:  Pt will tolerate diet with at least 50-75% intake at meals by RD follow up  Nutrition Goal Status: new

## 2025-01-26 NOTE — PROGRESS NOTES
"Job - Telemetry  Adult Nutrition  Progress Note    SUMMARY       Recommendations    Recommendation:  1. Encourage intake at meals as tolerated. 2. Monitor need for supplements.   3. Monitor weight/labs.   4. RD to follow to monitor po intake    Goals:  Pt will tolerate diet with at least 50-75% intake at meals by RD follow up  Nutrition Goal Status: new  Communication of RD Recs: reviewed with RN    Assessment and Plan  Nutrition Problem  Inadequate energy intake    Related to (etiology):   Shortness of breath, GI bleed    Signs and Symptoms (as evidenced by):   Weight loss     Interventions:  Collaboration with other providers    Nutrition Diagnosis Status:   New      Malnutrition Assessment  Weight Loss (Malnutrition):  (9% x 5 months)       Reason for Assessment  Reason For Assessment: identified at risk by screening criteria ([facility])  Diagnosis:  (GI bleed)  General Information Comments: Pt admitted with shortness of breath and blood in stool. Pt on Cardiac diet with 2000ml fluid restriction. Intake not recorded. Alex 22-skin intact. Noted 20lb weight loss x 5 months. Unable to assess NFPE at this time.  Nutrition Discharge Planning: pt to d/c on Cardiac diet    Past Medical History:   Diagnosis Date    Asthma     Bipolar disorder     Hypertension         Nutrition/Diet History  Food Preferences: no Pent[employer]al or cultural food prefs identified  Factors Affecting Nutritional Intake: altered gastrointestinal function    Anthropometrics  Height: 5' 10" (177.8 cm)  Height (inches): 70 in  Height Method: Stated  Weight: 77.7 kg (171 lb 4.8 oz)  Weight (lb): 171.3 lb  Weight Method: Standard Scale  Ideal Body Weight (IBW), Female: 150 lb  % Ideal Body Weight, Female (lb): 114.2 %  BMI (Calculated): 24.6  BMI Grade: 18.5-24.9 - normal  Usual Body Weight (UBW), k.2 kg ()  % Usual Body Weight: 90.33  % Weight Change From Usual Weight: -9.86 %    Lab/Procedures/Meds  Pertinent Labs Reviewed: reviewed  Pertinent " Labs Comments: Na 132L, BUN 25H, Crea 1.5H, Ca 7.6L, Mg 1.5L, Alb 1.8L  Pertinent Medications Reviewed: reviewed  Pertinent Medications Comments: carvedilol, Lasix, pantoprazole, senna    Estimated/Assessed Needs  Weight Used For Calorie Calculations: 77.7 kg (171 lb 4.8 oz)  Energy Calorie Requirements (kcal): 1942 (25 kcal/kg)  Energy Need Method: Kcal/kg  Protein Requirements: 62-77g (0.8-1.0g/kg)  Weight Used For Protein Calculations: 77.7 kg (171 lb 4.8 oz)  Estimated Fluid Requirement Method: RDA Method  RDA Method (mL): 1942     Nutrition Prescription Ordered  Current Diet Order: Cardiac 2000ml fluid    Evaluation of Received Nutrient/Fluid Intake  I/O: 1232/2200  Energy Calories Required: not meeting needs  Protein Required: not meeting needs  Fluid Required: not meeting needs  Comments: LBM 1/25  % Intake of Estimated Energy Needs: Other: intake not recorded  % Meal Intake: Other: intake not recorded    Nutrition Risk  Level of Risk/Frequency of Follow-up:  (2xweekly)     Monitor and Evaluation  Food and Nutrient Intake: food and beverage intake  Food and Nutrient Adminstration: diet order  Physical Activity and Function: nutrition-related ADLs and IADLs  Anthropometric Measurements: weight  Biochemical Data, Medical Tests and Procedures: electrolyte and renal panel  Nutrition-Focused Physical Findings: overall appearance     Nutrition Related Social Determinants of Health: SDOH: Unable to assess at this time.      Nutrition Follow-Up  RD Follow-up?: Yes

## 2025-01-26 NOTE — PROGRESS NOTES
"[facility] Progress    CC: Rectal bleeding     HPI 59 y.o. female with a PMH of HFrEF, CKD, and colonic amyloidosis. Currently admitted with heart failure exacerbation. GI consulted for acute on chronic anemia, now requiring transfusion and associated rectal bleeding, which is now improving. She has a history of clean based ulcer on EGD in 11/20204 bleeding colonic amyloid lesions on colonoscopy     Interval: Reports more SOB this AM. One stool with AM with less blood than prior, now only a small amount streaked on stool. Still with some abdominal pain.    Past Medical History  Past Medical History:   Diagnosis Date    Asthma     Bipolar disorder     Hypertension      Physical Examination  /74 (BP Location: Right arm, Patient Position: Lying)   Pulse 97   Temp 98.6 °F (37 °C) (Oral)   Resp 17   Ht 5' 10" (1.778 m)   Wt 77.7 kg (171 lb 4.8 oz)   SpO2 95%   BMI 24.58 kg/m²   General appearance: alert, cooperative, no distress  Lungs: clear to auscultation bilaterally, no dullness to percussion bilaterally  Heart: regular rate and rhythm without rub; no displacement of the PMI   Abdomen: soft, non-tender; bowel sounds normoactive; no organomegaly    Assessment:   59 y.o. female with a PMH of HFrEF, CKD, and colonic amyloidosis. Currently admitted with heart failure exacerbation. GI consulted for acute on chronic anemia, now requiring transfusion and associated rectal bleeding. Suspect this is due to the amyloid lesions seen on prior colonoscopy.     Plan:  - Discussed repeat endoscopic evaluation with patient and she declined due to concerns of her heart and inability to complete bowel prep. She is open to colonoscopy if she continues to require blood transfusions or as outpatient. Will plan for polypectomy of polyp previously identified on her last exam.   - Trend Hgb, transfuse if indicated    Javad Vance MD  [facility]     "

## 2025-01-27 ENCOUNTER — TELEPHONE (OUTPATIENT)
Dept: PULMONOLOGY | Facility: CLINIC | Age: 60
End: 2025-01-27
Payer: MEDICARE

## 2025-01-27 ENCOUNTER — CLINICAL SUPPORT (OUTPATIENT)
Dept: SMOKING CESSATION | Facility: CLINIC | Age: 60
End: 2025-01-27

## 2025-01-27 DIAGNOSIS — F17.210 CIGARETTE SMOKER: Primary | ICD-10-CM

## 2025-01-27 DIAGNOSIS — R06.02 SOB (SHORTNESS OF BREATH): Primary | ICD-10-CM

## 2025-01-27 PROBLEM — R07.89 OTHER CHEST PAIN: Status: ACTIVE | Noted: 2025-01-27

## 2025-01-27 LAB
ALBUMIN SERPL BCP-MCNC: 1.7 G/DL (ref 3.5–5.2)
ALP SERPL-CCNC: 90 U/L (ref 40–150)
ALT SERPL W/O P-5'-P-CCNC: 14 U/L (ref 10–44)
ANION GAP SERPL CALC-SCNC: 10 MMOL/L (ref 8–16)
ANION GAP SERPL CALC-SCNC: 8 MMOL/L (ref 8–16)
AST SERPL-CCNC: 19 U/L (ref 10–40)
BASOPHILS # BLD AUTO: 0.07 K/UL (ref 0–0.2)
BASOPHILS # BLD AUTO: 0.07 K/UL (ref 0–0.2)
BASOPHILS NFR BLD: 0.4 % (ref 0–1.9)
BASOPHILS NFR BLD: 0.5 % (ref 0–1.9)
BILIRUB SERPL-MCNC: 0.4 MG/DL (ref 0.1–1)
BUN SERPL-MCNC: 29 MG/DL (ref 6–20)
BUN SERPL-MCNC: 32 MG/DL (ref 6–20)
CALCIUM SERPL-MCNC: 7.4 MG/DL (ref 8.7–10.5)
CALCIUM SERPL-MCNC: 7.4 MG/DL (ref 8.7–10.5)
CHLORIDE SERPL-SCNC: 95 MMOL/L (ref 95–110)
CHLORIDE SERPL-SCNC: 96 MMOL/L (ref 95–110)
CO2 SERPL-SCNC: 26 MMOL/L (ref 23–29)
CO2 SERPL-SCNC: 27 MMOL/L (ref 23–29)
CREAT SERPL-MCNC: 1.2 MG/DL (ref 0.5–1.4)
CREAT SERPL-MCNC: 1.4 MG/DL (ref 0.5–1.4)
DIFFERENTIAL METHOD BLD: ABNORMAL
DIFFERENTIAL METHOD BLD: ABNORMAL
EOSINOPHIL # BLD AUTO: 0.2 K/UL (ref 0–0.5)
EOSINOPHIL # BLD AUTO: 0.3 K/UL (ref 0–0.5)
EOSINOPHIL NFR BLD: 1.2 % (ref 0–8)
EOSINOPHIL NFR BLD: 1.7 % (ref 0–8)
ERYTHROCYTE [DISTWIDTH] IN BLOOD BY AUTOMATED COUNT: 17.2 % (ref 11.5–14.5)
ERYTHROCYTE [DISTWIDTH] IN BLOOD BY AUTOMATED COUNT: 17.3 % (ref 11.5–14.5)
EST. GFR  (NO RACE VARIABLE): 44 ML/MIN/1.73 M^2
EST. GFR  (NO RACE VARIABLE): 52 ML/MIN/1.73 M^2
GLUCOSE SERPL-MCNC: 110 MG/DL (ref 70–110)
GLUCOSE SERPL-MCNC: 94 MG/DL (ref 70–110)
HCT VFR BLD AUTO: 26.1 % (ref 37–48.5)
HCT VFR BLD AUTO: 26.6 % (ref 37–48.5)
HGB BLD-MCNC: 8.4 G/DL (ref 12–16)
HGB BLD-MCNC: 8.5 G/DL (ref 12–16)
IMM GRANULOCYTES # BLD AUTO: 0.07 K/UL (ref 0–0.04)
IMM GRANULOCYTES # BLD AUTO: 0.11 K/UL (ref 0–0.04)
IMM GRANULOCYTES NFR BLD AUTO: 0.5 % (ref 0–0.5)
IMM GRANULOCYTES NFR BLD AUTO: 0.7 % (ref 0–0.5)
LYMPHOCYTES # BLD AUTO: 1.7 K/UL (ref 1–4.8)
LYMPHOCYTES # BLD AUTO: 1.9 K/UL (ref 1–4.8)
LYMPHOCYTES NFR BLD: 11.4 % (ref 18–48)
LYMPHOCYTES NFR BLD: 12.2 % (ref 18–48)
MAGNESIUM SERPL-MCNC: 1.3 MG/DL (ref 1.6–2.6)
MAGNESIUM SERPL-MCNC: 1.6 MG/DL (ref 1.6–2.6)
MCH RBC QN AUTO: 24.6 PG (ref 27–31)
MCH RBC QN AUTO: 25.3 PG (ref 27–31)
MCHC RBC AUTO-ENTMCNC: 31.6 G/DL (ref 32–36)
MCHC RBC AUTO-ENTMCNC: 32.6 G/DL (ref 32–36)
MCV RBC AUTO: 78 FL (ref 82–98)
MCV RBC AUTO: 78 FL (ref 82–98)
MONOCYTES # BLD AUTO: 0.7 K/UL (ref 0.3–1)
MONOCYTES # BLD AUTO: 0.8 K/UL (ref 0.3–1)
MONOCYTES NFR BLD: 4.4 % (ref 4–15)
MONOCYTES NFR BLD: 5.1 % (ref 4–15)
NEUTROPHILS # BLD AUTO: 12.4 K/UL (ref 1.8–7.7)
NEUTROPHILS # BLD AUTO: 12.7 K/UL (ref 1.8–7.7)
NEUTROPHILS NFR BLD: 79.9 % (ref 38–73)
NEUTROPHILS NFR BLD: 82 % (ref 38–73)
NRBC BLD-RTO: 0 /100 WBC
NRBC BLD-RTO: 0 /100 WBC
OHS QRS DURATION: 100 MS
OHS QRS DURATION: 104 MS
OHS QTC CALCULATION: 508 MS
OHS QTC CALCULATION: 560 MS
PHOSPHATE SERPL-MCNC: 4.3 MG/DL (ref 2.7–4.5)
PLATELET # BLD AUTO: 365 K/UL (ref 150–450)
PLATELET # BLD AUTO: 388 K/UL (ref 150–450)
PMV BLD AUTO: 8.9 FL (ref 9.2–12.9)
PMV BLD AUTO: 9.4 FL (ref 9.2–12.9)
POCT GLUCOSE: 117 MG/DL (ref 70–110)
POTASSIUM SERPL-SCNC: 3.6 MMOL/L (ref 3.5–5.1)
POTASSIUM SERPL-SCNC: 3.7 MMOL/L (ref 3.5–5.1)
PROT SERPL-MCNC: 5.5 G/DL (ref 6–8.4)
RBC # BLD AUTO: 3.36 M/UL (ref 4–5.4)
RBC # BLD AUTO: 3.41 M/UL (ref 4–5.4)
SODIUM SERPL-SCNC: 131 MMOL/L (ref 136–145)
SODIUM SERPL-SCNC: 131 MMOL/L (ref 136–145)
TROPONIN I SERPL DL<=0.01 NG/ML-MCNC: 0.16 NG/ML (ref 0–0.03)
WBC # BLD AUTO: 15.13 K/UL (ref 3.9–12.7)
WBC # BLD AUTO: 15.94 K/UL (ref 3.9–12.7)

## 2025-01-27 PROCEDURE — 25000242 PHARM REV CODE 250 ALT 637 W/ HCPCS: Performed by: NURSE PRACTITIONER

## 2025-01-27 PROCEDURE — 25000242 PHARM REV CODE 250 ALT 637 W/ HCPCS

## 2025-01-27 PROCEDURE — 80053 COMPREHEN METABOLIC PANEL: CPT

## 2025-01-27 PROCEDURE — 99900035 HC TECH TIME PER 15 MIN (STAT)

## 2025-01-27 PROCEDURE — 27000221 HC OXYGEN, UP TO 24 HOURS

## 2025-01-27 PROCEDURE — 36415 COLL VENOUS BLD VENIPUNCTURE: CPT

## 2025-01-27 PROCEDURE — 94640 AIRWAY INHALATION TREATMENT: CPT

## 2025-01-27 PROCEDURE — 83735 ASSAY OF MAGNESIUM: CPT | Mod: 91

## 2025-01-27 PROCEDURE — 25000003 PHARM REV CODE 250

## 2025-01-27 PROCEDURE — 21400001 HC TELEMETRY ROOM

## 2025-01-27 PROCEDURE — 36415 COLL VENOUS BLD VENIPUNCTURE: CPT | Mod: XB

## 2025-01-27 PROCEDURE — 80048 BASIC METABOLIC PNL TOTAL CA: CPT | Mod: XB

## 2025-01-27 PROCEDURE — 36415 COLL VENOUS BLD VENIPUNCTURE: CPT | Performed by: NURSE PRACTITIONER

## 2025-01-27 PROCEDURE — 84100 ASSAY OF PHOSPHORUS: CPT

## 2025-01-27 PROCEDURE — 63600175 PHARM REV CODE 636 W HCPCS

## 2025-01-27 PROCEDURE — 93005 ELECTROCARDIOGRAM TRACING: CPT

## 2025-01-27 PROCEDURE — 93010 ELECTROCARDIOGRAM REPORT: CPT | Mod: ,,, | Performed by: STUDENT IN AN ORGANIZED HEALTH CARE EDUCATION/TRAINING PROGRAM

## 2025-01-27 PROCEDURE — 84484 ASSAY OF TROPONIN QUANT: CPT | Performed by: NURSE PRACTITIONER

## 2025-01-27 PROCEDURE — 25000003 PHARM REV CODE 250: Performed by: NURSE PRACTITIONER

## 2025-01-27 PROCEDURE — 99223 1ST HOSP IP/OBS HIGH 75: CPT | Mod: ,,, | Performed by: NURSE PRACTITIONER

## 2025-01-27 PROCEDURE — 99406 BEHAV CHNG SMOKING 3-10 MIN: CPT | Mod: ,,,

## 2025-01-27 PROCEDURE — 85025 COMPLETE CBC W/AUTO DIFF WBC: CPT

## 2025-01-27 PROCEDURE — 94761 N-INVAS EAR/PLS OXIMETRY MLT: CPT

## 2025-01-27 PROCEDURE — 85025 COMPLETE CBC W/AUTO DIFF WBC: CPT | Mod: 91 | Performed by: FAMILY MEDICINE

## 2025-01-27 PROCEDURE — 83735 ASSAY OF MAGNESIUM: CPT

## 2025-01-27 RX ORDER — METOPROLOL SUCCINATE 50 MG/1
50 TABLET, EXTENDED RELEASE ORAL DAILY
Status: DISCONTINUED | OUTPATIENT
Start: 2025-01-28 | End: 2025-01-28 | Stop reason: HOSPADM

## 2025-01-27 RX ORDER — METOPROLOL TARTRATE 1 MG/ML
INJECTION, SOLUTION INTRAVENOUS
Status: COMPLETED
Start: 2025-01-27 | End: 2025-01-27

## 2025-01-27 RX ORDER — FERROUS SULFATE 324(65)MG
325 TABLET, DELAYED RELEASE (ENTERIC COATED) ORAL DAILY
Qty: 30 TABLET | Refills: 0 | Status: SHIPPED | OUTPATIENT
Start: 2025-01-27 | End: 2025-02-26

## 2025-01-27 RX ORDER — METOPROLOL SUCCINATE 25 MG/1
25 TABLET, EXTENDED RELEASE ORAL ONCE
Status: COMPLETED | OUTPATIENT
Start: 2025-01-27 | End: 2025-01-27

## 2025-01-27 RX ORDER — MAGNESIUM SULFATE HEPTAHYDRATE 40 MG/ML
2 INJECTION, SOLUTION INTRAVENOUS ONCE
Status: DISCONTINUED | OUTPATIENT
Start: 2025-01-27 | End: 2025-01-27

## 2025-01-27 RX ORDER — MAGNESIUM SULFATE HEPTAHYDRATE 40 MG/ML
2 INJECTION, SOLUTION INTRAVENOUS
Status: COMPLETED | OUTPATIENT
Start: 2025-01-27 | End: 2025-01-27

## 2025-01-27 RX ORDER — NITROGLYCERIN 0.4 MG/1
0.4 TABLET SUBLINGUAL EVERY 5 MIN PRN
Status: DISCONTINUED | OUTPATIENT
Start: 2025-01-27 | End: 2025-01-28 | Stop reason: HOSPADM

## 2025-01-27 RX ORDER — HYDROCODONE BITARTRATE AND ACETAMINOPHEN 5; 325 MG/1; MG/1
1 TABLET ORAL 2 TIMES DAILY PRN
Start: 2025-01-27 | End: 2025-02-03

## 2025-01-27 RX ADMIN — ACETAMINOPHEN 650 MG: 325 TABLET ORAL at 08:01

## 2025-01-27 RX ADMIN — IPRATROPIUM BROMIDE AND ALBUTEROL SULFATE 3 ML: 2.5; .5 SOLUTION RESPIRATORY (INHALATION) at 08:01

## 2025-01-27 RX ADMIN — METOPROLOL SUCCINATE 25 MG: 25 TABLET, EXTENDED RELEASE ORAL at 02:01

## 2025-01-27 RX ADMIN — NITROGLYCERIN 0.4 MG: 0.4 TABLET SUBLINGUAL at 12:01

## 2025-01-27 RX ADMIN — MAGNESIUM SULFATE HEPTAHYDRATE 2 G: 40 INJECTION, SOLUTION INTRAVENOUS at 04:01

## 2025-01-27 RX ADMIN — SENNOSIDES AND DOCUSATE SODIUM 1 TABLET: 8.6; 5 TABLET ORAL at 09:01

## 2025-01-27 RX ADMIN — NYSTATIN 500000 UNITS: 100000 SUSPENSION ORAL at 08:01

## 2025-01-27 RX ADMIN — ATORVASTATIN CALCIUM 80 MG: 40 TABLET, FILM COATED ORAL at 08:01

## 2025-01-27 RX ADMIN — TIOTROPIUM BROMIDE INHALATION SPRAY 2 PUFF: 3.12 SPRAY, METERED RESPIRATORY (INHALATION) at 07:01

## 2025-01-27 RX ADMIN — IPRATROPIUM BROMIDE AND ALBUTEROL SULFATE 3 ML: 2.5; .5 SOLUTION RESPIRATORY (INHALATION) at 03:01

## 2025-01-27 RX ADMIN — MORPHINE SULFATE 4 MG: 4 INJECTION INTRAVENOUS at 01:01

## 2025-01-27 RX ADMIN — PANTOPRAZOLE SODIUM 40 MG: 40 INJECTION, POWDER, LYOPHILIZED, FOR SOLUTION INTRAVENOUS at 08:01

## 2025-01-27 RX ADMIN — IPRATROPIUM BROMIDE AND ALBUTEROL SULFATE 3 ML: 2.5; .5 SOLUTION RESPIRATORY (INHALATION) at 07:01

## 2025-01-27 RX ADMIN — NYSTATIN 500000 UNITS: 100000 SUSPENSION ORAL at 11:01

## 2025-01-27 RX ADMIN — METOPROLOL SUCCINATE 25 MG: 25 TABLET, EXTENDED RELEASE ORAL at 08:01

## 2025-01-27 RX ADMIN — SENNOSIDES AND DOCUSATE SODIUM 1 TABLET: 8.6; 5 TABLET ORAL at 08:01

## 2025-01-27 RX ADMIN — NYSTATIN 500000 UNITS: 100000 SUSPENSION ORAL at 04:01

## 2025-01-27 RX ADMIN — MORPHINE SULFATE 4 MG: 4 INJECTION INTRAVENOUS at 06:01

## 2025-01-27 RX ADMIN — METOPROLOL TARTRATE: 1 INJECTION, SOLUTION INTRAVENOUS at 01:01

## 2025-01-27 NOTE — ASSESSMENT & PLAN NOTE
Latest ECHO  Results for orders placed during the hospital encounter of 12/29/24    Echo    Interpretation Summary    Left Ventricle: The left ventricle is mildly dilated. Mildly increased wall thickness. Severe global hypokinesis present. There is severely reduced systolic function. Quantitated ejection fraction is 28%. Grade II diastolic dysfunction.    Right Ventricle: Normal right ventricular cavity size. Right ventricle wall motion has global hypokinesis. Systolic function is mildly reduced.    Left Atrium: Left atrium is severely dilated.    Right Atrium: Right atrium is dilated.    Tricuspid Valve: There is moderate regurgitation.    Pulmonary Artery: There is mild pulmonary hypertension. The estimated pulmonary artery systolic pressure is 47 mmHg.    Current Heart Failure Medications  Resume home Lasix 40 mg BID  metoprolol succinate (TOPROL-XL) 24 hr tablet 50 mg, Daily, Oral      Plan  - Monitor strict I&Os and daily weights.    - Place on telemetry  - Low sodium diet

## 2025-01-27 NOTE — ASSESSMENT & PLAN NOTE
Patient experiencing blood in her bowel movements, sometimes red or brown. No bleeding otherwise. Patient has history of colonic amyloidosis and recurrent GIB Patients most recent Hgb, Hct, platelets, and INR are listed below.  Recent Labs     01/25/25  0415 01/25/25  1058 01/26/25  0624 01/26/25  1214 01/27/25  0455   HGB 7.1*   < > 8.7* 8.6* 8.4*   HCT 21.9*   < > 27.0* 26.7* 26.6*     --  368  --  388    < > = values in this interval not displayed.     Pending 1unit pRBCs in ED.    Plan  - Will trend hemoglobin/hematocrit Every 12 hours  - Will monitor and correct any coagulation defects  - Will transfuse if Hgb is <7g/dl (<8g/dl in cases of active ACS) or if patient has rapid bleeding leading to hemodynamic instability  - 2 large bore IVs  - Continue Protonix 40 BID  - Consult [facility], appreciate recs  -Pt to have outpatient endoscopy.  Per [facility], she is amenable to have endoscopy if she continues to require blood transfusions  -Ferrous sulfate  supplementation upon discharge

## 2025-01-27 NOTE — ASSESSMENT & PLAN NOTE
Patient with significant smoking history, presumed COPD. No PFTs seen on file. Patient endorsing dyspnea, wheezing, increased sputum production. Patient uses Spiriva and Albuterol nebulizer daily at home. Has not required oxygen supplementation.  Dyspnea could be multifactorial due to anemia, pulmonary congestion, COPD. Lower suspicion for acute COPD exacerbation at this time so will hold off on steroid and abx. CXR with signs of pulmonary congestion.    - Will hold off on steroid for now due to JAQUELINE Briscoe nebulizer  - Daily inhaler  -Will require PFT and pulmonology outpatient

## 2025-01-27 NOTE — NURSING
RAPID RESPONSE NURSE NOTE        Admit Date: 2025  LOS: 3  Code Status: Full Code   Date of Consult: 2025  : 1965  Age: 59 y.o.  Weight:   Wt Readings from Last 1 Encounters:   25 77.7 kg (171 lb 4.8 oz)     Sex: female  Race: White   Bed: [place] [address] A:   MRN: [id number]  Time Rapid Response Team page Received: 1250  Time Rapid Response Team at Bedside: 1252  Time Rapid Response Team left Bedside: 1315  Was the patient discharged from an ICU this admission? No   Was the patient discharged from a PACU within last 24 hours? No   Did the patient receive conscious sedation/general anesthesia in last 24 hours? No   Was the patient in the ED within the past 24 hours? No   Was the patient on NIPPV within the past 24 hours? No   Attending Physician: Karan Pierre III, MD  Primary Service: Family Medicine,Internal Medicine     SITUATION     Notified by overhead page.  Reason for alert: Chest pain, tachycardia      Why is the patient in the hospital?: GIB (gastrointestinal bleeding)    Patient has a past medical history of Asthma, Bipolar disorder, and Hypertension.    Last Vitals:  Temp: 98.3 °F (36.8 °C) ( 1120)  Pulse: 98 ( 1308)  Resp: 18 ( 1120)  BP: 113/61 ( 1307)  SpO2: 98 % ( 1300)    24 Hours Vitals Range:  Temp:  [97.4 °F (36.3 °C)-98.3 °F (36.8 °C)]   Pulse:  []   Resp:  [16-22]   BP: (105-142)/(61-93)   SpO2:  [93 %-99 %]     Labs:  Recent Labs     25  0415 25  1058 25  0624 25  1214 25  0455   WBC 12.59  --  15.38*  --  15.94*   HGB 7.1*   < > 8.7* 8.6* 8.4*   HCT 21.9*   < > 27.0* 26.7* 26.6*     --  368  --  388    < > = values in this interval not displayed.       Recent Labs     255 25  0624 25  0455   * 132* 131*   K 3.5 3.9 3.7    96 95   CO2 25 25 26   BUN 24* 25* 29*   CREATININE 1.4 1.5* 1.4    94 94   PHOS 4.5 4.0 4.3   MG 1.4* 1.5* 1.3*        No results for input(s):  ""PH", "PCO2", "PO2", "HCO3", "POCSATURATED", "BE" in the last 72 hours.     ASSESSMENT/INTERVENTIONS  AAOx4. Patient c/o aching chest pain and SOB    /93  SpO2 96%  T 98.3  BG WDL      L 20g PIV placed  EKG reviewed by MD, valsalva maneuver completed with no success  Nitro SL x2, 2L NC placed  Cards at bedside       RECOMMENDATIONS    We recommend:STAT EKG, trop + CBC + BMP/Mg, IVP metoprolol, pads on pt    Patient converted to SR follow IVP lopressor 5mg. Started on PO Toprol    VSS, denies chest pain. Follow up on labs.     Discussed plan of care with bedside RNHua    PROVIDER ESCALATION    Orders received and case discussed with Dr. Espino and Dr. Barraza .    Disposition: Remain in room 403    FOLLOW UP  Call the Rapid Response NurseSherrie at x [phone] for additional questions or concerns.            "

## 2025-01-27 NOTE — SIGNIFICANT EVENT
Patient started having complaints of chest pain at rest and heart rate increasing to the 150s.  MET was called and primary team and ICU team was at bedside to assess.    EKG showed mild ST changes and SVT. Patient was given 2x of NTG with no relief of chest pain. STAT troponin and CBC were ordered, cardiology was consulted and assessed patient. Patient chest pain likely due to SVT. Vagal maneuvers did not abort SVT and patient was given metoprolol IV 5mg 1x.   Heart rate decreased to the 90s and returned to sinus rhythm on telemetry. Will continue monitoring on telemetry      Nehemiah Hernandez MD  [facility], PGY-2  01/27/2025

## 2025-01-27 NOTE — ASSESSMENT & PLAN NOTE
Patient's hemorrhage is due to gastrointestinal bleed,  Patients most recent Hgb, Hct, platelets, and INR are listed below.  Recent Labs     01/24/25  1153 01/24/25  1913 01/25/25  0415 01/25/25  1058 01/25/25 2022 01/26/25  0624 01/26/25  1214   HGB 6.5*   < > 7.1*   < > 9.2* 8.7* 8.6*   HCT 20.9*   < > 21.9*   < > 28.5* 27.0* 26.7*     --  327  --   --  368  --     < > = values in this interval not displayed.     Plan  - Will trend hemoglobin/hematocrit Daily  - Will monitor and correct any coagulation defects  - Will transfuse if Hgb is <7g/dl (<8g/dl in cases of active ACS) or if patient has rapid bleeding leading to hemodynamic instability

## 2025-01-27 NOTE — SUBJECTIVE & OBJECTIVE
Past Medical History:   Diagnosis Date    Asthma     Bipolar disorder     Hypertension        Past Surgical History:   Procedure Laterality Date    CHOLECYSTECTOMY      COLONOSCOPY N/A 10/22/2024    Procedure: COLONOSCOPY;  Surgeon: Dayday Freed MD;  Location: [facility] ENDO;  Service: Endoscopy;  Laterality: N/A;    COLONOSCOPY N/A 11/11/2024    Procedure: COLONOSCOPY;  Surgeon: Reese Chen MD;  Location: [facility] ENDO;  Service: Endoscopy;  Laterality: N/A;    ESOPHAGOGASTRODUODENOSCOPY N/A 10/22/2024    Procedure: EGD (ESOPHAGOGASTRODUODENOSCOPY);  Surgeon: Dayday Freed MD;  Location: [facility] ENDO;  Service: Endoscopy;  Laterality: N/A;    ESOPHAGOGASTRODUODENOSCOPY N/A 11/11/2024    Procedure: EGD (ESOPHAGOGASTRODUODENOSCOPY);  Surgeon: Reese Chen MD;  [facility];  Service: Endoscopy;  Laterality: N/A;    SHOULDER SURGERY      TUBAL LIGATION         Review of patient's allergies indicates:  No Known Allergies    No current facility-administered medications on file prior to encounter.     Current Outpatient Medications on File Prior to Encounter   Medication Sig    aspirin 81 MG Chew Take 81 mg by mouth once daily.    atorvastatin (LIPITOR) 80 MG tablet Take 80 mg by mouth every evening.    carvediloL (COREG) 3.125 MG tablet Take 3.125 mg by mouth 2 (two) times daily with meals.    lactulose (CHRONULAC) 10 gram/15 mL solution Take 30 mLs (20 g total) by mouth 3 (three) times daily.    magnesium 250 mg Tab Take 1 tablet by mouth once daily.    oxybutynin (DITROPAN) 5 MG Tab Take 1 tablet (5 mg total) by mouth 3 (three) times daily.    potassium gluconate 595 mg (99 mg) Tab Take 1 tablet by mouth once daily.    tiotropium bromide (SPIRIVA RESPIMAT) 2.5 mcg/actuation inhaler Inhale 2 puffs into the lungs Daily. Controller    albuterol (PROVENTIL/VENTOLIN HFA) 90 mcg/actuation inhaler Inhale 1-2 puffs into the lungs every 6 (six) hours as needed for Wheezing. Rescue    furosemide  (LASIX) 40 MG tablet Take 1 tablet (40 mg total) by mouth 2 (two) times daily.    LIDOcaine (LIDODERM) 5 % Place 2 patches onto the skin every evening. Remove & Discard patch within 12 hours or as directed by MD    magic mouthwash diphen/antac/lidoc/nysta take 10 mLs 4 (four) times daily.    magnesium oxide (MAG-OX) 400 mg (241.3 mg magnesium) tablet Take 1 tablet (400 mg total) by mouth once daily.    metoprolol succinate (TOPROL-XL) 25 MG 24 hr tablet Take 1 tablet (25 mg total) by mouth once daily.    naloxone (NARCAN) 4 mg/actuation Spry 4mg by nasal route as needed for opioid overdose; may repeat every 2-3 minutes in alternating nostrils until medical help arrives. Call 911    nicotine (NICODERM CQ) 21 mg/24 hr Place 1 patch onto the skin once daily.    nystatin (MYCOSTATIN) cream Apply topically 2 (two) times daily.    pantoprazole (PROTONIX) 40 MG tablet Take 1 tablet (40 mg total) by mouth 2 (two) times daily.    polyethylene glycol (GLYCOLAX) 17 gram PwPk Take 17 g by mouth 2 (two) times daily as needed for Constipation.    potassium chloride SA (K-DUR,KLOR-CON) 20 MEQ tablet Take 2 tablets (40 mEq total) by mouth once daily.    sucralfate (CARAFATE) 1 gram tablet Take 1 tablet (1 g total) by mouth 3 (three) times daily before meals.    tiotropium (SPIRIVA) 18 mcg inhalation capsule Inhale 1 capsule (18 mcg total) into the lungs once daily. Controller    [DISCONTINUED] ferrous sulfate 324 mg (65 mg iron) TbEC Take 1 tablet (324 mg total) by mouth once daily.    [DISCONTINUED] HYDROcodone-acetaminophen (NORCO) 5-325 mg per tablet Take 1 tablet by mouth 2 (two) times daily as needed.     Family History    None       Tobacco Use    Smoking status: Every Day     Current packs/day: 1.00     Average packs/day: 2.0 packs/day for 58.1 years (115.2 ttl pk-yrs)     Types: Cigarettes     Start date: 1967    Smokeless tobacco: Never    Tobacco comments:     Pt is a 2 pk/day cigarette smoker x 57 yrs. She states that  she cut down to about 1 pk/day, and is trying to quit.  Ambulatory referral to Smoking Cessation clinic following hospital discharge.     Substance and Sexual Activity    Alcohol use: Yes     Comment: socailly    Drug use: Yes     Types: Methamphetamines, Marijuana     Comment: denies cocaine or meth. Reports maijurana use    Sexual activity: Yes     Review of Systems   Constitutional: Negative for diaphoresis.   HENT: Negative.     Cardiovascular:  Positive for chest pain, leg swelling and palpitations. Negative for near-syncope, orthopnea, paroxysmal nocturnal dyspnea and syncope.   Respiratory:  Negative for shortness of breath.    Hematologic/Lymphatic: Positive for bleeding problem.   Musculoskeletal:  Positive for back pain and myalgias.   Gastrointestinal:  Positive for abdominal pain, hematochezia and melena. Negative for nausea and vomiting.   Genitourinary: Negative.    Psychiatric/Behavioral:  Positive for substance abuse.      Objective:     Vital Signs (Most Recent):  Temp: 98.3 °F (36.8 °C) (01/27/25 1120)  Pulse: 98 (01/27/25 1308)  Resp: 18 (01/27/25 1120)  BP: 113/61 (01/27/25 1307)  SpO2: 98 % (01/27/25 1300) Vital Signs (24h Range):  Temp:  [97.4 °F (36.3 °C)-98.3 °F (36.8 °C)] 98.3 °F (36.8 °C)  Pulse:  [] 98  Resp:  [16-22] 18  SpO2:  [93 %-99 %] 98 %  BP: (105-142)/(61-93) 113/61     Weight: 77.7 kg (171 lb 4.8 oz)  Body mass index is 24.58 kg/m².    SpO2: 98 %         Intake/Output Summary (Last 24 hours) at 1/27/2025 1330  Last data filed at 1/26/2025 2038  Gross per 24 hour   Intake 95 ml   Output 1500 ml   Net -1405 ml       Lines/Drains/Airways       Peripheral Intravenous Line  Duration                  Peripheral IV - Single Lumen 01/24/25 1425 20 G 1 in Right Antecubital 2 days                     Physical Exam  Constitutional:       General: She is not in acute distress.     Appearance: She is not diaphoretic.   HENT:      Head: Atraumatic.   Eyes:      General:         Right  "eye: No discharge.         Left eye: No discharge.   Cardiovascular:      Rate and Rhythm: Normal rate and regular rhythm.   Abdominal:      General: Bowel sounds are normal.      Palpations: Abdomen is soft.      Tenderness: There is abdominal tenderness.   Skin:     General: Skin is warm and dry.   Neurological:      Mental Status: She is alert.          Significant Labs: BMP:   Recent Labs   Lab 01/26/25  0624 01/27/25  0455   GLU 94 94   * 131*   K 3.9 3.7   CL 96 95   CO2 25 26   BUN 25* 29*   CREATININE 1.5* 1.4   CALCIUM 7.6* 7.4*   MG 1.5* 1.3*   , CMP   Recent Labs   Lab 01/26/25  0624 01/27/25  0455   * 131*   K 3.9 3.7   CL 96 95   CO2 25 26   GLU 94 94   BUN 25* 29*   CREATININE 1.5* 1.4   CALCIUM 7.6* 7.4*   PROT 5.7* 5.5*   ALBUMIN 1.8* 1.7*   BILITOT 0.5 0.4   ALKPHOS 91 90   AST 20 19   ALT 14 14   ANIONGAP 11 10   , CBC   Recent Labs   Lab 01/26/25  0624 01/26/25  1214 01/27/25  0455   WBC 15.38*  --  15.94*   HGB 8.7* 8.6* 8.4*   HCT 27.0* 26.7* 26.6*     --  388   , INR No results for input(s): "INR", "PROTIME" in the last 48 hours., Lipid Panel No results for input(s): "CHOL", "HDL", "LDLCALC", "TRIG", "CHOLHDL" in the last 48 hours., Troponin No results for input(s): "TROPONINIHS" in the last 48 hours., and All pertinent lab results from the last 24 hours have been reviewed.    Significant Imaging: Echocardiogram: Transthoracic echo (TTE) complete (Cupid Only):   Results for orders placed or performed during the hospital encounter of 12/29/24   Echo   Result Value Ref Range    LVOT stroke volume 72.7 cm3    LVIDd 5.7 3.5 - 6.0 cm    LV Systolic Volume 103.72 mL    LV Systolic Volume Index 54.6 mL/m2    LVIDs 4.7 (A) 2.1 - 4.0 cm    LV Diastolic Volume 157.20 mL    LV Diastolic Volume Index 82.74 mL/m2    IVS 0.9 0.6 - 1.1 cm    LVOT diameter 2.3 cm    LVOT area 4.2 cm2    FS 17.5 (A) 28 - 44 %    Left Ventricle Relative Wall Thickness 0.35 cm    PW 1.0 0.6 - 1.1 cm    LV mass " 211.7 g    LV Mass Index 111.4 g/m2    MV Peak E Sukhdeep 1.03 m/s    TDI LATERAL 0.08 m/s    TDI SEPTAL 0.04 m/s    E/E' ratio 17.17 m/s    MV Peak A Sukhdeep 0.70 m/s    TR Max Sukhdeep 3.30 m/s    E/A ratio 1.47     IVRT 93.24 msec    E wave deceleration time 117.79 msec    LV SEPTAL E/E' RATIO 25.75 m/s    LV LATERAL E/E' RATIO 12.88 m/s    LVOT peak sukhdeep 1.1 m/s    RV S' 12.04 cm/s    TAPSE 1.49 cm    LA Vol (MOD) 111.23 mL    ANTOINETTE (MOD) 58.5 mL/m2    AV mean gradient 4.5 mmHg    AV peak gradient 6.8 mmHg    Ao peak sukhdeep 1.3 m/s    Ao VTI 21.9 cm    LVOT peak VTI 17.5 cm    AV valve area 3.3 cm²    AV Velocity Ratio 0.85     AV index (prosthetic) 0.80     BHARAT by Velocity Ratio 3.5 cm²    MV stenosis pressure 1/2 time 34.16 ms    MV valve area p 1/2 method 6.44 cm2    Triscuspid Valve Regurgitation Peak Gradient 44 mmHg    STJ 2.84 cm    Mean e' 0.06 m/s    ZLVIDS 2.83     ZLVIDD 0.67     BSA 1.89 m2    Jenkins's Biplane MOD Ejection Fraction 28 %    A2C EF 26 %    A4C EF 30 %    LV ESV A2C 108.38 mL    LV ESV A4C 104.66 mL    LV EDV A2C 141.847095371570801 mL    LV EDV A4C 152.27 mL    Left Ventricular End Systolic Volume by Teichholz Method 103.72 mL    Left Ventricular End Diastolic Volume by Teichholz Method 157.20 mL    Left Ventricular Outflow Tract Mean Velocity 0.78 cm/s    Left Ventricular Outflow Tract Mean Gradient 2.75 mmHg    RV- diaz basal diam 3.8 cm    RV/LV Ratio 0.67 cm    AV regurgitation pressure 1/2 time 1,532.005693703712943 ms    AR Max Sukhdeep 3.44 m/s    Sinus 3.72 cm    LA area A4C 29.43 cm2    LA area A2C 30.82 cm2    TV resting pulmonary artery pressure 47 mmHg    RV TB RVSP 6 mmHg    Est. RA pres 3 mmHg    LV EDV index A4C 80.0 ml/m2    Narrative      Left Ventricle: The left ventricle is mildly dilated. Mildly increased   wall thickness. Severe global hypokinesis present. There is severely   reduced systolic function. Quantitated ejection fraction is 28%. Grade II   diastolic dysfunction.    Right  Ventricle: Normal right ventricular cavity size. Right ventricle   wall motion has global hypokinesis. Systolic function is mildly reduced.    Left Atrium: Left atrium is severely dilated.    Right Atrium: Right atrium is dilated.    Tricuspid Valve: There is moderate regurgitation.    Pulmonary Artery: There is mild pulmonary hypertension. The estimated   pulmonary artery systolic pressure is 47 mmHg.

## 2025-01-27 NOTE — PLAN OF CARE
CM spoke with pt prior to d/c - she states she has transportation to home   No dme, no HH ordered   f/u Hepatology apt requested per Radha   Future Appointments   Date Time Provider Department Center   2/4/2025  3:00 PM [facility]   2/6/2025 11:00 AM [facility]es[facility]   2/10/2025 10:30 AM Octavio Linares MD [facility]MS[facility]   2/10/2025  1:05 PM PULMONARY FUNCTION [facility] PULMLAB [facility]   2/10/2025  2:40 PM SIX, MINUTE WALK [facility] PUL WLK [facility]     Discharging nurse to review all d/c meds/instructions.        01/27/25 1153   Final Note   Assessment Type Final Discharge Note   Anticipated Discharge Disposition Home   What phone number can be called within the next 1-3 days to see how you are doing after discharge? [phone]   Hospital Resources/Appts/Education Provided Appointments scheduled and added to AVS   Post-Acute Status   Discharge Delays None known at this time

## 2025-01-27 NOTE — ASSESSMENT & PLAN NOTE
Patient with history of SVT  Upon attempt to DC today, patient went into SVT with a rate in the 140s-150s which corrected with IV Lopressor  Toprol XL up titrated to 50 mg daily

## 2025-01-27 NOTE — ASSESSMENT & PLAN NOTE
Patient experiencing blood in her bowel movements, sometimes red or brown. No bleeding otherwise. Patient has history of colonic amyloidosis and recurrent GIB Patients most recent Hgb, Hct, platelets, and INR are listed below.  Recent Labs     01/24/25  1153 01/24/25  1913 01/25/25  0415 01/25/25  1058 01/25/25 2022 01/26/25  0624 01/26/25  1214   HGB 6.5*   < > 7.1*   < > 9.2* 8.7* 8.6*   HCT 20.9*   < > 21.9*   < > 28.5* 27.0* 26.7*     --  327  --   --  368  --     < > = values in this interval not displayed.     Pending 1unit pRBCs in ED.    Plan  - Will trend hemoglobin/hematocrit Every 12 hours  - Will monitor and correct any coagulation defects  - Will transfuse if Hgb is <7g/dl (<8g/dl in cases of active ACS) or if patient has rapid bleeding leading to hemodynamic instability  - 2 large bore IVs  - Protonix 40 BID  - Consult [facility], appreciate recs

## 2025-01-27 NOTE — HPI
60 yo female with PMH of HCV, HFrEF (30-35%) supposed to have a LifeVest, CAD, AFib, COPD, medication nonadherence, SVT, current methamphetamine use, homelessness ( now living with sister in [facility]). Patient presented to the ED with shortness of breath, bright red blood in stool. Recently discharged on 1/17 from [facility] in [place] for NSTEMI, was told to stop Lasix due to diarrhea at that time and was not able to follow-up with a PCP. Patient tried taking Lasix again PTA due to worsening lower extremity edema. Of note, pt has undergone GI workup during previous hospitalizations for GIB. Endoscopies with biopsy positive for amyloidosis.   On admission - CBC with  H/H 6.5/20.5 . BUN/Cr 23/1.4 (baseline Cr 1.4). CXR pulmonary congestion. BNP 2,314 and troponin 0.283. Patient was being prepared for DC today when she developed palpitations and CP. HR 140s-150s without any LORENZA on EKG. HR corrected to the 90s with IV Lopressor. BB up titrated. Troponin pending.   Patient has not undergone an ischemic evaluation 2/2 to ongoing anemia requiring blood transfusions. She is unlikely to tolerate DAPT.

## 2025-01-27 NOTE — SUBJECTIVE & OBJECTIVE
Interval History: patient denies any bloody bowel movements this morning. No acute events overnight. VSS this morning. Diuresing well with 2x unmeasured urine and 2L output yesterday night.     Review of Systems   Constitutional:  Negative for chills and fever.   Respiratory:  Negative for wheezing.    Cardiovascular:  Positive for leg swelling. Negative for chest pain and palpitations.   Gastrointestinal:  Negative for abdominal pain, constipation, diarrhea, nausea and vomiting.   Genitourinary:  Negative for difficulty urinating.   Skin:  Negative for rash.     Objective:     Vital Signs (Most Recent):  Temp: 97.7 °F (36.5 °C) (01/26/25 1618)  Pulse: 81 (01/26/25 1919)  Resp: 16 (01/26/25 1919)  BP: 126/79 (01/26/25 1618)  SpO2: 96 % (01/26/25 1919) Vital Signs (24h Range):  Temp:  [97.2 °F (36.2 °C)-98.6 °F (37 °C)] 97.7 °F (36.5 °C)  Pulse:  [] 81  Resp:  [16-22] 16  SpO2:  [94 %-98 %] 96 %  BP: (111-133)/(74-85) 126/79     Weight: 77.7 kg (171 lb 4.8 oz)  Body mass index is 24.58 kg/m².    Intake/Output Summary (Last 24 hours) at 1/26/2025 1950  Last data filed at 1/26/2025 1835  Gross per 24 hour   Intake --   Output 1500 ml   Net -1500 ml         Physical Exam  Constitutional:       General: She is not in acute distress.     Appearance: She is normal weight. She is not ill-appearing.   HENT:      Mouth/Throat:      Pharynx: Oropharynx is clear. No oropharyngeal exudate.   Cardiovascular:      Rate and Rhythm: Normal rate and regular rhythm.      Heart sounds: Normal heart sounds. No murmur heard.     No friction rub.   Pulmonary:      Effort: Pulmonary effort is normal. No respiratory distress.      Breath sounds: No stridor. Wheezing present. No rhonchi.   Abdominal:      General: Abdomen is flat.      Palpations: Abdomen is soft.      Tenderness: There is abdominal tenderness (deep palpation in lower quadrants).   Musculoskeletal:      Right lower leg: Edema present.      Left lower leg: Edema  present.   Neurological:      Mental Status: She is alert.             Significant Labs: All pertinent labs within the past 24 hours have been reviewed.  CBC:   Recent Labs   Lab 01/25/25  0415 01/25/25  1058 01/25/25 2022 01/26/25  0624 01/26/25  1214   WBC 12.59  --   --  15.38*  --    HGB 7.1*   < > 9.2* 8.7* 8.6*   HCT 21.9*   < > 28.5* 27.0* 26.7*     --   --  368  --     < > = values in this interval not displayed.     CMP:   Recent Labs   Lab 01/25/25  0415 01/26/25  0624   * 132*   K 3.5 3.9    96   CO2 25 25    94   BUN 24* 25*   CREATININE 1.4 1.5*   CALCIUM 7.4* 7.6*   PROT 5.8* 5.7*   ALBUMIN 1.8* 1.8*   BILITOT 0.4 0.5   ALKPHOS 87 91   AST 29 20   ALT 20 14   ANIONGAP 8 11       Significant Imaging: I have reviewed all pertinent imaging results/findings within the past 24 hours.

## 2025-01-27 NOTE — SUBJECTIVE & OBJECTIVE
Interval History: Pt states that she is ready to go home.      Review of Systems  Negative except as stated in HPI  Objective:     Vital Signs (Most Recent):  Temp: 98.3 °F (36.8 °C) (01/27/25 1120)  Pulse: 98 (01/27/25 1120)  Resp: 18 (01/27/25 1120)  BP: 109/69 (01/27/25 1120)  SpO2: 95 % (01/27/25 1120) Vital Signs (24h Range):  Temp:  [97.4 °F (36.3 °C)-98.3 °F (36.8 °C)] 98.3 °F (36.8 °C)  Pulse:  [81-98] 98  Resp:  [16-22] 18  SpO2:  [93 %-99 %] 95 %  BP: (105-129)/(64-84) 109/69     Weight: 77.7 kg (171 lb 4.8 oz)  Body mass index is 24.58 kg/m².    Intake/Output Summary (Last 24 hours) at 1/27/2025 1256  Last data filed at 1/26/2025 2038  Gross per 24 hour   Intake 95 ml   Output 1500 ml   Net -1405 ml         Physical Exam  Constitutional:       Appearance: She is normal weight.   HENT:      Head: Normocephalic and atraumatic.      Nose: Nose normal.      Mouth/Throat:      Mouth: Mucous membranes are moist.   Eyes:      Pupils: Pupils are equal, round, and reactive to light.   Cardiovascular:      Rate and Rhythm: Normal rate.      Heart sounds: No murmur heard.     No friction rub. No gallop.   Pulmonary:      Effort: Pulmonary effort is normal.   Abdominal:      General: Bowel sounds are normal. There is no distension.      Palpations: Abdomen is soft.      Tenderness: There is abdominal tenderness (LLQ). There is no right CVA tenderness, left CVA tenderness, guarding or rebound.   Musculoskeletal:      Right lower leg: No edema.      Left lower leg: No edema.   Skin:     Capillary Refill: Capillary refill takes less than 2 seconds.   Neurological:      General: No focal deficit present.      Mental Status: She is alert and oriented to person, place, and time.   Psychiatric:         Mood and Affect: Mood normal.             Significant Labs: All pertinent labs within the past 24 hours have been reviewed.    Significant Imaging: I have reviewed all pertinent imaging results/findings within the past 24  hours.

## 2025-01-27 NOTE — ASSESSMENT & PLAN NOTE
Creatine stable for now. BMP reviewed- noted Estimated Creatinine Clearance: 44.2 mL/min (A) (based on SCr of 1.5 mg/dL (H)). according to latest data. Based on current GFR.  Monitor UOP and serial BMP and adjust therapy as needed. Renally dose meds. Avoid nephrotoxic medications and procedures.

## 2025-01-27 NOTE — PROGRESS NOTES
YOVANY prepared by JOSE. Secure chat sent to [employer] also.       01/27/25 1126   Nurse Notification   Charge Nurse Notified? Yes   Name of Charge Nurse helen ANDRES   Bedside Nurse Notified? Yes   Name of Bedside Nurse Hua andres   Nurse Notfication Method Secure Chat   Nurse Notified Of Orders    Notification    Notified? Yes   Name of  reena LIANG    Notification Method Secure Chat    Notified Of Discharge Status

## 2025-01-27 NOTE — ASSESSMENT & PLAN NOTE
Patient has Combined Systolic and Diastolic heart failure that is Acute on chronic. On presentation their CHF was decompensated. Evidence of decompensated CHF on presentation includes: edema, orthopnea, dyspnea on exertion (MONSON), and shortness of breath. The etiology of their decompensation is likely due to discontinuation of lasix upon previous admission . Most recent BNP and echo results are listed below.  Recent Labs     01/24/25  1153   BNP 2,314*       Latest ECHO  Results for orders placed during the hospital encounter of 12/29/24    Echo    Interpretation Summary    Left Ventricle: The left ventricle is mildly dilated. Mildly increased wall thickness. Severe global hypokinesis present. There is severely reduced systolic function. Quantitated ejection fraction is 28%. Grade II diastolic dysfunction.    Right Ventricle: Normal right ventricular cavity size. Right ventricle wall motion has global hypokinesis. Systolic function is mildly reduced.    Left Atrium: Left atrium is severely dilated.    Right Atrium: Right atrium is dilated.    Tricuspid Valve: There is moderate regurgitation.    Pulmonary Artery: There is mild pulmonary hypertension. The estimated pulmonary artery systolic pressure is 47 mmHg.    Current Heart Failure Medications  , 2 times daily with meals, Oral  furosemide injection 80 mg, Daily, Intravenous  metoprolol succinate (TOPROL-XL) 24 hr tablet 25 mg, Daily, Oral    Plan  - Monitor strict I&Os and daily weights.    - Place on telemetry  - Low sodium diet  - Place on fluid restriction of 2 L.   - Lasix 80mg IV

## 2025-01-27 NOTE — PROGRESS NOTES
[facility] Medicine  Progress Note    Patient Name: Mary Ellen Saini  MRN: [id number]  Patient Class: IP- Inpatient   Admission Date: 1/24/2025  Length of Stay: 3 days  Attending Physician: Dr. Pierre  Primary Care Provider: No, Primary Doctor        Subjective     Principal Problem:GIB (gastrointestinal bleeding)        HPI:  Patient is a 58 yo F w/ PMHx of CKD, Colonic amyloidosis, Chronic anemia, HfrEF EF 25-30%, . Presenting for shortness of breath, bright red blood in stool. Recently discharged on 1/17 from [facility] in [place] for NSTEMI, was told to stop Lasix due to diarrhea at that time and was not able to follow-up with a PCP. Patient tried taking Lasix again yesterday due to worsening lower extremity edema. Of note, pt has undergone GI workup during previous hospitalizations for GIB. Endoscopies with biopsy positive for amyloidosis.     In the ED, initial vital signs /95, HR 97, Temp 97.7, SpO2 100% on room air. Labs include CBC with  H/H 6.5/20.5 and WBC within normal limits 11.54. CMP with K 3.2, Na 131 and BUN/Cr 23/1.4 (baseline Cr 1.4). CXR pulmonary congestion. EKG appears unchanged. BNP 2,314 and troponin 0.283.  [facility] consulted for evaluation for admission for GIB and heart failure exacerbation.       Overview/Hospital Course:  No notes on file    Interval History: Pt states that she is ready to go home.      Review of Systems  Negative except as stated in HPI  Objective:     Vital Signs (Most Recent):  Temp: 98.3 °F (36.8 °C) (01/27/25 1120)  Pulse: 98 (01/27/25 1120)  Resp: 18 (01/27/25 1120)  BP: 109/69 (01/27/25 1120)  SpO2: 95 % (01/27/25 1120) Vital Signs (24h Range):  Temp:  [97.4 °F (36.3 °C)-98.3 °F (36.8 °C)] 98.3 °F (36.8 °C)  Pulse:  [81-98] 98  Resp:  [16-22] 18  SpO2:  [93 %-99 %] 95 %  BP: (105-129)/(64-84) 109/69     Weight: 77.7 kg (171 lb 4.8 oz)  Body mass index is 24.58 kg/m².    Intake/Output Summary (Last 24 hours) at 1/27/2025  1256  Last data filed at 1/26/2025 2038  Gross per 24 hour   Intake 95 ml   Output 1500 ml   Net -1405 ml         Physical Exam  Constitutional:       Appearance: She is normal weight.   HENT:      Head: Normocephalic and atraumatic.      Nose: Nose normal.      Mouth/Throat:      Mouth: Mucous membranes are moist.   Eyes:      Pupils: Pupils are equal, round, and reactive to light.   Cardiovascular:      Rate and Rhythm: Normal rate.      Heart sounds: No murmur heard.     No friction rub. No gallop.   Pulmonary:      Effort: Pulmonary effort is normal.   Abdominal:      General: Bowel sounds are normal. There is no distension.      Palpations: Abdomen is soft.      Tenderness: There is abdominal tenderness (LLQ). There is no right CVA tenderness, left CVA tenderness, guarding or rebound.   Musculoskeletal:      Right lower leg: No edema.      Left lower leg: No edema.   Skin:     Capillary Refill: Capillary refill takes less than 2 seconds.   Neurological:      General: No focal deficit present.      Mental Status: She is alert and oriented to person, place, and time.   Psychiatric:         Mood and Affect: Mood normal.             Significant Labs: All pertinent labs within the past 24 hours have been reviewed.    Significant Imaging: I have reviewed all pertinent imaging results/findings within the past 24 hours.    Assessment and Plan     * GIB (gastrointestinal bleeding)  Patient experiencing blood in her bowel movements, sometimes red or brown. No bleeding otherwise. Patient has history of colonic amyloidosis and recurrent GIB Patients most recent Hgb, Hct, platelets, and INR are listed below.  Recent Labs     01/25/25  0415 01/25/25  1058 01/26/25  0624 01/26/25  1214 01/27/25  0455   HGB 7.1*   < > 8.7* 8.6* 8.4*   HCT 21.9*   < > 27.0* 26.7* 26.6*     --  368  --  388    < > = values in this interval not displayed.     Pending 1unit pRBCs in ED.    Plan  - Will trend hemoglobin/hematocrit Every 12  hours  - Will monitor and correct any coagulation defects  - Will transfuse if Hgb is <7g/dl (<8g/dl in cases of active ACS) or if patient has rapid bleeding leading to hemodynamic instability  - 2 large bore IVs  - Continue Protonix 40 BID  - Consult [facility], appreciate recs  -Pt to have outpatient endoscopy.  Per [facility], she is amenable to have endoscopy if she continues to require blood transfusions  -Ferrous sulfate  supplementation upon discharge     Rectal bleeding  Patient's hemorrhage is due to gastrointestinal bleed,  Patients most recent Hgb, Hct, platelets, and INR are listed below.  Recent Labs     01/24/25  1153 01/24/25  1913 01/25/25  0415 01/25/25  1058 01/25/25  2022 01/26/25  0624 01/26/25  1214   HGB 6.5*   < > 7.1*   < > 9.2* 8.7* 8.6*   HCT 20.9*   < > 21.9*   < > 28.5* 27.0* 26.7*     --  327  --   --  368  --     < > = values in this interval not displayed.     Plan  - Will trend hemoglobin/hematocrit Daily  - Will monitor and correct any coagulation defects  - Will transfuse if Hgb is <7g/dl (<8g/dl in cases of active ACS) or if patient has rapid bleeding leading to hemodynamic instability    Chronic kidney disease (CKD)  Creatine stable for now. BMP reviewed- noted Estimated Creatinine Clearance: 44.2 mL/min (A) (based on SCr of 1.5 mg/dL (H)). according to latest data. Based on current GFR.  Monitor UOP and serial BMP and adjust therapy as needed. Renally dose meds. Avoid nephrotoxic medications and procedures.    Hepatitis C    Plan:  Will CTM    COPD (chronic obstructive pulmonary disease)  Patient with significant smoking history, presumed COPD. No PFTs seen on file. Patient endorsing dyspnea, wheezing, increased sputum production. Patient uses Spiriva and Albuterol nebulizer daily at home. Has not required oxygen supplementation.  Dyspnea could be multifactorial due to anemia, pulmonary congestion, COPD. Lower suspicion for acute COPD exacerbation at this time so will hold off on  "steroid and abx. CXR with signs of pulmonary congestion.    - Will hold off on steroid for now due to GIB  - Duonebs nebulizer  - Daily inhaler  -Will require PFT and pulmonology outpatient     Symptomatic anemia  Anemia is likely due to chronic blood loss. Most recent hemoglobin and hematocrit are listed below.    Plan as under "GIB"      Acute on chronic combined systolic and diastolic congestive heart failure  Patient has Combined Systolic and Diastolic heart failure that is Acute on chronic. On presentation their CHF was decompensated. Evidence of decompensated CHF on presentation includes: edema, orthopnea, dyspnea on exertion (MONSON), and shortness of breath. The etiology of their decompensation is likely due to discontinuation of lasix upon previous admission . Most recent BNP and echo results are listed below.  Recent Labs     01/24/25  1153   BNP 2,314*       Latest ECHO  Results for orders placed during the hospital encounter of 12/29/24    Echo    Interpretation Summary    Left Ventricle: The left ventricle is mildly dilated. Mildly increased wall thickness. Severe global hypokinesis present. There is severely reduced systolic function. Quantitated ejection fraction is 28%. Grade II diastolic dysfunction.    Right Ventricle: Normal right ventricular cavity size. Right ventricle wall motion has global hypokinesis. Systolic function is mildly reduced.    Left Atrium: Left atrium is severely dilated.    Right Atrium: Right atrium is dilated.    Tricuspid Valve: There is moderate regurgitation.    Pulmonary Artery: There is mild pulmonary hypertension. The estimated pulmonary artery systolic pressure is 47 mmHg.    Current Heart Failure Medications  , 2 times daily with meals, Oral  furosemide injection 80 mg, Daily, Intravenous  metoprolol succinate (TOPROL-XL) 24 hr tablet 25 mg, Daily, Oral    Plan  - Monitor strict I&Os and daily weights.    - Place on telemetry  - Low sodium diet  - Place on fluid " restriction of 2 L.   - Lasix 80mg IV    Elevated troponin  In ED Troponin 0.283, endorsed chest tightness initially however has resolved. EKG appears unchanged from previous. Suspect Type II NSTEMI due to demand ischemia from anemia.  Repeat troponin 0.273  - Monitor chest pain      VTE Risk Mitigation (From admission, onward)           Ordered     IP VTE HIGH RISK PATIENT  Once         01/24/25 1406     Place sequential compression device  Until discontinued         01/24/25 1406                    Discharge Planning   JENAE: 1/27/2025     Code Status: Full Code   Medical Readiness for Discharge Date: 1/27/2025  Discharge Plan A: Home with family   Discharge Delays: None known at this time                    Berenice Alberto MD  Department of Hospital Medicine   [facility]

## 2025-01-27 NOTE — PLAN OF CARE
1900 Pt appeared to be in no distress. Reported 6/10  pain to abdomin.     2030 administered 4 mg morphine IVP, pt satisfied.     2100 accu ck: none    Tele: SR,  HR 87,  No alarms.     Bed in lowest position, wheels locked, non skid socks, ID band worn, personal items and call bell with in reach, bed alarm set.

## 2025-01-27 NOTE — PROGRESS NOTES
Smoking cessation education note: Pt is a 2 pk/day cigarette smoker x 57 yrs. She is reporting nicotine withdrawal symptoms but does not wish to use patch because she expects to be discharged from the hospital today. Pt previously had referral to Ambulatory Smoking Cessation clinic, but did not attend scheculed appointment. Handout provided. Strongly urged pt to consider a quit attempt and to contact clinic for additional resources.

## 2025-01-27 NOTE — NURSING
Upon entering the room to give AVS and remove IV access, [facility] reports left arm pain, denies SOB, Stat EKG ordered, VS taken    1250- MET called at this time.

## 2025-01-27 NOTE — SIGNIFICANT EVENT
"Response Team - Patient Flow Sheet     Date: 2025  Time: 1:00 pm  Name: Mary Ellen Saini  : 1965  MRN: [id number]  PCP: No, Primary Doctor  Allergies: Review of patient's allergies indicates:  No Known Allergies  Past Medical History:    Past Medical History:   Diagnosis Date    Asthma     Bipolar disorder     Hypertension        Reason for response team call: Chest pain      Vitals:    25 1308   BP:    Pulse: 98   Resp:    Temp:        60 yo male with PMH of HFrEF, anemia, colonic amyloidosis, CKD who initially presented for shortness of breath and bright red blood per rectum. She recently was treated for NSTEMI at [facility] and discharged on 25. Her H/H on admission was 6.5/20.5. CXR had pulmonary vascular congestion and BNP elevated to 2300. She received 2 units of PRBCs. She declined colonoscopy during her stay due to her heart failure.     MET was called 25 at ~1 pm for development of chest pain and shortness of breath after she had gotten up to go to the bathroom. She received sublingual nitroglycerin. EKG showed SVT with HR in 140s-150 and ST depressions in II, V5, and V6. She reports having a history of "fast heart rates" in the past. Vagal maneuvers including carotid massage and blowing into a syringe did not decrease her HR. 5 mg of lopressor was ordered and her HR decreased to the 90s. Telemetry appeared to be NSR. Likely her ST depressions were precipitated by her going into SVT. Chest pain has now significantly improved.       EKG performed: Yes  Initial EKG with SVT and ST depressions in II, V5 and V6. Previous EKGs showed NSR and did have some very mild ST segment changes.       Plan:  - Repeat EKG  - Trend troponin  - Replete magnesium, was 1.3 this morning and patient declined magnesium this morning  - Check CBC, troponin, BMP, Mg; replete electrolytes as necessary for K>4 and Mg>2  - Monitor for further episodes of chest pain  - Cardiology aware   - Patient is stable to remain " on the floor with telemetry     Please reach out with any further concerns.    Mary Jo Espino, DO  [facility] Fellow

## 2025-01-27 NOTE — PROGRESS NOTES
MET called over head; cued into room via [employer].  See MET flowsheet; all info documented received per VN observation and designated MET speaker, Susan ANDRES RN at bedside.   01/27/25 1324   Significant Events This Shift   Significant Events Rapid Response Team call   Virtual Nurse - Description of Significant Event SVT with chest pain after getting up to go to bathroom   Safety/Activity   Patient Rounds visualized patient   Positioning   Body Position supine   Head of Bed (HOB) Positioning HOB at 30 degrees

## 2025-01-27 NOTE — CONSULTS
[facility] - Telemetry  Cardiology  Consult Note    Patient Name: Mary Ellen Saini  MRN: [id number]  Admission Date: 1/24/2025  Hospital Length of Stay: 3 days  Code Status: Full Code   Attending Provider: Karan Pierre III, MD   Consulting Provider: William Flores NP  Primary Care Physician: Marlen, Primary Doctor  Principal Problem:GIB (gastrointestinal bleeding)    Patient information was obtained from patient, past medical records, and ER records.     [facility]  Consult performed by: William Flores NP  Consult ordered by: Nehemiah Hernandez MD        Subjective:     Chief Complaint:  Chest Pain      HPI:   58 yo female with PMH of HCV, HFrEF (30-35%) supposed to have a LifeVest, CAD, AFib, COPD, medication nonadherence, SVT, current methamphetamine use, homelessness ( now living with sister in [facility]). Patient presented to the ED with shortness of breath, bright red blood in stool. Recently discharged on 1/17 from [facility] in [place] for NSTEMI, was told to stop Lasix due to diarrhea at that time and was not able to follow-up with a PCP. Patient tried taking Lasix again PTA due to worsening lower extremity edema. Of note, pt has undergone GI workup during previous hospitalizations for GIB. Endoscopies with biopsy positive for amyloidosis.   On admission - CBC with  H/H 6.5/20.5 . BUN/Cr 23/1.4 (baseline Cr 1.4). CXR pulmonary congestion. BNP 2,314 and troponin 0.283. Patient was being prepared for DC today when she developed palpitations and CP. HR 140s-150s without any LORENZA on EKG. HR corrected to the 90s with IV Lopressor. BB up titrated. Troponin pending.   Patient has not undergone an ischemic evaluation 2/2 to ongoing anemia requiring blood transfusions. She is unlikely to tolerate DAPT.     Past Medical History:   Diagnosis Date    Asthma     Bipolar disorder     Hypertension        Past Surgical History:   Procedure Laterality Date    CHOLECYSTECTOMY      COLONOSCOPY  N/A 10/22/2024    Procedure: COLONOSCOPY;  Surgeon: Dayday Freed MD;  Location: [facility] ENDO;  Service: Endoscopy;  Laterality: N/A;    COLONOSCOPY N/A 11/11/2024    Procedure: COLONOSCOPY;  Surgeon: Reese Chen MD;  Location: [facility] ENDO;  Service: Endoscopy;  Laterality: N/A;    ESOPHAGOGASTRODUODENOSCOPY N/A 10/22/2024    Procedure: EGD (ESOPHAGOGASTRODUODENOSCOPY);  Surgeon: Dayday Freed MD;  Location: [facility] ENDO;  Service: Endoscopy;  Laterality: N/A;    ESOPHAGOGASTRODUODENOSCOPY N/A 11/11/2024    Procedure: EGD (ESOPHAGOGASTRODUODENOSCOPY);  Surgeon: Reese Chen MD;  Location: [facility] ENDO;  Service: Endoscopy;  Laterality: N/A;    SHOULDER SURGERY      TUBAL LIGATION         Review of patient's allergies indicates:  No Known Allergies    No current facility-administered medications on file prior to encounter.     Current Outpatient Medications on File Prior to Encounter   Medication Sig    aspirin 81 MG Chew Take 81 mg by mouth once daily.    atorvastatin (LIPITOR) 80 MG tablet Take 80 mg by mouth every evening.    carvediloL (COREG) 3.125 MG tablet Take 3.125 mg by mouth 2 (two) times daily with meals.    lactulose (CHRONULAC) 10 gram/15 mL solution Take 30 mLs (20 g total) by mouth 3 (three) times daily.    magnesium 250 mg Tab Take 1 tablet by mouth once daily.    oxybutynin (DITROPAN) 5 MG Tab Take 1 tablet (5 mg total) by mouth 3 (three) times daily.    potassium gluconate 595 mg (99 mg) Tab Take 1 tablet by mouth once daily.    tiotropium bromide (SPIRIVA RESPIMAT) 2.5 mcg/actuation inhaler Inhale 2 puffs into the lungs Daily. Controller    albuterol (PROVENTIL/VENTOLIN HFA) 90 mcg/actuation inhaler Inhale 1-2 puffs into the lungs every 6 (six) hours as needed for Wheezing. Rescue    furosemide (LASIX) 40 MG tablet Take 1 tablet (40 mg total) by mouth 2 (two) times daily.    LIDOcaine (LIDODERM) 5 % Place 2 patches onto the skin every evening. Remove & Discard patch within  12 hours or as directed by MD    magic mouthwash diphen/antac/lidoc/nysta take 10 mLs 4 (four) times daily.    magnesium oxide (MAG-OX) 400 mg (241.3 mg magnesium) tablet Take 1 tablet (400 mg total) by mouth once daily.    metoprolol succinate (TOPROL-XL) 25 MG 24 hr tablet Take 1 tablet (25 mg total) by mouth once daily.    naloxone (NARCAN) 4 mg/actuation Spry 4mg by nasal route as needed for opioid overdose; may repeat every 2-3 minutes in alternating nostrils until medical help arrives. Call 911    nicotine (NICODERM CQ) 21 mg/24 hr Place 1 patch onto the skin once daily.    nystatin (MYCOSTATIN) cream Apply topically 2 (two) times daily.    pantoprazole (PROTONIX) 40 MG tablet Take 1 tablet (40 mg total) by mouth 2 (two) times daily.    polyethylene glycol (GLYCOLAX) 17 gram PwPk Take 17 g by mouth 2 (two) times daily as needed for Constipation.    potassium chloride SA (K-DUR,KLOR-CON) 20 MEQ tablet Take 2 tablets (40 mEq total) by mouth once daily.    sucralfate (CARAFATE) 1 gram tablet Take 1 tablet (1 g total) by mouth 3 (three) times daily before meals.    tiotropium (SPIRIVA) 18 mcg inhalation capsule Inhale 1 capsule (18 mcg total) into the lungs once daily. Controller    [DISCONTINUED] ferrous sulfate 324 mg (65 mg iron) TbEC Take 1 tablet (324 mg total) by mouth once daily.    [DISCONTINUED] HYDROcodone-acetaminophen (NORCO) 5-325 mg per tablet Take 1 tablet by mouth 2 (two) times daily as needed.     Family History    None       Tobacco Use    Smoking status: Every Day     Current packs/day: 1.00     Average packs/day: 2.0 packs/day for 58.1 years (115.2 ttl pk-yrs)     Types: Cigarettes     Start date: 1967    Smokeless tobacco: Never    Tobacco comments:     Pt is a 2 pk/day cigarette smoker x 57 yrs. She states that she cut down to about 1 pk/day, and is trying to quit.  Ambulatory referral to Smoking Cessation clinic following hospital discharge.     Substance and Sexual Activity    Alcohol use:  Yes     Comment: socailly    Drug use: Yes     Types: Methamphetamines, Marijuana     Comment: denies cocaine or meth. Reports maijurana use    Sexual activity: Yes     Review of Systems   Constitutional: Negative for diaphoresis.   HENT: Negative.     Cardiovascular:  Positive for chest pain, leg swelling and palpitations. Negative for near-syncope, orthopnea, paroxysmal nocturnal dyspnea and syncope.   Respiratory:  Negative for shortness of breath.    Hematologic/Lymphatic: Positive for bleeding problem.   Musculoskeletal:  Positive for back pain and myalgias.   Gastrointestinal:  Positive for abdominal pain, hematochezia and melena. Negative for nausea and vomiting.   Genitourinary: Negative.    Psychiatric/Behavioral:  Positive for substance abuse.      Objective:     Vital Signs (Most Recent):  Temp: 98.3 °F (36.8 °C) (01/27/25 1120)  Pulse: 98 (01/27/25 1308)  Resp: 18 (01/27/25 1120)  BP: 113/61 (01/27/25 1307)  SpO2: 98 % (01/27/25 1300) Vital Signs (24h Range):  Temp:  [97.4 °F (36.3 °C)-98.3 °F (36.8 °C)] 98.3 °F (36.8 °C)  Pulse:  [] 98  Resp:  [16-22] 18  SpO2:  [93 %-99 %] 98 %  BP: (105-142)/(61-93) 113/61     Weight: 77.7 kg (171 lb 4.8 oz)  Body mass index is 24.58 kg/m².    SpO2: 98 %         Intake/Output Summary (Last 24 hours) at 1/27/2025 1330  Last data filed at 1/26/2025 2038  Gross per 24 hour   Intake 95 ml   Output 1500 ml   Net -1405 ml       Lines/Drains/Airways       Peripheral Intravenous Line  Duration                  Peripheral IV - Single Lumen 01/24/25 1425 20 G 1 in Right Antecubital 2 days                     Physical Exam  Constitutional:       General: She is not in acute distress.     Appearance: She is not diaphoretic.   HENT:      Head: Atraumatic.   Eyes:      General:         Right eye: No discharge.         Left eye: No discharge.   Cardiovascular:      Rate and Rhythm: Normal rate and regular rhythm.   Abdominal:      General: Bowel sounds are normal.       "Palpations: Abdomen is soft.      Tenderness: There is abdominal tenderness.   Skin:     General: Skin is warm and dry.   Neurological:      Mental Status: She is alert.          Significant Labs: BMP:   Recent Labs   Lab 01/26/25  0624 01/27/25  0455   GLU 94 94   * 131*   K 3.9 3.7   CL 96 95   CO2 25 26   BUN 25* 29*   CREATININE 1.5* 1.4   CALCIUM 7.6* 7.4*   MG 1.5* 1.3*   , CMP   Recent Labs   Lab 01/26/25  0624 01/27/25  0455   * 131*   K 3.9 3.7   CL 96 95   CO2 25 26   GLU 94 94   BUN 25* 29*   CREATININE 1.5* 1.4   CALCIUM 7.6* 7.4*   PROT 5.7* 5.5*   ALBUMIN 1.8* 1.7*   BILITOT 0.5 0.4   ALKPHOS 91 90   AST 20 19   ALT 14 14   ANIONGAP 11 10   , CBC   Recent Labs   Lab 01/26/25  0624 01/26/25  1214 01/27/25  0455   WBC 15.38*  --  15.94*   HGB 8.7* 8.6* 8.4*   HCT 27.0* 26.7* 26.6*     --  388   , INR No results for input(s): "INR", "PROTIME" in the last 48 hours., Lipid Panel No results for input(s): "CHOL", "HDL", "LDLCALC", "TRIG", "CHOLHDL" in the last 48 hours., Troponin No results for input(s): "TROPONINIHS" in the last 48 hours., and All pertinent lab results from the last 24 hours have been reviewed.    Significant Imaging: Echocardiogram: Transthoracic echo (TTE) complete (Cupid Only):   Results for orders placed or performed during the hospital encounter of 12/29/24   Echo   Result Value Ref Range    LVOT stroke volume 72.7 cm3    LVIDd 5.7 3.5 - 6.0 cm    LV Systolic Volume 103.72 mL    LV Systolic Volume Index 54.6 mL/m2    LVIDs 4.7 (A) 2.1 - 4.0 cm    LV Diastolic Volume 157.20 mL    LV Diastolic Volume Index 82.74 mL/m2    IVS 0.9 0.6 - 1.1 cm    LVOT diameter 2.3 cm    LVOT area 4.2 cm2    FS 17.5 (A) 28 - 44 %    Left Ventricle Relative Wall Thickness 0.35 cm    PW 1.0 0.6 - 1.1 cm    LV mass 211.7 g    LV Mass Index 111.4 g/m2    MV Peak E Sukhdeep 1.03 m/s    TDI LATERAL 0.08 m/s    TDI SEPTAL 0.04 m/s    E/E' ratio 17.17 m/s    MV Peak A Sukhdeep 0.70 m/s    TR Max Sukhdeep 3.30 " m/s    E/A ratio 1.47     IVRT 93.24 msec    E wave deceleration time 117.79 msec    LV SEPTAL E/E' RATIO 25.75 m/s    LV LATERAL E/E' RATIO 12.88 m/s    LVOT peak jesusita 1.1 m/s    RV S' 12.04 cm/s    TAPSE 1.49 cm    LA Vol (MOD) 111.23 mL    ANTOINETTE (MOD) 58.5 mL/m2    AV mean gradient 4.5 mmHg    AV peak gradient 6.8 mmHg    Ao peak jesusita 1.3 m/s    Ao VTI 21.9 cm    LVOT peak VTI 17.5 cm    AV valve area 3.3 cm²    AV Velocity Ratio 0.85     AV index (prosthetic) 0.80     BHARAT by Velocity Ratio 3.5 cm²    MV stenosis pressure 1/2 time 34.16 ms    MV valve area p 1/2 method 6.44 cm2    Triscuspid Valve Regurgitation Peak Gradient 44 mmHg    STJ 2.84 cm    Mean e' 0.06 m/s    ZLVIDS 2.83     ZLVIDD 0.67     BSA 1.89 m2    Jenkins's Biplane MOD Ejection Fraction 28 %    A2C EF 26 %    A4C EF 30 %    LV ESV A2C 108.38 mL    LV ESV A4C 104.66 mL    LV EDV A2C 141.594823743314988 mL    LV EDV A4C 152.27 mL    Left Ventricular End Systolic Volume by Teichholz Method 103.72 mL    Left Ventricular End Diastolic Volume by Teichholz Method 157.20 mL    Left Ventricular Outflow Tract Mean Velocity 0.78 cm/s    Left Ventricular Outflow Tract Mean Gradient 2.75 mmHg    RV- diaz basal diam 3.8 cm    RV/LV Ratio 0.67 cm    AV regurgitation pressure 1/2 time 1,532.083329405742739 ms    AR Max Jesuista 3.44 m/s    Sinus 3.72 cm    LA area A4C 29.43 cm2    LA area A2C 30.82 cm2    TV resting pulmonary artery pressure 47 mmHg    RV TB RVSP 6 mmHg    Est. RA pres 3 mmHg    LV EDV index A4C 80.0 ml/m2    Narrative      Left Ventricle: The left ventricle is mildly dilated. Mildly increased   wall thickness. Severe global hypokinesis present. There is severely   reduced systolic function. Quantitated ejection fraction is 28%. Grade II   diastolic dysfunction.    Right Ventricle: Normal right ventricular cavity size. Right ventricle   wall motion has global hypokinesis. Systolic function is mildly reduced.    Left Atrium: Left atrium is severely  dilated.    Right Atrium: Right atrium is dilated.    Tricuspid Valve: There is moderate regurgitation.    Pulmonary Artery: There is mild pulmonary hypertension. The estimated   pulmonary artery systolic pressure is 47 mmHg.       Assessment and Plan:     Other chest pain  CP felt to be multifactorial given anemia, SVT  No LORENZA on EKG  She is unable to tolerate DAPT given frequent GIBs, evaluated by GI- amyloid noted. HGB 6.5 on admission  Up titrated BB  Continue statin  Not a cath candidate at this time given the above  Cessation of methamphetamines advised  Follow up with cardiology as OP    Troponin in progress, 0.2 on admission    SVT (supraventricular tachycardia)  Patient with history of SVT  Upon attempt to DC today, patient went into SVT with a rate in the 140s-150s which corrected with IV Lopressor  Toprol XL up titrated to 50 mg daily     Tobacco dependency  Cessation advised  NRT PRN    Acute on chronic combined systolic and diastolic congestive heart failure    Latest ECHO  Results for orders placed during the hospital encounter of 12/29/24    Echo    Interpretation Summary    Left Ventricle: The left ventricle is mildly dilated. Mildly increased wall thickness. Severe global hypokinesis present. There is severely reduced systolic function. Quantitated ejection fraction is 28%. Grade II diastolic dysfunction.    Right Ventricle: Normal right ventricular cavity size. Right ventricle wall motion has global hypokinesis. Systolic function is mildly reduced.    Left Atrium: Left atrium is severely dilated.    Right Atrium: Right atrium is dilated.    Tricuspid Valve: There is moderate regurgitation.    Pulmonary Artery: There is mild pulmonary hypertension. The estimated pulmonary artery systolic pressure is 47 mmHg.    Current Heart Failure Medications  Resume home Lasix 40 mg BID  metoprolol succinate (TOPROL-XL) 24 hr tablet 50 mg, Daily, Oral      Plan  - Monitor strict I&Os and daily weights.    - Place  on telemetry  - Low sodium diet            VTE Risk Mitigation (From admission, onward)           Ordered     IP VTE HIGH RISK PATIENT  Once         01/24/25 1406     Place sequential compression device  Until discontinued         01/24/25 1406                    Thank you for your consult. I will follow-up with patient. Please contact us if you have any additional questions.    William Flores NP  Cardiology   [place] - Telemetry

## 2025-01-27 NOTE — TELEPHONE ENCOUNTER
Message was sent to Ms Leonardo. Patient is scheduled on 2/10/25 at 10:30 am with Dr. Linares. Patient is added to wait list.

## 2025-01-27 NOTE — ASSESSMENT & PLAN NOTE
CP felt to be multifactorial given anemia, SVT  No LORENZA on EKG  She is unable to tolerate DAPT given frequent GIBs, evaluated by GI- amyloid noted. HGB 6.5 on admission  Up titrated BB  Continue statin  Not a cath candidate at this time given the above  Cessation of methamphetamines advised  Follow up with cardiology as OP    Troponin in progress, 0.2 on admission

## 2025-01-27 NOTE — PROGRESS NOTES
Future Appointments   Date Time Provider Department Center   2/4/2025  3:00 PM Nehemiah Dempsey MD [facility] ENZO [facility]   2/6/2025 11:00 AM Phuc Montes PA-C [facility] RADHA [facility]   2/10/2025 10:30 AM Octavio Linares MD [facility] PULMS[facility]   2/10/2025  1:05 PM PULMONARY FUNCTION [facility] PULMLAB [facility]   2/10/2025  2:40 PM SIX, MINUTE WALK [facility] PUL WLK [facility]

## 2025-01-27 NOTE — PROGRESS NOTES
Family Medicine  Progress Note    Patient Name: Mary Ellen Saini  MRN: [id number]  Patient Class: IP- Inpatient   Admission Date: 1/24/2025  Length of Stay: 2 days  Attending Physician: Juliette Taylor MD  Primary Care Provider: Marlen, Primary Doctor        Subjective     Principal Problem:GIB (gastrointestinal bleeding)        HPI:  Patient is a 58 yo F w/ PMHx of CKD, Colonic amyloidosis, Chronic anemia, HfrEF EF 25-30%, . Presenting for shortness of breath, bright red blood in stool. Recently discharged on 1/17 from [facility] in [place] for NSTEMI, was told to stop Lasix due to diarrhea at that time and was not able to follow-up with a PCP. Patient tried taking Lasix again yesterday due to worsening lower extremity edema. Of note, pt has undergone GI workup during previous hospitalizations for GIB. Endoscopies with biopsy positive for amyloidosis.     In the ED, initial vital signs /95, HR 97, Temp 97.7, SpO2 100% on room air. Labs include CBC with  H/H 6.5/20.5 and WBC within normal limits 11.54. CMP with K 3.2, Na 131 and BUN/Cr 23/1.4 (baseline Cr 1.4). CXR pulmonary congestion. EKG appears unchanged. BNP 2,314 and troponin 0.283.  [facility] consulted for evaluation for admission for GIB and heart failure exacerbation.       Overview/Hospital Course:  No notes on file    Interval History: patient denies any bloody bowel movements this morning. No acute events overnight. VSS this morning. Diuresing well with 2x unmeasured urine and 2L output yesterday night.     Review of Systems   Constitutional:  Negative for chills and fever.   Respiratory:  Negative for wheezing.    Cardiovascular:  Positive for leg swelling. Negative for chest pain and palpitations.   Gastrointestinal:  Negative for abdominal pain, constipation, diarrhea, nausea and vomiting.   Genitourinary:  Negative for difficulty urinating.   Skin:  Negative for rash.     Objective:     Vital Signs (Most Recent):  Temp: 97.7  °F (36.5 °C) (01/26/25 1618)  Pulse: 81 (01/26/25 1919)  Resp: 16 (01/26/25 1919)  BP: 126/79 (01/26/25 1618)  SpO2: 96 % (01/26/25 1919) Vital Signs (24h Range):  Temp:  [97.2 °F (36.2 °C)-98.6 °F (37 °C)] 97.7 °F (36.5 °C)  Pulse:  [] 81  Resp:  [16-22] 16  SpO2:  [94 %-98 %] 96 %  BP: (111-133)/(74-85) 126/79     Weight: 77.7 kg (171 lb 4.8 oz)  Body mass index is 24.58 kg/m².    Intake/Output Summary (Last 24 hours) at 1/26/2025 1950  Last data filed at 1/26/2025 1835  Gross per 24 hour   Intake --   Output 1500 ml   Net -1500 ml         Physical Exam  Constitutional:       General: She is not in acute distress.     Appearance: She is normal weight. She is not ill-appearing.   HENT:      Mouth/Throat:      Pharynx: Oropharynx is clear. No oropharyngeal exudate.   Cardiovascular:      Rate and Rhythm: Normal rate and regular rhythm.      Heart sounds: Normal heart sounds. No murmur heard.     No friction rub.   Pulmonary:      Effort: Pulmonary effort is normal. No respiratory distress.      Breath sounds: No stridor. Wheezing present. No rhonchi.   Abdominal:      General: Abdomen is flat.      Palpations: Abdomen is soft.      Tenderness: There is abdominal tenderness (deep palpation in lower quadrants).   Musculoskeletal:      Right lower leg: Edema present.      Left lower leg: Edema present.   Neurological:      Mental Status: She is alert.             Significant Labs: All pertinent labs within the past 24 hours have been reviewed.  CBC:   Recent Labs   Lab 01/25/25  0415 01/25/25  1058 01/25/25  2022 01/26/25  0624 01/26/25  1214   WBC 12.59  --   --  15.38*  --    HGB 7.1*   < > 9.2* 8.7* 8.6*   HCT 21.9*   < > 28.5* 27.0* 26.7*     --   --  368  --     < > = values in this interval not displayed.     CMP:   Recent Labs   Lab 01/25/25  0415 01/26/25  0624   * 132*   K 3.5 3.9    96   CO2 25 25    94   BUN 24* 25*   CREATININE 1.4 1.5*   CALCIUM 7.4* 7.6*   PROT 5.8* 5.7*    ALBUMIN 1.8* 1.8*   BILITOT 0.4 0.5   ALKPHOS 87 91   AST 29 20   ALT 20 14   ANIONGAP 8 11       Significant Imaging: I have reviewed all pertinent imaging results/findings within the past 24 hours.    Assessment and Plan     * GIB (gastrointestinal bleeding)  Patient experiencing blood in her bowel movements, sometimes red or brown. No bleeding otherwise. Patient has history of colonic amyloidosis and recurrent GIB Patients most recent Hgb, Hct, platelets, and INR are listed below.  Recent Labs     01/24/25  1153 01/24/25  1913 01/25/25  0415 01/25/25  1058 01/25/25 2022 01/26/25  0624 01/26/25  1214   HGB 6.5*   < > 7.1*   < > 9.2* 8.7* 8.6*   HCT 20.9*   < > 21.9*   < > 28.5* 27.0* 26.7*     --  327  --   --  368  --     < > = values in this interval not displayed.     Pending 1unit pRBCs in ED.    Plan  - Will trend hemoglobin/hematocrit Every 12 hours  - Will monitor and correct any coagulation defects  - Will transfuse if Hgb is <7g/dl (<8g/dl in cases of active ACS) or if patient has rapid bleeding leading to hemodynamic instability  - 2 large bore IVs  - Protonix 40 BID  - Consult [facility], appreciate recs    Rectal bleeding  Patient's hemorrhage is due to gastrointestinal bleed,  Patients most recent Hgb, Hct, platelets, and INR are listed below.  Recent Labs     01/24/25  1153 01/24/25  1913 01/25/25  0415 01/25/25  1058 01/25/25 2022 01/26/25  0624 01/26/25  1214   HGB 6.5*   < > 7.1*   < > 9.2* 8.7* 8.6*   HCT 20.9*   < > 21.9*   < > 28.5* 27.0* 26.7*     --  327  --   --  368  --     < > = values in this interval not displayed.     Plan  - Will trend hemoglobin/hematocrit Daily  - Will monitor and correct any coagulation defects  - Will transfuse if Hgb is <7g/dl (<8g/dl in cases of active ACS) or if patient has rapid bleeding leading to hemodynamic instability    Chronic kidney disease (CKD)  Creatine stable for now. BMP reviewed- noted Estimated Creatinine Clearance: 44.2 mL/min (A)  "(based on SCr of 1.5 mg/dL (H)). according to latest data. Based on current GFR.  Monitor UOP and serial BMP and adjust therapy as needed. Renally dose meds. Avoid nephrotoxic medications and procedures.    Hepatitis C        COPD (chronic obstructive pulmonary disease)  Patient with significant smoking history, presumed COPD. No PFTs seen on file. Patient endorsing dyspnea, wheezing, increased sputum production. Patient uses Spiriva and Albuterol nebulizer daily at home. Has not required oxygen supplementation.  Dyspnea could be multifactorial due to anemia, pulmonary congestion, COPD. Lower suspicion for acute COPD exacerbation at this time so will hold off on steroid and abx. CXR with signs of pulmonary congestion.    - Will hold off on steroid for now due to GIB  - Duonebs nebulizer  - Daily inhaler    Symptomatic anemia  Anemia is likely due to chronic blood loss. Most recent hemoglobin and hematocrit are listed below.    Plan as under "GIB"    Acute on chronic combined systolic and diastolic congestive heart failure  Patient has Combined Systolic and Diastolic heart failure that is Acute on chronic. On presentation their CHF was decompensated. Evidence of decompensated CHF on presentation includes: edema, orthopnea, dyspnea on exertion (MONSON), and shortness of breath. The etiology of their decompensation is likely due to discontinuation of lasix upon previous admission . Most recent BNP and echo results are listed below.  Recent Labs     01/24/25  1153   BNP 2,314*       Latest ECHO  Results for orders placed during the hospital encounter of 12/29/24    Echo    Interpretation Summary    Left Ventricle: The left ventricle is mildly dilated. Mildly increased wall thickness. Severe global hypokinesis present. There is severely reduced systolic function. Quantitated ejection fraction is 28%. Grade II diastolic dysfunction.    Right Ventricle: Normal right ventricular cavity size. Right ventricle wall motion has " global hypokinesis. Systolic function is mildly reduced.    Left Atrium: Left atrium is severely dilated.    Right Atrium: Right atrium is dilated.    Tricuspid Valve: There is moderate regurgitation.    Pulmonary Artery: There is mild pulmonary hypertension. The estimated pulmonary artery systolic pressure is 47 mmHg.    Current Heart Failure Medications  , 2 times daily with meals, Oral  furosemide injection 80 mg, Daily, Intravenous  metoprolol succinate (TOPROL-XL) 24 hr tablet 25 mg, Daily, Oral    Plan  - Monitor strict I&Os and daily weights.    - Place on telemetry  - Low sodium diet  - Place on fluid restriction of 2 L.   - Lasix 80mg IV    Elevated troponin  In ED Troponin 0.283, endorsed chest tightness initially however has resolved. EKG appears unchanged from previous. Suspect Type II NSTEMI due to demand ischemia from anemia.  Repeat troponin 0.273  - Monitor chest pain      VTE Risk Mitigation (From admission, onward)           Ordered     IP VTE HIGH RISK PATIENT  Once         01/24/25 1406     Place sequential compression device  Until discontinued         01/24/25 1406                    Discharge Planning   JENAE: 1/28/2025     Code Status: Full Code   Medical Readiness for Discharge Date:   Discharge Plan A: Home with family        Nehemiah Hernandez MD  [facility], PGY-2  01/26/2025

## 2025-01-28 ENCOUNTER — TELEPHONE (OUTPATIENT)
Dept: SMOKING CESSATION | Facility: CLINIC | Age: 60
End: 2025-01-28
Payer: MEDICARE

## 2025-01-28 ENCOUNTER — NURSE TRIAGE (OUTPATIENT)
Dept: ADMINISTRATIVE | Facility: CLINIC | Age: 60
End: 2025-01-28
Payer: MEDICARE

## 2025-01-28 VITALS
BODY MASS INDEX: 24.52 KG/M2 | DIASTOLIC BLOOD PRESSURE: 68 MMHG | HEIGHT: 70 IN | WEIGHT: 171.31 LBS | SYSTOLIC BLOOD PRESSURE: 115 MMHG | HEART RATE: 80 BPM | TEMPERATURE: 98 F | OXYGEN SATURATION: 94 % | RESPIRATION RATE: 16 BRPM

## 2025-01-28 LAB
ALBUMIN SERPL BCP-MCNC: 1.5 G/DL (ref 3.5–5.2)
ALP SERPL-CCNC: 85 U/L (ref 40–150)
ALT SERPL W/O P-5'-P-CCNC: 11 U/L (ref 10–44)
ANION GAP SERPL CALC-SCNC: 10 MMOL/L (ref 8–16)
AST SERPL-CCNC: 21 U/L (ref 10–40)
BASOPHILS # BLD AUTO: 0.07 K/UL (ref 0–0.2)
BASOPHILS NFR BLD: 0.6 % (ref 0–1.9)
BILIRUB SERPL-MCNC: 0.3 MG/DL (ref 0.1–1)
BUN SERPL-MCNC: 33 MG/DL (ref 6–20)
CALCIUM SERPL-MCNC: 7.4 MG/DL (ref 8.7–10.5)
CHLORIDE SERPL-SCNC: 96 MMOL/L (ref 95–110)
CO2 SERPL-SCNC: 24 MMOL/L (ref 23–29)
CREAT SERPL-MCNC: 1.3 MG/DL (ref 0.5–1.4)
DIFFERENTIAL METHOD BLD: ABNORMAL
EOSINOPHIL # BLD AUTO: 0.3 K/UL (ref 0–0.5)
EOSINOPHIL NFR BLD: 2.2 % (ref 0–8)
ERYTHROCYTE [DISTWIDTH] IN BLOOD BY AUTOMATED COUNT: 17.7 % (ref 11.5–14.5)
EST. GFR  (NO RACE VARIABLE): 48 ML/MIN/1.73 M^2
GLUCOSE SERPL-MCNC: 102 MG/DL (ref 70–110)
HCT VFR BLD AUTO: 25.1 % (ref 37–48.5)
HGB BLD-MCNC: 8 G/DL (ref 12–16)
IMM GRANULOCYTES # BLD AUTO: 0.06 K/UL (ref 0–0.04)
IMM GRANULOCYTES NFR BLD AUTO: 0.5 % (ref 0–0.5)
LYMPHOCYTES # BLD AUTO: 1.9 K/UL (ref 1–4.8)
LYMPHOCYTES NFR BLD: 15 % (ref 18–48)
MAGNESIUM SERPL-MCNC: 1.5 MG/DL (ref 1.6–2.6)
MCH RBC QN AUTO: 25 PG (ref 27–31)
MCHC RBC AUTO-ENTMCNC: 31.9 G/DL (ref 32–36)
MCV RBC AUTO: 78 FL (ref 82–98)
MONOCYTES # BLD AUTO: 0.8 K/UL (ref 0.3–1)
MONOCYTES NFR BLD: 6.3 % (ref 4–15)
NEUTROPHILS # BLD AUTO: 9.4 K/UL (ref 1.8–7.7)
NEUTROPHILS NFR BLD: 75.4 % (ref 38–73)
NRBC BLD-RTO: 0 /100 WBC
PHOSPHATE SERPL-MCNC: 3.7 MG/DL (ref 2.7–4.5)
PLATELET # BLD AUTO: 369 K/UL (ref 150–450)
PMV BLD AUTO: 9 FL (ref 9.2–12.9)
POTASSIUM SERPL-SCNC: 3.7 MMOL/L (ref 3.5–5.1)
PROT SERPL-MCNC: 5.3 G/DL (ref 6–8.4)
RBC # BLD AUTO: 3.2 M/UL (ref 4–5.4)
SODIUM SERPL-SCNC: 130 MMOL/L (ref 136–145)
WBC # BLD AUTO: 12.48 K/UL (ref 3.9–12.7)

## 2025-01-28 PROCEDURE — 25000003 PHARM REV CODE 250

## 2025-01-28 PROCEDURE — 80053 COMPREHEN METABOLIC PANEL: CPT

## 2025-01-28 PROCEDURE — 27000221 HC OXYGEN, UP TO 24 HOURS

## 2025-01-28 PROCEDURE — 63600175 PHARM REV CODE 636 W HCPCS

## 2025-01-28 PROCEDURE — 94761 N-INVAS EAR/PLS OXIMETRY MLT: CPT

## 2025-01-28 PROCEDURE — 85025 COMPLETE CBC W/AUTO DIFF WBC: CPT

## 2025-01-28 PROCEDURE — 63600175 PHARM REV CODE 636 W HCPCS: Performed by: NURSE PRACTITIONER

## 2025-01-28 PROCEDURE — 94640 AIRWAY INHALATION TREATMENT: CPT

## 2025-01-28 PROCEDURE — 25000242 PHARM REV CODE 250 ALT 637 W/ HCPCS

## 2025-01-28 PROCEDURE — 25000003 PHARM REV CODE 250: Performed by: NURSE PRACTITIONER

## 2025-01-28 PROCEDURE — 36415 COLL VENOUS BLD VENIPUNCTURE: CPT

## 2025-01-28 PROCEDURE — 84100 ASSAY OF PHOSPHORUS: CPT

## 2025-01-28 PROCEDURE — 83735 ASSAY OF MAGNESIUM: CPT

## 2025-01-28 PROCEDURE — 99900035 HC TECH TIME PER 15 MIN (STAT)

## 2025-01-28 RX ORDER — METOPROLOL SUCCINATE 50 MG/1
50 TABLET, EXTENDED RELEASE ORAL DAILY
Qty: 30 TABLET | Refills: 0 | Status: SHIPPED | OUTPATIENT
Start: 2025-01-29 | End: 2025-02-28

## 2025-01-28 RX ORDER — POTASSIUM CHLORIDE 750 MG/1
30 TABLET, EXTENDED RELEASE ORAL ONCE
Status: COMPLETED | OUTPATIENT
Start: 2025-01-28 | End: 2025-01-28

## 2025-01-28 RX ORDER — METOPROLOL SUCCINATE 50 MG/1
50 TABLET, EXTENDED RELEASE ORAL DAILY
Qty: 30 TABLET | Refills: 0 | Status: SHIPPED | OUTPATIENT
Start: 2025-01-29 | End: 2025-01-28

## 2025-01-28 RX ORDER — MAGNESIUM SULFATE HEPTAHYDRATE 40 MG/ML
2 INJECTION, SOLUTION INTRAVENOUS ONCE
Status: COMPLETED | OUTPATIENT
Start: 2025-01-28 | End: 2025-01-28

## 2025-01-28 RX ADMIN — TIOTROPIUM BROMIDE INHALATION SPRAY 2 PUFF: 3.12 SPRAY, METERED RESPIRATORY (INHALATION) at 07:01

## 2025-01-28 RX ADMIN — IPRATROPIUM BROMIDE AND ALBUTEROL SULFATE 3 ML: 2.5; .5 SOLUTION RESPIRATORY (INHALATION) at 07:01

## 2025-01-28 RX ADMIN — ACETAMINOPHEN 650 MG: 325 TABLET ORAL at 12:01

## 2025-01-28 RX ADMIN — METOPROLOL SUCCINATE 50 MG: 50 TABLET, EXTENDED RELEASE ORAL at 09:01

## 2025-01-28 RX ADMIN — MAGNESIUM SULFATE IN WATER 2 G: 40 INJECTION, SOLUTION INTRAVENOUS at 12:01

## 2025-01-28 RX ADMIN — FUROSEMIDE 80 MG: 10 INJECTION, SOLUTION INTRAMUSCULAR; INTRAVENOUS at 09:01

## 2025-01-28 RX ADMIN — POTASSIUM CHLORIDE 30 MEQ: 750 TABLET, EXTENDED RELEASE ORAL at 12:01

## 2025-01-28 RX ADMIN — NYSTATIN 500000 UNITS: 100000 SUSPENSION ORAL at 09:01

## 2025-01-28 RX ADMIN — IPRATROPIUM BROMIDE AND ALBUTEROL SULFATE 3 ML: 2.5; .5 SOLUTION RESPIRATORY (INHALATION) at 11:01

## 2025-01-28 RX ADMIN — Medication 6 MG: at 12:01

## 2025-01-28 RX ADMIN — IPRATROPIUM BROMIDE AND ALBUTEROL SULFATE 3 ML: 2.5; .5 SOLUTION RESPIRATORY (INHALATION) at 03:01

## 2025-01-28 RX ADMIN — PANTOPRAZOLE SODIUM 40 MG: 40 INJECTION, POWDER, LYOPHILIZED, FOR SOLUTION INTRAVENOUS at 09:01

## 2025-01-28 RX ADMIN — SENNOSIDES AND DOCUSATE SODIUM 1 TABLET: 8.6; 5 TABLET ORAL at 09:01

## 2025-01-28 NOTE — PLAN OF CARE
Problem: Adult Inpatient Plan of Care  Goal: Plan of Care Review  Outcome: Progressing     Problem: Adult Inpatient Plan of Care  Goal: Patient-Specific Goal (Individualized)  Outcome: Progressing     Problem: Adult Inpatient Plan of Care  Goal: Optimal Comfort and Wellbeing  Outcome: Progressing     Problem: Fall Injury Risk  Goal: Absence of Fall and Fall-Related Injury  Outcome: Progressing     Problem: Pain Acute  Goal: Optimal Pain Control and Function  Outcome: Progressing

## 2025-01-28 NOTE — DISCHARGE SUMMARY
[facility] Medicine  Discharge Summary      Patient Name: Mary Ellen Saini  MRN: [id number]  ANGELIQUE: [id number]  Patient Class: IP- Inpatient  Admission Date: 1/24/2025  Hospital Length of Stay: 4 days  Discharge Date and Time:  01/28/2025 3:11 PM  Attending Physician: Ugo Magana MD   Discharging Provider: Berenice Alberto MD  Primary Care Provider: Marlen, Primary Doctor    Primary Care Team: Networked reference to record PCT     HPI:   Patient is a 60 yo F w/ PMHx of CKD, Colonic amyloidosis, Chronic anemia, HfrEF EF 25-30%, . Presenting for shortness of breath, bright red blood in stool. Recently discharged on 1/17 from [facility] in [place] for NSTEMI, was told to stop Lasix due to diarrhea at that time and was not able to follow-up with a PCP. Patient tried taking Lasix again yesterday due to worsening lower extremity edema. Of note, pt has undergone GI workup during previous hospitalizations for GIB. Endoscopies with biopsy positive for amyloidosis.     In the ED, initial vital signs /95, HR 97, Temp 97.7, SpO2 100% on room air. Labs include CBC with  H/H 6.5/20.5 and WBC within normal limits 11.54. CMP with K 3.2, Na 131 and BUN/Cr 23/1.4 (baseline Cr 1.4). CXR pulmonary congestion. EKG appears unchanged. BNP 2,314 and troponin 0.283.  [facility] consulted for evaluation for admission for GIB and heart failure exacerbation.       * No surgery found *      Hospital Course:    Pt had no further melena or blood stools.  GI was consulted and discussed the need for a repeat endoscopy the patient declined due to concerns of her heart and inability to complete bowel prep.  She was open to endoscopy new she continued to require blood transfusion or to be done as an outpatient.  Gastroenterology also recommended an outpatient colonoscopy as well.  Patient remained on a PPI b.i.d. due to her history of a clean based duodenal ulcer.  The hemoglobin continued to be  "trended during her stay.  Upon the day of discharge  she had a low magnesium and refused to have it repleted.  Then prior to discharge the patient was noted to have complaints of chest pain and an elevated heart rate in the 150s.  She was given nitroglycerin without any relief.  An EKG demonstrated SVT and the troponin was elevated at 0.159.  A repeat CBC was 8.5 hemoglobin and hematocrit 26.1 with MCV 78.  Cardiology was notify and metoprolol succinate was increased to 50 mg daily.  On the day of discharge there have been no further episodes of SVT no complaints of chest pain.  Patient will need to follow-up with cardiology in regards to the SVT, chest pain and continued effectiveness of the metoprolol succinate 50 mg daily.  In addition she will also need follow-up with pulmonology and outpatient PFTs.  Lastly patient was also discharged on iron supplements in regards to her symptomatic anemia secondary to chronic blood loss sustained visit.   On day of discharge, patient was hemodynamically stable. She was sent home with instructions to continue home medications, change dosage/frequency of home medications, and/or take new medications as mentioned under "Medication Reconciliation" below. These changes were further explained by patient's nurse or the clinical pharmacist. Patient will need to schedule with their PCP for hospital discharge followup. Patient agreeable to discharge plan & expressed understanding of all aforementioned changes. All questions were answered and return precautions were discussed & detailed in the after-visit summary.       Goals of Care Treatment Preferences:  Code Status: Full Code          What is most important right now is to focus on remaining as independent as possible, symptom/pain control, extending life as long as possible, even it it means sacrificing quality.  Accordingly, we have decided that the best plan to meet the patient's goals includes continuing with " treatment.      SDOH Screening:  The patient was screened for food insecurity, housing instability, transportation needs, utility difficulties, and interpersonal safety. The social determinant(s) of health identified as a concern this admission are:  Housing instability  Food insecurity  Utility difficulties  Transportation difficulties    Will discuss with case management and/or community health workers.    Social Drivers of Health with Concerns     Food Insecurity: Food Insecurity Present (1/25/2025)   Housing Stability: High Risk (1/25/2025)   Transportation Needs: Unmet Transportation Needs (1/25/2025)   Utilities: At Risk (1/25/2025)        Consults:   Consults (From admission, onward)          Status Ordering Provider     [facility]        Provider:  (Not yet assigned)    Completed ASIM CALDERON     Inpatient consult to Gastroenterology-[facility]  Once        Provider:  (Not yet assigned)    Completed VIRAJ JUAREZ            * GIB (gastrointestinal bleeding)  Patient experiencing blood in her bowel movements, sometimes red or brown. No bleeding otherwise. Patient has history of colonic amyloidosis and recurrent GIB Patients most recent Hgb, Hct, platelets, and INR are listed below.  Recent Labs     01/27/25  0455 01/27/25  1359 01/28/25  0547   HGB 8.4* 8.5* 8.0*   HCT 26.6* 26.1* 25.1*    365 369     Pending 1unit pRBCs in ED.    Plan  - Will trend hemoglobin/hematocrit Every 12 hours  - Will monitor and correct any coagulation defects  - Will transfuse if Hgb is <7g/dl (<8g/dl in cases of active ACS) or if patient has rapid bleeding leading to hemodynamic instability  - 2 large bore IVs  - Continue Protonix 40 BID  - Consult [facility], appreciate recs  -Pt to have outpatient endoscopy.  Per [facility], she is amenable to have endoscopy if she continues to require blood transfusions  -Ferrous sulfate  supplementation upon discharge   -follow-up outpatient with [facility] for endoscopy  "and colonoscopy    Other chest pain  See "SVT"      Rectal bleeding  Patient's hemorrhage is due to gastrointestinal bleed,  Patients most recent Hgb, Hct, platelets, and INR are listed below.  Recent Labs     01/24/25  1153 01/24/25  1913 01/25/25  0415 01/25/25  1058 01/25/25 2022 01/26/25  0624 01/26/25  1214   HGB 6.5*   < > 7.1*   < > 9.2* 8.7* 8.6*   HCT 20.9*   < > 21.9*   < > 28.5* 27.0* 26.7*     --  327  --   --  368  --     < > = values in this interval not displayed.     Plan  - Will trend hemoglobin/hematocrit Daily  - Will monitor and correct any coagulation defects  - Will transfuse if Hgb is <7g/dl (<8g/dl in cases of active ACS) or if patient has rapid bleeding leading to hemodynamic instability    Chronic kidney disease (CKD)  Creatine stable for now. BMP reviewed- noted Estimated Creatinine Clearance: 44.2 mL/min (A) (based on SCr of 1.5 mg/dL (H)). according to latest data. Based on current GFR.  Monitor UOP and serial BMP and adjust therapy as needed. Renally dose meds. Avoid nephrotoxic medications and procedures.    SVT (supraventricular tachycardia)  MET called 1/27/25 for c/o chest pain-->unresolved with NTG  Elevated Troponin 0.159 and cardiology notified  SVT noted and resolved with Metoprolol 5 mg x 1  Pt declined Mg repletion in AM      Plan:  Maintain K>4, Phos >3 and Mg>2 and replete accordingly  Cardiology following, appreciate recommendations  Increased Metoprolol Succinate to 50 mg daily   CTM for chest pain      Hepatitis C    Plan:  Will CTM    COPD (chronic obstructive pulmonary disease)  Patient with significant smoking history, presumed COPD. No PFTs seen on file. Patient endorsing dyspnea, wheezing, increased sputum production. Patient uses Spiriva and Albuterol nebulizer daily at home. Has not required oxygen supplementation.  Dyspnea could be multifactorial due to anemia, pulmonary congestion, COPD. Lower suspicion for acute COPD exacerbation at this time so will " "hold off on steroid and abx. CXR with signs of pulmonary congestion.    - Will hold off on steroid for now due to GIB  - Duonebs nebulizer  - Daily inhaler  -Will require PFT and pulmonology outpatient     Symptomatic anemia  Anemia is likely due to chronic blood loss. Most recent hemoglobin and hematocrit are listed below.    Plan as under "GIB"      Acute on chronic combined systolic and diastolic congestive heart failure  Patient has Combined Systolic and Diastolic heart failure that is Acute on chronic. On presentation their CHF was decompensated. Evidence of decompensated CHF on presentation includes: edema, orthopnea, dyspnea on exertion (MONSON), and shortness of breath. The etiology of their decompensation is likely due to discontinuation of lasix upon previous admission . Most recent BNP and echo results are listed below.  No results for input(s): "BNP" in the last 72 hours.    Latest ECHO  Results for orders placed during the hospital encounter of 12/29/24    Echo    Interpretation Summary    Left Ventricle: The left ventricle is mildly dilated. Mildly increased wall thickness. Severe global hypokinesis present. There is severely reduced systolic function. Quantitated ejection fraction is 28%. Grade II diastolic dysfunction.    Right Ventricle: Normal right ventricular cavity size. Right ventricle wall motion has global hypokinesis. Systolic function is mildly reduced.    Left Atrium: Left atrium is severely dilated.    Right Atrium: Right atrium is dilated.    Tricuspid Valve: There is moderate regurgitation.    Pulmonary Artery: There is mild pulmonary hypertension. The estimated pulmonary artery systolic pressure is 47 mmHg.    Current Heart Failure Medications  , 2 times daily with meals, Oral  furosemide injection 80 mg, Daily, Intravenous  metoprolol succinate (TOPROL-XL) 24 hr tablet 50 mg, Daily, Oral  metoprolol (TOPROL-XL) 24 hr tablet, Daily, Oral    Plan  - Monitor strict I&Os and daily weights.  "   - Place on telemetry  - Low sodium diet  - Place on fluid restriction of 2 L.   - Lasix 80mg IV  Peripheral    Elevated troponin  In ED Troponin 0.283, endorsed chest tightness initially however has resolved. EKG appears unchanged from previous. Suspect Type II NSTEMI due to demand ischemia from anemia.  Repeat troponin 0.273    Plan:  - Monitor chest pain      Final Active Diagnoses:    Diagnosis Date Noted POA    PRINCIPAL PROBLEM:  GIB (gastrointestinal bleeding) [K92.2] 11/08/2024 Yes    Other chest pain [R07.89] 01/27/2025 No    Rectal bleeding [K62.5] 01/25/2025 Yes    Chronic kidney disease (CKD) [N18.9] 01/24/2025 Yes    SVT (supraventricular tachycardia) [I47.10] 12/30/2024 Yes    Hepatitis C [B19.20] 09/05/2024 Yes    COPD (chronic obstructive pulmonary disease) [J44.9] 08/20/2024 Yes    Tobacco dependency [F17.200] 08/14/2024 Yes    Elevated troponin [R79.89] 08/12/2024 Yes    Acute on chronic combined systolic and diastolic congestive heart failure [I50.43] 08/12/2024 Yes    Symptomatic anemia [D64.9] 08/12/2024 Yes      Problems Resolved During this Admission:       Discharged Condition: good    Disposition: Home or Self Care    Follow Up:   Follow-up Information       Nehemiah Dempsey MD Follow up on 2/4/2025.    Specialty: Gastroenterology  Why: 3:00 pm  Contact information:  [address]  [address]  [facility] [address]  [phone]               Octavio Linares MD Follow up on 2/10/2025.    Specialty: Pulmonary Disease  Why: 10:30 am    1:05 pm --- Pulmonary Function   2:40 pm -- six minute walk  Contact information:  [address] [facility] [address]  [phone]               [facility] Follow up on 2/6/2025.    Specialty: Family Medicine  Why: 11:00 am  Contact information:  [address]  [address]  [facility] [id number] [phone]  Additional information:  Please park in [address] or D and use Rony subramanian. Take Medical Office Bldg. elevators.              HEPATOLOGY Follow up.    Why: YOU WILL BE CONTACTED WITH A HOSPITAL FOLLOW UP APT                         Patient Instructions:      Ambulatory referral/consult to Hepatology   Standing Status: Future   Referral Priority: Routine Referral Type: Consultation   Referral Reason: Specialty Services Required   Requested Specialty: Hepatology   Number of Visits Requested: 1     Ambulatory referral/consult to Pulmonology   Standing Status: Future   Referral Priority: Routine Referral Type: Consultation   Referral Reason: Specialty Services Required   Requested Specialty: Pulmonary Disease   Number of Visits Requested: 1     Ambulatory referral/consult to Gastroenterology   Standing Status: Future   Referral Priority: Routine Referral Type: Consultation   Referral Reason: Specialty Services Required   Requested Specialty: Gastroenterology   Number of Visits Requested: 1     Ambulatory referral/consult to Smoking Cessation Program   Standing Status: Future   Referral Priority: Routine Referral Type: Consultation   Referral Reason: Specialty Services Required   Requested Specialty: CTTS   Number of Visits Requested: 1     Ambulatory referral/consult to Cardiology   Standing Status: Future   Referral Priority: Routine Referral Type: Consultation   Referral Reason: Specialty Services Required   Requested Specialty: Cardiology   Number of Visits Requested: 1     Diet Cardiac     Notify your health care provider if you experience any of the following:  temperature >100.4     Notify your health care provider if you experience any of the following:  persistent nausea and vomiting or diarrhea     Notify your health care provider if you experience any of the following:  severe uncontrolled pain     Notify your health care provider if you experience any of the following:  redness, tenderness, or signs of infection (pain, swelling, redness, odor or green/yellow discharge around incision site)     Notify your health care  provider if you experience any of the following:  difficulty breathing or increased cough     Notify your health care provider if you experience any of the following:  severe persistent headache     Notify your health care provider if you experience any of the following:  worsening rash     Notify your health care provider if you experience any of the following:  persistent dizziness, light-headedness, or visual disturbances     Notify your health care provider if you experience any of the following:  increased confusion or weakness     Notify your health care provider if you experience any of the following:     Pulmonary function test   Standing Status: Future Standing Exp. Date: 01/27/26     Order Specific Question Answer Comments   Release to patient Immediate      Activity as tolerated       Significant Diagnostic Studies: Labs: BMP:   Recent Labs   Lab 01/27/25 0455 01/27/25  1930 01/28/25  0547   GLU 94 110 102   * 131* 130*   K 3.7 3.6 3.7   CL 95 96 96   CO2 26 27 24   BUN 29* 32* 33*   CREATININE 1.4 1.2 1.3   CALCIUM 7.4* 7.4* 7.4*   MG 1.3* 1.6 1.5*   , CMP   Recent Labs   Lab 01/27/25 0455 01/27/25 1930 01/28/25  0547   * 131* 130*   K 3.7 3.6 3.7   CL 95 96 96   CO2 26 27 24   GLU 94 110 102   BUN 29* 32* 33*   CREATININE 1.4 1.2 1.3   CALCIUM 7.4* 7.4* 7.4*   PROT 5.5*  --  5.3*   ALBUMIN 1.7*  --  1.5*   BILITOT 0.4  --  0.3   ALKPHOS 90  --  85   AST 19  --  21   ALT 14  --  11   ANIONGAP 10 8 10   , CBC   Recent Labs   Lab 01/27/25  0455 01/27/25  1359 01/28/25  0547   WBC 15.94* 15.13* 12.48   HGB 8.4* 8.5* 8.0*   HCT 26.6* 26.1* 25.1*    365 369   , INR   Lab Results   Component Value Date    INR 1.5 (H) 12/31/2024    INR 1.3 (H) 12/29/2024    INR 1.1 11/07/2024   , Lipid Panel   Lab Results   Component Value Date    CHOL 59 01/15/2025    HDL 31 (L) 01/15/2025    LDLCALC 15 01/15/2025    TRIG 63 01/15/2025    CHOLHDL 39.2 10/20/2024   , Troponin   Recent Labs   Lab  "01/24/25  1153 01/24/25  1913 01/27/25  1359   TROPONINI 0.283* 0.273* 0.159*   , and A1C:   Recent Labs   Lab 10/20/24  0722   HGBA1C 5.4     Microbiology: Blood Culture   Lab Results   Component Value Date    LABBLOO No growth after 5 days. 08/19/2024    LABBLOO No growth after 5 days. 08/19/2024   , Sputum Culture No results found for: "GSRESP", "RESPIRATORYC", and Urine Culture    Lab Results   Component Value Date    LABURIN KLEBSIELLA PNEUMONIAE ESBL  >100,000 cfu/ml   (A) 08/20/2024     Radiology: X-Ray: CXR: X-Ray Chest 1 View (CXR): No results found for this visit on 01/24/25. and X-Ray Chest PA and Lateral (CXR): No results found for this visit on 01/24/25.    Pending Diagnostic Studies:       None           Medications:  Reconciled Home Medications:      Medication List        CHANGE how you take these medications      metoprolol succinate 50 MG 24 hr tablet  Commonly known as: TOPROL-XL  Take 1 tablet (50 mg total) by mouth once daily.  Start taking on: January 29, 2025  What changed:   medication strength  how much to take            CONTINUE taking these medications      albuterol 90 mcg/actuation inhaler  Commonly known as: PROVENTIL/VENTOLIN HFA  Inhale 1-2 puffs into the lungs every 6 (six) hours as needed for Wheezing. Rescue     aspirin 81 MG Chew  Take 81 mg by mouth once daily.     atorvastatin 80 MG tablet  Commonly known as: LIPITOR  Take 80 mg by mouth every evening.     carvediloL 3.125 MG tablet  Commonly known as: COREG  Take 3.125 mg by mouth 2 (two) times daily with meals.     ferrous sulfate 324 mg (65 mg iron) Tbec  Take 1 tablet (324 mg total) by mouth once daily.     furosemide 40 MG tablet  Commonly known as: LASIX  Take 1 tablet (40 mg total) by mouth 2 (two) times daily.     HYDROcodone-acetaminophen 5-325 mg per tablet  Commonly known as: NORCO  Take 1 tablet by mouth 2 (two) times daily as needed.     lactulose 10 gram/15 mL solution  Commonly known as: CHRONULAC  Take 30 mLs " (20 g total) by mouth 3 (three) times daily.     LIDOcaine 5 %  Commonly known as: LIDODERM  Place 2 patches onto the skin every evening. Remove & Discard patch within 12 hours or as directed by MD     magic mouthwash diphen/antac/lidoc/nysta  take 10 mLs 4 (four) times daily.     magnesium 250 mg Tab  Take 1 tablet by mouth once daily.     magnesium oxide 400 mg (241.3 mg magnesium) tablet  Commonly known as: MAG-OX  Take 1 tablet (400 mg total) by mouth once daily.     naloxone 4 mg/actuation Spry  Commonly known as: NARCAN  4mg by nasal route as needed for opioid overdose; may repeat every 2-3 minutes in alternating nostrils until medical help arrives. Call 911     nicotine 21 mg/24 hr  Commonly known as: NICODERM CQ  Place 1 patch onto the skin once daily.     nystatin cream  Commonly known as: MYCOSTATIN  Apply topically 2 (two) times daily.     oxybutynin 5 MG Tab  Commonly known as: DITROPAN  Take 1 tablet (5 mg total) by mouth 3 (three) times daily.     pantoprazole 40 MG tablet  Commonly known as: PROTONIX  Take 1 tablet (40 mg total) by mouth 2 (two) times daily.     polyethylene glycol 17 gram Pwpk  Commonly known as: GLYCOLAX  Take 17 g by mouth 2 (two) times daily as needed for Constipation.     potassium chloride SA 20 MEQ tablet  Commonly known as: K-DUR,KLOR-CON  Take 2 tablets (40 mEq total) by mouth once daily.     potassium gluconate 595 mg (99 mg) Tab  Take 1 tablet by mouth once daily.     sucralfate 1 gram tablet  Commonly known as: CARAFATE  Take 1 tablet (1 g total) by mouth 3 (three) times daily before meals.     * SPIRIVA RESPIMAT 2.5 mcg/actuation inhaler  Generic drug: tiotropium bromide  Inhale 2 puffs into the lungs Daily. Controller     * tiotropium 18 mcg inhalation capsule  Commonly known as: SPIRIVA  Inhale 1 capsule (18 mcg total) into the lungs once daily. Controller           * This list has 2 medication(s) that are the same as other medications prescribed for you. Read the  directions carefully, and ask your doctor or other care provider to review them with you.                  Indwelling Lines/Drains at time of discharge:   Lines/Drains/Airways       None                   Time spent on the discharge of patient: 35 minutes         Berenice Alberto MD  Department of Hospital Medicine  [facility]

## 2025-01-28 NOTE — PROGRESS NOTES
Virtual Nurse:Discharge orders noted; additional clinical references attached.  notified.  Patient's discharge instruction packet given by bedside RN.    Cued into patient's room.  Permission received per patient to turn camera to view patient.  Introduced as VN that will be instructing on discharge instructions.  Educated patient on reason for admission; medications to hold, continue, and start, appointment to follow-up with doctor, and when to return to ED. Patient not receptive to teaching. Medication sent to [facility] in [place]. Encouraged patient compliance with medication regimen.  Number given for 24/7 Nurse Line. Opportunity given for questions and questions answered.  Bedside nurse updated. Transport to [facility] requested.        01/28/25 1642   Shift   Virtual Nurse - Patient Verbalized Approval Of Camera Use;VN Rounding   Type of Frequent Check   Type Patient Rounds   Safety/Activity   Patient Rounds visualized patient   Activity Management Ambulated in room - L4   Activity Assistance Provided independent

## 2025-01-28 NOTE — NURSING
Rapid Response Nurse Follow-up Note     Followed up with patient for proactive rounding.   Pt in bed resting. Endorses no complaints at this time. Says she feels a lot better than she did earlier and has not had any repeat episodes of chest pain.  Team will continue to follow.  Please call Rapid Response RN, Garret Ruiz RN with any questions or concerns at [phone].

## 2025-01-28 NOTE — PLAN OF CARE
Medicare Message     Important Message from Medicare regarding Discharge Appeal Rights Other (comments)Important Message from Medicare regarding Discharge Appeal Rights. Other (comments). The comment is Patient unable to sign due to medical condition.. Taken on 1/28/25 1013   Date IMM was signed 1/27/2025   Time IMM was signed 1116

## 2025-01-28 NOTE — ASSESSMENT & PLAN NOTE
MET called 1/27/25 for c/o chest pain-->unresolved with NTG  Elevated Troponin 0.159 and cardiology notified  SVT noted and resolved with Metoprolol 5 mg x 1  Pt declined Mg repletion in AM      Plan:  Maintain K>4, Phos >3 and Mg>2 and replete accordingly  Cardiology following, appreciate recommendations  Increased Metoprolol Succinate to 50 mg daily   CTM for chest pain

## 2025-01-28 NOTE — ASSESSMENT & PLAN NOTE
Patient experiencing blood in her bowel movements, sometimes red or brown. No bleeding otherwise. Patient has history of colonic amyloidosis and recurrent GIB Patients most recent Hgb, Hct, platelets, and INR are listed below.  Recent Labs     01/27/25  0455 01/27/25  1359 01/28/25  0547   HGB 8.4* 8.5* 8.0*   HCT 26.6* 26.1* 25.1*    365 369     Pending 1unit pRBCs in ED.    Plan  - Will trend hemoglobin/hematocrit Every 12 hours  - Will monitor and correct any coagulation defects  - Will transfuse if Hgb is <7g/dl (<8g/dl in cases of active ACS) or if patient has rapid bleeding leading to hemodynamic instability  - 2 large bore IVs  - Continue Protonix 40 BID  - Consult [facility], appreciate recs  -Pt to have outpatient endoscopy.  Per [facility], she is amenable to have endoscopy if she continues to require blood transfusions  -Ferrous sulfate  supplementation upon discharge   -follow-up outpatient with [facility] for endoscopy and colonoscopy

## 2025-01-28 NOTE — HOSPITAL COURSE
" Pt had no further melena or blood stools.  GI was consulted and discussed the need for a repeat endoscopy the patient declined due to concerns of her heart and inability to complete bowel prep.  She was open to endoscopy new she continued to require blood transfusion or to be done as an outpatient.  Gastroenterology also recommended an outpatient colonoscopy as well.  Patient remained on a PPI b.i.d. due to her history of a clean based duodenal ulcer.  The hemoglobin continued to be trended during her stay.  Upon the day of discharge  she had a low magnesium and refused to have it repleted.  Then prior to discharge the patient was noted to have complaints of chest pain and an elevated heart rate in the 150s.  She was given nitroglycerin without any relief.  An EKG demonstrated SVT and the troponin was elevated at 0.159.  A repeat CBC was 8.5 hemoglobin and hematocrit 26.1 with MCV 78.  Cardiology was notify and metoprolol succinate was increased to 50 mg daily.  On the day of discharge there have been no further episodes of SVT no complaints of chest pain.  Patient will need to follow-up with cardiology in regards to the SVT, chest pain and continued effectiveness of the metoprolol succinate 50 mg daily.  In addition she will also need follow-up with pulmonology and outpatient PFTs.  Lastly patient was also discharged on iron supplements in regards to her symptomatic anemia secondary to chronic blood loss sustained visit.   On day of discharge, patient was hemodynamically stable. She was sent home with instructions to continue home medications, change dosage/frequency of home medications, and/or take new medications as mentioned under "Medication Reconciliation" below. These changes were further explained by patient's nurse or the clinical pharmacist. Patient will need to schedule with their PCP for hospital discharge followup. Patient agreeable to discharge plan & expressed understanding of all aforementioned " changes. All questions were answered and return precautions were discussed & detailed in the after-visit summary.

## 2025-01-28 NOTE — PLAN OF CARE
Pt for d/c to home today   CM met with pt - friend at bedside to transport pt to home   No dme, no hh ordered   f/u Cardiology apt requested   Discharging nurse to review all d/c instructions/info     Future Appointments   Date Time Provider Department Center   2/4/2025  3:00 PM Nehemiah Dempsey MD [facility]   2/6/2025 11:00 AM Phuc Montes, PA-C [facility]   2/10/2025 10:30 AM Octavio Linares MD [facility]   2/10/2025  1:05 PM PULMONARY FUNCTION [facility]   2/10/2025  2:40 PM SIX, MINUTE WALK [facility] PUL WLK [facility]   2/12/2025  2:20 PM Enmanuel Barraza MD [facility] CARDIO [facility]   3/10/2025  8:00 AM Brian Sun MD [facility] HEPAT [facility]

## 2025-01-28 NOTE — PROGRESS NOTES
AVS prepared by JOSE. Secure chat sent to [employer]     01/28/25 4745   Nurse Notification   Charge Nurse Notified? Yes   Name of Charge Nurse Violeta   Bedside Nurse Notified? Yes   Name of Bedside Nurse Hua/Chacha   Nurse Notfication Method Secure Chat   Nurse Notified Of Orders;Medications    Notification    Notified? Yes   Name of  reena LIANG    Notification Method Secure Chat    Notified Of Discharge Status

## 2025-01-28 NOTE — NURSING
"Patient disgruntled and agitated stating, "I'm ready to go", telemetry monitor removed by patient, Patient educated on risk of leaving AMA, Dr. Magana and team notified of above    @1217 Remy at bedside, patient agreeable to medication administration before discharge    @1222 upon entering the room patient more calm, telemetry monitor replaced, patient states, "I can go get something off the street for pain"   "

## 2025-01-28 NOTE — SUBJECTIVE & OBJECTIVE
Interval History: MET called yesterday afternoon for c/o chest pain.  NTG given without relief. Pt noted to be in SVT that resolved with metoprolol  5 mg x 1 dose to NSR.  No further c/o chest pain.     Review of Systems  Negative except as stated in HPI  Objective:     Vital Signs (Most Recent):  Temp: 98.3 °F (36.8 °C) (01/27/25 2349)  Pulse: 85 (01/28/25 0348)  Resp: 18 (01/27/25 2349)  BP: 114/69 (01/27/25 2349)  SpO2: 98 % (01/28/25 0042) Vital Signs (24h Range):  Temp:  [97.7 °F (36.5 °C)-98.3 °F (36.8 °C)] 98.3 °F (36.8 °C)  Pulse:  [] 85  Resp:  [16-22] 18  SpO2:  [95 %-99 %] 98 %  BP: (101-142)/(61-93) 114/69     Weight: 77.7 kg (171 lb 4.8 oz)  Body mass index is 24.58 kg/m².  No intake or output data in the 24 hours ending 01/28/25 0445      Physical Exam  Constitutional:       Appearance: Normal appearance.   HENT:      Head: Normocephalic and atraumatic.      Nose: Nose normal.      Mouth/Throat:      Mouth: Mucous membranes are dry.   Eyes:      Pupils: Pupils are equal, round, and reactive to light.   Cardiovascular:      Rate and Rhythm: Normal rate and regular rhythm.      Pulses: Normal pulses.      Heart sounds: Normal heart sounds. No murmur heard.     No friction rub. No gallop.   Pulmonary:      Effort: Pulmonary effort is normal.      Breath sounds: Normal breath sounds.   Abdominal:      General: Bowel sounds are normal. There is no distension.      Palpations: Abdomen is soft. There is no mass.      Tenderness: There is abdominal tenderness (mild). There is no right CVA tenderness, left CVA tenderness, guarding or rebound.      Hernia: No hernia is present.   Musculoskeletal:      Right lower leg: No edema.      Left lower leg: No edema.   Skin:     Capillary Refill: Capillary refill takes less than 2 seconds.      Findings: No lesion or rash.   Neurological:      General: No focal deficit present.      Mental Status: She is alert and oriented to person, place, and time.              Significant Labs: All pertinent labs within the past 24 hours have been reviewed.    Significant Imaging: I have reviewed all pertinent imaging results/findings within the past 24 hours.

## 2025-01-28 NOTE — ASSESSMENT & PLAN NOTE
In ED Troponin 0.283, endorsed chest tightness initially however has resolved. EKG appears unchanged from previous. Suspect Type II NSTEMI due to demand ischemia from anemia.  Repeat troponin 0.273    Plan:  - Monitor chest pain

## 2025-01-28 NOTE — PROGRESS NOTES
[facility] Medicine  Progress Note    Patient Name: Mary Ellen Saini  MRN: [id number]  Patient Class: IP- Inpatient   Admission Date: 1/24/2025  Length of Stay: 4 days  Attending Physician: Ugo Magana MD  Primary Care Provider: Marlen, Primary Doctor        Subjective     Principal Problem:GIB (gastrointestinal bleeding)        HPI:  Patient is a 58 yo F w/ PMHx of CKD, Colonic amyloidosis, Chronic anemia, HfrEF EF 25-30%, . Presenting for shortness of breath, bright red blood in stool. Recently discharged on 1/17 from [facility] in [place] for NSTEMI, was told to stop Lasix due to diarrhea at that time and was not able to follow-up with a PCP. Patient tried taking Lasix again yesterday due to worsening lower extremity edema. Of note, pt has undergone GI workup during previous hospitalizations for GIB. Endoscopies with biopsy positive for amyloidosis.     In the ED, initial vital signs /95, HR 97, Temp 97.7, SpO2 100% on room air. Labs include CBC with  H/H 6.5/20.5 and WBC within normal limits 11.54. CMP with K 3.2, Na 131 and BUN/Cr 23/1.4 (baseline Cr 1.4). CXR pulmonary congestion. EKG appears unchanged. BNP 2,314 and troponin 0.283.  [facility] consulted for evaluation for admission for GIB and heart failure exacerbation.       Overview/Hospital Course:       Interval History: MET called yesterday afternoon for c/o chest pain.  NTG given without relief. Pt noted to be in SVT that resolved with metoprolol  5 mg x 1 dose to NSR.  No further c/o chest pain.     Review of Systems  Negative except as stated in HPI  Objective:     Vital Signs (Most Recent):  Temp: 98.3 °F (36.8 °C) (01/27/25 2349)  Pulse: 85 (01/28/25 0348)  Resp: 18 (01/27/25 2349)  BP: 114/69 (01/27/25 2349)  SpO2: 98 % (01/28/25 0042) Vital Signs (24h Range):  Temp:  [97.7 °F (36.5 °C)-98.3 °F (36.8 °C)] 98.3 °F (36.8 °C)  Pulse:  [] 85  Resp:  [16-22] 18  SpO2:  [95 %-99 %] 98 %  BP:  (101-142)/(61-93) 114/69     Weight: 77.7 kg (171 lb 4.8 oz)  Body mass index is 24.58 kg/m².  No intake or output data in the 24 hours ending 01/28/25 0445      Physical Exam  Constitutional:       Appearance: Normal appearance.   HENT:      Head: Normocephalic and atraumatic.      Nose: Nose normal.      Mouth/Throat:      Mouth: Mucous membranes are dry.   Eyes:      Pupils: Pupils are equal, round, and reactive to light.   Cardiovascular:      Rate and Rhythm: Normal rate and regular rhythm.      Pulses: Normal pulses.      Heart sounds: Normal heart sounds. No murmur heard.     No friction rub. No gallop.   Pulmonary:      Effort: Pulmonary effort is normal.      Breath sounds: Normal breath sounds.   Abdominal:      General: Bowel sounds are normal. There is no distension.      Palpations: Abdomen is soft. There is no mass.      Tenderness: There is abdominal tenderness (mild). There is no right CVA tenderness, left CVA tenderness, guarding or rebound.      Hernia: No hernia is present.   Musculoskeletal:      Right lower leg: No edema.      Left lower leg: No edema.   Skin:     Capillary Refill: Capillary refill takes less than 2 seconds.      Findings: No lesion or rash.   Neurological:      General: No focal deficit present.      Mental Status: She is alert and oriented to person, place, and time.             Significant Labs: All pertinent labs within the past 24 hours have been reviewed.    Significant Imaging: I have reviewed all pertinent imaging results/findings within the past 24 hours.    Assessment and Plan     * GIB (gastrointestinal bleeding)  Patient experiencing blood in her bowel movements, sometimes red or brown. No bleeding otherwise. Patient has history of colonic amyloidosis and recurrent GIB Patients most recent Hgb, Hct, platelets, and INR are listed below.  Recent Labs     01/25/25  0415 01/25/25  1058 01/26/25  0624 01/26/25  1214 01/27/25  0455   HGB 7.1*   < > 8.7* 8.6* 8.4*   HCT  "21.9*   < > 27.0* 26.7* 26.6*     --  368  --  388    < > = values in this interval not displayed.     Pending 1unit pRBCs in ED.    Plan  - Will trend hemoglobin/hematocrit Every 12 hours  - Will monitor and correct any coagulation defects  - Will transfuse if Hgb is <7g/dl (<8g/dl in cases of active ACS) or if patient has rapid bleeding leading to hemodynamic instability  - 2 large bore IVs  - Continue Protonix 40 BID  - Consult [facility], appreciate recs  -Pt to have outpatient endoscopy.  Per [facility], she is amenable to have endoscopy if she continues to require blood transfusions  -Ferrous sulfate  supplementation upon discharge     Other chest pain  See "SVT"      Rectal bleeding  Patient's hemorrhage is due to gastrointestinal bleed,  Patients most recent Hgb, Hct, platelets, and INR are listed below.  Recent Labs     01/24/25  1153 01/24/25  1913 01/25/25  0415 01/25/25  1058 01/25/25  2022 01/26/25  0624 01/26/25  1214   HGB 6.5*   < > 7.1*   < > 9.2* 8.7* 8.6*   HCT 20.9*   < > 21.9*   < > 28.5* 27.0* 26.7*     --  327  --   --  368  --     < > = values in this interval not displayed.     Plan  - Will trend hemoglobin/hematocrit Daily  - Will monitor and correct any coagulation defects  - Will transfuse if Hgb is <7g/dl (<8g/dl in cases of active ACS) or if patient has rapid bleeding leading to hemodynamic instability    Chronic kidney disease (CKD)  Creatine stable for now. BMP reviewed- noted Estimated Creatinine Clearance: 44.2 mL/min (A) (based on SCr of 1.5 mg/dL (H)). according to latest data. Based on current GFR.  Monitor UOP and serial BMP and adjust therapy as needed. Renally dose meds. Avoid nephrotoxic medications and procedures.    SVT (supraventricular tachycardia)  MET called 1/27/25 for c/o chest pain-->unresolved with NTG  Elevated Troponin 0.159 and cardiology notified  SVT noted and resolved with Metoprolol 5 mg x 1  Pt declined Mg repletion in AM      Plan:  Maintain K>4, Phos " ">3 and Mg>2 and replete accordingly  Cardiology following, appreciate recommendations  Increased Metoprolol Succinate to 50 mg daily   CTM for chest pain      Hepatitis C    Plan:  Will CTM    COPD (chronic obstructive pulmonary disease)  Patient with significant smoking history, presumed COPD. No PFTs seen on file. Patient endorsing dyspnea, wheezing, increased sputum production. Patient uses Spiriva and Albuterol nebulizer daily at home. Has not required oxygen supplementation.  Dyspnea could be multifactorial due to anemia, pulmonary congestion, COPD. Lower suspicion for acute COPD exacerbation at this time so will hold off on steroid and abx. CXR with signs of pulmonary congestion.    - Will hold off on steroid for now due to GIB  - Duonebs nebulizer  - Daily inhaler  -Will require PFT and pulmonology outpatient     Symptomatic anemia  Anemia is likely due to chronic blood loss. Most recent hemoglobin and hematocrit are listed below.    Plan as under "GIB"      Acute on chronic combined systolic and diastolic congestive heart failure  Patient has Combined Systolic and Diastolic heart failure that is Acute on chronic. On presentation their CHF was decompensated. Evidence of decompensated CHF on presentation includes: edema, orthopnea, dyspnea on exertion (MONSON), and shortness of breath. The etiology of their decompensation is likely due to discontinuation of lasix upon previous admission . Most recent BNP and echo results are listed below.  Recent Labs     01/24/25  1153   BNP 2,314*       Latest ECHO  Results for orders placed during the hospital encounter of 12/29/24    Echo    Interpretation Summary    Left Ventricle: The left ventricle is mildly dilated. Mildly increased wall thickness. Severe global hypokinesis present. There is severely reduced systolic function. Quantitated ejection fraction is 28%. Grade II diastolic dysfunction.    Right Ventricle: Normal right ventricular cavity size. Right ventricle " wall motion has global hypokinesis. Systolic function is mildly reduced.    Left Atrium: Left atrium is severely dilated.    Right Atrium: Right atrium is dilated.    Tricuspid Valve: There is moderate regurgitation.    Pulmonary Artery: There is mild pulmonary hypertension. The estimated pulmonary artery systolic pressure is 47 mmHg.    Current Heart Failure Medications  , 2 times daily with meals, Oral  furosemide injection 80 mg, Daily, Intravenous  metoprolol succinate (TOPROL-XL) 24 hr tablet 25 mg, Daily, Oral    Plan  - Monitor strict I&Os and daily weights.    - Place on telemetry  - Low sodium diet  - Place on fluid restriction of 2 L.   - Lasix 80mg IV    Elevated troponin  In ED Troponin 0.283, endorsed chest tightness initially however has resolved. EKG appears unchanged from previous. Suspect Type II NSTEMI due to demand ischemia from anemia.  Repeat troponin 0.273    Plan:  - Monitor chest pain      VTE Risk Mitigation (From admission, onward)           Ordered     IP VTE HIGH RISK PATIENT  Once         01/24/25 1406     Place sequential compression device  Until discontinued         01/24/25 1406                    Discharge Planning   JENAE: 1/28/2025     Code Status: Full Code   Medical Readiness for Discharge Date: 1/27/2025  Discharge Plan A: Home with family   Discharge Delays: None known at this time                    Berenice Alberto MD  Department of Hospital Medicine   [place] - Telemetry

## 2025-01-28 NOTE — ASSESSMENT & PLAN NOTE
"Patient has Combined Systolic and Diastolic heart failure that is Acute on chronic. On presentation their CHF was decompensated. Evidence of decompensated CHF on presentation includes: edema, orthopnea, dyspnea on exertion (MONSON), and shortness of breath. The etiology of their decompensation is likely due to discontinuation of lasix upon previous admission . Most recent BNP and echo results are listed below.  No results for input(s): "BNP" in the last 72 hours.    Latest ECHO  Results for orders placed during the hospital encounter of 12/29/24    Echo    Interpretation Summary    Left Ventricle: The left ventricle is mildly dilated. Mildly increased wall thickness. Severe global hypokinesis present. There is severely reduced systolic function. Quantitated ejection fraction is 28%. Grade II diastolic dysfunction.    Right Ventricle: Normal right ventricular cavity size. Right ventricle wall motion has global hypokinesis. Systolic function is mildly reduced.    Left Atrium: Left atrium is severely dilated.    Right Atrium: Right atrium is dilated.    Tricuspid Valve: There is moderate regurgitation.    Pulmonary Artery: There is mild pulmonary hypertension. The estimated pulmonary artery systolic pressure is 47 mmHg.    Current Heart Failure Medications  , 2 times daily with meals, Oral  furosemide injection 80 mg, Daily, Intravenous  metoprolol succinate (TOPROL-XL) 24 hr tablet 50 mg, Daily, Oral  metoprolol (TOPROL-XL) 24 hr tablet, Daily, Oral    Plan  - Monitor strict I&Os and daily weights.    - Place on telemetry  - Low sodium diet  - Place on fluid restriction of 2 L.   - Lasix 80mg IV  Peripheral  "

## 2025-01-29 NOTE — TELEPHONE ENCOUNTER
"Spoke with patient who was discharged from the hospital today.  Patient has a question about taking metoprolol 50 mg tablet tonight.  Patient states she only has a 20 mg tablet on hand.  Per discharge paperwork direction states "metoprolol succinate 50 mg, Start taking on: January 29, 2025, Last time this was given: January 28, 2025 9:40 AM, Take 1 tablet (50 mg total) by mouth once daily. Per protocol discuss with provider on call.  Spoke with Dr. Owen who states patient should proceed with following the discharge instructions to start medication tomorrow as a dose was given today per discharge instructions.  Patient hung up called her back to give details.  Patient then asked about magnesium 250 mg Tab and magnesium oxide 400 mg.  Patient states she thinks she has the medication at the pharmacy but she was not able to afford to pick it up.  Discharge instructions state to resume medications on tomorrow.  Patient states she will discuss medication cost with [facility]'s pharmacist when they open tomorrow (closed currently).  Patient denies having any symptoms.  Advised that patient call back with questions.  Patient is going to be seen for a Hospital Follow Up with Phuc Montes PA-C on Thursday Feb 6, 2025 11:00 AM.  Will forward to their office for review.     Reason for Disposition   [1] Caller has URGENT medicine question about med that PCP or specialist prescribed AND [2] triager unable to answer question    Additional Information   Negative: [1] Intentional drug overdose AND [2] suicidal thoughts or ideas   Negative: MORE THAN A DOUBLE DOSE of a prescription or over-the-counter (OTC) drug   Negative: [1] DOUBLE DOSE (an extra dose or lesser amount) of prescription drug AND [2] any symptoms (e.g., dizziness, nausea, pain, sleepiness)   Negative: [1] DOUBLE DOSE (an extra dose or lesser amount) of over-the-counter (OTC) drug AND [2] any symptoms (e.g., dizziness, nausea, pain, sleepiness)   Negative: Took " another person's prescription drug   Negative: [1] DOUBLE DOSE (an extra dose or lesser amount) of prescription drug AND [2] NO symptoms  (Exception: A double dose of antibiotics.)   Negative: Diabetes drug error or overdose (e.g., took wrong type of insulin or took extra dose)   Negative: [1] Prescription not at pharmacy AND [2] was prescribed by PCP recently (Exception: Triager has access to EMR and prescription is recorded there. Go to Home Care and confirm for pharmacy.)   Negative: [1] Pharmacy calling with prescription question AND [2] triager unable to answer question    Protocols used: Medication Question Call-A-AH

## 2025-02-19 NOTE — ED NOTES
Pt presents to ED for bright red rectal bleeding 7x today with abdominal pain. Pt appears pale, hx of blood transfusion. Reports dx w/ colon ca 1yr ago. Was given 1 SL nitro and 325 aspirin by EMS for c/o CP, SOB.

## 2025-02-19 NOTE — ED PROVIDER NOTES
Encounter Date: 2/19/2025       History     Chief Complaint   Patient presents with    Rectal Bleeding     Pt presents to ED today from home via [facility]   Recently dx with colon ca   BRB pt c/o SOB x of copd , CHF      59-year-old female history of bipolar disorder, hypertension, colonic amyloidosis with history of GI bleed and symptomatic anemia, duodenal ulcer presents for lower GI bleeding for the past 3 days.  Patient reports having to use the bathroom every hour with large bleeding episodes.  She complains of periumbilical abdominal pain and nausea.  She has been having intermittent chest pain, shortness of breath.  Last vomiting episode was yesterday.    The history is provided by the patient and medical records.     Review of patient's allergies indicates:  No Known Allergies  Past Medical History:   Diagnosis Date    Asthma     Bipolar disorder     Hypertension      Past Surgical History:   Procedure Laterality Date    CHOLECYSTECTOMY      COLONOSCOPY N/A 10/22/2024    Procedure: COLONOSCOPY;  Surgeon: Dayday Freed MD;  Location: [facility];  Service: Endoscopy;  Laterality: N/A;    COLONOSCOPY N/A 11/11/2024    Procedure: COLONOSCOPY;  Surgeon: Reese Chen MD;  Location: [facility];  Service: Endoscopy;  Laterality: N/A;    ESOPHAGOGASTRODUODENOSCOPY N/A 10/22/2024    Procedure: EGD (ESOPHAGOGASTRODUODENOSCOPY);  Surgeon: Dayday Freed MD;  Location: [facility];  Service: Endoscopy;  Laterality: N/A;    ESOPHAGOGASTRODUODENOSCOPY N/A 11/11/2024    Procedure: EGD (ESOPHAGOGASTRODUODENOSCOPY);  Surgeon: Reese Chen MD;  Location: [facility];  Service: Endoscopy;  Laterality: N/A;    SHOULDER SURGERY      TUBAL LIGATION       No family history on file.  Social History[1]  Review of Systems    Physical Exam     Initial Vitals   BP Pulse Resp Temp SpO2   02/19/25 1543 02/19/25 1543 02/19/25 1543 02/19/25 1609 02/19/25 1543   (!) 98/56 103 15 98.5 °F (36.9 °C) 100 %      MAP        --                Physical Exam    Nursing note and vitals reviewed.  Constitutional: She appears well-developed and well-nourished. No distress.   HENT:   Head: Normocephalic and atraumatic. Mouth/Throat: Oropharynx is clear and moist.   Eyes: EOM are normal. Pupils are equal, round, and reactive to light.   Conjunctival pallor   Neck: Neck supple.   Normal range of motion.  Cardiovascular:  Regular rhythm and intact distal pulses.           Mild tachycardia   Pulmonary/Chest: Breath sounds normal. No stridor. No respiratory distress.   Abdominal: Abdomen is soft. She exhibits no distension. There is abdominal tenderness.   Generalized abdominal tenderness more prominent periumbilically   Musculoskeletal:         General: Normal range of motion.      Cervical back: Normal range of motion and neck supple.      Comments: Moving all extremities with grossly equal strength     Neurological: She is alert and oriented to person, place, and time.   CN 2-12 appear grossly intact   Skin: Skin is warm and dry. There is pallor.   Psychiatric: She has a normal mood and affect.         ED Course   Procedures  Labs Reviewed   CBC W/ AUTO DIFFERENTIAL - Abnormal       Result Value    WBC 10.35      RBC 3.02 (*)     Hemoglobin 7.1 (*)     Hematocrit 22.7 (*)     MCV 75 (*)     MCH 23.5 (*)     MCHC 31.3 (*)     RDW 17.5 (*)     Platelets 324      MPV 9.7      Immature Granulocytes 1.2 (*)     Gran # (ANC) 7.4      Immature Grans (Abs) 0.12 (*)     Lymph # 1.6      Mono # 1.1 (*)     Eos # 0.0      Baso # 0.02      nRBC 0      Gran % 71.9      Lymph % 15.7 (*)     Mono % 10.6      Eosinophil % 0.4      Basophil % 0.2      Platelet Estimate Appears normal      Aniso Slight      Poik Slight      Poly Occasional      Hypo Occasional      Differential Method Automated     COMPREHENSIVE METABOLIC PANEL - Abnormal    Sodium 125 (*)     Potassium 4.0      Chloride 97      CO2 13 (*)     Glucose 113 (*)     BUN 44 (*)     Creatinine  2.3 (*)     Calcium 7.0 (*)     Total Protein 5.1 (*)     Albumin 1.4 (*)     Total Bilirubin 0.6      Alkaline Phosphatase 75      AST 21      ALT 19      eGFR 24 (*)     Anion Gap 15     TROPONIN I - Abnormal    Troponin I 0.166 (*)    MAGNESIUM - Abnormal    Magnesium 1.5 (*)    LACTIC ACID, PLASMA - Abnormal    Lactate (Lactic Acid) 4.0 (*)     Narrative:     LA   critical result(s) called and verbal readback obtained from   SYLVIE BOWMAN RN [place] [facility] [place]19/2025 20:00   CULTURE, BLOOD   CULTURE, BLOOD   CLOSTRIDIUM DIFFICILE   URINALYSIS, REFLEX TO URINE CULTURE   TYPE & SCREEN    Group & Rh O POS      Indirect Juanis NEG      Specimen Outdate 02/22/2025 23:59     PREPARE RBC SOFT    UNIT NUMBER [id number]      Product Code [id number]      DISPENSE STATUS ISSUED      CODING SYSTEM [id number]      Unit Blood Type Code 5100      Unit Blood Type O POS      Unit Expiration [id number]      CROSSMATCH INTERPRETATION Compatible       EKG Readings: (Independently Interpreted)   Initial Reading: No STEMI. Rhythm: Sinus Tachycardia. Heart Rate: 103. ST Segments: Non-Specific ST Segment Depression. T Waves: Normal. Axis: Normal. Clinical Impression: Sinus Tachycardia       Imaging Results              CT Abdomen Pelvis  Without Contrast (Final result)  Result time 02/19/25 18:24:35   Procedure changed from CTA Acute GI Bleed, Abdomen and Pelvis     Final result by Nick Clifton DO (02/19/25 18:24:35)                   Impression:      Diffusely distended and fluid-filled loops of small and large bowel, with some wall thickening of the large bowel and adjacent pericolonic fat stranding.  Findings are likely related to enterocolitis, with the possibility of ileus or partial small bowel obstruction not entirely excluded.  Further evaluation with direct visualization and tissue sampling if clinically indicated.      Electronically signed by: Nick Clifton  Date:    02/19/2025  Time:    18:24               Narrative:     EXAMINATION:  CT ABDOMEN PELVIS WITHOUT CONTRAST    CLINICAL HISTORY:  Rectal bleeding.    TECHNIQUE:  Multiplanar images were obtained of the abdomen and pelvis from the hemidiaphragms through the symphysis pubis without intravenous contrast.    COMPARISON:  CTA abdomen, and pelvis from 11/07/2024.    FINDINGS:  Lung Bases: Clear.    Heart: Heart size is normal.  No pericardial effusion.  There is decreased attenuation of the blood pool which can be seen with anemia.    Liver: Normal in size and attenuation without focal hepatic lesion.    Biliary tract: Continued dilation of the common bile duct, measuring up to approximately 1.5 cm in maximal diameter, likely secondary to prior cholecystectomy.    Gallbladder: Surgically absent.    Pancreas: There are pancreatic parenchymal calcifications at the pancreatic head, may be related to chronic pancreatitis, stable.  No pancreatic ductal dilatation.    Spleen: There are calcified granulomas of the spleen.    Adrenals: Normal.    Kidneys and urinary collecting systems: Normal.  No hydronephrosis or urolithiasis.    Lymph nodes: None enlarged.    Stomach and bowel: The stomach is normal.  Diffusely distended and fluid-filled loops of small bowel, measuring up to approximately 3.5 cm in diameter.  No transition point is seen.  Diffusely distended and fluid-filled loops of large bowel also noted.  There is colonic wall thickening and mild adjacent pericolonic fat stranding, predominantly within the right lower quadrant.  The appendix is normal.    Peritoneum and mesentery: No ascites or free intraperitoneal air. No abdominal fluid collection.    Vasculature: Normal.    Urinary bladder: Normal.    Reproductive organs: The uterus and adnexae are unremarkable    Body wall: No abnormality.    Musculoskeletal: No aggressive osseous lesion.                                       Medications   0.9%  NaCl infusion (for blood administration) (has no administration in time range)    magnesium sulfate 2g in water 50mL IVPB (premix) (has no administration in time range)   NORepinephrine 4 mg in dextrose 5% 250 mL infusion (premix) (has no administration in time range)   morphine injection 8 mg (8 mg Intravenous Given 2/19/25 2031)   ondansetron injection 4 mg (4 mg Intravenous Given 2/19/25 2030)   sodium chloride 0.9% bolus 1,000 mL 1,000 mL (0 mLs Intravenous Stopped 2/19/25 1839)   piperacillin-tazobactam (ZOSYN) 4.5 g in D5W 100 mL IVPB (MB+) (4.5 g Intravenous New Bag 2/19/25 1930)   lactated ringers bolus 1,000 mL (1,000 mLs Intravenous New Bag 2/19/25 2006)     Medical Decision Making  Chronically ill-appearing, pallor on exam suspect anemia secondary to GI bleed.  Lower GI bleed likely from her history of amyloidosis.    Amount and/or Complexity of Data Reviewed  Labs: ordered. Decision-making details documented in ED Course.  Radiology: ordered and independent interpretation performed. Decision-making details documented in ED Course.  ECG/medicine tests: ordered and independent interpretation performed. Decision-making details documented in ED Course.  Discussion of management or test interpretation with external provider(s): Case discussed with Gastroenterology Dr. Chen who will consult on the patient tomorrow after resuscitation he is very familiar with the patient's case    Case discussed with hospital medicine Dr. Hoffmann for admission, patient to need ICU due to starting pressors    Risk  Prescription drug management.  Decision regarding hospitalization.  Risk Details: This patient does have evidence of infective focus  My overall impression is sepsis.  Source: Abdominal  Antibiotics given- Antibiotics (72h ago, onward)    None      Latest lactate reviewed-  Lab             02/19/25 1928          LACTATE      4.0*          Organ dysfunction indicated by Acute kidney injury    Fluid challenge Ideal Body Weight- The patient's ideal body weight is Ideal body  weight: 61.6 kg (135 lb 12.9 oz) which will be used to calculate fluid bolus of 30 ml/kg for treatment of septic shock.      Post- resuscitation assessment Yes - I attest a sepsis perfusion exam was performed within 6 hours of sepsis, severe sepsis, or septic shock presentation, following fluid resuscitation.      Will Start Pressors- Levophed for MAP of 65  Source control achieved by: Zosyn      Critical Care  Total time providing critical care: 65 minutes               ED Course as of 02/19/25 2106 Wed Feb 19, 2025 1848 CT abdomen pelvis reviewed independently agree with Radiology interpretation  Impression:     Diffusely distended and fluid-filled loops of small and large bowel, with some wall thickening of the large bowel and adjacent pericolonic fat stranding.  Findings are likely related to enterocolitis, with the possibility of ileus or partial small bowel obstruction not entirely excluded.  Further evaluation with direct visualization and tissue sampling if clinically indicated.         [AP]   1849 CBC shows worsening chronic anemia likely from patient's GI bleed no leukocytosis.  Chemistry panel shows hyponatremia evidence of possible acidosis with low bicarb, worsening of CKD reflective of acute kidney injury.  Hypoalbuminemia.  Patient is also hypomagnesemic although potassium is normal.  Troponin mildly elevated however consistent with baseline likely secondary to CKD although concerning in the setting acute GI bleed, hypotension. [AP]   1851 Given hypotension and CT findings of enterocolitis raises concern for possible sepsis although I think the patient's symptoms are primarily secondary to acute GI bleed.  Workup expanded to include lactate, blood cultures.  Zosyn ordered for broad-spectrum treatment of enterocolitis.  Patient already received 1 L IV fluid bolus.  Additional L ordered which will bring her to over 30 cc/kilos based on ideal body weight. [AP]   2104 Discussed results of workup with  the patient.  Notified that she would require central line access for Levophed administration.  She is refusing central line in the ED but agrees to a PICC line placement. [AP]      ED Course User Index  [AP] Jeffrey Peterson DO                           Clinical Impression:  Final diagnoses:  [K92.2] GI bleed (Primary)  [N17.9] KENY (acute kidney injury)  [I95.9] Hypotension, unspecified hypotension type  [K52.9] Enterocolitis          ED Disposition Condition    Admit Stable                    [1]   Social History  Tobacco Use    Smoking status: Every Day     Current packs/day: 1.00     Average packs/day: 2.0 packs/day for 58.1 years (115.2 ttl pk-yrs)     Types: Cigarettes     Start date: 1967    Smokeless tobacco: Never    Tobacco comments:     Pt is a 2 pk/day cigarette smoker x 57 yrs. She states that she cut down to about 1 pk/day, and is trying to quit.  Ambulatory referral to Smoking Cessation clinic following hospital discharge.     Substance Use Topics    Alcohol use: Yes     Comment: socailly    Drug use: Yes     Types: Methamphetamines, Marijuana     Comment: denies cocaine or meth. Reports maijurana use        Jeffrey Peterson,   02/19/25 8591

## 2025-02-20 PROBLEM — R57.1 HYPOVOLEMIC SHOCK: Status: ACTIVE | Noted: 2025-01-01

## 2025-02-20 PROBLEM — E87.1 HYPONATREMIA: Status: ACTIVE | Noted: 2025-01-01

## 2025-02-20 PROBLEM — K52.9 ENTEROCOLITIS: Status: ACTIVE | Noted: 2025-01-01

## 2025-02-20 NOTE — CONSULTS
[facility] - Intensive Care  Hematology/Oncology  Consult Note    Patient Name: Mary Ellen Saini  MRN: [id number]  Admission Date: 2/19/2025  Hospital Length of Stay: 1 days  Code Status: Full Code   Attending Provider: Jessica Charles*  Consulting Provider: Octavio Hays MD  Primary Care Physician: No, Primary Doctor  Principal Problem:Sepsis    Inpatient consult to Hematology/Oncology  Consult performed by: Octavio Hays MD  Consult ordered by: Jeancarlos Beltran MD  Reason for consult: amyloidosis        Subjective:     HPI:  59 year-old female with amyloidosis (based of colon biopsy) was admitted for hematochezia, concern for sepsis. Consult is for amyloidosis.       - she endorses fatigue, weakness, dyspnea upon exertion, abdominal pain, rectal bleeding.      Oncology Treatment Plan:   [No matching plan found]    Medications:  Continuous Infusions:   NORepinephrine bitartrate-D5W  0-3 mcg/kg/min Intravenous Continuous 23.3 mL/hr at 02/20/25 1400 0.08 mcg/kg/min at 02/20/25 1400     Scheduled Meds:   aluminum-magnesium hydroxide-simethicone  30 mL Oral QID (AC & HS)    mupirocin   Nasal BID    pantoprazole  40 mg Intravenous Daily    piperacillin-tazobactam (Zosyn) IV (PEDS and ADULTS) (extended infusion is not appropriate)  4.5 g Intravenous Q8H     PRN Meds:  Current Facility-Administered Medications:     0.9%  NaCl infusion (for blood administration), , Intravenous, Q24H PRN    HYDROmorphone, 1 mg, Intravenous, Q6H PRN    ondansetron, 4 mg, Intravenous, Q8H PRN    Flushing PICC/Midline Protocol, , , Until Discontinued **AND** sodium chloride 0.9%, 10 mL, Intravenous, Q12H PRN    Flushing PICC/Midline Protocol, , , Until Discontinued **AND** sodium chloride 0.9%, 10 mL, Intravenous, Q12H PRN    sodium chloride 0.9%, 10 mL, Intravenous, PRN     Review of patient's allergies indicates:  No Known Allergies     Past Medical History:   Diagnosis Date    Asthma     Bipolar disorder     Hypertension      Past  Surgical History:   Procedure Laterality Date    CHOLECYSTECTOMY      COLONOSCOPY N/A 10/22/2024    Procedure: COLONOSCOPY;  Surgeon: Dayday Freed MD;  Location: [facility];  Service: Endoscopy;  Laterality: N/A;    COLONOSCOPY N/A 11/11/2024    Procedure: COLONOSCOPY;  Surgeon: Reese Chen MD;  Location: [facility];  Service: Endoscopy;  Laterality: N/A;    ESOPHAGOGASTRODUODENOSCOPY N/A 10/22/2024    Procedure: EGD (ESOPHAGOGASTRODUODENOSCOPY);  Surgeon: Dayday Freed MD;  Location: [facility];  Service: Endoscopy;  Laterality: N/A;    ESOPHAGOGASTRODUODENOSCOPY N/A 11/11/2024    Procedure: EGD (ESOPHAGOGASTRODUODENOSCOPY);  Surgeon: Reese Chen MD;  Location: [facility];  Service: Endoscopy;  Laterality: N/A;    SHOULDER SURGERY      TUBAL LIGATION       Family History    None       Tobacco Use    Smoking status: Every Day     Current packs/day: 1.00     Average packs/day: 2.0 packs/day for 58.1 years (115.2 ttl pk-yrs)     Types: Cigarettes     Start date: 1967    Smokeless tobacco: Never    Tobacco comments:     Pt is a 2 pk/day cigarette smoker x 57 yrs. She states that she cut down to about 1 pk/day, and is trying to quit.  Ambulatory referral to Smoking Cessation clinic following hospital discharge.     Substance and Sexual Activity    Alcohol use: Yes     Comment: socailly    Drug use: Yes     Types: Methamphetamines, Marijuana     Comment: denies cocaine or meth. Reports maijurana use    Sexual activity: Yes       Review of Systems   Constitutional:  Positive for fatigue.   HENT:  Negative for sore throat.    Eyes:  Negative for visual disturbance.   Respiratory:  Positive for shortness of breath. Negative for cough.    Cardiovascular:  Negative for chest pain.   Gastrointestinal:  Positive for abdominal pain and blood in stool. Negative for constipation, diarrhea, nausea and vomiting.   Genitourinary:  Negative for dysuria.   Musculoskeletal:  Negative for back pain.    Skin:  Negative for rash.   Neurological:  Positive for weakness. Negative for headaches.   Hematological:  Negative for adenopathy.   Psychiatric/Behavioral:  The patient is not nervous/anxious.      Objective:     Vital Signs (Most Recent):  Temp: 97.8 °F (36.6 °C) (02/20/25 1200)  Pulse: (!) 126 (02/20/25 1500)  Resp: 20 (02/20/25 1500)  BP: (!) 94/57 (02/20/25 1500)  SpO2: 97 % (02/20/25 1500) Vital Signs (24h Range):  Temp:  [97 °F (36.1 °C)-98.5 °F (36.9 °C)] 97.8 °F (36.6 °C)  Pulse:  [] 126  Resp:  [12-36] 20  SpO2:  [91 %-100 %] 97 %  BP: ()/(44-86) 94/57     Weight: 70 kg (154 lb 5.2 oz)  Body mass index is 21.52 kg/m².  Body surface area is 1.87 meters squared.      Intake/Output Summary (Last 24 hours) at 2/20/2025 1546  Last data filed at 2/20/2025 1200  Gross per 24 hour   Intake 3210.33 ml   Output --   Net 3210.33 ml        Physical Exam  Vitals and nursing note reviewed.   Constitutional:       Appearance: She is well-developed.      Comments: Appears uncomfortable   HENT:      Head: Normocephalic and atraumatic.   Eyes:      Pupils: Pupils are equal, round, and reactive to light.   Cardiovascular:      Rate and Rhythm: Regular rhythm. Tachycardia present.   Pulmonary:      Effort: Pulmonary effort is normal.      Breath sounds: Normal breath sounds.   Abdominal:      General: Bowel sounds are normal.      Palpations: Abdomen is soft.   Musculoskeletal:         General: Normal range of motion.      Cervical back: Normal range of motion and neck supple.   Skin:     General: Skin is warm and dry.   Neurological:      Mental Status: She is alert and oriented to person, place, and time.   Psychiatric:         Behavior: Behavior normal.         Thought Content: Thought content normal.         Judgment: Judgment normal.          Significant Labs:   CBC:   Recent Labs   Lab 02/19/25  1615 02/20/25  0144 02/20/25  0606 02/20/25  0802 02/20/25  1528   WBC 10.35  --  SEE COMMENT 16.46*  --     HGB 7.1*   < > SEE COMMENT 9.2* 9.1*   HCT 22.7*   < > SEE COMMENT 28.7* 29.2*     --  SEE COMMENT 343  --     < > = values in this interval not displayed.    and CMP:   Recent Labs   Lab 02/19/25  1615 02/20/25  0606 02/20/25  0802   * 120* 125*   K 4.0 3.6 3.9   CL 97 91* 97   CO2 13* 13* 13*   * 379* 118*   BUN 44* 47* 51*   CREATININE 2.3* 2.5* 2.3*   CALCIUM 7.0* 7.1* 7.2*   PROT 5.1* 5.3* 5.6*   ALBUMIN 1.4* 1.4* 1.4*   BILITOT 0.6 0.9 1.0   ALKPHOS 75 80 85   AST 21 21 25   ALT 19 19 21   ANIONGAP 15 16 15        Assessment/Plan:     Other amyloidosis  - biopsy of stomach/duodenum/colon (11/11/24) revealed amyloidosis.  - admitted for hematochezia, concern for sepsis.  - she has had multiple hospitalizations for similar presentations  - she had canceled her appt with Dr. Luna in January 2025.  - I am concerned that she will not make it to outpatient therapy. Amyloidosis is managed in the outpatient setting and treatment does not produce immediate improvement in most cases. In most cases progress/improvement is slow.  - recommend palliative care consult  - will inform Dr. Luna about her current hospitalization.        Thank you for your consult.     Octavio Hays MD  Hematology/Oncology  [place] - Intensive Care

## 2025-02-20 NOTE — HPI
59 year-old female with amyloidosis (based of colon biopsy) was admitted for hematochezia, concern for sepsis. Consult is for amyloidosis.

## 2025-02-20 NOTE — ASSESSMENT & PLAN NOTE
Anemia is likely due to acute blood loss which was from GI bleed . Most recent hemoglobin and hematocrit are listed below.  Recent Labs     02/19/25  1615 02/20/25  0144   HGB 7.1* 8.4*   HCT 22.7* 25.8*     Plan  - Monitor serial CBC: Every 8 hours  - Transfuse PRBC if patient becomes hemodynamically unstable, symptomatic or H/H drops below 7/21.  - Patient has received 1 units of PRBCs on 2/19/2025  - Patient's anemia is currently worsening. Will continue current treatment  -recent diagnosis of colon CA  - transfuse as needed  -hold blood thinners  -consult GI   No

## 2025-02-20 NOTE — ASSESSMENT & PLAN NOTE
Hyponatremia is likely due to Dehydration/hypovolemia. The patient's most recent sodium results are listed below.  Recent Labs     02/19/25  1615 02/20/25  0606 02/20/25  0802   * 120* 125*     Plan  - Correct the sodium by 4-6mEq in 24 hours.   - Obtain the following studies: Urine sodium, urine osmolality, serum osmolality.  - Will treat the hyponatremia with IV fluids as follows:   - Monitor sodium Daily.   - Patient hyponatremia is stable  -

## 2025-02-20 NOTE — PHARMACY MED REC
"[facility]           Pharmacy  Admission Medication History     The home medication history was taken by Caitlin Stinson.      Medication history obtained from Medications listed below were obtained from: Patient/family    Based on information gathered for medication list, you may go to "Admission" then "Reconcile Home Medications" tabs to review and/or act upon those items.     The home medication list has been updated by the Pharmacy department.   Please read ALL comments highlighted in yellow.   Please address this information as you see fit.    Feel free to contact us if you have any questions or require assistance.    The medications listed below were removed from the home medication list.  Please reorder if appropriate:    Patient reports NOT TAKING the following medication(s):  Mycostatin cream      No current facility-administered medications on file prior to encounter.     Current Outpatient Medications on File Prior to Encounter   Medication Sig Dispense Refill    albuterol (PROVENTIL/VENTOLIN HFA) 90 mcg/actuation inhaler Inhale 1-2 puffs into the lungs every 6 (six) hours as needed for Wheezing. Rescue 18 g 1    ferrous sulfate 324 mg (65 mg iron) TbEC Take 1 tablet (324 mg total) by mouth once daily. 30 tablet 0    furosemide (LASIX) 40 MG tablet Take 1 tablet (40 mg total) by mouth 2 (two) times daily. 60 tablet 11    lactulose (CHRONULAC) 10 gram/15 mL solution Take 30 mLs (20 g total) by mouth 3 (three) times daily. 2700 mL 1    LIDOcaine (LIDODERM) 5 % Place 2 patches onto the skin every evening. Remove & Discard patch within 12 hours or as directed by MD 30 patch 1    magnesium 250 mg Tab Take 250 mg by mouth once daily.      metoprolol succinate (TOPROL-XL) 50 MG 24 hr tablet Take 1 tablet (50 mg total) by mouth once daily. 30 tablet 0    oxybutynin (DITROPAN) 5 MG Tab Take 1 tablet (5 mg total) by mouth 3 (three) times daily. 90 tablet 1    pantoprazole (PROTONIX) 40 MG " tablet Take 1 tablet (40 mg total) by mouth 2 (two) times daily. 60 tablet 1    potassium gluconate 595 mg (99 mg) Tab Take 1 tablet by mouth once daily.      sucralfate (CARAFATE) 1 gram tablet Take 1 tablet (1 g total) by mouth 3 (three) times daily before meals. 90 tablet 1    tiotropium (SPIRIVA) 18 mcg inhalation capsule Inhale 1 capsule (18 mcg total) into the lungs once daily. Controller 90 capsule 3    naloxone (NARCAN) 4 mg/actuation Spry 4mg by nasal route as needed for opioid overdose; may repeat every 2-3 minutes in alternating nostrils until medical help arrives. Call 911 2 each 11       Please address this information as you see fit.  Feel free to contact us if you have any questions or require assistance.    Caitlin Stinson  [phone]                  .

## 2025-02-20 NOTE — ED NOTES
Patient Mary Ellen Saini transported to the floor without incident Patient remained connected to the Cardiac monitor throughout transport, Patient remained in NAD throughout transport, patient arrived to new room without incident. Unit staff made aware of patient arrival. Care formally handed off and transitioned to the receiving unit.       MARÍA Montes De Oca informed of request of indigestion med

## 2025-02-20 NOTE — ASSESSMENT & PLAN NOTE
This patient does have evidence of infective focus  My overall impression is septic shock due to lactate > 4, MAP < 65, and SBP < 90.  Source: Abdominal  Antibiotics given-   Antibiotics (72h ago, onward)      Start     Stop Route Frequency Ordered    02/20/25 0900  mupirocin 2 % ointment         02/25/25 0859 Nasl 2 times daily 02/19/25 2326          Latest lactate reviewed-  Recent Labs   Lab 02/19/25  2315   LACTATE 3.2*     Organ dysfunction indicated by Acute kidney injury and Acute heart failure    Fluid challenge Actual Body weight- Patient will receive 30ml/kg actual body weight to calculate fluid bolus for treatment of septic shock.     Post- resuscitation assessment Yes - I attest a sepsis perfusion exam was performed within 6 hours of sepsis, severe sepsis, or septic shock presentation, following fluid resuscitation.      Will Start Pressors- Levophed for MAP of 65  Source control achieved by:  IV fluid, antibiotics, vasopressors, PRBC

## 2025-02-20 NOTE — HPI
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  Gi consulted for rectal bleeding  Pt with worsening abd tenderness, assoc rectal bleeding. Has had similar symptoms in the past that are intermittent, has known colonic amyloid, see prior endoscopy 3 months ago. Assoc nasuea, abd distention, weakness, SOB , chest pain.    CT reviewed, showing diffuse enterocolitis, again suggestive of systemic process of which she has known amyloid.

## 2025-02-20 NOTE — SUBJECTIVE & OBJECTIVE
- she endorses fatigue, weakness, dyspnea upon exertion, abdominal pain, rectal bleeding.      Oncology Treatment Plan:   [No matching plan found]    Medications:  Continuous Infusions:   NORepinephrine bitartrate-D5W  0-3 mcg/kg/min Intravenous Continuous 23.3 mL/hr at 02/20/25 1400 0.08 mcg/kg/min at 02/20/25 1400     Scheduled Meds:   aluminum-magnesium hydroxide-simethicone  30 mL Oral QID (AC & HS)    mupirocin   Nasal BID    pantoprazole  40 mg Intravenous Daily    piperacillin-tazobactam (Zosyn) IV (PEDS and ADULTS) (extended infusion is not appropriate)  4.5 g Intravenous Q8H     PRN Meds:  Current Facility-Administered Medications:     0.9%  NaCl infusion (for blood administration), , Intravenous, Q24H PRN    HYDROmorphone, 1 mg, Intravenous, Q6H PRN    ondansetron, 4 mg, Intravenous, Q8H PRN    Flushing PICC/Midline Protocol, , , Until Discontinued **AND** sodium chloride 0.9%, 10 mL, Intravenous, Q12H PRN    Flushing PICC/Midline Protocol, , , Until Discontinued **AND** sodium chloride 0.9%, 10 mL, Intravenous, Q12H PRN    sodium chloride 0.9%, 10 mL, Intravenous, PRN     Review of patient's allergies indicates:  No Known Allergies     Past Medical History:   Diagnosis Date    Asthma     Bipolar disorder     Hypertension      Past Surgical History:   Procedure Laterality Date    CHOLECYSTECTOMY      COLONOSCOPY N/A 10/22/2024    Procedure: COLONOSCOPY;  Surgeon: Dayday Freed MD;  Location: [facility];  Service: Endoscopy;  Laterality: N/A;    COLONOSCOPY N/A 11/11/2024    Procedure: COLONOSCOPY;  Surgeon: Reese Chen MD;  Location: [facility];  Service: Endoscopy;  Laterality: N/A;    ESOPHAGOGASTRODUODENOSCOPY N/A 10/22/2024    Procedure: EGD (ESOPHAGOGASTRODUODENOSCOPY);  Surgeon: Dayday Freed MD;  Location: [facility];  Service: Endoscopy;  Laterality: N/A;    ESOPHAGOGASTRODUODENOSCOPY N/A 11/11/2024    Procedure: EGD (ESOPHAGOGASTRODUODENOSCOPY);  Surgeon: Reese Chen  MD JORDAN;  Location: [facility];  Service: Endoscopy;  Laterality: N/A;    SHOULDER SURGERY      TUBAL LIGATION       Family History    None       Tobacco Use    Smoking status: Every Day     Current packs/day: 1.00     Average packs/day: 2.0 packs/day for 58.1 years (115.2 ttl pk-yrs)     Types: Cigarettes     Start date: 1967    Smokeless tobacco: Never    Tobacco comments:     Pt is a 2 pk/day cigarette smoker x 57 yrs. She states that she cut down to about 1 pk/day, and is trying to quit.  Ambulatory referral to Smoking Cessation clinic following hospital discharge.     Substance and Sexual Activity    Alcohol use: Yes     Comment: socailly    Drug use: Yes     Types: Methamphetamines, Marijuana     Comment: denies cocaine or meth. Reports maijurana use    Sexual activity: Yes       Review of Systems   Constitutional:  Positive for fatigue.   HENT:  Negative for sore throat.    Eyes:  Negative for visual disturbance.   Respiratory:  Positive for shortness of breath. Negative for cough.    Cardiovascular:  Negative for chest pain.   Gastrointestinal:  Positive for abdominal pain and blood in stool. Negative for constipation, diarrhea, nausea and vomiting.   Genitourinary:  Negative for dysuria.   Musculoskeletal:  Negative for back pain.   Skin:  Negative for rash.   Neurological:  Positive for weakness. Negative for headaches.   Hematological:  Negative for adenopathy.   Psychiatric/Behavioral:  The patient is not nervous/anxious.      Objective:     Vital Signs (Most Recent):  Temp: 97.8 °F (36.6 °C) (02/20/25 1200)  Pulse: (!) 126 (02/20/25 1500)  Resp: 20 (02/20/25 1500)  BP: (!) 94/57 (02/20/25 1500)  SpO2: 97 % (02/20/25 1500) Vital Signs (24h Range):  Temp:  [97 °F (36.1 °C)-98.5 °F (36.9 °C)] 97.8 °F (36.6 °C)  Pulse:  [] 126  Resp:  [12-36] 20  SpO2:  [91 %-100 %] 97 %  BP: ()/(44-86) 94/57     Weight: 70 kg (154 lb 5.2 oz)  Body mass index is 21.52 kg/m².  Body surface area is 1.87 meters  squared.      Intake/Output Summary (Last 24 hours) at 2/20/2025 1546  Last data filed at 2/20/2025 1200  Gross per 24 hour   Intake 3210.33 ml   Output --   Net 3210.33 ml        Physical Exam  Vitals and nursing note reviewed.   Constitutional:       Appearance: She is well-developed.      Comments: Appears uncomfortable   HENT:      Head: Normocephalic and atraumatic.   Eyes:      Pupils: Pupils are equal, round, and reactive to light.   Cardiovascular:      Rate and Rhythm: Regular rhythm. Tachycardia present.   Pulmonary:      Effort: Pulmonary effort is normal.      Breath sounds: Normal breath sounds.   Abdominal:      General: Bowel sounds are normal.      Palpations: Abdomen is soft.   Musculoskeletal:         General: Normal range of motion.      Cervical back: Normal range of motion and neck supple.   Skin:     General: Skin is warm and dry.   Neurological:      Mental Status: She is alert and oriented to person, place, and time.   Psychiatric:         Behavior: Behavior normal.         Thought Content: Thought content normal.         Judgment: Judgment normal.          Significant Labs:   CBC:   Recent Labs   Lab 02/19/25  1615 02/20/25  0144 02/20/25  0606 02/20/25  0802 02/20/25  1528   WBC 10.35  --  SEE COMMENT 16.46*  --    HGB 7.1*   < > SEE COMMENT 9.2* 9.1*   HCT 22.7*   < > SEE COMMENT 28.7* 29.2*     --  SEE COMMENT 343  --     < > = values in this interval not displayed.    and CMP:   Recent Labs   Lab 02/19/25  1615 02/20/25  0606 02/20/25  0802   * 120* 125*   K 4.0 3.6 3.9   CL 97 91* 97   CO2 13* 13* 13*   * 379* 118*   BUN 44* 47* 51*   CREATININE 2.3* 2.5* 2.3*   CALCIUM 7.0* 7.1* 7.2*   PROT 5.1* 5.3* 5.6*   ALBUMIN 1.4* 1.4* 1.4*   BILITOT 0.6 0.9 1.0   ALKPHOS 75 80 85   AST 21 21 25   ALT 19 19 21   ANIONGAP 15 16 15

## 2025-02-20 NOTE — ASSESSMENT & PLAN NOTE
This patient has shock. The type of shock is hemorrhagic. The patient has the following evidence of shock: persistent hypotension, elevated lactic acid after adequate fluid resuscitation, and KENY. The patient will be admitted to an intensive care unit, they will be treated with IV fluids, PRBC, vasopressors.

## 2025-02-20 NOTE — ED NOTES
This RN and Iraida RN @bs to inject phentalomine/regitine. Pt very upset regarding multiple sticks, but this RN and Iraida RN able to educate pt regarding risks of not administering medication.

## 2025-02-20 NOTE — ED NOTES
PICC RN MOHINDER STATES XRAY LOOKS GOOD, PICC CONFIRMED TO BE IN PLACE. LEVOPHED TO SWITCH TO PICC LINE.

## 2025-02-20 NOTE — ASSESSMENT & PLAN NOTE
KENY is likely due to pre-renal azotemia due to intravascular volume depletion. Baseline creatinine is  1.3 . Most recent creatinine and eGFR are listed below.  Recent Labs     02/19/25  1615 02/20/25  0606 02/20/25  0802   CREATININE 2.3* 2.5* 2.3*   EGFRNORACEVR 24* 22* 24*      Plan  - KENY is improving  - Avoid nephrotoxins and renally dose meds for GFR listed above  - Monitor urine output, serial BMP, and adjust therapy as needed  -

## 2025-02-20 NOTE — SUBJECTIVE & OBJECTIVE
Past Medical History:   Diagnosis Date    Asthma     Bipolar disorder     Hypertension        Past Surgical History:   Procedure Laterality Date    CHOLECYSTECTOMY      COLONOSCOPY N/A 10/22/2024    Procedure: COLONOSCOPY;  Surgeon: Dayday Freed MD;  Location: [facility];  Service: Endoscopy;  Laterality: N/A;    COLONOSCOPY N/A 11/11/2024    Procedure: COLONOSCOPY;  Surgeon: Reese Chen MD;  Location: [facility];  Service: Endoscopy;  Laterality: N/A;    ESOPHAGOGASTRODUODENOSCOPY N/A 10/22/2024    Procedure: EGD (ESOPHAGOGASTRODUODENOSCOPY);  Surgeon: Dayday Freed MD;  Location: [facility] END[facility];  Service: Endoscopy;  Laterality: N/A;    ESOPHAGOGASTRODUODENOSCOPY N/A 11/11/2024    Procedure: EGD (ESOPHAGOGASTRODUODENOSCOPY);  Surgeon: Reese Chen MD;  Location: [facility];  Service: Endoscopy;  Laterality: N/A;    SHOULDER SURGERY      TUBAL LIGATION         Review of patient's allergies indicates:  No Known Allergies    No current facility-administered medications on file prior to encounter.     Current Outpatient Medications on File Prior to Encounter   Medication Sig    albuterol (PROVENTIL/VENTOLIN HFA) 90 mcg/actuation inhaler Inhale 1-2 puffs into the lungs every 6 (six) hours as needed for Wheezing. Rescue    ferrous sulfate 324 mg (65 mg iron) TbEC Take 1 tablet (324 mg total) by mouth once daily.    furosemide (LASIX) 40 MG tablet Take 1 tablet (40 mg total) by mouth 2 (two) times daily.    lactulose (CHRONULAC) 10 gram/15 mL solution Take 30 mLs (20 g total) by mouth 3 (three) times daily.    LIDOcaine (LIDODERM) 5 % Place 2 patches onto the skin every evening. Remove & Discard patch within 12 hours or as directed by MD    magnesium 250 mg Tab Take 250 mg by mouth once daily.    metoprolol succinate (TOPROL-XL) 50 MG 24 hr tablet Take 1 tablet (50 mg total) by mouth once daily.    oxybutynin (DITROPAN) 5 MG Tab Take 1 tablet (5 mg total) by mouth 3 (three) times daily.     pantoprazole (PROTONIX) 40 MG tablet Take 1 tablet (40 mg total) by mouth 2 (two) times daily.    potassium gluconate 595 mg (99 mg) Tab Take 1 tablet by mouth once daily.    sucralfate (CARAFATE) 1 gram tablet Take 1 tablet (1 g total) by mouth 3 (three) times daily before meals.    tiotropium (SPIRIVA) 18 mcg inhalation capsule Inhale 1 capsule (18 mcg total) into the lungs once daily. Controller    magic mouthwash diphen/antac/lidoc/nysta take 10 mLs 4 (four) times daily. (Patient not taking: Reported on 2/19/2025)    naloxone (NARCAN) 4 mg/actuation Spry 4mg by nasal route as needed for opioid overdose; may repeat every 2-3 minutes in alternating nostrils until medical help arrives. Call 911    nicotine (NICODERM CQ) 21 mg/24 hr Place 1 patch onto the skin once daily. (Patient not taking: Reported on 2/19/2025)    tiotropium bromide (SPIRIVA RESPIMAT) 2.5 mcg/actuation inhaler Inhale 2 puffs into the lungs Daily. Controller (Patient not taking: Reported on 2/19/2025)     Family History    None       Tobacco Use    Smoking status: Every Day     Current packs/day: 1.00     Average packs/day: 2.0 packs/day for 58.1 years (115.2 ttl pk-yrs)     Types: Cigarettes     Start date: 1967    Smokeless tobacco: Never    Tobacco comments:     Pt is a 2 pk/day cigarette smoker x 57 yrs. She states that she cut down to about 1 pk/day, and is trying to quit.  Ambulatory referral to Smoking Cessation clinic following hospital discharge.     Substance and Sexual Activity    Alcohol use: Yes     Comment: socailly    Drug use: Yes     Types: Methamphetamines, Marijuana     Comment: denies cocaine or meth. Reports maijurana use    Sexual activity: Yes     Review of Systems   Constitutional:  Positive for fatigue.   Respiratory:  Positive for shortness of breath.    Gastrointestinal:  Positive for abdominal distention, abdominal pain and blood in stool.   Neurological:  Positive for weakness.   All other systems reviewed and are  negative.    Objective:     Vital Signs (Most Recent):  Temp: 97.9 °F (36.6 °C) (02/19/25 2124)  Pulse: (!) 124 (02/20/25 0325)  Resp: 20 (02/20/25 0325)  BP: 117/73 (02/20/25 0325)  SpO2: 95 % (02/20/25 0325) Vital Signs (24h Range):  Temp:  [97.9 °F (36.6 °C)-98.5 °F (36.9 °C)] 97.9 °F (36.6 °C)  Pulse:  [] 124  Resp:  [14-36] 20  SpO2:  [91 %-100 %] 95 %  BP: ()/(44-86) 117/73     Weight: 77.6 kg (171 lb)  Body mass index is 26.78 kg/m².     Physical Exam  Constitutional:       Appearance: She is ill-appearing.   HENT:      Head: Normocephalic.      Mouth/Throat:      Mouth: Mucous membranes are dry.      Pharynx: Oropharynx is clear.   Eyes:      Pupils: Pupils are equal, round, and reactive to light.   Cardiovascular:      Rate and Rhythm: Regular rhythm. Tachycardia present.      Heart sounds: Normal heart sounds.   Pulmonary:      Comments: Normal breath sounds, increased work of breathing.  Abdominal:      General: There is distension.      Palpations: Abdomen is soft.      Tenderness: There is abdominal tenderness.   Musculoskeletal:         General: Normal range of motion.      Cervical back: Normal range of motion.   Skin:     Capillary Refill: Capillary refill takes less than 2 seconds.      Coloration: Skin is pale.   Neurological:      Mental Status: She is oriented to person, place, and time.   Psychiatric:         Behavior: Behavior normal.              CRANIAL NERVES     CN III, IV, VI   Pupils are equal, round, and reactive to light.       Significant Labs: All pertinent labs within the past 24 hours have been reviewed.  Recent Lab Results  (Last 5 results in the past 24 hours)        02/20/25  0144   02/19/25  2315   02/19/25  1928   02/19/25  1739   02/19/25  1716        Unit Blood Type Code         5100       Unit Expiration         [id number]       Unit Blood Type         O POS       CODING SYSTEM         [id number]       Crossmatch Interpretation         Compatible       DISPENSE  STATUS         TRANSFUSED       Group & Rh         O POS       Hematocrit 25.8               Hemoglobin 8.4               INDIRECT MO         NEG       Lactic Acid Level   3.2  Comment: Falsely low lactic acid results can be found in samples   containing >=13.0 mg/dL total bilirubin and/or >=3.5 mg/dL   direct bilirubin.     4.0  Comment: Falsely low lactic acid results can be found in samples   containing >=13.0 mg/dL total bilirubin and/or >=3.5 mg/dL   direct bilirubin.  LA   critical result(s) called and verbal readback obtained from   SYLVIE BOWMAN RN by U 02/19/2025 20:00             Magnesium        1.5         Product Code         [id number]       Specimen Outdate         02/22/2025 23:59       Troponin I       0.166  Comment: The reference interval for Troponin I represents the 99th percentile   cutoff   for our facility and is consistent with 3rd generation assay   performance.           UNIT NUMBER         [id number]                              Significant Imaging: I have reviewed all pertinent imaging results/findings within the past 24 hours.  I have reviewed and interpreted all pertinent imaging results/findings within the past 24 hours.

## 2025-02-20 NOTE — CARE UPDATE
Patient requiring higher dose sof norepinephrine  this afternoon to maintain BP.  Tachycardic 120-140. Patient endorses pain in upper chest area described as burning indigestion not relieved my malox. Mentation normal.  No BM's reported or seen on exam.     H/H stable; EKG, CXR show  no acute changes; troponin slightly elevated to 0.184 from 0.166 on admission.  Will trend Troponins and EKG's q 3 hours until troponin plateaus.

## 2025-02-20 NOTE — ASSESSMENT & PLAN NOTE
Diffuse on imaging from known colonic amyloid, untreated.  Also with CHF which is likely from amyloid    She has not been able to make scheduled follow up to get treatment for amyloid, with barriers of lack of transport and lack of social support as well    No further rectal bleeding today reported, Hgb stable and lactic down.    Recs  Consultation with hematology  Consultation with social work  Consider consultation with palliative care depending on treatment options from hematology  Montior stool output  Check Cdiff and other stool studies   Supportive care  Empiric Abx to protect from translocation  Serial abd exams    I do not feel endoscopy will change the current trajectory of her care , as she has systemic amyloid biopsy proven, and if she does have concomitant ischemic colitis, the treatment is currently the same.    Will follow  Discussed with ICU team extensively

## 2025-02-20 NOTE — PROCEDURES
"Mary Ellen Saini is a 59 y.o. female patient.    Temp: 97.9 °F (36.6 °C) (02/19/25 2124)  Pulse: 94 (02/19/25 2226)  Resp: 19 (02/19/25 2226)  BP: (!) 90/50 (02/19/25 2226)  SpO2: 96 % (02/19/25 2226)  Weight: 77.6 kg (171 lb) (02/19/25 1609)  Height: 5' 7" (170.2 cm) (02/19/25 1609)    PICC  Date/Time: 2/19/2025 10:25 PM  Performed by: Papo Castro RN  Consent Done: Yes  Time out: Immediately prior to procedure a time out was called to verify the correct patient, procedure, equipment, support staff and site/side marked as required  Indications: med administration and vascular access  Anesthesia: local infiltration  Local anesthetic: lidocaine 1% without epinephrine  Anesthetic Total (mL): 2  Preparation: skin prepped with ChloraPrep  Skin prep agent dried: skin prep agent completely dried prior to procedure  Sterile barriers: all five maximum sterile barriers used - cap, mask, sterile gown, sterile gloves, and large sterile sheet  Hand hygiene: hand hygiene performed prior to central venous catheter insertion  Location details: right basilic  Catheter type: triple lumen  Catheter size: 5 Fr  Catheter Length: 35cm    Ultrasound guidance: yes  Vessel Caliber: medium, compressibility normal  Vascular Doppler: not done  Needle advanced into vessel with real time Ultrasound guidance.  Guidewire confirmed in vessel.  Sterile sheath used.  Number of attempts: 1  Post-procedure: blood return through all ports, chlorhexidine patch and sterile dressing applied    Assessment: successful placement, tip termination and placement verified by x-ray    Levophed drip, Electrolytes, and PRBC       Name Javad Castro RN   2/19/2025    "

## 2025-02-20 NOTE — CONSULTS
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  Gi consulted for rectal bleeding  Pt with worsening abd tenderness, assoc rectal bleeding. Has had similar symptoms in the past that are intermittent, has known colonic amyloid, see prior endoscopy 3 months ago. Assoc nasuea, abd distention, weakness, SOB , chest pain.    CT reviewed, showing diffuse enterocolitis, again suggestive of systemic process of which she has known amyloid.    Past Medical History:   Diagnosis Date    Asthma     Bipolar disorder     Hypertension        Past Surgical History:   Procedure Laterality Date    CHOLECYSTECTOMY      COLONOSCOPY N/A 10/22/2024    Procedure: COLONOSCOPY;  Surgeon: Dayday Freed MD;  Location: [facility];  Service: Endoscopy;  Laterality: N/A;    COLONOSCOPY N/A 11/11/2024    Procedure: COLONOSCOPY;  Surgeon: Reese Chen MD;  Location: [facility];  Service: Endoscopy;  Laterality: N/A;    ESOPHAGOGASTRODUODENOSCOPY N/A 10/22/2024    Procedure: EGD (ESOPHAGOGASTRODUODENOSCOPY);  Surgeon: Dayday Freed MD;  Location: [facility];  Service: Endoscopy;  Laterality: N/A;    ESOPHAGOGASTRODUODENOSCOPY N/A 11/11/2024    Procedure: EGD (ESOPHAGOGASTRODUODENOSCOPY);  Surgeon: Reese Chen MD;  Location: [facility];  Service: Endoscopy;  Laterality: N/A;    SHOULDER SURGERY      TUBAL LIGATION         Review of patient's allergies  indicates:  No Known Allergies  Family History    None       Tobacco Use    Smoking status: Every Day     Current packs/day: 1.00     Average packs/day: 2.0 packs/day for 58.1 years (115.2 ttl pk-yrs)     Types: Cigarettes     Start date: 1967    Smokeless tobacco: Never    Tobacco comments:     Pt is a 2 pk/day cigarette smoker x 57 yrs. She states that she cut down to about 1 pk/day, and is trying to quit.  Ambulatory referral to Smoking Cessation clinic following hospital discharge.     Substance and Sexual Activity    Alcohol use: Yes     Comment: socailly    Drug use: Yes     Types: Methamphetamines, Marijuana     Comment: denies cocaine or meth. Reports maijurana use    Sexual activity: Yes     Review of Systems   Constitutional:  Positive for activity change, appetite change and fatigue. Negative for chills and fever.   HENT:  Negative for mouth sores and nosebleeds.    Eyes:  Negative for pain and redness.   Respiratory:  Negative for cough and shortness of breath.    Cardiovascular:  Negative for chest pain and palpitations.   Gastrointestinal:  Positive for abdominal distention, abdominal pain and anal bleeding.   Genitourinary:  Negative for dysuria and hematuria.   Musculoskeletal:  Negative for arthralgias and joint swelling.   Neurological:  Positive for weakness. Negative for seizures and facial asymmetry.   Psychiatric/Behavioral:  Negative for agitation and confusion.      Objective:     Vital Signs (Most Recent):  Temp: 97.8 °F (36.6 °C) (02/20/25 1200)  Pulse: (!) 126 (02/20/25 1500)  Resp: 20 (02/20/25 1500)  BP: (!) 94/57 (02/20/25 1500)  SpO2: 97 % (02/20/25 1500) Vital Signs (24h Range):  Temp:  [97 °F (36.1 °C)-98.5 °F (36.9 °C)] 97.8 °F (36.6 °C)  Pulse:  [] 126  Resp:  [12-36] 20  SpO2:  [91 %-100 %] 97 %  BP: ()/(44-86) 94/57     Weight: 70 kg (154 lb 5.2 oz) (02/20/25 0536)  Body mass index is 21.52 kg/m².      Intake/Output Summary (Last 24 hours) at 2/20/2025 1523  Last data  "filed at 2/20/2025 1200  Gross per 24 hour   Intake 3210.33 ml   Output --   Net 3210.33 ml       Lines/Drains/Airways       Peripherally Inserted Central Catheter Line  Duration             PICC Triple Lumen 02/19/25 2225 right cephalic;right basilic <1 day              Peripheral Intravenous Line  Duration                  Peripheral IV - Single Lumen 02/19/25 1919 22 G No Posterior;Right Hand <1 day                     Physical Exam  Vitals reviewed.   Constitutional:       General: She is not in acute distress.     Appearance: She is ill-appearing. She is not diaphoretic.   HENT:      Head: Normocephalic and atraumatic.   Eyes:      General: No scleral icterus.     Conjunctiva/sclera: Conjunctivae normal.   Cardiovascular:      Rate and Rhythm: Normal rate.      Heart sounds: Normal heart sounds.   Pulmonary:      Effort: Pulmonary effort is normal. No respiratory distress.      Breath sounds: Normal breath sounds. No stridor.   Abdominal:      General: There is distension.      Palpations: Abdomen is soft. There is no mass.      Tenderness: There is abdominal tenderness. There is guarding. There is no rebound.   Musculoskeletal:         General: No tenderness or deformity.      Cervical back: Neck supple.   Lymphadenopathy:      Cervical: No cervical adenopathy.   Skin:     General: Skin is warm and dry.      Findings: No rash.   Neurological:      Mental Status: She is alert and oriented to person, place, and time.      Gait: Gait normal.   Psychiatric:         Mood and Affect: Mood normal.         Behavior: Behavior normal.          Significant Labs:  Acute Hepatitis Panel: No results for input(s): "HEPBSAG", "HEPAIGM", "HEPCAB" in the last 48 hours.    Invalid input(s): "HAPBIGM"  Amylase: No results for input(s): "AMYLASE" in the last 48 hours.  Blood Culture:   Recent Labs   Lab 02/19/25  1928 02/19/25  2315   LABBLOO No Growth to date No Growth to date     CBC:   Recent Labs   Lab 02/19/25  1615 " "02/20/25  0144 02/20/25  0606 02/20/25  0802   WBC 10.35  --  SEE COMMENT 16.46*   HGB 7.1* 8.4* SEE COMMENT 9.2*   HCT 22.7* 25.8* SEE COMMENT 28.7*     --  SEE COMMENT 343     BMP:   Recent Labs   Lab 02/20/25  0606 02/20/25  0802   * 118*   * 125*   K 3.6 3.9   CL 91* 97   CO2 13* 13*   BUN 47* 51*   CREATININE 2.5* 2.3*   CALCIUM 7.1* 7.2*   MG 1.9  --      CMP:   Recent Labs   Lab 02/20/25  0802   *   CALCIUM 7.2*   ALBUMIN 1.4*   PROT 5.6*   *   K 3.9   CO2 13*   CL 97   BUN 51*   CREATININE 2.3*   ALKPHOS 85   ALT 21   AST 25   BILITOT 1.0     Coagulation: No results for input(s): "PT", "INR", "APTT" in the last 48 hours.  CRP: No results for input(s): "CRP" in the last 48 hours.  ESR: No results for input(s): "SEDRATE" in the last 48 hours.  H.Pylori Ab IgG: No results for input(s): "HPYLORIIGG" in the last 48 hours.  Lipase: No results for input(s): "LIPASE" in the last 48 hours.    Significant Imaging:  Imaging results within the past 24 hours have been reviewed.  Assessment/Plan:     GI  Enterocolitis  Diffuse on imaging from known colonic amyloid, untreated.  Also with CHF which is likely from amyloid    She has not been able to make scheduled follow up to get treatment for amyloid, with barriers of lack of transport and lack of social support as well    No further rectal bleeding today reported, Hgb stable and lactic down.    Recs  Consultation with hematology  Consultation with social work  Consider consultation with palliative care depending on treatment options from hematology  Montior stool output  Check Cdiff and other stool studies   Supportive care  Empiric Abx to protect from translocation  Serial abd exams    I do not feel endoscopy will change the current trajectory of her care , as she has systemic amyloid biopsy proven, and if she does have concomitant ischemic colitis, the treatment is currently the same.    Will follow  Discussed with ICU team extensively "         Thank you for your consult. I will follow-up with patient. Please contact us if you have any additional questions.    Reese Chen MD  Gastroenterology  [place] - Intensive [facility]

## 2025-02-20 NOTE — ED NOTES
Pt refusing additional PIV or any sticks at this time, states she hates needles and tired of being stuck. Pt education performed.

## 2025-02-20 NOTE — PLAN OF CARE
Problem: Adult Inpatient Plan of Care  Goal: Plan of Care Review  Outcome: Progressing  Goal: Patient-Specific Goal (Individualized)  Outcome: Progressing  Goal: Absence of Hospital-Acquired Illness or Injury  Outcome: Progressing  Goal: Optimal Comfort and Wellbeing  Outcome: Progressing  Goal: Readiness for Transition of Care  Outcome: Progressing     Problem: Sepsis/Septic Shock  Goal: Optimal Coping  Outcome: Progressing  Goal: Absence of Bleeding  Outcome: Progressing  Goal: Blood Glucose Level Within Targeted Range  Outcome: Progressing  Goal: Absence of Infection Signs and Symptoms  Outcome: Progressing  Goal: Optimal Nutrition Intake  Outcome: Progressing     Problem: Acute Kidney Injury/Impairment  Goal: Fluid and Electrolyte Balance  Outcome: Progressing  Goal: Improved Oral Intake  Outcome: Progressing  Goal: Effective Renal Function  Outcome: Progressing     Problem: Infection  Goal: Absence of Infection Signs and Symptoms  Outcome: Progressing     Problem: Fall Injury Risk  Goal: Absence of Fall and Fall-Related Injury  Outcome: Progressing     Problem: Skin Injury Risk Increased  Goal: Skin Health and Integrity  Outcome: Progressing     Problem: Neutropenia  Goal: Absence of Infection  Outcome: Progressing

## 2025-02-20 NOTE — ASSESSMENT & PLAN NOTE
Patient has Abnormal Magnesium: hypomagnesemia. Will continue to monitor electrolytes closely. Will replace the affected electrolytes and repeat labs to be done after interventions completed. The patient's magnesium results have been reviewed and are listed below.  Recent Labs   Lab 02/20/25  0606   MG 1.9

## 2025-02-20 NOTE — ED NOTES
Pt c/o L arm pain @ IV site. IV site notice to be swollen and painful to touch. IV Removed, Due to previously infusing levophed, Np Tavon Crawley informed, Warm Compress applied at site, This RN and Iraida RN called to confirm this hospital has regitine. NP to order Regitine.

## 2025-02-20 NOTE — PLAN OF CARE
went to meet with patient. Patient was sleeping on and off, no family at beside. Patient is known to me from previous admissions. She reports she now lives with her brother and sister. Address: [address] [place]. She has a walker at home, but she does not usually need it. Patient does not have Home Health. Hepatology follow-up noted to be previously scheduled. Patient asked that I come back at a later time.  to follow-up as able. Patient encouraged to call with any questions or concerns.  will continue to follow patient through transitions of care and assist with any discharge needs.     Other Contacts    Name Relation Home Work Mobile   Cass Strong Brother   [phone]   Iain Strong Brother   [phone]   Raymond Shi        Future Appointments   Date Time Provider Department Center   3/10/2025  8:00 AM Brian Sun MD [facility] HEPAT [facility]         02/20/25 1230   Discharge Assessment   Assessment Type Discharge Planning Assessment   Confirmed/corrected address, phone number and insurance Yes   Confirmed Demographics Correct on Facesheet   Source of Information patient;health record   People in Home sibling(s)   Do you expect to return to your current living situation? Yes   Do you have help at home or someone to help you manage your care at home? Yes   Prior to hospitilization cognitive status: Alert/Oriented   Current cognitive status: Alert/Oriented   Walking or Climbing Stairs Difficulty no   Dressing/Bathing Difficulty no   Equipment Currently Used at Home walker, rolling   Do you have any problems affording any of your prescribed medications? TBD   How do you get to doctors appointments? family or friend will provide   Are you on dialysis? No   Do you take coumadin? No   Discharge Plan A Home with family   Discharge Plan B Home with family   DME Needed Upon Discharge  other (see comments)  (TBD)   Discharge Plan discussed with: Patient    Physical Activity   On average, how many days per week do you engage in moderate to strenuous exercise (like a brisk walk)? Pt Unable   On average, how many minutes do you engage in exercise at this level? Pt Unable   Financial Resource Strain   How hard is it for you to pay for the very basics like food, housing, medical care, and heating? Hard   Housing Stability   In the last 12 months, was there a time when you were not able to pay the mortgage or rent on time? Y   At any time in the past 12 months, were you homeless or living in a shelter (including now)? Y   Transportation Needs   Has the lack of transportation kept you from medical appointments, meetings, work or from getting things needed for daily living? Yes, it has kept me from medical appointments or from getting my medications.   Food Insecurity   Within the past 12 months, you worried that your food would run out before you got the money to buy more. Pt Unable   Within the past 12 months, the food you bought just didn't last and you didn't have money to get more. Pt Unable   Stress   Do you feel stress - tense, restless, nervous, or anxious, or unable to sleep at night because your mind is troubled all the time - these days? Pt Unable   Social Isolation   How often do you feel lonely or isolated from those around you?  Patient unable to answer   Alcohol Use   Q1: How often do you have a drink containing alcohol? Pt Unable   Q2: How many drinks containing alcohol do you have on a typical day when you are drinking? Pt Unable   Q3: How often do you have six or more drinks on one occasion? Pt Unable     Debora Ennis RN    [phone]

## 2025-02-20 NOTE — HPI
Patient is a 59-year-old female recently diagnosed with a colon CA and history of bipolar disorder, hypertension, colonic amyloidosis with history of GI bleed and symptomatic anemia, duodenal ulcer presented to ED with 3 days of BRBPR.In ED labwork remarkable for worsening anemia with a H&H 7.1 and 22.  Hyponatremia of 125, bicarb 13.  Worsening CKD with BUN 44 serum creatinine is 2.3.  Lactic acid elevated at 4.0.  CT a/p concerning for enterocolitis.  Pt initially stable, however became hypotensive. Pt given 30cc/kg IVF and 1unit PRBC subsequently placed on vasopressors.  Likely secondary to GI bleed however patient has been expanded to sepsis workup.  Patient will be admitted to ICU.

## 2025-02-20 NOTE — ASSESSMENT & PLAN NOTE
Anemia is likely due to acute blood loss which was from GI bleed . Most recent hemoglobin and hematocrit are listed below.  Recent Labs     02/20/25  0144 02/20/25  0606 02/20/25  0802   HGB 8.4* SEE COMMENT 9.2*   HCT 25.8* SEE COMMENT 28.7*       Plan  - Monitor serial CBC: Every 8 hours  - Transfuse PRBC if patient becomes hemodynamically unstable, symptomatic or H/H drops below 7/21.  - Patient has received 1 units of PRBCs on 2/19/2025  - Patient's anemia is currently worsening. Will continue current treatment  -recent diagnosis of colon CA  - transfuse as needed  -hold blood thinners  -consult GI

## 2025-02-20 NOTE — NURSING
Pt arrived to ICU Rm 563 via stretcher with 2 RN's. Levo gtt infusing @ 0.2mcg/kg/min. Attached to monitor upon arrival. Pt belongings at bedside. No family members with pt.Oriented to room. Fall risk precautions discussed. POC reviewed.

## 2025-02-20 NOTE — SUBJECTIVE & OBJECTIVE
Past Medical History:   Diagnosis Date    Asthma     Bipolar disorder     Hypertension        Past Surgical History:   Procedure Laterality Date    CHOLECYSTECTOMY      COLONOSCOPY N/A 10/22/2024    Procedure: COLONOSCOPY;  Surgeon: Dayday Freed MD;  Location: [facility];  Service: Endoscopy;  Laterality: N/A;    COLONOSCOPY N/A 11/11/2024    Procedure: COLONOSCOPY;  Surgeon: Reese Chen MD;  Location: [facility];  Service: Endoscopy;  Laterality: N/A;    ESOPHAGOGASTRODUODENOSCOPY N/A 10/22/2024    Procedure: EGD (ESOPHAGOGASTRODUODENOSCOPY);  Surgeon: Dayday Freed MD;  Location: [facility];  Service: Endoscopy;  Laterality: N/A;    ESOPHAGOGASTRODUODENOSCOPY N/A 11/11/2024    Procedure: EGD (ESOPHAGOGASTRODUODENOSCOPY);  Surgeon: Reese Chen MD;  Location: [facility];  Service: Endoscopy;  Laterality: N/A;    SHOULDER SURGERY      TUBAL LIGATION         Review of patient's allergies indicates:  No Known Allergies  Family History    None       Tobacco Use    Smoking status: Every Day     Current packs/day: 1.00     Average packs/day: 2.0 packs/day for 58.1 years (115.2 ttl pk-yrs)     Types: Cigarettes     Start date: 1967    Smokeless tobacco: Never    Tobacco comments:     Pt is a 2 pk/day cigarette smoker x 57 yrs. She states that she cut down to about 1 pk/day, and is trying to quit.  Ambulatory referral to Smoking Cessation clinic following hospital discharge.     Substance and Sexual Activity    Alcohol use: Yes     Comment: socailly    Drug use: Yes     Types: Methamphetamines, Marijuana     Comment: denies cocaine or meth. Reports maijurana use    Sexual activity: Yes     Review of Systems   Constitutional:  Positive for activity change, appetite change and fatigue. Negative for chills and fever.   HENT:  Negative for mouth sores and nosebleeds.    Eyes:  Negative for pain and redness.   Respiratory:  Negative for cough and shortness of breath.    Cardiovascular:  Negative  for chest pain and palpitations.   Gastrointestinal:  Positive for abdominal distention, abdominal pain and anal bleeding.   Genitourinary:  Negative for dysuria and hematuria.   Musculoskeletal:  Negative for arthralgias and joint swelling.   Neurological:  Positive for weakness. Negative for seizures and facial asymmetry.   Psychiatric/Behavioral:  Negative for agitation and confusion.      Objective:     Vital Signs (Most Recent):  Temp: 97.8 °F (36.6 °C) (02/20/25 1200)  Pulse: (!) 126 (02/20/25 1500)  Resp: 20 (02/20/25 1500)  BP: (!) 94/57 (02/20/25 1500)  SpO2: 97 % (02/20/25 1500) Vital Signs (24h Range):  Temp:  [97 °F (36.1 °C)-98.5 °F (36.9 °C)] 97.8 °F (36.6 °C)  Pulse:  [] 126  Resp:  [12-36] 20  SpO2:  [91 %-100 %] 97 %  BP: ()/(44-86) 94/57     Weight: 70 kg (154 lb 5.2 oz) (02/20/25 0536)  Body mass index is 21.52 kg/m².      Intake/Output Summary (Last 24 hours) at 2/20/2025 1523  Last data filed at 2/20/2025 1200  Gross per 24 hour   Intake 3210.33 ml   Output --   Net 3210.33 ml       Lines/Drains/Airways       Peripherally Inserted Central Catheter Line  Duration             PICC Triple Lumen 02/19/25 2225 right cephalic;right basilic <1 day              Peripheral Intravenous Line  Duration                  Peripheral IV - Single Lumen 02/19/25 1919 22 G No Posterior;Right Hand <1 day                     Physical Exam  Vitals reviewed.   Constitutional:       General: She is not in acute distress.     Appearance: She is ill-appearing. She is not diaphoretic.   HENT:      Head: Normocephalic and atraumatic.   Eyes:      General: No scleral icterus.     Conjunctiva/sclera: Conjunctivae normal.   Cardiovascular:      Rate and Rhythm: Normal rate.      Heart sounds: Normal heart sounds.   Pulmonary:      Effort: Pulmonary effort is normal. No respiratory distress.      Breath sounds: Normal breath sounds. No stridor.   Abdominal:      General: There is distension.      Palpations:  "Abdomen is soft. There is no mass.      Tenderness: There is abdominal tenderness. There is guarding. There is no rebound.   Musculoskeletal:         General: No tenderness or deformity.      Cervical back: Neck supple.   Lymphadenopathy:      Cervical: No cervical adenopathy.   Skin:     General: Skin is warm and dry.      Findings: No rash.   Neurological:      Mental Status: She is alert and oriented to person, place, and time.      Gait: Gait normal.   Psychiatric:         Mood and Affect: Mood normal.         Behavior: Behavior normal.          Significant Labs:  Acute Hepatitis Panel: No results for input(s): "HEPBSAG", "HEPAIGM", "HEPCAB" in the last 48 hours.    Invalid input(s): "HAPBIGM"  Amylase: No results for input(s): "AMYLASE" in the last 48 hours.  Blood Culture:   Recent Labs   Lab 02/19/25  1928 02/19/25  2315   LABBLOO No Growth to date No Growth to date     CBC:   Recent Labs   Lab 02/19/25  1615 02/20/25  0144 02/20/25  0606 02/20/25  0802   WBC 10.35  --  SEE COMMENT 16.46*   HGB 7.1* 8.4* SEE COMMENT 9.2*   HCT 22.7* 25.8* SEE COMMENT 28.7*     --  SEE COMMENT 343     BMP:   Recent Labs   Lab 02/20/25  0606 02/20/25  0802   * 118*   * 125*   K 3.6 3.9   CL 91* 97   CO2 13* 13*   BUN 47* 51*   CREATININE 2.5* 2.3*   CALCIUM 7.1* 7.2*   MG 1.9  --      CMP:   Recent Labs   Lab 02/20/25  0802   *   CALCIUM 7.2*   ALBUMIN 1.4*   PROT 5.6*   *   K 3.9   CO2 13*   CL 97   BUN 51*   CREATININE 2.3*   ALKPHOS 85   ALT 21   AST 25   BILITOT 1.0     Coagulation: No results for input(s): "PT", "INR", "APTT" in the last 48 hours.  CRP: No results for input(s): "CRP" in the last 48 hours.  ESR: No results for input(s): "SEDRATE" in the last 48 hours.  H.Pylori Ab IgG: No results for input(s): "HPYLORIIGG" in the last 48 hours.  Lipase: No results for input(s): "LIPASE" in the last 48 hours.    Significant Imaging:  Imaging results within the past 24 hours have been " reviewed.

## 2025-02-20 NOTE — H&P
[facility]  History & Physical    Patient Name: Mary Ellen Saini  MRN: [id number]  Patient Class: IP- Inpatient  Admission Date: 2/19/2025  Attending Physician: Marlen att. providers found   Primary Care Provider: Marlen, Primary Doctor         Patient information was obtained from patient and ER records.     Subjective:     Principal Problem:<principal problem not specified>    Chief Complaint:   Chief Complaint   Patient presents with    Rectal Bleeding     Pt presents to ED today from home via [facility]   Recently dx with colon ca   BRB pt c/o SOB x of copd , CHF         HPI: Patient is a 59-year-old female recently diagnosed with a colon CA and history of bipolar disorder, hypertension, colonic amyloidosis with history of GI bleed and symptomatic anemia, duodenal ulcer presented to ED with 3 days of BRBPR.In ED labwork remarkable for worsening anemia with a H&H 7.1 and 22.  Hyponatremia of 125, bicarb 13.  Worsening CKD with BUN 44 serum creatinine is 2.3.  Lactic acid elevated at 4.0.  CT a/p concerning for enterocolitis.  Pt initially stable, however became hypotensive. Pt given 30cc/kg IVF and 1unit PRBC subsequently placed on vasopressors.  Likely secondary to GI bleed however patient has been expanded to sepsis workup.  Patient will be admitted to ICU.    Past Medical History:   Diagnosis Date    Asthma     Bipolar disorder     Hypertension        Past Surgical History:   Procedure Laterality Date    CHOLECYSTECTOMY      COLONOSCOPY N/A 10/22/2024    Procedure: COLONOSCOPY;  Surgeon: Dayday Freed MD;  Location: [facility];  Service: Endoscopy;  Laterality: N/A;    COLONOSCOPY N/A 11/11/2024    Procedure: COLONOSCOPY;  Surgeon: Reese Chen MD;  Location: [facility];  Service: Endoscopy;  Laterality: N/A;    ESOPHAGOGASTRODUODENOSCOPY N/A 10/22/2024    Procedure: EGD (ESOPHAGOGASTRODUODENOSCOPY);  Surgeon: Dayday Freed MD;  Location: [facility];  Service:  Endoscopy;  Laterality: N/A;    ESOPHAGOGASTRODUODENOSCOPY N/A 11/11/2024    Procedure: EGD (ESOPHAGOGASTRODUODENOSCOPY);  Surgeon: Reese Chen MD;  Location: [facility];  Service: Endoscopy;  Laterality: N/A;    SHOULDER SURGERY      TUBAL LIGATION         Review of patient's allergies indicates:  No Known Allergies    No current facility-administered medications on file prior to encounter.     Current Outpatient Medications on File Prior to Encounter   Medication Sig    albuterol (PROVENTIL/VENTOLIN HFA) 90 mcg/actuation inhaler Inhale 1-2 puffs into the lungs every 6 (six) hours as needed for Wheezing. Rescue    ferrous sulfate 324 mg (65 mg iron) TbEC Take 1 tablet (324 mg total) by mouth once daily.    furosemide (LASIX) 40 MG tablet Take 1 tablet (40 mg total) by mouth 2 (two) times daily.    lactulose (CHRONULAC) 10 gram/15 mL solution Take 30 mLs (20 g total) by mouth 3 (three) times daily.    LIDOcaine (LIDODERM) 5 % Place 2 patches onto the skin every evening. Remove & Discard patch within 12 hours or as directed by MD    magnesium 250 mg Tab Take 250 mg by mouth once daily.    metoprolol succinate (TOPROL-XL) 50 MG 24 hr tablet Take 1 tablet (50 mg total) by mouth once daily.    oxybutynin (DITROPAN) 5 MG Tab Take 1 tablet (5 mg total) by mouth 3 (three) times daily.    pantoprazole (PROTONIX) 40 MG tablet Take 1 tablet (40 mg total) by mouth 2 (two) times daily.    potassium gluconate 595 mg (99 mg) Tab Take 1 tablet by mouth once daily.    sucralfate (CARAFATE) 1 gram tablet Take 1 tablet (1 g total) by mouth 3 (three) times daily before meals.    tiotropium (SPIRIVA) 18 mcg inhalation capsule Inhale 1 capsule (18 mcg total) into the lungs once daily. Controller    magic mouthwash diphen/antac/lidoc/nysta take 10 mLs 4 (four) times daily. (Patient not taking: Reported on 2/19/2025)    naloxone (NARCAN) 4 mg/actuation Spry 4mg by nasal route as needed for opioid overdose; may repeat every 2-3  minutes in alternating nostrils until medical help arrives. Call 911    nicotine (NICODERM CQ) 21 mg/24 hr Place 1 patch onto the skin once daily. (Patient not taking: Reported on 2/19/2025)    tiotropium bromide (SPIRIVA RESPIMAT) 2.5 mcg/actuation inhaler Inhale 2 puffs into the lungs Daily. Controller (Patient not taking: Reported on 2/19/2025)     Family History    None       Tobacco Use    Smoking status: Every Day     Current packs/day: 1.00     Average packs/day: 2.0 packs/day for 58.1 years (115.2 ttl pk-yrs)     Types: Cigarettes     Start date: 1967    Smokeless tobacco: Never    Tobacco comments:     Pt is a 2 pk/day cigarette smoker x 57 yrs. She states that she cut down to about 1 pk/day, and is trying to quit.  Ambulatory referral to Smoking Cessation clinic following hospital discharge.     Substance and Sexual Activity    Alcohol use: Yes     Comment: socailly    Drug use: Yes     Types: Methamphetamines, Marijuana     Comment: denies cocaine or meth. Reports maijurana use    Sexual activity: Yes     Review of Systems   Constitutional:  Positive for fatigue.   Respiratory:  Positive for shortness of breath.    Gastrointestinal:  Positive for abdominal distention, abdominal pain and blood in stool.   Neurological:  Positive for weakness.   All other systems reviewed and are negative.    Objective:     Vital Signs (Most Recent):  Temp: 97.9 °F (36.6 °C) (02/19/25 2124)  Pulse: (!) 124 (02/20/25 0325)  Resp: 20 (02/20/25 0325)  BP: 117/73 (02/20/25 0325)  SpO2: 95 % (02/20/25 0325) Vital Signs (24h Range):  Temp:  [97.9 °F (36.6 °C)-98.5 °F (36.9 °C)] 97.9 °F (36.6 °C)  Pulse:  [] 124  Resp:  [14-36] 20  SpO2:  [91 %-100 %] 95 %  BP: ()/(44-86) 117/73     Weight: 77.6 kg (171 lb)  Body mass index is 26.78 kg/m².     Physical Exam  Constitutional:       Appearance: She is ill-appearing.   HENT:      Head: Normocephalic.      Mouth/Throat:      Mouth: Mucous membranes are dry.      Pharynx:  Oropharynx is clear.   Eyes:      Pupils: Pupils are equal, round, and reactive to light.   Cardiovascular:      Rate and Rhythm: Regular rhythm. Tachycardia present.      Heart sounds: Normal heart sounds.   Pulmonary:      Comments: Normal breath sounds, increased work of breathing.  Abdominal:      General: There is distension.      Palpations: Abdomen is soft.      Tenderness: There is abdominal tenderness.   Musculoskeletal:         General: Normal range of motion.      Cervical back: Normal range of motion.   Skin:     Capillary Refill: Capillary refill takes less than 2 seconds.      Coloration: Skin is pale.   Neurological:      Mental Status: She is oriented to person, place, and time.   Psychiatric:         Behavior: Behavior normal.              CRANIAL NERVES     CN III, IV, VI   Pupils are equal, round, and reactive to light.       Significant Labs: All pertinent labs within the past 24 hours have been reviewed.  Recent Lab Results  (Last 5 results in the past 24 hours)        02/20/25  0144   02/19/25  2315   02/19/25  1928   02/19/25  1739   02/19/25  1716        Unit Blood Type Code         5100       Unit Expiration         [id number]       Unit Blood Type         O POS       CODING SYSTEM         [id number]       Crossmatch Interpretation         Compatible       DISPENSE STATUS         TRANSFUSED       Group & Rh         O POS       Hematocrit 25.8               Hemoglobin 8.4               INDIRECT MO         NEG       Lactic Acid Level   3.2  Comment: Falsely low lactic acid results can be found in samples   containing >=13.0 mg/dL total bilirubin and/or >=3.5 mg/dL   direct bilirubin.     4.0  Comment: Falsely low lactic acid results can be found in samples   containing >=13.0 mg/dL total bilirubin and/or >=3.5 mg/dL   direct bilirubin.  LA   critical result(s) called and verbal readback obtained from   SYLVIE BOWMAN RN by [facility] 02/19/2025 20:00             Magnesium        1.5          Product Code         [id number]       Specimen Outdate         02/22/2025 23:59       Troponin I       0.166  Comment: The reference interval for Troponin I represents the 99th percentile   cutoff   for our facility and is consistent with 3rd generation assay   performance.           UNIT NUMBER         [id number]                              Significant Imaging: I have reviewed all pertinent imaging results/findings within the past 24 hours.  I have reviewed and interpreted all pertinent imaging results/findings within the past 24 hours.  Assessment/Plan:     Hypovolemic shock  This patient has shock. The type of shock is hemorrhagic. The patient has the following evidence of shock: persistent hypotension, elevated lactic acid after adequate fluid resuscitation, and KENY. The patient will be admitted to an intensive care unit, they will be treated with IV fluids, PRBC, vasopressors.        ABLA (acute blood loss anemia)  Anemia is likely due to acute blood loss which was from GI bleed . Most recent hemoglobin and hematocrit are listed below.  Recent Labs     02/19/25  1615 02/20/25  0144   HGB 7.1* 8.4*   HCT 22.7* 25.8*     Plan  - Monitor serial CBC: Every 8 hours  - Transfuse PRBC if patient becomes hemodynamically unstable, symptomatic or H/H drops below 7/21.  - Patient has received 1 units of PRBCs on 2/19/2025  - Patient's anemia is currently worsening. Will continue current treatment  -recent diagnosis of colon CA  - transfuse as needed  -hold blood thinners  -consult GI    HFrEF (heart failure with reduced ejection fraction)  EF 28% on last echo  Close monitoring while resuscitating with IV fluids    Hypomagnesemia  Patient has Abnormal Magnesium: hypomagnesemia. Will continue to monitor electrolytes closely. Will replace the affected electrolytes and repeat labs to be done after interventions completed. The patient's magnesium results have been reviewed and are listed below.  Recent Labs   Lab  02/19/25  1739   MG 1.5*        Sepsis  This patient does have evidence of infective focus  My overall impression is septic shock due to lactate > 4, MAP < 65, and SBP < 90.  Source: Abdominal  Antibiotics given-   Antibiotics (72h ago, onward)      Start     Stop Route Frequency Ordered    02/20/25 0900  mupirocin 2 % ointment         02/25/25 0859 Nasl 2 times daily 02/19/25 2326          Latest lactate reviewed-  Recent Labs   Lab 02/19/25  2315   LACTATE 3.2*     Organ dysfunction indicated by Acute kidney injury and Acute heart failure    Fluid challenge Actual Body weight- Patient will receive 30ml/kg actual body weight to calculate fluid bolus for treatment of septic shock.     Post- resuscitation assessment Yes - I attest a sepsis perfusion exam was performed within 6 hours of sepsis, severe sepsis, or septic shock presentation, following fluid resuscitation.      Will Start Pressors- Levophed for MAP of 65  Source control achieved by:  IV fluid, antibiotics, vasopressors, PRBC      VTE Risk Mitigation (From admission, onward)      None                            Tavon Crawley NP  Department of Hospital Medicine  [facility] - Emergency Dept

## 2025-02-20 NOTE — ASSESSMENT & PLAN NOTE
Patient has Abnormal Magnesium: hypomagnesemia. Will continue to monitor electrolytes closely. Will replace the affected electrolytes and repeat labs to be done after interventions completed. The patient's magnesium results have been reviewed and are listed below.  Recent Labs   Lab 02/19/25  1739   MG 1.5*

## 2025-02-20 NOTE — H&P
Consult Note  [facility]    Pulm/Critical Care Attending: Bishnu Aguilera  Resident: Jeancarlos Beltran  Admit Date: 2/19/2025  Today's Date: 02/20/2025  Reason for Consult:  Hemmorhagic shock    SUBJECTIVE:     HPI:  59 year old woman with PMHx of HFrEF (EF~28%), CAD, HTN, CAD, Hep-C, COPD, Asthma, Colonic Amyloidosis presented to ED with 3 day history of rectal bleeding.  She reports multiple bloody bowel movements, abdominal pain, nausea, chest pain, SOB.        Initial vitals BP 98/56,  RR 15, Temp 98.5F SPO2 100% on room air.  CBC showed H/H 7.1/22.7, CMP Na 125, K 4.0, Glucose 113, BUN/Cre 44/2.4, AG 15.  Lactate 4.0, UA showed leukocytes 3+, nitrite +, and 91 WBC's.  EKG showed sinus tachycardia with ST and T wave abnormalities.  CXR showed evidence of pulmonary vascular congestion. CT abdo-pelvis showed Diffusely distended and fluid-filled loops of small and large bowel, with some wall thickening of the large bowel and adjacent pericolonic fat stranding suggesting enterocolitis.    Patient treated with 2L IVF, 1 unit pRBC's, IV Zosyn placed on Norepinephrine drip.  Admitted to ICU for hemorraghic vs. Septic shock.    Review of Systems   Constitutional:  Negative for chills and fever.   Respiratory:  Negative for shortness of breath.    Cardiovascular:  Negative for chest pain.   Gastrointestinal:  Positive for abdominal pain, blood in stool and vomiting.   Genitourinary: Negative.      OBJECTIVE:     Vital Signs Trends/Hx Reviewed  Vitals:    02/20/25 1330 02/20/25 1345 02/20/25 1400 02/20/25 1413   BP: (!) 92/56 (!) 88/52 (!) 90/52    BP Location:       Patient Position:       Pulse: 103 (!) 112 (!) 117    Resp: 15 15 18 16   Temp:       TempSrc:       SpO2: 97% 96% 98%    Weight:       Height:             Physical Exam:  General: NAD, cooperative & interactive.  Patient on norepinephrine drip  HEENT: AT/NC, PERRL, EOMI, oral and nasal mucosa moist.   Neck: Supple without JVD or  palpable LAD.   Cardiac: normal rate, regular rhythm, with no MRG with brisk cap refill and symmetric pulses in distal extremities.  Respiratory: Normal inspection. Symmetric chest rise.  Auscultation clear bilaterally. No increased work of breathing noted.   Abdomen: Tender at central abdomen and RLQ.   Extremities: 1+ Pitting edema in lower extremities   Neuro: Grossly intact to brief exam. Oriented x3 with appropriate mood/affect to situation.       ASSESSMENT & RECOMMENDATIONS       Neuro/Psych  No acute issues at this time  Continue to monitor    CV  #Shock  Patient currently in shock requiring pressors to maintain MAP  On Norepinephrine Drip  Last BP: 92/56  Goal MAP >65  Wean pressors as able    #HFpEF  Patient has bilateral lower extremity edema  CXR shows evidence of pulmonary vascular congestion  Restrict fluid intake    Pulm  No acute processes    FEN/GI  #Upper GI Bleed 2/2 Colonic Amyloidosis  Patient endorses 3 days of rectal bleeding  Tender at central lower  and right abdomen, no guarding present  History of Amyloidosis,  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  GI consulted; deferred endoscopy; recommended Heme-Onc consult  Hematology-Oncology consulted; recommendations appreciated  H/H 9.2/28.7 s/p transfusion  F - S/P 2L IVF 1 unit pRBC  E - Na 125 K 3.9 CO2 13  N - Diet: NPO    Keep Mg 2, K 4    RENAL  #KENY  Strict I&Os  Avoid renal toxic meds  BUN/Cr: 51/2.3, baseline 1.4  Continue to monitor renal status and urine output     Heme  # Amyloidosis  History of colonic amyloidosis on biopsy AL (severiano) type on biopsy  Hematology consulted; recommendations appreciated  H/H 9.2/28.7; stable  WBC 16.46  DVT prophylaxis: None    Endo  #No acute issues    ID  #Sepsis  Patient presented in shock requiring Norepinephrine to maintain BP  CT abdo-pelvis showed evidence of enterocolitis  Cxs are pending  Continue ABX: IV Zosyn  Urinalysis: leukocytes 2+, Nitrites, WBC 90  Blood culture ordered and pending  urine  culture ordered and pending    ICU Checklist  Feeding: NPO  Analgesia: IV dilaudid  1 mg q 6 horus PRN  Sedation: N/A  Thrombo PPX: None  Head of Bed: >30 degrees  Ulcer (Stress) PPX: IV Pantoprazole 40 mg daily  Glucose: goal 140-180  SBT/SAT: N/A  Bowel Regimen: N/A  Indwelling catheter/Lines/Invasive devices: PICC line: right cephalic; PIV posterior right hand  De-escalation ABX: Continue IV Zosyn pending cultures    Code Status: Full    Dispo  ICU    Jeancarlos Beltran MD  [facility]     Pt seen and examined with Pulmonary/Critical Care team and this note was reviewed and validated with the following additional comments: Lower abdominal tenderness but no rebound. Has mild ascites. Bladder markedly distended.  Placing Grajeda to monitor urine output. Discussed with GI.  They feel strongly that this is amyloid and not ischemic colitis. Most commonly this is AL amyloid.  Treatment may require surgical resection.  Discussing among care group.    Critical Care time was spent validating the history and physical exam, reviewing the lab and imaging results, and discussing the care of the patient with the bedside nurse and the patient and/or surrogates. This critical care time did not overlap with that of any other provider or involve time for any procedures.  This patient has a high probability of sudden clinically significant deterioration which requires the highest level of physician preparedness to intervene urgently. I managed/supervised life or organ supporting interventions that required frequent physician assessments. I devoted my full attention in the ICU to the direct care of this patient for this period of time. Organ systems which are failing and require intensive, critical care support are: cardiovascular, GI, hematologic  Critical Care time: 45 minutes    Bishnu Chase MD  Phone [phone]

## 2025-02-20 NOTE — NURSING
Patient HR increasing from 115 to 135-140 and patient is now requiring a higher dose of norepi. MD Beltran notified and at bedside. EKG performed and given to MD Beltran.

## 2025-02-20 NOTE — ASSESSMENT & PLAN NOTE
- biopsy of stomach/duodenum/colon (11/11/24) revealed amyloidosis.  - admitted for hematochezia, concern for sepsis.  - she has had multiple hospitalizations for similar presentations  - she had canceled her appt with Dr. Luna in January 2025.  - I am concerned that she will not make it to outpatient therapy. Amyloidosis is managed in the outpatient setting and treatment does not produce immediate improvement in most cases. In most cases progress/improvement is slow.  - recommend palliative care consult  - will inform Dr. Luna about her current hospitalization.

## 2025-02-20 NOTE — ASSESSMENT & PLAN NOTE
Enterocolitis  This patient does have evidence of infective focus  My overall impression is septic shock due to lactate > 4, MAP < 65, and SBP < 90.  Source: Abdominal  CT shows enterocolitis  Antibiotics given-   Antibiotics (72h ago, onward)      Start     Stop Route Frequency Ordered    02/20/25 0900  mupirocin 2 % ointment         02/25/25 0859 Nasl 2 times daily 02/19/25 2326    02/20/25 0530  piperacillin-tazobactam (ZOSYN) 4.5 g in D5W 100 mL IVPB (MB+)         -- IV Every 8 hours (non-standard times) 02/20/25 0420          Latest lactate reviewed-  Recent Labs   Lab 02/19/25  2315   LACTATE 3.2*       Organ dysfunction indicated by Acute kidney injury and Acute heart failure    Fluid challenge Actual Body weight- Patient will receive 30ml/kg actual body weight to calculate fluid bolus for treatment of septic shock.     Post- resuscitation assessment Yes - I attest a sepsis perfusion exam was performed within 6 hours of sepsis, severe sepsis, or septic shock presentation, following fluid resuscitation.      Will Start Pressors- Levophed for MAP of 65  Source control achieved by:  IV fluid, antibiotics, vasopressors, PRBC    Abd/pel  Diffusely distended and fluid-filled loops of small and large bowel, with some wall thickening of the large bowel and adjacent pericolonic fat stranding. Findings are likely related to enterocolitis, with the possibility of ileus or partial small bowel obstruction not entirely excluded. Further evaluation with direct visualization and tissue sampling if clinically indicated.

## 2025-02-20 NOTE — EICU
EICU BRIEF INITIAL EVALUATION:    Code Status: Full Code     HISTORY:      59-year-old woman admitted to the ICU on Levophed drip for septic and hypovolemic shock.  Noted to have enterocolitis on CT and received 1 unit PRBCs and full sepsis bolus.  Reportedly with diagnosis of colon cancer but no corresponding pathology and chart status post colonoscopy in October and November 20, 2024  History of combined CHF, smoking, hypertension, asthma, hypertension, GI bleeding, CKD, supraventricular tachycardia, duodenal ulcer, NSTEMI, hepatitis-C, COPD, coronary artery disease    DVT prophylaxis SCDs  GI prophylaxis not indicated     /64 (80), P 107-NSR, R 15, O2 sat 98  Resting comfortably on room air    Chest x-ray with PICC line just above cavoatrial junction, no pneumothorax, no infiltrates or signs of volume overload      CAMERA ASSESSMENT: Yes      TELEMETRY: Reviewed      NOTES: Reviewed     LABS: Reviewed      IMAGING: Personally reviewed.      DISCUSSED with bedside nurse        ASSESSMENT AND PLAN:       Shock-hemorrhagic/hypovolemic/possible sepsis-continue Levophed to maintain mean arterial pressure 65.  Continue antibiotics, fluids.  Check cultures.  Trend lactate    GI bleeding-transfuse to hemoglobin 8.  To be seen by GI    Possible colon cancer-there is no corresponding pathology.  This will to be confirmed 1 way the other       I have reviewed the plan of care for the patient and agree with the plan of care unless noted otherwise above.      ALEX Keller MD  eICU Attending  [phone]    This report has been created through the use of [employer] dictation software. Typographical and content errors may occur with this process. While efforts are made to detect and correct such errors, in some cases errors will persist. For this reason, wording in this document should be considered in the proper context and not strictly verbatim.

## 2025-02-21 ENCOUNTER — CLINICAL SUPPORT (OUTPATIENT)
Dept: SMOKING CESSATION | Facility: CLINIC | Age: 60
End: 2025-02-21

## 2025-02-21 DIAGNOSIS — F17.210 CIGARETTE SMOKER: Primary | ICD-10-CM

## 2025-02-21 NOTE — CONSULTS
Palliative Medicine  Consult Note       Patient Name: Mary Ellen Saini   MRN: [id number]   Admission Date: 2/19/2025   Hospital Length of Stay: 2   Attending Provider: Jessica Charles*   Consulting Provider: Ignacio Griffith Jr, MD  Primary Care Physician: Marlen, Primary Doctor   Principal Problem: Sepsis     Patient information was obtained from patient, past medical records, and primary team.        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     Now admitted for recurrent large GI bleeding of undetermined source c/b undifferentiated shock (hemorrhagic vs distributive) with CT imaging showing diffuse enterocolitis with broad diff including amyloidosis, peritonitis, and segmental bowel ischemia (downtrending lactate and preserved mental status argue against ischemia or infarction as the predominant etiology of this diffuse enterocolitis).    We have no means to rule out segmental bowel ischemia or infarct without exploratory surgery, presently contraindicated due to her hemodynamic instability which may yet prove fatal if the underlying etiology does not respond to present supportive measures.     Pt expresses optimism that she can recover but if she develops cascading multi-organ failure I cannot foresee a good outcome from an emergent ex-lap and would recommend transitioning to strict comfort measures under the primary team with assistance from the pulmonology fellow. Pt's two adult sons would need to reach an emergent consensus in that unfortunate event.     She not currently prepared to contemplate her wishes in the event this illness  proves unsalvageable and is adamantly against Hospice by name. She did request and respond well to escalating IV hydromorphone, expresses reasonable concerns that comfort measures may worsen her prognosis however current dose appears to confer benefits >> risks.    Palliative care consulted for goals of care.       Advance Care Planning   Advance Directives:   Living Will: No        Oral Declaration: No    LaPOST: No    Do Not Resuscitate Status: No    Medical Power of : No    Agent's Name:  Navarro Saini (son)   Agent's Contact Number:  [phone]    Decision Making:  Patient answered questions and Family answered questions  Goals of Care: What is most important right now is to focus on remaining as independent as possible, symptom/pain control, extending life as long as possible, even it it means sacrificing quality. Accordingly, we have decided that the best plan to meet the patient's goals includes continuing with treatment.       - Prior experience with serious illness: yes  -The patient has not previously engaged in advance care planning or GOC discussions  - Insight/Understanding of illness: good    Life Limiting Diagnosis:  AL amyloidosis with cardiomyopathy and global vascular burden including extensive visceral vasculopathy c/b repeated large GI bleeds    -Prognosis-Time and potential for recovery:   < 6 months on this poor clinical course and hospice qualified at any time    -Functional status: fair.  Pt was able to manage ADLs  -Dementia diagnosis no      Symptom Management:  -Pain: yes, RLQ      -Dyspnea no      -Anxiety/Depression yes      -Constipation: no      -Anorexia:yes      Summary of recommendations and follow up plan:  -Most important goals at this time: comfort, improvement in condition with limitations on invasive interventions at end of life     # Folate deficiency  - smooth tongue, objectively depressed on labs  - MCV is depressed due to MIGUELINA but I conclude folate deficiency is  exacerbating chronic GI blood loss anemia  - start folate supplements daily once clear for full diet    -Code status: DNR/DNI  -Disposition: medically active with concrete ICU needs (pressors)    The above recommendations communicated directly to primary team on 2/21/25.    Thank you for your consult. I will follow-up with patient. Please contact us if you have any additional questions.       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 Pt was schedule to f/u with hematology as an outpt for disease modifying tx but was unable to attend due to socio/economic stress including housing insecurity and lack of transportation.    Now admitted for large unsourced GI bleed and prerenal KENY secondary to undifferentiated shock (hemorrhagic vs distributive) with CT imaging showing diffuse enterocolitis. GI assesses pt's acute condition as more likely attributable to her underlying amyloid rather than acute bowel ischemia, consistent with mild/moderate peak lactate elevation at 4.0, but discordant with pt's persistent shock and focal abdominal tenderness.    We have no means to rule out segmental bowel ischemia or infarct in this pt at this time and she is presently on a non-trivial dose of vasopressors (levophed 0.28 mcg/kg/min escalating over the past 24h) with insidiously declining renal function. Pt is not a candidate for a non-emergent ex-lap at this time and there is no role for endoscopy.    Pt has unquestioned decisional capacity and accepts that her condition remains critical but expresses optimism that she can recover with  continued supportive care. She agrees to new DNR status and appropriately dosed IV hydromorphone for comfort. Appropriate for liquid pleasure feeds and pt requests milk specifically.    Pt should remain in ICU for close monitoring and appropriate comfort measures. Pt is at high risk of rapid decompensation at any time and in the event she loses capacity I would recommend transition to strictly comfort focused care.     Family aware of poor prognosis and multiple teams have encouraged them to visit pt ASAP.    Review of Symptoms      Symptom Assessment (ESAS 0-10 Scale)  Pain:  7  Dyspnea:  0  Anxiety:  0  Nausea:  2  Depression:  0  Anorexia:  0  Fatigue:  5  Insomnia:  0  Restlessness:  4  Agitation:  0     CAM / Delirium:  Negative  Constipation:  Negative  Diarrhea:  Negative    Anxiety:  Is nervous/anxious    Pain Assessment:    Location(s): abdomen    Abdomen       Location: right        Quality: Dull, throbbing and cramping        Quantity: 7/10 in intensity        Chronicity: Onset 3 day(s) ago, gradually worsening        Aggravating Factors: Eating, pressure and movement        Alleviating Factors: Opiates        Associated Symptoms: Nausea, diarrhea and anorexia    ECOG Performance Status stGstrstastdstest:st st1st Living Arrangements:  Lives alone    Psychosocial/Cultural:   See Palliative Psychosocial Note: No  Social Issues Identified: Coping deficit pt/family, New Diagnosis/Trauma, and Financial Issues  Bereavement Risk: Yes: Social isolation or loss of a support system or friendships and Identified: work/home, financial, intimacy, and caregiver concerns   Caregiver Needs Discussed. Caregiver Distress: Yes: Issues of guilt and Intensity of family caregiving  Cultural: Prolonged housing insecurity, little family support  **Primary  to Follow**  Palliative Care  Consult: No    Spiritual:  F - Ness and Belief:  Nonadherent  I - Importance:  N/A  C - Community:  N/A  A - Address in Care:  SW  support PRN     Time-Based Charting:  Yes  Chart Review: 22 minutes  Face to Face: 18 minutes  Symptom Assessment: 13 minutes  Coordination of Care: 19 minutes  Advance Care Plannin minutes  Goals of Care: 40 minutes    Total Time Spent: 123 minutes          ROS:  Review of Systems   Constitutional:  Positive for activity change, appetite change and fatigue.   Respiratory:  Negative for chest tightness and shortness of breath.    Cardiovascular:  Positive for leg swelling.   Gastrointestinal:  Positive for abdominal distention, abdominal pain, blood in stool, diarrhea and nausea. Negative for vomiting.   Genitourinary:  Positive for difficulty urinating.   Psychiatric/Behavioral:  Positive for depressed mood. The patient is nervous/anxious.          Past Medical History:   Diagnosis Date    Asthma     Bipolar disorder     Hypertension      Past Surgical History:   Procedure Laterality Date    CHOLECYSTECTOMY      COLONOSCOPY N/A 10/22/2024    Procedure: COLONOSCOPY;  Surgeon: Dayday Freed MD;  Location: [facility];  Service: Endoscopy;  Laterality: N/A;    COLONOSCOPY N/A 2024    Procedure: COLONOSCOPY;  Surgeon: Reese Chen MD;  Location: [facility];  Service: Endoscopy;  Laterality: N/A;    ESOPHAGOGASTRODUODENOSCOPY N/A 10/22/2024    Procedure: EGD (ESOPHAGOGASTRODUODENOSCOPY);  Surgeon: Dayday Freed MD;  Location: [facility];  Service: Endoscopy;  Laterality: N/A;    ESOPHAGOGASTRODUODENOSCOPY N/A 2024    Procedure: EGD (ESOPHAGOGASTRODUODENOSCOPY);  Surgeon: Reese Chen MD;  Location: [facility];  Service: Endoscopy;  Laterality: N/A;    SHOULDER SURGERY      TUBAL LIGATION       No family history on file.      Review of patient's allergies indicates:  No Known Allergies    Medications:  Current Medications[1]         Objective:      Physical Exam:  Vitals: Temp: 97.3 °F (36.3 °C) (25 0915)  Pulse: (!) 125 (25 0930)  Resp: (!) 22 (25  0930)  BP: (!) 90/55 (02/21/25 0915)  SpO2: 98 % (02/21/25 0930)    Physical Exam  Constitutional:       General: She is not in acute distress.     Appearance: She is normal weight. She is ill-appearing. She is not toxic-appearing or diaphoretic.   HENT:      Head: Normocephalic and atraumatic.      Mouth/Throat:      Mouth: Mucous membranes are dry.      Dentition: Abnormal dentition. Gingival swelling present.      Tongue: Lesions (small ulcerations; smooth tongue) present.   Eyes:      Extraocular Movements: Extraocular movements intact.      Pupils: Pupils are equal, round, and reactive to light.   Cardiovascular:      Rate and Rhythm: Regular rhythm. Tachycardia present.      Pulses: Normal pulses.      Heart sounds: Murmur (2/6 systolic murmur at apex radiating to RUSB and back) heard.      No friction rub. No gallop.   Pulmonary:      Effort: Pulmonary effort is normal.      Breath sounds: Normal breath sounds. No rales.   Abdominal:      General: Bowel sounds are absent. There is distension.      Palpations: Abdomen is soft. There is no mass.      Tenderness: There is abdominal tenderness in the right lower quadrant. There is no guarding or rebound.      Hernia: No hernia is present.   Musculoskeletal:         General: Normal range of motion.      Right lower leg: No edema.      Left lower leg: No edema.   Skin:     General: Skin is warm and dry.      Capillary Refill: Capillary refill takes less than 2 seconds.      Coloration: Skin is mottled and sallow.   Neurological:      General: No focal deficit present.      Mental Status: She is alert and oriented to person, place, and time. Mental status is at baseline.      Cranial Nerves: No cranial nerve deficit.      Sensory: No sensory deficit.      Motor: No weakness.      Coordination: Coordination normal.      Gait: Gait normal.      Deep Tendon Reflexes: Reflexes normal.   Psychiatric:         Mood and Affect: Mood normal.         Behavior: Behavior  normal.         Thought Content: Thought content normal.         Judgment: Judgment normal.                 Labs:   Creatinine   Date Value Ref Range Status   02/21/2025 2.9 (H) 0.5 - 1.4 mg/dL Final      Hemoglobin   Date Value Ref Range Status   02/21/2025 9.1 (L) 12.0 - 16.0 g/dL Final      Albumin   Date Value Ref Range Status   02/21/2025 1.3 (L) 3.5 - 5.2 g/dL Final   02/20/2025 1.4 (L) 3.5 - 5.2 g/dL Final   02/20/2025 1.4 (L) 3.5 - 5.2 g/dL Final          Imaging:   Imaging Results              X-Ray Chest 1 View for Line/Tube Placement (Final result)  Result time 02/20/25 00:47:13      Final result by Nick Clifton DO (02/20/25 00:47:13)                   Impression:      Right PICC as above.      Electronically signed by: Nick Clifton  Date:    02/20/2025  Time:    00:47               Narrative:    EXAMINATION:  XR CHEST 1 VIEW FOR LINE/TUBE PLACEMENT    CLINICAL HISTORY:  PICC insertion;    TECHNIQUE:  Single frontal portable view of the chest was performed.    COMPARISON:  01/24/2025.    FINDINGS:  The right-sided PICC with the tip in the mid SVC.  The lungs are well expanded and clear. No focal opacities are seen. The pleural spaces are clear. The cardiac silhouette is unremarkable. The visualized osseous structures demonstrate degenerative changes.                                       CT Abdomen Pelvis  Without Contrast (Final result)  Result time 02/19/25 18:24:35   Procedure changed from CTA Acute GI Bleed, Abdomen and Pelvis     Final result by Nick Clifton DO (02/19/25 18:24:35)                   Impression:      Diffusely distended and fluid-filled loops of small and large bowel, with some wall thickening of the large bowel and adjacent pericolonic fat stranding.  Findings are likely related to enterocolitis, with the possibility of ileus or partial small bowel obstruction not entirely excluded.  Further evaluation with direct visualization and tissue sampling if clinically  indicated.      Electronically signed by: Nickbenjamín Clifton  Date:    02/19/2025  Time:    18:24               Narrative:    EXAMINATION:  CT ABDOMEN PELVIS WITHOUT CONTRAST    CLINICAL HISTORY:  Rectal bleeding.    TECHNIQUE:  Multiplanar images were obtained of the abdomen and pelvis from the hemidiaphragms through the symphysis pubis without intravenous contrast.    COMPARISON:  CTA abdomen, and pelvis from 11/07/2024.    FINDINGS:  Lung Bases: Clear.    Heart: Heart size is normal.  No pericardial effusion.  There is decreased attenuation of the blood pool which can be seen with anemia.    Liver: Normal in size and attenuation without focal hepatic lesion.    Biliary tract: Continued dilation of the common bile duct, measuring up to approximately 1.5 cm in maximal diameter, likely secondary to prior cholecystectomy.    Gallbladder: Surgically absent.    Pancreas: There are pancreatic parenchymal calcifications at the pancreatic head, may be related to chronic pancreatitis, stable.  No pancreatic ductal dilatation.    Spleen: There are calcified granulomas of the spleen.    Adrenals: Normal.    Kidneys and urinary collecting systems: Normal.  No hydronephrosis or urolithiasis.    Lymph nodes: None enlarged.    Stomach and bowel: The stomach is normal.  Diffusely distended and fluid-filled loops of small bowel, measuring up to approximately 3.5 cm in diameter.  No transition point is seen.  Diffusely distended and fluid-filled loops of large bowel also noted.  There is colonic wall thickening and mild adjacent pericolonic fat stranding, predominantly within the right lower quadrant.  The appendix is normal.    Peritoneum and mesentery: No ascites or free intraperitoneal air. No abdominal fluid collection.    Vasculature: Normal.    Urinary bladder: Normal.    Reproductive organs: The uterus and adnexae are unremarkable    Body wall: No abnormality.    Musculoskeletal: No aggressive osseous lesion.                                             I spent a total of 74 minutes on the day of the visit. This includes face to face time in discussion of goals of care, symptom assessment, coordination of care and emotional support.  This also includes non-face to face time preparing to see the patient (eg, review of tests/imaging), obtaining and/or reviewing separately obtained history, documenting clinical information in the electronic or other health record, independently interpreting results and communicating results to the patient/family/caregiver, or care coordinator.     Additional 51 min time spent on a voluntary advance care planning and /or goals of care discussion, providing emotional support, formulating, and communicating prognosis, exploring burdens and benefits of various approaches of treatment, and various avenues for accessing hospice care.      Thank you for the opportunity to care for this patient and family.       Ignacio Griffith Jr, MD         [1]   Current Facility-Administered Medications:     0.9%  NaCl infusion (for blood administration), , Intravenous, Q24H PRN, Jeffrey Peterson DO    aluminum-magnesium hydroxide-simethicone 200-200-20 mg/5 mL suspension 30 mL, 30 mL, Oral, QID (AC & HS), Tavon Crawley, NP, 30 mL at 02/20/25 2059    HYDROmorphone injection 1 mg, 1 mg, Intravenous, Q6H PRN, Jeancarlos Beltran MD, 1 mg at 02/21/25 0649    mupirocin 2 % ointment, , Nasal, BID, Jeffrey Peterson DO, Given at 02/21/25 0830    NORepinephrine 4 mg in dextrose 5% 250 mL infusion (premix), 0-3 mcg/kg/min, Intravenous, Continuous, Jeffrey Peterson DO, Last Rate: 81.5 mL/hr at 02/21/25 0950, 0.28 mcg/kg/min at 02/21/25 0950    ondansetron injection 4 mg, 4 mg, Intravenous, Q8H PRN, Tavon Crawley, NP    pantoprazole injection 40 mg, 40 mg, Intravenous, Daily, Dyan Eubanks MD, 40 mg at 02/21/25 0830    piperacillin-tazobactam (ZOSYN) 4.5 g in D5W 100 mL IVPB (MB+), 4.5 g, Intravenous, Q8H,  Tavon Crawley, NP, Stopped at 02/21/25 0845    Flushing PICC/Midline Protocol, , , Until Discontinued **AND** sodium chloride 0.9% flush 10 mL, 10 mL, Intravenous, Q12H PRN, Jeffrey Peterson, DO    Flushing PICC/Midline Protocol, , , Until Discontinued **AND** sodium chloride 0.9% flush 10 mL, 10 mL, Intravenous, Q12H PRN, Jeffrey Peterson, DO    sodium chloride 0.9% flush 10 mL, 10 mL, Intravenous, PRN, Tavon Crawley, NP

## 2025-02-21 NOTE — PROGRESS NOTES
"[place] - Intensive Care  Adult Nutrition  Progress Note    SUMMARY       Recommendations    Recommendation:  1. Encourage intake of meals & Boost as tolerated.   2. Monitor weight/labs.   3. Advance diet when medically acceptable. 4. RD to follow to monitor po intake.    Goals:  Pt will tolerate diet with at least 50% intake at meals by RD follow up  Nutrition Goal Status: new  Communication of RD Recs: reviewed with RN    Assessment and Plan  Nutrition Problem  Inadequate energy intake    Related to (etiology):   Rectal bleeding, colon cancer    Signs and Symptoms (as evidenced by):   Decreased po intake, Full liquid diet just started, weight loss     Interventions:  Collaboration with other providers  Kardium beverage    Nutrition Diagnosis Status:   New      Malnutrition Assessment  Unable to assess NFPE at visit      Weight Loss (Malnutrition):  (13% x 6 months)       Reason for Assessment  Reason For Assessment: identified at risk by screening criteria ([facility])  Diagnosis:  (sepsis)  General Information Comments: Pt admitted with rectal bleeding. Pt just started on Full Liquid diet with Boost Plus. Intake not yet recorded. Hx:colon cancer. Alex 18-skin intact. PICC line. Noted 25lb weight loss x 6 months. Unable to assess NFPE at visit.  Nutrition Discharge Planning: d/c diet to be determined    Past Medical History:   Diagnosis Date    Asthma     Bipolar disorder     Hypertension         Nutrition/Diet History  Food Preferences: no Congregation or cultural food prefs identified  Factors Affecting Nutritional Intake: altered gastrointestinal function    Anthropometrics  Height: 5' 11" (180.3 cm)  Height (inches): 71 in  Height Method: Stated  Weight: 70 kg (154 lb 5.2 oz)  Weight (lb): 154.32 lb  Weight Method: Bed Scale  Ideal Body Weight (IBW), Female: 155 lb  % Ideal Body Weight, Female (lb): 99.56 %  BMI (Calculated): 21.5  BMI Grade: 18.5-24.9 - normal  Usual Body Weight (UBW), k.3 kg ()  % Usual " Body Weight: 86.28  % Weight Change From Usual Weight: -13.9 %     Lab/Procedures/Meds  Pertinent Labs Reviewed: reviewed  Pertinent Labs Comments: Na 124L, BUN 66H, Crea 2.9H, Ca 6.8L, Alb 1.3L  Pertinent Medications Reviewed: reviewed  Pertinent Medications Comments: zosyn, pantoprazole, norepinephrine    Estimated/Assessed Needs  Weight Used For Calorie Calculations: 70 kg (154 lb 5.2 oz)  Energy Calorie Requirements (kcal): 1750 (25 kcal/kg)  Energy Need Method: Kcal/kg  Protein Requirements: 56-70g (0.8-1.0g/kg)  Weight Used For Protein Calculations: 70 kg (154 lb 5.2 oz)  Estimated Fluid Requirement Method: RDA Method  RDA Method (mL): 1750     Nutrition Prescription Ordered  Current Diet Order: Full Liquid  Oral Nutrition Supplement: Boost Plus    Evaluation of Received Nutrient/Fluid Intake  I/O: 1700/765  Energy Calories Required: not meeting needs  Protein Required: not meeting needs  Fluid Required: not meeting needs  Comments: LBM 2/21  % Intake of Estimated Energy Needs: Other: diet just started  % Meal Intake: Other: diet just started    Nutrition Risk  Level of Risk/Frequency of Follow-up:  (2xweekly)     Monitor and Evaluation  Food and Nutrient Intake: food and beverage intake  Food and Nutrient Adminstration: diet order  Physical Activity and Function: nutrition-related ADLs and IADLs  Anthropometric Measurements: weight  Biochemical Data, Medical Tests and Procedures: electrolyte and renal panel  Nutrition-Focused Physical Findings: overall appearance     Nutrition Related Social Determinants of Health: SDOH: Adequate food in home environment     Nutrition Follow-Up  RD Follow-up?: Yes

## 2025-02-21 NOTE — PROGRESS NOTES
"[facility]     CC: Rectal Bleeding     HPI 59 y.o. 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 GI consulted for rectal bleeding.   Today, she is having lower abdominal pain but overall feels a little better. She is having no more episodes of bleeding. Per nursing staff she had no brown bowel movements overnight with no evidence of blood. Denies nausea, and vomiting    Past Medical History  Past Medical History:   Diagnosis Date    Asthma     Bipolar disorder     Hypertension        Physical Examination  BP (!) 90/55 (BP Location: Right arm, Patient Position: Lying)   Pulse (!) 125   Temp 97.3 °F (36.3 °C) (Axillary)   Resp (!) 22   Ht 5' 11" (1.803 m)   Wt 70 kg (154 lb 5.2 oz)   SpO2 98%   BMI 21.52 kg/m²   General appearace: well appearing, no acute distress, alert   Abdomen: soft, non-tender, non-distended abdomen with no rebound or guarding    Labs:    Hb: 9.1  Hct: 28.1  Plt: 284  C Diff: Negative  Stool WBC: No neutrophils seen  Stool studies: in process     Imaging:    CT AP 2/19/25:  Diffusely distended and fluid-filled loops of small and large bowel, with some wall thickening of the large bowel and adjacent pericolonic fat stranding. Findings are likely related to enterocolitis,       External medical records reviewed including external documents       Endoscopic History:    EGD 11/11/24:Small hiatal hernia. Gastritis. Non-bleeding duodenal ulcer with a clean ulcer base (Tacho Class III).     Colonoscopy 11/11/24: Erythematous, eroded, friable (with contact bleeding) and ulcerated mucosa at the hepatic flexure and in the ascending colon.  Erythematous, eroded, friable (with contact bleeding) and ulcerated mucosa in the transverse colon.  Path: AL amyloidosis     Assessment:  59 y.o. female known colonic amyloid untreated, CHF (likely amyloid) HCV here for 3 day history of rectal bleeding. No longer having episodes of bleeding and hemoglobin " stable although still requiring pressors for blood pressure support.   Bleeding likely secondary to known colonic amyloid. She has been unable to reach her outpatient appointments due to social barriers.     (This is a chronic illness with severe exacerbation, progression or side effect from treatment)    Plan:  - Hematology consulted, no plans for inpatient management and recommended palliative consult  - Will await palliative consultation   - Currently no plans for endoscopic evaluation as this would not change her plan of care  Continue supportive care      Case discussed with Dr. Chen.       Adri Sanford MD  [facility] PGY-2  [facility]

## 2025-02-21 NOTE — PROGRESS NOTES
"Progress Note  [facility]    Attending:    Admit Date: 2/19/2025  Today's Date: 02/21/2025    SUBJECTIVE:   No acute overnight events. Required 0.20 of levo overnight. No bloody bowel movements. H/H stable. Complaining of mild abdominal discomfort this morning. States that drinking milk helps her with her acid reflux symptoms. Had GOC discussions with patient to which she expressed optimism that she will recover. Palliative care on board for further assistance given poor clinical prognosis at this time. Family to be present at bedside later today.     OBJECTIVE:     Vital Signs Trends/Hx Reviewed  Vitals:    02/21/25 1300 02/21/25 1315 02/21/25 1330 02/21/25 1353   BP: (!) 96/59 (!) 110/56 106/61    BP Location:   Right arm    Patient Position:   Lying    Pulse: (!) 122 (!) 125 (!) 125    Resp: (!) 9 10 12 18   Temp:   97.8 °F (36.6 °C)    TempSrc:   Axillary    SpO2: 97% 96% 97%    Weight:       Height:         Physical Exam:  General: NAD, cooperative & interactive.  Patient on norepinephrine drip  HEENT: AT/NC, PERRL, EOMI, oral and nasal mucosa moist.   Neck: Supple without JVD or palpable LAD.   Cardiac: normal rate, regular rhythm, with no MRG with brisk cap refill and symmetric pulses in distal extremities.  Respiratory: Normal inspection. Symmetric chest rise.  Auscultation clear bilaterally. No increased work of breathing noted.   Abdomen: Tender at central abdomen and RLQ.   Extremities: 1+ Pitting edema in lower extremities   Neuro: Grossly intact to brief exam. Oriented x3 with appropriate mood/affect to situation.    Laboratory:  Recent Labs   Lab 02/21/25  0600   WBC 12.06   RBC 3.77*   HGB 9.1*   HCT 28.1*      MCV 75*   MCH 24.1*   MCHC 32.4     Recent Labs   Lab 02/21/25  0554   *   K 4.3   CL 94*   CO2 17*   BUN 66*   CREATININE 2.9*   CALCIUM 6.8*   MG 2.4     No results for input(s): "PH", "PCO2", "PO2", "HCO3", "POCSATURATED", "BE" in the " last 24 hours.      ASSESSMENT & RECOMMENDATIONS     Neuro/Psych  No acute issues at this time  Continue to monitor     CV  #Undifferentiated Shock  Patient currently in shock requiring pressors to maintain MAP  - concern for septic shock in the setting of bacterial enterocolitis  vs. Hemorrhagic in the setting of bloody bowel movements vs. Cardiogenic shock in the setting of low EF  - concern for microvascular involvement of amyloid causing ischemic colitis  - lactic acid  -On Norepinephrine Drip  Last BP: 92/56  Plan  -Goal MAP >65  -Wean pressors as able  -Continue zosyn for empiric coverage  -Blood cultures NGTD  - c diff negative    #HFpEF  #HFrEF   Last TTE 1/15/25- LVEF 25-30 with G2DD; global hypokinesis  Patient has bilateral lower extremity edema  CXR shows evidence of pulmonary vascular congestion  Plan  - patient has had minimal UOP over the last 24 hours; will attempt lasix challenge with lasix 120 with 10 of metolazone 30 minutes prior     Pulm  - saturating well on room air; no acute complaints at thsi time    FEN/GI  #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  -GI consulted; deferred endoscopy at this time  -Hematology-Oncology consulted; recommendations appreciated   -recommend palliative care consult  -H/H stable   F - S/P 2L IVF 1 unit pRBC  E - Na 124 K 4.3 CO2 17  N - Diet: NPO     Keep Mg 2, K 4     RENAL  #KENY on CKD3  #Cardiorenal syndrome  -Strict I&Os  -Avoid renal toxic meds  - creatinine continues to trend up; currently 2.9  - minimal urine output  Plan  -Continue to monitor renal status and urine output  - lasix challenge with metolazone 10 and lasix 120      Heme  # Amyloidosis  History of colonic amyloidosis on biopsy AL (severiano) type on biopsy  Hematology consulted; state that this is typically managed in outpatient  setting; recommend palliative consult as patient has poor outpatient follow up  H/H stable  WBC 16.46  DVT prophylaxis: None     Endo  #No acute issues     ID  #Sepsis  Patient presented in shock requiring Norepinephrine to maintain BP  CT abdo-pelvis showed evidence of enterocolitis  Cxs are pending  Continue ABX: IV Zosyn  Urinalysis: leukocytes 2+, Nitrites, WBC 90  Blood culture ordered and pending  urine culture ordered and pending  Stool studies pending     ICU Checklist  Feeding: NPO  Analgesia: IV dilaudid  1 mg q 6 horus PRN  Sedation: N/A  Thrombo PPX: None  Head of Bed: >30 degrees  Ulcer (Stress) PPX: IV Pantoprazole 40 mg daily  Glucose: goal 140-180  SBT/SAT: N/A  Bowel Regimen: N/A  Indwelling catheter/Lines/Invasive devices: PICC line: right cephalic; PIV posterior right hand  De-escalation ABX: Continue IV Zosyn pending cultures     Code Status: DNR     Dispo  Continue ICU care    Dyan Eubanks DO  [facility] PGY1

## 2025-02-21 NOTE — PLAN OF CARE
Pt AAOx4. Afebrile. Sinus tachycardia on monitor. MAP goal of 65-75 maintained with levo gtt infusing at 0.16mcg/kg/min. O2 sats stable on RA. 2 liquid brown BM overnight. Constant abdominal pain, controlled well with dilaudid PRN. UOP 165mL per potter overnight. Labs reviewed. Safety maintained. POC reviewed with pt. Care ongoing.    Problem: Adult Inpatient Plan of Care  Goal: Plan of Care Review  Outcome: Progressing  Goal: Patient-Specific Goal (Individualized)  Outcome: Progressing  Goal: Absence of Hospital-Acquired Illness or Injury  Outcome: Progressing  Goal: Optimal Comfort and Wellbeing  Outcome: Progressing  Goal: Readiness for Transition of Care  Outcome: Progressing     Problem: Sepsis/Septic Shock  Goal: Optimal Coping  Outcome: Progressing  Goal: Absence of Bleeding  Outcome: Progressing  Goal: Blood Glucose Level Within Targeted Range  Outcome: Progressing  Goal: Absence of Infection Signs and Symptoms  Outcome: Progressing  Goal: Optimal Nutrition Intake  Outcome: Progressing     Problem: Acute Kidney Injury/Impairment  Goal: Fluid and Electrolyte Balance  Outcome: Progressing  Goal: Improved Oral Intake  Outcome: Progressing  Goal: Effective Renal Function  Outcome: Progressing     Problem: Infection  Goal: Absence of Infection Signs and Symptoms  Outcome: Progressing     Problem: Fall Injury Risk  Goal: Absence of Fall and Fall-Related Injury  Outcome: Progressing     Problem: Skin Injury Risk Increased  Goal: Skin Health and Integrity  Outcome: Progressing     Problem: Neutropenia  Goal: Absence of Infection  Outcome: Progressing

## 2025-02-21 NOTE — PLAN OF CARE
went to meet with patient. Patient was asleep at this time, no family at bedside. Palliative Care consult noted. Will follow-up on discussion.  will continue to follow patient through transitions of care and assist with any discharge needs.     Other Contacts    Name Relation Home Work Mobile   Navarro Saini   [phone]   Cass Strong Brothdanny   [phone]   Iain Strong   [phone]   Raymond Shi        Future Appointments   Date Time Provider Department Center   3/10/2025  8:00 AM Brian Sun MD [facility]         02/21/25 1400   Discharge Reassessment   Assessment Type Discharge Planning Reassessment   Discharge Plan A Home with family  (TBD Palliative Discussion)   Discharge Plan B Home with family   DME Needed Upon Discharge  other (see comments)  (TBD)   Why the patient remains in the hospital Requires continued medical care   Post-Acute Status   Hospital Resources/Appts/Education Provided Appointments scheduled and added to AVS   Discharge Delays None known at this time     Debora Ennis RN    [phone]

## 2025-02-21 NOTE — PROGRESS NOTES
Pt denies nicotine withdrawal symptoms at this time. Will follow up for additional smoking cessation education when patient condition improves.

## 2025-02-21 NOTE — SUBJECTIVE & OBJECTIVE
Interval History: awake and alert, requesting to increase pain meds frequency. Patient reported she is not able  Appreciate GI -no plan for endoscopy, continue with stool study  Appreciate Heme-Onc-patient noncompliant with outpatient follow-up for amyloidosis-consult palliative   Appreciates palliative care rec's       Review of Systems   Constitutional:  Positive for activity change and fatigue.   Respiratory:  Negative for chest tightness and shortness of breath.    Musculoskeletal:  Positive for arthralgias, back pain and myalgias.   Skin:  Negative for color change and wound.   Neurological:  Positive for weakness.   Psychiatric/Behavioral:  Negative for agitation.      Objective:     Vital Signs (Most Recent):  Temp: 97.3 °F (36.3 °C) (02/21/25 0315)  Pulse: (!) 122 (02/21/25 0645)  Resp: 17 (02/21/25 0649)  BP: (!) 87/56 (02/21/25 0645)  SpO2: 96 % (02/21/25 0645) Vital Signs (24h Range):  Temp:  [96.7 °F (35.9 °C)-97.8 °F (36.6 °C)] 97.3 °F (36.3 °C)  Pulse:  [] 122  Resp:  [10-35] 17  SpO2:  [87 %-100 %] 96 %  BP: ()/(47-73) 87/56     Weight: 70 kg (154 lb 5.2 oz)  Body mass index is 21.52 kg/m².    Intake/Output Summary (Last 24 hours) at 2/21/2025 0732  Last data filed at 2/21/2025 0602  Gross per 24 hour   Intake 1700.95 ml   Output 765 ml   Net 935.95 ml         Physical Exam  HENT:      Head: Normocephalic and atraumatic.   Musculoskeletal:      Cervical back: Normal range of motion.      Right lower leg: Edema present.      Left lower leg: Edema present.   Skin:     Capillary Refill: Capillary refill takes less than 2 seconds.   Neurological:      Mental Status: She is alert and oriented to person, place, and time.           Significant Labs: A1C:   Recent Labs   Lab 10/20/24  0722   HGBA1C 5.4     CBC:   Recent Labs   Lab 02/20/25  0606 02/20/25  0802 02/20/25  1528 02/21/25  0600   WBC SEE COMMENT 16.46*  --  12.06   HGB SEE COMMENT 9.2* 9.1* 9.1*   HCT SEE COMMENT 28.7* 29.2* 28.1*  "  PLT SEE COMMENT 343  --  284     CMP:   Recent Labs   Lab 02/20/25  0606 02/20/25  0802 02/21/25  0554   * 125* 124*   K 3.6 3.9 4.3   CL 91* 97 94*   CO2 13* 13* 17*   * 118* 109   BUN 47* 51* 66*   CREATININE 2.5* 2.3* 2.9*   CALCIUM 7.1* 7.2* 6.8*   PROT 5.3* 5.6* 5.5*   ALBUMIN 1.4* 1.4* 1.3*   BILITOT 0.9 1.0 0.8   ALKPHOS 80 85 78   AST 21 25 69*   ALT 19 21 40   ANIONGAP 16 15 13     Lactic Acid:   Recent Labs   Lab 02/19/25  1928 02/19/25  2315   LACTATE 4.0* 3.2*     Lipase: No results for input(s): "LIPASE" in the last 48 hours.  Lipid Panel: No results for input(s): "CHOL", "HDL", "LDLCALC", "TRIG", "CHOLHDL" in the last 48 hours.  Magnesium:   Recent Labs   Lab 02/19/25  1739 02/20/25  0606 02/21/25  0554   MG 1.5* 1.9 2.4     Troponin:   Recent Labs   Lab 02/20/25  1851 02/20/25  2148 02/20/25  2354   TROPONINI 0.195* 0.212* 0.200*     TSH: No results for input(s): "TSH" in the last 4320 hours.  Urine Culture: No results for input(s): "LABURIN" in the last 48 hours.  Urine Studies:   Recent Labs   Lab 02/20/25  0459   COLORU Yellow   APPEARANCEUA Cloudy*   PHUR 6.0   SPECGRAV 1.015   PROTEINUA 1+*   GLUCUA Negative   KETONESU Negative   BILIRUBINUA Negative   OCCULTUA 3+*   NITRITE Positive*   UROBILINOGEN Negative   LEUKOCYTESUR 3+*   RBCUA 20*   WBCUA 91*   BACTERIA Many*   SQUAMEPITHEL 3   HYALINECASTS 0       Significant Imaging: I have reviewed all pertinent imaging results/findings within the past 24 hours.  "

## 2025-02-21 NOTE — ASSESSMENT & PLAN NOTE
Advance Care Planning     Date: 02/21/2025    Code Status  In light of the patients advanced and life limiting illness,I engaged the the patient in a voluntary conversation about the patient's preferences for care  at the very end of life. The patient wishes to have a natural, peaceful death.  Along those lines, the patient does not wish to have CPR or other invasive treatments performed when her heart and/or breathing stops. I communicated to the patient that a DNR order would be placed in her medical record to reflect this preference.    A total of 24 min was spent on advance care planning, goals of care discussion, emotional support, formulating and communicating prognosis and exploring burden/benefit of various approaches of treatment. This discussion occurred on a fully voluntary basis with the verbal consent of the patient and/or family.

## 2025-02-21 NOTE — PROGRESS NOTES
[facility] Medicine  Progress Note    Patient Name: Mary Ellen Saini  MRN: [id number]  Patient Class: IP- Inpatient   Admission Date: 2/19/2025  Length of Stay: 2 days  Attending Physician: Jessica Charles*  Primary Care Provider: Marlen, Primary Doctor        Subjective     Principal Problem:Amyloidosis        HPI:  Patient is a 59-year-old female recently diagnosed with a colon CA and history of bipolar disorder, hypertension, colonic amyloidosis with history of GI bleed and symptomatic anemia, duodenal ulcer presented to ED with 3 days of BRBPR.In ED labwork remarkable for worsening anemia with a H&H 7.1 and 22.  Hyponatremia of 125, bicarb 13.  Worsening CKD with BUN 44 serum creatinine is 2.3.  Lactic acid elevated at 4.0.  CT a/p concerning for enterocolitis.  Pt initially stable, however became hypotensive. Pt given 30cc/kg IVF and 1unit PRBC subsequently placed on vasopressors.  Likely secondary to GI bleed however patient has been expanded to sepsis workup.  Patient will be admitted to ICU.    Overview/Hospital Course:  No notes on file    Interval History: awake and alert, requesting to increase pain meds frequency. Patient reported she is not able  Appreciate GI -no plan for endoscopy, continue with stool study  Appreciate Heme-Onc-patient noncompliant with outpatient follow-up for amyloidosis-consult palliative   Appreciates palliative care rec's       Review of Systems   Constitutional:  Positive for activity change and fatigue.   Respiratory:  Negative for chest tightness and shortness of breath.    Musculoskeletal:  Positive for arthralgias, back pain and myalgias.   Skin:  Negative for color change and wound.   Neurological:  Positive for weakness.   Psychiatric/Behavioral:  Negative for agitation.      Objective:     Vital Signs (Most Recent):  Temp: 97.3 °F (36.3 °C) (02/21/25 0315)  Pulse: (!) 122 (02/21/25 0645)  Resp: 17 (02/21/25 0649)  BP: (!) 87/56 (02/21/25  "0645)  SpO2: 96 % (02/21/25 0645) Vital Signs (24h Range):  Temp:  [96.7 °F (35.9 °C)-97.8 °F (36.6 °C)] 97.3 °F (36.3 °C)  Pulse:  [] 122  Resp:  [10-35] 17  SpO2:  [87 %-100 %] 96 %  BP: ()/(47-73) 87/56     Weight: 70 kg (154 lb 5.2 oz)  Body mass index is 21.52 kg/m².    Intake/Output Summary (Last 24 hours) at 2/21/2025 0732  Last data filed at 2/21/2025 0602  Gross per 24 hour   Intake 1700.95 ml   Output 765 ml   Net 935.95 ml         Physical Exam  HENT:      Head: Normocephalic and atraumatic.   Musculoskeletal:      Cervical back: Normal range of motion.      Right lower leg: Edema present.      Left lower leg: Edema present.   Skin:     Capillary Refill: Capillary refill takes less than 2 seconds.   Neurological:      Mental Status: She is alert and oriented to person, place, and time.           Significant Labs: A1C:   Recent Labs   Lab 10/20/24  0722   HGBA1C 5.4     CBC:   Recent Labs   Lab 02/20/25  0606 02/20/25  0802 02/20/25  1528 02/21/25  0600   WBC SEE COMMENT 16.46*  --  12.06   HGB SEE COMMENT 9.2* 9.1* 9.1*   HCT SEE COMMENT 28.7* 29.2* 28.1*   PLT SEE COMMENT 343  --  284     CMP:   Recent Labs   Lab 02/20/25  0606 02/20/25  0802 02/21/25  0554   * 125* 124*   K 3.6 3.9 4.3   CL 91* 97 94*   CO2 13* 13* 17*   * 118* 109   BUN 47* 51* 66*   CREATININE 2.5* 2.3* 2.9*   CALCIUM 7.1* 7.2* 6.8*   PROT 5.3* 5.6* 5.5*   ALBUMIN 1.4* 1.4* 1.3*   BILITOT 0.9 1.0 0.8   ALKPHOS 80 85 78   AST 21 25 69*   ALT 19 21 40   ANIONGAP 16 15 13     Lactic Acid:   Recent Labs   Lab 02/19/25  1928 02/19/25  2315   LACTATE 4.0* 3.2*     Lipase: No results for input(s): "LIPASE" in the last 48 hours.  Lipid Panel: No results for input(s): "CHOL", "HDL", "LDLCALC", "TRIG", "CHOLHDL" in the last 48 hours.  Magnesium:   Recent Labs   Lab 02/19/25  1739 02/20/25  0606 02/21/25  0554   MG 1.5* 1.9 2.4     Troponin:   Recent Labs   Lab 02/20/25  1851 02/20/25  2148 02/20/25  2354   TROPONINI " "0.195* 0.212* 0.200*     TSH: No results for input(s): "TSH" in the last 4320 hours.  Urine Culture: No results for input(s): "LABURIN" in the last 48 hours.  Urine Studies:   Recent Labs   Lab 02/20/25  0459   COLORU Yellow   APPEARANCEUA Cloudy*   PHUR 6.0   SPECGRAV 1.015   PROTEINUA 1+*   GLUCUA Negative   KETONESU Negative   BILIRUBINUA Negative   OCCULTUA 3+*   NITRITE Positive*   UROBILINOGEN Negative   LEUKOCYTESUR 3+*   RBCUA 20*   WBCUA 91*   BACTERIA Many*   SQUAMEPITHEL 3   HYALINECASTS 0       Significant Imaging: I have reviewed all pertinent imaging results/findings within the past 24 hours.    Assessment and Plan     * Amyloidosis    Consult GI-appreciate [id number] with Heme-Onc  Consult Heme-Onc:    GI biopsy on 11/11/2024 revealed amyloidosis   patient noncompliant with her outpatient follow-up with Heme-Onc.    Amyloidosis is managed the outpatient setting  Palliative care consult- appreciates rec's    Enterocolitis  Consult GI: No plan for endoscopy  Continue with stool study      Hyponatremia  Hyponatremia is likely due to Dehydration/hypovolemia. The patient's most recent sodium results are listed below.  Recent Labs     02/19/25  1615 02/20/25  0606 02/20/25  0802   * 120* 125*     Plan  - Correct the sodium by 4-6mEq in 24 hours.   - Obtain the following studies: Urine sodium, urine osmolality, serum osmolality.  - Will treat the hyponatremia with IV fluids as follows:   - Monitor sodium Daily.   - Patient hyponatremia is stable  -     Hypovolemic shock  This patient has shock. The type of shock is hemorrhagic. The patient has the following evidence of shock: persistent hypotension, elevated lactic acid after adequate fluid resuscitation, and KENY. The patient will be admitted to an intensive care unit, they will be treated with IV fluids, PRBC, vasopressors.        ABLA (acute blood loss anemia)  Anemia is likely due to acute blood loss which was from GI bleed . Most recent " hemoglobin and hematocrit are listed below.  Recent Labs     02/20/25  0144 02/20/25  0606 02/20/25  0802   HGB 8.4* SEE COMMENT 9.2*   HCT 25.8* SEE COMMENT 28.7*       Plan  - Monitor serial CBC: Every 8 hours  - Transfuse PRBC if patient becomes hemodynamically unstable, symptomatic or H/H drops below 7/21.  - Patient has received 1 units of PRBCs on 2/19/2025  - Patient's anemia is currently worsening. Will continue current treatment  -recent diagnosis of colon CA  - transfuse as needed  -hold blood thinners  -consult GI    HFrEF (heart failure with reduced ejection fraction)  EF 28% on last echo  Close monitoring while resuscitating with IV fluids    ACP (advance care planning)  Advance Care Planning    Date: 02/21/2025    Code Status  In light of the patients advanced and life limiting illness,I engaged the the patient in a voluntary conversation about the patient's preferences for care  at the very end of life. The patient wishes to have a natural, peaceful death.  Along those lines, the patient does not wish to have CPR or other invasive treatments performed when her heart and/or breathing stops. I communicated to the patient that a DNR order would be placed in her medical record to reflect this preference.    A total of 24 min was spent on advance care planning, goals of care discussion, emotional support, formulating and communicating prognosis and exploring burden/benefit of various approaches of treatment. This discussion occurred on a fully voluntary basis with the verbal consent of the patient and/or family.            Hypomagnesemia  Patient has Abnormal Magnesium: hypomagnesemia. Will continue to monitor electrolytes closely. Will replace the affected electrolytes and repeat labs to be done after interventions completed. The patient's magnesium results have been reviewed and are listed below.  Recent Labs   Lab 02/20/25  0606   MG 1.9          KENY (acute kidney injury)  KENY is likely due to  pre-renal azotemia due to intravascular volume depletion. Baseline creatinine is  1.3 . Most recent creatinine and eGFR are listed below.  Recent Labs     02/19/25  1615 02/20/25  0606 02/20/25  0802   CREATININE 2.3* 2.5* 2.3*   EGFRNORACEVR 24* 22* 24*      Plan  - KENY is improving  - Avoid nephrotoxins and renally dose meds for GFR listed above  - Monitor urine output, serial BMP, and adjust therapy as needed  -     Sepsis  Enterocolitis  This patient does have evidence of infective focus  My overall impression is septic shock due to lactate > 4, MAP < 65, and SBP < 90.  Source: Abdominal  CT shows enterocolitis  Antibiotics given-   Antibiotics (72h ago, onward)      Start     Stop Route Frequency Ordered    02/20/25 0900  mupirocin 2 % ointment         02/25/25 0859 Nasl 2 times daily 02/19/25 2326    02/20/25 0530  piperacillin-tazobactam (ZOSYN) 4.5 g in D5W 100 mL IVPB (MB+)         -- IV Every 8 hours (non-standard times) 02/20/25 0420          Latest lactate reviewed-  Recent Labs   Lab 02/19/25  2315   LACTATE 3.2*       Organ dysfunction indicated by Acute kidney injury and Acute heart failure    Fluid challenge Actual Body weight- Patient will receive 30ml/kg actual body weight to calculate fluid bolus for treatment of septic shock.     Post- resuscitation assessment Yes - I attest a sepsis perfusion exam was performed within 6 hours of sepsis, severe sepsis, or septic shock presentation, following fluid resuscitation.      Will Start Pressors- Levophed for MAP of 65  Source control achieved by:  IV fluid, antibiotics, vasopressors, PRBC    Abd/pel  Diffusely distended and fluid-filled loops of small and large bowel, with some wall thickening of the large bowel and adjacent pericolonic fat stranding. Findings are likely related to enterocolitis, with the possibility of ileus or partial small bowel obstruction not entirely excluded. Further evaluation with direct visualization and tissue sampling if  clinically indicated.         VTE Risk Mitigation (From admission, onward)           Ordered     IP VTE HIGH RISK PATIENT  Once         02/20/25 0558     Place sequential compression device  Until discontinued         02/20/25 0558                    Discharge Planning   JENAE: 2/23/2025     Code Status: DNR   Medical Readiness for Discharge Date:   Discharge Plan A: Home with family (TBD Palliative Discussion)   Discharge Delays: None known at this time        Critical care time spent on the evaluation and treatment of severe organ dysfunction, review of pertinent labs and imaging studies, discussions with consulting providers and discussions with patient/family: 35 minutes.            Jessica Charles MD  Department of Hospital Medicine   [facility] - Intensive Care

## 2025-02-21 NOTE — ACP (ADVANCE CARE PLANNING)
Full palliative assessment and plan to follow in separately dictated consult note.    -----    Advance Care Planning     Date: 02/21/2025    Code Status  In light of the patients advanced and life limiting illness,I engaged the the patient in a voluntary conversation about the patient's preferences for care  at the very end of life. The patient wishes to have a natural, peaceful death.  Along those lines, the patient does not wish to have CPR or other invasive treatments performed when her heart and/or breathing stops. I communicated to the patient that a DNR order would be placed in her medical record to reflect this preference.    A total of 20 min was spent on advance care planning, goals of care discussion, emotional support, formulating and communicating prognosis and exploring burden/benefit of various approaches of treatment. This discussion occurred on a fully voluntary basis with the verbal consent of the patient and/or family.        Ignacio Griffith MD  Hospice and Palliative Medicine  Palliative Care Pager: [phone]

## 2025-02-21 NOTE — ASSESSMENT & PLAN NOTE
Consult GI-appreciate [id number] with Heme-Onc  Consult Heme-Onc:    GI biopsy on 11/11/2024 revealed amyloidosis   patient noncompliant with her outpatient follow-up with Heme-Onc.    Amyloidosis is managed the outpatient setting  Palliative care consult- appreciates rec's

## 2025-02-21 NOTE — PLAN OF CARE
Pt placed on palliative care, DNR order in place. Pt has been complaining of constant abdominal pain throughout the day. Pain relieved by PRN medication. Full liquid diet ordered. Pt currently receiving Levo @ 0.26 mcg/kg/min for MAP goal greater than 65. Minimal UO today. MD aware, Lasix given as ordered. Afebrile. VSS. Bed locked and in lowest position. Pt safety maintained. Care ongoing.

## 2025-02-21 NOTE — PLAN OF CARE
Recommendation:  1. Encourage intake of meals & Boost as tolerated.   2. Monitor weight/labs.   3. Advance diet when medically acceptable. 4. RD to follow to monitor po intake.    Goals:  Pt will tolerate diet with at least 50% intake at meals by RD follow up  Nutrition Goal Status: new

## 2025-02-22 NOTE — PLAN OF CARE
Pt AAOx4. Afebrile. Sinus tachycardia on monitor. MAP goal of 65-75 maintained with levo gtt infusing at 0.26mcg/kg/min. O2 sats stable on RA. No BM overnight. Abdominal pain controlled well, dilaudid given x1 overnight. UOP 210mL per potter overnight. Labs reviewed, calcium 6.9, MD notified, replacements ordered and given. Safety maintained. POC reviewed with pt. Care ongoing.     Problem: Adult Inpatient Plan of Care  Goal: Plan of Care Review  Outcome: Progressing  Goal: Patient-Specific Goal (Individualized)  Outcome: Progressing  Goal: Absence of Hospital-Acquired Illness or Injury  Outcome: Progressing  Goal: Optimal Comfort and Wellbeing  Outcome: Progressing  Goal: Readiness for Transition of Care  Outcome: Progressing     Problem: Sepsis/Septic Shock  Goal: Optimal Coping  Outcome: Progressing  Goal: Absence of Bleeding  Outcome: Progressing  Goal: Blood Glucose Level Within Targeted Range  Outcome: Progressing  Goal: Absence of Infection Signs and Symptoms  Outcome: Progressing  Goal: Optimal Nutrition Intake  Outcome: Progressing     Problem: Acute Kidney Injury/Impairment  Goal: Fluid and Electrolyte Balance  Outcome: Progressing  Goal: Improved Oral Intake  Outcome: Progressing  Goal: Effective Renal Function  Outcome: Progressing     Problem: Infection  Goal: Absence of Infection Signs and Symptoms  Outcome: Progressing     Problem: Fall Injury Risk  Goal: Absence of Fall and Fall-Related Injury  Outcome: Progressing     Problem: Skin Injury Risk Increased  Goal: Skin Health and Integrity  Outcome: Progressing     Problem: Neutropenia  Goal: Absence of Infection  Outcome: Progressing     Problem: Coping Ineffective  Goal: Effective Coping  Outcome: Progressing

## 2025-02-22 NOTE — EICU
EICU FOLLOWUP NOTE:    Called for:  Abnormal labs    DISCUSSED with bedside nurse.    ASSESSMENT AND PLAN:    Serum sodium is 111 which was 124 yesterday.  Serum calcium is 6.3.  Serum glucose is 443 which was 109 yesterday.  Labs were drawn from an IV line through which patient is getting Levophed which is in dextrose water.  I will repeat BMP again.    Thank You for allowing EICU to participate in the care of the patient. Please call as needed    Angelica Alvarez MD  [facility]  [phone]       Addendum:  BMP came back.  Serum sodium and glucose are within acceptable range.  Serum calcium is 6.3.  Albumin is 1 time 2.  Current calcium is 8.5.  I will give calcium gluconate for replacement.   [facility] - Emergency Dept  Podiatry  Consult Note    Patient Name: Cj Barnes  MRN: [id number]  Admission Date: 1/16/2024  Hospital Length of Stay: 0 days  Attending Physician: No att. providers found  Primary Care Provider: Garry Stover MD     Inpatient consult to Podiatry  Consult performed by: Josias Morin MD  Consult ordered by: Maurizio Almeida MD  Assessment/Recommendations: Ambulatory referral to ID for IV abx        Subjective:     History of Present Illness:  61-year-old male with PMH DM I, HTN, HLD, aortic atherosclerosis presents to ED 1/16/24 for concerns of ongoing pain, post-op infection in right heel surgical site. 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 Bowers 1/27/23. Last seen by Dr. Eugene Bowers DPM 1/9/24. Rates pain 8/10, worse than it had been in recent weeks; was given percocet 7.5 and Bactrim, but states that course of Bactrim had not shown improvement in infective symptoms. 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 MRI 1/4 showing tendinosis of Achilles with edema of calcaneus, possible osteomyelitis of calcaneus at fluid-filled tract.  Per patient, and wife at bedside, attests that infection has been going on and off for large amount of time since the procedure had occurred but had otherwise been largely stable aside from pain.  States that he can not put pressure on it, but is otherwise able to ambulate.  Denies nausea, vomiting, fevers, chills, shortness of breath.  No other pedal complaints at this time.    Scheduled Meds:  Continuous Infusions:  PRN Meds:    Review of patient's allergies indicates:  No Known Allergies     Past Medical History:   Diagnosis Date    Anemia     Aortic stenosis 2018    Cancer 2014    Colon cancer     Coronary artery disease     Diabetes mellitus type I     since in his 30's    Heart murmur     Heel fracture     HTN (hypertension)     Hyperlipidemia LDL goal < 70     Insulin pump in place     MVP  (mitral valve prolapse)     Stenosis of aortic and mitral valves      Past Surgical History:   Procedure Laterality Date    AORTIC VALVE REPLACEMENT N/A 8/9/2019    Procedure: Replacement-valve-aortic;  Surgeon: Ryan Knight MD;  [facility];  Service: Cardiothoracic;  Laterality: N/A;    BACK SURGERY      BENTALL PROCEDURE FOR REPLACEMENT OF AORTIC VALVE, AORTIC ROOT, AND ASCENDING AORTA N/A 8/9/2019    Procedure: BENTALL PROCEDURE;  Surgeon: Ryan Knight MD;  [facility];  Service: Cardiothoracic;  Laterality: N/A;    CARDIAC SURGERY      CARPAL TUNNEL RELEASE      COLON SURGERY  2014    COLONOSCOPY N/A 2/17/2020    Procedure: COLONOSCOPY;  Surgeon: Richi Mock MD;  Location: [facility] ENDO;  Service: Colon and Rectal;  Laterality: N/A;    COLONOSCOPY N/A 10/26/2023    Procedure: COLONOSCOPY;  Surgeon: Yadira Louis MD;  Location: [facility] ENDO (4TH FLR);  Service: Endoscopy;  Laterality: N/A;  ok to hold Coumadin x5 days per [facility], see telephone encounter 8/4/23  ref by KELSIE Cao/ suprep inst portal-RB  10/19-precall complete-pt verbalized understanding of holding Ozempic-MS    DEBRIDEMENT OF ACHILLES TENDON Right 1/27/2023    Procedure: DEBRIDEMENT, TENDON, ACHILLES;  Surgeon: Eugene Bowers DPM;  [facility];  Service: Podiatry;  Laterality: Right;    ELBOW SURGERY      tendon release    ESOPHAGOGASTRODUODENOSCOPY N/A 9/10/2020    Procedure: EGD (ESOPHAGOGASTRODUODENOSCOPY);  Surgeon: Abilio Boo MD;  Location: [facility] ENDO (4TH FLR);  Service: Endoscopy;  Laterality: N/A;  Cardiac clearance received, see telephone encounter 8/27/20-BB  Approval to hold Coumadin prior to procedure received, see telephone encounter 8/27/20-BB  covid-9/7/20-[facility]    FOOT TENDON SURGERY      OSTECTOMY Right 1/27/2023    Procedure: OSTECTOMY;  Surgeon: Eugene Bowers DPM;  [facility];  Service: Podiatry;  Laterality:  Right;  calcaneal    right and left heart cath Bilateral 01/15/2018    TREATMENT OF CARDIAC ARRHYTHMIA N/A 8/19/2019    Procedure: CARDIOVERSION;  Surgeon: Juan Zapata MD;  [facility]: [facility] EP LAB;  Service: Cardiology;  Laterality: N/A;  afib, dccv only, anes, GP, 3092    TRIGGER FINGER RELEASE      x 2    TRIGGER FINGER RELEASE Left 1/5/2022    Procedure: RELEASE, TRIGGER FINGER,LEFT,SMALL & THUMB;  Surgeon: Dilma Hunter MD;  Location: [facility] OR;  Service: Orthopedics;  Laterality: Left;       Family History       Problem Relation (Age of Onset)    Diabetes Father    HIV Brother    Heart attack Father    Heart disease Mother    Lung cancer Mother          Tobacco Use    Smoking status: Never    Smokeless tobacco: Former     Types: Snuff     Quit date: 8/30/2019    Tobacco comments:     since he was 15 yrs old   Substance and Sexual Activity    Alcohol use: No     Comment: socially    Drug use: No    Sexual activity: Yes     Partners: Female     Review of Systems   Constitutional:  Negative for chills and fever.   Respiratory:  Negative for cough, shortness of breath and wheezing.    Cardiovascular:  Negative for leg swelling.   Gastrointestinal:  Negative for diarrhea, nausea and vomiting.   Skin:  Positive for wound.     Objective:     Vital Signs (Most Recent):  Temp: 98 °F (36.7 °C) (01/16/24 1430)  Pulse: 73 (01/16/24 1430)  Resp: 16 (01/16/24 1636)  BP: 123/61 (01/16/24 1430)  SpO2: 97 % (01/16/24 1430) Vital Signs (24h Range):  Temp:  [98 °F (36.7 °C)] 98 °F (36.7 °C)  Pulse:  [73] 73  Resp:  [16-18] 16  SpO2:  [97 %] 97 %  BP: (123)/(61) 123/61     Weight: 99.8 kg (220 lb)  Body mass index is 34.46 kg/m².    Foot Exam    General  Orientation: alert and oriented to person, place, and time   Affect: appropriate       Right Foot/Ankle     Inspection and Palpation  Skin Exam: drainage, dry skin and ulcer;     Neurovascular  Dorsalis pedis: 1+  Posterior tibial: 1+  Saphenous nerve sensation:  diminished  Tibial nerve sensation: diminished  Superficial peroneal nerve sensation: diminished  Deep peroneal nerve sensation: diminished  Sural nerve sensation: diminished    Comments  Skin right lower extremity noted to be thin, dry, and atrophic.    Superior aspect to right heel presenting with ulcer to the level subcutaneous, erythematous periwound and tender to palpation. Tracking distally 2.5cm. No discernible probe to bone. No fluctuance or crepitance noted. No malodor.     Left Foot/Ankle      Neurovascular  Dorsalis pedis: 1+  Posterior tibial: 1+  Saphenous nerve sensation: diminished  Tibial nerve sensation: diminished  Superficial peroneal nerve sensation: diminished  Deep peroneal nerve sensation: diminished  Sural nerve sensation: diminished          Laboratory:  CBC:   Recent Labs   Lab 01/16/24  1453   WBC 4.76   RBC 4.61   HGB 12.5*   HCT 40.3      MCV 87   MCH 27.1   MCHC 31.0*     CMP:   Recent Labs   Lab 01/16/24  1453   *   CALCIUM 9.1   ALBUMIN 3.5   PROT 8.0      K 3.7   CO2 27      BUN 12   CREATININE 0.8   ALKPHOS 70   ALT 14   AST 21   BILITOT 0.6     Microbiology Results (last 7 days)       Procedure Component Value Units Date/Time    Fungus culture [[id number]] Collected: 01/16/24 1617    Order Status: Sent Specimen: Wound from Foot, Right Updated: 01/16/24 1626    AFB Culture & Smear [[id number]] Collected: 01/16/24 1617    Order Status: Sent Specimen: Wound from Foot, Right Updated: 01/16/24 1626    Culture, Anaerobic [[id number]] Collected: 01/16/24 1617    Order Status: Sent Specimen: Wound from Foot, Right Updated: 01/16/24 1626    Gram stain [[id number]] Collected: 01/16/24 1617    Order Status: Sent Specimen: Wound from Foot, Right Updated: 01/16/24 1625    Aerobic culture [[id number]] Collected: 01/16/24 1617    Order Status: Sent Specimen: Wound from Foot, Right Updated: 01/16/24 1625    Blood culture #1 **CANNOT BE ORDERED STAT** [[id number]]  Collected: 01/16/24 1453    Order Status: Sent Specimen: Blood from Peripheral, Antecubital, Left Updated: 01/16/24 1518    Blood culture #2 **CANNOT BE ORDERED STAT** [[id number]] Collected: 01/16/24 1453    Order Status: Sent Specimen: Blood from Peripheral, Antecubital, Right Updated: 01/16/24 1518          All pertinent labs reviewed within the last 24 hours.    Diagnostic Results:  MRI: I have reviewed all pertinent results/findings within the past 24 hours.  1/4 showing tendinosis of Achilles with edema of calcaneus, possible osteomyelitis of calcaneus at fluid-filled tract.     Clinical Findings:  Right heel (-) probe to bone, tracking distally  Right heel          Assessment/Plan:     Orthopedic  Ulcer of foot with fat layer exposed, unspecified laterality  61-year-old male with PMH DM I, HTN, HLD, aortic atherosclerosis presents to ED 1/16/24 for concerns of ongoing pain, post-op infection in right heel surgical site. 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 Bowers 1/27/23. Last seen by Dr. Eugene Bowers DPM 1/9/24 to which he was given percocet 7.5 and Bactrim, but states that course of Bactrim had not shown improvement in infective symptoms. Attests that infection has been going on and off for large amount of time since the procedure had occurred but had otherwise been largely stable aside from pain. States that he can not put pressure on it, but is otherwise ambulatory.    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 MRI 1/4 showing tendinosis of Achilles with edema of calcaneus, possible osteomyelitis of calcaneus at fluid-filled tract. Superior aspect to right heel presenting with ulcer to the level subcutaneous, erythematous periwound and tender to palpation. Tracking distally 2.5cm. No discernible probe to bone. No fluctuance or crepitance noted. No malodor.     Plan:  - Deep wound cx taken today of ulcer of right heel, sent to microbiology.  -  Dressed with betadine wet-to-dry, secured with kerlix, Ace to moderate compression. Patient states that he has a shower bag at home and is instructed to keep dressings clean, dry, and intact.  - Outpatient follow-up with Dr. Bowers scheduled later in the week, tentatively 1/18.  - Patient is instructed to refer to outpatient ID for IV antibiotics.  - Disposition per ED on pain regimen, 10-day course of cipro. Ambulatory referral to ID    - Due to chronic and lengthy course of infection, as well as stable nature of wound, admission to hospital is contraindicated per podiatry perspective at this time. AFVSS and no leukocytosis. Per collateral with Dr. Bowers, woundcare will be performed in-office, and referral to ID for antibiotics is necessary. Patient instructed that if new, worsening changes occur to present to the ED immediately.    Discharge recs:  Patient to follow up in Podiatry Clinic outpatient, Podiatry will arrange.  Abx plan per ID, ambulatory referral to ID for IV antibiotics.   Patient to only to transfer in short distances to affected foot, partial weightbearing to heel  Patient to keep dressings clean dry and intact  Patient explained the importance of daily foot checks        Thank you for your consult. I will sign off. Please contact us if you have any additional questions.      [facility] - Emergency Dept  Josias Morin DPM PGY-1  Podiatric Medicine & Surgery  [facility]  Secure Chat Preferred  Mobile: [phone]  Pager: [phone]

## 2025-02-22 NOTE — PROGRESS NOTES
Progress Note  [facility]    Attending:    Admit Date: 2/19/2025  Today's Date: 02/22/2025    SUBJECTIVE:   No acute overnight events. Required 0.20 of levo overnight. No bloody bowel movements. H/H stable. Complaining of mild abdominal discomfort this morning. States that drinking milk helps her with her acid reflux symptoms. Had GOC discussions with patient to which she expressed optimism that she will recover. Palliative care on board for further assistance given poor clinical prognosis at this time. Family to be present at bedside later today.    Interval History:  Continued hypotension requiring levo 0.26 overnight. S/p lasix challenge with 210cc urine output overnight. Calcium gluconate given overnight for hypocalcemia.  Pt seen and examined at bedside this morning. Ill-appearing and lethargic. No more bloody bowel movements.  Suspect shock is multifactorial- cardiogenic and distributive.  Will continue to have goals of care discussions with family/ patient as prognosis is poor.     OBJECTIVE:     Vital Signs Trends/Hx Reviewed  Vitals:    02/22/25 1334 02/22/25 1345 02/22/25 1400 02/22/25 1415   BP: 97/68 91/64 94/63 (!) 91/59   BP Location:       Patient Position:       Pulse: 109 103 108 109   Resp: 18 12 11 11   Temp:       TempSrc:       SpO2: (!) 94% (!) 94% (!) 94% (!) 94%   Weight:       Height:         Physical Exam:  General: NAD, cooperative & interactive.  Patient on norepinephrine drip  HEENT: AT/NC, PERRL, EOMI, oral and nasal mucosa moist.   Neck: Supple without JVD or palpable LAD.   Cardiac: normal rate, regular rhythm, with no MRG with brisk cap refill and symmetric pulses in distal extremities.  Respiratory: Normal inspection. Symmetric chest rise.  Auscultation clear bilaterally. No increased work of breathing noted.   Abdomen: Tender at central abdomen and RLQ.   Extremities: 1+ Pitting edema in lower extremities   Neuro: Grossly intact to brief  "exam. Oriented x3 with appropriate mood/affect to situation.    Laboratory:  Recent Labs   Lab 02/22/25  0407   WBC 17.28*   RBC 3.42*   HGB 8.2*   HCT 25.5*      MCV 75*   MCH 24.0*   MCHC 32.2     Recent Labs   Lab 02/22/25  0407 02/22/25  0504   * 121*   K 4.5 4.8   CL 85* 92*   CO2 14* 15*   BUN 71* 69*   CREATININE 3.2* 3.2*   CALCIUM 6.3* 6.9*   MG 2.7*  --      No results for input(s): "PH", "PCO2", "PO2", "HCO3", "POCSATURATED", "BE" in the last 24 hours.      ASSESSMENT & RECOMMENDATIONS     Neuro/Psych  No acute issues at this time  Continue to monitor     CV  #Undifferentiated Shock  Patient currently in shock requiring pressors to maintain MAP  - concern for septic shock in the setting of bacterial enterocolitis  vs. Hemorrhagic in the setting of bloody bowel movements vs. Cardiogenic shock in the setting of low EF  - concern for microvascular involvement of amyloid causing ischemic colitis  - lactic acid  -On Norepinephrine Drip  Last BP: 92/56  Plan  -Goal MAP >65  -Wean pressors as able  -Continue zosyn for empiric coverage  -Blood cultures NGTD  - c diff negative    #HFpEF  #HFrEF   Last TTE 1/15/25- LVEF 25-30 with G2DD; global hypokinesis  Patient has bilateral lower extremity edema  CXR shows evidence of pulmonary vascular congestion  Plan  - patient has had minimal UOP over the last 24 hours; will attempt lasix challenge with lasix 120 with 10 of metolazone 30 minutes prior; 120 cc UOP  - continue to monitor    Pulm  - saturating well on room air; no acute complaints at thsi time    FEN/GI  #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  -GI consulted; deferred endoscopy at this time  -Hematology-Oncology consulted; recommendations appreciated   -recommend palliative care consult; patient currently DNR  -H/H stable   F - " S/P 2L IVF 1 unit pRBC  E - Na 124 K 4.3 CO2 17  N - Diet: NPO     Keep Mg 2, K 4     RENAL  #KENY on CKD3  #Cardiorenal syndrome  -Strict I&Os  -Avoid renal toxic meds  - creatinine continues to trend up; currently 2.9  - minimal urine output  Plan  -Continue to monitor renal status and urine output  - lasix challenge with metolazone 10 and lasix 120 with minimal UOP  - will hold further diuresis in the setting of worsening kidney function     Heme  # Amyloidosis  History of colonic amyloidosis on biopsy AL (severiano) type on biopsy  Hematology consulted; state that this is typically managed in outpatient setting; recommend palliative consult as patient has poor outpatient follow up  H/H stable  WBC 16.46  DVT prophylaxis: None     Endo  #No acute issues     ID  #Sepsis  Patient presented in shock requiring Norepinephrine to maintain BP  CT abdo-pelvis showed evidence of enterocolitis  Cxs are pending  Continue ABX: IV Zosyn  Urinalysis: leukocytes 2+, Nitrites, WBC 90  Blood culture; NGTD  urine culture ordered and pending  Stool studies negative     ICU Checklist  Feeding: regular diet  Analgesia: IV dilaudid  1 mg q 6 horus PRN  Sedation: N/A  Thrombo PPX: None  Head of Bed: >30 degrees  Ulcer (Stress) PPX: IV Pantoprazole 40 mg daily  Glucose: goal 140-180  SBT/SAT: N/A  Bowel Regimen: N/A  Indwelling catheter/Lines/Invasive devices: PICC line: right cephalic; PIV posterior right hand  De-escalation ABX: Continue IV Zosyn pending cultures     Code Status: DNR     Dispo  Continue ICU care/ GOC discussions     Dyan Eubanks DO  [facility] PGY1    Pt seen with Dr. Chase. Attending attestation to follow.

## 2025-02-22 NOTE — PROGRESS NOTES
[facility] Medicine  Progress Note    Patient Name: Mary Ellen Saini  MRN: [id number]  Patient Class: IP- Inpatient   Admission Date: 2/19/2025  Length of Stay: 3 days  Attending Physician: Jessica Charles  Primary Care Provider: Marlen, Primary Doctor        Subjective     Principal Problem:Amyloidosis        HPI:  Patient is a 59-year-old female recently diagnosed with a colon CA and history of bipolar disorder, hypertension, colonic amyloidosis with history of GI bleed and symptomatic anemia, duodenal ulcer presented to ED with 3 days of BRBPR.In ED labwork remarkable for worsening anemia with a H&H 7.1 and 22.  Hyponatremia of 125, bicarb 13.  Worsening CKD with BUN 44 serum creatinine is 2.3.  Lactic acid elevated at 4.0.  CT a/p concerning for enterocolitis.  Pt initially stable, however became hypotensive. Pt given 30cc/kg IVF and 1unit PRBC subsequently placed on vasopressors.  Likely secondary to GI bleed however patient has been expanded to sepsis workup.  Patient will be admitted to ICU.    Overview/Hospital Course:  No notes on file    Interval History: awake and alert, requesting for food, patient also wanting to discuss further with family regarding goals of care  Presently on pressor-continue pending final discussion with family.  Appreciate pulmonary crit, Heme-Onc, palliative Care, and GI        Review of Systems   Constitutional:  Positive for activity change and fatigue.   Respiratory:  Negative for chest tightness and shortness of breath.    Musculoskeletal:  Positive for arthralgias, back pain and myalgias.   Skin:  Negative for color change and wound.   Neurological:  Positive for weakness.   Psychiatric/Behavioral:  Negative for agitation.      Objective:     Vital Signs (Most Recent):  Temp: 97.7 °F (36.5 °C) (02/22/25 1200)  Pulse: 109 (02/22/25 1415)  Resp: 11 (02/22/25 1415)  BP: (!) 91/59 (02/22/25 1415)  SpO2: (!) 94 % (02/22/25 1415) Vital Signs (24h  "Range):  Temp:  [97.1 °F (36.2 °C)-98.4 °F (36.9 °C)] 97.7 °F (36.5 °C)  Pulse:  [] 109  Resp:  [9-25] 11  SpO2:  [93 %-99 %] 94 %  BP: ()/(50-68) 91/59     Weight: 70 kg (154 lb 5.2 oz)  Body mass index is 21.52 kg/m².    Intake/Output Summary (Last 24 hours) at 2/22/2025 1449  Last data filed at 2/22/2025 1244  Gross per 24 hour   Intake 3045.78 ml   Output 380 ml   Net 2665.78 ml         Physical Exam  HENT:      Head: Normocephalic and atraumatic.   Musculoskeletal:      Cervical back: Normal range of motion.      Right lower leg: Edema present.      Left lower leg: Edema present.   Skin:     Capillary Refill: Capillary refill takes less than 2 seconds.   Neurological:      Mental Status: She is alert and oriented to person, place, and time.             Significant Labs: A1C:   Recent Labs   Lab 10/20/24  0722   HGBA1C 5.4     CBC:   Recent Labs   Lab 02/20/25  1528 02/21/25  0600 02/22/25  0407   WBC  --  12.06 17.28*   HGB 9.1* 9.1* 8.2*   HCT 29.2* 28.1* 25.5*   PLT  --  284 207     CMP:   Recent Labs   Lab 02/21/25  0554 02/22/25  0407 02/22/25  0504   * 111* 121*   K 4.3 4.5 4.8   CL 94* 85* 92*   CO2 17* 14* 15*    443* 117*   BUN 66* 71* 69*   CREATININE 2.9* 3.2* 3.2*   CALCIUM 6.8* 6.3* 6.9*   PROT 5.5* 5.2*  --    ALBUMIN 1.3* 1.2*  --    BILITOT 0.8 0.7  --    ALKPHOS 78 72  --    AST 69* 42*  --    ALT 40 28  --    ANIONGAP 13 12 14     Lactic Acid:   Recent Labs   Lab 02/22/25  0852   LACTATE 2.5*     Lipase: No results for input(s): "LIPASE" in the last 48 hours.  Lipid Panel: No results for input(s): "CHOL", "HDL", "LDLCALC", "TRIG", "CHOLHDL" in the last 48 hours.  Magnesium:   Recent Labs   Lab 02/21/25  0554 02/22/25  0407   MG 2.4 2.7*     Troponin:   Recent Labs   Lab 02/20/25  1851 02/20/25  2148 02/20/25  2354   TROPONINI 0.195* 0.212* 0.200*     TSH:   Recent Labs   Lab 02/22/25  0504   TSH 5.705*     Urine Culture: No results for input(s): "LABURIN" in the last 48 " "hours.  Urine Studies:   No results for input(s): "COLORU", "APPEARANCEUA", "PHUR", "SPECGRAV", "PROTEINUA", "GLUCUA", "KETONESU", "BILIRUBINUA", "OCCULTUA", "NITRITE", "UROBILINOGEN", "LEUKOCYTESUR", "RBCUA", "WBCUA", "BACTERIA", "SQUAMEPITHEL", "HYALINECASTS" in the last 48 hours.    Invalid input(s): "WRIGHTSUR"      Significant Imaging: I have reviewed all pertinent imaging results/findings within the past 24 hours.    Assessment and Plan     * Amyloidosis    Consult GI-appreciate [id number] with Heme-Onc  Consult Heme-Onc:    GI biopsy on 11/11/2024 revealed amyloidosis   patient noncompliant with her outpatient follow-up with Heme-Onc.    Amyloidosis is managed the outpatient setting  Palliative care consult- appreciates rec's    Enterocolitis  Consult GI: No plan for endoscopy  Continue with stool study      Hyponatremia  Hyponatremia is likely due to Dehydration/hypovolemia. The patient's most recent sodium results are listed below.  Recent Labs     02/19/25  1615 02/20/25  0606 02/20/25  0802   * 120* 125*     Plan  - Correct the sodium by 4-6mEq in 24 hours.   - Obtain the following studies: Urine sodium, urine osmolality, serum osmolality.  - Will treat the hyponatremia with IV fluids as follows:   - Monitor sodium Daily.   - Patient hyponatremia is stable  -     Hypovolemic shock  This patient has shock. The type of shock is hemorrhagic. The patient has the following evidence of shock: persistent hypotension, elevated lactic acid after adequate fluid resuscitation, and KENY. The patient will be admitted to an intensive care unit, they will be treated with IV fluids, PRBC, vasopressors.        ABLA (acute blood loss anemia)  Anemia is likely due to acute blood loss which was from GI bleed . Most recent hemoglobin and hematocrit are listed below.  Recent Labs     02/20/25  0144 02/20/25  0606 02/20/25  0802   HGB 8.4* SEE COMMENT 9.2*   HCT 25.8* SEE COMMENT 28.7*       Plan  - Monitor serial CBC: " Every 8 hours  - Transfuse PRBC if patient becomes hemodynamically unstable, symptomatic or H/H drops below 7/21.  - Patient has received 1 units of PRBCs on 2/19/2025  - Patient's anemia is currently worsening. Will continue current treatment  -recent diagnosis of colon CA  - transfuse as needed  -hold blood thinners  -consult GI    HFrEF (heart failure with reduced ejection fraction)  EF 28% on last echo  Close monitoring while resuscitating with IV fluids    ACP (advance care planning)  Advance Care Planning    Date: 02/21/2025    Code Status  In light of the patients advanced and life limiting illness,I engaged the the patient in a voluntary conversation about the patient's preferences for care  at the very end of life. The patient wishes to have a natural, peaceful death.  Along those lines, the patient does not wish to have CPR or other invasive treatments performed when her heart and/or breathing stops. I communicated to the patient that a DNR order would be placed in her medical record to reflect this preference.    A total of 24 min was spent on advance care planning, goals of care discussion, emotional support, formulating and communicating prognosis and exploring burden/benefit of various approaches of treatment. This discussion occurred on a fully voluntary basis with the verbal consent of the patient and/or family.            Hypomagnesemia  Patient has Abnormal Magnesium: hypomagnesemia. Will continue to monitor electrolytes closely. Will replace the affected electrolytes and repeat labs to be done after interventions completed. The patient's magnesium results have been reviewed and are listed below.  Recent Labs   Lab 02/20/25  0606   MG 1.9          KENY (acute kidney injury)  KENY is likely due to pre-renal azotemia due to intravascular volume depletion. Baseline creatinine is  1.3 . Most recent creatinine and eGFR are listed below.  Recent Labs     02/19/25  1615 02/20/25  0606 02/20/25  0802    CREATININE 2.3* 2.5* 2.3*   EGFRNORACEVR 24* 22* 24*      Plan  - KENY is improving  - Avoid nephrotoxins and renally dose meds for GFR listed above  - Monitor urine output, serial BMP, and adjust therapy as needed  -     Sepsis  Enterocolitis  This patient does have evidence of infective focus  My overall impression is septic shock due to lactate > 4, MAP < 65, and SBP < 90.  Source: Abdominal  CT shows enterocolitis  Antibiotics given-   Antibiotics (72h ago, onward)      Start     Stop Route Frequency Ordered    02/20/25 0900  mupirocin 2 % ointment         02/25/25 0859 Nasl 2 times daily 02/19/25 2326    02/20/25 0530  piperacillin-tazobactam (ZOSYN) 4.5 g in D5W 100 mL IVPB (MB+)         -- IV Every 8 hours (non-standard times) 02/20/25 0420          Latest lactate reviewed-  Recent Labs   Lab 02/19/25  2315   LACTATE 3.2*       Organ dysfunction indicated by Acute kidney injury and Acute heart failure    Fluid challenge Actual Body weight- Patient will receive 30ml/kg actual body weight to calculate fluid bolus for treatment of septic shock.     Post- resuscitation assessment Yes - I attest a sepsis perfusion exam was performed within 6 hours of sepsis, severe sepsis, or septic shock presentation, following fluid resuscitation.      Will Start Pressors- Levophed for MAP of 65  Source control achieved by:  IV fluid, antibiotics, vasopressors, PRBC    Abd/pel  Diffusely distended and fluid-filled loops of small and large bowel, with some wall thickening of the large bowel and adjacent pericolonic fat stranding. Findings are likely related to enterocolitis, with the possibility of ileus or partial small bowel obstruction not entirely excluded. Further evaluation with direct visualization and tissue sampling if clinically indicated.         VTE Risk Mitigation (From admission, onward)           Ordered     IP VTE HIGH RISK PATIENT  Once         02/20/25 0558     Place sequential compression device  Until  discontinued         02/20/25 0558                    Discharge Planning   JENAE: 2/23/2025     Code Status: DNR   Medical Readiness for Discharge Date:   Discharge Plan A: Home with family (TBD Palliative Discussion)   Discharge Delays: None known at this time        Critical care time spent on the evaluation and treatment of severe organ dysfunction, review of pertinent labs and imaging studies, discussions with consulting providers and discussions with patient/family: 45 minutes.            Jessica Charles MD  Department of Hospital Medicine   [facility] - Intensive Care

## 2025-02-22 NOTE — SUBJECTIVE & OBJECTIVE
Interval History: awake and alert, requesting for food, patient also wanting to discuss further with family regarding goals of care  Presently on pressor-continue pending final discussion with family.  Appreciate pulmonary crit, Heme-Onc, palliative Care, and GI        Review of Systems   Constitutional:  Positive for activity change and fatigue.   Respiratory:  Negative for chest tightness and shortness of breath.    Musculoskeletal:  Positive for arthralgias, back pain and myalgias.   Skin:  Negative for color change and wound.   Neurological:  Positive for weakness.   Psychiatric/Behavioral:  Negative for agitation.      Objective:     Vital Signs (Most Recent):  Temp: 97.7 °F (36.5 °C) (02/22/25 1200)  Pulse: 109 (02/22/25 1415)  Resp: 11 (02/22/25 1415)  BP: (!) 91/59 (02/22/25 1415)  SpO2: (!) 94 % (02/22/25 1415) Vital Signs (24h Range):  Temp:  [97.1 °F (36.2 °C)-98.4 °F (36.9 °C)] 97.7 °F (36.5 °C)  Pulse:  [] 109  Resp:  [9-25] 11  SpO2:  [93 %-99 %] 94 %  BP: ()/(50-68) 91/59     Weight: 70 kg (154 lb 5.2 oz)  Body mass index is 21.52 kg/m².    Intake/Output Summary (Last 24 hours) at 2/22/2025 1449  Last data filed at 2/22/2025 1244  Gross per 24 hour   Intake 3045.78 ml   Output 380 ml   Net 2665.78 ml         Physical Exam  HENT:      Head: Normocephalic and atraumatic.   Musculoskeletal:      Cervical back: Normal range of motion.      Right lower leg: Edema present.      Left lower leg: Edema present.   Skin:     Capillary Refill: Capillary refill takes less than 2 seconds.   Neurological:      Mental Status: She is alert and oriented to person, place, and time.             Significant Labs: A1C:   Recent Labs   Lab 10/20/24  0722   HGBA1C 5.4     CBC:   Recent Labs   Lab 02/20/25  1528 02/21/25  0600 02/22/25  0407   WBC  --  12.06 17.28*   HGB 9.1* 9.1* 8.2*   HCT 29.2* 28.1* 25.5*   PLT  --  284 207     CMP:   Recent Labs   Lab 02/21/25  0554 02/22/25  0407 02/22/25  0504   *  "111* 121*   K 4.3 4.5 4.8   CL 94* 85* 92*   CO2 17* 14* 15*    443* 117*   BUN 66* 71* 69*   CREATININE 2.9* 3.2* 3.2*   CALCIUM 6.8* 6.3* 6.9*   PROT 5.5* 5.2*  --    ALBUMIN 1.3* 1.2*  --    BILITOT 0.8 0.7  --    ALKPHOS 78 72  --    AST 69* 42*  --    ALT 40 28  --    ANIONGAP 13 12 14     Lactic Acid:   Recent Labs   Lab 02/22/25  0852   LACTATE 2.5*     Lipase: No results for input(s): "LIPASE" in the last 48 hours.  Lipid Panel: No results for input(s): "CHOL", "HDL", "LDLCALC", "TRIG", "CHOLHDL" in the last 48 hours.  Magnesium:   Recent Labs   Lab 02/21/25  0554 02/22/25  0407   MG 2.4 2.7*     Troponin:   Recent Labs   Lab 02/20/25  1851 02/20/25  2148 02/20/25  2354   TROPONINI 0.195* 0.212* 0.200*     TSH:   Recent Labs   Lab 02/22/25  0504   TSH 5.705*     Urine Culture: No results for input(s): "LABURIN" in the last 48 hours.  Urine Studies:   No results for input(s): "COLORU", "APPEARANCEUA", "PHUR", "SPECGRAV", "PROTEINUA", "GLUCUA", "KETONESU", "BILIRUBINUA", "OCCULTUA", "NITRITE", "UROBILINOGEN", "LEUKOCYTESUR", "RBCUA", "WBCUA", "BACTERIA", "SQUAMEPITHEL", "HYALINECASTS" in the last 48 hours.    Invalid input(s): "WRIGHTSUR"      Significant Imaging: I have reviewed all pertinent imaging results/findings within the past 24 hours.  "

## 2025-02-23 NOTE — ASSESSMENT & PLAN NOTE
EF 28% on last echo  Close monitoring while resuscitating with IV fluids  Transition to comfort measures

## 2025-02-23 NOTE — EICU
eICU Physician Virtual/Remote Brief Evaluation Note      Telephone call bedside RN   Sodium 115, calcium 6.9  Chart reviewed, discussed with RN /64, P 90, R 17, O2 sat 97  Sodium 115 decreased from 121, calcium 6.9, unchanged.  Albumin 1.2, creatinine 3.6-increased from 3.2  Corrected calcium 9.1  Markedly peaked T-waves in V1  Echocardiogram 01/15/2025 with severely dilated left ventricle with severely replaced LV function, EF 25-30%, grade 2 diastolic dysfunction.  Moderately dilated RV with normal RV systolic function, severe mitral regurg, moderate tricuspid regurg, severe pulmonary hypertension  Calcium repletion not necessary except as part of hyperkalemia protocol  Hyperkalemia protocol ordered  Start bicarb drip at 75 mL/hour  Renal consult placed-may need hypertonic saline          ALEX Keller MD  eICU Attending  [phone]    This report has been created through the use of M-Modal dictation software. Typographical and content errors may occur with this process. While efforts are made to detect and correct such errors, in some cases errors will persist. For this reason, wording in this document should be considered in the proper context and not strictly verbatim

## 2025-02-23 NOTE — SUBJECTIVE & OBJECTIVE
Interval History: patient unresponsive with agonal breathing, lethargic  Updated family at the bedside on declining clinical status and they wanted to transition to comfort measures, stating they do not want her to suffer.   Transition to comfort measures    Review of Systems   Unable to perform ROS: Patient unresponsive     Objective:     Vital Signs (Most Recent):  Temp: 98.1 °F (36.7 °C) (02/23/25 1115)  Pulse: 87 (02/23/25 1200)  Resp: 20 (02/23/25 1200)  BP: 117/64 (02/23/25 1200)  SpO2: 97 % (02/23/25 1200) Vital Signs (24h Range):  Temp:  [97.3 °F (36.3 °C)-98.7 °F (37.1 °C)] 98.1 °F (36.7 °C)  Pulse:  [] 87  Resp:  [10-21] 20  SpO2:  [93 %-98 %] 97 %  BP: ()/(51-72) 117/64     Weight: 70 kg (154 lb 5.2 oz)  Body mass index is 21.52 kg/m².    Intake/Output Summary (Last 24 hours) at 2/23/2025 1309  Last data filed at 2/23/2025 1200  Gross per 24 hour   Intake 2489.3 ml   Output 198 ml   Net 2291.3 ml         Physical Exam  Constitutional:       Appearance: She is ill-appearing and toxic-appearing.   Pulmonary:      Effort: Tachypnea present.   Musculoskeletal:      Cervical back: Normal range of motion.             Significant Labs: All pertinent labs within the past 24 hours have been reviewed.    Significant Imaging: I have reviewed all pertinent imaging results/findings within the past 24 hours.

## 2025-02-23 NOTE — ASSESSMENT & PLAN NOTE
Advance Care Planning     Date: 02/21/2025    Code Status  In light of the patients advanced and life limiting illness,I engaged the the patient in a voluntary conversation about the patient's preferences for care  at the very end of life. The patient wishes to have a natural, peaceful death.  Along those lines, the patient does not wish to have CPR or other invasive treatments performed when her heart and/or breathing stops. I communicated to the patient that a DNR order would be placed in her medical record to reflect this preference.    A total of 24 min was spent on advance care planning, goals of care discussion, emotional support, formulating and communicating prognosis and exploring burden/benefit of various approaches of treatment. This discussion occurred on a fully voluntary basis with the verbal consent of the patient and/or family.        Advance Care Planning     Date: 02/23/2025    [facility]  I engaged the family in a voluntary conversation about advance care planning and we specifically addressed what the goals of care would be moving forward, in light of the patient's change in clinical status, specifically declining clinical status.  We did specifically address the patient's likely prognosis, which is poor.  We explored the patient's values and preferences for future care.  The family endorses that what is most important right now is to focus on comfort and QOL     Accordingly, we have decided that the best plan to meet the patient's goals includes no further escalation in treatment and enrolling in hospice care    A total of 30 min was spent on advance care planning, goals of care discussion, emotional support, formulating and communicating prognosis and exploring burden/benefit of various approaches of treatment. This discussion occurred on a fully voluntary basis with the verbal consent of the patient and/or family.

## 2025-02-23 NOTE — ASSESSMENT & PLAN NOTE
Hyponatremia is likely due to Dehydration/hypovolemia. The patient's most recent sodium results are listed below.  Recent Labs     02/22/25  0504 02/23/25  0505 02/23/25  0915   * 115* 116*       Plan  - Correct the sodium by 4-6mEq in 24 hours.   - Obtain the following studies: Urine sodium, urine osmolality, serum osmolality.  - Will treat the hyponatremia with IV fluids as follows:   - Monitor sodium Daily.   - Patient hyponatremia is stable  Transition to comfort measures

## 2025-02-23 NOTE — NURSING
Family requests [facility] as  home of choice.   Rings x 3, Necklace x 1, and bible given to brother, Iain Strong.

## 2025-02-23 NOTE — ASSESSMENT & PLAN NOTE
KENY is likely due to pre-renal azotemia due to intravascular volume depletion. Baseline creatinine is  1.3 . Most recent creatinine and eGFR are listed below.  Recent Labs     02/22/25  0504 02/23/25  0505 02/23/25  0915   CREATININE 3.2* 3.6* 3.7*   EGFRNORACEVR 16* 14* 14*        Plan  - KENY is improving  - Avoid nephrotoxins and renally dose meds for GFR listed above  - Monitor urine output, serial BMP, and adjust therapy as needed    Transition to comfort measures

## 2025-02-23 NOTE — PLAN OF CARE
Pt still experiencing abdominal pain throughout shift. Pain relieved by PRN medication. Pt currently receiving Levo at 0.22 mcg/kg/min to maintain MAP goal 65 or greater. Pt's appetite remains poor. UO 195cc throughout shift. Pt AAOx4, afebrile. Sinus tach, BP stable. Bed in lowest position and wheels locked. Pt safety maintained. Pt's sister Kirstin to remain bedside throughout the night. Care ongoing.

## 2025-02-23 NOTE — ASSESSMENT & PLAN NOTE
Patient has Abnormal Magnesium: hypomagnesemia. Will continue to monitor electrolytes closely. Will replace the affected electrolytes and repeat labs to be done after interventions completed. The patient's magnesium results have been reviewed and are listed below.  Recent Labs   Lab 02/23/25  0505   MG 4.0*      Transition to comfort measures

## 2025-02-23 NOTE — ASSESSMENT & PLAN NOTE
Consult GI-appreciate [id number] with Heme-Onc  Consult Heme-Onc:    GI biopsy on 11/11/2024 revealed amyloidosis   patient noncompliant with her outpatient follow-up with Heme-Onc.    Amyloidosis is managed the outpatient setting  Palliative care consult- appreciates rec's    Transition to comfort measures

## 2025-02-23 NOTE — ASSESSMENT & PLAN NOTE
This patient has shock. The type of shock is hemorrhagic. The patient has the following evidence of shock: persistent hypotension, elevated lactic acid after adequate fluid resuscitation, and KENY. The patient will be admitted to an intensive care unit, they will be treated with IV fluids, PRBC, vasopressors.    Transition to comfort measures

## 2025-02-23 NOTE — EICU
Intervention Initiated From:  Bedside    Santiago intervened regarding:  Labs    Nurse Notified:  Yes    Doctor Notified:  Yes    Comments: bedside nurse called to cbc ordered every 8hr and daily phos level. Informed eicu md-.

## 2025-02-23 NOTE — PROGRESS NOTES
[facility] Medicine  Progress Note    Patient Name: Mary Ellen Saini  MRN: [id number]  Patient Class: IP- Inpatient   Admission Date: 2/19/2025  Length of Stay: 4 days  Attending Physician: Jessica Charles*  Primary Care Provider: Marlen, Primary Doctor        Subjective     Principal Problem:Amyloidosis        HPI:  Patient is a 59-year-old female recently diagnosed with a colon CA and history of bipolar disorder, hypertension, colonic amyloidosis with history of GI bleed and symptomatic anemia, duodenal ulcer presented to ED with 3 days of BRBPR.In ED labwork remarkable for worsening anemia with a H&H 7.1 and 22.  Hyponatremia of 125, bicarb 13.  Worsening CKD with BUN 44 serum creatinine is 2.3.  Lactic acid elevated at 4.0.  CT a/p concerning for enterocolitis.  Pt initially stable, however became hypotensive. Pt given 30cc/kg IVF and 1unit PRBC subsequently placed on vasopressors.  Likely secondary to GI bleed however patient has been expanded to sepsis workup.  Patient will be admitted to ICU.    Overview/Hospital Course:  No notes on file    Interval History: patient unresponsive with agonal breathing, lethargic  Updated family at the bedside on declining clinical status and they wanted to transition to comfort measures, stating they do not want her to suffer.   Transition to comfort measures    Review of Systems   Unable to perform ROS: Patient unresponsive     Objective:     Vital Signs (Most Recent):  Temp: 98.1 °F (36.7 °C) (02/23/25 1115)  Pulse: 87 (02/23/25 1200)  Resp: 20 (02/23/25 1200)  BP: 117/64 (02/23/25 1200)  SpO2: 97 % (02/23/25 1200) Vital Signs (24h Range):  Temp:  [97.3 °F (36.3 °C)-98.7 °F (37.1 °C)] 98.1 °F (36.7 °C)  Pulse:  [] 87  Resp:  [10-21] 20  SpO2:  [93 %-98 %] 97 %  BP: ()/(51-72) 117/64     Weight: 70 kg (154 lb 5.2 oz)  Body mass index is 21.52 kg/m².    Intake/Output Summary (Last 24 hours) at 2/23/2025 1309  Last data filed at 2/23/2025  1200  Gross per 24 hour   Intake 2489.3 ml   Output 198 ml   Net 2291.3 ml         Physical Exam  Constitutional:       Appearance: She is ill-appearing and toxic-appearing.   Pulmonary:      Effort: Tachypnea present.   Musculoskeletal:      Cervical back: Normal range of motion.             Significant Labs: All pertinent labs within the past 24 hours have been reviewed.    Significant Imaging: I have reviewed all pertinent imaging results/findings within the past 24 hours.    Assessment and Plan     * Amyloidosis    Consult GI-appreciate [id number] with Heme-Onc  Consult Heme-Onc:    GI biopsy on 11/11/2024 revealed amyloidosis   patient noncompliant with her outpatient follow-up with Heme-Onc.    Amyloidosis is managed the outpatient setting  Palliative care consult- appreciates rec's    Transition to comfort measures    Enterocolitis  Consult GI: No plan for endoscopy  Continue with stool study      Hyponatremia  Hyponatremia is likely due to Dehydration/hypovolemia. The patient's most recent sodium results are listed below.  Recent Labs     02/22/25  0504 02/23/25  0505 02/23/25  0915   * 115* 116*       Plan  - Correct the sodium by 4-6mEq in 24 hours.   - Obtain the following studies: Urine sodium, urine osmolality, serum osmolality.  - Will treat the hyponatremia with IV fluids as follows:   - Monitor sodium Daily.   - Patient hyponatremia is stable  Transition to comfort measures    Hypovolemic shock  This patient has shock. The type of shock is hemorrhagic. The patient has the following evidence of shock: persistent hypotension, elevated lactic acid after adequate fluid resuscitation, and KENY. The patient will be admitted to an intensive care unit, they will be treated with IV fluids, PRBC, vasopressors.    Transition to comfort measures    ABLA (acute blood loss anemia)  Anemia is likely due to acute blood loss which was from GI bleed . Most recent hemoglobin and hematocrit are listed  below.  Recent Labs     02/21/25  0600 02/22/25  0407 02/23/25  0505   HGB 9.1* 8.2* 9.2*   HCT 28.1* 25.5* 28.4*       Plan  - Monitor serial CBC: Every 8 hours  - Transfuse PRBC if patient becomes hemodynamically unstable, symptomatic or H/H drops below 7/21.  - Patient has received 1 units of PRBCs on 2/19/2025  - Patient's anemia is currently worsening. Will continue current treatment  -recent diagnosis of colon CA  - transfuse as needed  -hold blood thinners  -consult GI- appreciates rec's  Transition to comfort measures    HFrEF (heart failure with reduced ejection fraction)  EF 28% on last echo  Close monitoring while resuscitating with IV fluids    ACP (advance care planning)  Advance Care Planning    Date: 02/21/2025    Code Status  In light of the patients advanced and life limiting illness,I engaged the the patient in a voluntary conversation about the patient's preferences for care  at the very end of life. The patient wishes to have a natural, peaceful death.  Along those lines, the patient does not wish to have CPR or other invasive treatments performed when her heart and/or breathing stops. I communicated to the patient that a DNR order would be placed in her medical record to reflect this preference.    A total of 24 min was spent on advance care planning, goals of care discussion, emotional support, formulating and communicating prognosis and exploring burden/benefit of various approaches of treatment. This discussion occurred on a fully voluntary basis with the verbal consent of the patient and/or family.        Advance Care Planning    Date: 02/23/2025    [facility]  I engaged the family in a voluntary conversation about advance care planning and we specifically addressed what the goals of care would be moving forward, in light of the patient's change in clinical status, specifically declining clinical status.  We did specifically address the patient's likely prognosis, which is poor.  We explored the  patient's values and preferences for future care.  The family endorses that what is most important right now is to focus on comfort and QOL     Accordingly, we have decided that the best plan to meet the patient's goals includes no further escalation in treatment and enrolling in hospice care    A total of 30 min was spent on advance care planning, goals of care discussion, emotional support, formulating and communicating prognosis and exploring burden/benefit of various approaches of treatment. This discussion occurred on a fully voluntary basis with the verbal consent of the patient and/or family.            Hypomagnesemia  Patient has Abnormal Magnesium: hypomagnesemia. Will continue to monitor electrolytes closely. Will replace the affected electrolytes and repeat labs to be done after interventions completed. The patient's magnesium results have been reviewed and are listed below.  Recent Labs   Lab 02/23/25  0505   MG 4.0*      Transition to comfort measures    KENY (acute kidney injury)  KENY is likely due to pre-renal azotemia due to intravascular volume depletion. Baseline creatinine is  1.3 . Most recent creatinine and eGFR are listed below.  Recent Labs     02/22/25  0504 02/23/25  0505 02/23/25  0915   CREATININE 3.2* 3.6* 3.7*   EGFRNORACEVR 16* 14* 14*        Plan  - KENY is improving  - Avoid nephrotoxins and renally dose meds for GFR listed above  - Monitor urine output, serial BMP, and adjust therapy as needed    Transition to comfort measures      Sepsis  Enterocolitis  This patient does have evidence of infective focus  My overall impression is septic shock due to lactate > 4, MAP < 65, and SBP < 90.  Source: Abdominal  CT shows enterocolitis  Antibiotics given-   Antibiotics (72h ago, onward)      None          Latest lactate reviewed-  Recent Labs   Lab 02/22/25  0852   LACTATE 2.5*       Organ dysfunction indicated by Acute kidney injury and Acute heart failure    Fluid challenge Actual Body  weight- Patient will receive 30ml/kg actual body weight to calculate fluid bolus for treatment of septic shock.     Post- resuscitation assessment Yes - I attest a sepsis perfusion exam was performed within 6 hours of sepsis, severe sepsis, or septic shock presentation, following fluid resuscitation.      Will Start Pressors- Levophed for MAP of 65  Source control achieved by:  IV fluid, antibiotics, vasopressors, PRBC    Abd/pel  Diffusely distended and fluid-filled loops of small and large bowel, with some wall thickening of the large bowel and adjacent pericolonic fat stranding. Findings are likely related to enterocolitis, with the possibility of ileus or partial small bowel obstruction not entirely excluded. Further evaluation with direct visualization and tissue sampling if clinically indicated.   Transition to comfort measures      VTE Risk Mitigation (From admission, onward)           Ordered     IP VTE HIGH RISK PATIENT  Once         02/20/25 0558     Place sequential compression device  Until discontinued         02/20/25 0558                    Discharge Planning   JENAE: 2/27/2025     Code Status: DNR   Medical Readiness for Discharge Date:   Discharge Plan A: Home with family (TBD Palliative Discussion)   Discharge Delays: None known at this time        Critical care time spent on the evaluation and treatment of severe organ dysfunction, review of pertinent labs and imaging studies, discussions with consulting providers and discussions with patient/family: 35 minutes.            Jessica Charles MD  Department of Hospital Medicine   [facility] - Intensive Care

## 2025-02-23 NOTE — NURSING
Pt restless with increased work of breathing.   PARTH Crawley NP notified and will be to bedside.

## 2025-02-23 NOTE — NURSING
Patient is starting to have labored breathing and seems like gasping. Sats is 94% on RA. RT notified. Attempted to call on call Np. Unable to reach. Charge notified.

## 2025-02-23 NOTE — ASSESSMENT & PLAN NOTE
Anemia is likely due to acute blood loss which was from GI bleed . Most recent hemoglobin and hematocrit are listed below.  Recent Labs     02/21/25  0600 02/22/25  0407 02/23/25  0505   HGB 9.1* 8.2* 9.2*   HCT 28.1* 25.5* 28.4*       Plan  - Monitor serial CBC: Every 8 hours  - Transfuse PRBC if patient becomes hemodynamically unstable, symptomatic or H/H drops below 7/21.  - Patient has received 1 units of PRBCs on 2/19/2025  - Patient's anemia is currently worsening. Will continue current treatment  -recent diagnosis of colon CA  - transfuse as needed  -hold blood thinners  -consult GI- appreciates rec's  Transition to comfort measures

## 2025-02-23 NOTE — PROGRESS NOTES
Called to bedside by rn for change in resp status. Patient is labored breathing, placed on 2L nc, charge rn called md.

## 2025-02-23 NOTE — PROGRESS NOTES
Progress Note  [facility]    Attending:    Admit Date: 2/19/2025  Today's Date: 02/23/2025    SUBJECTIVE:   No acute overnight events. Required 0.20 of levo overnight. No bloody bowel movements. H/H stable. Complaining of mild abdominal discomfort this morning. States that drinking milk helps her with her acid reflux symptoms. Had GOC discussions with patient to which she expressed optimism that she will recover. Palliative care on board for further assistance given poor clinical prognosis at this time. Family to be present at bedside later today.    Interval History:  2/22/25: Continued hypotension requiring levo 0.26 overnight. S/p lasix challenge with 210cc urine output overnight. Calcium gluconate given overnight for hypocalcemia.  Pt seen and examined at bedside this morning. Ill-appearing and lethargic. No more bloody bowel movements.  Suspect shock is multifactorial- cardiogenic and distributive.  Will continue to have goals of care discussions with family/ patient as prognosis is guarded.    2/23/25: Patient, hyponatremic hyperkalemic overnight; hyperkalemic protocol ordered for calcium gluconate 1g, patient placed on sodium bicarbonate drip 75 mls/hr. Furosemide 80 mg IV, Insulin 7 units given.  Patient has decreased alertness from baseline with  bronchial breath sounds, increased WOB.  Prognosis is grave; Patient transitioned to comfort care per family wishes.     OBJECTIVE:     Vital Signs Trends/Hx Reviewed  Vitals:    02/23/25 0730 02/23/25 0800 02/23/25 0811 02/23/25 0900   BP:  119/61  109/61   BP Location:  Left arm  Left arm   Patient Position:  Lying  Lying   Pulse:  92 100 96   Resp:  15 14 16   Temp: 97.3 °F (36.3 °C)      TempSrc: Axillary      SpO2:  (!) 93% (!) 94% 95%   Weight:       Height:         Physical Exam:  General: NAD, cooperative & interactive.  Patient on norepinephrine drip  HEENT: AT/NC, PERRL, EOMI, oral and nasal mucosa moist.   Neck:  Supple without JVD or palpable LAD.   Cardiac: normal rate, regular rhythm, with no MRG with brisk cap refill and symmetric pulses in distal extremities.  Respiratory: Normal inspection. Symmetric chest rise.  Harsh Bronchial Breath sounds, increased respiratory secretion,  increased work of breathing noted.   Abdomen: Tender at central abdomen and RLQ.   Extremities: 3+ Pitting edema in lower extremities   Neuro: Grossly intact to brief exam. Oriented x3 with appropriate mood/affect to situation.    Laboratory:  Recent Labs   Lab 02/23/25  0505   WBC 26.21*   RBC 3.79*   HGB 9.2*   HCT 28.4*      MCV 75*   MCH 24.3*   MCHC 32.4     Recent Labs   Lab 02/23/25  0505   *   K 5.6*   CL 87*   CO2 12*   BUN 95*   CREATININE 3.6*   CALCIUM 6.9*   MG 4.0*     Recent Labs   Lab 02/23/25  0927   PH 7.154*   PCO2 35.7   PO2 38.8*   HCO3 12.5*   POCSATURATED 58.1*         ASSESSMENT & RECOMMENDATIONS     Neuro/Psych  No acute issues at this time  Continue to monitor     CV  #Undifferentiated Shock  Patient currently in shock requiring pressors to maintain MAP  - concern for septic shock in the setting of bacterial enterocolitis  vs. Hemorrhagic in the setting of bloody bowel movements vs. Cardiogenic shock in the setting of low EF  - concern for microvascular involvement of amyloid causing ischemic colitis  - lactic acid  -On Norepinephrine Drip  -Last BP: 112/61  - Patient requiring increased vasopressor requirements  - Prognosis is grave; patient is actively dying  Plan  - Transition to Comfort Care per family wishes    #HFpEF  #HFrEF   Last TTE 1/15/25- LVEF 25-30 with G2DD; global hypokinesis  Patient has bilateral lower extremity edema  CXR shows evidence of pulmonary vascular congestion      Pulm  - Patient has increased work of breathing, increased respiratory secretions  - She is actively dying  Plan  -Transition to Comfort Care per family wishes      FEN/GI  #Upper GI Bleed 2/2 Colonic  Amyloidosis  -Patient endorses 3 days of rectal bleeding  -Tender at central lower  and right abdomen, no guarding present  -History of Amyloidosis,  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  -GI consulted; deferred endoscopy at this time  -Hematology-Oncology consulted; recommendations appreciated   -recommend palliative care consult; patient currently DNR  -H/H stable   F - S/P 2L IVF 1 unit pRBC  E - Na 124 K 4.3 CO2 17  N - Diet: NPO     Keep Mg 2, K 4  Plan  -Transition to Comfort Care per family wishes     RENAL  #KENY on CKD3  #Cardiorenal syndrome  -Strict I&Os  -Avoid renal toxic meds  - creatinine continues to trend up; currently 2.9  - minimal urine output  Plan  -Transition to Comfort Care per family wishes     Heme  # Amyloidosis  History of colonic amyloidosis on biopsy AL (severiano) type on biopsy  Hematology consulted; state that this is typically managed in outpatient setting; recommend palliative consult as patient has poor outpatient follow up  H/H stable  WBC 16.46  DVT prophylaxis: None    Plan  -Transition to Comfort Care per family wishes  Endo  #No acute issues     ID  #Sepsis  Patient presented in shock requiring Norepinephrine to maintain BP  CT abdo-pelvis showed evidence of enterocolitis  Cxs are pending  Urinalysis: leukocytes 2+, Nitrites, WBC 90  Blood culture; NGTD  urine culture ordered and pending  Stool studies negative    Plan  -Transition to Comfort Care per family wishes   ICU Checklist  Feeding: regular diet  Analgesia: IV dilaudid  1 mg q 2 hours PRN  Sedation: N/A  Thrombo PPX: None  Head of Bed: >30 degrees  Ulcer (Stress) PPX: IV Pantoprazole 40 mg daily  Glucose: goal 140-180  SBT/SAT: N/A  Bowel Regimen: N/A  Indwelling catheter/Lines/Invasive devices: PICC line: right cephalic; PIV posterior right hand, urethral catheter  De-escalation ABX: Comfort Care Only     Code Status: DNR     Dispo  ICU; Comfort Care     Jeancarlos Beltran MD  [facility] PGY1    Pt seen and examined with  Pulmonary/Critical Care team and this note was reviewed and validated with the following additional comments: Vasopressor requirements up, no urine output, pt now delirious. I met with sister.  She and the remainder of the family are in agreement with a palliative care only approach. Remainder of family coming to hospital.    Critical Care time was spent validating the history and physical exam, reviewing the lab and imaging results, and discussing the care of the patient with the bedside nurse and the patient and/or surrogates. This critical care time did not overlap with that of any other provider or involve time for any procedures.  This patient has a high probability of sudden clinically significant deterioration which requires the highest level of physician preparedness to intervene urgently. I managed/supervised life or organ supporting interventions that required frequent physician assessments. I devoted my full attention in the ICU to the direct care of this patient for this period of time. Organ systems which are failing and require intensive, critical care support are: cardiovascular, GI, metabolic  Critical Care time: 30 minutes    Bishnu Chase MD  Phone [phone]

## 2025-02-23 NOTE — EICU
eICU Physician Virtual/Remote Brief Evaluation Note      Message from bedside RN   Requesting CBC q.8h per MD note and daily phosphorus   Chart reviewed   Patient admitted with GI bleeding  CBC q.8h x2 ordered   Phosphorus ordered for this morning      ALEX Keller MD  [facility] Attending  [phone]    This report has been created through the use of [employer] dictation software. Typographical and content errors may occur with this process. While efforts are made to detect and correct such errors, in some cases errors will persist. For this reason, wording in this document should be considered in the proper context and not strictly verbatim

## 2025-02-23 NOTE — HOSPITAL COURSE
59-year-old female with colonic amyloidosis, with progressive decline, Suspect shock is multifactorial- cardiogenic and distributive.  Discuss with family bedside of poor prognosis.  With worsening clinical status family make additions to transition to comfort measures  Call to patient's room now in asystole, on exam no spontaneous respiration, no heart sound heard, pupils are fixed, patient pronounced on 02/23/2025 at 2:30 p.m..Emotional comfort provided family at the bedside

## 2025-02-23 NOTE — DISCHARGE SUMMARY
[facility] Medicine  Discharge Summary      Patient Name: Mary Ellen Saini  MRN: [id number]  ANGELIQUE: [id number]  Patient Class: IP- Inpatient  Admission Date: 2/19/2025  Hospital Length of Stay: 4 days  Discharge Date and Time: 2/23/2025  2:30 PM  Attending Physician: Jessica Charles*   Discharging Provider: Jessica Charles MD  Primary Care Provider: Marlen, Primary Doctor    Primary Care Team: Networked reference to record PCT     HPI:   Patient is a 59-year-old female recently diagnosed with a colon CA and history of bipolar disorder, hypertension, colonic amyloidosis with history of GI bleed and symptomatic anemia, duodenal ulcer presented to ED with 3 days of BRBPR.In ED labwork remarkable for worsening anemia with a H&H 7.1 and 22.  Hyponatremia of 125, bicarb 13.  Worsening CKD with BUN 44 serum creatinine is 2.3.  Lactic acid elevated at 4.0.  CT a/p concerning for enterocolitis.  Pt initially stable, however became hypotensive. Pt given 30cc/kg IVF and 1unit PRBC subsequently placed on vasopressors.  Likely secondary to GI bleed however patient has been expanded to sepsis workup.  Patient will be admitted to ICU.    * No surgery found *      Hospital Course:   No notes on file     Goals of Care Treatment Preferences:  Code Status: DNR    Health care agent: Navarro Saini (son)  Health care agent number: [phone]          What is most important right now is to focus on comfort and QOL .  Accordingly, we have decided that the best plan to meet the patient's goals includes no further escalation in treatment, enrolling in hospice care.      SDOH Screening:  The patient was screened for food insecurity, housing instability, transportation needs, utility difficulties, and interpersonal safety. The social determinant(s) of health identified as a concern this admission are:  Housing instability  Utility difficulties  Transportation difficulties    Will not discuss with case  management and/or community health workers.    Social Drivers of Health with Concerns     Food Insecurity: Patient Unable To Answer (2/20/2025)   Recent Concern: Food Insecurity - Food Insecurity Present (2/20/2025)   Housing Stability: High Risk (2/20/2025)   Transportation Needs: Unmet Transportation Needs (1/25/2025)   Utilities: At Risk (2/20/2025)        Consults:   Consults (From admission, onward)          Status Ordering Provider     Inpatient consult to Registered Dietitian/Nutritionist  Once        Provider:  (Not yet assigned)    Completed SANDI SMITH     Inpatient consult to Nephrology  Once        Provider:  Maday Grande MD    Acknowledged AZIZA MOYER     Inpatient consult to Palliative Care  Once        Provider:  (Not yet assigned)    Completed SANDI SMITH     Inpatient consult to Hematology/Oncology  Once        Provider:  (Not yet assigned)    Completed DAMON KINSEY     Inpatient consult to Gastroenterology  Once        Provider:  (Not yet assigned)    Completed HANNA CHOE     Inpatient consult to PICC team ([facility])  Once        Provider:  (Not yet assigned)    Acknowledged HANNA CHOE            * Amyloidosis    Consult GI-appreciate [id number] with Heme-Onc  Consult Heme-Onc:    GI biopsy on 11/11/2024 revealed amyloidosis   patient noncompliant with her outpatient follow-up with Heme-Onc.    Amyloidosis is managed the outpatient setting  Palliative care consult- appreciates rec's    Transition to comfort measures    Enterocolitis  Consult GI: No plan for endoscopy  Continue with stool study  Transition to comfort measures    Hyponatremia  Hyponatremia is likely due to Dehydration/hypovolemia. The patient's most recent sodium results are listed below.  Recent Labs     02/22/25  0504 02/23/25  0505 02/23/25  0915   * 115* 116*       Plan  - Correct the sodium by 4-6mEq in 24 hours.   - Obtain the following studies: Urine sodium,  urine osmolality, serum osmolality.  - Will treat the hyponatremia with IV fluids as follows:   - Monitor sodium Daily.   - Patient hyponatremia is stable  Transition to comfort measures    Hypovolemic shock  This patient has shock. The type of shock is hemorrhagic. The patient has the following evidence of shock: persistent hypotension, elevated lactic acid after adequate fluid resuscitation, and KENY. The patient will be admitted to an intensive care unit, they will be treated with IV fluids, PRBC, vasopressors.    Transition to comfort measures    ABLA (acute blood loss anemia)  Anemia is likely due to acute blood loss which was from GI bleed . Most recent hemoglobin and hematocrit are listed below.  Recent Labs     02/21/25  0600 02/22/25  0407 02/23/25  0505   HGB 9.1* 8.2* 9.2*   HCT 28.1* 25.5* 28.4*       Plan  - Monitor serial CBC: Every 8 hours  - Transfuse PRBC if patient becomes hemodynamically unstable, symptomatic or H/H drops below 7/21.  - Patient has received 1 units of PRBCs on 2/19/2025  - Patient's anemia is currently worsening. Will continue current treatment  -recent diagnosis of colon CA  - transfuse as needed  -hold blood thinners  -consult GI- appreciates rec's  Transition to comfort measures    HFrEF (heart failure with reduced ejection fraction)  EF 28% on last echo  Close monitoring while resuscitating with IV fluids  Transition to comfort measures    ACP (advance care planning)  Advance Care Planning    Date: 02/21/2025    Code Status  In light of the patients advanced and life limiting illness,I engaged the the patient in a voluntary conversation about the patient's preferences for care  at the very end of life. The patient wishes to have a natural, peaceful death.  Along those lines, the patient does not wish to have CPR or other invasive treatments performed when her heart and/or breathing stops. I communicated to the patient that a DNR order would be placed in her medical record to  reflect this preference.    A total of 24 min was spent on advance care planning, goals of care discussion, emotional support, formulating and communicating prognosis and exploring burden/benefit of various approaches of treatment. This discussion occurred on a fully voluntary basis with the verbal consent of the patient and/or family.        Advance Care Planning    Date: 02/23/2025    [facility]  I engaged the family in a voluntary conversation about advance care planning and we specifically addressed what the goals of care would be moving forward, in light of the patient's change in clinical status, specifically declining clinical status.  We did specifically address the patient's likely prognosis, which is poor.  We explored the patient's values and preferences for future care.  The family endorses that what is most important right now is to focus on comfort and QOL     Accordingly, we have decided that the best plan to meet the patient's goals includes no further escalation in treatment and enrolling in hospice care    A total of 30 min was spent on advance care planning, goals of care discussion, emotional support, formulating and communicating prognosis and exploring burden/benefit of various approaches of treatment. This discussion occurred on a fully voluntary basis with the verbal consent of the patient and/or family.            Hypomagnesemia  Patient has Abnormal Magnesium: hypomagnesemia. Will continue to monitor electrolytes closely. Will replace the affected electrolytes and repeat labs to be done after interventions completed. The patient's magnesium results have been reviewed and are listed below.  Recent Labs   Lab 02/23/25  0505   MG 4.0*      Transition to comfort measures    KENY (acute kidney injury)  KENY is likely due to pre-renal azotemia due to intravascular volume depletion. Baseline creatinine is  1.3 . Most recent creatinine and eGFR are listed below.  Recent Labs     02/22/25  0504  02/23/25  0505 02/23/25  0915   CREATININE 3.2* 3.6* 3.7*   EGFRNORACEVR 16* 14* 14*        Plan  - KENY is improving  - Avoid nephrotoxins and renally dose meds for GFR listed above  - Monitor urine output, serial BMP, and adjust therapy as needed    Transition to comfort measures      Sepsis  Enterocolitis  This patient does have evidence of infective focus  My overall impression is septic shock due to lactate > 4, MAP < 65, and SBP < 90.  Source: Abdominal  CT shows enterocolitis  Antibiotics given-   Antibiotics (72h ago, onward)      None          Latest lactate reviewed-  Recent Labs   Lab 02/22/25  0852   LACTATE 2.5*       Organ dysfunction indicated by Acute kidney injury and Acute heart failure    Fluid challenge Actual Body weight- Patient will receive 30ml/kg actual body weight to calculate fluid bolus for treatment of septic shock.     Post- resuscitation assessment Yes - I attest a sepsis perfusion exam was performed within 6 hours of sepsis, severe sepsis, or septic shock presentation, following fluid resuscitation.      Will Start Pressors- Levophed for MAP of 65  Source control achieved by:  IV fluid, antibiotics, vasopressors, PRBC    Abd/pel  Diffusely distended and fluid-filled loops of small and large bowel, with some wall thickening of the large bowel and adjacent pericolonic fat stranding. Findings are likely related to enterocolitis, with the possibility of ileus or partial small bowel obstruction not entirely excluded. Further evaluation with direct visualization and tissue sampling if clinically indicated.   Transition to comfort measures      Final Active Diagnoses:    Diagnosis Date Noted POA    PRINCIPAL PROBLEM:  Amyloidosis [E85.9] 11/12/2024 Yes    Hypovolemic shock [R57.1] 02/20/2025 Yes    Hyponatremia [E87.1] 02/20/2025 Yes    Enterocolitis [K52.9] 02/20/2025 Yes    ABLA (acute blood loss anemia) [D62] 11/08/2024 Yes    HFrEF (heart failure with reduced ejection fraction) [I50.20]  2024 Yes    Hypomagnesemia [E83.42] 2024 Yes    Sepsis [A41.9] 2024 Yes    KENY (acute kidney injury) [N17.9] 2024 Yes    ACP (advance care planning) [Z71.89] 2024 Not Applicable      Problems Resolved During this Admission:       Discharged Condition:     Disposition:     Follow Up:    Patient Instructions:   No discharge procedures on file.    Significant Diagnostic Studies: N/A    Pending Diagnostic Studies:       Procedure Component Value Units Date/Time    Fecal fat, qualitative [[id number]] Collected: 25 1853    Order Status: Sent Lab Status: In process Updated: 25 0049    Specimen: Stool            Medications:  None    Indwelling Lines/Drains at time of discharge:   Lines/Drains/Airways       Peripherally Inserted Central Catheter Line  Duration             PICC Triple Lumen 25 2225 right cephalic;right basilic 3 days              Drain  Duration                  Urethral Catheter 25 1000 Straight-tip 16 Fr. 3 days                    Time spent on the discharge of patient: 35 minutes    Critical care time spent on the evaluation and treatment of severe organ dysfunction, review of pertinent labs and imaging studies, discussions with consulting providers and discussions with patient/family: 35 minutes.     Jessica Charles MD  Department of Hospital Medicine  [place] - Intensive Care

## 2025-02-23 NOTE — CONSULTS
Consult received for pt identified at risk of developing a pressure injury. RD already following pt. Will continue to follow throughout admit.

## 2025-02-23 NOTE — SIGNIFICANT EVENT
Call to patient's room now in asystole, on exam no spontaneous respiration, no heart sound heard, pupils are fixed, patient pronounced on 02/23/2025 at 2:30 p.m..Emotional comfort provided family at the bedsid  
Hypertensive Disorder

## 2025-02-23 NOTE — ASSESSMENT & PLAN NOTE
Enterocolitis  This patient does have evidence of infective focus  My overall impression is septic shock due to lactate > 4, MAP < 65, and SBP < 90.  Source: Abdominal  CT shows enterocolitis  Antibiotics given-   Antibiotics (72h ago, onward)      None          Latest lactate reviewed-  Recent Labs   Lab 02/22/25  0852   LACTATE 2.5*       Organ dysfunction indicated by Acute kidney injury and Acute heart failure    Fluid challenge Actual Body weight- Patient will receive 30ml/kg actual body weight to calculate fluid bolus for treatment of septic shock.     Post- resuscitation assessment Yes - I attest a sepsis perfusion exam was performed within 6 hours of sepsis, severe sepsis, or septic shock presentation, following fluid resuscitation.      Will Start Pressors- Levophed for MAP of 65  Source control achieved by:  IV fluid, antibiotics, vasopressors, PRBC    Abd/pel  Diffusely distended and fluid-filled loops of small and large bowel, with some wall thickening of the large bowel and adjacent pericolonic fat stranding. Findings are likely related to enterocolitis, with the possibility of ileus or partial small bowel obstruction not entirely excluded. Further evaluation with direct visualization and tissue sampling if clinically indicated.   Transition to comfort measures

## 2025-02-23 NOTE — PLAN OF CARE
Problem: Adult Inpatient Plan of Care  Goal: Plan of Care Review  2/23/2025 0412 by Torito Gusman RN  Outcome: Progressing     Problem: Sepsis/Septic Shock  Goal: Optimal Nutrition Intake  2/23/2025 0412 by Torito Gusman RN  Outcome: Progressing     Problem: Sepsis/Septic Shock  Goal: Absence of Bleeding  2/23/2025 0412 by Torito Gusman RN  Outcome: Progressing     Problem: Acute Kidney Injury/Impairment  Goal: Fluid and Electrolyte Balance  2/23/2025 0412 by Torito Gusman RN  Outcome: Progressing     Problem: Pain Acute  Goal: Optimal Pain Control and Function  2/23/2025 0412 by Torito Gusman RN  Outcome: Progressing     Problem: Coping Ineffective  Goal: Effective Coping  2/23/2025 0412 by Torito Gusman RN  Outcome: Progressing     Problem: Fall Injury Risk  Goal: Absence of Fall and Fall-Related Injury  2/23/2025 0412 by Torito Gusman RN  Outcome: Progressing     Problem: Skin Injury Risk Increased  Goal: Skin Health and Integrity  2/23/2025 0412 by Torito Gusman RN  Outcome: Progressing

## 2025-02-24 LAB
BACTERIA STL CULT: NORMAL
FAT STL QL: NORMAL
NEUTRAL FAT STL QL: NORMAL
OHS QRS DURATION: 90 MS
OHS QRS DURATION: 92 MS
OHS QTC CALCULATION: 463 MS
OHS QTC CALCULATION: 464 MS

## 2025-02-25 LAB
BACTERIA BLD CULT: NORMAL
BACTERIA BLD CULT: NORMAL